# Patient Record
Sex: MALE | Race: WHITE | NOT HISPANIC OR LATINO | Employment: OTHER | URBAN - METROPOLITAN AREA
[De-identification: names, ages, dates, MRNs, and addresses within clinical notes are randomized per-mention and may not be internally consistent; named-entity substitution may affect disease eponyms.]

---

## 2019-09-24 ENCOUNTER — LAB VISIT (OUTPATIENT)
Dept: LAB | Facility: HOSPITAL | Age: 54
End: 2019-09-24
Attending: PSYCHIATRY & NEUROLOGY
Payer: OTHER GOVERNMENT

## 2019-09-24 ENCOUNTER — OFFICE VISIT (OUTPATIENT)
Dept: NEUROLOGY | Facility: CLINIC | Age: 54
End: 2019-09-24
Payer: OTHER GOVERNMENT

## 2019-09-24 VITALS
DIASTOLIC BLOOD PRESSURE: 88 MMHG | BODY MASS INDEX: 31.14 KG/M2 | HEIGHT: 72 IN | SYSTOLIC BLOOD PRESSURE: 141 MMHG | HEART RATE: 69 BPM | WEIGHT: 229.94 LBS

## 2019-09-24 DIAGNOSIS — G44.019 EPISODIC CLUSTER HEADACHE, NOT INTRACTABLE: ICD-10-CM

## 2019-09-24 DIAGNOSIS — G44.019 EPISODIC CLUSTER HEADACHE, NOT INTRACTABLE: Primary | ICD-10-CM

## 2019-09-24 LAB
ALBUMIN SERPL BCP-MCNC: 3.8 G/DL (ref 3.5–5.2)
ALP SERPL-CCNC: 91 U/L (ref 55–135)
ALT SERPL W/O P-5'-P-CCNC: 63 U/L (ref 10–44)
ANION GAP SERPL CALC-SCNC: 5 MMOL/L (ref 8–16)
AST SERPL-CCNC: 43 U/L (ref 10–40)
BASOPHILS # BLD AUTO: 0.03 K/UL (ref 0–0.2)
BASOPHILS NFR BLD: 0.5 % (ref 0–1.9)
BILIRUB SERPL-MCNC: 0.4 MG/DL (ref 0.1–1)
BUN SERPL-MCNC: 10 MG/DL (ref 6–20)
CALCIUM SERPL-MCNC: 8.8 MG/DL (ref 8.7–10.5)
CHLORIDE SERPL-SCNC: 105 MMOL/L (ref 95–110)
CO2 SERPL-SCNC: 29 MMOL/L (ref 23–29)
CREAT SERPL-MCNC: 1 MG/DL (ref 0.5–1.4)
CRP SERPL-MCNC: 5.8 MG/L (ref 0–8.2)
DIFFERENTIAL METHOD: ABNORMAL
EOSINOPHIL # BLD AUTO: 0.1 K/UL (ref 0–0.5)
EOSINOPHIL NFR BLD: 1.8 % (ref 0–8)
ERYTHROCYTE [DISTWIDTH] IN BLOOD BY AUTOMATED COUNT: 12.5 % (ref 11.5–14.5)
ERYTHROCYTE [SEDIMENTATION RATE] IN BLOOD BY WESTERGREN METHOD: 4 MM/HR (ref 0–10)
EST. GFR  (AFRICAN AMERICAN): >60 ML/MIN/1.73 M^2
EST. GFR  (NON AFRICAN AMERICAN): >60 ML/MIN/1.73 M^2
GLUCOSE SERPL-MCNC: 103 MG/DL (ref 70–110)
HCT VFR BLD AUTO: 45.4 % (ref 40–54)
HGB BLD-MCNC: 15.5 G/DL (ref 14–18)
LYMPHOCYTES # BLD AUTO: 1.1 K/UL (ref 1–4.8)
LYMPHOCYTES NFR BLD: 18.6 % (ref 18–48)
MCH RBC QN AUTO: 34.1 PG (ref 27–31)
MCHC RBC AUTO-ENTMCNC: 34.1 G/DL (ref 32–36)
MCV RBC AUTO: 100 FL (ref 82–98)
MONOCYTES # BLD AUTO: 0.8 K/UL (ref 0.3–1)
MONOCYTES NFR BLD: 12.7 % (ref 4–15)
NEUTROPHILS # BLD AUTO: 4 K/UL (ref 1.8–7.7)
NEUTROPHILS NFR BLD: 66.6 % (ref 38–73)
PLATELET # BLD AUTO: 257 K/UL (ref 150–350)
PMV BLD AUTO: 9.3 FL (ref 9.2–12.9)
POTASSIUM SERPL-SCNC: 3.9 MMOL/L (ref 3.5–5.1)
PROT SERPL-MCNC: 7 G/DL (ref 6–8.4)
RBC # BLD AUTO: 4.54 M/UL (ref 4.6–6.2)
SODIUM SERPL-SCNC: 139 MMOL/L (ref 136–145)
WBC # BLD AUTO: 6.07 K/UL (ref 3.9–12.7)

## 2019-09-24 PROCEDURE — 86140 C-REACTIVE PROTEIN: CPT

## 2019-09-24 PROCEDURE — 99999 PR PBB SHADOW E&M-EST. PATIENT-LVL III: CPT | Mod: PBBFAC,,, | Performed by: PSYCHIATRY & NEUROLOGY

## 2019-09-24 PROCEDURE — 85025 COMPLETE CBC W/AUTO DIFF WBC: CPT

## 2019-09-24 PROCEDURE — 99204 PR OFFICE/OUTPT VISIT, NEW, LEVL IV, 45-59 MIN: ICD-10-PCS | Mod: S$PBB,,, | Performed by: PSYCHIATRY & NEUROLOGY

## 2019-09-24 PROCEDURE — 85652 RBC SED RATE AUTOMATED: CPT

## 2019-09-24 PROCEDURE — 36415 COLL VENOUS BLD VENIPUNCTURE: CPT

## 2019-09-24 PROCEDURE — 99204 OFFICE O/P NEW MOD 45 MIN: CPT | Mod: S$PBB,,, | Performed by: PSYCHIATRY & NEUROLOGY

## 2019-09-24 PROCEDURE — 80053 COMPREHEN METABOLIC PANEL: CPT

## 2019-09-24 PROCEDURE — 99213 OFFICE O/P EST LOW 20 MIN: CPT | Mod: PBBFAC | Performed by: PSYCHIATRY & NEUROLOGY

## 2019-09-24 PROCEDURE — 99999 PR PBB SHADOW E&M-EST. PATIENT-LVL III: ICD-10-PCS | Mod: PBBFAC,,, | Performed by: PSYCHIATRY & NEUROLOGY

## 2019-09-24 RX ORDER — BUDESONIDE 0.25 MG/2ML
0.25 INHALANT ORAL
COMMUNITY
End: 2020-09-17

## 2019-09-24 RX ORDER — LISINOPRIL AND HYDROCHLOROTHIAZIDE 12.5; 2 MG/1; MG/1
TABLET ORAL
COMMUNITY
Start: 2019-08-30 | End: 2020-01-08 | Stop reason: SDUPTHER

## 2019-09-24 NOTE — PROGRESS NOTES
Neurology Consult Note    Chief Complaint: right sided headache    HPI:   Enoch Barr is a 54 y.o. male with medical conditions as outlined below who presents for further evaluation of right sided headache. He states he has been having headaches for the past 2 years. He denies a history of migraines or headaches when he was younger. He states he will get a sharp pain above his right eye and it will progress to involve the whole right side of his head. He states this gets up to a 9/10 pain in intensity and can last 3 hours. He states sometimes taking ibuprofen 600mg helps with this headache. He states he has been taking ibuprofen daily for the past 2 years. He reports sometimes it helps and sometimes it doesn't. He states when he gets the headache, his right eye turns red and he will have tearing from this eye. He denies ptosis or droopy eyelid. He states sometimes he will get blurry vision, but denies diplopia or loss of vision. He states he will get a runny nose at times. He states he has a constant 1-2/10 pain, however, around 5 pm four to 5 times a week, his headache will worsen to a 9/10 pain. He states he had an MRI brain done at [facility] within the past year for this and he was told everything was okay. He has no further complaints.     Past Medical History:  Past Medical History:   Diagnosis Date    Hypertension     on medication       Past Surgical History:  Past Surgical History:   Procedure Laterality Date    HERNIA REPAIR      SINUS SURGERY  2012       Social History:  Social History     Socioeconomic History    Marital status:      Spouse name: Not on file    Number of children: Not on file    Years of education: Not on file    Highest education level: Not on file   Occupational History    Not on file   Social Needs    Financial resource strain: Not on file    Food insecurity:     Worry: Not on file     Inability: Not on file    Transportation needs:     Medical: Not on file      Non-medical: Not on file   Tobacco Use    Smoking status: Former Smoker     Last attempt to quit: 1999     Years since quittin.3    Smokeless tobacco: Never Used   Substance and Sexual Activity    Alcohol use: Yes     Alcohol/week: 7.0 standard drinks     Types: 7 Glasses of wine per week     Comment: beer 2-3 daily    Drug use: No    Sexual activity: Yes     Partners: Female   Lifestyle    Physical activity:     Days per week: Not on file     Minutes per session: Not on file    Stress: Not on file   Relationships    Social connections:     Talks on phone: Not on file     Gets together: Not on file     Attends Pentecostal service: Not on file     Active member of club or organization: Not on file     Attends meetings of clubs or organizations: Not on file     Relationship status: Not on file   Other Topics Concern    Not on file   Social History Narrative    Not on file       Family History:  History reviewed. No pertinent family history.    Medications:  Current Outpatient Medications   Medication Sig Dispense Refill    budesonide (PULMICORT) 0.25 mg/2 mL nebulizer solution Inhale 0.25 mg into the lungs.      fexofenadine (ALLEGRA) 30 MG tablet Take 30 mg by mouth once daily.      fluticasone (FLONASE) 50 mcg/actuation nasal spray 2 sprays by Each Nare route once daily.      lisinopril-hydrochlorothiazide (PRINZIDE,ZESTORETIC) 20-12.5 mg per tablet Take 1 tablet by mouth once daily.  6    omeprazole (PRILOSEC) 20 MG capsule Take 20 mg by mouth once daily.  6    lisinopril-hydrochlorothiazide (PRINZIDE,ZESTORETIC) 20-12.5 mg per tablet TAKE 1 TABLET BY MOUTH ONCE DAILY       No current facility-administered medications for this visit.        Allergies:  Review of patient's allergies indicates:  No Known Allergies    ROS:  A 12 point review of system was negative aside from pertinent positives and negatives as outlined above.    Physical Exam  Vitals:    19 1456   BP: (!) 141/88   Pulse:  69       General: well nourished, well developed  Eyes: no scleral icterus   Nose: nasal turbinates intact  Neck: supple, ROM intact  Skin: no rash or ecchymosis  Joints: no swelling or erythema  Cardiac: regular rate and rhythm  Lungs: clear to auscultation bilaterally    Neuro:  Mental status: AAO x 3, no dysarthria, no aphasia, communicating appropriately  CN: PERRL, EOMI, VFF, V1-V3 sensation intact, no facial asymmetry, hearing grossly intact, tongue midline  Motor:   RUE 5/5  RLE 5/5  LUE 5/5  LLE 5/5    Normal bulk and tone    Reflexes: 2+ throughout, toes equivocal bilaterally  Sensory: intact to light touch throughout  Coordination: no dysmetria on FTN  Gait: steady    Prior Imaging/Labs:  Will try to obtain outside MRI brain records      Assessment and Plan:    54 y.o. male with right sided headaches likely 2/2 cluster headache.    1. Episodic cluster headache, not intractable  - galcanezumab-gnlm (GALCANEZUMAB-GNLM) 100 mg/mL Syrg; Inject 300 mg into the skin every 28 days.  Dispense: 3 mL; Refill: 0  - Sedimentation rate; Future  - C-reactive protein; Future  - CBC auto differential; Future  - Comprehensive metabolic panel; Future  Patient was educated on medication overuse headache and was advised to limit over the counter medication for headache including ibuprofen to 2-3 times a week.      Patient was made aware of side effects of medication(s) prescribed and was advised to notify me if he experiences any.        Patient was advised to notify me for worsening symptoms. I will see patient back in 1 month or sooner if necessary.     Thank you for this consultation.    Jacqueline Pacheco DO  [facility]  [address] [facility] [address]  [place] [id number] [phone]

## 2019-09-25 ENCOUNTER — TELEPHONE (OUTPATIENT)
Dept: PHARMACY | Facility: CLINIC | Age: 54
End: 2019-09-25

## 2019-09-26 ENCOUNTER — OFFICE VISIT (OUTPATIENT)
Dept: OPTOMETRY | Facility: CLINIC | Age: 54
End: 2019-09-26
Payer: OTHER GOVERNMENT

## 2019-09-26 DIAGNOSIS — H53.8 BLURRY VISION: Primary | ICD-10-CM

## 2019-09-26 DIAGNOSIS — H52.7 REFRACTIVE ERROR: ICD-10-CM

## 2019-09-26 PROCEDURE — 92004 PR EYE EXAM, NEW PATIENT,COMPREHESV: ICD-10-PCS | Mod: S$PBB,,, | Performed by: OPTOMETRIST

## 2019-09-26 PROCEDURE — 92015 DETERMINE REFRACTIVE STATE: CPT | Mod: ,,, | Performed by: OPTOMETRIST

## 2019-09-26 PROCEDURE — 99211 OFF/OP EST MAY X REQ PHY/QHP: CPT | Mod: PBBFAC,PO | Performed by: OPTOMETRIST

## 2019-09-26 PROCEDURE — 99999 PR PBB SHADOW E&M-EST. PATIENT-LVL I: CPT | Mod: PBBFAC,,, | Performed by: OPTOMETRIST

## 2019-09-26 PROCEDURE — 92004 COMPRE OPH EXAM NEW PT 1/>: CPT | Mod: S$PBB,,, | Performed by: OPTOMETRIST

## 2019-09-26 PROCEDURE — 92015 PR REFRACTION: ICD-10-PCS | Mod: ,,, | Performed by: OPTOMETRIST

## 2019-09-26 PROCEDURE — 99999 PR PBB SHADOW E&M-EST. PATIENT-LVL I: ICD-10-PCS | Mod: PBBFAC,,, | Performed by: OPTOMETRIST

## 2019-09-26 NOTE — PROGRESS NOTES
Subjective:       Patient ID: Enoch Barr is a 54 y.o. male      Chief Complaint   Patient presents with    Concerns About Ocular Health    Hypertensive Eye Exam     History of Present Illness  Dls: 1 yr     53 y/o male presents today with c/o blurry vision at distance and near ou.     Pt wears pal's.      No tearing  + itching  No burning   No pain   + ha's  + floaters  No flashes    Eye meds  None    Assessment/Plan:     1. Blurry vision  Good ocular health OU    2. Refractive error  Educated patient on refractive error and discussed lens options. Dispensed updated spectacle Rx. Educated about adaptation period to new specs.    Eyeglass Final Rx     Eyeglass Final Rx       Sphere Cylinder Axis Add    Right -0.75 +1.00 155 +2.00    Left -0.25 +0.25 165 +2.00    Expiration Date:  9/26/2020                  Follow up in about 1 year (around 9/26/2020).

## 2019-10-02 NOTE — TELEPHONE ENCOUNTER
Submitted prior authorization for EMGALITY 100 MG/ML (300 MG)  to insurance [employer] for review on 10/2/2019 [facility]

## 2019-10-07 NOTE — TELEPHONE ENCOUNTER
DOCUMENTATION ONLY:   Prior authorization for EMGALITY 100 MG/ML approved from 10/3/2019 to 03/31/2020.     Co-pay: $53    Patient Assistance IS NOT required.     Forward to clinical pharmacist for consult & shipment. [facility]LC

## 2019-10-21 ENCOUNTER — TELEPHONE (OUTPATIENT)
Dept: PHARMACY | Facility: CLINIC | Age: 54
End: 2019-10-21

## 2019-10-22 NOTE — TELEPHONE ENCOUNTER
Call to complete initial consult, no answer. Unable to leave voicemail.     Rui Mahajan, PharmD  Clinical Pharmacist  [facility]  P: [phone]

## 2019-10-23 ENCOUNTER — OFFICE VISIT (OUTPATIENT)
Dept: NEUROLOGY | Facility: CLINIC | Age: 54
End: 2019-10-23
Payer: OTHER GOVERNMENT

## 2019-10-23 VITALS
SYSTOLIC BLOOD PRESSURE: 133 MMHG | WEIGHT: 229 LBS | BODY MASS INDEX: 31.02 KG/M2 | HEIGHT: 72 IN | HEART RATE: 80 BPM | DIASTOLIC BLOOD PRESSURE: 89 MMHG

## 2019-10-23 DIAGNOSIS — G44.019 EPISODIC CLUSTER HEADACHE, NOT INTRACTABLE: Primary | ICD-10-CM

## 2019-10-23 PROCEDURE — 99999 PR PBB SHADOW E&M-EST. PATIENT-LVL III: ICD-10-PCS | Mod: PBBFAC,,, | Performed by: PSYCHIATRY & NEUROLOGY

## 2019-10-23 PROCEDURE — 99213 OFFICE O/P EST LOW 20 MIN: CPT | Mod: PBBFAC | Performed by: PSYCHIATRY & NEUROLOGY

## 2019-10-23 PROCEDURE — 99999 PR PBB SHADOW E&M-EST. PATIENT-LVL III: CPT | Mod: PBBFAC,,, | Performed by: PSYCHIATRY & NEUROLOGY

## 2019-10-23 PROCEDURE — 99214 PR OFFICE/OUTPT VISIT, EST, LEVL IV, 30-39 MIN: ICD-10-PCS | Mod: S$PBB,,, | Performed by: PSYCHIATRY & NEUROLOGY

## 2019-10-23 PROCEDURE — 99214 OFFICE O/P EST MOD 30 MIN: CPT | Mod: S$PBB,,, | Performed by: PSYCHIATRY & NEUROLOGY

## 2019-10-23 NOTE — PROGRESS NOTES
Neurology Follow Up Note    Chief Complaint: follow up for headaches    Interval History:  Since last visit, patient states his headaches have remained stable. He continues to get right sided constant headaches associated with tearing from his eye and nasal congestion. He states the pain becomes severe around 4-5 in the afternoon, and then subsides after a few hours.  He states he was unable to obtain emgality as of yet. I called [facility] and they state patient can come in tomorrow for initial consultation and to obtain medication.       Initial Visit 9/24/19:  Enoch Barr is a 54 y.o. male with medical conditions as outlined below who presents for further evaluation of right sided headache. He states he has been having headaches for the past 2 years. He denies a history of migraines or headaches when he was younger. He states he will get a sharp pain above his right eye and it will progress to involve the whole right side of his head. He states this gets up to a 9/10 pain in intensity and can last 3 hours. He states sometimes taking ibuprofen 600mg helps with this headache. He states he has been taking ibuprofen daily for the past 2 years. He reports sometimes it helps and sometimes it doesn't. He states when he gets the headache, his right eye turns red and he will have tearing from this eye. He denies ptosis or droopy eyelid. He states sometimes he will get blurry vision, but denies diplopia or loss of vision. He states he will get a runny nose at times. He states he has a constant 1-2/10 pain, however, around 5 pm four to 5 times a week, his headache will worsen to a 9/10 pain. He states he had an MRI brain done at [facility] within the past year for this and he was told everything was okay. He has no further complaints.     Past Medical History:  Past Medical History:   Diagnosis Date    Hypertension     on medication       Past Surgical History:  Past Surgical History:   Procedure  Laterality Date    HERNIA REPAIR      SINUS SURGERY         Social History:  Social History     Socioeconomic History    Marital status:      Spouse name: Not on file    Number of children: Not on file    Years of education: Not on file    Highest education level: Not on file   Occupational History    Not on file   Social Needs    Financial resource strain: Not on file    Food insecurity:     Worry: Not on file     Inability: Not on file    Transportation needs:     Medical: Not on file     Non-medical: Not on file   Tobacco Use    Smoking status: Former Smoker     Last attempt to quit: 1999     Years since quittin.4    Smokeless tobacco: Never Used   Substance and Sexual Activity    Alcohol use: Yes     Alcohol/week: 7.0 standard drinks     Types: 7 Glasses of wine per week     Comment: beer 2-3 daily    Drug use: No    Sexual activity: Yes     Partners: Female   Lifestyle    Physical activity:     Days per week: Not on file     Minutes per session: Not on file    Stress: Not on file   Relationships    Social connections:     Talks on phone: Not on file     Gets together: Not on file     Attends Yazidism service: Not on file     Active member of club or organization: Not on file     Attends meetings of clubs or organizations: Not on file     Relationship status: Not on file   Other Topics Concern    Not on file   Social History Narrative    Not on file       Family History:  Family History   Problem Relation Age of Onset    No Known Problems Mother     No Known Problems Father     No Known Problems Sister     No Known Problems Brother     No Known Problems Maternal Aunt     No Known Problems Maternal Uncle     No Known Problems Paternal Aunt     No Known Problems Paternal Uncle     No Known Problems Maternal Grandmother     No Known Problems Maternal Grandfather     No Known Problems Paternal Grandmother     No Known Problems Paternal Grandfather     Amblyopia  Neg Hx     Blindness Neg Hx     Cancer Neg Hx     Cataracts Neg Hx     Diabetes Neg Hx     Glaucoma Neg Hx     Hypertension Neg Hx     Macular degeneration Neg Hx     Retinal detachment Neg Hx     Strabismus Neg Hx     Stroke Neg Hx     Thyroid disease Neg Hx        Medications:  Current Outpatient Medications   Medication Sig Dispense Refill    budesonide (PULMICORT) 0.25 mg/2 mL nebulizer solution Inhale 0.25 mg into the lungs.      fexofenadine (ALLEGRA) 30 MG tablet Take 30 mg by mouth once daily.      fluticasone (FLONASE) 50 mcg/actuation nasal spray 2 sprays by Each Nare route once daily.      galcanezumab-gnlm (GALCANEZUMAB-GNLM) 300 mg/3 mL (100 mg/mL x 3) Syrg Inject 300 mg into the skin every 28 days. 3 mL 0    lisinopril-hydrochlorothiazide (PRINZIDE,ZESTORETIC) 20-12.5 mg per tablet Take 1 tablet by mouth once daily.  6    lisinopril-hydrochlorothiazide (PRINZIDE,ZESTORETIC) 20-12.5 mg per tablet TAKE 1 TABLET BY MOUTH ONCE DAILY      omeprazole (PRILOSEC) 20 MG capsule Take 20 mg by mouth once daily.  6     No current facility-administered medications for this visit.        Allergies:  Review of patient's allergies indicates:  No Known Allergies    ROS:  A 12 point review of system was negative aside from pertinent positives and negatives as outlined above.    Physical Exam  Vitals:    10/23/19 1505   BP: 133/89   Pulse: 80       General: well nourished, well developed  Eyes: no scleral icterus   Nose: nasal turbinates intact  Neck: supple, ROM intact  Skin: no rash or ecchymosis  Joints: no swelling or erythema  Cardiac: regular rate and rhythm  Lungs: clear to auscultation bilaterally    Neuro:  Mental status: AAO x 3, no dysarthria, no aphasia, communicating appropriately  CN: PERRL, EOMI, VFF, V1-V3 sensation intact, no facial asymmetry, hearing grossly intact, tongue midline  Motor:   RUE 5/5  RLE 5/5  LUE 5/5  LLE 5/5    Normal bulk and tone    Reflexes: 2+ throughout, toes  equivocal bilaterally  Sensory: intact to light touch throughout  Coordination: no dysmetria on FTN  Gait: steady    Prior Imaging/Labs:  Request for MRI sent to [place] at last visit, will have staff resend to obtain these records      Assessment and Plan:    54 y.o. male with cluster headaches.    1. Episodic cluster headache, not intractable  - galcanezumab-gnlm (GALCANEZUMAB-GNLM) 100 mg/mL Syrg; Inject 300 mg into the skin every 28 days.  Dispense: 3 mL; Refill: 0 - spoke to [facility] and patient can obtain this medication at 8 am tomorrow  Patient was educated on medication overuse headache and was advised to limit over the counter medication for headache including ibuprofen to 2-3 times a week.    Patient was made aware of side effects of medication(s) prescribed and was advised to notify me if he experiences any.         Patient was advised to notify me for worsening symptoms. If he doesn't notice a difference in symptoms in a week, he was advised to call and notify me and we will consider an alternative.  I will see patient back in 1 month or sooner if necessary.     Jacqueline Pacheco DO  [facility]  [address] [facility] [address]  [place] [id number]  [phone]

## 2019-10-24 ENCOUNTER — TELEPHONE (OUTPATIENT)
Dept: PHARMACY | Facility: CLINIC | Age: 54
End: 2019-10-24

## 2019-10-24 NOTE — TELEPHONE ENCOUNTER
Initial Emgality consult completed on 10/24 . Emgality 100mg x3 will be picked up from 001 on 10/24. $53.00 copay. Patient intends to start Emgality on 10/24. Address confirmed. Confirmed 2 patient identifiers - name and . Therapy Appropriate.    Mr. Barr suffers cluster headaches almost daily, starting usually after lunchtime and lasting about 2 hours. These headaches started about a year and a half ago. His pain level ranges from 3-7/10. His headaches frequently make him miss out on going to the gym. He rates his QoL a 7/10.    Indication: Migraine prophlaxis  Goals of treatment: reduction in cluster attach frequency    --Injection experience: None, not needle scared and confident in ability to administer  Informed patient on online injection video on  website.      Store in refrigerator prior to use (do not freeze, do not shake, keep in original box until use).    Counseled patient on administration directions:  - Inject (300 mg) comes in three (100 mg) prefilled syringes, which are taken one after the other at the start of a cluster period and then every month until the end of the cluster period.  - Take out of the refrigerator 30 minutes prior to injection to reach room temperature.  - Wash hands before and after injection.  - Monthly RX will come with gauze, band aids, and alcohol swabs.  - Patient may self-inject in either the front/top of the thighs, abdomen- but at least 2 inches away from belly button   - If someone else is giving the injection, they may also use the outer part of your upper arm or your buttocks.   - Use 3 different injection sites.   - Patient was instructed to rotate injections sites monthly.  - Inspect medication - should be clear and colorless to slightly yellow to slightly brown.   - Patient is to wipe down the injection site with the alcohol pad, wait to dry.    - Remove white cap from needle (pull straight off).  - Gently pinch the skin where you will inject and  insert needle at a 45-degree angle.  - Slowly depress plunger with thumb until all medicine injected   - If you have bleeding at the injection site, press a cotton ball or gauze over the injection site. Do not rub the injection site.  - Do not put the needle cap back on the prefilled syringe.  - Patient will use sharps container; once full, per state law, she/he may securely lock the sharps container and place in trash. Pharmacy will replace the sharps at no additional charge.    Patient was counseled on possible side effects:  - Injection site reaction: redness, soreness, itching, bruising, which should resolve within 3-5 days.  - Allergic reactions: iswelling of face, mouth, tongue, throat, trouble breathing.   - Informed that there is no data in pregnancy/breastfeeding.     Consultation included the importance of compliance and of keeping all follow up appointments.  Patient understands to report any medication changes to [facility] and provider. All questions answered and addressed to patients satisfaction. RPh will touchbase with pt in 7 days and [facility] will contact patient in 3 weeks for refills.    Rui Mahajan, PharmD  Clinical Pharmacist  [facility]  P: [phone]    InBasket sent 10/24 @ 10:44AM

## 2019-10-31 ENCOUNTER — OFFICE VISIT (OUTPATIENT)
Dept: OPTOMETRY | Facility: CLINIC | Age: 54
End: 2019-10-31
Payer: OTHER GOVERNMENT

## 2019-10-31 ENCOUNTER — PATIENT MESSAGE (OUTPATIENT)
Dept: NEUROLOGY | Facility: CLINIC | Age: 54
End: 2019-10-31

## 2019-10-31 DIAGNOSIS — H11.31 SUBCONJUNCTIVAL HEMORRHAGE OF RIGHT EYE: Primary | ICD-10-CM

## 2019-10-31 PROCEDURE — 99999 PR PBB SHADOW E&M-EST. PATIENT-LVL II: ICD-10-PCS | Mod: PBBFAC,,, | Performed by: OPTOMETRIST

## 2019-10-31 PROCEDURE — 99999 PR PBB SHADOW E&M-EST. PATIENT-LVL II: CPT | Mod: PBBFAC,,, | Performed by: OPTOMETRIST

## 2019-10-31 PROCEDURE — 99212 OFFICE O/P EST SF 10 MIN: CPT | Mod: PBBFAC,PO | Performed by: OPTOMETRIST

## 2019-10-31 PROCEDURE — 92014 COMPRE OPH EXAM EST PT 1/>: CPT | Mod: S$PBB,,, | Performed by: OPTOMETRIST

## 2019-10-31 PROCEDURE — 92014 PR EYE EXAM, EST PATIENT,COMPREHESV: ICD-10-PCS | Mod: S$PBB,,, | Performed by: OPTOMETRIST

## 2019-10-31 NOTE — PATIENT INSTRUCTIONS
Subconjunctival Hemorrhage    A subconjunctival hemorrhage is when a blood vessel breaks open in the white of the eye. It causes a bright red patch in the white of the eye. It is similar to a bruise on the skin. This type of hemorrhage is common. It can look quite alarming, but it is usually harmless.    Understanding the conjunctiva  The conjunctiva is the thin layer that covers the inside of the eyelids and the surface of the eye. It has many tiny blood vessels that bring oxygen and nutrients to the eye. The sclera is the white part of the eye that lies beneath the conjunctiva. Sometimes a blood vessel in the conjunctiva breaks open and bleeds. The blood then collects under the conjunctiva and turns part of the eye red. Over several weeks, your body then absorbs the blood.    What causes subconjunctival hemorrhage?  In many cases the cause isnt known. But some health conditions may make it more likely. These include:  Eye injury  Eye surgery  High blood pressure  Inflammation of the conjunctiva  Contact lens use  Diabetes  Arteriosclerosis  Tumor of the conjunctiva  Diseases that affect blood clotting  Violent sneezing, coughing, or vomiting  Certain medicines that can increase bleeding, such as aspirin  Pushing hard during childbirth  Straining during constipation    Symptoms of subconjunctival hemorrhage  The main symptom is a red patch on the eye. You may notice it after waking up in the morning. In most cases just 1 eye will have a hemorrhage. It usually happens once and then goes away. But some health conditions may cause repeat hemorrhages. You may feel like you have something in your eye, but this is not common.    Diagnosing subconjunctival hemorrhage  Your health care provider will ask about your health history. You may have a physical exam. This includes a basic eye exam. Your health care provider will make sure you dont have other causes of red eye that may need other treatment.  You will need to see  an eye doctor if you have had an eye injury. This doctor might use a special lighted microscope to look closely at your eye. This helps show the doctor if the injury hurt the eye itself and not just its outer layer.  If this is not your first subconjunctival hemorrhage, your doctor may need to find the cause. For example, you may need blood tests to check for a blood clotting disorder.  Treatment for subconjunctival hemorrhage  In most cases you will not need treatment. The red patch will usually go away on its own in a few weeks. It will turn from red to brown and then yellow. There are no treatments to speed up this process. Your doctor may suggest you use a warm compress and artificial tears eye drops to help relieve some of the redness.  If your subconjunctival hemorrhage was caused by a health condition, that condition will be treated. For example, you may need a blood pressure medicine to treat high blood pressure.    When to call your health care provider  Call your health care provider right away if you have any of these:  Hemorrhage that doesnt go away in 2 to 3 weeks  Eye pain  Loss of eyesight  Another subconjunctival hemorrhage       © 0530-4592 [employer]. [address] [place] [address], [place] [address]. All rights reserved. This information is not intended as a substitute for professional medical care. Always follow your healthcare professional's instructions.

## 2019-10-31 NOTE — PROGRESS NOTES
Subjective:       Patient ID: Enoch Barr is a 54 y.o. male      Chief Complaint   Patient presents with    Concerns About Ocular Health     History of Present Illness  Dls: 9/26/19 Dr. Chapman     55 y/o male presents today with c/o x 1 day blood red od no tearing no mucous no pain slight irritation od no changes in vision no  Photophobia.   Pt was seen in urgent care x 1 day. Pt did not use any gtts.         Assessment/Plan:     1. Subconjunctival hemorrhage of right eye  Discussed diagnosis with patient. Educated patient that it can take 2-3 weeks for symptoms to resolve. Artificial tears QID for comfort. Can alternate between warm and cold compresses. RTC if no improvement in symptoms.       Follow up if symptoms worsen or fail to improve.

## 2019-11-06 ENCOUNTER — PATIENT MESSAGE (OUTPATIENT)
Dept: NEUROLOGY | Facility: CLINIC | Age: 54
End: 2019-11-06

## 2019-11-06 DIAGNOSIS — G44.019 EPISODIC CLUSTER HEADACHE, NOT INTRACTABLE: Primary | ICD-10-CM

## 2019-11-06 RX ORDER — METHYLPREDNISOLONE 4 MG/1
TABLET ORAL
Qty: 1 PACKAGE | Refills: 0 | Status: SHIPPED | OUTPATIENT
Start: 2019-11-06 | End: 2019-11-27

## 2019-11-18 ENCOUNTER — TELEPHONE (OUTPATIENT)
Dept: PHARMACY | Facility: CLINIC | Age: 54
End: 2019-11-18

## 2019-11-18 DIAGNOSIS — G44.019 EPISODIC CLUSTER HEADACHE, NOT INTRACTABLE: ICD-10-CM

## 2019-11-18 NOTE — TELEPHONE ENCOUNTER
Call attempt 1 for Emgality refill - LVM and MyChart message sent. Script is out of refills; request sent to provider.

## 2019-11-20 ENCOUNTER — PATIENT MESSAGE (OUTPATIENT)
Dept: NEUROLOGY | Facility: CLINIC | Age: 54
End: 2019-11-20

## 2019-11-20 ENCOUNTER — OFFICE VISIT (OUTPATIENT)
Dept: NEUROLOGY | Facility: CLINIC | Age: 54
End: 2019-11-20
Payer: OTHER GOVERNMENT

## 2019-11-20 VITALS
BODY MASS INDEX: 31.2 KG/M2 | WEIGHT: 230.38 LBS | HEIGHT: 72 IN | DIASTOLIC BLOOD PRESSURE: 94 MMHG | SYSTOLIC BLOOD PRESSURE: 145 MMHG | HEART RATE: 66 BPM

## 2019-11-20 DIAGNOSIS — G44.019 EPISODIC CLUSTER HEADACHE, NOT INTRACTABLE: ICD-10-CM

## 2019-11-20 PROCEDURE — 99213 OFFICE O/P EST LOW 20 MIN: CPT | Mod: PBBFAC | Performed by: PSYCHIATRY & NEUROLOGY

## 2019-11-20 PROCEDURE — 99999 PR PBB SHADOW E&M-EST. PATIENT-LVL III: CPT | Mod: PBBFAC,,, | Performed by: PSYCHIATRY & NEUROLOGY

## 2019-11-20 PROCEDURE — 99214 OFFICE O/P EST MOD 30 MIN: CPT | Mod: S$PBB,,, | Performed by: PSYCHIATRY & NEUROLOGY

## 2019-11-20 PROCEDURE — 99999 PR PBB SHADOW E&M-EST. PATIENT-LVL III: ICD-10-PCS | Mod: PBBFAC,,, | Performed by: PSYCHIATRY & NEUROLOGY

## 2019-11-20 PROCEDURE — 99214 PR OFFICE/OUTPT VISIT, EST, LEVL IV, 30-39 MIN: ICD-10-PCS | Mod: S$PBB,,, | Performed by: PSYCHIATRY & NEUROLOGY

## 2019-11-20 NOTE — PROGRESS NOTES
Neurology Follow Up Note    Chief Complaint: follow up for headaches    Interval History:  Since last visit, patient feels his headaches have greatly improved on emgality. He states he tolerated medrol dose madeline well, but the past week, his headaches have started to come back. He states he has had 2-3 typical headaches in the past week. He only took emgality 100mg last month, however, he was supposed to take 300mg and was unaware of this. He is due for another injection soon and was advised to inject 300mg. He has no further complaints.    Last Visit 10/23/19:  Since last visit, patient states his headaches have remained stable. He continues to get right sided constant headaches associated with tearing from his eye and nasal congestion. He states the pain becomes severe around 4-5 in the afternoon, and then subsides after a few hours.  He states he was unable to obtain emgality as of yet. I called [facility] and they state patient can come in tomorrow for initial consultation and to obtain medication.         Initial Visit 9/24/19:  Enoch Barr is a 54 y.o. male with medical conditions as outlined below who presents for further evaluation of right sided headache. He states he has been having headaches for the past 2 years. He denies a history of migraines or headaches when he was younger. He states he will get a sharp pain above his right eye and it will progress to involve the whole right side of his head. He states this gets up to a 9/10 pain in intensity and can last 3 hours. He states sometimes taking ibuprofen 600mg helps with this headache. He states he has been taking ibuprofen daily for the past 2 years. He reports sometimes it helps and sometimes it doesn't. He states when he gets the headache, his right eye turns red and he will have tearing from this eye. He denies ptosis or droopy eyelid. He states sometimes he will get blurry vision, but denies diplopia or loss of vision. He states he  will get a runny nose at times. He states he has a constant 1-2/10 pain, however, around 5 pm four to 5 times a week, his headache will worsen to a 9/10 pain. He states he had an MRI brain done at [facility] within the past year for this and he was told everything was okay. He has no further complaints.     Past Medical History:  Past Medical History:   Diagnosis Date    Hypertension     on medication       Past Surgical History:  Past Surgical History:   Procedure Laterality Date    HERNIA REPAIR      SINUS SURGERY         Social History:  Social History     Socioeconomic History    Marital status:      Spouse name: Not on file    Number of children: Not on file    Years of education: Not on file    Highest education level: Not on file   Occupational History    Not on file   Social Needs    Financial resource strain: Not on file    Food insecurity:     Worry: Not on file     Inability: Not on file    Transportation needs:     Medical: Not on file     Non-medical: Not on file   Tobacco Use    Smoking status: Former Smoker     Last attempt to quit: 1999     Years since quittin.5    Smokeless tobacco: Never Used   Substance and Sexual Activity    Alcohol use: Yes     Alcohol/week: 7.0 standard drinks     Types: 7 Glasses of wine per week     Comment: beer 2-3 daily    Drug use: No    Sexual activity: Yes     Partners: Female   Lifestyle    Physical activity:     Days per week: Not on file     Minutes per session: Not on file    Stress: Not on file   Relationships    Social connections:     Talks on phone: Not on file     Gets together: Not on file     Attends Mandaeism service: Not on file     Active member of club or organization: Not on file     Attends meetings of clubs or organizations: Not on file     Relationship status: Not on file   Other Topics Concern    Not on file   Social History Narrative    Not on file       Family History:  Family History   Problem Relation Age  of Onset    No Known Problems Mother     No Known Problems Father     No Known Problems Sister     No Known Problems Brother     No Known Problems Maternal Aunt     No Known Problems Maternal Uncle     No Known Problems Paternal Aunt     No Known Problems Paternal Uncle     No Known Problems Maternal Grandmother     No Known Problems Maternal Grandfather     No Known Problems Paternal Grandmother     No Known Problems Paternal Grandfather     Amblyopia Neg Hx     Blindness Neg Hx     Cancer Neg Hx     Cataracts Neg Hx     Diabetes Neg Hx     Glaucoma Neg Hx     Hypertension Neg Hx     Macular degeneration Neg Hx     Retinal detachment Neg Hx     Strabismus Neg Hx     Stroke Neg Hx     Thyroid disease Neg Hx        Medications:  Current Outpatient Medications   Medication Sig Dispense Refill    budesonide (PULMICORT) 0.25 mg/2 mL nebulizer solution Inhale 0.25 mg into the lungs.      fexofenadine (ALLEGRA) 30 MG tablet Take 30 mg by mouth once daily.      fluticasone (FLONASE) 50 mcg/actuation nasal spray 2 sprays by Each Nare route once daily.      galcanezumab-gnlm (GALCANEZUMAB-GNLM) 300 mg/3 mL (100 mg/mL x 3) Syrg Inject 300 mg into the skin every 28 days. 3 mL 0    lisinopril-hydrochlorothiazide (PRINZIDE,ZESTORETIC) 20-12.5 mg per tablet Take 1 tablet by mouth once daily.  6    lisinopril-hydrochlorothiazide (PRINZIDE,ZESTORETIC) 20-12.5 mg per tablet TAKE 1 TABLET BY MOUTH ONCE DAILY      methylPREDNISolone (MEDROL DOSEPACK) 4 mg tablet use as directed 1 Package 0    omeprazole (PRILOSEC) 20 MG capsule Take 20 mg by mouth once daily.  6     No current facility-administered medications for this visit.        Allergies:  Review of patient's allergies indicates:  No Known Allergies    ROS:  A 12 point review of system was negative aside from pertinent positives and negatives as outlined above.    Physical Exam  Vitals:    11/20/19 1459   BP: (!) 145/94   Pulse: 66       General:  well nourished, well developed  Eyes: no scleral icterus   Nose: nasal turbinates intact  Neck: supple, ROM intact  Skin: no rash or ecchymosis  Joints: no swelling or erythema  Cardiac: regular rate and rhythm  Lungs: clear to auscultation bilaterally    Neuro:  Mental status: AAO x 3, no dysarthria, no aphasia, communicating appropriately  CN: PERRL, EOMI, VFF, V1-V3 sensation intact, no facial asymmetry, hearing grossly intact, tongue midline  Motor:   RUE 5/5  RLE 5/5  LUE 5/5  LLE 5/5    Normal bulk and tone    Reflexes: 2+ throughout, toes equivocal bilaterally  Sensory: intact to light touch throughout  Coordination: no dysmetria on FTN  Gait: steady    Prior Imaging/Labs:  No recent neuroimaging available for review      Assessment and Plan:    54 y.o. male with cluster headache improving on emgality    1. Episodic cluster headache  - galcanezumab-gnlm (GALCANEZUMAB-GNLM) 300 mg/3 mL (100 mg/mL x 3) Syrg; Inject 300 mg into the skin every 28 days.  Dispense: 3 mL; Refill: 0  - MRI Brain Without Contrast; Future  - MRA Brain; Future      Patient was made aware of side effects of medication(s) prescribed and was advised to notify me if he experiences any.        Patient was advised to notify me for worsening symptoms. I will see patient back in 1 month  or sooner if necessary.       Jacqueline Pacheco DO  [facility]  [address] [facility] [address]  [place] [address]  [phone]

## 2019-11-21 ENCOUNTER — TELEPHONE (OUTPATIENT)
Dept: PHARMACY | Facility: CLINIC | Age: 54
End: 2019-11-21

## 2019-11-25 ENCOUNTER — TELEPHONE (OUTPATIENT)
Dept: NEUROLOGY | Facility: CLINIC | Age: 54
End: 2019-11-25

## 2019-12-02 ENCOUNTER — PATIENT MESSAGE (OUTPATIENT)
Dept: NEUROLOGY | Facility: CLINIC | Age: 54
End: 2019-12-02

## 2019-12-06 ENCOUNTER — HOSPITAL ENCOUNTER (OUTPATIENT)
Dept: RADIOLOGY | Facility: HOSPITAL | Age: 54
Discharge: HOME | End: 2019-12-06
Attending: PSYCHIATRY & NEUROLOGY
Payer: OTHER GOVERNMENT

## 2019-12-06 DIAGNOSIS — G44.019 EPISODIC CLUSTER HEADACHE, NOT INTRACTABLE: ICD-10-CM

## 2019-12-06 PROCEDURE — 70551 MRI BRAIN STEM W/O DYE: CPT | Mod: 26,,, | Performed by: RADIOLOGY

## 2019-12-06 PROCEDURE — 70544 MRA BRAIN WITHOUT CONTRAST: ICD-10-PCS | Mod: 26,59,, | Performed by: RADIOLOGY

## 2019-12-06 PROCEDURE — 70551 MRI BRAIN WITHOUT CONTRAST: ICD-10-PCS | Mod: 26,,, | Performed by: RADIOLOGY

## 2019-12-06 PROCEDURE — 70544 MR ANGIOGRAPHY HEAD W/O DYE: CPT | Mod: 26,59,, | Performed by: RADIOLOGY

## 2019-12-06 PROCEDURE — 70544 MR ANGIOGRAPHY HEAD W/O DYE: CPT | Mod: 59,TC

## 2019-12-06 PROCEDURE — 70551 MRI BRAIN STEM W/O DYE: CPT | Mod: TC

## 2019-12-18 ENCOUNTER — OFFICE VISIT (OUTPATIENT)
Dept: NEUROLOGY | Facility: CLINIC | Age: 54
End: 2019-12-18
Payer: OTHER GOVERNMENT

## 2019-12-18 VITALS
BODY MASS INDEX: 31.24 KG/M2 | HEART RATE: 61 BPM | HEIGHT: 72 IN | DIASTOLIC BLOOD PRESSURE: 89 MMHG | WEIGHT: 230.63 LBS | SYSTOLIC BLOOD PRESSURE: 156 MMHG

## 2019-12-18 DIAGNOSIS — G44.019 EPISODIC CLUSTER HEADACHE, NOT INTRACTABLE: Primary | ICD-10-CM

## 2019-12-18 PROCEDURE — 99999 PR PBB SHADOW E&M-EST. PATIENT-LVL III: CPT | Mod: PBBFAC,,, | Performed by: PSYCHIATRY & NEUROLOGY

## 2019-12-18 PROCEDURE — 99999 PR PBB SHADOW E&M-EST. PATIENT-LVL III: ICD-10-PCS | Mod: PBBFAC,,, | Performed by: PSYCHIATRY & NEUROLOGY

## 2019-12-18 PROCEDURE — 99213 OFFICE O/P EST LOW 20 MIN: CPT | Mod: PBBFAC | Performed by: PSYCHIATRY & NEUROLOGY

## 2019-12-18 PROCEDURE — 99213 PR OFFICE/OUTPT VISIT, EST, LEVL III, 20-29 MIN: ICD-10-PCS | Mod: S$PBB,,, | Performed by: PSYCHIATRY & NEUROLOGY

## 2019-12-18 PROCEDURE — 99213 OFFICE O/P EST LOW 20 MIN: CPT | Mod: S$PBB,,, | Performed by: PSYCHIATRY & NEUROLOGY

## 2019-12-18 NOTE — PROGRESS NOTES
Neurology Follow Up Note    Chief Complaint: follow up MRI results    Interval History:  Since last visit, patient states he has had no further headaches. He took emgality 300mg injection on 11/28/19 and tolerated this well. He had an MRI brain and MRA since last visit which were unremarkable.    Last Visit 11/20/19:  Since last visit, patient feels his headaches have greatly improved on emgality. He states he tolerated medrol dose madeline well, but the past week, his headaches have started to come back. He states he has had 2-3 typical headaches in the past week. He only took emgality 100mg last month, however, he was supposed to take 300mg and was unaware of this. He is due for another injection soon and was advised to inject 300mg. He has no further complaints.     Visit 10/23/19:  Since last visit, patient states his headaches have remained stable. He continues to get right sided constant headaches associated with tearing from his eye and nasal congestion. He states the pain becomes severe around 4-5 in the afternoon, and then subsides after a few hours.  He states he was unable to obtain emgality as of yet. I called [facility] and they state patient can come in tomorrow for initial consultation and to obtain medication.         Initial Visit 9/24/19:  Enoch Barr is a 54 y.o. male with medical conditions as outlined below who presents for further evaluation of right sided headache. He states he has been having headaches for the past 2 years. He denies a history of migraines or headaches when he was younger. He states he will get a sharp pain above his right eye and it will progress to involve the whole right side of his head. He states this gets up to a 9/10 pain in intensity and can last 3 hours. He states sometimes taking ibuprofen 600mg helps with this headache. He states he has been taking ibuprofen daily for the past 2 years. He reports sometimes it helps and sometimes it doesn't. He states  when he gets the headache, his right eye turns red and he will have tearing from this eye. He denies ptosis or droopy eyelid. He states sometimes he will get blurry vision, but denies diplopia or loss of vision. He states he will get a runny nose at times. He states he has a constant 1-2/10 pain, however, around 5 pm four to 5 times a week, his headache will worsen to a 9/10 pain. He states he had an MRI brain done at [facility] within the past year for this and he was told everything was okay. He has no further complaints.        Past Medical History:  Past Medical History:   Diagnosis Date    Hypertension     on medication       Past Surgical History:  Past Surgical History:   Procedure Laterality Date    HERNIA REPAIR      SINUS SURGERY         Social History:  Social History     Socioeconomic History    Marital status:      Spouse name: Not on file    Number of children: Not on file    Years of education: Not on file    Highest education level: Not on file   Occupational History    Not on file   Social Needs    Financial resource strain: Not on file    Food insecurity:     Worry: Not on file     Inability: Not on file    Transportation needs:     Medical: Not on file     Non-medical: Not on file   Tobacco Use    Smoking status: Former Smoker     Last attempt to quit: 1999     Years since quittin.5    Smokeless tobacco: Never Used   Substance and Sexual Activity    Alcohol use: Yes     Alcohol/week: 7.0 standard drinks     Types: 7 Glasses of wine per week     Comment: beer 2-3 daily    Drug use: No    Sexual activity: Yes     Partners: Female   Lifestyle    Physical activity:     Days per week: Not on file     Minutes per session: Not on file    Stress: Not on file   Relationships    Social connections:     Talks on phone: Not on file     Gets together: Not on file     Attends Episcopal service: Not on file     Active member of club or organization: Not on file     Attends  meetings of clubs or organizations: Not on file     Relationship status: Not on file   Other Topics Concern    Not on file   Social History Narrative    Not on file       Family History:  Family History   Problem Relation Age of Onset    No Known Problems Mother     No Known Problems Father     No Known Problems Sister     No Known Problems Brother     No Known Problems Maternal Aunt     No Known Problems Maternal Uncle     No Known Problems Paternal Aunt     No Known Problems Paternal Uncle     No Known Problems Maternal Grandmother     No Known Problems Maternal Grandfather     No Known Problems Paternal Grandmother     No Known Problems Paternal Grandfather     Amblyopia Neg Hx     Blindness Neg Hx     Cancer Neg Hx     Cataracts Neg Hx     Diabetes Neg Hx     Glaucoma Neg Hx     Hypertension Neg Hx     Macular degeneration Neg Hx     Retinal detachment Neg Hx     Strabismus Neg Hx     Stroke Neg Hx     Thyroid disease Neg Hx        Medications:  Current Outpatient Medications   Medication Sig Dispense Refill    budesonide (PULMICORT) 0.25 mg/2 mL nebulizer solution Inhale 0.25 mg into the lungs.      fexofenadine (ALLEGRA) 30 MG tablet Take 30 mg by mouth once daily.      fluticasone (FLONASE) 50 mcg/actuation nasal spray 2 sprays by Each Nare route once daily.      galcanezumab-gnlm (GALCANEZUMAB-GNLM) 300 mg/3 mL (100 mg/mL x 3) Syrg Inject 300 mg into the skin every 28 days. 3 mL 0    lisinopril-hydrochlorothiazide (PRINZIDE,ZESTORETIC) 20-12.5 mg per tablet Take 1 tablet by mouth once daily.  6    lisinopril-hydrochlorothiazide (PRINZIDE,ZESTORETIC) 20-12.5 mg per tablet TAKE 1 TABLET BY MOUTH ONCE DAILY      omeprazole (PRILOSEC) 20 MG capsule Take 20 mg by mouth once daily.  6     No current facility-administered medications for this visit.        Allergies:  Review of patient's allergies indicates:  No Known Allergies    ROS:  A 12 point review of system was negative aside  from pertinent positives and negatives as outlined above.    Physical Exam  Vitals:    12/18/19 1527   BP: (!) 156/89   Pulse: 61       General: well nourished, well developed  Eyes: no scleral icterus   Nose: nasal turbinates intact  Neck: supple, ROM intact  Skin: no rash or ecchymosis  Joints: no swelling or erythema  Cardiac: regular rate and rhythm  Lungs: clear to auscultation bilaterally    Neuro:  Mental status: AAO x 3, no dysarthria, no aphasia, communicating appropriately  CN: PERRL, EOMI, VFF, V1-V3 sensation intact, no facial asymmetry, hearing grossly intact, tongue midline  Motor:   RUE 5/5  RLE 5/5  LUE 5/5  LLE 5/5    Normal bulk and tone    Reflexes: 2+ throughout, toes equivocal bilaterally  Sensory: intact to light touch throughout  Coordination: no dysmetria on FTN  Gait: steady    Prior Imaging/Labs:  Reviewed      Assessment and Plan:    54 y.o. male with cluster headaches improved on emgality.    1. Episodic cluster headache  If patient remains headache free until his next injection was due, he was advised to take one emgality 100mg injection. If his headaches worsen prior to his next injection, he was advised to take 2 emgality 100mg injections for a total of 200mg.  I will see patient back in 6 weeks and depending on headache control, will determine if patient needs to continue on emgality.            Patient was advised to notify me for worsening symptoms. I will see patient back in 6 weeks or sooner if necessary.       Jacqueline Pacheco DO  [facility]  [address] [facility] [address]  [place] [address]  [phone]

## 2019-12-19 ENCOUNTER — TELEPHONE (OUTPATIENT)
Dept: PHARMACY | Facility: CLINIC | Age: 54
End: 2019-12-19

## 2019-12-19 NOTE — TELEPHONE ENCOUNTER
Emgality script is out of refills -- electronically requesting additional. Called patient to obtain next injection date -- patient was not reached, left a voicemail.

## 2019-12-24 ENCOUNTER — TELEPHONE (OUTPATIENT)
Dept: NEUROLOGY | Facility: CLINIC | Age: 54
End: 2019-12-24

## 2019-12-24 NOTE — TELEPHONE ENCOUNTER
Sent to Dr. Miller.        ----- Message from Michael Galindo sent at 12/24/2019  8:49 AM CST -----  Contact: [facility]  Type:  Pharmacy Calling to Clarify an RX    Name of Caller: Neela    Pharm[facility] Name: [facility]    Prescription Name: galcanezumab-gnlm (GALCANEZUMAB-GNLM) 300 mg/3 mL (100 mg/mL x 3) Syrg    What do they need to clarify?Pt needs a new script/refill    Can you be contacted via [facility]?No    Best Call Back Number: [phone]    Additional Information: n/a

## 2019-12-26 ENCOUNTER — PATIENT MESSAGE (OUTPATIENT)
Dept: NEUROLOGY | Facility: CLINIC | Age: 54
End: 2019-12-26

## 2019-12-30 DIAGNOSIS — G44.019 EPISODIC CLUSTER HEADACHE, NOT INTRACTABLE: ICD-10-CM

## 2019-12-31 ENCOUNTER — HOSPITAL ENCOUNTER (EMERGENCY)
Facility: HOSPITAL | Age: 54
Discharge: HOME | End: 2019-12-31
Attending: EMERGENCY MEDICINE
Payer: OTHER GOVERNMENT

## 2019-12-31 VITALS
DIASTOLIC BLOOD PRESSURE: 92 MMHG | RESPIRATION RATE: 18 BRPM | TEMPERATURE: 99 F | HEIGHT: 72 IN | OXYGEN SATURATION: 96 % | HEART RATE: 83 BPM | SYSTOLIC BLOOD PRESSURE: 159 MMHG | BODY MASS INDEX: 31.15 KG/M2 | WEIGHT: 230 LBS

## 2019-12-31 DIAGNOSIS — J30.9 ALLERGIC RHINITIS, UNSPECIFIED SEASONALITY, UNSPECIFIED TRIGGER: ICD-10-CM

## 2019-12-31 DIAGNOSIS — R09.81 SINUS CONGESTION: Primary | ICD-10-CM

## 2019-12-31 LAB
CTP QC/QA: YES
POC MOLECULAR INFLUENZA A AGN: NEGATIVE
POC MOLECULAR INFLUENZA B AGN: NEGATIVE

## 2019-12-31 PROCEDURE — 63600175 PHARM REV CODE 636 W HCPCS: Mod: ER | Performed by: NURSE PRACTITIONER

## 2019-12-31 PROCEDURE — 87502 INFLUENZA DNA AMP PROBE: CPT | Mod: ER

## 2019-12-31 PROCEDURE — 99284 EMERGENCY DEPT VISIT MOD MDM: CPT | Mod: 25,ER

## 2019-12-31 PROCEDURE — 96372 THER/PROPH/DIAG INJ SC/IM: CPT | Mod: ER

## 2019-12-31 RX ORDER — AZELASTINE 1 MG/ML
1 SPRAY, METERED NASAL 2 TIMES DAILY
Qty: 30 ML | Refills: 0 | Status: SHIPPED | OUTPATIENT
Start: 2019-12-31 | End: 2020-05-13 | Stop reason: SDUPTHER

## 2019-12-31 RX ORDER — DEXAMETHASONE SODIUM PHOSPHATE 4 MG/ML
10 INJECTION, SOLUTION INTRA-ARTICULAR; INTRALESIONAL; INTRAMUSCULAR; INTRAVENOUS; SOFT TISSUE
Status: COMPLETED | OUTPATIENT
Start: 2019-12-31 | End: 2019-12-31

## 2019-12-31 RX ADMIN — DEXAMETHASONE SODIUM PHOSPHATE 10 MG: 4 INJECTION, SOLUTION INTRA-ARTICULAR; INTRALESIONAL; INTRAMUSCULAR; INTRAVENOUS; SOFT TISSUE at 06:12

## 2020-01-01 NOTE — ED PROVIDER NOTES
Encounter Date: 2019       History     Chief Complaint   Patient presents with    Sinus Problem     Patient reports 2-3 days of right sided sinus pressure, mild relief with OTC ibuprofen. +Sneezing. Denies fevers or chills.     53 y/o male presents to the ED with complaints of sinus congestion and pressure for 3 days.  Patient denies rash, fever, cough, chest pain, SOB, numbness, weakness, tingling, abdominal pain, back pain, dysuria, hematuria, nausea, vomiting, diarrhea, or any other complaints.  He states he has a history of sinus problems and cluster HA and the sinus congestion and pressure triggers the HA.  He rates his pain as 5/10 and has been trying allegra and flonase with minimal relief.  No aggravating or alleviating factors.            Review of patient's allergies indicates:  No Known Allergies  Past Medical History:   Diagnosis Date    Hypertension     on medication     Past Surgical History:   Procedure Laterality Date    HERNIA REPAIR      SINUS SURGERY       Family History   Problem Relation Age of Onset    No Known Problems Mother     No Known Problems Father     No Known Problems Sister     No Known Problems Brother     No Known Problems Maternal Aunt     No Known Problems Maternal Uncle     No Known Problems Paternal Aunt     No Known Problems Paternal Uncle     No Known Problems Maternal Grandmother     No Known Problems Maternal Grandfather     No Known Problems Paternal Grandmother     No Known Problems Paternal Grandfather     Amblyopia Neg Hx     Blindness Neg Hx     Cancer Neg Hx     Cataracts Neg Hx     Diabetes Neg Hx     Glaucoma Neg Hx     Hypertension Neg Hx     Macular degeneration Neg Hx     Retinal detachment Neg Hx     Strabismus Neg Hx     Stroke Neg Hx     Thyroid disease Neg Hx      Social History     Tobacco Use    Smoking status: Former Smoker     Last attempt to quit: 1999     Years since quittin.6    Smokeless tobacco: Never  Used   Substance Use Topics    Alcohol use: Yes     Alcohol/week: 7.0 standard drinks     Types: 7 Glasses of wine per week     Comment: beer 2-3 daily    Drug use: No     Review of Systems   Constitutional: Negative for chills and fever.   HENT: Positive for congestion. Negative for ear pain, rhinorrhea and sore throat.    Eyes: Negative for pain, discharge and redness.   Respiratory: Negative for cough and shortness of breath.    Cardiovascular: Negative for chest pain.   Gastrointestinal: Negative for abdominal pain, diarrhea, nausea and vomiting.   Genitourinary: Negative for dysuria.   Musculoskeletal: Negative for back pain, neck pain and neck stiffness.   Skin: Negative for rash.   Neurological: Negative for dizziness, weakness, light-headedness, numbness and headaches.   Psychiatric/Behavioral: Negative for confusion.       Physical Exam     Initial Vitals [12/31/19 1606]   BP Pulse Resp Temp SpO2   (!) 149/100 76 18 98.5 °F (36.9 °C) 95 %      MAP       --         Physical Exam    Nursing note and vitals reviewed.  Constitutional: Vital signs are normal. He appears well-developed. He is cooperative.  Non-toxic appearance. He does not appear ill.   HENT:   Head: Normocephalic and atraumatic.   Right Ear: Tympanic membrane normal.   Left Ear: Tympanic membrane normal.   Nose: Mucosal edema present. Right sinus exhibits maxillary sinus tenderness. Right sinus exhibits no frontal sinus tenderness. Left sinus exhibits no maxillary sinus tenderness and no frontal sinus tenderness.   Mouth/Throat: Uvula is midline, oropharynx is clear and moist and mucous membranes are normal.   Eyes: Conjunctivae are normal.   Neck: Normal range of motion.   Cardiovascular: Normal rate and regular rhythm.   Pulmonary/Chest: Effort normal and breath sounds normal.   Abdominal: Normal appearance.   Neurological: He is alert and oriented to person, place, and time. GCS eye subscore is 4. GCS verbal subscore is 5. GCS motor  subscore is 6.   Skin: Skin is warm, dry and intact. No rash noted.   Psychiatric: He has a normal mood and affect. His speech is normal and behavior is normal. Thought content normal.         ED Course   Procedures  Labs Reviewed   POCT INFLUENZA A/B MOLECULAR          Imaging Results    None                APC / Resident Notes:   This is an evaluation of a 54 y.o. male that presents to the Emergency Department for sinus congestion and pressure    Physical Exam shows a non-toxic, afebrile, and well appearing male. Right maxillary sinus tenderness with mucosal edema    Vital signs are reassuring. If available, previous records reviewed.   RESULTS: influenza negative    My overall impression is Sinus congestion, allergic rhinitis. I considered, but at this time, do not suspect URI, sinusitis, strep, influenza, cellulitis.    ED Course: Decadron. D/C Meds: Astelin. D/C Information: f/u, medications. The diagnosis, treatment plan, instructions for follow-up and reevaluation with PCP as well as ED return precautions were discussed and understanding was verbalized. All questions or concerns have been addressed.                                 Clinical Impression:       ICD-10-CM ICD-9-CM   1. Sinus congestion R09.81 478.19   2. Allergic rhinitis, unspecified seasonality, unspecified trigger J30.9 477.9         Disposition:   Disposition: Discharged  Condition: Stable                     ZEN Oneill  12/31/19 2028

## 2020-01-07 NOTE — PROGRESS NOTES
This note was created by combination of typed  and M-Modal dictation.  Transcription errors may be present.  If there are any questions, please contact me.    Assessment & Plan:   Acute sinusitis, recurrence not specified, unspecified location  -ongoing without relief.  Greater than 7 days.  Trial of Augmentin.  -     amoxicillin-clavulanate 875-125mg (AUGMENTIN) 875-125 mg per tablet; Take 1 tablet by mouth 2 (two) times daily. for 10 days  Dispense: 20 tablet; Refill: 0    Essential hypertension  -has been on lisinopril HCT longstanding.  Refilled today.  I have asked him to return at his convenience for fasting labs.  -     CBC auto differential; Future; Expected date: 01/08/2020  -     Comprehensive metabolic panel; Future; Expected date: 01/08/2020  -     Lipid panel; Future; Expected date: 01/08/2020  -     TSH; Future; Expected date: 01/08/2020  -     lisinopril-hydrochlorothiazide (PRINZIDE,ZESTORETIC) 20-12.5 mg per tablet; Take 1 tablet by mouth once daily.  Dispense: 90 tablet; Refill: 3    Right lower quadrant abdominal pain  Gastroesophageal reflux disease, esophagitis presence not specified  Screening for colon cancer  -he notes along with his sinus symptoms, some abdominal discomfort more on the right side, and some constipation.  No gross blood.  Has never had screening for colon cancer and I have recommended he do so.  We will see if his symptoms of discomfort and constipation resolved with treatment of his sinusitis.  But he should get the colonoscopy done regardless.  -     Case request GI: COLONOSCOPY; Future; Expected date: 01/08/2020    Chronic cluster headache, not intractable followed by neurology    Other chronic sinusitis uses budesonide for this NOT for lungs  -he is is booties and I would nebulizer for sinus rinse and not for pulmonary issues.  Does not recall a diagnosis of COPD.  No PFTs on outside records that I can visualize.    Medications Discontinued During This Encounter    Medication Reason    lisinopril-hydrochlorothiazide (PRINZIDE,ZESTORETIC) 20-12.5 mg per tablet Duplicate Order    lisinopril-hydrochlorothiazide (PRINZIDE,ZESTORETIC) 20-12.5 mg per tablet Reorder       meds sent this encounter:  Medications Ordered This Encounter   Medications    amoxicillin-clavulanate 875-125mg (AUGMENTIN) 875-125 mg per tablet     Sig: Take 1 tablet by mouth 2 (two) times daily. for 10 days     Dispense:  20 tablet     Refill:  0    lisinopril-hydrochlorothiazide (PRINZIDE,ZESTORETIC) 20-12.5 mg per tablet     Sig: Take 1 tablet by mouth once daily.     Dispense:  90 tablet     Refill:  3       Follow Up: No follow-ups on file. follow up labs.     Subjective:     Chief Complaint   Patient presents with    GI Problem     bloated. for a few days now    Headache    Generalized Body Aches       MARA Kendall is a 54 y.o. male, last appointment with this clinic was Visit date not found.    NP; previous Dr. Bergman/Kwan  Cluster headaches followed by neuro  HTN, lisinopril hctz. Thinks white coat BP.   GERD on PPI  COPD, former smoker; pulmicort PRN  Chronic sinusitis, hx of evaluation with [facility]  6/5/19 CT sinus:  Small amount of mucosal thickening along the floor of the left maxillary sinus. Both maxillary sinuses undergone previous antrostomies. There is a bridge of tissue  the natural ostia versus the created ostia bilaterally. Note that the ethmoid sinuses of also undergone previous surgery. There is no significant paranasal sinus disease within the ethmoid sinuses or the sphenoid sinuses. The frontal sinuses are absent.    ER 12/31 for sinusitis.  Decadron.  Elevated BP at ER    Here to establish care.  Also for sinus complaints as well as abdominal discomfort.    The respiratory infection type symptoms and the abdominal soreness seem to have come together.  It has been going on now for well over a week.  He felt initially some abdominal discomfort and then it evolved into a  generalized achiness and flu-like symptoms and now it is more localized to the abdomen again.  More on the right side.  Some sensation of constipation with some hard stools and resultant straining.  No gross blood.  Has never had colonoscopy.  Urination is normal.    Respiratory infection type symptoms with sinus congestion and pressure and feeling head heaviness.  Was seen at the ER but felt to be viral.  It has persisted and has not improved at all.  No objective measurement of temperature but he is afebrile on intake today.  Continued nasal congestion.    Outside records from Care everywhere state COPD, he has never been told he has COPD.  He has P does and I would nebulizer but this is used as a sinus rinse and not inhaled into the lungs.  Does not recall ever having had PFTs.  Former smoker.    Ran out of his blood pressure medicine yesterday.  Today blood pressure is okay.  Has not had labs recently and is agreeable to return for fasting labs at his convenience.    Patient Care Team:  Thom Bueno MD as PCP - General (Family Medicine)    Patient Active Problem List    Diagnosis Date Noted    Chronic cluster headache, not intractable followed by neurology 01/08/2020    Refractive error 09/26/2019    Other chronic sinusitis uses budesonide for this NOT for lungs 05/09/2019 6/5/19 CT sinus:  Small amount of mucosal thickening along the floor of the left maxillary sinus. Both maxillary sinuses undergone previous antrostomies. There is a bridge of tissue  the natural ostia versus the created ostia bilaterally. Note that the ethmoid sinuses of also undergone previous surgery. There is no significant paranasal sinus disease within the ethmoid sinuses or the sphenoid sinuses. The frontal sinuses are absent.      Essential hypertension 05/09/2019    Gastroesophageal reflux disease without esophagitis 05/09/2019       PAST MEDICAL HISTORY:  Past Medical History:   Diagnosis Date    Hypertension      on medication       PAST SURGICAL HISTORY:  Past Surgical History:   Procedure Laterality Date    HERNIA REPAIR      SINUS SURGERY  2012     Family History   Problem Relation Age of Onset    Coronary artery disease Mother     Hypertension Father     No Known Problems Sister     No Known Problems Brother     Valvular heart disease Sister     No Known Problems Brother     No Known Problems Brother     Amblyopia Neg Hx     Blindness Neg Hx     Cancer Neg Hx     Cataracts Neg Hx     Diabetes Neg Hx     Glaucoma Neg Hx     Macular degeneration Neg Hx     Retinal detachment Neg Hx     Strabismus Neg Hx     Stroke Neg Hx     Thyroid disease Neg Hx        SOCIAL HISTORY:  Social History     Socioeconomic History    Marital status:      Spouse name: Not on file    Number of children: Not on file    Years of education: Not on file    Highest education level: Not on file   Occupational History    Occupation: production      Employer: [employer]   Social Needs    Financial resource strain: Not on file    Food insecurity:     Worry: Not on file     Inability: Not on file    Transportation needs:     Medical: Not on file     Non-medical: Not on file   Tobacco Use    Smoking status: Former Smoker     Last attempt to quit: 1999     Years since quittin.6    Smokeless tobacco: Never Used   Substance and Sexual Activity    Alcohol use: Yes     Alcohol/week: 7.0 standard drinks     Types: 7 Glasses of wine per week     Comment: beer 2-3 daily    Drug use: No    Sexual activity: Yes     Partners: Female   Lifestyle    Physical activity:     Days per week: Not on file     Minutes per session: Not on file    Stress: Not on file   Relationships    Social connections:     Talks on phone: Not on file     Gets together: Not on file     Attends Christianity service: Not on file     Active member of club or organization: Not on file     Attends meetings of clubs or  organizations: Not on file     Relationship status: Not on file   Other Topics Concern    Not on file   Social History Narrative    Not on file       ALLERGIES AND MEDICATIONS: updated and reviewed.  Review of patient's allergies indicates:  No Known Allergies  Current Outpatient Medications   Medication Sig Dispense Refill    azelastine (ASTELIN) 137 mcg (0.1 %) nasal spray 1 spray (137 mcg total) by Nasal route 2 (two) times daily. 30 mL 0    budesonide (PULMICORT) 0.25 mg/2 mL nebulizer solution Inhale 0.25 mg into the lungs.      fexofenadine (ALLEGRA) 30 MG tablet Take 30 mg by mouth once daily.      galcanezumab-gnlm (GALCANEZUMAB-GNLM) 300 mg/3 mL (100 mg/mL x 3) Syrg Inject 300 mg into the skin every 28 days. 3 mL 0    lisinopril-hydrochlorothiazide (PRINZIDE,ZESTORETIC) 20-12.5 mg per tablet TAKE 1 TABLET BY MOUTH ONCE DAILY      omeprazole (PRILOSEC) 20 MG capsule Take 20 mg by mouth once daily.  6     No current facility-administered medications for this visit.        Review of Systems   Constitutional: Negative for chills, fever and malaise/fatigue.   HENT: Positive for congestion. Negative for ear pain, hearing loss and sore throat.    Respiratory: Negative for cough, sputum production and shortness of breath.    Cardiovascular: Negative for chest pain.   Gastrointestinal: Positive for abdominal pain.   Musculoskeletal: Negative for myalgias and neck pain.   Skin: Negative for rash.   Neurological: Positive for headaches.       Objective:   Physical Exam   Vitals:    01/08/20 1502   BP: 132/82   BP Location: Right arm   Patient Position: Sitting   BP Method: Medium (Manual)   Pulse: 78   Temp: 98.7 °F (37.1 °C)   TempSrc: Oral   SpO2: 99%   Weight: 103.9 kg (229 lb)   Height: 6' (1.829 m)    Body mass index is 31.06 kg/m².  Weight: 103.9 kg (229 lb)   Height: 6' (182.9 cm)     Physical Exam   Constitutional: He is oriented to person, place, and time. He appears well-developed and well-nourished.    HENT:   TMs grey/clear bilaterally.  OP no erythema no exudates   Eyes: EOM are normal.   Neck: Neck supple.   Cardiovascular: Normal rate, regular rhythm and normal heart sounds.   Pulmonary/Chest: Effort normal and breath sounds normal. He has no wheezes.   Abdominal: He exhibits no distension and no mass. There is no tenderness. There is no rebound and no guarding.   Lymphadenopathy:     He has no cervical adenopathy.   Neurological: He is alert and oriented to person, place, and time.   Skin: Skin is warm and dry.   Psychiatric: He has a normal mood and affect. His behavior is normal.

## 2020-01-08 ENCOUNTER — OFFICE VISIT (OUTPATIENT)
Dept: FAMILY MEDICINE | Facility: CLINIC | Age: 55
End: 2020-01-08
Payer: OTHER GOVERNMENT

## 2020-01-08 VITALS
HEIGHT: 72 IN | WEIGHT: 229 LBS | HEART RATE: 78 BPM | SYSTOLIC BLOOD PRESSURE: 132 MMHG | TEMPERATURE: 99 F | DIASTOLIC BLOOD PRESSURE: 82 MMHG | OXYGEN SATURATION: 99 % | BODY MASS INDEX: 31.02 KG/M2

## 2020-01-08 DIAGNOSIS — G44.029 CHRONIC CLUSTER HEADACHE, NOT INTRACTABLE: ICD-10-CM

## 2020-01-08 DIAGNOSIS — J01.90 ACUTE SINUSITIS, RECURRENCE NOT SPECIFIED, UNSPECIFIED LOCATION: Primary | ICD-10-CM

## 2020-01-08 DIAGNOSIS — R10.31 RIGHT LOWER QUADRANT ABDOMINAL PAIN: ICD-10-CM

## 2020-01-08 DIAGNOSIS — J32.8 OTHER CHRONIC SINUSITIS: ICD-10-CM

## 2020-01-08 DIAGNOSIS — K21.9 GASTROESOPHAGEAL REFLUX DISEASE, ESOPHAGITIS PRESENCE NOT SPECIFIED: ICD-10-CM

## 2020-01-08 DIAGNOSIS — Z12.11 SCREENING FOR COLON CANCER: ICD-10-CM

## 2020-01-08 DIAGNOSIS — I10 ESSENTIAL HYPERTENSION: ICD-10-CM

## 2020-01-08 PROBLEM — J44.9 CHRONIC OBSTRUCTIVE PULMONARY DISEASE: Status: ACTIVE | Noted: 2019-05-09

## 2020-01-08 PROCEDURE — 99203 PR OFFICE/OUTPT VISIT, NEW, LEVL III, 30-44 MIN: ICD-10-PCS | Mod: S$PBB,,, | Performed by: INTERNAL MEDICINE

## 2020-01-08 PROCEDURE — 99999 PR PBB SHADOW E&M-EST. PATIENT-LVL III: CPT | Mod: PBBFAC,,, | Performed by: INTERNAL MEDICINE

## 2020-01-08 PROCEDURE — 99203 OFFICE O/P NEW LOW 30 MIN: CPT | Mod: S$PBB,,, | Performed by: INTERNAL MEDICINE

## 2020-01-08 PROCEDURE — 99213 OFFICE O/P EST LOW 20 MIN: CPT | Mod: PBBFAC,PO | Performed by: INTERNAL MEDICINE

## 2020-01-08 PROCEDURE — 99999 PR PBB SHADOW E&M-EST. PATIENT-LVL III: ICD-10-PCS | Mod: PBBFAC,,, | Performed by: INTERNAL MEDICINE

## 2020-01-08 RX ORDER — AMOXICILLIN AND CLAVULANATE POTASSIUM 875; 125 MG/1; MG/1
1 TABLET, FILM COATED ORAL 2 TIMES DAILY
Qty: 20 TABLET | Refills: 0 | Status: SHIPPED | OUTPATIENT
Start: 2020-01-08 | End: 2020-01-18

## 2020-01-08 RX ORDER — LISINOPRIL AND HYDROCHLOROTHIAZIDE 12.5; 2 MG/1; MG/1
1 TABLET ORAL DAILY
Qty: 90 TABLET | Refills: 3 | Status: SHIPPED | OUTPATIENT
Start: 2020-01-08 | End: 2020-11-18 | Stop reason: SDUPTHER

## 2020-01-10 ENCOUNTER — PATIENT MESSAGE (OUTPATIENT)
Dept: NEUROLOGY | Facility: CLINIC | Age: 55
End: 2020-01-10

## 2020-01-17 DIAGNOSIS — Z12.11 COLON CANCER SCREENING: Primary | ICD-10-CM

## 2020-01-28 ENCOUNTER — OFFICE VISIT (OUTPATIENT)
Dept: PODIATRY | Facility: CLINIC | Age: 55
End: 2020-01-28
Payer: OTHER GOVERNMENT

## 2020-01-28 VITALS
BODY MASS INDEX: 31.03 KG/M2 | SYSTOLIC BLOOD PRESSURE: 132 MMHG | WEIGHT: 229.06 LBS | HEIGHT: 72 IN | DIASTOLIC BLOOD PRESSURE: 82 MMHG

## 2020-01-28 DIAGNOSIS — M72.2 PLANTAR FASCIITIS: ICD-10-CM

## 2020-01-28 DIAGNOSIS — M79.672 LEFT FOOT PAIN: Primary | ICD-10-CM

## 2020-01-28 PROCEDURE — 99203 PR OFFICE/OUTPT VISIT, NEW, LEVL III, 30-44 MIN: ICD-10-PCS | Mod: S$PBB,25,, | Performed by: PODIATRIST

## 2020-01-28 PROCEDURE — 99999 PR PBB SHADOW E&M-EST. PATIENT-LVL III: ICD-10-PCS | Mod: PBBFAC,,, | Performed by: PODIATRIST

## 2020-01-28 PROCEDURE — 99999 PR PBB SHADOW E&M-EST. PATIENT-LVL III: CPT | Mod: PBBFAC,,, | Performed by: PODIATRIST

## 2020-01-28 PROCEDURE — 96372 PR INJECTION,THERAP/PROPH/DIAG2ST, IM OR SUBCUT: ICD-10-PCS | Mod: S$PBB,,, | Performed by: PODIATRIST

## 2020-01-28 PROCEDURE — 99213 OFFICE O/P EST LOW 20 MIN: CPT | Mod: PBBFAC,PO | Performed by: PODIATRIST

## 2020-01-28 PROCEDURE — 99203 OFFICE O/P NEW LOW 30 MIN: CPT | Mod: S$PBB,25,, | Performed by: PODIATRIST

## 2020-01-28 PROCEDURE — 96372 THER/PROPH/DIAG INJ SC/IM: CPT | Mod: S$PBB,,, | Performed by: PODIATRIST

## 2020-01-28 RX ORDER — DEXAMETHASONE SODIUM PHOSPHATE 4 MG/ML
2 INJECTION, SOLUTION INTRA-ARTICULAR; INTRALESIONAL; INTRAMUSCULAR; INTRAVENOUS; SOFT TISSUE ONCE
Status: COMPLETED | OUTPATIENT
Start: 2020-01-28 | End: 2020-01-28

## 2020-01-28 RX ORDER — TRIAMCINOLONE ACETONIDE 40 MG/ML
20 INJECTION, SUSPENSION INTRA-ARTICULAR; INTRAMUSCULAR ONCE
Status: COMPLETED | OUTPATIENT
Start: 2020-01-28 | End: 2020-01-28

## 2020-01-28 RX ADMIN — TRIAMCINOLONE ACETONIDE 20 MG: 40 INJECTION, SUSPENSION INTRA-ARTICULAR; INTRAMUSCULAR at 03:01

## 2020-01-28 RX ADMIN — DEXAMETHASONE SODIUM PHOSPHATE 2 MG: 4 INJECTION, SOLUTION INTRAMUSCULAR; INTRAVENOUS at 03:01

## 2020-01-28 NOTE — PROGRESS NOTES
Subjective:      Patient ID: Enoch Barr is a 55 y.o. male.    Chief Complaint: Heel Pain (left foot, Dr Bueno)    Enoch is a 55 y.o. male who presents to the clinic complaining of heel pain in the left foot, especially with the first step in the morning. The pain is described as Throbbing. The onset of the pain was gradual and has worsened over the past several days. Enoch rates the pain as 4/10. He denies a history of trauma. Prior treatments include none.    Review of Systems   Constitution: Negative for chills, diaphoresis and fever.   Cardiovascular: Negative for claudication, cyanosis, leg swelling and syncope.   Respiratory: Negative for cough and shortness of breath.    Skin: Negative for color change, nail changes and suspicious lesions.   Musculoskeletal: Positive for myalgias. Negative for falls, joint pain, muscle cramps and muscle weakness.   Gastrointestinal: Negative for diarrhea, nausea and vomiting.   Neurological: Negative for disturbances in coordination, numbness, paresthesias, sensory change, tremors and weakness.   Psychiatric/Behavioral: Negative for altered mental status.           Objective:      Physical Exam   Constitutional: He appears well-developed. He is cooperative.   Oriented to time, place, and person.   Cardiovascular:   DP and PT pulses are palpable bilaterally. 3 sec capillary refill time and toes and feet are warm to touch proximally .  There is  hair growth on the feet and toes b/l. There is no edema b/l. No spider veins or varicosities present b/l.      Musculoskeletal:   Equinus noted b/l ankles with < 10 deg DF noted. MMT 5/5 in DF/PF/Inv/Ev resistance with no reproduction of pain in any direction. Passive range of motion of ankle and pedal joints is painless b/l.    Pain on palpation plantar medial left heel, no pain with ROM or MMT or medial and lateral compression of heel, - tinel's sign       Feet:   Right Foot:   Skin Integrity: Negative for callus or dry skin.   Left  Foot:   Skin Integrity: Negative for callus or dry skin.   Lymphadenopathy:   Negative lymphadenopathy bilateral popliteal fossa and tarsal tunnel.   Neurological: He is alert.   Light touch, proprioception, and sharp/dull sensation are all intact bilaterally. Protective threshold with the Brantwood-Wienstein monofilament is intact bilaterally.    Skin:   No open lesions, lacerations or wounds noted.Interdigital spaces clean, dry and intact b/l. No erythema noted to b/l foot.  Nails normal color and trophic qualities.     Psychiatric: He has a normal mood and affect.             Assessment:       Encounter Diagnoses   Name Primary?    Left foot pain Yes    Plantar fasciitis          Plan:       Enoch was seen today for heel pain.    Diagnoses and all orders for this visit:    Left foot pain    Plantar fasciitis    Other orders  -     triamcinolone acetonide injection 20 mg  -     dexamethasone injection 2 mg      I counseled the patient on his conditions, their implications and medical management.    Discussed different treatment options for heel pain. including conservative and interventional.  I gave written and verbal instructions on heel cord stretching and this was demonstrated for the patient. Patient expressed understanding. Discussed wearing appropriate shoe gear and avoiding flats, slippers, sandals, barefoot, and sockfeet. Recommended arch supports. My recommendation for OTC supports is [employer], [employer]ICS tennis shoes.       Patient instructed on adequate icing techniques. Patient should ice the affected area at least once per day x 10 minutes for 10 days . I advised the  patient that extra icing would also be beneficial to ensure adequate anti inflammatory effect     Stretching handout dispensed to patient. Instructions on adequate stretching reviewed in clinic     Patient would like injection today. Skin was prepped with alcohol and anesthetized with ethyl chloride.  The following mixture was  injected into Left medial heel  approach: 3ccs of mixture of (1cc 1% plain Lidocaine : 1cc 0.5% Marcaine plain:  0.5cc kenalog-40 : 0.5cc dexamethasone) directly into problematic areas.  Patient  tolerated the injection well. Related nearly 100% relief following injection.     RTC PRN

## 2020-01-29 ENCOUNTER — OFFICE VISIT (OUTPATIENT)
Dept: NEUROLOGY | Facility: CLINIC | Age: 55
End: 2020-01-29
Payer: OTHER GOVERNMENT

## 2020-01-29 VITALS
SYSTOLIC BLOOD PRESSURE: 148 MMHG | HEIGHT: 72 IN | WEIGHT: 227.31 LBS | HEART RATE: 82 BPM | BODY MASS INDEX: 30.79 KG/M2 | DIASTOLIC BLOOD PRESSURE: 94 MMHG

## 2020-01-29 DIAGNOSIS — G44.019 EPISODIC CLUSTER HEADACHE, NOT INTRACTABLE: ICD-10-CM

## 2020-01-29 PROCEDURE — 99999 PR PBB SHADOW E&M-EST. PATIENT-LVL III: CPT | Mod: PBBFAC,,, | Performed by: PSYCHIATRY & NEUROLOGY

## 2020-01-29 PROCEDURE — 99213 OFFICE O/P EST LOW 20 MIN: CPT | Mod: S$PBB,,, | Performed by: PSYCHIATRY & NEUROLOGY

## 2020-01-29 PROCEDURE — 99213 PR OFFICE/OUTPT VISIT, EST, LEVL III, 20-29 MIN: ICD-10-PCS | Mod: S$PBB,,, | Performed by: PSYCHIATRY & NEUROLOGY

## 2020-01-29 PROCEDURE — 99999 PR PBB SHADOW E&M-EST. PATIENT-LVL III: ICD-10-PCS | Mod: PBBFAC,,, | Performed by: PSYCHIATRY & NEUROLOGY

## 2020-01-29 PROCEDURE — 99213 OFFICE O/P EST LOW 20 MIN: CPT | Mod: PBBFAC | Performed by: PSYCHIATRY & NEUROLOGY

## 2020-01-29 NOTE — PROGRESS NOTES
Neurology Follow Up Note    Chief Complaint: follow up for headaches    Interval History:  Since last visit, patient states his headaches have greatly improved. He took emgality 200mg on January 10. He states he gets a mild headache one to times a week which resolves with ibuprofen. He states he had a sinus infection at the end of December which has now resolved. He had no further complaints.    Last Visit 12/18/19:  Since last visit, patient states he has had no further headaches. He took emgality 300mg injection on 11/28/19 and tolerated this well. He had an MRI brain and MRA since last visit which were unremarkable.     Visit 11/20/19:  Since last visit, patient feels his headaches have greatly improved on emgality. He states he tolerated medrol dose madeline well, but the past week, his headaches have started to come back. He states he has had 2-3 typical headaches in the past week. He only took emgality 100mg last month, however, he was supposed to take 300mg and was unaware of this. He is due for another injection soon and was advised to inject 300mg. He has no further complaints.     Visit 10/23/19:  Since last visit, patient states his headaches have remained stable. He continues to get right sided constant headaches associated with tearing from his eye and nasal congestion. He states the pain becomes severe around 4-5 in the afternoon, and then subsides after a few hours.  He states he was unable to obtain emgality as of yet. I called [facility] and they state patient can come in tomorrow for initial consultation and to obtain medication.         Initial Visit 9/24/19:  Enoch Barr is a 54 y.o. male with medical conditions as outlined below who presents for further evaluation of right sided headache. He states he has been having headaches for the past 2 years. He denies a history of migraines or headaches when he was younger. He states he will get a sharp pain above his right eye and it will  progress to involve the whole right side of his head. He states this gets up to a 9/10 pain in intensity and can last 3 hours. He states sometimes taking ibuprofen 600mg helps with this headache. He states he has been taking ibuprofen daily for the past 2 years. He reports sometimes it helps and sometimes it doesn't. He states when he gets the headache, his right eye turns red and he will have tearing from this eye. He denies ptosis or droopy eyelid. He states sometimes he will get blurry vision, but denies diplopia or loss of vision. He states he will get a runny nose at times. He states he has a constant 1-2/10 pain, however, around 5 pm four to 5 times a week, his headache will worsen to a 9/10 pain. He states he had an MRI brain done at [facility] within the past year for this and he was told everything was okay. He has no further complaints.        Past Medical History:  Past Medical History:   Diagnosis Date    Hypertension     on medication       Past Surgical History:  Past Surgical History:   Procedure Laterality Date    HERNIA REPAIR      SINUS SURGERY         Social History:  Social History     Socioeconomic History    Marital status:      Spouse name: Not on file    Number of children: Not on file    Years of education: Not on file    Highest education level: Not on file   Occupational History    Occupation: production      Employer: [employer]   Social Needs    Financial resource strain: Not on file    Food insecurity:     Worry: Not on file     Inability: Not on file    Transportation needs:     Medical: Not on file     Non-medical: Not on file   Tobacco Use    Smoking status: Former Smoker     Last attempt to quit: 1999     Years since quittin.7    Smokeless tobacco: Never Used   Substance and Sexual Activity    Alcohol use: Yes     Alcohol/week: 7.0 standard drinks     Types: 7 Glasses of wine per week     Comment: beer 2-3 daily     Drug use: No    Sexual activity: Yes     Partners: Female   Lifestyle    Physical activity:     Days per week: Not on file     Minutes per session: Not on file    Stress: Not on file   Relationships    Social connections:     Talks on phone: Not on file     Gets together: Not on file     Attends [employer]ist service: Not on file     Active member of club or organization: Not on file     Attends meetings of clubs or organizations: Not on file     Relationship status: Not on file   Other Topics Concern    Not on file   Social History Narrative    Not on file       Family History:  Family History   Problem Relation Age of Onset    Coronary artery disease Mother     Hypertension Father     No Known Problems Sister     No Known Problems Brother     Valvular heart disease Sister     No Known Problems Brother     No Known Problems Brother     Amblyopia Neg Hx     Blindness Neg Hx     Cancer Neg Hx     Cataracts Neg Hx     Diabetes Neg Hx     Glaucoma Neg Hx     Macular degeneration Neg Hx     Retinal detachment Neg Hx     Strabismus Neg Hx     Stroke Neg Hx     Thyroid disease Neg Hx        Medications:  Current Outpatient Medications   Medication Sig Dispense Refill    azelastine (ASTELIN) 137 mcg (0.1 %) nasal spray 1 spray (137 mcg total) by Nasal route 2 (two) times daily. 30 mL 0    budesonide (PULMICORT) 0.25 mg/2 mL nebulizer solution Inhale 0.25 mg into the lungs.      fexofenadine (ALLEGRA) 30 MG tablet Take 30 mg by mouth once daily.      lisinopril-hydrochlorothiazide (PRINZIDE,ZESTORETIC) 20-12.5 mg per tablet Take 1 tablet by mouth once daily. 90 tablet 3    omeprazole (PRILOSEC) 20 MG capsule Take 20 mg by mouth once daily.  6    [START ON 2/17/2020] polyethylene glycol (COLYTE) 240-22.72-6.72 -5.84 gram SolR Take 4,000 mLs (4 L total) by mouth once. for 1 dose 4000 mL 0    galcanezumab-gnlm (EMGALITY PEN) 120 mg/mL PnIj Inject 120 mg into the skin every 28 days. 1 mL 3     No  current facility-administered medications for this visit.        Allergies:  Review of patient's allergies indicates:  No Known Allergies    ROS:  A 12 point review of system was negative aside from pertinent positives and negatives as outlined above.    Physical Exam  Vitals:    01/29/20 1541   BP: (!) 148/94   Pulse: 82       General: well nourished, well developed  Eyes: no scleral icterus   Nose: nasal turbinates intact  Neck: supple, ROM intact  Skin: no rash or ecchymosis  Joints: no swelling or erythema  Cardiac: regular rate and rhythm  Lungs: clear to auscultation bilaterally    Neuro:  Mental status: AAO x 3, no dysarthria, no aphasia, communicating appropriately  CN: PERRL, EOMI, VFF, V1-V3 sensation intact, no facial asymmetry, hearing grossly intact, tongue midline  Motor:   RUE 5/5  RLE 5/5  LUE 5/5  LLE 5/5    Normal bulk and tone    Reflexes: 2+ throughout, toes equivocal bilaterally  Sensory: intact to light touch throughout  Coordination: no dysmetria on FTN  Gait: steady    Prior Imaging/Labs:  Reviewed      Assessment and Plan:    55 y.o. male with cluster headaches improved on emgality    1. Episodic cluster headache  - galcanezumab-gnlm (EMGALITY PEN) 120 mg/mL PnIj; Inject 120 mg into the skin every 28 days.  Dispense: 1 mL; Refill: 3    Patient was made aware of side effects of medication(s) prescribed and was advised to notify me if he experiences any.      Patient was advised to notify me for worsening symptoms. I will see patient back in 3 months or sooner if necessary.       Jacqueline Pacheco DO  [facility]  [address] [facility] [address]  [place] [id number]  [phone]

## 2020-02-07 NOTE — PROGRESS NOTES
This note was created by combination of typed  and M-Modal dictation.  Transcription errors may be present.  If there are any questions, please contact me.    Assessment & Plan:   Strain of abdominal wall, initial encounter  -could be abdominal strain or could be irritation of the scar tissue from previous herniorrhaphy.  I do not think this is recurrence of hernia.  No GI or  symptoms.  I would just monitor.  Does not find it debilitating.  If persisting, could consider abdominal soft tissue ultrasound to look for hernia.  We also talked about gabapentin for symptomatic control, he had previously taken this he thinks at the time of his surgery.  Is not interested in this at this time.    Bacterial sinusitis  -status post treatment last visit for sinusitis, does not feel like he got better 100%.  I will try him on a Zithromax Z-Elias.  -     azithromycin (ZITHROMAX Z-ELIAS) 250 MG tablet; Take 2 pills day 1, then 1 pill day 2-5.  Dispense: 6 tablet; Refill: 0    Needs flu shot  -     Influenza - Quadrivalent (6 months+) (PF)        There are no discontinued medications.    meds sent this encounter:  Medications Ordered This Encounter   Medications    azithromycin (ZITHROMAX Z-ELIAS) 250 MG tablet     Sig: Take 2 pills day 1, then 1 pill day 2-5.     Dispense:  6 tablet     Refill:  0       Follow Up: No follow-ups on file.    Subjective:     Chief Complaint   Patient presents with    Flank Pain     right side for about an week now. shooting pain sometimes    Leg Pain       MARA Kendall is a 55 y.o. male, last appointment with this clinic was 1/8/2020.    Last visit complained of right lower quadrant pain along with some constipation.  No gross blood  Colonoscopy has been ordered scheduled for February 18th.  Cluster headaches, followed by neuro. emgality  Saw Podiatry for plantar fasciitis.  Had injection.    Status post left-sided herniorrhaphy 2015  Status post left herniorrhaphy 2013    Future labs  ordered    1 week - pain in the right lower abd, shooting inferiorly to the proximal thigh.  No bulge.  Hx of herniorrhapy.  It's not all the time but can feel it. No redness. No pain to light touch. But with pushing firmly on this.  No pain with wearing a belt. No recent heavy lifting or straining or sneezing. BMs are OK. No blood in stool no new constipation.  Normal urination. No pain no burning no gross blood.  OTC meds none. No accompanying back pain.      Still some pressure sensation on the right side, purulent sputum this AM.  Feels like not quite ever got 100%.  Hx of allegra taking daily for allergies.  Using nasal spray about to run out.     Answers for HPI/ROS submitted by the patient on 2/6/2020   Abdominal pain  Chronicity: recurrent  Onset: in the past 7 days  Onset quality: undetermined  Frequency: daily  Episode duration: 3 hours  Progression since onset: waxing and waning  Pain location: RLQ, RUQ  Pain quality: sharp, tearing  Radiates to: RLQ, RUQ, perineum, scrotum  anorexia: No  arthralgias: No  belching: No  flatus: No  hematochezia: No  Aggravated by: certain positions, coughing, movement  Relieved by: being still  Diagnostic workup: surgery  Pain severity: mild  Treatments tried: acetaminophen  Improvement on treatment: mild  abdominal surgery: Yes  colon cancer: No  Crohn's disease: No  gallstones: No  GERD: No  irritable bowel syndrome: No  kidney stones: No  pancreatitis: No  PUD: No  ulcerative colitis: No  UTI: No      Patient Care Team:  Thom uBeno MD as PCP - General (Family Medicine)    Patient Active Problem List    Diagnosis Date Noted    Chronic cluster headache, not intractable followed by neurology 01/08/2020    Refractive error 09/26/2019    Other chronic sinusitis uses budesonide for this NOT for lungs 05/09/2019 6/5/19 CT sinus:  Small amount of mucosal thickening along the floor of the left maxillary sinus. Both maxillary sinuses undergone previous antrostomies.  There is a bridge of tissue  the natural ostia versus the created ostia bilaterally. Note that the ethmoid sinuses of also undergone previous surgery. There is no significant paranasal sinus disease within the ethmoid sinuses or the sphenoid sinuses. The frontal sinuses are absent.      Essential hypertension 2019    Gastroesophageal reflux disease without esophagitis 2019       PAST MEDICAL HISTORY:  Past Medical History:   Diagnosis Date    Hypertension     on medication       PAST SURGICAL HISTORY:  Past Surgical History:   Procedure Laterality Date    HERNIA REPAIR      SINUS SURGERY         SOCIAL HISTORY:  Social History     Socioeconomic History    Marital status:      Spouse name: Not on file    Number of children: Not on file    Years of education: Not on file    Highest education level: Not on file   Occupational History    Occupation: production      Employer: [employer]   Social Needs    Financial resource strain: Not very hard    Food insecurity:     Worry: Never true     Inability: Never true    Transportation needs:     Medical: No     Non-medical: No   Tobacco Use    Smoking status: Former Smoker     Last attempt to quit: 1999     Years since quittin.7    Smokeless tobacco: Never Used   Substance and Sexual Activity    Alcohol use: Yes     Alcohol/week: 7.0 standard drinks     Types: 7 Glasses of wine per week     Frequency: 2-3 times a week     Drinks per session: 3 or 4     Binge frequency: Less than monthly     Comment: beer 2-3 daily    Drug use: No    Sexual activity: Yes     Partners: Female   Lifestyle    Physical activity:     Days per week: 4 days     Minutes per session: 60 min    Stress: Very much   Relationships    Social connections:     Talks on phone: Twice a week     Gets together: Patient refused     Attends Quaker service: Not on file     Active member of club or organization: No      Attends meetings of clubs or organizations: Never     Relationship status:    Other Topics Concern    Not on file   Social History Narrative    Not on file       ALLERGIES AND MEDICATIONS: updated and reviewed.  Review of patient's allergies indicates:  No Known Allergies  Current Outpatient Medications   Medication Sig Dispense Refill    azelastine (ASTELIN) 137 mcg (0.1 %) nasal spray 1 spray (137 mcg total) by Nasal route 2 (two) times daily. 30 mL 0    budesonide (PULMICORT) 0.25 mg/2 mL nebulizer solution Inhale 0.25 mg into the lungs.      fexofenadine (ALLEGRA) 30 MG tablet Take 30 mg by mouth once daily.      galcanezumab-gnlm (EMGALITY PEN) 120 mg/mL PnIj Inject 120 mg into the skin every 28 days. 1 mL 3    lisinopril-hydrochlorothiazide (PRINZIDE,ZESTORETIC) 20-12.5 mg per tablet Take 1 tablet by mouth once daily. 90 tablet 3    omeprazole (PRILOSEC) 20 MG capsule Take 20 mg by mouth once daily.  6    [START ON 2/17/2020] polyethylene glycol (COLYTE) 240-22.72-6.72 -5.84 gram SolR Take 4,000 mLs (4 L total) by mouth once. for 1 dose 4000 mL 0     No current facility-administered medications for this visit.        Review of Systems   Constitutional: Negative for fever and weight loss.   Gastrointestinal: Positive for abdominal pain. Negative for constipation, diarrhea, melena, nausea and vomiting.   Genitourinary: Negative for dysuria, frequency and hematuria.   Musculoskeletal: Negative for myalgias.   Neurological: Negative for headaches.       Objective:   Physical Exam  Vitals:    02/10/20 1323   BP: (!) 132/96   BP Location: Left arm   Patient Position: Sitting   BP Method: Medium (Manual)   Pulse: 85   Temp: 98.6 °F (37 °C)   TempSrc: Oral   SpO2: 99%   Weight: 101.6 kg (224 lb)   Height: 6' (1.829 m)    Body mass index is 30.38 kg/m².  Weight: 101.6 kg (224 lb)   Height: 6' (182.9 cm)     Physical Exam   Constitutional: He is oriented to person, place, and time. He appears  well-developed and well-nourished.   HENT:   TMs grey/clear bilaterally.  OP no erythema no exudates   Eyes: EOM are normal.   Neck: Neck supple.   Cardiovascular: Normal rate, regular rhythm and normal heart sounds.   Pulmonary/Chest: Effort normal and breath sounds normal. He has no wheezes.   Abdominal: Soft. He exhibits no distension and no mass. There is tenderness. There is no guarding.   Site of previous herniorrhapy with TTP on deep palpation but I do not feel recurrent hernia/weakness of the abd wall. No rash  Hip no pain with inversion or eversion, no pain with straight leg raise.    Lymphadenopathy:     He has no cervical adenopathy.   Neurological: He is alert and oriented to person, place, and time.   Skin: Skin is warm and dry.   Psychiatric: He has a normal mood and affect. His behavior is normal.

## 2020-02-10 ENCOUNTER — TELEPHONE (OUTPATIENT)
Dept: PHARMACY | Facility: CLINIC | Age: 55
End: 2020-02-10

## 2020-02-10 ENCOUNTER — OFFICE VISIT (OUTPATIENT)
Dept: FAMILY MEDICINE | Facility: CLINIC | Age: 55
End: 2020-02-10
Payer: OTHER GOVERNMENT

## 2020-02-10 VITALS
TEMPERATURE: 99 F | HEIGHT: 72 IN | BODY MASS INDEX: 30.34 KG/M2 | OXYGEN SATURATION: 99 % | SYSTOLIC BLOOD PRESSURE: 134 MMHG | DIASTOLIC BLOOD PRESSURE: 90 MMHG | WEIGHT: 224 LBS | HEART RATE: 85 BPM

## 2020-02-10 DIAGNOSIS — S39.011A STRAIN OF ABDOMINAL WALL, INITIAL ENCOUNTER: Primary | ICD-10-CM

## 2020-02-10 DIAGNOSIS — J32.9 BACTERIAL SINUSITIS: ICD-10-CM

## 2020-02-10 DIAGNOSIS — Z23 NEEDS FLU SHOT: ICD-10-CM

## 2020-02-10 DIAGNOSIS — B96.89 BACTERIAL SINUSITIS: ICD-10-CM

## 2020-02-10 PROCEDURE — 99999 PR PBB SHADOW E&M-EST. PATIENT-LVL III: ICD-10-PCS | Mod: PBBFAC,,, | Performed by: INTERNAL MEDICINE

## 2020-02-10 PROCEDURE — 90471 IMMUNIZATION ADMIN: CPT | Mod: PBBFAC,PO

## 2020-02-10 PROCEDURE — 99999 PR PBB SHADOW E&M-EST. PATIENT-LVL III: CPT | Mod: PBBFAC,,, | Performed by: INTERNAL MEDICINE

## 2020-02-10 PROCEDURE — 99214 PR OFFICE/OUTPT VISIT, EST, LEVL IV, 30-39 MIN: ICD-10-PCS | Mod: S$PBB,25,, | Performed by: INTERNAL MEDICINE

## 2020-02-10 PROCEDURE — 99214 OFFICE O/P EST MOD 30 MIN: CPT | Mod: S$PBB,25,, | Performed by: INTERNAL MEDICINE

## 2020-02-10 PROCEDURE — 99213 OFFICE O/P EST LOW 20 MIN: CPT | Mod: PBBFAC,PO,25 | Performed by: INTERNAL MEDICINE

## 2020-02-10 RX ORDER — AZITHROMYCIN 250 MG/1
TABLET, FILM COATED ORAL
Qty: 6 TABLET | Refills: 0 | Status: SHIPPED | OUTPATIENT
Start: 2020-02-10 | End: 2020-04-03 | Stop reason: ALTCHOICE

## 2020-02-10 NOTE — TELEPHONE ENCOUNTER
Refill and followup call for Emgality. Patient confirmed need of the refill. Will deliver via [employer] on  to arrive on  with patient consent. Copay $33.00 at 001 with auth to charge [facility]. Address confirmed. Patient has 0 doses remaining (0 day supply)/ next injection due . Patient denies missed doses and no side effects.  No new medications/allergies/medical conditions. Labs are stable. No ER/Urgent care visits in past month. No Sharps container needed. Patient taking the medication as directed. Patient denies any further questions. Confirmed 2 patient identifiers - Name and .     Rui Mahajan, PharmD  Clinical Pharmacist  [facility]  P: [phone]

## 2020-02-11 ENCOUNTER — HOSPITAL ENCOUNTER (EMERGENCY)
Facility: HOSPITAL | Age: 55
Discharge: HOME | End: 2020-02-11
Attending: EMERGENCY MEDICINE
Payer: OTHER GOVERNMENT

## 2020-02-11 VITALS
SYSTOLIC BLOOD PRESSURE: 176 MMHG | TEMPERATURE: 97 F | BODY MASS INDEX: 30.34 KG/M2 | DIASTOLIC BLOOD PRESSURE: 99 MMHG | RESPIRATION RATE: 14 BRPM | HEIGHT: 72 IN | HEART RATE: 55 BPM | WEIGHT: 224 LBS | OXYGEN SATURATION: 96 %

## 2020-02-11 DIAGNOSIS — K59.00 CONSTIPATION, UNSPECIFIED CONSTIPATION TYPE: Primary | ICD-10-CM

## 2020-02-11 DIAGNOSIS — R10.9 ABDOMINAL PAIN: ICD-10-CM

## 2020-02-11 PROCEDURE — 99283 EMERGENCY DEPT VISIT LOW MDM: CPT | Mod: 25,ER

## 2020-02-11 RX ORDER — METOCLOPRAMIDE 10 MG/1
10 TABLET ORAL EVERY 6 HOURS PRN
Qty: 30 TABLET | Refills: 0 | Status: SHIPPED | OUTPATIENT
Start: 2020-02-11 | End: 2020-09-17

## 2020-02-11 NOTE — DISCHARGE INSTRUCTIONS
MiraLax, Colace, fiber and plenty of fluids.  Give yourself a mineral oil enema and hold for 20 min then follow it with a saline or Fleet's enema.

## 2020-02-11 NOTE — ED TRIAGE NOTES
55 y.o. Male with PMH HTN to ED with c.o. 8/10 substernal chest pain since 3:30 this morning, patient reports it woke him up and has gotten progressively worse throughout the day. Patient denies n/v/d. Patient endorses associated shortness of breath. Patient denies radiation, denies alleviating/ exacerbating factors.

## 2020-02-11 NOTE — ED PROVIDER NOTES
Encounter Date: 2020    SCRIBE #1 NOTE: I, am scribing for, and in the presence of. I have scribed the following portions of the note - Other sections scribed: HPI, ROS, PE.       History     Chief Complaint   Patient presents with    Chest Pain     Patient is a 55 y.o. male with midepigastric pain x 10 hours. Pain woke him up out of his sleep. He is experiencing SOB. Patient denies nausea, vomiting, diarrhea, or any alleviating/ exacerbating factors.  Patient also states that he has been constipated for the last 3-4 days and straining to have a bowel movement.  He denies any specific chest pain denies any shortness of breath denies any exertional type symptoms.  Patient states that the his symptoms started when he was lying in bed last night.    The history is provided by the patient. No  was used.     Review of patient's allergies indicates:  No Known Allergies  Past Medical History:   Diagnosis Date    Hypertension     on medication     Past Surgical History:   Procedure Laterality Date    HERNIA REPAIR      SINUS SURGERY       Family History   Problem Relation Age of Onset    Coronary artery disease Mother     Hypertension Father     No Known Problems Sister     No Known Problems Brother     Valvular heart disease Sister     No Known Problems Brother     No Known Problems Brother     Amblyopia Neg Hx     Blindness Neg Hx     Cancer Neg Hx     Cataracts Neg Hx     Diabetes Neg Hx     Glaucoma Neg Hx     Macular degeneration Neg Hx     Retinal detachment Neg Hx     Strabismus Neg Hx     Stroke Neg Hx     Thyroid disease Neg Hx      Social History     Tobacco Use    Smoking status: Former Smoker     Last attempt to quit: 1999     Years since quittin.7    Smokeless tobacco: Never Used   Substance Use Topics    Alcohol use: Yes     Alcohol/week: 7.0 standard drinks     Types: 7 Glasses of wine per week     Frequency: 2-3 times a week     Drinks per  session: 3 or 4     Binge frequency: Less than monthly     Comment: beer 2-3 daily    Drug use: No     Review of Systems   Constitutional: Negative.  Negative for fever.   HENT: Negative.  Negative for sore throat.    Eyes: Negative.  Negative for pain.   Respiratory: Negative for shortness of breath.    Cardiovascular: Negative for chest pain.   Gastrointestinal: Positive for abdominal pain (Crampy abdominal pain) and constipation. Negative for diarrhea, nausea and vomiting.   Genitourinary: Negative.  Negative for dysuria.   Musculoskeletal: Negative.  Negative for back pain.   Skin: Negative.  Negative for rash.   Neurological: Negative.  Negative for headaches.   Hematological: Negative.    Psychiatric/Behavioral: Negative.    All other systems reviewed and are negative.      Physical Exam     Initial Vitals [02/11/20 1315]   BP Pulse Resp Temp SpO2   (!) 188/110 (!) 52 14 97.4 °F (36.3 °C) 98 %      MAP       --         Physical Exam    Nursing note and vitals reviewed.  Constitutional: He appears well-developed and well-nourished.   HENT:   Head: Normocephalic and atraumatic.   Eyes: Conjunctivae and EOM are normal.   Neck: Normal range of motion. Neck supple.   Cardiovascular: Normal rate and intact distal pulses. Exam reveals no gallop, no S3, no S4, no distant heart sounds and no friction rub.    No murmur heard.  Pulmonary/Chest: Effort normal and breath sounds normal. No respiratory distress.   Abdominal: Soft. There is no tenderness. There is no rigidity, no rebound, no guarding, no CVA tenderness, no tenderness at McBurney's point and negative Eduardo's sign.       Musculoskeletal: Normal range of motion.   Neurological: He is alert and oriented to person, place, and time.   Skin: Skin is warm and dry.   Psychiatric: He has a normal mood and affect. His behavior is normal.         ED Course   Procedures  Labs Reviewed - No data to display  EKG Readings: (Independently Interpreted)   Rhythm: Normal Sinus  Rhythm. Ectopy: No Ectopy. Conduction: Normal. ST Segments: Normal ST Segments. T Waves: Normal. Clinical Impression: Normal Sinus Rhythm       Imaging Results          X-Ray Abdomen Flat And Erect (Final result)  Result time 02/11/20 14:10:58    Final result by Raymond Valerio MD (02/11/20 14:10:58)                 Impression:      See above      Electronically signed by: Raymond Valerio MD  Date:    02/11/2020  Time:    14:10             Narrative:    EXAMINATION:  XR ABDOMEN FLAT AND ERECT    CLINICAL HISTORY:  Unspecified abdominal pain    TECHNIQUE:  Flat and erect AP views of the abdomen were performed.    COMPARISON:  None    FINDINGS:  No free air in the abdomen.  No significant bowel dilatation identified.  Few calcified phleboliths noted in the pelvis.                                 Medical Decision Making:   History:   Old Medical Records: I decided to obtain old medical records.  ED Management:  This patient presents to the emergency department midepigastric abdominal pain associated with constipation.  Patient's x-ray evaluation although read negative by Radiology shows a significant amount of stool but no signs of any obstruction.  Patient's findings are consistent with his prior complaint of constipation.  Patient also is currently pending a colonoscopy for GI symptoms and the recent past.  Patient will be discharged home on Reglan with specific instructions to improve his bowel habitus.            Scribe Attestation:   Scribe #1: I performed the above scribed service and the documentation accurately describes the services I performed. I attest to the accuracy of the note.    This document was produced by a scribe under my direction and in my presence. I agree with the content of the note and have made any necessary edits.     Santana Anglin MD    02/11/2020 2:31 PM                      Clinical Impression:     1. Constipation, unspecified constipation type    2. Abdominal pain                                 Santana Anglin MD  02/11/20 1224

## 2020-02-12 ENCOUNTER — NURSE TRIAGE (OUTPATIENT)
Dept: ADMINISTRATIVE | Facility: CLINIC | Age: 55
End: 2020-02-12

## 2020-02-12 NOTE — TELEPHONE ENCOUNTER
Was seen in the ER yesterday for abdominal and chest pain. Patient dx with impaction. Did mineral oil and saline enema. Patient states that it was mostly liquid. Patient states that he has used the bathroom a few times today but not a lot has come out. Advised to see physician within three days. Verbalized understanding.     Reason for Disposition   Unable to have a bowel movement (BM) without laxative or enema    Additional Information   Negative: Patient sounds very sick or weak to the triager   Negative: [1] Vomiting AND [2] abdomen looks much more swollen than usual   Negative: [1] Vomiting AND [2] contains bile (green color)   Negative: [1] Constant abdominal pain AND [2] present > 2 hours   Negative: [1] Sudden onset rectal pain (straining, rectal pressure or fullness) AND [2] NOT better after SITZ bath, suppository or enema   Negative: [1] Intermittent mild abdominal pain AND [2] fever   Negative: Abdomen is more swollen than usual   Negative: Last bowel movement (BM) > 4 days ago   Negative: Leaking stool    Protocols used: ST CONSTIPATION-A-

## 2020-02-13 ENCOUNTER — PATIENT MESSAGE (OUTPATIENT)
Dept: FAMILY MEDICINE | Facility: CLINIC | Age: 55
End: 2020-02-13

## 2020-03-05 ENCOUNTER — TELEPHONE (OUTPATIENT)
Dept: PHARMACY | Facility: CLINIC | Age: 55
End: 2020-03-05

## 2020-03-05 NOTE — TELEPHONE ENCOUNTER
Refill call regarding Emgality (001). Will prepare for shipment with consent of patient on 33.00 [facility] ending in 1945. Patient has not started any new medications including OTC drugs. Patient has not had any medication/ dose or instruction changes. No new allergies or side effects reported with this shipment. Medication is being taken as prescribed by physician and properly stored. Two patient identifiers:  and Address verified. Next injection 3/11 with 0 doses on hand.

## 2020-04-02 ENCOUNTER — TELEPHONE (OUTPATIENT)
Dept: PHARMACY | Facility: CLINIC | Age: 55
End: 2020-04-02

## 2020-04-02 NOTE — TELEPHONE ENCOUNTER
confirmed the need for refill of emgality, shipped to confirmed address on 4/7 to arrive on 4/8. verified 2 pt. identifier, pt. states that next dose is on 4/8. pt reports that he has tested positive for COVID-19. I informed pt if he has any questions to please call [facility] for assistance. pt agreed, pt's copay is 33.00 via [facility]. no special notes for [employer] was added.

## 2020-04-03 ENCOUNTER — TELEPHONE (OUTPATIENT)
Dept: FAMILY MEDICINE | Facility: CLINIC | Age: 55
End: 2020-04-03

## 2020-04-03 ENCOUNTER — OFFICE VISIT (OUTPATIENT)
Dept: FAMILY MEDICINE | Facility: CLINIC | Age: 55
End: 2020-04-03
Payer: OTHER GOVERNMENT

## 2020-04-03 ENCOUNTER — PATIENT MESSAGE (OUTPATIENT)
Dept: FAMILY MEDICINE | Facility: CLINIC | Age: 55
End: 2020-04-03

## 2020-04-03 DIAGNOSIS — J01.81 OTHER ACUTE RECURRENT SINUSITIS: Primary | ICD-10-CM

## 2020-04-03 DIAGNOSIS — U07.1 COVID-19 VIRUS INFECTION: ICD-10-CM

## 2020-04-03 DIAGNOSIS — J31.0 PERENNIAL NON-ALLERGIC RHINITIS: ICD-10-CM

## 2020-04-03 PROCEDURE — 99213 OFFICE O/P EST LOW 20 MIN: CPT | Mod: 95,,, | Performed by: INTERNAL MEDICINE

## 2020-04-03 PROCEDURE — 99213 PR OFFICE/OUTPT VISIT, EST, LEVL III, 20-29 MIN: ICD-10-PCS | Mod: 95,,, | Performed by: INTERNAL MEDICINE

## 2020-04-03 RX ORDER — MONTELUKAST SODIUM 10 MG/1
TABLET ORAL
Qty: 90 TABLET | Refills: 3 | Status: SHIPPED | OUTPATIENT
Start: 2020-04-03 | End: 2020-09-17

## 2020-04-03 RX ORDER — MONTELUKAST SODIUM 10 MG/1
10 TABLET ORAL NIGHTLY
Qty: 30 TABLET | Refills: 0 | Status: SHIPPED | OUTPATIENT
Start: 2020-04-03 | End: 2020-04-03

## 2020-04-03 RX ORDER — AMOXICILLIN AND CLAVULANATE POTASSIUM 875; 125 MG/1; MG/1
1 TABLET, FILM COATED ORAL 2 TIMES DAILY
Qty: 20 TABLET | Refills: 0 | Status: SHIPPED | OUTPATIENT
Start: 2020-04-03 | End: 2020-04-13

## 2020-04-03 NOTE — PROGRESS NOTES
This note was created by combination of typed  and M-Modal dictation.  Transcription errors may be present.  If there are any questions, please contact me.    Assessment & Plan:   Other acute recurrent sinusitis  -Augmentin.  He has seen ENT previously.  If this continues to be an issue I have asked him to follow-up with ENT and revisit this.  Does not sound like he has had more aggressive workup, not sure if he has had nasal endoscopy.  -     amoxicillin-clavulanate 875-125mg (AUGMENTIN) 875-125 mg per tablet; Take 1 tablet by mouth 2 (two) times daily. for 10 days  Dispense: 20 tablet; Refill: 0    Perennial non-allergic rhinitis  -this seems to be chronic for him, has never tried Singulair and antihistamine and nasal spray all at once.  I have asked him to try  -     montelukast (SINGULAIR) 10 mg tablet; Take 1 tablet (10 mg total) by mouth every evening.  Dispense: 30 tablet; Refill: 0    COVID-19 virus infection  -continue self quarantine.  He will be contagious/infectious until after 14 days after onset of symptoms or afebrile times 72 hr without NSAIDs or Tylenol and reduction in respiratory symptoms.  Unfortunately he did not have any inciting symptoms.  So may be difficult to monitor.        Medications Discontinued During This Encounter   Medication Reason    azithromycin (ZITHROMAX Z-ELIAS) 250 MG tablet Therapy completed       meds sent this encounter:  Medications Ordered This Encounter   Medications    amoxicillin-clavulanate 875-125mg (AUGMENTIN) 875-125 mg per tablet     Sig: Take 1 tablet by mouth 2 (two) times daily. for 10 days     Dispense:  20 tablet     Refill:  0    montelukast (SINGULAIR) 10 mg tablet     Sig: Take 1 tablet (10 mg total) by mouth every evening.     Dispense:  30 tablet     Refill:  0       Follow Up: No follow-ups on file.    Subjective:     Chief Complaint   Patient presents with    Sinusitis    COVID-19 Concerns     positive outside test       MARA jaimes a  55 y.o. male, last appointment with this clinic was 2/10/2020.    The patient location is: [place], [place]  The chief complaint leading to consultation is: sinus pressure and drainage  Visit type: Virtual visit with synchronous audio and video  Total time spent with patient: 10 min  Each patient to whom he or she provides medical services by telemedicine is:  (1) informed of the relationship between the physician and patient and the respective role of any other health care provider with respect to management of the patient; and (2) notified that he or she may decline to receive medical services by telemedicine and may withdraw from such care at any time.    Notes:  I previously saw him back in February.  At the time abdominal wall strain.  I did not think that this was recurrence of hernia.  Sinusitis, Zithromax at that time.  Colonoscopy had been ordered but it appears it has been delayed because of the COVID-19 pandemic.    Perennial rhinitis.  In January gave him Augmentin.  In February gave him Zithromax.  He notes that it did help the intensity but did not resolve his symptoms completely.  He has chronic sinus congestion with postnasal drip.  However he has been experiencing more pressure sensation in the right maxillary area and more thick purulent drainage.  No objective fevers.  His wife had tested positive for Covid19.  She had been symptomatic.  He did not have any new symptoms but because he was exposed he went ahead and got tested at the [place] [address] and that was on Sunday.  They just called him yesterday and told him he was positive.  So it sounds like he has a mild case.  Again he is afebrile with no new real respiratory symptoms.    Patient Care Team:  Primary Doctor No as PCP - General    Patient Active Problem List    Diagnosis Date Noted    Chronic cluster headache, not intractable followed by neurology 01/08/2020    Refractive error 09/26/2019    Other chronic sinusitis uses budesonide  for this NOT for lungs 2019 CT sinus:  Small amount of mucosal thickening along the floor of the left maxillary sinus. Both maxillary sinuses undergone previous antrostomies. There is a bridge of tissue  the natural ostia versus the created ostia bilaterally. Note that the ethmoid sinuses of also undergone previous surgery. There is no significant paranasal sinus disease within the ethmoid sinuses or the sphenoid sinuses. The frontal sinuses are absent.      Essential hypertension 2019    Gastroesophageal reflux disease without esophagitis 2019       PAST MEDICAL HISTORY:  Past Medical History:   Diagnosis Date    Hypertension     on medication       PAST SURGICAL HISTORY:  Past Surgical History:   Procedure Laterality Date    HERNIA REPAIR      SINUS SURGERY         SOCIAL HISTORY:  Social History     Socioeconomic History    Marital status:      Spouse name: Not on file    Number of children: Not on file    Years of education: Not on file    Highest education level: Not on file   Occupational History    Occupation: production      Employer: [employer]   Social Needs    Financial resource strain: Not very hard    Food insecurity:     Worry: Never true     Inability: Never true    Transportation needs:     Medical: No     Non-medical: No   Tobacco Use    Smoking status: Former Smoker     Last attempt to quit: 1999     Years since quittin.8    Smokeless tobacco: Never Used   Substance and Sexual Activity    Alcohol use: Yes     Alcohol/week: 7.0 standard drinks     Types: 7 Glasses of wine per week     Frequency: 2-3 times a week     Drinks per session: 3 or 4     Binge frequency: Less than monthly     Comment: beer 2-3 daily    Drug use: No    Sexual activity: Yes     Partners: Female   Lifestyle    Physical activity:     Days per week: 4 days     Minutes per session: 60 min    Stress: Very much    Relationships    Social connections:     Talks on phone: Twice a week     Gets together: Patient refused     Attends Christian service: Not on file     Active member of club or organization: No     Attends meetings of clubs or organizations: Never     Relationship status:    Other Topics Concern    Not on file   Social History Narrative    Not on file       ALLERGIES AND MEDICATIONS: updated and reviewed.  Review of patient's allergies indicates:  No Known Allergies  Current Outpatient Medications   Medication Sig Dispense Refill    azelastine (ASTELIN) 137 mcg (0.1 %) nasal spray 1 spray (137 mcg total) by Nasal route 2 (two) times daily. 30 mL 0    azithromycin (ZITHROMAX Z-ELIAS) 250 MG tablet Take 2 pills day 1, then 1 pill day 2-5. 6 tablet 0    budesonide (PULMICORT) 0.25 mg/2 mL nebulizer solution Inhale 0.25 mg into the lungs.      fexofenadine (ALLEGRA) 30 MG tablet Take 30 mg by mouth once daily.      galcanezumab-gnlm (EMGALITY PEN) 120 mg/mL PnIj Inject 120 mg into the skin every 28 days. 1 mL 3    lisinopril-hydrochlorothiazide (PRINZIDE,ZESTORETIC) 20-12.5 mg per tablet Take 1 tablet by mouth once daily. 90 tablet 3    metoclopramide HCl (REGLAN) 10 MG tablet Take 1 tablet (10 mg total) by mouth every 6 (six) hours as needed. 30 tablet 0    omeprazole (PRILOSEC) 20 MG capsule Take 20 mg by mouth once daily.  6     No current facility-administered medications for this visit.        Review of Systems   Constitutional: Negative for chills and fever.   Respiratory: Negative for sputum production, shortness of breath and wheezing.        Objective:   Physical Exam  There were no vitals filed for this visit. There is no height or weight on file to calculate BMI.            Physical Exam   Constitutional: He is oriented to person, place, and time. He appears well-developed and well-nourished. No distress.   HENT:   Head: Normocephalic and atraumatic.   Pulmonary/Chest: Effort normal.    Neurological: He is alert and oriented to person, place, and time.   Psychiatric: He has a normal mood and affect. His behavior is normal. Thought content normal.

## 2020-04-21 ENCOUNTER — TELEPHONE (OUTPATIENT)
Dept: NEUROLOGY | Facility: CLINIC | Age: 55
End: 2020-04-21

## 2020-04-29 ENCOUNTER — OFFICE VISIT (OUTPATIENT)
Dept: NEUROLOGY | Facility: CLINIC | Age: 55
End: 2020-04-29
Payer: OTHER GOVERNMENT

## 2020-04-29 DIAGNOSIS — G44.019 EPISODIC CLUSTER HEADACHE, NOT INTRACTABLE: ICD-10-CM

## 2020-04-29 PROCEDURE — 99213 OFFICE O/P EST LOW 20 MIN: CPT | Mod: 95,,, | Performed by: PSYCHIATRY & NEUROLOGY

## 2020-04-29 PROCEDURE — 99213 PR OFFICE/OUTPT VISIT, EST, LEVL III, 20-29 MIN: ICD-10-PCS | Mod: 95,,, | Performed by: PSYCHIATRY & NEUROLOGY

## 2020-04-29 NOTE — PROGRESS NOTES
Neurology Follow Up Note    Chief Complaint: follow up cluster headaches    Interval History:  Since last visit, patient states he has not had a headache in over a month. He is tolerating emgality 120mg injection once a month well. He has no further complaints. He denies having a recent fever or feeling sick.    Last Visit 1/29/20:  Since last visit, patient states his headaches have greatly improved. He took emgality 200mg on January 10. He states he gets a mild headache one to times a week which resolves with ibuprofen. He states he had a sinus infection at the end of December which has now resolved. He had no further complaints.     Visit 12/18/19:  Since last visit, patient states he has had no further headaches. He took emgality 300mg injection on 11/28/19 and tolerated this well. He had an MRI brain and MRA since last visit which were unremarkable.     Visit 11/20/19:  Since last visit, patient feels his headaches have greatly improved on emgality. He states he tolerated medrol dose madeline well, but the past week, his headaches have started to come back. He states he has had 2-3 typical headaches in the past week. He only took emgality 100mg last month, however, he was supposed to take 300mg and was unaware of this. He is due for another injection soon and was advised to inject 300mg. He has no further complaints.     Visit 10/23/19:  Since last visit, patient states his headaches have remained stable. He continues to get right sided constant headaches associated with tearing from his eye and nasal congestion. He states the pain becomes severe around 4-5 in the afternoon, and then subsides after a few hours.  He states he was unable to obtain emgality as of yet. I called [facility] and they state patient can come in tomorrow for initial consultation and to obtain medication.         Initial Visit 9/24/19:  Enoch Barr is a 54 y.o. male with medical conditions as outlined below who presents for  further evaluation of right sided headache. He states he has been having headaches for the past 2 years. He denies a history of migraines or headaches when he was younger. He states he will get a sharp pain above his right eye and it will progress to involve the whole right side of his head. He states this gets up to a 9/10 pain in intensity and can last 3 hours. He states sometimes taking ibuprofen 600mg helps with this headache. He states he has been taking ibuprofen daily for the past 2 years. He reports sometimes it helps and sometimes it doesn't. He states when he gets the headache, his right eye turns red and he will have tearing from this eye. He denies ptosis or droopy eyelid. He states sometimes he will get blurry vision, but denies diplopia or loss of vision. He states he will get a runny nose at times. He states he has a constant 1-2/10 pain, however, around 5 pm four to 5 times a week, his headache will worsen to a 9/10 pain. He states he had an MRI brain done at [facility] within the past year for this and he was told everything was okay. He has no further complaints.    Past Medical History:  Past Medical History:   Diagnosis Date    Hypertension     on medication       Past Surgical History:  Past Surgical History:   Procedure Laterality Date    HERNIA REPAIR      SINUS SURGERY  2012       Social History:  Social History     Socioeconomic History    Marital status:      Spouse name: Not on file    Number of children: Not on file    Years of education: Not on file    Highest education level: Not on file   Occupational History    Occupation: production      Employer:  [employer]   Social Needs    Financial resource strain: Not very hard    Food insecurity:     Worry: Never true     Inability: Never true    Transportation needs:     Medical: No     Non-medical: No   Tobacco Use    Smoking status: Former Smoker     Last attempt to quit: 5/20/1999     Years  since quittin.9    Smokeless tobacco: Never Used   Substance and Sexual Activity    Alcohol use: Yes     Alcohol/week: 7.0 standard drinks     Types: 7 Glasses of wine per week     Frequency: 2-3 times a week     Drinks per session: 3 or 4     Binge frequency: Less than monthly     Comment: beer 2-3 daily    Drug use: No    Sexual activity: Yes     Partners: Female   Lifestyle    Physical activity:     Days per week: 4 days     Minutes per session: 60 min    Stress: Very much   Relationships    Social connections:     Talks on phone: Twice a week     Gets together: Patient refused     Attends Yazidi service: Not on file     Active member of club or organization: No     Attends meetings of clubs or organizations: Never     Relationship status:    Other Topics Concern    Not on file   Social History Narrative    Not on file       Family History:  Family History   Problem Relation Age of Onset    Coronary artery disease Mother     Hypertension Father     No Known Problems Sister     No Known Problems Brother     Valvular heart disease Sister     No Known Problems Brother     No Known Problems Brother     Amblyopia Neg Hx     Blindness Neg Hx     Cancer Neg Hx     Cataracts Neg Hx     Diabetes Neg Hx     Glaucoma Neg Hx     Macular degeneration Neg Hx     Retinal detachment Neg Hx     Strabismus Neg Hx     Stroke Neg Hx     Thyroid disease Neg Hx        Medications:  Current Outpatient Medications   Medication Sig Dispense Refill    azelastine (ASTELIN) 137 mcg (0.1 %) nasal spray 1 spray (137 mcg total) by Nasal route 2 (two) times daily. 30 mL 0    budesonide (PULMICORT) 0.25 mg/2 mL nebulizer solution Inhale 0.25 mg into the lungs.      fexofenadine (ALLEGRA) 30 MG tablet Take 30 mg by mouth once daily.      galcanezumab-gnlm (EMGALITY PEN) 120 mg/mL PnIj Inject 120 mg into the skin every 28 days. 1 mL 2    lisinopril-hydrochlorothiazide (PRINZIDE,ZESTORETIC) 20-12.5 mg  per tablet Take 1 tablet by mouth once daily. 90 tablet 3    metoclopramide HCl (REGLAN) 10 MG tablet Take 1 tablet (10 mg total) by mouth every 6 (six) hours as needed. 30 tablet 0    montelukast (SINGULAIR) 10 mg tablet TAKE 1 TABLET(10 MG) BY MOUTH EVERY EVENING 90 tablet 3    omeprazole (PRILOSEC) 20 MG capsule Take 20 mg by mouth once daily.  6     No current facility-administered medications for this visit.        Allergies:  Review of patient's allergies indicates:  No Known Allergies    ROS:  A 12 point review of system was negative aside from pertinent positives and negatives as outlined above.    Physical Exam  General: well nourished, well developed  Eyes: no scleral icterus   Neck: ROM intact  Skin: no obvious rashes     Neuro:  Limited exam as patient seen via virtual visit  Neuro:  Mental status: AAO x 3, no dysarthria, no aphasia, communicating appropriately  CN: EOMI, VFF, no gross facial asymmetry, hearing grossly intact, tongue midline  Motor:   Moves all extremities at least 4/5  Coordination: no dysmetria on FTN  Sensation: exam limited due to virtual visit  Gait: steady    Prior Imaging/Labs:  Reviewed      Assessment and Plan:    55 y.o. male with cluster headaches    1. Episodic cluster headache  - galcanezumab-gnlm (EMGALITY PEN) 120 mg/mL PnIj; Inject 120 mg into the skin every 28 days.  Dispense: 1 mL; Refill: 2  If patient remains headache free at next visit, can consider a trial off of emgality and see how patient does    Patient was made aware of side effects of medication(s) prescribed and was advised to notify me if he experiences any.      Patient was advised to notify me for worsening symptoms. He was made aware that I will be moving out of state next month and will transition his care to Dr. Malin. He is in agreement with this plan. Will have staff set him up to see Dr. Malin in June 2020.    The patient location is: home  The chief complaint leading to consultation is: follow  up cluster headaches  Visit type: Virtual visit with synchronous audio and video  Total time spent with patient: 10 minutes  Each patient to whom he or she provides medical services by telemedicine is:  (1) informed of the relationship between the physician and patient and the respective role of any other health care provider with respect to management of the patient; and (2) notified that he or she may decline to receive medical services by telemedicine and may withdraw from such care at any time.      Jacqueline Pacheco DO  [facility]  [address] [facility] [address]  [place], [place] [id number] [phone]

## 2020-05-01 ENCOUNTER — TELEPHONE (OUTPATIENT)
Dept: PHARMACY | Facility: CLINIC | Age: 55
End: 2020-05-01

## 2020-05-04 ENCOUNTER — TELEPHONE (OUTPATIENT)
Dept: PHARMACY | Facility: CLINIC | Age: 55
End: 2020-05-04

## 2020-05-12 RX ORDER — AZELASTINE 1 MG/ML
1 SPRAY, METERED NASAL 2 TIMES DAILY
Qty: 30 ML | Refills: 0 | OUTPATIENT
Start: 2020-05-12 | End: 2021-05-12

## 2020-05-13 ENCOUNTER — PATIENT MESSAGE (OUTPATIENT)
Dept: FAMILY MEDICINE | Facility: CLINIC | Age: 55
End: 2020-05-13

## 2020-05-13 DIAGNOSIS — J31.0 PERENNIAL NON-ALLERGIC RHINITIS: Primary | ICD-10-CM

## 2020-05-13 RX ORDER — AZELASTINE 1 MG/ML
1 SPRAY, METERED NASAL 2 TIMES DAILY
Qty: 30 ML | Refills: 11 | Status: SHIPPED | OUTPATIENT
Start: 2020-05-13 | End: 2020-09-23 | Stop reason: SDUPTHER

## 2020-05-13 RX ORDER — AZELASTINE 1 MG/ML
1 SPRAY, METERED NASAL 2 TIMES DAILY
Qty: 30 ML | Refills: 0 | Status: SHIPPED | OUTPATIENT
Start: 2020-05-13 | End: 2020-05-13 | Stop reason: SDUPTHER

## 2020-05-27 ENCOUNTER — TELEPHONE (OUTPATIENT)
Dept: PHARMACY | Facility: CLINIC | Age: 55
End: 2020-05-27

## 2020-05-29 ENCOUNTER — TELEPHONE (OUTPATIENT)
Dept: PHARMACY | Facility: CLINIC | Age: 55
End: 2020-05-29

## 2020-06-15 ENCOUNTER — TELEPHONE (OUTPATIENT)
Dept: NEUROLOGY | Facility: CLINIC | Age: 55
End: 2020-06-15

## 2020-06-29 DIAGNOSIS — Z12.11 SCREENING FOR COLON CANCER: Primary | ICD-10-CM

## 2020-07-02 ENCOUNTER — TELEPHONE (OUTPATIENT)
Dept: PHARMACY | Facility: CLINIC | Age: 55
End: 2020-07-02

## 2020-07-06 ENCOUNTER — OFFICE VISIT (OUTPATIENT)
Dept: FAMILY MEDICINE | Facility: CLINIC | Age: 55
End: 2020-07-06
Payer: OTHER GOVERNMENT

## 2020-07-06 VITALS
BODY MASS INDEX: 32.06 KG/M2 | TEMPERATURE: 97 F | DIASTOLIC BLOOD PRESSURE: 102 MMHG | SYSTOLIC BLOOD PRESSURE: 144 MMHG | WEIGHT: 236.69 LBS | HEART RATE: 80 BPM | OXYGEN SATURATION: 97 % | HEIGHT: 72 IN

## 2020-07-06 DIAGNOSIS — M54.50 LOW BACK PAIN POTENTIALLY ASSOCIATED WITH RADICULOPATHY: Primary | ICD-10-CM

## 2020-07-06 DIAGNOSIS — E66.9 OBESITY (BMI 30-39.9): ICD-10-CM

## 2020-07-06 DIAGNOSIS — I10 ESSENTIAL HYPERTENSION: ICD-10-CM

## 2020-07-06 PROCEDURE — 99999 PR PBB SHADOW E&M-EST. PATIENT-LVL IV: ICD-10-PCS | Mod: PBBFAC,,, | Performed by: INTERNAL MEDICINE

## 2020-07-06 PROCEDURE — 99214 OFFICE O/P EST MOD 30 MIN: CPT | Mod: PBBFAC,PO | Performed by: INTERNAL MEDICINE

## 2020-07-06 PROCEDURE — 99214 PR OFFICE/OUTPT VISIT, EST, LEVL IV, 30-39 MIN: ICD-10-PCS | Mod: S$PBB,,, | Performed by: INTERNAL MEDICINE

## 2020-07-06 PROCEDURE — 99999 PR PBB SHADOW E&M-EST. PATIENT-LVL IV: CPT | Mod: PBBFAC,,, | Performed by: INTERNAL MEDICINE

## 2020-07-06 PROCEDURE — 99214 OFFICE O/P EST MOD 30 MIN: CPT | Mod: S$PBB,,, | Performed by: INTERNAL MEDICINE

## 2020-07-06 RX ORDER — TIZANIDINE HYDROCHLORIDE 2 MG/1
2-4 CAPSULE, GELATIN COATED ORAL EVERY 12 HOURS PRN
Qty: 30 CAPSULE | Refills: 0 | Status: SHIPPED | OUTPATIENT
Start: 2020-07-06 | End: 2020-07-16

## 2020-07-06 NOTE — PROGRESS NOTES
HISTORY OF PRESENT ILLNESS:  Enoch Barr is a 55 y.o. male who presents to the clinic today for Back Pain  .   The patient presents to clinic today over concern of persistent back pain.  He states he initially started having some minor back pain few years ago.  He states an [place] showed the gender disc.  He states he normally exercises regularly, but this sort of fell off in January of this year.  At that time he thought that the exercising was actually aggravating his back pain.  He denies any history of trauma.  He states that since then his pain has persisted.  Today is a good they.  His pain is a 5 to a 6/10.  His pain can get worse on other days.  He reports some radiation of the pain around to the front of his groin on the right and occasionally on the left side to his mid buttock.  No pain radiating into his legs.  No bowel or bladder incontinence.  He states he has been taking Aleve and ibuprofen with minimal relief of his symptoms.  He does help.  He does not find that icing helps much.  He does have hypertension.  He states he has not been checking his blood pressure recently.  He reports compliance with his blood pressure medication.  He denies cardiac chest pain or shortness of breath      PAST MEDICAL HISTORY:  Past Medical History:   Diagnosis Date    Hypertension     on medication       PAST SURGICAL HISTORY:  Past Surgical History:   Procedure Laterality Date    HERNIA REPAIR      SINUS SURGERY  2012       SOCIAL HISTORY:  Social History     Socioeconomic History    Marital status:      Spouse name: Not on file    Number of children: Not on file    Years of education: Not on file    Highest education level: Not on file   Occupational History    Occupation: production      Employer: [employer]   Social Needs    Financial resource strain: Not very hard    Food insecurity     Worry: Never true     Inability: Never true    Transportation  needs     Medical: No     Non-medical: No   Tobacco Use    Smoking status: Former Smoker     Quit date: 1999     Years since quittin.1    Smokeless tobacco: Never Used   Substance and Sexual Activity    Alcohol use: Yes     Alcohol/week: 7.0 standard drinks     Types: 7 Glasses of wine per week     Frequency: 2-3 times a week     Drinks per session: 3 or 4     Binge frequency: Less than monthly     Comment: beer 2-3 daily    Drug use: No    Sexual activity: Yes     Partners: Female   Lifestyle    Physical activity     Days per week: 4 days     Minutes per session: 60 min    Stress: Very much   Relationships    Social connections     Talks on phone: Twice a week     Gets together: Patient refused     Attends Pentecostalism service: Not on file     Active member of club or organization: No     Attends meetings of clubs or organizations: Never     Relationship status:    Other Topics Concern    Not on file   Social History Narrative    Not on file       FAMILY HISTORY:  Family History   Problem Relation Age of Onset    Coronary artery disease Mother     Hypertension Father     No Known Problems Sister     No Known Problems Brother     Valvular heart disease Sister     No Known Problems Brother     No Known Problems Brother     Amblyopia Neg Hx     Blindness Neg Hx     Cancer Neg Hx     Cataracts Neg Hx     Diabetes Neg Hx     Glaucoma Neg Hx     Macular degeneration Neg Hx     Retinal detachment Neg Hx     Strabismus Neg Hx     Stroke Neg Hx     Thyroid disease Neg Hx        ALLERGIES AND MEDICATIONS: updated and reviewed.  Review of patient's allergies indicates:  No Known Allergies  Medication List with Changes/Refills   New Medications    TIZANIDINE 2 MG CAP    Take 1-2 capsules (2-4 mg total) by mouth every 12 (twelve) hours as needed.   Current Medications    AZELASTINE (ASTELIN) 137 MCG (0.1 %) NASAL SPRAY    1 spray (137 mcg total) by Nasal route 2 (two) times daily.     BUDESONIDE (PULMICORT) 0.25 MG/2 ML NEBULIZER SOLUTION    Inhale 0.25 mg into the lungs.    FEXOFENADINE (ALLEGRA) 30 MG TABLET    Take 30 mg by mouth once daily.    GALCANEZUMAB-GNLM (EMGALITY PEN) 120 MG/ML PNIJ    Inject 120 mg into the skin every 28 days.    LISINOPRIL-HYDROCHLOROTHIAZIDE (PRINZIDE,ZESTORETIC) 20-12.5 MG PER TABLET    Take 1 tablet by mouth once daily.    METOCLOPRAMIDE HCL (REGLAN) 10 MG TABLET    Take 1 tablet (10 mg total) by mouth every 6 (six) hours as needed.    MONTELUKAST (SINGULAIR) 10 MG TABLET    TAKE 1 TABLET(10 MG) BY MOUTH EVERY EVENING    OMEPRAZOLE (PRILOSEC) 20 MG CAPSULE    Take 20 mg by mouth once daily.         CARE TEAM:  Patient Care Team:  Dipesh Clements MD as PCP - General (Internal Medicine)         REVIEW OF SYSTEMS:  Review of Systems   Constitutional: Negative for chills, fatigue, fever and unexpected weight change.   Respiratory: Negative for cough, shortness of breath and wheezing.    Cardiovascular: Negative for chest pain, palpitations and leg swelling.   Gastrointestinal: Negative for abdominal pain, blood in stool, constipation, diarrhea, nausea and vomiting.   Genitourinary: Negative for difficulty urinating, dysuria and frequency.   Musculoskeletal: Positive for back pain. Negative for gait problem and joint swelling.   Skin: Negative for rash.   Neurological: Negative for seizures, weakness and headaches.   Hematological: Negative for adenopathy.         PHYSICAL EXAM:  Vitals:    07/06/20 1626   BP: (!) 144/102   Pulse: 80   Temp: 97.3 °F (36.3 °C)     Weight: 107.3 kg (236 lb 10.6 oz)   Height: 6' (182.9 cm)   Body mass index is 32.1 kg/m².      General appearance - alert, well appearing, and in no distress, obese  Psychiatric - alert, oriented to person, place, and time, normal mood, behavior, speech, dress, motor activity, and thought processes  Eyes - pupils equal and reactive, extraocular eye movements intact, sclera anicteric  Mouth - not examined;  patient wearing mask due to Covid 19 pandemic  Lymphatics - no palpable cervical lymphadenopathy  Chest - clear to auscultation, no wheezes, rales or rhonchi, symmetric air entry  Heart - normal rate and regular rhythm, no gallops noted  Back exam - mild limit in range of motion noted on exam due to pain, mild pain with motion noted during exam, tenderness to palpation noted - bilaterally (R>L) of the lumbar spine, negative straight-leg raise - left, mild positive straight-leg raise - right, normal strength bilateral lower extremities; normal gait  Neurological - alert, normal speech, no focal findings or movement disorder noted, cranial nerves II through XII intact  Musculoskeletal - no joint tenderness, deformity or swelling, no muscular tenderness noted  Extremities - peripheral pulses normal, no pedal edema, no clubbing or cyanosis  Skin - normal coloration and turgor, no rashes, no suspicious skin lesions noted          ASSESSMENT AND PLAN:  1. Low back pain potentially associated with radiculopathy  I suspect his pain is musculoskeletal in etiology.  He was given a back booklet with stretching and strengthening exercises.  I advised him to switch to Tylenol due to his elevation in blood pressure.  He may continue with heat.  I have also prescribed a muscle relaxer he already has an appointment with the back specialist in and several weeks for further evaluation.  He will call us if symptoms do not improve or worsen and we will try to get him in a little sooner.  - tiZANidine 2 mg Cap; Take 1-2 capsules (2-4 mg total) by mouth every 12 (twelve) hours as needed.  Dispense: 30 capsule; Refill: 0    2. Essential hypertension  Blood pressure is not controlled today. We discussed low salt diet and regular exercise. Patient reports that they have taken any decongestant or anti-inflammatory medication recently (patient educated that these medications can increase blood pressure).  Medication changes made today: None.  Patient will come back in 2-4 weeks for recheck of blood pressure by nursing staff. Patient also asked to check blood pressure at home and bring recordings to next office visit. Patient is not eligible to enroll at this time in the digital hypertension program at this time.     3. Obesity (BMI 30-39.9)  The patient is asked to make an attempt to improve diet and exercise patterns to aid in medical management of this problem.    We have made the patient an appointment to see his primary care physician in a few weeks for recheck of blood pressure.  Further health maintenance can be addressed at that time.  He states he is already scheduled for his colonoscopy next month.         No orders of the defined types were placed in this encounter.     Follow up in about 4 weeks (around 8/3/2020), or if symptoms worsen or fail to improve, for blood pressure check with PCP. or sooner as needed.

## 2020-07-08 ENCOUNTER — PATIENT OUTREACH (OUTPATIENT)
Dept: ADMINISTRATIVE | Facility: OTHER | Age: 55
End: 2020-07-08

## 2020-07-08 NOTE — PROGRESS NOTES
Requested updates within Care Everywhere.  Patient's chart was reviewed for overdue MITCHELL topics.  Immunizations reconciled.

## 2020-07-09 ENCOUNTER — TELEPHONE (OUTPATIENT)
Dept: NEUROLOGY | Facility: CLINIC | Age: 55
End: 2020-07-09

## 2020-07-09 ENCOUNTER — OFFICE VISIT (OUTPATIENT)
Dept: NEUROLOGY | Facility: CLINIC | Age: 55
End: 2020-07-09
Payer: OTHER GOVERNMENT

## 2020-07-09 DIAGNOSIS — G44.029 CHRONIC CLUSTER HEADACHE, NOT INTRACTABLE: Primary | ICD-10-CM

## 2020-07-09 PROCEDURE — 99213 OFFICE O/P EST LOW 20 MIN: CPT | Mod: 95,,, | Performed by: NEUROLOGICAL SURGERY

## 2020-07-09 PROCEDURE — 99213 PR OFFICE/OUTPT VISIT, EST, LEVL III, 20-29 MIN: ICD-10-PCS | Mod: 95,,, | Performed by: NEUROLOGICAL SURGERY

## 2020-07-09 RX ORDER — RIMEGEPANT SULFATE 75 MG/75MG
75 TABLET, ORALLY DISINTEGRATING ORAL ONCE AS NEEDED
Qty: 8 TABLET | Refills: 2 | Status: SHIPPED | OUTPATIENT
Start: 2020-07-09 | End: 2020-07-09

## 2020-07-13 NOTE — PROGRESS NOTES
Chief Complaint   Patient presents with    Headache        Enoch Barr is a 55 y.o. male with a history of multiple medical diagnoses as listed below that presents for evaluation headaches.  Patient has been tolerating his Emgality injections without any difficulty.  He feels that the injections have been helpful in controlling his pain..     PAST MEDICAL HISTORY:  Past Medical History:   Diagnosis Date    Hypertension     on medication       PAST SURGICAL HISTORY:  Past Surgical History:   Procedure Laterality Date    HERNIA REPAIR      SINUS SURGERY         SOCIAL HISTORY:  Social History     Socioeconomic History    Marital status:      Spouse name: Not on file    Number of children: Not on file    Years of education: Not on file    Highest education level: Not on file   Occupational History    Occupation: production      Employer: [employer]   Social Needs    Financial resource strain: Not very hard    Food insecurity     Worry: Never true     Inability: Never true    Transportation needs     Medical: No     Non-medical: No   Tobacco Use    Smoking status: Former Smoker     Quit date: 1999     Years since quittin.1    Smokeless tobacco: Never Used   Substance and Sexual Activity    Alcohol use: Yes     Alcohol/week: 7.0 standard drinks     Types: 7 Glasses of wine per week     Frequency: 2-3 times a week     Drinks per session: 3 or 4     Binge frequency: Less than monthly     Comment: beer 2-3 daily    Drug use: No    Sexual activity: Yes     Partners: Female   Lifestyle    Physical activity     Days per week: 4 days     Minutes per session: 60 min    Stress: Very much   Relationships    Social connections     Talks on phone: Twice a week     Gets together: Patient refused     Attends Jew service: Not on file     Active member of club or organization: No     Attends meetings of clubs or organizations: Never     Relationship status:     Other Topics Concern    Not on file   Social History Narrative    Not on file       FAMILY HISTORY:  Family History   Problem Relation Age of Onset    Coronary artery disease Mother     Hypertension Father     No Known Problems Sister     No Known Problems Brother     Valvular heart disease Sister     No Known Problems Brother     No Known Problems Brother     Amblyopia Neg Hx     Blindness Neg Hx     Cancer Neg Hx     Cataracts Neg Hx     Diabetes Neg Hx     Glaucoma Neg Hx     Macular degeneration Neg Hx     Retinal detachment Neg Hx     Strabismus Neg Hx     Stroke Neg Hx     Thyroid disease Neg Hx        ALLERGIES AND MEDICATIONS: updated and reviewed.  Review of patient's allergies indicates:  No Known Allergies  Current Outpatient Medications   Medication Sig Dispense Refill    azelastine (ASTELIN) 137 mcg (0.1 %) nasal spray 1 spray (137 mcg total) by Nasal route 2 (two) times daily. 30 mL 11    budesonide (PULMICORT) 0.25 mg/2 mL nebulizer solution Inhale 0.25 mg into the lungs.      fexofenadine (ALLEGRA) 30 MG tablet Take 30 mg by mouth once daily.      galcanezumab-gnlm (EMGALITY PEN) 120 mg/mL PnIj Inject 120 mg into the skin every 28 days. 1 mL 2    lisinopril-hydrochlorothiazide (PRINZIDE,ZESTORETIC) 20-12.5 mg per tablet Take 1 tablet by mouth once daily. 90 tablet 3    metoclopramide HCl (REGLAN) 10 MG tablet Take 1 tablet (10 mg total) by mouth every 6 (six) hours as needed. (Patient not taking: Reported on 7/6/2020) 30 tablet 0    montelukast (SINGULAIR) 10 mg tablet TAKE 1 TABLET(10 MG) BY MOUTH EVERY EVENING 90 tablet 3    omeprazole (PRILOSEC) 20 MG capsule Take 20 mg by mouth once daily.  6    tiZANidine 2 mg Cap Take 1-2 capsules (2-4 mg total) by mouth every 12 (twelve) hours as needed. 30 capsule 0     No current facility-administered medications for this visit.        Review of Systems    Neurologic Exam    Physical Exam    There were no vitals filed  for this visit.    Assessment & Plan:    Problem List Items Addressed This Visit     Chronic cluster headache, not intractable followed by neurology - Primary          The patient location is:  home  The chief complaint leading to consultation is:  Headache  Visit type: Virtual visit with synchronous audio and video  Total time spent with patient:  20  Each patient to whom he or she provides medical services by telemedicine is:  (1) informed of the relationship between the physician and patient and the respective role of any other health care provider with respect to management of the patient; and (2) notified that he or she may decline to receive medical services by telemedicine and may withdraw from such care at any time.    Notes:     Follow-up: Follow up in about 6 months (around 1/9/2021).    This note was done with the assistance of voice recognition software. Some errors may be present after proofreading.

## 2020-07-14 DIAGNOSIS — G43.009 MIGRAINE WITHOUT AURA AND WITHOUT STATUS MIGRAINOSUS, NOT INTRACTABLE: Primary | ICD-10-CM

## 2020-07-26 NOTE — PROGRESS NOTES
This note was created by combination of typed  and M-Modal dictation.  Transcription errors may be present.  If there are any questions, please contact me.    Assessment and Plan:   Normal physical exam  Encounter for screening for HIV  Need for hepatitis C screening test  -nonfasting today. Check labs.  -     CBC auto differential; Future; Expected date: 07/27/2020  -     Comprehensive metabolic panel; Future; Expected date: 07/27/2020  -     Lipid Panel; Future; Expected date: 07/27/2020  -     HIV 1/2 Ag/Ab (4th Gen); Future; Expected date: 07/27/2020  -     Hepatitis C Antibody; Future; Expected date: 07/27/2020    Chest pain, unspecified type  -subacute. No hx of asthma/COPD. EKG no abn. Checking CXR. Will likely need to refer to cardiology if labs and CXR normal.  -     X-Ray Chest PA And Lateral; Future; Expected date: 07/27/2020  -     EKG 12-lead    Acute right-sided low back pain without sciatica  -persisting pain.  Medrol dose madeline.   -     methylPREDNISolone (MEDROL DOSEPACK) 4 mg tablet; use as directed  Dispense: 1 Package; Refill: 0      There are no discontinued medications.    meds sent this encounter:  Medications Ordered This Encounter   Medications    methylPREDNISolone (MEDROL DOSEPACK) 4 mg tablet     Sig: use as directed     Dispense:  1 Package     Refill:  0       Follow Up: No follow-ups on file.    Subjective:     Chief Complaint   Patient presents with    Back Pain    Chest Pain       MARA Kendall is a 55 y.o. male, last appointment with this clinic was 7/6/2020.    Chronic cluster headaches.  Emgality.  Followed by Neurology.    8/10 colonoscopy scheduled    He saw my colleague earlier this month for low back pain.  Tizanidine.    Hx of covid positive 4/2020    Continues to have R sided low back pain.  Started in January. Similar location. Really tender on the lower back SI area. It can be sharp in spots. Mostly dull.  With bending over to  something, a hard dull ache.  Sometimes radiates into groin. Not into the leg. No weakness. Compared to inception it's worse.    Hx of back pain in the past.  Usually would be in the center of lumbar back. Hx of DJD he reports.     No pain with BM or urination.     The tizanidine knocked him out. Some pain relief.     Having sensation of chest tightness in the upper chest. That started in March or April. It's constant. Always sensation that it's tight.  Not related to physical activity. Most strenuous activity is around the house, remodeling. He's been remastering his master bath. Mainly just painting. Some dyspnea with that.     Reports hx of EGD with scarring from GERD.  Hx of PPI has been taking it a while. Today though he is having throat irritation. Normally better controlled.    Thinks he has component of white coat syndrome.  Historically home readings better than in the office.  But his current machine, is broken so no recent bp checks.     Cough mild. Post nasal drip he thinks.     Has consult with pain mgmt scheduled in August.    Answers for HPI/ROS submitted by the patient on 7/25/2020   activity change: No  unexpected weight change: Yes  rhinorrhea: No  trouble swallowing: No  visual disturbance: No  chest tightness: Yes  polyuria: No  difficulty urinating: No  joint swelling: No  confusion: No  dysphoric mood: Yes      Patient Care Team:  Dipesh Clements MD as PCP - General (Internal Medicine)    Patient Active Problem List    Diagnosis Date Noted    Chronic cluster headache, not intractable followed by neurology 01/08/2020    Refractive error 09/26/2019    Other chronic sinusitis uses budesonide for this NOT for lungs 05/09/2019 6/5/19 CT sinus:  Small amount of mucosal thickening along the floor of the left maxillary sinus. Both maxillary sinuses undergone previous antrostomies. There is a bridge of tissue  the natural ostia versus the created ostia bilaterally. Note that the ethmoid sinuses of also undergone  previous surgery. There is no significant paranasal sinus disease within the ethmoid sinuses or the sphenoid sinuses. The frontal sinuses are absent.      Essential hypertension 2019    Gastroesophageal reflux disease without esophagitis 2019       PAST MEDICAL HISTORY:  Past Medical History:   Diagnosis Date    Hypertension     on medication       PAST SURGICAL HISTORY:  Past Surgical History:   Procedure Laterality Date    HERNIA REPAIR      SINUS SURGERY         SOCIAL HISTORY:  Social History     Socioeconomic History    Marital status:      Spouse name: Not on file    Number of children: Not on file    Years of education: Not on file    Highest education level: Not on file   Occupational History    Occupation: production      Employer: [employer]   Social Needs    Financial resource strain: Not very hard    Food insecurity     Worry: Never true     Inability: Never true    Transportation needs     Medical: No     Non-medical: No   Tobacco Use    Smoking status: Former Smoker     Quit date: 1999     Years since quittin.2    Smokeless tobacco: Never Used   Substance and Sexual Activity    Alcohol use: Yes     Alcohol/week: 7.0 standard drinks     Types: 7 Glasses of wine per week     Frequency: 2-3 times a week     Drinks per session: 3 or 4     Binge frequency: Less than monthly     Comment: beer 2-3 daily    Drug use: No    Sexual activity: Yes     Partners: Female   Lifestyle    Physical activity     Days per week: 4 days     Minutes per session: 60 min    Stress: Very much   Relationships    Social connections     Talks on phone: Twice a week     Gets together: Patient refused     Attends Yazidi service: Not on file     Active member of club or organization: No     Attends meetings of clubs or organizations: Never     Relationship status:    Other Topics Concern    Not on file   Social History Narrative    Not on  file       ALLERGIES AND MEDICATIONS: updated and reviewed.  Review of patient's allergies indicates:  No Known Allergies  Current Outpatient Medications   Medication Sig Dispense Refill    azelastine (ASTELIN) 137 mcg (0.1 %) nasal spray 1 spray (137 mcg total) by Nasal route 2 (two) times daily. 30 mL 11    budesonide (PULMICORT) 0.25 mg/2 mL nebulizer solution Inhale 0.25 mg into the lungs.      fexofenadine (ALLEGRA) 30 MG tablet Take 30 mg by mouth once daily.      galcanezumab-gnlm (EMGALITY PEN) 120 mg/mL PnIj Inject 120 mg into the skin every 28 days. 1 mL 2    lisinopril-hydrochlorothiazide (PRINZIDE,ZESTORETIC) 20-12.5 mg per tablet Take 1 tablet by mouth once daily. 90 tablet 3    metoclopramide HCl (REGLAN) 10 MG tablet Take 1 tablet (10 mg total) by mouth every 6 (six) hours as needed. (Patient not taking: Reported on 7/6/2020) 30 tablet 0    montelukast (SINGULAIR) 10 mg tablet TAKE 1 TABLET(10 MG) BY MOUTH EVERY EVENING 90 tablet 3    omeprazole (PRILOSEC) 20 MG capsule Take 20 mg by mouth once daily.  6     No current facility-administered medications for this visit.        Review of Systems   HENT: Negative for hearing loss.    Eyes: Negative for discharge.   Respiratory: Negative for wheezing.    Cardiovascular: Negative for chest pain and palpitations.   Gastrointestinal: Positive for diarrhea. Negative for blood in stool, constipation and vomiting.   Genitourinary: Negative for hematuria and urgency.   Musculoskeletal: Negative for neck pain.   Neurological: Positive for headaches. Negative for weakness.   Endo/Heme/Allergies: Negative for polydipsia.       Objective:   Physical Exam   Vitals:    07/27/20 1559 07/27/20 1625   BP: (!) 160/110 (!) 140/90   BP Location: Left arm Left arm   Patient Position: Sitting Sitting   BP Method: Medium (Manual) Large (Manual)   Pulse: 87    Temp: 97.9 °F (36.6 °C)    TempSrc: Temporal    SpO2: 96%    Weight: 106 kg (233 lb 11 oz)    Height: 6' (1.829  m)     Body mass index is 31.69 kg/m².  Weight: 106 kg (233 lb 11 oz)   Height: 6' (182.9 cm)     Physical Exam  Constitutional:       General: He is not in acute distress.     Appearance: He is well-developed.   Cardiovascular:      Rate and Rhythm: Normal rate and regular rhythm.      Heart sounds: Normal heart sounds. No murmur.   Pulmonary:      Effort: Pulmonary effort is normal.      Breath sounds: Normal breath sounds.   Musculoskeletal: Normal range of motion.      Right lower leg: No edema.      Left lower leg: No edema.      Comments: Seated straight leg raise no pain on the right  Iliac crest in the posterior mid clavicular line is area of max tenderness  No induration no swelling no deformity  L spine nontender  SI joint nontender  Plantar strength 5/5 bilateral  Toe dorsiflexion on the right 5/5   Skin:     General: Skin is warm and dry.   Neurological:      Mental Status: He is alert and oriented to person, place, and time.      Coordination: Coordination normal.   Psychiatric:         Behavior: Behavior normal.         Thought Content: Thought content normal.       EKG NSR

## 2020-07-27 ENCOUNTER — HOSPITAL ENCOUNTER (OUTPATIENT)
Dept: RADIOLOGY | Facility: HOSPITAL | Age: 55
Discharge: HOME | End: 2020-07-27
Attending: INTERNAL MEDICINE
Payer: OTHER GOVERNMENT

## 2020-07-27 ENCOUNTER — OFFICE VISIT (OUTPATIENT)
Dept: FAMILY MEDICINE | Facility: CLINIC | Age: 55
End: 2020-07-27
Payer: OTHER GOVERNMENT

## 2020-07-27 VITALS
SYSTOLIC BLOOD PRESSURE: 140 MMHG | TEMPERATURE: 98 F | BODY MASS INDEX: 31.65 KG/M2 | OXYGEN SATURATION: 96 % | HEART RATE: 87 BPM | WEIGHT: 233.69 LBS | DIASTOLIC BLOOD PRESSURE: 90 MMHG | HEIGHT: 72 IN

## 2020-07-27 DIAGNOSIS — Z11.4 ENCOUNTER FOR SCREENING FOR HIV: ICD-10-CM

## 2020-07-27 DIAGNOSIS — R07.9 CHEST PAIN, UNSPECIFIED TYPE: ICD-10-CM

## 2020-07-27 DIAGNOSIS — Z11.59 NEED FOR HEPATITIS C SCREENING TEST: ICD-10-CM

## 2020-07-27 DIAGNOSIS — M54.50 ACUTE RIGHT-SIDED LOW BACK PAIN WITHOUT SCIATICA: ICD-10-CM

## 2020-07-27 DIAGNOSIS — Z00.00 NORMAL PHYSICAL EXAM: Primary | ICD-10-CM

## 2020-07-27 PROCEDURE — 99999 PR PBB SHADOW E&M-EST. PATIENT-LVL IV: ICD-10-PCS | Mod: PBBFAC,,, | Performed by: INTERNAL MEDICINE

## 2020-07-27 PROCEDURE — 71046 XR CHEST PA AND LATERAL: ICD-10-PCS | Mod: 26,,, | Performed by: RADIOLOGY

## 2020-07-27 PROCEDURE — 99396 PREV VISIT EST AGE 40-64: CPT | Mod: S$PBB,,, | Performed by: INTERNAL MEDICINE

## 2020-07-27 PROCEDURE — 71046 X-RAY EXAM CHEST 2 VIEWS: CPT | Mod: 26,,, | Performed by: RADIOLOGY

## 2020-07-27 PROCEDURE — 99999 PR PBB SHADOW E&M-EST. PATIENT-LVL IV: CPT | Mod: PBBFAC,,, | Performed by: INTERNAL MEDICINE

## 2020-07-27 PROCEDURE — 93010 EKG 12-LEAD: ICD-10-PCS | Mod: S$PBB,,, | Performed by: INTERNAL MEDICINE

## 2020-07-27 PROCEDURE — 99396 PR PREVENTIVE VISIT,EST,40-64: ICD-10-PCS | Mod: S$PBB,,, | Performed by: INTERNAL MEDICINE

## 2020-07-27 PROCEDURE — 99214 OFFICE O/P EST MOD 30 MIN: CPT | Mod: PBBFAC,25,PO | Performed by: INTERNAL MEDICINE

## 2020-07-27 PROCEDURE — 93010 ELECTROCARDIOGRAM REPORT: CPT | Mod: S$PBB,,, | Performed by: INTERNAL MEDICINE

## 2020-07-27 PROCEDURE — 71046 X-RAY EXAM CHEST 2 VIEWS: CPT | Mod: TC,FY,PO

## 2020-07-27 PROCEDURE — 93005 ELECTROCARDIOGRAM TRACING: CPT | Mod: PBBFAC,PO | Performed by: INTERNAL MEDICINE

## 2020-07-27 RX ORDER — METHYLPREDNISOLONE 4 MG/1
TABLET ORAL
Qty: 1 PACKAGE | Refills: 0 | Status: SHIPPED | OUTPATIENT
Start: 2020-07-27 | End: 2020-09-17

## 2020-07-27 NOTE — PROGRESS NOTES
CXR normal.  Await labs. No etiology for chest tightness found. Will refer to cards if labs unrevealing

## 2020-07-28 DIAGNOSIS — R79.89 ELEVATED LFTS: ICD-10-CM

## 2020-07-28 DIAGNOSIS — E78.2 MIXED HYPERLIPIDEMIA: ICD-10-CM

## 2020-07-28 DIAGNOSIS — R71.8 ELEVATED MCV: Primary | ICD-10-CM

## 2020-07-30 ENCOUNTER — TELEPHONE (OUTPATIENT)
Dept: FAMILY MEDICINE | Facility: CLINIC | Age: 55
End: 2020-07-30

## 2020-07-30 ENCOUNTER — PATIENT MESSAGE (OUTPATIENT)
Dept: FAMILY MEDICINE | Facility: CLINIC | Age: 55
End: 2020-07-30

## 2020-07-30 DIAGNOSIS — R07.9 CHEST PAIN, UNSPECIFIED TYPE: Primary | ICD-10-CM

## 2020-07-30 NOTE — TELEPHONE ENCOUNTER
I left VM on cell phone, I will also [facility] pt [facility]  Labs elevated MCV, elevated LFTs, does he drink alcohol? If so, how much?   Lipid very high  He had c/o chest tightness, plan was EKG and CXR and possibly referral to cards  EKG WNL  CXR WNL  Will refer to cardiology.

## 2020-07-31 ENCOUNTER — TELEPHONE (OUTPATIENT)
Dept: FAMILY MEDICINE | Facility: CLINIC | Age: 55
End: 2020-07-31

## 2020-08-04 ENCOUNTER — PATIENT OUTREACH (OUTPATIENT)
Dept: ADMINISTRATIVE | Facility: OTHER | Age: 55
End: 2020-08-04

## 2020-08-05 ENCOUNTER — OFFICE VISIT (OUTPATIENT)
Dept: CARDIOLOGY | Facility: CLINIC | Age: 55
End: 2020-08-05
Payer: OTHER GOVERNMENT

## 2020-08-05 VITALS
OXYGEN SATURATION: 96 % | DIASTOLIC BLOOD PRESSURE: 76 MMHG | SYSTOLIC BLOOD PRESSURE: 118 MMHG | WEIGHT: 233.69 LBS | BODY MASS INDEX: 31.69 KG/M2 | RESPIRATION RATE: 16 BRPM | HEART RATE: 72 BPM

## 2020-08-05 DIAGNOSIS — E66.9 NON MORBID OBESITY: ICD-10-CM

## 2020-08-05 DIAGNOSIS — I10 ESSENTIAL HYPERTENSION: ICD-10-CM

## 2020-08-05 DIAGNOSIS — R07.9 CHEST PAIN, UNSPECIFIED TYPE: ICD-10-CM

## 2020-08-05 DIAGNOSIS — R06.09 DOE (DYSPNEA ON EXERTION): Primary | ICD-10-CM

## 2020-08-05 PROCEDURE — 99999 PR PBB SHADOW E&M-EST. PATIENT-LVL IV: ICD-10-PCS | Mod: PBBFAC,,, | Performed by: INTERNAL MEDICINE

## 2020-08-05 PROCEDURE — 99999 PR PBB SHADOW E&M-EST. PATIENT-LVL IV: CPT | Mod: PBBFAC,,, | Performed by: INTERNAL MEDICINE

## 2020-08-05 PROCEDURE — 99204 OFFICE O/P NEW MOD 45 MIN: CPT | Mod: S$PBB,,, | Performed by: INTERNAL MEDICINE

## 2020-08-05 PROCEDURE — 99214 OFFICE O/P EST MOD 30 MIN: CPT | Mod: PBBFAC | Performed by: INTERNAL MEDICINE

## 2020-08-05 PROCEDURE — 99204 PR OFFICE/OUTPT VISIT, NEW, LEVL IV, 45-59 MIN: ICD-10-PCS | Mod: S$PBB,,, | Performed by: INTERNAL MEDICINE

## 2020-08-05 NOTE — PROGRESS NOTES
CARDIOVASCULAR CONSULTATION    REASON FOR CONSULT:   Enoch Barr is a 55 y.o. male who presents for evaluation chest tightness.      HISTORY OF PRESENT ILLNESS:     Patient is a pleasant 55-year-old.  Family history significant for coronary artery disease and mom had a massive heart attack at the age of 60 and  from it.  Has chest tightness at rest, gets worse on going up a flight of stairs.  Associated with shortness of breath with also gets worse on going up 1 flights of stairs.  Denies orthopnea, PND, swelling of feet.  EKG was personally reviewed and demonstrates normal sinus rhythm.    Also has daily palpitations.  Will check Holter monitor for it.      PAST MEDICAL HISTORY:     Past Medical History:   Diagnosis Date    Hypertension     on medication       PAST SURGICAL HISTORY:     Past Surgical History:   Procedure Laterality Date    HERNIA REPAIR      SINUS SURGERY         ALLERGIES AND MEDICATION:   Review of patient's allergies indicates:  No Known Allergies     Medication List          Accurate as of 2020  2:12 PM. If you have any questions, ask your nurse or doctor.            CONTINUE taking these medications    azelastine 137 mcg (0.1 %) nasal spray  Commonly known as: ASTELIN  1 spray (137 mcg total) by Nasal route 2 (two) times daily.     budesonide 0.25 mg/2 mL nebulizer solution  Commonly known as: PULMICORT     EMGALITY  mg/mL Pnij  Generic drug: galcanezumab-gnlm  Inject 120 mg into the skin every 28 days.     fexofenadine 30 MG tablet  Commonly known as: ALLEGRA     lisinopriL-hydrochlorothiazide 20-12.5 mg per tablet  Commonly known as: PRINZIDE,ZESTORETIC  Take 1 tablet by mouth once daily.     methylPREDNISolone 4 mg tablet  Commonly known as: MEDROL DOSEPACK  use as directed     metoclopramide HCl 10 MG tablet  Commonly known as: REGLAN  Take 1 tablet (10 mg total) by mouth every 6 (six) hours as needed.     montelukast 10 mg tablet  Commonly known as:  SINGULAIR  TAKE 1 TABLET(10 MG) BY MOUTH EVERY EVENING     omeprazole 20 MG capsule  Commonly known as: PRILOSEC            SOCIAL HISTORY:     Social History     Socioeconomic History    Marital status:      Spouse name: Not on file    Number of children: Not on file    Years of education: Not on file    Highest education level: Not on file   Occupational History    Occupation: production      Employer:  [employer]   Social Needs    Financial resource strain: Not very hard    Food insecurity     Worry: Never true     Inability: Never true    Transportation needs     Medical: No     Non-medical: No   Tobacco Use    Smoking status: Former Smoker     Quit date: 1999     Years since quittin.2    Smokeless tobacco: Never Used   Substance and Sexual Activity    Alcohol use: Yes     Alcohol/week: 7.0 standard drinks     Types: 7 Glasses of wine per week     Frequency: 2-3 times a week     Drinks per session: 3 or 4     Binge frequency: Less than monthly     Comment: beer 2-3 daily    Drug use: No    Sexual activity: Yes     Partners: Female   Lifestyle    Physical activity     Days per week: 4 days     Minutes per session: 60 min    Stress: Very much   Relationships    Social connections     Talks on phone: Twice a week     Gets together: Patient refused     Attends Confucianist service: Not on file     Active member of club or organization: No     Attends meetings of clubs or organizations: Never     Relationship status:    Other Topics Concern    Not on file   Social History Narrative    Not on file       FAMILY HISTORY:     Family History   Problem Relation Age of Onset    Coronary artery disease Mother     Hypertension Father     No Known Problems Sister     No Known Problems Brother     Valvular heart disease Sister     No Known Problems Brother     No Known Problems Brother     Amblyopia Neg Hx     Blindness Neg Hx     Cancer Neg Hx      Cataracts Neg Hx     Diabetes Neg Hx     Glaucoma Neg Hx     Macular degeneration Neg Hx     Retinal detachment Neg Hx     Strabismus Neg Hx     Stroke Neg Hx     Thyroid disease Neg Hx        REVIEW OF SYSTEMS:   Review of Systems   Constitution: Negative.   HENT: Negative.    Eyes: Negative.    Cardiovascular: Positive for chest pain and dyspnea on exertion.   Respiratory: Negative.    Endocrine: Negative.    Hematologic/Lymphatic: Negative.    Skin: Negative.    Musculoskeletal: Negative.    Gastrointestinal: Negative.    Genitourinary: Negative.    Neurological: Negative.    Psychiatric/Behavioral: Negative.    Allergic/Immunologic: Negative.        A 10 point review of systems was performed and all the pertinent positives have been mentioned. Rest of review of systems was negative.        PHYSICAL EXAM:     Vitals:    08/05/20 1400   BP: 118/76   Pulse: 72   Resp: 16    Body mass index is 31.69 kg/m².  Weight: 106 kg (233 lb 11 oz)         Physical Exam   Constitutional: He is oriented to person, place, and time. He appears well-developed and well-nourished.   HENT:   Head: Normocephalic.   Eyes: Pupils are equal, round, and reactive to light. Conjunctivae are normal.   Neck: Normal range of motion. Neck supple.   Cardiovascular: Normal rate, regular rhythm and normal heart sounds.   Pulmonary/Chest: Effort normal and breath sounds normal.   Abdominal: Soft. Bowel sounds are normal.   Neurological: He is alert and oriented to person, place, and time.   Skin: Skin is warm.   Vitals reviewed.        DATA:     Laboratory:  CBC:  Recent Labs   Lab 09/24/19  1540 07/27/20  1645   WBC 6.07 5.64   Hemoglobin 15.5 16.4   Hematocrit 45.4 49.7   Platelets 257 258       CHEMISTRIES:  Recent Labs   Lab 09/24/19  1540 07/27/20  1645   Glucose 103 95   Sodium 139 141   Potassium 3.9 3.9   BUN, Bld 10 15   Creatinine 1.0 1.3   eGFR if African American >60 >60.0   eGFR if non African American >60 >60.0   Calcium 8.8 9.8        CARDIAC BIOMARKERS:        COAGS:        LIPIDS/LFTS:  Recent Labs   Lab 09/24/19  1540 07/27/20  1645   Cholesterol  --  249 H   Triglycerides  --  508 H   HDL  --  39 L   LDL Cholesterol  --  Invalid, Trig>400.0   Non-HDL Cholesterol  --  210   AST 43 H 50 H   ALT 63 H 67 H       No results found for: HGBA1C    TSH  Recent Labs   Lab 07/27/20  1645   TSH 1.204       The 10-year ASCVD risk score (Tamaanibal STUBBS Jr., et al., 2013) is: 8.4%    Values used to calculate the score:      Age: 55 years      Sex: Male      Is Non- : No      Diabetic: No      Tobacco smoker: No      Systolic Blood Pressure: 118 mmHg      Is BP treated: Yes      HDL Cholesterol: 39 mg/dL      Total Cholesterol: 249 mg/dL             ASSESSMENT AND PLAN     Patient Active Problem List   Diagnosis    Refractive error    Other chronic sinusitis uses budesonide for this NOT for lungs    Essential hypertension    Gastroesophageal reflux disease without esophagitis    Chronic cluster headache, not intractable followed by neurology       Chest tightness and dyspnea on exertion in a patient with multiple risk factors for coronary artery disease.  Further evaluation with the help of stress test and echocardiogram.    Palpitations:  Check Holter      Thank you very much for involving me in the care of your patient.  Please do not hesitate to contact me if there are any questions.      Mario Moore MD, FACC, [facility]  Interventional Cardiologist, [facility].           This note was dictated with the help of speech recognition software.  There might be un-intended errors and/or substitutions.

## 2020-08-05 NOTE — LETTER
August 5, 2020      Dipesh Clements MD  [address]  [place] [address]           [facility] - Cardiology  [address]  [place] [address]  Phone: [phone]          Patient: Enoch Barr   MR Number: [id number]   YOB: 1965   Date of Visit: 8/5/2020       Dear Dr. Dipesh Clements:    Thank you for referring Enoch Barr to me for evaluation. Attached you will find relevant portions of my assessment and plan of care.    If you have questions, please do not hesitate to call me. I look forward to following Enoch Barr along with you.    Sincerely,    Mario Moore MD    Enclosure  CC:  No Recipients    If you would like to receive this communication electronically, please contact [email or web address] or [phone] to request more information on [employer] Link access.    For providers and/or their staff who would like to refer a patient to [facility], please contact us through our one-stop-shop provider referral line, [facility], at [phone].    If you feel you have received this communication in error or would no longer like to receive these types of communications, please e-mail [email or web address]

## 2020-08-06 ENCOUNTER — TELEPHONE (OUTPATIENT)
Dept: PHARMACY | Facility: CLINIC | Age: 55
End: 2020-08-06

## 2020-08-06 NOTE — TELEPHONE ENCOUNTER
(001) pt    refill call attempt for emgality was resend. Patient needs a new prescription refill to be authorized by the provider, to Dr ROWDY Matson, all of her pt are to sent to Dr Thom Malin. Request has been sent. [facility] will follow up to schedule.

## 2020-08-07 ENCOUNTER — LAB VISIT (OUTPATIENT)
Dept: FAMILY MEDICINE | Facility: CLINIC | Age: 55
End: 2020-08-07
Payer: OTHER GOVERNMENT

## 2020-08-07 DIAGNOSIS — Z12.11 COLON CANCER SCREENING: ICD-10-CM

## 2020-08-07 PROCEDURE — U0003 INFECTIOUS AGENT DETECTION BY NUCLEIC ACID (DNA OR RNA); SEVERE ACUTE RESPIRATORY SYNDROME CORONAVIRUS 2 (SARS-COV-2) (CORONAVIRUS DISEASE [COVID-19]), AMPLIFIED PROBE TECHNIQUE, MAKING USE OF HIGH THROUGHPUT TECHNOLOGIES AS DESCRIBED BY CMS-2020-01-R: HCPCS

## 2020-08-08 LAB — SARS-COV-2 RNA RESP QL NAA+PROBE: NOT DETECTED

## 2020-08-10 ENCOUNTER — ANESTHESIA (OUTPATIENT)
Dept: ENDOSCOPY | Facility: HOSPITAL | Age: 55
End: 2020-08-10
Payer: OTHER GOVERNMENT

## 2020-08-10 ENCOUNTER — HOSPITAL ENCOUNTER (OUTPATIENT)
Facility: HOSPITAL | Age: 55
Discharge: HOME | End: 2020-08-10
Attending: INTERNAL MEDICINE | Admitting: INTERNAL MEDICINE
Payer: OTHER GOVERNMENT

## 2020-08-10 ENCOUNTER — ANESTHESIA EVENT (OUTPATIENT)
Dept: ENDOSCOPY | Facility: HOSPITAL | Age: 55
End: 2020-08-10
Payer: OTHER GOVERNMENT

## 2020-08-10 VITALS
TEMPERATURE: 98 F | HEART RATE: 63 BPM | RESPIRATION RATE: 18 BRPM | SYSTOLIC BLOOD PRESSURE: 142 MMHG | OXYGEN SATURATION: 98 % | DIASTOLIC BLOOD PRESSURE: 60 MMHG

## 2020-08-10 DIAGNOSIS — Z12.11 COLON CANCER SCREENING: ICD-10-CM

## 2020-08-10 PROBLEM — K63.5 POLYP OF SIGMOID COLON: Status: ACTIVE | Noted: 2020-08-10

## 2020-08-10 PROCEDURE — 37000008 HC ANESTHESIA 1ST 15 MINUTES: Performed by: INTERNAL MEDICINE

## 2020-08-10 PROCEDURE — 63600175 PHARM REV CODE 636 W HCPCS: Performed by: REGISTERED NURSE

## 2020-08-10 PROCEDURE — 45385 PR COLONOSCOPY,REMV LESN,SNARE: ICD-10-PCS | Mod: 33,,, | Performed by: INTERNAL MEDICINE

## 2020-08-10 PROCEDURE — 27201089 HC SNARE, DISP (ANY): Performed by: INTERNAL MEDICINE

## 2020-08-10 PROCEDURE — 45385 COLONOSCOPY W/LESION REMOVAL: CPT | Performed by: INTERNAL MEDICINE

## 2020-08-10 PROCEDURE — 25000003 PHARM REV CODE 250: Performed by: ANESTHESIOLOGY

## 2020-08-10 PROCEDURE — 88305 TISSUE EXAM BY PATHOLOGIST: CPT | Performed by: PATHOLOGY

## 2020-08-10 PROCEDURE — 00811 ANES LWR INTST NDSC NOS: CPT | Performed by: INTERNAL MEDICINE

## 2020-08-10 PROCEDURE — D9220A PRA ANESTHESIA: ICD-10-PCS | Mod: 33,ANES,, | Performed by: ANESTHESIOLOGY

## 2020-08-10 PROCEDURE — D9220A PRA ANESTHESIA: Mod: 33,ANES,, | Performed by: ANESTHESIOLOGY

## 2020-08-10 PROCEDURE — 88305 TISSUE EXAM BY PATHOLOGIST: ICD-10-PCS | Mod: 26,,, | Performed by: PATHOLOGY

## 2020-08-10 PROCEDURE — D9220A PRA ANESTHESIA: Mod: 33,CRNA,, | Performed by: REGISTERED NURSE

## 2020-08-10 PROCEDURE — D9220A PRA ANESTHESIA: ICD-10-PCS | Mod: 33,CRNA,, | Performed by: REGISTERED NURSE

## 2020-08-10 PROCEDURE — 45385 COLONOSCOPY W/LESION REMOVAL: CPT | Mod: 33,,, | Performed by: INTERNAL MEDICINE

## 2020-08-10 PROCEDURE — 88305 TISSUE EXAM BY PATHOLOGIST: CPT | Mod: 26,,, | Performed by: PATHOLOGY

## 2020-08-10 PROCEDURE — 37000009 HC ANESTHESIA EA ADD 15 MINS: Performed by: INTERNAL MEDICINE

## 2020-08-10 RX ORDER — SODIUM CHLORIDE 9 MG/ML
INJECTION, SOLUTION INTRAVENOUS CONTINUOUS
Status: DISCONTINUED | OUTPATIENT
Start: 2020-08-10 | End: 2020-08-10 | Stop reason: HOSPADM

## 2020-08-10 RX ORDER — PROPOFOL 10 MG/ML
VIAL (ML) INTRAVENOUS
Status: DISCONTINUED | OUTPATIENT
Start: 2020-08-10 | End: 2020-08-10

## 2020-08-10 RX ORDER — LIDOCAINE HYDROCHLORIDE 20 MG/ML
INJECTION INTRAVENOUS
Status: DISCONTINUED | OUTPATIENT
Start: 2020-08-10 | End: 2020-08-10

## 2020-08-10 RX ORDER — PROPOFOL 10 MG/ML
INJECTION, EMULSION INTRAVENOUS
Status: DISCONTINUED
Start: 2020-08-10 | End: 2020-08-10 | Stop reason: HOSPADM

## 2020-08-10 RX ORDER — LIDOCAINE HYDROCHLORIDE 20 MG/ML
INJECTION, SOLUTION EPIDURAL; INFILTRATION; INTRACAUDAL; PERINEURAL
Status: DISCONTINUED
Start: 2020-08-10 | End: 2020-08-10 | Stop reason: HOSPADM

## 2020-08-10 RX ADMIN — PROPOFOL 100 MG: 10 INJECTION, EMULSION INTRAVENOUS at 09:08

## 2020-08-10 RX ADMIN — PROPOFOL 50 MG: 10 INJECTION, EMULSION INTRAVENOUS at 09:08

## 2020-08-10 RX ADMIN — Medication 100 MG: at 09:08

## 2020-08-10 RX ADMIN — SODIUM CHLORIDE: 0.9 INJECTION, SOLUTION INTRAVENOUS at 09:08

## 2020-08-10 RX ADMIN — PROPOFOL 20 MG: 10 INJECTION, EMULSION INTRAVENOUS at 09:08

## 2020-08-10 NOTE — DISCHARGE INSTRUCTIONS
Diverticulosis    Diverticulosis means that small pouches have formed in the wall of your large intestine (colon). Most often, this problem causes no symptoms and is common as people age. But the pouches in the colon are at risk of becoming infected. When this happens, the condition is called diverticulitis. Although most people with diverticulosis never develop diverticulitis, it is still not uncommon. Rectal bleeding can also occur and in less common situations, a type of colon inflammation called colitis.  While most people do not have symptoms, some people with diverticulosis may have:  · Abdominal cramps and pain  · Bloating  · Constipation  · Change in bowel habits  Causes  The exact cause of diverticulosis (and diverticulitis) has not been proved, but a few things are associated with the condition:  · Low-fiber diet  · Constipation  · Lack of exercise  Your healthcare provider will talk with you about how to manage your condition. Diet changes may be all that are needed to help control diverticulosis and prevent progression to diverticulitis. If you develop diverticulitis, you will likely need other treatments.  Home care  You may be told to take fiber supplements daily. Fiber adds bulk to the stool so that it passes through the colon more easily. Stool softeners may be recommended. You may also be given medications for pain relief. Be sure to take all medications as directed.  In the past, people were told to avoid corn, nuts, and seeds. This is no longer necessary.  Follow these guidelines when caring for yourself at home:  · Eat unprocessed foods that are high in fiber. Whole grains, fruits, and vegetables are good choices.  · Drink 6 to 8 glasses of water every day unless your healthcare provider has you limit how much fluid you should have.  · Watch for changes in your bowel movements. Tell your provider if you notice any changes.  · Begin an exercise program. Ask your provider how to get started.  Generally, walking is the best.  · Get plenty of rest and sleep.  Follow-up care  Follow up with your healthcare provider, or as advised. Regular visits may be needed to check on your health. Sometimes special procedures such as colonoscopy, are needed after an episode of diverticulitis or blooding. Be sure to keep all your appointments.  If a stool sample was taken, or cultures were done, you should be told if they are positive, or if your treatment needs to be changed. You can call as directed for the results.  If X-rays were done, a radiologist will look at them. You will be told if there is a change in your treatment.  If antibiotics were prescribed, be sure to finish them all.  When to seek medical advice  Call your healthcare provider right away if any of these occur:  · Fever of 100.4°F (38°C) or higher, or as directed by your healthcare provider  · Severe cramps in the lower left side of the abdomen or pain that is getting worse  · Tenderness in the lower left side of the abdomen or worsening pain throughout the abdomen  · Diarrhea or constipation that doesn't get better within 24 hours  · Nausea and vomiting  · Bleeding from the rectum  Call 911  Call emergency services if any of the following occur:  · Trouble breathing  · Confusion  · Very drowsy or trouble awakening  · Fainting or loss of consciousness  · Rapid heart rate  · Chest pain  Date Last Reviewed: 12/30/2015 © 2000-2017 [employer]. [address] [place] [address]. All rights reserved. This information is not intended as a substitute for professional medical care. Always follow your healthcare professional's instructions.        Understanding Colon and Rectal Polyps    The colon (also called the large intestine) is a muscular tube that forms the last part of the digestive tract. It absorbs water and stores food waste. The colon is about 4 to 6 feet long. The rectum is the last 6 inches of the colon. The colon and rectum  have a smooth lining composed of millions of cells. Changes in these cells can lead to growths in the colon that can become cancerous and should be removed. Multiple tests are available to screen for colon cancer, but the colonoscopy is the most recommended test. During colonoscopy, these polyps can be removed. How often you need this test depends on many things including your condition, your family history, symptoms, and what the findings were at the previous colonoscopy.   When the colon lining changes  Changes that happen in the cells that line the colon or rectum can lead to growths called polyps. Over a period of years, polyps can turn cancerous. Removing polyps early may prevent cancer from ever forming.  Polyps  Polyps are fleshy clumps of tissue that form on the lining of the colon or rectum. Small polyps are usually benign (not cancerous). However, over time, cells in a polyp can change and become cancerous. Certain types of polyps known as adenomatous polyps are premalignant. The risk for invasive cancer increases with the size of the polyp and certain cell and gene features. This means that they can become cancerous if they're not removed. Hyperplastic polyps are benign. They can grow quite large and not turn cancerous.   Cancer  Almost all colorectal cancers start when polyp cells begin growing abnormally. As a cancerous tumor grows, it may involve more and more of the colon or rectum. In time, cancer can also grow beyond the colon or rectum and spread to nearby organs or to glands called lymph nodes. The cells can also travel to other parts of the body. This is known as metastasis. The earlier a cancerous tumor is removed, the better the chance of preventing its spread.    Date Last Reviewed: 8/1/2016  © 1036-8165 [employer]. [address] [place] [address], [place] [address]. All rights reserved. This information is not intended as a substitute for professional medical care. Always follow your  healthcare professional's instructions.

## 2020-08-10 NOTE — [employer]ATION PATIENT INSTRUCTIONS
Discharge Summary/Instructions after an Endoscopic Procedure  Patient Name: Enoch Barr  Patient MRN: [id number]  Patient YOB: 1965  Monday, August 10, 2020  April Dos Santos MD  RESTRICTIONS:  During your procedure today, you received medications for sedation.  These   medications may affect your judgment, balance and coordination.  Therefore,   for 24 hours, you have the following restrictions:   - DO NOT drive a car, operate machinery, make legal/financial decisions,   sign important papers or drink alcohol.    ACTIVITY:  Today: no heavy lifting, straining or running due to procedural   sedation/anesthesia.  The following day: return to full activity including work.  DIET:  Eat and drink normally unless instructed otherwise.     TREATMENT FOR COMMON SIDE EFFECTS:  - Mild abdominal pain, nausea, belching, bloating or excessive gas:  rest,   eat lightly and use a heating pad.  - Sore Throat: treat with throat lozenges and/or gargle with warm salt   water.  - Because air was used during the procedure, expelling large amounts of air   from your rectum or belching is normal.  - If a bowel prep was taken, you may not have a bowel movement for 1-3 days.    This is normal.  SYMPTOMS TO WATCH FOR AND REPORT TO YOUR PHYSICIAN:  1. Abdominal pain or bloating, other than gas cramps.  2. Chest pain.  3. Back pain.  4. Signs of infection such as: chills or fever occurring within 24 hours   after the procedure.  5. Rectal bleeding, which would show as bright red, maroon, or black stools.   (A tablespoon of blood from the rectum is not serious, especially if   hemorrhoids are present.)  6. Vomiting.  7. Weakness or dizziness.  GO DIRECTLY TO THE NEAREST EMERGENCY ROOM IF YOU HAVE ANY OF THE FOLLOWING:      Difficulty breathing              Chills and/or fever over 101 F   Persistent vomiting and/or vomiting blood   Severe abdominal pain   Severe chest pain   Black, tarry stools   Bleeding- more than one tablespoon   Any  other symptom or condition that you feel may need urgent attention  Your doctor recommends these additional instructions:  If any biopsies were taken, your doctors clinic will contact you in 1 to 2   weeks with any results.  - Patient has a contact number available for emergencies.  The signs and   symptoms of potential delayed complications were discussed with the   patient.  Return to normal activities tomorrow.  Written discharge   instructions were provided to the patient.   - Discharge patient to home.   - Resume previous diet today.   - Await pathology results.   - Continue present medications.   - Repeat colonoscopy in 3 - 5 years for surveillance based on pathology   results.   - Return to primary care physician in 1 month.   - The findings and recommendations were discussed with the patient and their   family.  For questions, problems or results please call your physician - April Dos Santos MD at Work:  [phone].  [facility] can be reached at [phone]     IF A COMPLICATION OR EMERGENCY SITUATION ARISES AND YOU ARE UNABLE TO REACH   YOUR PHYSICIAN - GO DIRECTLY TO THE EMERGENCY ROOM.  April Dos Santos MD  8/10/2020 9:59:40 AM  This report has been verified and signed electronically.  [employer]

## 2020-08-10 NOTE — H&P
Endoscopy Pre-Procedure H&P    Reason for Procedure: screening colonoscopy    HPI:  Pt is a 55 y.o. male who presents for screening colonoscopy.  No family history of CRC and no GI symptoms.    Past Medical History:   Diagnosis Date    Hypertension     on medication       Past Surgical History:   Procedure Laterality Date    HERNIA REPAIR      SINUS SURGERY  2012       Family History   Problem Relation Age of Onset    Coronary artery disease Mother     Hypertension Father     No Known Problems Sister     No Known Problems Brother     Valvular heart disease Sister     No Known Problems Brother     No Known Problems Brother     Amblyopia Neg Hx     Blindness Neg Hx     Cancer Neg Hx     Cataracts Neg Hx     Diabetes Neg Hx     Glaucoma Neg Hx     Macular degeneration Neg Hx     Retinal detachment Neg Hx     Strabismus Neg Hx     Stroke Neg Hx     Thyroid disease Neg Hx        Social History     Tobacco Use    Smoking status: Former Smoker     Quit date: 1999     Years since quittin.2    Smokeless tobacco: Never Used   Substance Use Topics    Alcohol use: Yes     Alcohol/week: 7.0 standard drinks     Types: 7 Glasses of wine per week     Frequency: 2-3 times a week     Drinks per session: 3 or 4     Binge frequency: Less than monthly     Comment: beer 2-3 daily    Drug use: No       Review of patient's allergies indicates:  No Known Allergies    No current facility-administered medications on file prior to encounter.      Current Outpatient Medications on File Prior to Encounter   Medication Sig Dispense Refill    azelastine (ASTELIN) 137 mcg (0.1 %) nasal spray 1 spray (137 mcg total) by Nasal route 2 (two) times daily. 30 mL 11    budesonide (PULMICORT) 0.25 mg/2 mL nebulizer solution Inhale 0.25 mg into the lungs.      fexofenadine (ALLEGRA) 30 MG tablet Take 30 mg by mouth once daily.      galcanezumab-gnlm (EMGALITY PEN) 120 mg/mL PnIj Inject 120 mg into the skin every 28  days. 1 mL 2    lisinopril-hydrochlorothiazide (PRINZIDE,ZESTORETIC) 20-12.5 mg per tablet Take 1 tablet by mouth once daily. 90 tablet 3    metoclopramide HCl (REGLAN) 10 MG tablet Take 1 tablet (10 mg total) by mouth every 6 (six) hours as needed. 30 tablet 0    montelukast (SINGULAIR) 10 mg tablet TAKE 1 TABLET(10 MG) BY MOUTH EVERY EVENING 90 tablet 3    omeprazole (PRILOSEC) 20 MG capsule Take 20 mg by mouth once daily.  6       ROS: Negative x 10    Patient Vitals for the past 24 hrs:   BP Temp Temp src Pulse Resp SpO2   08/10/20 0820 (!) 145/94 98.4 °F (36.9 °C) Oral 76 20 98 %     Gen: Well developed, well nourished, no apparent distress  HEENT: Anicteric, PERRL, MMM  CV: Regular rate and rhythm, no murmurs   Lungs: CTAB, no increased work of breathing  Abd: Soft, NT, ND, normal BS, no HSM  Ext: No C/C/E  Neuro: Alert and oriented to person, place, time; no weakness  Skin: No rashes/lesions  Psych: Normal mood and affect    Assessment:  Enoch Barr is a 55 y.o. male who presents for screening colonoscopy.    Recommendations:  1. Colonoscopy  2. F/U with PCP     April Dos Santos MD

## 2020-08-10 NOTE — ANESTHESIA PREPROCEDURE EVALUATION
08/10/2020    Pre-operative evaluation for Procedure(s) (LRB):  COLONOSCOPY (N/A)    Enoch Barr is a 55 y.o. male     Patient Active Problem List   Diagnosis    Refractive error    Other chronic sinusitis uses budesonide for this NOT for lungs    Essential hypertension    Gastroesophageal reflux disease without esophagitis    Chronic cluster headache, not intractable followed by neurology       Review of patient's allergies indicates:  No Known Allergies    No current facility-administered medications on file prior to encounter.      Current Outpatient Medications on File Prior to Encounter   Medication Sig Dispense Refill    azelastine (ASTELIN) 137 mcg (0.1 %) nasal spray 1 spray (137 mcg total) by Nasal route 2 (two) times daily. 30 mL 11    budesonide (PULMICORT) 0.25 mg/2 mL nebulizer solution Inhale 0.25 mg into the lungs.      fexofenadine (ALLEGRA) 30 MG tablet Take 30 mg by mouth once daily.      galcanezumab-gnlm (EMGALITY PEN) 120 mg/mL PnIj Inject 120 mg into the skin every 28 days. 1 mL 2    lisinopril-hydrochlorothiazide (PRINZIDE,ZESTORETIC) 20-12.5 mg per tablet Take 1 tablet by mouth once daily. 90 tablet 3    metoclopramide HCl (REGLAN) 10 MG tablet Take 1 tablet (10 mg total) by mouth every 6 (six) hours as needed. 30 tablet 0    montelukast (SINGULAIR) 10 mg tablet TAKE 1 TABLET(10 MG) BY MOUTH EVERY EVENING 90 tablet 3    omeprazole (PRILOSEC) 20 MG capsule Take 20 mg by mouth once daily.  6       Past Surgical History:   Procedure Laterality Date    HERNIA REPAIR      SINUS SURGERY  2012       Social History     Socioeconomic History    Marital status:      Spouse name: Not on file    Number of children: Not on file    Years of education: Not on file    Highest education level: Not on file   Occupational History    Occupation: production       Employer: [employer]   Social Needs    Financial resource strain: Not very hard    Food insecurity     Worry: Never true     Inability: Never true    Transportation needs     Medical: No     Non-medical: No   Tobacco Use    Smoking status: Former Smoker     Quit date: 1999     Years since quittin.2    Smokeless tobacco: Never Used   Substance and Sexual Activity    Alcohol use: Yes     Alcohol/week: 7.0 standard drinks     Types: 7 Glasses of wine per week     Frequency: 2-3 times a week     Drinks per session: 3 or 4     Binge frequency: Less than monthly     Comment: beer 2-3 daily    Drug use: No    Sexual activity: Yes     Partners: Female   Lifestyle    Physical activity     Days per week: 4 days     Minutes per session: 60 min    Stress: Very much   Relationships    Social connections     Talks on phone: Twice a week     Gets together: Patient refused     Attends Moravian service: Not on file     Active member of club or organization: No     Attends meetings of clubs or organizations: Never     Relationship status:    Other Topics Concern    Not on file   Social History Narrative    Not on file         CBC: No results for input(s): WBC, RBC, HGB, HCT, PLT, MCV, MCH, MCHC in the last 72 hours.    CMP: No results for input(s): NA, K, CL, CO2, BUN, CREATININE, GLU, MG, PHOS, CALCIUM, ALBUMIN, PROT, ALKPHOS, ALT, AST, BILITOT in the last 72 hours.    INR  No results for input(s): PT, INR, PROTIME, APTT in the last 72 hours.        Diagnostic Studies:      EKD Echo:  No results found for this or any previous visit.      Anesthesia Evaluation    I have reviewed the Patient Summary Reports.    I have reviewed the Nursing Notes.    I have reviewed the Medications.     Review of Systems  Anesthesia Hx:  No problems with previous Anesthesia  History of prior surgery of interest to airway management or planning: Denies Family Hx of Anesthesia complications.   Denies  Personal Hx of Anesthesia complications.   Social:  Non-Smoker    Cardiovascular:   Exercise tolerance: good Hypertension    Pulmonary:  Pulmonary Normal    Renal/:  Renal/ Normal     Hepatic/GI:   Bowel Prep. Denies PUD. GERD Denies Liver Disease. Denies Hepatitis. Inguinal hernia   Neurological:  Neurology Normal Headaches    Endocrine:  Endocrine Normal        Physical Exam  General:  Well nourished    Airway/Jaw/Neck:  Airway Findings: Mouth Opening: Normal Tongue: Normal  Mallampati: II  TM Distance: Normal, at least 6 cm         Dental:  DENTAL FINDINGS: Normal   Chest/Lungs:  Chest/Lungs Findings: Normal Respiratory Rate         Mental Status:  Mental Status Findings:  Cooperative, Alert and Oriented         Anesthesia Plan  Type of Anesthesia, risks & benefits discussed:  Anesthesia Type:  spinal, general  Patient's Preference:   Intra-op Monitoring Plan:   Intra-op Monitoring Plan Comments:   Post Op Pain Control Plan:   Post Op Pain Control Plan Comments:   Induction:   IV  Beta Blocker:         Informed Consent: Patient understands risks and agrees with Anesthesia plan.  Questions answered. Anesthesia consent signed with patient.  ASA Score: 2     Day of Surgery Review of History & Physical: I have interviewed and examined the patient. I have reviewed the patient's H&P dated:  There are no significant changes.          Ready For Surgery From Anesthesia Perspective.

## 2020-08-10 NOTE — ANESTHESIA POSTPROCEDURE EVALUATION
Anesthesia Post Evaluation    Patient: Enoch Barr    Procedure(s) Performed: Procedure(s) (LRB):  COLONOSCOPY (N/A)    Final Anesthesia Type: general    Patient location during evaluation: GI PACU  Patient participation: Yes- Able to Participate  Level of consciousness: awake and alert and oriented  Post-procedure vital signs: reviewed and stable  Pain management: adequate  Airway patency: patent    PONV status at discharge: No PONV  Anesthetic complications: no      Cardiovascular status: blood pressure returned to baseline and hemodynamically stable  Respiratory status: unassisted, spontaneous ventilation and room air  Hydration status: euvolemic  Follow-up not needed.          Vitals Value Taken Time   /60 08/10/20 1028   Temp 36.4 °C (97.5 °F) 08/10/20 0958   Pulse 63 08/10/20 1028   Resp 18 08/10/20 1028   SpO2 98 % 08/10/20 1028         Event Time   Out of Recovery 10:30:17         Pain/Alexander Score: Alexander Score: 10 (8/10/2020 10:28 AM)

## 2020-08-10 NOTE — TRANSFER OF CARE
Anesthesia Transfer of Care Note    Patient: Enoch Barr    Procedure(s) Performed: Procedure(s) (LRB):  COLONOSCOPY (N/A)    Patient location: GI    Anesthesia Type: general    Transport from OR: Transported from OR on room air with adequate spontaneous ventilation    Post pain: adequate analgesia    Post assessment: no apparent anesthetic complications and tolerated procedure well    Post vital signs: stable    Level of consciousness: alert, oriented and awake    Nausea/Vomiting: no nausea/vomiting    Complications: none    Transfer of care protocol was followed      Last vitals:   Visit Vitals  /71 (BP Location: Right arm, Patient Position: Lying)   Pulse 84   Temp 36.4 °C (97.5 °F) (Oral)   Resp 12   SpO2 96%

## 2020-08-11 LAB
FINAL PATHOLOGIC DIAGNOSIS: NORMAL
GROSS: NORMAL

## 2020-08-13 DIAGNOSIS — G44.019 EPISODIC CLUSTER HEADACHE, NOT INTRACTABLE: ICD-10-CM

## 2020-08-13 RX ORDER — GALCANEZUMAB 120 MG/ML
INJECTION, SOLUTION SUBCUTANEOUS
Qty: 1 ML | Refills: 2 | Status: SHIPPED | OUTPATIENT
Start: 2020-08-13 | End: 2020-08-17 | Stop reason: SDUPTHER

## 2020-08-16 ENCOUNTER — PATIENT MESSAGE (OUTPATIENT)
Dept: FAMILY MEDICINE | Facility: CLINIC | Age: 55
End: 2020-08-16

## 2020-08-16 DIAGNOSIS — K21.9 GASTROESOPHAGEAL REFLUX DISEASE WITHOUT ESOPHAGITIS: Primary | ICD-10-CM

## 2020-08-16 RX ORDER — OMEPRAZOLE 40 MG/1
40 CAPSULE, DELAYED RELEASE ORAL DAILY
Qty: 90 CAPSULE | Refills: 1 | Status: SHIPPED | OUTPATIENT
Start: 2020-08-16 | End: 2020-10-10 | Stop reason: SDUPTHER

## 2020-08-17 DIAGNOSIS — G44.019 EPISODIC CLUSTER HEADACHE, NOT INTRACTABLE: ICD-10-CM

## 2020-08-19 ENCOUNTER — HOSPITAL ENCOUNTER (OUTPATIENT)
Dept: CARDIOLOGY | Facility: HOSPITAL | Age: 55
Discharge: HOME | End: 2020-08-19
Attending: INTERNAL MEDICINE
Payer: OTHER GOVERNMENT

## 2020-08-19 ENCOUNTER — HOSPITAL ENCOUNTER (OUTPATIENT)
Dept: RADIOLOGY | Facility: HOSPITAL | Age: 55
Discharge: HOME | End: 2020-08-19
Attending: INTERNAL MEDICINE
Payer: OTHER GOVERNMENT

## 2020-08-19 DIAGNOSIS — R06.09 DOE (DYSPNEA ON EXERTION): ICD-10-CM

## 2020-08-19 DIAGNOSIS — R07.9 CHEST PAIN, UNSPECIFIED TYPE: ICD-10-CM

## 2020-08-19 LAB
ASCENDING AORTA: 2.96 CM
AV INDEX (PROSTH): 0.85
AV MEAN GRADIENT: 3 MMHG
AV PEAK GRADIENT: 5 MMHG
AV VALVE AREA: 4.5 CM2
AV VELOCITY RATIO: 0.87
CV ECHO LV RWT: 0.49 CM
CV STRESS BASE HR: 69 BPM
DIASTOLIC BLOOD PRESSURE: 81 MMHG
DOP CALC AO PEAK VEL: 1.17 M/S
DOP CALC AO VTI: 20.08 CM
DOP CALC LVOT AREA: 5.3 CM2
DOP CALC LVOT DIAMETER: 2.59 CM
DOP CALC LVOT PEAK VEL: 1.02 M/S
DOP CALC LVOT STROKE VOLUME: 90.31 CM3
DOP CALCLVOT PEAK VEL VTI: 17.15 CM
E WAVE DECELERATION TIME: 289.16 MSEC
E/A RATIO: 0.9
ECHO LV POSTERIOR WALL: 1.09 CM (ref 0.6–1.1)
FRACTIONAL SHORTENING: 13 % (ref 28–44)
INTERVENTRICULAR SEPTUM: 1.39 CM (ref 0.6–1.1)
IVRT: 59.98 MSEC
LA MAJOR: 5.26 CM
LA MINOR: 4.69 CM
LA WIDTH: 3.94 CM
LEFT ATRIUM SIZE: 4.67 CM
LEFT ATRIUM VOLUME: 77.55 CM3
LEFT INTERNAL DIMENSION IN SYSTOLE: 3.86 CM (ref 2.1–4)
LEFT VENTRICLE DIASTOLIC VOLUME: 90.65 ML
LEFT VENTRICLE SYSTOLIC VOLUME: 64.29 ML
LEFT VENTRICULAR INTERNAL DIMENSION IN DIASTOLE: 4.46 CM (ref 3.5–6)
LEFT VENTRICULAR MASS: 204.89 G
MV PEAK A VEL: 0.61 M/S
MV PEAK E VEL: 0.55 M/S
MV STENOSIS PRESSURE HALF TIME: 51.3 MS
MV VALVE AREA P 1/2 METHOD: 4.29 CM2
OHS CV CPX 1 MINUTE RECOVERY HEART RATE: 150 BPM
OHS CV CPX 85 PERCENT MAX PREDICTED HEART RATE MALE: 141
OHS CV CPX ESTIMATED METS: 13.7
OHS CV CPX MAX PREDICTED HEART RATE: 166
OHS CV CPX PATIENT IS FEMALE: 0
OHS CV CPX PATIENT IS MALE: 1
OHS CV CPX PEAK DIASTOLIC BLOOD PRESSURE: 90 MMHG
OHS CV CPX PEAK HEAR RATE: 164 BPM
OHS CV CPX PEAK RATE PRESSURE PRODUCT: NORMAL
OHS CV CPX PEAK SYSTOLIC BLOOD PRESSURE: 180 MMHG
OHS CV CPX PERCENT MAX PREDICTED HEART RATE ACHIEVED: 99
OHS CV CPX RATE PRESSURE PRODUCT PRESENTING: 8487
PISA TR MAX VEL: 0.86 M/S
PV PEAK VELOCITY: 1.21 CM/S
RA MAJOR: 4.41 CM
RA WIDTH: 3.62 CM
RIGHT VENTRICULAR END-DIASTOLIC DIMENSION: 2.73 CM
RV TISSUE DOPPLER FREE WALL SYSTOLIC VELOCITY 1 (APICAL 4 CHAMBER VIEW): 11.52 CM/S
SINUS: 3.8 CM
STJ: 2.86 CM
STRESS ECHO POST EXERCISE DUR MIN: 13 MINUTES
STRESS ECHO POST EXERCISE DUR SEC: 18 SECONDS
STRESS ST DEPRESSION: 1 MM
SYSTOLIC BLOOD PRESSURE: 123 MMHG
TR MAX PG: 3 MMHG
TRICUSPID ANNULAR PLANE SYSTOLIC EXCURSION: 2.19 CM

## 2020-08-19 PROCEDURE — 93018 CV STRESS TEST I&R ONLY: CPT | Mod: ,,, | Performed by: INTERNAL MEDICINE

## 2020-08-19 PROCEDURE — 93227 HOLTER MONITOR - 48 HOUR (CUPID ONLY): ICD-10-PCS | Mod: ,,, | Performed by: INTERNAL MEDICINE

## 2020-08-19 PROCEDURE — 93016 CV STRESS TEST SUPVJ ONLY: CPT | Mod: ,,, | Performed by: INTERNAL MEDICINE

## 2020-08-19 PROCEDURE — 93306 TTE W/DOPPLER COMPLETE: CPT

## 2020-08-19 PROCEDURE — 93226 XTRNL ECG REC<48 HR SCAN A/R: CPT

## 2020-08-19 PROCEDURE — 78452 STRESS TEST WITH MYOCARDIAL PERFUSION (CUPID ONLY): ICD-10-PCS | Mod: 26,,, | Performed by: INTERNAL MEDICINE

## 2020-08-19 PROCEDURE — 93018 STRESS TEST WITH MYOCARDIAL PERFUSION (CUPID ONLY): ICD-10-PCS | Mod: ,,, | Performed by: INTERNAL MEDICINE

## 2020-08-19 PROCEDURE — 93016 STRESS TEST WITH MYOCARDIAL PERFUSION (CUPID ONLY): ICD-10-PCS | Mod: ,,, | Performed by: INTERNAL MEDICINE

## 2020-08-19 PROCEDURE — 93017 CV STRESS TEST TRACING ONLY: CPT

## 2020-08-19 PROCEDURE — 78452 HT MUSCLE IMAGE SPECT MULT: CPT | Mod: 26,,, | Performed by: INTERNAL MEDICINE

## 2020-08-19 PROCEDURE — 93227 XTRNL ECG REC<48 HR R&I: CPT | Mod: ,,, | Performed by: INTERNAL MEDICINE

## 2020-08-19 PROCEDURE — A9502 TC99M TETROFOSMIN: HCPCS

## 2020-08-19 PROCEDURE — 93306 ECHO (CUPID ONLY): ICD-10-PCS | Mod: 26,,, | Performed by: INTERNAL MEDICINE

## 2020-08-19 PROCEDURE — 93306 TTE W/DOPPLER COMPLETE: CPT | Mod: 26,,, | Performed by: INTERNAL MEDICINE

## 2020-08-19 RX ORDER — GALCANEZUMAB 120 MG/ML
INJECTION, SOLUTION SUBCUTANEOUS
Qty: 1 ML | Refills: 2 | Status: SHIPPED | OUTPATIENT
Start: 2020-08-19 | End: 2020-10-26 | Stop reason: SDUPTHER

## 2020-08-21 LAB
OHS CV EVENT MONITOR DAY: 0
OHS CV HOLTER LENGTH DECIMAL HOURS: 47.98
OHS CV HOLTER LENGTH HOURS: 47
OHS CV HOLTER LENGTH MINUTES: 59

## 2020-08-26 ENCOUNTER — OFFICE VISIT (OUTPATIENT)
Dept: CARDIOLOGY | Facility: CLINIC | Age: 55
End: 2020-08-26
Payer: OTHER GOVERNMENT

## 2020-08-26 VITALS
OXYGEN SATURATION: 96 % | SYSTOLIC BLOOD PRESSURE: 143 MMHG | BODY MASS INDEX: 29.86 KG/M2 | HEIGHT: 72 IN | DIASTOLIC BLOOD PRESSURE: 89 MMHG | HEART RATE: 84 BPM | WEIGHT: 220.44 LBS

## 2020-08-26 DIAGNOSIS — I73.9 PAD (PERIPHERAL ARTERY DISEASE): Primary | ICD-10-CM

## 2020-08-26 PROCEDURE — 99214 OFFICE O/P EST MOD 30 MIN: CPT | Mod: PBBFAC | Performed by: INTERNAL MEDICINE

## 2020-08-26 PROCEDURE — 99999 PR PBB SHADOW E&M-EST. PATIENT-LVL IV: CPT | Mod: PBBFAC,,, | Performed by: INTERNAL MEDICINE

## 2020-08-26 PROCEDURE — 99214 PR OFFICE/OUTPT VISIT, EST, LEVL IV, 30-39 MIN: ICD-10-PCS | Mod: S$PBB,,, | Performed by: INTERNAL MEDICINE

## 2020-08-26 PROCEDURE — 99999 PR PBB SHADOW E&M-EST. PATIENT-LVL IV: ICD-10-PCS | Mod: PBBFAC,,, | Performed by: INTERNAL MEDICINE

## 2020-08-26 PROCEDURE — 99214 OFFICE O/P EST MOD 30 MIN: CPT | Mod: S$PBB,,, | Performed by: INTERNAL MEDICINE

## 2020-08-26 RX ORDER — ATORVASTATIN CALCIUM 40 MG/1
40 TABLET, FILM COATED ORAL NIGHTLY
Qty: 90 TABLET | Refills: 3 | Status: SHIPPED | OUTPATIENT
Start: 2020-08-26 | End: 2022-03-04 | Stop reason: SDUPTHER

## 2020-08-26 RX ORDER — METOPROLOL SUCCINATE 25 MG/1
25 TABLET, EXTENDED RELEASE ORAL DAILY
Qty: 90 TABLET | Refills: 3 | Status: SHIPPED | OUTPATIENT
Start: 2020-08-26 | End: 2022-03-04 | Stop reason: SDUPTHER

## 2020-08-26 NOTE — PROGRESS NOTES
CARDIOVASCULAR CONSULTATION    REASON FOR CONSULT:   Enoch Barr is a 55 y.o. male who presents for evaluation chest tightness.      HISTORY OF PRESENT ILLNESS:     Patient is a pleasant 55-year-old.  Family history significant for coronary artery disease and mom had a massive heart attack at the age of 60 and  from it.  Has chest tightness at rest, gets worse on going up a flight of stairs.  Associated with shortness of breath with also gets worse on going up 1 flights of stairs.  Denies orthopnea, PND, swelling of feet.  EKG was personally reviewed and demonstrates normal sinus rhythm.    Also has daily palpitations.  Will check Holter monitor for it.    Notes from 2020:  Patient here for follow-up.  Denies any chest pains at rest on exertion, orthopnea, PND. No chest pains    · Sinus rhythm with heart rates varying between 40 and 126 bpm with an average of 69 bpm. Total time in sinus rhythm was approximately 48 hours  · There were very rare PVCs totalling 7  · There were very rare PACs totalling 33  · SYMPTOMS OF CHEST TIGHTNESS ASSOCIATED WITH NORMAL SINUS RHYTHM/SINUS TACHYCARDIA    The study shows equivocal myocardial perfusion.  Cannot exclude inferior ischemia vs attenuation.    Perfusion Defect There is a mild intensity, mostly fixed with some reversibilty defect in the basal to distal inferior wall(s).    Visually estimated ejection fraction is normal at stress.    There is normal wall motion post stress.    The EKG portion of this study is abnormal but not diagnostic. (Erwin 13:18, 13.7 METS, 106% MPHR, 1mm upsloping St depression).    The patient reported no chest pain during the stress test.    Consider Cardiac PET vs cath if high clinical suspicion for CAD.       PAST MEDICAL HISTORY:     Past Medical History:   Diagnosis Date    Hypertension     on medication       PAST SURGICAL HISTORY:     Past Surgical History:   Procedure Laterality Date    COLONOSCOPY N/A 8/10/2020     Procedure: COLONOSCOPY;  Surgeon: April Dos Santos MD;  Location: [facility];  Service: Endoscopy;  Laterality: N/A;    HERNIA REPAIR      SINUS SURGERY  2012       ALLERGIES AND MEDICATION:   Review of patient's allergies indicates:  No Known Allergies     Medication List          Accurate as of August 26, 2020  3:14 PM. If you have any questions, ask your nurse or doctor.            CONTINUE taking these medications    azelastine 137 mcg (0.1 %) nasal spray  Commonly known as: ASTELIN  1 spray (137 mcg total) by Nasal route 2 (two) times daily.     budesonide 0.25 mg/2 mL nebulizer solution  Commonly known as: PULMICORT     EMGALITY  mg/mL Pnij  Generic drug: galcanezumab-gnlm  Inject 120 mg into the skin every 28 days.     fexofenadine 30 MG tablet  Commonly known as: ALLEGRA     lisinopriL-hydrochlorothiazide 20-12.5 mg per tablet  Commonly known as: PRINZIDE,ZESTORETIC  Take 1 tablet by mouth once daily.     methylPREDNISolone 4 mg tablet  Commonly known as: MEDROL DOSEPACK  use as directed     metoclopramide HCl 10 MG tablet  Commonly known as: REGLAN  Take 1 tablet (10 mg total) by mouth every 6 (six) hours as needed.     montelukast 10 mg tablet  Commonly known as: SINGULAIR  TAKE 1 TABLET(10 MG) BY MOUTH EVERY EVENING     omeprazole 40 MG capsule  Commonly known as: PRILOSEC  Take 1 capsule (40 mg total) by mouth once daily.            SOCIAL HISTORY:     Social History     Socioeconomic History    Marital status:      Spouse name: Not on file    Number of children: Not on file    Years of education: Not on file    Highest education level: Not on file   Occupational History    Occupation: production      Employer: [employer]   Social Needs    Financial resource strain: Not very hard    Food insecurity     Worry: Never true     Inability: Never true    Transportation needs     Medical: No     Non-medical: No   Tobacco Use    Smoking status: Former Smoker      Quit date: 1999     Years since quittin.2    Smokeless tobacco: Never Used   Substance and Sexual Activity    Alcohol use: Yes     Alcohol/week: 7.0 standard drinks     Types: 7 Glasses of wine per week     Frequency: 2-3 times a week     Drinks per session: 3 or 4     Binge frequency: Less than monthly     Comment: beer 2-3 daily    Drug use: No    Sexual activity: Yes     Partners: Female   Lifestyle    Physical activity     Days per week: 4 days     Minutes per session: 60 min    Stress: Very much   Relationships    Social connections     Talks on phone: Twice a week     Gets together: Patient refused     Attends Buddhist service: Not on file     Active member of club or organization: No     Attends meetings of clubs or organizations: Never     Relationship status:    Other Topics Concern    Not on file   Social History Narrative    Not on file       FAMILY HISTORY:     Family History   Problem Relation Age of Onset    Coronary artery disease Mother     Hypertension Father     No Known Problems Sister     No Known Problems Brother     Valvular heart disease Sister     No Known Problems Brother     No Known Problems Brother     Amblyopia Neg Hx     Blindness Neg Hx     Cancer Neg Hx     Cataracts Neg Hx     Diabetes Neg Hx     Glaucoma Neg Hx     Macular degeneration Neg Hx     Retinal detachment Neg Hx     Strabismus Neg Hx     Stroke Neg Hx     Thyroid disease Neg Hx        REVIEW OF SYSTEMS:   Review of Systems   Constitution: Negative.   HENT: Negative.    Eyes: Negative.    Cardiovascular: Positive for chest pain and dyspnea on exertion.   Respiratory: Negative.    Endocrine: Negative.    Hematologic/Lymphatic: Negative.    Skin: Negative.    Musculoskeletal: Negative.    Gastrointestinal: Negative.    Genitourinary: Negative.    Neurological: Negative.    Psychiatric/Behavioral: Negative.    Allergic/Immunologic: Negative.        A 10 point review of systems was  performed and all the pertinent positives have been mentioned. Rest of review of systems was negative.        PHYSICAL EXAM:     Vitals:    08/26/20 1453   BP: (!) 143/89   Pulse: 84    Body mass index is 29.9 kg/m².  Weight: 100 kg (220 lb 7.4 oz)   Height: 6' (182.9 cm)     Physical Exam   Constitutional: He is oriented to person, place, and time. He appears well-developed and well-nourished.   HENT:   Head: Normocephalic.   Eyes: Pupils are equal, round, and reactive to light. Conjunctivae are normal.   Neck: Normal range of motion. Neck supple.   Cardiovascular: Normal rate, regular rhythm and normal heart sounds.   Pulmonary/Chest: Effort normal and breath sounds normal.   Abdominal: Soft. Bowel sounds are normal.   Neurological: He is alert and oriented to person, place, and time.   Skin: Skin is warm.   Vitals reviewed.        DATA:     Laboratory:  CBC:  Recent Labs   Lab 09/24/19  1540 07/27/20  1645   WBC 6.07 5.64   Hemoglobin 15.5 16.4   Hematocrit 45.4 49.7   Platelets 257 258       CHEMISTRIES:  Recent Labs   Lab 09/24/19  1540 07/27/20  1645   Glucose 103 95   Sodium 139 141   Potassium 3.9 3.9   BUN, Bld 10 15   Creatinine 1.0 1.3   eGFR if African American >60 >60.0   eGFR if non African American >60 >60.0   Calcium 8.8 9.8       CARDIAC BIOMARKERS:        COAGS:        LIPIDS/LFTS:  Recent Labs   Lab 09/24/19  1540 07/27/20  1645   Cholesterol  --  249 H   Triglycerides  --  508 H   HDL  --  39 L   LDL Cholesterol  --  Invalid, Trig>400.0   Non-HDL Cholesterol  --  210   AST 43 H 50 H   ALT 63 H 67 H       No results found for: HGBA1C    TSH  Recent Labs   Lab 07/27/20  1645   TSH 1.204       The 10-year ASCVD risk score (Hennaanibal STUBBS Jr., et al., 2013) is: 11.6%    Values used to calculate the score:      Age: 55 years      Sex: Male      Is Non- : No      Diabetic: No      Tobacco smoker: No      Systolic Blood Pressure: 143 mmHg      Is BP treated: Yes      HDL Cholesterol: 39  mg/dL      Total Cholesterol: 249 mg/dL             ASSESSMENT AND PLAN     Patient Active Problem List   Diagnosis    Refractive error    Other chronic sinusitis uses budesonide for this NOT for lungs    Essential hypertension    Gastroesophageal reflux disease without esophagitis    Chronic cluster headache, not intractable followed by neurology    Polyp of sigmoid colon 08/10/2020 colonoscopy diverticulosis entire colon.  3 mm ascending polyp.  Three polyp sigmoid.  One rectal polyp.       Chest tightness and dyspnea on exertion in a patient with multiple risk factors for coronary artery disease.  equivocal stress test.  Start metoprolol.    Dyslipidemia: lipitor. lft in 6 weeks    Palpitations:  No significant arrhythmia.    MAT          Thank you very much for involving me in the care of your patient.  Please do not hesitate to contact me if there are any questions.      Mario Moore MD, FACC, [facility]  Interventional Cardiologist, [facility].           This note was dictated with the help of speech recognition software.  There might be un-intended errors and/or substitutions.

## 2020-09-05 ENCOUNTER — TELEPHONE (OUTPATIENT)
Dept: PHARMACY | Facility: CLINIC | Age: 55
End: 2020-09-05

## 2020-09-12 DIAGNOSIS — G44.019 EPISODIC CLUSTER HEADACHE, NOT INTRACTABLE: ICD-10-CM

## 2020-09-12 RX ORDER — GALCANEZUMAB 120 MG/ML
INJECTION, SOLUTION SUBCUTANEOUS
Qty: 1 ML | Refills: 2 | Status: CANCELLED | OUTPATIENT
Start: 2020-09-12

## 2020-09-17 ENCOUNTER — OFFICE VISIT (OUTPATIENT)
Dept: URGENT CARE | Facility: CLINIC | Age: 55
End: 2020-09-17
Payer: OTHER GOVERNMENT

## 2020-09-17 ENCOUNTER — HOSPITAL ENCOUNTER (EMERGENCY)
Facility: HOSPITAL | Age: 55
Discharge: HOME | End: 2020-09-17
Attending: EMERGENCY MEDICINE
Payer: OTHER GOVERNMENT

## 2020-09-17 VITALS
HEIGHT: 72 IN | OXYGEN SATURATION: 96 % | TEMPERATURE: 98 F | DIASTOLIC BLOOD PRESSURE: 92 MMHG | BODY MASS INDEX: 29.8 KG/M2 | HEART RATE: 100 BPM | RESPIRATION RATE: 16 BRPM | WEIGHT: 220 LBS | SYSTOLIC BLOOD PRESSURE: 156 MMHG

## 2020-09-17 VITALS
WEIGHT: 220 LBS | OXYGEN SATURATION: 96 % | TEMPERATURE: 98 F | RESPIRATION RATE: 15 BRPM | HEART RATE: 114 BPM | HEIGHT: 72 IN | BODY MASS INDEX: 29.8 KG/M2

## 2020-09-17 DIAGNOSIS — S99.911A ANKLE INJURY, RIGHT, INITIAL ENCOUNTER: ICD-10-CM

## 2020-09-17 DIAGNOSIS — S70.02XA CONTUSION OF LEFT HIP, INITIAL ENCOUNTER: ICD-10-CM

## 2020-09-17 DIAGNOSIS — S50.01XA CONTUSION OF RIGHT ELBOW, INITIAL ENCOUNTER: ICD-10-CM

## 2020-09-17 DIAGNOSIS — Z23 NEED FOR TD VACCINE: ICD-10-CM

## 2020-09-17 DIAGNOSIS — S59.909A ELBOW INJURY, INITIAL ENCOUNTER: ICD-10-CM

## 2020-09-17 DIAGNOSIS — S41.112A LACERATION OF MULTIPLE SITES OF LEFT UPPER EXTREMITY, INITIAL ENCOUNTER: Primary | ICD-10-CM

## 2020-09-17 DIAGNOSIS — S51.012A LACERATION OF LEFT ELBOW, INITIAL ENCOUNTER: Primary | ICD-10-CM

## 2020-09-17 DIAGNOSIS — S49.92XA INJURY OF LEFT SHOULDER AND UPPER ARM, INITIAL ENCOUNTER: ICD-10-CM

## 2020-09-17 DIAGNOSIS — S20.212A CHEST WALL CONTUSION, LEFT, INITIAL ENCOUNTER: ICD-10-CM

## 2020-09-17 DIAGNOSIS — S30.1XXA CONTUSION OF ABDOMINAL WALL, INITIAL ENCOUNTER: ICD-10-CM

## 2020-09-17 DIAGNOSIS — S80.02XA CONTUSION OF LEFT KNEE, INITIAL ENCOUNTER: ICD-10-CM

## 2020-09-17 DIAGNOSIS — T07.XXXA ABRASIONS OF MULTIPLE SITES: ICD-10-CM

## 2020-09-17 DIAGNOSIS — S59.902A ELBOW INJURY, LEFT, INITIAL ENCOUNTER: ICD-10-CM
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
## 2020-09-17 LAB
ALBUMIN SERPL BCP-MCNC: 4.4 G/DL (ref 3.5–5.2)
ALP SERPL-CCNC: 84 U/L (ref 55–135)
ALT SERPL W/O P-5'-P-CCNC: 41 U/L (ref 10–44)
ANION GAP SERPL CALC-SCNC: 11 MMOL/L (ref 8–16)
AST SERPL-CCNC: 32 U/L (ref 10–40)
BASOPHILS # BLD AUTO: 0.04 K/UL (ref 0–0.2)
BASOPHILS NFR BLD: 0.3 % (ref 0–1.9)
BILIRUB SERPL-MCNC: 0.9 MG/DL (ref 0.1–1)
BUN SERPL-MCNC: 14 MG/DL (ref 6–20)
CALCIUM SERPL-MCNC: 9.5 MG/DL (ref 8.7–10.5)
CHLORIDE SERPL-SCNC: 104 MMOL/L (ref 95–110)
CO2 SERPL-SCNC: 25 MMOL/L (ref 23–29)
CREAT SERPL-MCNC: 1 MG/DL (ref 0.5–1.4)
DIFFERENTIAL METHOD: ABNORMAL
EOSINOPHIL # BLD AUTO: 0 K/UL (ref 0–0.5)
EOSINOPHIL NFR BLD: 0.3 % (ref 0–8)
ERYTHROCYTE [DISTWIDTH] IN BLOOD BY AUTOMATED COUNT: 11.9 % (ref 11.5–14.5)
EST. GFR  (AFRICAN AMERICAN): >60 ML/MIN/1.73 M^2
EST. GFR  (NON AFRICAN AMERICAN): >60 ML/MIN/1.73 M^2
GLUCOSE SERPL-MCNC: 92 MG/DL (ref 70–110)
HCT VFR BLD AUTO: 47.1 % (ref 40–54)
HGB BLD-MCNC: 16 G/DL (ref 14–18)
IMM GRANULOCYTES # BLD AUTO: 0.03 K/UL (ref 0–0.04)
IMM GRANULOCYTES NFR BLD AUTO: 0.3 % (ref 0–0.5)
LYMPHOCYTES # BLD AUTO: 0.8 K/UL (ref 1–4.8)
LYMPHOCYTES NFR BLD: 6.8 % (ref 18–48)
MCH RBC QN AUTO: 33.2 PG (ref 27–31)
MCHC RBC AUTO-ENTMCNC: 34 G/DL (ref 32–36)
MCV RBC AUTO: 98 FL (ref 82–98)
MONOCYTES # BLD AUTO: 1 K/UL (ref 0.3–1)
MONOCYTES NFR BLD: 8.2 % (ref 4–15)
NEUTROPHILS # BLD AUTO: 10.1 K/UL (ref 1.8–7.7)
NEUTROPHILS NFR BLD: 84.1 % (ref 38–73)
NRBC BLD-RTO: 0 /100 WBC
PLATELET # BLD AUTO: 284 K/UL (ref 150–350)
PMV BLD AUTO: 9.5 FL (ref 9.2–12.9)
POTASSIUM SERPL-SCNC: 3.9 MMOL/L (ref 3.5–5.1)
PROT SERPL-MCNC: 7.3 G/DL (ref 6–8.4)
RBC # BLD AUTO: 4.82 M/UL (ref 4.6–6.2)
SODIUM SERPL-SCNC: 140 MMOL/L (ref 136–145)
WBC # BLD AUTO: 12 K/UL (ref 3.9–12.7)

## 2020-09-17 PROCEDURE — 99285 EMERGENCY DEPT VISIT HI MDM: CPT | Mod: 25

## 2020-09-17 PROCEDURE — 25000003 PHARM REV CODE 250: Performed by: PHYSICIAN ASSISTANT

## 2020-09-17 PROCEDURE — 29515 PR APPLY LOWER LEG SPLINT: ICD-10-PCS | Mod: RT,,, | Performed by: PHYSICIAN ASSISTANT

## 2020-09-17 PROCEDURE — 96372 THER/PROPH/DIAG INJ SC/IM: CPT | Mod: 59

## 2020-09-17 PROCEDURE — 99285 EMERGENCY DEPT VISIT HI MDM: CPT | Mod: 25,,, | Performed by: PHYSICIAN ASSISTANT

## 2020-09-17 PROCEDURE — 99285 PR EMERGENCY DEPT VISIT,LEVEL V: ICD-10-PCS | Mod: 25,,, | Performed by: PHYSICIAN ASSISTANT

## 2020-09-17 PROCEDURE — 29515 APPLICATION SHORT LEG SPLINT: CPT

## 2020-09-17 PROCEDURE — 90715 TDAP VACCINE 7 YRS/> IM: CPT | Performed by: PHYSICIAN ASSISTANT

## 2020-09-17 PROCEDURE — 90471 IMMUNIZATION ADMIN: CPT | Performed by: PHYSICIAN ASSISTANT

## 2020-09-17 PROCEDURE — 25500020 PHARM REV CODE 255: Performed by: EMERGENCY MEDICINE

## 2020-09-17 PROCEDURE — 63600175 PHARM REV CODE 636 W HCPCS: Performed by: PHYSICIAN ASSISTANT

## 2020-09-17 PROCEDURE — 96374 THER/PROPH/DIAG INJ IV PUSH: CPT | Mod: 59

## 2020-09-17 PROCEDURE — 80053 COMPREHEN METABOLIC PANEL: CPT

## 2020-09-17 PROCEDURE — 29515 APPLICATION SHORT LEG SPLINT: CPT | Mod: RT,,, | Performed by: PHYSICIAN ASSISTANT

## 2020-09-17 PROCEDURE — 85025 COMPLETE CBC W/AUTO DIFF WBC: CPT

## 2020-09-17 RX ORDER — HYDROCODONE BITARTRATE AND ACETAMINOPHEN 10; 325 MG/1; MG/1
1 TABLET ORAL EVERY 4 HOURS PRN
Qty: 15 TABLET | Refills: 0 | Status: SHIPPED | OUTPATIENT
Start: 2020-09-17 | End: 2020-09-20

## 2020-09-17 RX ORDER — SILVER SULFADIAZINE 10 G/1000G
1 CREAM TOPICAL
Status: COMPLETED | OUTPATIENT
Start: 2020-09-17 | End: 2020-09-17

## 2020-09-17 RX ORDER — METHOCARBAMOL 750 MG/1
750 TABLET, FILM COATED ORAL 2 TIMES DAILY PRN
Qty: 14 TABLET | Refills: 0 | Status: SHIPPED | OUTPATIENT
Start: 2020-09-17 | End: 2020-09-23 | Stop reason: SDUPTHER

## 2020-09-17 RX ORDER — LIDOCAINE HYDROCHLORIDE 10 MG/ML
10 INJECTION INFILTRATION; PERINEURAL
Status: DISCONTINUED | OUTPATIENT
Start: 2020-09-17 | End: 2020-09-17 | Stop reason: HOSPADM

## 2020-09-17 RX ORDER — IBUPROFEN 600 MG/1
600 TABLET ORAL
Status: DISCONTINUED | OUTPATIENT
Start: 2020-09-17 | End: 2020-09-17

## 2020-09-17 RX ORDER — SULFAMETHOXAZOLE AND TRIMETHOPRIM 800; 160 MG/1; MG/1
1 TABLET ORAL 2 TIMES DAILY
Qty: 20 TABLET | Refills: 0 | Status: SHIPPED | OUTPATIENT
Start: 2020-09-17 | End: 2020-09-27

## 2020-09-17 RX ORDER — MORPHINE SULFATE 4 MG/ML
4 INJECTION, SOLUTION INTRAMUSCULAR; INTRAVENOUS
Status: COMPLETED | OUTPATIENT
Start: 2020-09-17 | End: 2020-09-17

## 2020-09-17 RX ORDER — RIMEGEPANT SULFATE 75 MG/75MG
75 TABLET, ORALLY DISINTEGRATING ORAL ONCE AS NEEDED
COMMUNITY
End: 2021-02-04

## 2020-09-17 RX ORDER — CEFAZOLIN SODIUM 1 G/3ML
2 INJECTION, POWDER, FOR SOLUTION INTRAMUSCULAR; INTRAVENOUS
Status: COMPLETED | OUTPATIENT
Start: 2020-09-17 | End: 2020-09-17

## 2020-09-17 RX ORDER — SILVER SULFADIAZINE 10 G/1000G
CREAM TOPICAL 2 TIMES DAILY
Qty: 50 G | Refills: 0 | Status: SHIPPED | OUTPATIENT
Start: 2020-09-17 | End: 2020-09-24 | Stop reason: SDUPTHER

## 2020-09-17 RX ADMIN — IOHEXOL 100 ML: 350 INJECTION, SOLUTION INTRAVENOUS at 02:09

## 2020-09-17 RX ADMIN — SILVER SULFADIAZINE 1 TUBE: 10 CREAM TOPICAL at 02:09

## 2020-09-17 RX ADMIN — MORPHINE SULFATE 4 MG: 4 INJECTION INTRAVENOUS at 01:09

## 2020-09-17 RX ADMIN — CLOSTRIDIUM TETANI TOXOID ANTIGEN (FORMALDEHYDE INACTIVATED), CORYNEBACTERIUM DIPHTHERIAE TOXOID ANTIGEN (FORMALDEHYDE INACTIVATED), BORDETELLA PERTUSSIS TOXOID ANTIGEN (GLUTARALDEHYDE INACTIVATED), BORDETELLA PERTUSSIS FILAMENTOUS HEMAGGLUTININ ANTIGEN (FORMALDEHYDE INACTIVATED), BORDETELLA PERTUSSIS PERTACTIN ANTIGEN, AND BORDETELLA PERTUSSIS FIMBRIAE 2/3 ANTIGEN 0.5 ML: 5; 2; 2.5; 5; 3; 5 INJECTION, SUSPENSION INTRAMUSCULAR at 12:09

## 2020-09-17 RX ADMIN — CEFAZOLIN 2 G: 1 INJECTION, POWDER, FOR SOLUTION INTRAMUSCULAR; INTRAVENOUS at 03:09
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
## 2020-09-18 ENCOUNTER — NURSE TRIAGE (OUTPATIENT)
Dept: ADMINISTRATIVE | Facility: CLINIC | Age: 55
End: 2020-09-18

## 2020-09-18 ENCOUNTER — PATIENT OUTREACH (OUTPATIENT)
Dept: ADMINISTRATIVE | Facility: OTHER | Age: 55
End: 2020-09-18

## 2020-09-18 ENCOUNTER — TELEPHONE (OUTPATIENT)
Dept: ORTHOPEDICS | Facility: CLINIC | Age: 55
End: 2020-09-18
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
## 2020-09-18 NOTE — PROGRESS NOTES
Health Maintenance Due   Topic Date Due    Shingles Vaccine (1 of 2) 01/23/2016    Influenza Vaccine (1) 08/01/2020     Updates were requested from care everywhere.  Chart was reviewed for overdue Proactive [facility] Encounters ([facility]) topics (CRS, Breast Cancer Screening, Eye exam)  Health Maintenance has been updated.  LINKS immunization registry triggered.  Immunizations were reconciled.
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
## 2020-09-19 ENCOUNTER — HOSPITAL ENCOUNTER (EMERGENCY)
Facility: HOSPITAL | Age: 55
Discharge: HOME | End: 2020-09-19
Attending: EMERGENCY MEDICINE
Payer: OTHER GOVERNMENT

## 2020-09-19 ENCOUNTER — NURSE TRIAGE (OUTPATIENT)
Dept: ADMINISTRATIVE | Facility: CLINIC | Age: 55
End: 2020-09-19

## 2020-09-19 DIAGNOSIS — L08.9 INFECTED ABRASIONS OF MULTIPLE SITES: Primary | ICD-10-CM

## 2020-09-19 DIAGNOSIS — T07.XXXA INFECTED ABRASIONS OF MULTIPLE SITES: Primary | ICD-10-CM

## 2020-09-19 PROCEDURE — 25000003 PHARM REV CODE 250: Mod: ER | Performed by: EMERGENCY MEDICINE

## 2020-09-19 PROCEDURE — 99284 EMERGENCY DEPT VISIT MOD MDM: CPT | Mod: ER

## 2020-09-19 RX ORDER — ACETAMINOPHEN 500 MG
1000 TABLET ORAL
Status: COMPLETED | OUTPATIENT
Start: 2020-09-19 | End: 2020-09-19

## 2020-09-19 RX ORDER — CLINDAMYCIN HYDROCHLORIDE 150 MG/1
150 CAPSULE ORAL 4 TIMES DAILY
Qty: 28 CAPSULE | Refills: 0 | Status: SHIPPED | OUTPATIENT
Start: 2020-09-19 | End: 2020-09-26

## 2020-09-19 RX ORDER — SILVER SULFADIAZINE 10 G/1000G
CREAM TOPICAL
Status: COMPLETED | OUTPATIENT
Start: 2020-09-19 | End: 2020-09-19

## 2020-09-19 RX ORDER — SILVER SULFADIAZINE 10 G/1000G
CREAM TOPICAL 2 TIMES DAILY
Qty: 50 G | Refills: 0 | Status: SHIPPED | OUTPATIENT
Start: 2020-09-19 | End: 2020-09-24

## 2020-09-19 RX ORDER — CLINDAMYCIN HYDROCHLORIDE 150 MG/1
150 CAPSULE ORAL
Status: COMPLETED | OUTPATIENT
Start: 2020-09-19 | End: 2020-09-19

## 2020-09-19 RX ADMIN — CLINDAMYCIN HYDROCHLORIDE 150 MG: 150 CAPSULE ORAL at 08:09

## 2020-09-19 RX ADMIN — ACETAMINOPHEN 1000 MG: 500 TABLET ORAL at 07:09

## 2020-09-19 RX ADMIN — SILVER SULFADIAZINE: 10 CREAM TOPICAL at 08:09
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
## 2020-09-19 NOTE — Clinical Note
"Enoch Barr (James) was seen and treated in our emergency department on 9/19/2020.  He may return to work on 09/28/2020.       If you have any questions or concerns, please don't hesitate to call.      Steff Del Angel RN    "
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
## 2020-09-20 VITALS
DIASTOLIC BLOOD PRESSURE: 78 MMHG | OXYGEN SATURATION: 98 % | SYSTOLIC BLOOD PRESSURE: 142 MMHG | HEART RATE: 76 BPM | WEIGHT: 220 LBS | TEMPERATURE: 99 F | BODY MASS INDEX: 29.84 KG/M2 | RESPIRATION RATE: 20 BRPM
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
## 2020-09-21 ENCOUNTER — OFFICE VISIT (OUTPATIENT)
Dept: PAIN MEDICINE | Facility: CLINIC | Age: 55
End: 2020-09-21
Payer: OTHER GOVERNMENT

## 2020-09-21 ENCOUNTER — OFFICE VISIT (OUTPATIENT)
Dept: ORTHOPEDICS | Facility: CLINIC | Age: 55
End: 2020-09-21
Payer: OTHER GOVERNMENT

## 2020-09-21 VITALS
SYSTOLIC BLOOD PRESSURE: 121 MMHG | HEART RATE: 81 BPM | BODY MASS INDEX: 31.35 KG/M2 | WEIGHT: 219 LBS | TEMPERATURE: 100 F | HEIGHT: 70 IN | OXYGEN SATURATION: 98 % | DIASTOLIC BLOOD PRESSURE: 77 MMHG

## 2020-09-21 VITALS — BODY MASS INDEX: 31.26 KG/M2 | WEIGHT: 218.38 LBS | HEIGHT: 70 IN

## 2020-09-21 DIAGNOSIS — M25.522 LEFT ELBOW PAIN: ICD-10-CM

## 2020-09-21 DIAGNOSIS — S93.491A SPRAIN OF OTHER LIGAMENT OF RIGHT ANKLE, INITIAL ENCOUNTER: ICD-10-CM

## 2020-09-21 DIAGNOSIS — M47.816 LUMBAR SPONDYLOSIS: ICD-10-CM

## 2020-09-21 DIAGNOSIS — S40.812A ABRASION OF LEFT UPPER EXTREMITY, INITIAL ENCOUNTER: ICD-10-CM

## 2020-09-21 DIAGNOSIS — S53.402A SPRAIN OF LEFT ELBOW, INITIAL ENCOUNTER: Primary | ICD-10-CM

## 2020-09-21 DIAGNOSIS — M51.36 DDD (DEGENERATIVE DISC DISEASE), LUMBAR: Primary | ICD-10-CM

## 2020-09-21 PROBLEM — R91.1 PULMONARY NODULE: Status: ACTIVE | Noted: 2020-09-21

## 2020-09-21 PROBLEM — M51.369 DDD (DEGENERATIVE DISC DISEASE), LUMBAR: Status: ACTIVE | Noted: 2020-09-21

## 2020-09-21 PROCEDURE — 99999 PR PBB SHADOW E&M-EST. PATIENT-LVL III: CPT | Mod: PBBFAC,,, | Performed by: PHYSICIAN ASSISTANT

## 2020-09-21 PROCEDURE — 99215 OFFICE O/P EST HI 40 MIN: CPT | Mod: PBBFAC,25,27,PN | Performed by: PAIN MEDICINE

## 2020-09-21 PROCEDURE — 99213 OFFICE O/P EST LOW 20 MIN: CPT | Mod: PBBFAC,25 | Performed by: PHYSICIAN ASSISTANT

## 2020-09-21 PROCEDURE — 99204 PR OFFICE/OUTPT VISIT, NEW, LEVL IV, 45-59 MIN: ICD-10-PCS | Mod: S$PBB,,, | Performed by: PHYSICIAN ASSISTANT

## 2020-09-21 PROCEDURE — 99999 PR PBB SHADOW E&M-EST. PATIENT-LVL III: ICD-10-PCS | Mod: PBBFAC,,, | Performed by: PHYSICIAN ASSISTANT

## 2020-09-21 PROCEDURE — 99999 PR PBB SHADOW E&M-EST. PATIENT-LVL V: CPT | Mod: PBBFAC,,, | Performed by: PAIN MEDICINE

## 2020-09-21 PROCEDURE — 99204 PR OFFICE/OUTPT VISIT, NEW, LEVL IV, 45-59 MIN: ICD-10-PCS | Mod: S$PBB,,, | Performed by: PAIN MEDICINE

## 2020-09-21 PROCEDURE — 99999 PR PBB SHADOW E&M-EST. PATIENT-LVL V: ICD-10-PCS | Mod: PBBFAC,,, | Performed by: PAIN MEDICINE

## 2020-09-21 PROCEDURE — 99204 OFFICE O/P NEW MOD 45 MIN: CPT | Mod: S$PBB,,, | Performed by: PAIN MEDICINE

## 2020-09-21 PROCEDURE — 99204 OFFICE O/P NEW MOD 45 MIN: CPT | Mod: S$PBB,,, | Performed by: PHYSICIAN ASSISTANT

## 2020-09-21 RX ORDER — HYDROCODONE BITARTRATE AND ACETAMINOPHEN 10; 325 MG/1; MG/1
1 TABLET ORAL EVERY 6 HOURS PRN
Qty: 20 TABLET | Refills: 0 | Status: SHIPPED | OUTPATIENT
Start: 2020-09-21 | End: 2020-09-24
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
## 2020-09-22 ENCOUNTER — HOSPITAL ENCOUNTER (OUTPATIENT)
Dept: CARDIOLOGY | Facility: HOSPITAL | Age: 55
Discharge: HOME | End: 2020-09-22
Attending: INTERNAL MEDICINE
Payer: OTHER GOVERNMENT

## 2020-09-22 DIAGNOSIS — I73.9 PAD (PERIPHERAL ARTERY DISEASE): ICD-10-CM

## 2020-09-22 LAB
IMMEDIATE ARM BP: 131 MMHG
IMMEDIATE LEFT ABI: 0.95
IMMEDIATE LEFT TIBIAL: 125 MMHG
IMMEDIATE RIGHT ABI: 0.98
IMMEDIATE RIGHT TIBIAL: 128 MMHG
LEFT ABI: 1.24
LEFT ANT TIBIAL SYS PSV: 43 CM/S
LEFT CFA PSV: 69 CM/S
LEFT DORSALIS PEDIS: 139 MMHG
LEFT EXTERNAL ILIAC PSV: 88 CM/S
LEFT PERONEAL SYS PSV: 56 CM/S
LEFT POPLITEAL PSV: 55 CM/S
LEFT POST TIBIAL SYS PSV: 67 CM/S
LEFT POSTERIOR TIBIAL: 128 MMHG
LEFT PROFUNDA SYS PSV: 43 CM/S
LEFT SUPER FEMORAL DIST SYS PSV: 78 CM/S
LEFT SUPER FEMORAL MID SYS PSV: 98 CM/S
LEFT SUPER FEMORAL OSTIAL SYS PSV: 66 CM/S
LEFT SUPER FEMORAL PROX SYS PSV: 91 CM/S
LEFT TBI: 1.07
LEFT TIB/PER TRUNK SYS PSV: 75 CM/S
LEFT TOE PRESSURE: 120 MMHG
OHS CV LEFT LOWER EXTREMITY ABI (NO CALC): 1.2
OHS CV RIGHT ABI LOWER EXTREMITY (NO CALC): 1.2
RIGHT ABI: 1.2
RIGHT ANT TIBIAL SYS PSV: 57 CM/S
RIGHT ARM BP: 112 MMHG
RIGHT CFA PSV: 69 CM/S
RIGHT DORSALIS PEDIS: 134 MMHG
RIGHT EXTERNAL ILLIAC PSV: 104 CM/S
RIGHT PERONEAL SYS PSV: 44 CM/S
RIGHT POPLITEAL PSV: 56 CM/S
RIGHT POST TIBIAL SYS PSV: 74 CM/S
RIGHT POSTERIOR TIBIAL: 129 MMHG
RIGHT PROFUNDA SYS PSV: 59 CM/S
RIGHT SUPER FEMORAL DIST SYS PSV: 88 CM/S
RIGHT SUPER FEMORAL MID SYS PSV: 127 CM/S
RIGHT SUPER FEMORAL OSTIAL SYS PSV: 84 CM/S
RIGHT SUPER FEMORAL PROX SYS PSV: 95 CM/S
RIGHT TBI: 0.83
RIGHT TIB/PER TRUNK SYS PSV: 90 CM/S
RIGHT TOE PRESSURE: 93 MMHG
TOE RAISES: 75

## 2020-09-22 PROCEDURE — 93925 LOWER EXTREMITY STUDY: CPT | Mod: 26,,, | Performed by: INTERNAL MEDICINE

## 2020-09-22 PROCEDURE — 93925 LOWER EXTREMITY STUDY: CPT

## 2020-09-22 PROCEDURE — 93924 LWR XTR VASC STDY BILAT: CPT | Mod: 26,,, | Performed by: INTERNAL MEDICINE

## 2020-09-22 PROCEDURE — 93924 ANKLE BRACHIAL INDICES (ABI): ICD-10-PCS | Mod: 26,,, | Performed by: INTERNAL MEDICINE

## 2020-09-22 PROCEDURE — 93925 CV US DOPPLER ARTERIAL LEGS BILATERAL (CUPID ONLY): ICD-10-PCS | Mod: 26,,, | Performed by: INTERNAL MEDICINE

## 2020-09-22 PROCEDURE — 93924 LWR XTR VASC STDY BILAT: CPT

## 2020-09-23 ENCOUNTER — OFFICE VISIT (OUTPATIENT)
Dept: FAMILY MEDICINE | Facility: CLINIC | Age: 55
End: 2020-09-23
Payer: OTHER GOVERNMENT

## 2020-09-23 VITALS
WEIGHT: 215.06 LBS | HEIGHT: 70 IN | BODY MASS INDEX: 30.79 KG/M2 | HEART RATE: 76 BPM | TEMPERATURE: 98 F | OXYGEN SATURATION: 95 % | DIASTOLIC BLOOD PRESSURE: 82 MMHG | SYSTOLIC BLOOD PRESSURE: 110 MMHG

## 2020-09-23 DIAGNOSIS — T07.XXXA ABRASIONS OF MULTIPLE SITES: ICD-10-CM

## 2020-09-23 DIAGNOSIS — M62.838 MUSCLE SPASM: ICD-10-CM

## 2020-09-23 DIAGNOSIS — J31.0 PERENNIAL NON-ALLERGIC RHINITIS: ICD-10-CM

## 2020-09-23 DIAGNOSIS — Z09 HOSPITAL DISCHARGE FOLLOW-UP: Primary | ICD-10-CM
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
## 2020-09-23 PROCEDURE — 99215 OFFICE O/P EST HI 40 MIN: CPT | Mod: PBBFAC,PO | Performed by: NURSE PRACTITIONER

## 2020-09-23 PROCEDURE — 99214 OFFICE O/P EST MOD 30 MIN: CPT | Mod: S$PBB,,, | Performed by: NURSE PRACTITIONER

## 2020-09-23 PROCEDURE — 99999 PR PBB SHADOW E&M-EST. PATIENT-LVL V: ICD-10-PCS | Mod: PBBFAC,,, | Performed by: NURSE PRACTITIONER

## 2020-09-23 PROCEDURE — 99214 PR OFFICE/OUTPT VISIT, EST, LEVL IV, 30-39 MIN: ICD-10-PCS | Mod: S$PBB,,, | Performed by: NURSE PRACTITIONER

## 2020-09-23 PROCEDURE — 99999 PR PBB SHADOW E&M-EST. PATIENT-LVL V: CPT | Mod: PBBFAC,,, | Performed by: NURSE PRACTITIONER

## 2020-09-23 RX ORDER — METHOCARBAMOL 750 MG/1
750 TABLET, FILM COATED ORAL 2 TIMES DAILY PRN
Qty: 14 TABLET | Refills: 0 | Status: SHIPPED | OUTPATIENT
Start: 2020-09-23 | End: 2020-09-30

## 2020-09-23 RX ORDER — AZELASTINE 1 MG/ML
1 SPRAY, METERED NASAL 2 TIMES DAILY
Qty: 30 ML | Refills: 11 | Status: SHIPPED | OUTPATIENT
Start: 2020-09-23 | End: 2021-09-23
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
## 2020-09-23 NOTE — PATIENT INSTRUCTIONS
Follow up with wound care Dr. Sawyer tomorrow as scheduled   MRI left tricept tomorrow as scheduled   Complete Clindamycin prescription  Robaxin as needed for muscle spasms

## 2020-09-24 ENCOUNTER — HOSPITAL ENCOUNTER (OUTPATIENT)
Dept: RADIOLOGY | Facility: HOSPITAL | Age: 55
Discharge: HOME | End: 2020-09-24
Attending: PHYSICIAN ASSISTANT
Payer: OTHER GOVERNMENT

## 2020-09-24 ENCOUNTER — HOSPITAL ENCOUNTER (OUTPATIENT)
Dept: WOUND CARE | Facility: HOSPITAL | Age: 55
Discharge: HOME | End: 2020-09-24
Attending: INTERNAL MEDICINE
Payer: OTHER GOVERNMENT

## 2020-09-24 VITALS — DIASTOLIC BLOOD PRESSURE: 72 MMHG | HEART RATE: 70 BPM | SYSTOLIC BLOOD PRESSURE: 130 MMHG | TEMPERATURE: 98 F

## 2020-09-24 DIAGNOSIS — S40.811D ABRASION OF RIGHT UPPER EXTREMITY, SUBSEQUENT ENCOUNTER: ICD-10-CM

## 2020-09-24 DIAGNOSIS — S30.811A ABRASION OF ABDOMINAL WALL: ICD-10-CM

## 2020-09-24 DIAGNOSIS — S53.402A SPRAIN OF LEFT ELBOW, INITIAL ENCOUNTER: ICD-10-CM

## 2020-09-24 DIAGNOSIS — M25.522 LEFT ELBOW PAIN: ICD-10-CM

## 2020-09-24 DIAGNOSIS — S30.811D ABRASION OF ABDOMINAL WALL, SUBSEQUENT ENCOUNTER: Primary | ICD-10-CM

## 2020-09-24 DIAGNOSIS — S40.812D ABRASION OF LEFT UPPER EXTREMITY, SUBSEQUENT ENCOUNTER: ICD-10-CM
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
## 2020-09-24 PROCEDURE — 11042 WOUND DEBRIDEMENT: ICD-10-PCS | Mod: 59,,, | Performed by: INTERNAL MEDICINE

## 2020-09-24 PROCEDURE — 11043 DBRDMT MUSC&/FSCA 1ST 20/<: CPT | Mod: ,,, | Performed by: INTERNAL MEDICINE

## 2020-09-24 PROCEDURE — 27201912 HC WOUND CARE DEBRIDEMENT SUPPLIES

## 2020-09-24 PROCEDURE — 11043 DBRDMT MUSC&/FSCA 1ST 20/<: CPT

## 2020-09-24 PROCEDURE — 73221 MRI ELBOW WITHOUT CONTRAST LEFT: ICD-10-PCS | Mod: 26,LT,, | Performed by: RADIOLOGY

## 2020-09-24 PROCEDURE — 11042 DBRDMT SUBQ TIS 1ST 20SQCM/<: CPT | Mod: 59,,, | Performed by: INTERNAL MEDICINE

## 2020-09-24 PROCEDURE — 11042 DBRDMT SUBQ TIS 1ST 20SQCM/<: CPT

## 2020-09-24 PROCEDURE — 99214 OFFICE O/P EST MOD 30 MIN: CPT | Mod: 25,,, | Performed by: INTERNAL MEDICINE

## 2020-09-24 PROCEDURE — 11043 WOUND DEBRIDEMENT: ICD-10-PCS | Mod: ,,, | Performed by: INTERNAL MEDICINE

## 2020-09-24 PROCEDURE — 73221 MRI JOINT UPR EXTREM W/O DYE: CPT | Mod: TC,LT

## 2020-09-24 PROCEDURE — 99214 PR OFFICE/OUTPT VISIT, EST, LEVL IV, 30-39 MIN: ICD-10-PCS | Mod: 25,,, | Performed by: INTERNAL MEDICINE

## 2020-09-24 PROCEDURE — 73221 MRI JOINT UPR EXTREM W/O DYE: CPT | Mod: 26,LT,, | Performed by: RADIOLOGY

## 2020-09-24 RX ORDER — HYDROCODONE BITARTRATE AND ACETAMINOPHEN 10; 325 MG/1; MG/1
1 TABLET ORAL EVERY 6 HOURS PRN
Qty: 28 TABLET | Refills: 0 | Status: SHIPPED | OUTPATIENT
Start: 2020-09-24 | End: 2020-11-18 | Stop reason: ALTCHOICE

## 2020-09-24 RX ORDER — SILVER SULFADIAZINE 10 G/1000G
CREAM TOPICAL 2 TIMES DAILY
Qty: 85 G | Refills: 1 | Status: SHIPPED | OUTPATIENT
Start: 2020-09-24 | End: 2021-07-27

## 2020-09-24 NOTE — PROCEDURES
"Wound Debridement    Date/Time: 9/24/2020 4:35 PM  Performed by: Stiven Sawyer MD  Authorized by: Stiven Sawyer MD     Time out: Immediately prior to procedure a "time out" was called to verify the correct patient, procedure, equipment, support staff and site/side marked as required.    Consent Done?:  Yes (Written)  Local anesthetic:  Topical anesthetic    Wound Details:    Location:  Left elbow    Type of Debridement:  Excisional       Length (cm):  3       Area (sq cm):  10.5       Width (cm):  3.5       Percent Debrided (%):  75       Depth (cm):  0.2       Total Area Debrided (sq cm):  7.88    Depth of debridement:  Muscle/fascia/tendon    Tissue debrided:  Subcutaneous    Devitalized tissue debrided:  Biofilm, Necrotic/Eschar and Slough    Instruments:  Curette    Additional wounds:  1    2nd Wound Details:     Location:  Right elbow       Length (cm):  8       Area (sq cm):  16       Width (cm):  2       Percent Debrided (%):  25       Depth (cm):  0.1       Total Area Debrided (sq cm):  4    Depth of debridement:  Subcutaneous tissue    Tissue debrided:  Subcutaneous    Devitalized tissue debrided:  Biofilm and Slough    Instruments:  Curette    Bleeding:  Minimal  Hemostasis Achieved: Yes    Method Used:  Pressure  Patient tolerance:  Patient tolerated the procedure well with no immediate complications      "
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
## 2020-09-25 ENCOUNTER — OFFICE VISIT (OUTPATIENT)
Dept: ORTHOPEDICS | Facility: CLINIC | Age: 55
End: 2020-09-25
Payer: OTHER GOVERNMENT

## 2020-09-25 ENCOUNTER — TELEPHONE (OUTPATIENT)
Dept: ORTHOPEDICS | Facility: CLINIC | Age: 55
End: 2020-09-25

## 2020-09-25 ENCOUNTER — TELEPHONE (OUTPATIENT)
Dept: FAMILY MEDICINE | Facility: CLINIC | Age: 55
End: 2020-09-25

## 2020-09-25 VITALS
WEIGHT: 215 LBS | HEIGHT: 70 IN | SYSTOLIC BLOOD PRESSURE: 140 MMHG | RESPIRATION RATE: 18 BRPM | OXYGEN SATURATION: 97 % | HEART RATE: 60 BPM | BODY MASS INDEX: 30.78 KG/M2 | DIASTOLIC BLOOD PRESSURE: 93 MMHG

## 2020-09-25 DIAGNOSIS — S46.312A TRICEPS TENDON RUPTURE, LEFT, INITIAL ENCOUNTER: Primary | ICD-10-CM

## 2020-09-25 PROCEDURE — 99999 PR PBB SHADOW E&M-EST. PATIENT-LVL V: CPT | Mod: PBBFAC,,, | Performed by: ORTHOPAEDIC SURGERY

## 2020-09-25 PROCEDURE — 99215 OFFICE O/P EST HI 40 MIN: CPT | Mod: PBBFAC,PN | Performed by: ORTHOPAEDIC SURGERY

## 2020-09-25 PROCEDURE — 99213 PR OFFICE/OUTPT VISIT, EST, LEVL III, 20-29 MIN: ICD-10-PCS | Mod: S$PBB,,, | Performed by: ORTHOPAEDIC SURGERY

## 2020-09-25 PROCEDURE — 99213 OFFICE O/P EST LOW 20 MIN: CPT | Mod: S$PBB,,, | Performed by: ORTHOPAEDIC SURGERY

## 2020-09-25 PROCEDURE — 99999 PR PBB SHADOW E&M-EST. PATIENT-LVL V: ICD-10-PCS | Mod: PBBFAC,,, | Performed by: ORTHOPAEDIC SURGERY

## 2020-09-25 RX ORDER — SODIUM CHLORIDE 9 MG/ML
INJECTION, SOLUTION INTRAVENOUS CONTINUOUS
Status: CANCELLED | OUTPATIENT
Start: 2020-09-25

## 2020-09-25 NOTE — TELEPHONE ENCOUNTER
----- Message from Taryn Gregory MA sent at 9/25/2020 12:39 PM CDT -----  Dr. Galvez is asking to please clear this patient for surgery on 09/30/20 left triceps tendon repair  with deep sedation. The patient was seen by Dr. Rene 09/23/20. If there are any question please call the clinic. Thanks Taryn [phone]
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
## 2020-09-25 NOTE — TELEPHONE ENCOUNTER
Voicemail message left for patient to call this office to schedule an appointment for surgery clearance.

## 2020-09-25 NOTE — TELEPHONE ENCOUNTER
Patient returned call per message below but pre-op appointment for 9/29 has already been scheduled.

## 2020-09-25 NOTE — LETTER
September 25, 2020      Ary Shook PA-C  [address]  [facility] [address]           [facility]  [address]  [facility] [place] [address]  Phone: [phone]          Patient: Enoch Barr   MR Number: [id number]   YOB: 1965   Date of Visit: 9/25/2020       Dear Ary Shook:    Thank you for referring Enoch Barr to me for evaluation. Attached you will find relevant portions of my assessment and plan of care.    If you have questions, please do not hesitate to call me. I look forward to following Enoch Barr along with you.    Sincerely,    Keysha Galvez MD    Enclosure  CC:  No Recipients    If you would like to receive this communication electronically, please contact [email or web address] or [phone] to request more information on [employer] access.    For providers and/or their staff who would like to refer a patient to [facility], please contact us through our one-stop-shop provider referral line, [facility], at [phone].    If you feel you have received this communication in error or would no longer like to receive these types of communications, please e-mail [email or web address]
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
## 2020-09-28 ENCOUNTER — TELEPHONE (OUTPATIENT)
Dept: FAMILY MEDICINE | Facility: CLINIC | Age: 55
End: 2020-09-28

## 2020-09-28 NOTE — TELEPHONE ENCOUNTER
----- Message from Brett Summers MD sent at 9/25/2020  4:36 PM CDT -----  Needs pre op evaluation.  ----- Message -----  From: Romina Guaman MA  Sent: 9/25/2020   1:14 PM CDT  To: John Rene, HORACIOP-C, Dipesh Clements MD      ----- Message -----  From: Taryn Gregory MA  Sent: 9/25/2020  12:39 PM CDT  To: Tyree Landon Staff, Anju Lopez Staff    Dr. Galvez is asking to please clear this patient for surgery on 09/30/20 left triceps tendon repair  with deep sedation. The patient was seen by Dr. Rene 09/23/20. If there are any question please call the clinic. Thanks Taryn [phone]

## 2020-09-29 ENCOUNTER — HOSPITAL ENCOUNTER (OUTPATIENT)
Dept: PREADMISSION TESTING | Facility: HOSPITAL | Age: 55
Discharge: HOME | End: 2020-09-29
Attending: ORTHOPAEDIC SURGERY
Payer: OTHER GOVERNMENT

## 2020-09-29 ENCOUNTER — OFFICE VISIT (OUTPATIENT)
Dept: CARDIOLOGY | Facility: CLINIC | Age: 55
End: 2020-09-29
Payer: OTHER GOVERNMENT

## 2020-09-29 VITALS
RESPIRATION RATE: 16 BRPM | HEART RATE: 55 BPM | WEIGHT: 216.06 LBS | DIASTOLIC BLOOD PRESSURE: 81 MMHG | SYSTOLIC BLOOD PRESSURE: 128 MMHG | TEMPERATURE: 98 F | BODY MASS INDEX: 29.26 KG/M2 | HEIGHT: 72 IN | OXYGEN SATURATION: 97 %

## 2020-09-29 VITALS
BODY MASS INDEX: 29.26 KG/M2 | OXYGEN SATURATION: 97 % | SYSTOLIC BLOOD PRESSURE: 126 MMHG | DIASTOLIC BLOOD PRESSURE: 90 MMHG | HEIGHT: 72 IN | WEIGHT: 216.06 LBS | HEART RATE: 60 BPM

## 2020-09-29 DIAGNOSIS — I10 ESSENTIAL HYPERTENSION: Primary | ICD-10-CM

## 2020-09-29 DIAGNOSIS — Z01.810 PREOP CARDIOVASCULAR EXAM: ICD-10-CM

## 2020-09-29 DIAGNOSIS — Z01.818 PREOP TESTING: ICD-10-CM

## 2020-09-29 DIAGNOSIS — S46.312A TRICEPS TENDON RUPTURE, LEFT, INITIAL ENCOUNTER: ICD-10-CM

## 2020-09-29 DIAGNOSIS — M51.36 DDD (DEGENERATIVE DISC DISEASE), LUMBAR: ICD-10-CM

## 2020-09-29 DIAGNOSIS — R94.39 ABNORMAL STRESS TEST: ICD-10-CM

## 2020-09-29 LAB — SARS-COV-2 RDRP RESP QL NAA+PROBE: NEGATIVE

## 2020-09-29 PROCEDURE — 99214 PR OFFICE/OUTPT VISIT, EST, LEVL IV, 30-39 MIN: ICD-10-PCS | Mod: S$PBB,,, | Performed by: INTERNAL MEDICINE

## 2020-09-29 PROCEDURE — 99999 PR PBB SHADOW E&M-EST. PATIENT-LVL IV: ICD-10-PCS | Mod: PBBFAC,,, | Performed by: INTERNAL MEDICINE

## 2020-09-29 PROCEDURE — 99999 PR PBB SHADOW E&M-EST. PATIENT-LVL IV: CPT | Mod: PBBFAC,,, | Performed by: INTERNAL MEDICINE

## 2020-09-29 PROCEDURE — 99214 OFFICE O/P EST MOD 30 MIN: CPT | Mod: PBBFAC,PO | Performed by: INTERNAL MEDICINE

## 2020-09-29 PROCEDURE — U0002 COVID-19 LAB TEST NON-CDC: HCPCS

## 2020-09-29 PROCEDURE — 99214 OFFICE O/P EST MOD 30 MIN: CPT | Mod: S$PBB,,, | Performed by: INTERNAL MEDICINE

## 2020-09-29 NOTE — DISCHARGE INSTRUCTIONS
Your procedure  is scheduled for ___Wednesday, 9/29/2020_______.    Call [phone] between 2pm and 5pm on __TODAY__________to find out your arrival time for the day of surgery. Time:__________.    Report to the Emergency Department on the day of your surgery.  You will be escorted to the admitting unit.      Important instructions:   Do not eat or drink after 12 midnight, including water.  It is okay to brush your teeth.  Do not have gum, candy or mints.     Take only these medications with a small sip of water on the morning of your surgery: _see med sheet________.    STOP taking Aspirin, Ibuprofen, Motrin, Mobic, Advil, Aleve, Fish oil, and Vitamin E for at least 7 days before your surgery. You may use Tylenol unless otherwise instructed by your doctor.      Prep instructions:   SHOWER   OTHER ____________________     Please shower the night before and the morning of your surgery.      Use Hibiclens soap as instructed by your pre op nurse (do not use on face or genital area).   Please place clean linens on your bed the night before surgery and wear fresh clean clothing after each shower.     DO NOT shave procedural area at least 5 days before surgery due to increased risk of skin irritation and/or possible infection.     You may wear deodorant only. Do not wear make- up, mascara, powder, lotion, perfume, or cologne.     Do not wear any jewelry or have anything metal on your body (hairpins, clips, etc.).     Please bring any documents given to you by your doctor.     If you are going home on the same day of surgery, you must arrange for a family member or a friend to drive you home.  Public transportation is prohibited.  You will not be able to drive home if you were given anesthesia or sedation.     Wear loose fitting clothes allowing for bandages.     You may leave money and valuables home.  You may bring a cell phone.     Important: Call your surgeon/doctor if fever, chills, or illness should occur  before your surgery.    Call us at the Pre-op Center at [phone]  if needed.
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
## 2020-09-30 DIAGNOSIS — S46.312A TRICEPS TENDON RUPTURE, LEFT, INITIAL ENCOUNTER: Primary | ICD-10-CM

## 2020-09-30 RX ORDER — MUPIROCIN 20 MG/G
OINTMENT TOPICAL
Status: CANCELLED | OUTPATIENT
Start: 2020-09-30

## 2020-09-30 RX ORDER — SODIUM CHLORIDE 9 MG/ML
INJECTION, SOLUTION INTRAVENOUS CONTINUOUS
Status: CANCELLED | OUTPATIENT
Start: 2020-09-30

## 2020-10-01 ENCOUNTER — HOSPITAL ENCOUNTER (OUTPATIENT)
Dept: WOUND CARE | Facility: HOSPITAL | Age: 55
Discharge: HOME | End: 2020-10-01
Attending: INTERNAL MEDICINE
Payer: OTHER GOVERNMENT

## 2020-10-01 ENCOUNTER — PATIENT MESSAGE (OUTPATIENT)
Dept: SURGERY | Facility: HOSPITAL | Age: 55
End: 2020-10-01

## 2020-10-01 ENCOUNTER — ANESTHESIA EVENT (OUTPATIENT)
Dept: SURGERY | Facility: HOSPITAL | Age: 55
End: 2020-10-01
Payer: OTHER GOVERNMENT

## 2020-10-01 VITALS
RESPIRATION RATE: 20 BRPM | SYSTOLIC BLOOD PRESSURE: 117 MMHG | DIASTOLIC BLOOD PRESSURE: 85 MMHG | HEART RATE: 70 BPM | TEMPERATURE: 98 F

## 2020-10-01 DIAGNOSIS — S30.811D ABRASION OF ABDOMINAL WALL, SUBSEQUENT ENCOUNTER: ICD-10-CM

## 2020-10-01 DIAGNOSIS — S46.312A TRICEPS TENDON RUPTURE, LEFT, INITIAL ENCOUNTER: Primary | ICD-10-CM

## 2020-10-01 DIAGNOSIS — S40.811A ABRASION OF RIGHT ARM: ICD-10-CM

## 2020-10-01 PROCEDURE — 11043 DBRDMT MUSC&/FSCA 1ST 20/<: CPT | Mod: ,,, | Performed by: INTERNAL MEDICINE

## 2020-10-01 PROCEDURE — 11043 DEBRIDEMENT: ICD-10-PCS | Mod: ,,, | Performed by: INTERNAL MEDICINE

## 2020-10-01 PROCEDURE — 11045 DBRDMT SUBQ TISS EACH ADDL: CPT | Performed by: INTERNAL MEDICINE

## 2020-10-01 PROCEDURE — 27201912 HC WOUND CARE DEBRIDEMENT SUPPLIES: Performed by: INTERNAL MEDICINE

## 2020-10-01 PROCEDURE — 99214 PR OFFICE/OUTPT VISIT, EST, LEVL IV, 30-39 MIN: ICD-10-PCS | Mod: 25,,, | Performed by: INTERNAL MEDICINE

## 2020-10-01 PROCEDURE — 11042 DBRDMT SUBQ TIS 1ST 20SQCM/<: CPT | Performed by: INTERNAL MEDICINE

## 2020-10-01 PROCEDURE — 11042 DBRDMT SUBQ TIS 1ST 20SQCM/<: CPT | Mod: 59,,, | Performed by: INTERNAL MEDICINE

## 2020-10-01 PROCEDURE — 11043 DBRDMT MUSC&/FSCA 1ST 20/<: CPT | Performed by: INTERNAL MEDICINE

## 2020-10-01 PROCEDURE — 11042 DEBRIDEMENT: ICD-10-PCS | Mod: 59,,, | Performed by: INTERNAL MEDICINE

## 2020-10-01 PROCEDURE — 99214 OFFICE O/P EST MOD 30 MIN: CPT | Mod: 25,,, | Performed by: INTERNAL MEDICINE

## 2020-10-01 NOTE — PROCEDURES
"Debridement    Date/Time: 10/1/2020 11:04 AM  Performed by: Stiven Sawyer MD  Authorized by: Stiven Sawyer MD     Time out: Immediately prior to procedure a "time out" was called to verify the correct patient, procedure, equipment, support staff and site/side marked as required.    Consent Done?:  Yes (Verbal)  Local anesthetic:  Topical anesthetic    Wound Details:    Location:  Left elbow    Type of Debridement:  Excisional       Length (cm):  2       Area (sq cm):  5.6       Width (cm):  2.8       Percent Debrided (%):  100       Depth (cm):  0.2       Total Area Debrided (sq cm):  5.6    Depth of debridement:  Muscle/fascia/tendon    Devitalized tissue debrided:  Biofilm and Slough    Instruments:  Curette    2nd Wound Details:     Location:  Right elbow    Type of Debridement:  Excisional       Length (cm):  3.8       Area (sq cm):  3.8       Width (cm):  1       Percent Debrided (%):  50       Depth (cm):  0.1       Total Area Debrided (sq cm):  1.9    Depth of debridement:  Subcutaneous tissue    Tissue debrided:  Subcutaneous    Devitalized tissue debrided:  Biofilm and Slough    Instruments:  Curette    Bleeding:  Minimal  Hemostasis Achieved: Yes    Method Used:  Pressure  Patient tolerance:  Patient tolerated the procedure well with no immediate complications      "

## 2020-10-01 NOTE — PROGRESS NOTES
[facility]                Progress Note    Subjective:       Patient ID: Enoch Barr is a 55 y.o. male.    Chief Complaint: Wound Check    F/u wound care visit. Patient ambulatory with no c/o pain at present, states he took pain meds this a.m. Wound dressings to bilateral elbows intact with scant/small amount of yellowish drainage. 5% lidocaine applied to wounds. Wound debridement done, tolerated well. Patient to RTC in one week.    Wife apply silver sulfadene daily no drainage the abdominal abrasions have closed no redness. No fevers/chills plan for left tricep tendon repair with ortho next week.       Review of Systems   Constitutional: Negative for activity change, fever and unexpected weight change.   HENT: Negative for congestion, rhinorrhea, sore throat and trouble swallowing.    Eyes: Negative for photophobia and redness.   Respiratory: Negative for cough, chest tightness, shortness of breath and wheezing.    Cardiovascular: Negative for chest pain, palpitations and leg swelling.   Gastrointestinal: Negative for abdominal pain, blood in stool, constipation, diarrhea, nausea and vomiting.   Endocrine: Negative for cold intolerance, heat intolerance and polyuria.   Genitourinary: Negative for decreased urine volume, difficulty urinating, dysuria and urgency.   Musculoskeletal: Negative for arthralgias and back pain.   Skin: Negative for rash.   Neurological: Negative for dizziness, syncope, weakness and headaches.   Psychiatric/Behavioral: Negative for dysphoric mood, sleep disturbance and suicidal ideas.         Objective:        Physical Exam  Constitutional:       General: He is not in acute distress.     Appearance: Normal appearance. He is not toxic-appearing.   HENT:      Head: Normocephalic and atraumatic.   Eyes:      General: No scleral icterus.  Cardiovascular:      Pulses: Normal pulses.   Pulmonary:      Effort: Pulmonary effort is normal. No  respiratory distress.   Abdominal:      General: There is no distension.      Palpations: Abdomen is soft.   Skin:     Comments: Right elbow woudn measures smaller proximal one half with central pale slough the distal half pink granulation tissue    Left elbow posterior with pale white slough most proximal margin has exposed tendon     Anterior left forearm pink granulation    Neurological:      General: No focal deficit present.      Mental Status: He is alert and oriented to person, place, and time.         Vitals:    10/01/20 1103   BP: 117/85   Pulse: 70   Resp: 20   Temp: 97.9 °F (36.6 °C)       Assessment:           ICD-10-CM ICD-9-CM   1. Triceps tendon rupture, left, initial encounter  S46.312A 840.8   2. Abrasion of right arm  S40.811A 912.0            Wound 09/24/20 1546 Traumatic Left medial Elbow (Active)   09/24/20 1546    Pre-existing: Yes   Primary Wound Type: Traumatic   Side: Left   Orientation: medial   Location: Elbow   Wound Number (optional):    Ankle-Brachial Index:    Pulses:    Removal Indication and Assessment:    Wound Outcome:    (Retired) Wound Type:    (Retired) Wound Length (cm):    (Retired) Wound Width (cm):    (Retired) Depth (cm):    Wound Description (Comments):    Removal Indications:    Wound Image   10/01/20 1100   Wound WDL ex 10/01/20 1100   Dressing Appearance Intact;Moist drainage 10/01/20 1100   Drainage Amount Small 10/01/20 1100   Drainage Characteristics/Odor Yellow 10/01/20 1100   Appearance Pink;Red;Yellow 10/01/20 1100   Tissue loss description Full thickness 10/01/20 1100   Black (%), Wound Tissue Color 0 % 10/01/20 1100   Red (%), Wound Tissue Color 10 % 10/01/20 1100   Yellow (%), Wound Tissue Color 90 % 10/01/20 1100   Periwound Area Redness;Scar tissue 10/01/20 1100   Wound Edges Open 10/01/20 1100   Wound Length (cm) 2 cm 10/01/20 1100   Wound Width (cm) 2.8 cm 10/01/20 1100   Wound Depth (cm) 0.2 cm 10/01/20 1100   Wound Volume (cm^3) 1.12 cm^3 10/01/20 1100    Wound Surface Area (cm^2) 5.6 cm^2 10/01/20 1100   Care Cleansed with:;Sterile normal saline 10/01/20 1100   Dressing Applied;Silver;Abd pad;Cast padding;Elastic bandage 10/01/20 1100   Periwound Care Skin barrier film applied 10/01/20 1100   Dressing Change Due 10/02/20 10/01/20 1100            Wound 09/24/20 1546 Traumatic Left lateral Arm (Active)   09/24/20 1546    Pre-existing: Yes   Primary Wound Type: Traumatic   Side: Left   Orientation: lateral   Location: Arm   Wound Number (optional):    Ankle-Brachial Index:    Pulses:    Removal Indication and Assessment:    Wound Outcome:    (Retired) Wound Type:    (Retired) Wound Length (cm):    (Retired) Wound Width (cm):    (Retired) Depth (cm):    Wound Description (Comments):    Removal Indications:    Wound Image   10/01/20 1100   Wound WDL ex 10/01/20 1100   Dressing Appearance Intact;Moist drainage 10/01/20 1100   Drainage Amount Small 10/01/20 1100   Drainage Characteristics/Odor Serosanguineous 10/01/20 1100   Appearance Red;Yellow;Hypergranulation 10/01/20 1100   Tissue loss description Partial thickness 10/01/20 1100   Black (%), Wound Tissue Color 0 % 10/01/20 1100   Red (%), Wound Tissue Color 90 % 10/01/20 1100   Yellow (%), Wound Tissue Color 10 % 10/01/20 1100   Periwound Area Redness;Scar tissue 10/01/20 1100   Wound Edges Open 10/01/20 1100   Wound Length (cm) 2 cm 10/01/20 1100   Wound Width (cm) 2.5 cm 10/01/20 1100   Wound Depth (cm) 0.1 cm 10/01/20 1100   Wound Volume (cm^3) 0.5 cm^3 10/01/20 1100   Wound Surface Area (cm^2) 5 cm^2 10/01/20 1100   Care Cleansed with:;Soap and water;Sterile normal saline 10/01/20 1100   Dressing Applied;Silver;Abd pad;Cast padding;Elastic bandage 10/01/20 1100   Periwound Care Skin barrier film applied 10/01/20 1100   Dressing Change Due 10/02/20 10/01/20 1100            Wound 09/24/20 1602 Traumatic Right medial Elbow (Active)   09/24/20 1602    Pre-existing: Yes   Primary Wound Type: Traumatic   Side: Right    Orientation: medial   Location: Elbow   Wound Number (optional):    Ankle-Brachial Index:    Pulses:    Removal Indication and Assessment:    Wound Outcome:    (Retired) Wound Type:    (Retired) Wound Length (cm):    (Retired) Wound Width (cm):    (Retired) Depth (cm):    Wound Description (Comments):    Removal Indications:    Wound Image   10/01/20 1100   Wound WDL ex 10/01/20 1100   Dressing Appearance Intact;Moist drainage 10/01/20 1100   Drainage Amount Scant 10/01/20 1100   Drainage Characteristics/Odor Yellow 10/01/20 1100   Appearance Red;Yellow 10/01/20 1100   Tissue loss description Partial thickness 10/01/20 1100   Black (%), Wound Tissue Color 0 % 10/01/20 1100   Red (%), Wound Tissue Color 5 % 10/01/20 1100   Yellow (%), Wound Tissue Color 95 % 10/01/20 1100   Periwound Area Redness 10/01/20 1100   Wound Edges Open 10/01/20 1100   Wound Length (cm) 3.8 cm 10/01/20 1100   Wound Width (cm) 1 cm 10/01/20 1100   Wound Depth (cm) 0.1 cm 10/01/20 1100   Wound Volume (cm^3) 0.38 cm^3 10/01/20 1100   Wound Surface Area (cm^2) 3.8 cm^2 10/01/20 1100   Care Cleansed with:;Sterile normal saline 10/01/20 1100   Dressing Applied;Silver;Abd pad;Cast padding;Elastic bandage 10/01/20 1100   Periwound Care Skin barrier film applied 10/01/20 1100   Dressing Change Due 10/02/20 10/01/20 1100       [REMOVED]      Wound 09/24/20 1547 Abrasion(s) Left anterior Arm (Removed)   09/24/20 1547    Pre-existing: Yes   Primary Wound Type: Abrasion(s)   Side: Left   Orientation: anterior   Location: Arm   Wound Number (optional):    Ankle-Brachial Index:    Pulses:    Removal Indication and Assessment:    Wound Outcome: Healed   (Retired) Wound Type:    (Retired) Wound Length (cm):    (Retired) Wound Width (cm):    (Retired) Depth (cm):    Wound Description (Comments):    Removal Indications:    Removed 10/01/20 1130   Wound Image   10/01/20 1100   Wound WDL WDL 10/01/20 1100   Dressing Appearance Open to air 10/01/20 1100    Drainage Amount None 10/01/20 1100   Appearance Red 10/01/20 1100   Tissue loss description Not applicable 10/01/20 1100   Black (%), Wound Tissue Color 0 % 10/01/20 1100   Red (%), Wound Tissue Color 100 % 10/01/20 1100   Yellow (%), Wound Tissue Color 0 % 10/01/20 1100   Periwound Area Dry 10/01/20 1100   Wound Length (cm) 0 cm 10/01/20 1100   Wound Width (cm) 0 cm 10/01/20 1100   Wound Depth (cm) 0 cm 10/01/20 1100   Wound Volume (cm^3) 0 cm^3 10/01/20 1100   Wound Surface Area (cm^2) 0 cm^2 10/01/20 1100   Care Cleansed with:;Sterile normal saline 10/01/20 1100       [REMOVED]      Wound 09/24/20 1603 Abrasion(s) medial Abdomen (Removed)   09/24/20 1603    Pre-existing: Yes   Primary Wound Type: Abrasion(s)   Side:    Orientation: medial   Location: Abdomen   Wound Number (optional):    Ankle-Brachial Index:    Pulses:    Removal Indication and Assessment:    Wound Outcome: Healed   (Retired) Wound Type:    (Retired) Wound Length (cm):    (Retired) Wound Width (cm):    (Retired) Depth (cm):    Wound Description (Comments):    Removal Indications:    Removed 10/01/20 1131   Wound Image   10/01/20 1100   Wound WDL ex 10/01/20 1100   Dressing Appearance Open to air 10/01/20 1100   Drainage Amount None 10/01/20 1100   Appearance Pink 10/01/20 1100   Tissue loss description Not applicable 10/01/20 1100   Black (%), Wound Tissue Color 0 % 10/01/20 1100   Red (%), Wound Tissue Color 100 % 10/01/20 1100   Yellow (%), Wound Tissue Color 0 % 10/01/20 1100   Periwound Area Dry 10/01/20 1100   Wound Length (cm) 0 cm 10/01/20 1100   Wound Width (cm) 0 cm 10/01/20 1100   Wound Depth (cm) 0 cm 10/01/20 1100   Wound Volume (cm^3) 0 cm^3 10/01/20 1100   Wound Surface Area (cm^2) 0 cm^2 10/01/20 1100   Care Cleansed with:;Sterile normal saline 10/01/20 1100           Plan:            Wounds all improving continue silver sulfadene to perimeter can stop using on abdomen as this area is closed return one week     Orders Placed  This Encounter   Procedures    Change dressing     Clean with saline. Apply cavilon periwound. Silvadene cream, ABD, cast padding, ace to arm wounds. Change BID per wife.        Follow up in about 1 week (around 10/8/2020) for wound care.

## 2020-10-02 ENCOUNTER — HOSPITAL ENCOUNTER (OUTPATIENT)
Facility: HOSPITAL | Age: 55
Discharge: HOME | End: 2020-10-02
Attending: ORTHOPAEDIC SURGERY | Admitting: ORTHOPAEDIC SURGERY
Payer: OTHER GOVERNMENT

## 2020-10-02 ENCOUNTER — ANESTHESIA (OUTPATIENT)
Dept: SURGERY | Facility: HOSPITAL | Age: 55
End: 2020-10-02
Payer: OTHER GOVERNMENT

## 2020-10-02 VITALS
TEMPERATURE: 98 F | BODY MASS INDEX: 29.3 KG/M2 | WEIGHT: 216.06 LBS | OXYGEN SATURATION: 93 % | RESPIRATION RATE: 17 BRPM | SYSTOLIC BLOOD PRESSURE: 133 MMHG | HEART RATE: 77 BPM | DIASTOLIC BLOOD PRESSURE: 82 MMHG

## 2020-10-02 DIAGNOSIS — S46.312A TRICEPS TENDON RUPTURE, LEFT, INITIAL ENCOUNTER: Primary | ICD-10-CM

## 2020-10-02 PROCEDURE — 01710 ANES PX NRV MUSC UPR A&E NOS: CPT | Performed by: ORTHOPAEDIC SURGERY

## 2020-10-02 PROCEDURE — 71000015 HC POSTOP RECOV 1ST HR: Performed by: ORTHOPAEDIC SURGERY

## 2020-10-02 PROCEDURE — D9220A PRA ANESTHESIA: Mod: CRNA,,, | Performed by: NURSE ANESTHETIST, CERTIFIED REGISTERED

## 2020-10-02 PROCEDURE — D9220A PRA ANESTHESIA: ICD-10-PCS | Mod: CRNA,,, | Performed by: NURSE ANESTHETIST, CERTIFIED REGISTERED

## 2020-10-02 PROCEDURE — 37000008 HC ANESTHESIA 1ST 15 MINUTES: Performed by: ORTHOPAEDIC SURGERY

## 2020-10-02 PROCEDURE — 37000009 HC ANESTHESIA EA ADD 15 MINS: Performed by: ORTHOPAEDIC SURGERY

## 2020-10-02 PROCEDURE — 25000003 PHARM REV CODE 250: Performed by: ORTHOPAEDIC SURGERY

## 2020-10-02 PROCEDURE — 63600175 PHARM REV CODE 636 W HCPCS: Performed by: ANESTHESIOLOGY

## 2020-10-02 PROCEDURE — 27201423 OPTIME MED/SURG SUP & DEVICES STERILE SUPPLY: Performed by: ORTHOPAEDIC SURGERY

## 2020-10-02 PROCEDURE — D9220A PRA ANESTHESIA: ICD-10-PCS | Mod: ANES,,, | Performed by: ANESTHESIOLOGY

## 2020-10-02 PROCEDURE — 71000033 HC RECOVERY, INTIAL HOUR: Performed by: ORTHOPAEDIC SURGERY

## 2020-10-02 PROCEDURE — 36000709 HC OR TIME LEV III EA ADD 15 MIN: Performed by: ORTHOPAEDIC SURGERY

## 2020-10-02 PROCEDURE — C1713 ANCHOR/SCREW BN/BN,TIS/BN: HCPCS | Performed by: ORTHOPAEDIC SURGERY

## 2020-10-02 PROCEDURE — 63600175 PHARM REV CODE 636 W HCPCS: Performed by: NURSE ANESTHETIST, CERTIFIED REGISTERED

## 2020-10-02 PROCEDURE — 24342 REPAIR OF RUPTURED TENDON: CPT | Mod: LT,,, | Performed by: ORTHOPAEDIC SURGERY

## 2020-10-02 PROCEDURE — 24342 PR REINSERT BI/TRICEPS TENDON,DISTAL: ICD-10-PCS | Mod: LT,,, | Performed by: ORTHOPAEDIC SURGERY

## 2020-10-02 PROCEDURE — 63600175 PHARM REV CODE 636 W HCPCS: Performed by: ORTHOPAEDIC SURGERY

## 2020-10-02 PROCEDURE — 25000003 PHARM REV CODE 250: Performed by: NURSE ANESTHETIST, CERTIFIED REGISTERED

## 2020-10-02 PROCEDURE — 25000003 PHARM REV CODE 250: Performed by: ANESTHESIOLOGY

## 2020-10-02 PROCEDURE — 36000708 HC OR TIME LEV III 1ST 15 MIN: Performed by: ORTHOPAEDIC SURGERY

## 2020-10-02 PROCEDURE — D9220A PRA ANESTHESIA: Mod: ANES,,, | Performed by: ANESTHESIOLOGY

## 2020-10-02 PROCEDURE — 71000016 HC POSTOP RECOV ADDL HR: Performed by: ORTHOPAEDIC SURGERY

## 2020-10-02 RX ORDER — HYDROCODONE BITARTRATE AND ACETAMINOPHEN 10; 325 MG/1; MG/1
1 TABLET ORAL ONCE
Status: COMPLETED | OUTPATIENT
Start: 2020-10-02 | End: 2020-10-02

## 2020-10-02 RX ORDER — SODIUM CHLORIDE 9 MG/ML
INJECTION, SOLUTION INTRAVENOUS CONTINUOUS
Status: DISCONTINUED | OUTPATIENT
Start: 2020-10-02 | End: 2020-10-02 | Stop reason: HOSPADM

## 2020-10-02 RX ORDER — LIDOCAINE HYDROCHLORIDE 10 MG/ML
1 INJECTION, SOLUTION EPIDURAL; INFILTRATION; INTRACAUDAL; PERINEURAL ONCE
Status: DISCONTINUED | OUTPATIENT
Start: 2020-10-02 | End: 2020-10-02 | Stop reason: HOSPADM

## 2020-10-02 RX ORDER — CEFAZOLIN SODIUM 2 G/50ML
2 SOLUTION INTRAVENOUS
Status: COMPLETED | OUTPATIENT
Start: 2020-10-02 | End: 2020-10-02

## 2020-10-02 RX ORDER — PROPOFOL 10 MG/ML
VIAL (ML) INTRAVENOUS
Status: DISCONTINUED | OUTPATIENT
Start: 2020-10-02 | End: 2020-10-02

## 2020-10-02 RX ORDER — ROCURONIUM BROMIDE 10 MG/ML
INJECTION, SOLUTION INTRAVENOUS
Status: DISCONTINUED | OUTPATIENT
Start: 2020-10-02 | End: 2020-10-02

## 2020-10-02 RX ORDER — MIDAZOLAM HYDROCHLORIDE 1 MG/ML
INJECTION, SOLUTION INTRAMUSCULAR; INTRAVENOUS
Status: DISCONTINUED | OUTPATIENT
Start: 2020-10-02 | End: 2020-10-02

## 2020-10-02 RX ORDER — ACETAMINOPHEN 10 MG/ML
1000 INJECTION, SOLUTION INTRAVENOUS ONCE
Status: COMPLETED | OUTPATIENT
Start: 2020-10-02 | End: 2020-10-02

## 2020-10-02 RX ORDER — FENTANYL CITRATE 50 UG/ML
INJECTION, SOLUTION INTRAMUSCULAR; INTRAVENOUS
Status: DISCONTINUED | OUTPATIENT
Start: 2020-10-02 | End: 2020-10-02

## 2020-10-02 RX ORDER — MUPIROCIN 20 MG/G
OINTMENT TOPICAL
Status: DISCONTINUED | OUTPATIENT
Start: 2020-10-02 | End: 2020-10-02 | Stop reason: HOSPADM

## 2020-10-02 RX ORDER — SODIUM CHLORIDE, SODIUM LACTATE, POTASSIUM CHLORIDE, CALCIUM CHLORIDE 600; 310; 30; 20 MG/100ML; MG/100ML; MG/100ML; MG/100ML
INJECTION, SOLUTION INTRAVENOUS CONTINUOUS PRN
Status: DISCONTINUED | OUTPATIENT
Start: 2020-10-02 | End: 2020-10-02

## 2020-10-02 RX ORDER — HYDROMORPHONE HYDROCHLORIDE 2 MG/ML
0.2 INJECTION, SOLUTION INTRAMUSCULAR; INTRAVENOUS; SUBCUTANEOUS EVERY 5 MIN PRN
Status: DISCONTINUED | OUTPATIENT
Start: 2020-10-02 | End: 2020-10-02 | Stop reason: HOSPADM

## 2020-10-02 RX ORDER — ONDANSETRON 2 MG/ML
4 INJECTION INTRAMUSCULAR; INTRAVENOUS DAILY PRN
Status: DISCONTINUED | OUTPATIENT
Start: 2020-10-02 | End: 2020-10-02 | Stop reason: HOSPADM

## 2020-10-02 RX ORDER — PHENYLEPHRINE HYDROCHLORIDE 10 MG/ML
INJECTION INTRAVENOUS
Status: DISCONTINUED | OUTPATIENT
Start: 2020-10-02 | End: 2020-10-02

## 2020-10-02 RX ORDER — SODIUM CHLORIDE 0.9 % (FLUSH) 0.9 %
10 SYRINGE (ML) INJECTION
Status: DISCONTINUED | OUTPATIENT
Start: 2020-10-02 | End: 2020-10-02 | Stop reason: HOSPADM

## 2020-10-02 RX ORDER — HYDROCODONE BITARTRATE AND ACETAMINOPHEN 10; 325 MG/1; MG/1
1 TABLET ORAL EVERY 6 HOURS PRN
Qty: 30 TABLET | Refills: 0 | Status: SHIPPED | OUTPATIENT
Start: 2020-10-02 | End: 2020-10-14

## 2020-10-02 RX ORDER — LIDOCAINE HYDROCHLORIDE 20 MG/ML
INJECTION INTRAVENOUS
Status: DISCONTINUED | OUTPATIENT
Start: 2020-10-02 | End: 2020-10-02

## 2020-10-02 RX ORDER — CEPHALEXIN 500 MG/1
500 CAPSULE ORAL EVERY 6 HOURS
Qty: 20 CAPSULE | Refills: 0 | Status: SHIPPED | OUTPATIENT
Start: 2020-10-02 | End: 2020-10-07

## 2020-10-02 RX ORDER — NEOSTIGMINE METHYLSULFATE 1 MG/ML
INJECTION, SOLUTION INTRAVENOUS
Status: DISCONTINUED | OUTPATIENT
Start: 2020-10-02 | End: 2020-10-02

## 2020-10-02 RX ORDER — METHOCARBAMOL 500 MG/1
500 TABLET, FILM COATED ORAL 4 TIMES DAILY
Qty: 40 TABLET | Refills: 0 | Status: SHIPPED | OUTPATIENT
Start: 2020-10-02 | End: 2020-10-12

## 2020-10-02 RX ORDER — SUCCINYLCHOLINE CHLORIDE 20 MG/ML
INJECTION INTRAMUSCULAR; INTRAVENOUS
Status: DISCONTINUED | OUTPATIENT
Start: 2020-10-02 | End: 2020-10-02

## 2020-10-02 RX ADMIN — HYDROMORPHONE HYDROCHLORIDE 0.2 MG: 2 INJECTION INTRAMUSCULAR; INTRAVENOUS; SUBCUTANEOUS at 04:10

## 2020-10-02 RX ADMIN — GLYCOPYRROLATE 0.6 MG: 0.2 INJECTION, SOLUTION INTRAMUSCULAR; INTRAVITREAL at 02:10

## 2020-10-02 RX ADMIN — SODIUM CHLORIDE, SODIUM LACTATE, POTASSIUM CHLORIDE, AND CALCIUM CHLORIDE: .6; .31; .03; .02 INJECTION, SOLUTION INTRAVENOUS at 02:10

## 2020-10-02 RX ADMIN — MUPIROCIN: 20 OINTMENT TOPICAL at 12:10

## 2020-10-02 RX ADMIN — MIDAZOLAM HYDROCHLORIDE 2 MG: 1 INJECTION, SOLUTION INTRAMUSCULAR; INTRAVENOUS at 12:10

## 2020-10-02 RX ADMIN — SUCCINYLCHOLINE CHLORIDE 120 MG: 20 INJECTION, SOLUTION INTRAMUSCULAR; INTRAVENOUS at 12:10

## 2020-10-02 RX ADMIN — Medication 100 MG: at 12:10

## 2020-10-02 RX ADMIN — PHENYLEPHRINE HYDROCHLORIDE 100 MCG: 10 INJECTION INTRAVENOUS at 12:10

## 2020-10-02 RX ADMIN — FENTANYL CITRATE 50 MCG: 50 INJECTION INTRAMUSCULAR; INTRAVENOUS at 02:10

## 2020-10-02 RX ADMIN — FENTANYL CITRATE 50 MCG: 50 INJECTION INTRAMUSCULAR; INTRAVENOUS at 12:10

## 2020-10-02 RX ADMIN — PHENYLEPHRINE HYDROCHLORIDE 100 MCG: 10 INJECTION INTRAVENOUS at 01:10

## 2020-10-02 RX ADMIN — PROPOFOL 20 MG: 10 INJECTION, EMULSION INTRAVENOUS at 03:10

## 2020-10-02 RX ADMIN — ROCURONIUM BROMIDE 5 MG: 10 INJECTION, SOLUTION INTRAVENOUS at 12:10

## 2020-10-02 RX ADMIN — HYDROMORPHONE HYDROCHLORIDE 0.2 MG: 2 INJECTION INTRAMUSCULAR; INTRAVENOUS; SUBCUTANEOUS at 03:10

## 2020-10-02 RX ADMIN — PROPOFOL 50 MG: 10 INJECTION, EMULSION INTRAVENOUS at 01:10

## 2020-10-02 RX ADMIN — FENTANYL CITRATE 50 MCG: 50 INJECTION INTRAMUSCULAR; INTRAVENOUS at 03:10

## 2020-10-02 RX ADMIN — Medication 120 MG: at 03:10

## 2020-10-02 RX ADMIN — HYDROCODONE BITARTRATE AND ACETAMINOPHEN 1 TABLET: 10; 325 TABLET ORAL at 04:10

## 2020-10-02 RX ADMIN — ACETAMINOPHEN 1000 MG: 10 INJECTION, SOLUTION INTRAVENOUS at 03:10

## 2020-10-02 RX ADMIN — PHENYLEPHRINE HYDROCHLORIDE 200 MCG: 10 INJECTION INTRAVENOUS at 12:10

## 2020-10-02 RX ADMIN — PROPOFOL 150 MG: 10 INJECTION, EMULSION INTRAVENOUS at 12:10

## 2020-10-02 RX ADMIN — SODIUM CHLORIDE: 0.9 INJECTION, SOLUTION INTRAVENOUS at 12:10

## 2020-10-02 RX ADMIN — ROCURONIUM BROMIDE 10 MG: 10 INJECTION, SOLUTION INTRAVENOUS at 01:10

## 2020-10-02 RX ADMIN — GLYCOPYRROLATE 0.1 MG: 0.2 INJECTION, SOLUTION INTRAMUSCULAR; INTRAVITREAL at 01:10

## 2020-10-02 RX ADMIN — NEOSTIGMINE METHYLSULFATE 4 MG: 1 INJECTION INTRAVENOUS at 02:10

## 2020-10-02 RX ADMIN — CEFAZOLIN SODIUM 2 G: 2 SOLUTION INTRAVENOUS at 12:10

## 2020-10-02 RX ADMIN — ROCURONIUM BROMIDE 20 MG: 10 INJECTION, SOLUTION INTRAVENOUS at 01:10

## 2020-10-02 RX ADMIN — GLYCOPYRROLATE 0.2 MG: 0.2 INJECTION, SOLUTION INTRAMUSCULAR; INTRAVITREAL at 12:10

## 2020-10-02 NOTE — ANESTHESIA PROCEDURE NOTES
Intubation  Performed by: Rosy Centeno CRNA  Authorized by: Westley Rangel MD     Intubation:     Induction:  Intravenous    Intubated:  Postinduction    Mask Ventilation:  Easy with oral airway (moderate seal secondary to facil hair (beard))    Attempts:  1    Attempted By:  CRNA    Method of Intubation:  Direct    Blade:  Issa 2    Laryngeal View Grade: Grade I - full view of chords      Difficult Airway Encountered?: No      Complications:  None    Airway Device:  Oral endotracheal tube    Airway Device Size:  7.5    Style/Cuff Inflation:  Cuffed    Inflation Amount (mL):  5    Tube secured:  22    Secured at:  The lips    Placement Verified By:  Capnometry    Complicating Factors:  None    Findings Post-Intubation:  BS equal bilateral and atraumatic/condition of teeth unchanged

## 2020-10-02 NOTE — TRANSFER OF CARE
Anesthesia Transfer of Care Note    Patient: Enoch Barr    Procedure(s) Performed: Procedure(s) (LRB):  REPAIR, TENDON, TRICEPS (Left)    Patient location: PACU    Anesthesia Type: general    Transport from OR: Transported from OR on room air with adequate spontaneous ventilation    Post pain: adequate analgesia    Post assessment: no apparent anesthetic complications and tolerated procedure well    Post vital signs: stable    Level of consciousness: awake, alert and oriented    Nausea/Vomiting: no nausea/vomiting    Complications: none    Transfer of care protocol was followed      Last vitals:   Visit Vitals  /82 (BP Location: Right arm, Patient Position: Lying)   Pulse 76   Temp 36.7 °C (98.1 °F) (Temporal)   Resp 17   Wt 98 kg (216 lb 0.8 oz)   SpO2 100%   BMI 29.30 kg/m²

## 2020-10-02 NOTE — INTERVAL H&P NOTE
The patient has been examined and the H&P has been reviewed:    I concur with the findings and changes have been noted since the H&P was written: wound has improved, amenable to open fixation of the triceps    Surgery risks, benefits and alternative options discussed and understood by patient/family.          Active Hospital Problems    Diagnosis  POA    Triceps tendon rupture, left, initial encounter [S48.385A]  Yes      Resolved Hospital Problems   No resolved problems to display.

## 2020-10-02 NOTE — ANESTHESIA PREPROCEDURE EVALUATION
10/02/2020  Enoch Barr is a 55 y.o., male.    Anesthesia Evaluation    I have reviewed the Patient Summary Reports.    I have reviewed the Nursing Notes.       Review of Systems  Anesthesia Hx:  No problems with previous Anesthesia  Denies Family Hx of Anesthesia complications.   Denies Personal Hx of Anesthesia complications.   Social:  Non-Smoker, Former Smoker    Cardiovascular:   Exercise tolerance: good Denies Pacemaker. Hypertension  Denies Valvular problems/Murmurs.  Denies MI.  Denies CAD.    Denies CABG/stent.  Denies Dysrhythmias.   Denies Angina.             denies PVD hyperlipidemia Echo:  · Normal left ventricular systolic function. The estimated ejection fraction is 55%.  · Mild concentric left ventricular hypertrophy.  · No wall motion abnormalities.  · The sinuses of Valsalva is mildly dilated, 3.8cm.  · Normal right ventricular systolic function.    Cardiology-cleared at low-moderate risk   Pulmonary:   Denies Pneumonia Denies COPD.  Denies Asthma.  Denies Shortness of breath.  Denies Recent URI. Sleep Apnea    Renal/:  Renal/ Normal     Hepatic/GI:   No Bowel Prep. Denies PUD. Denies Hiatal Hernia. GERD, well controlled Denies Liver Disease.  Denies Hepatitis.    Musculoskeletal:   Arthritis   Spine Disorders: Degenerative disease    Neurological:   Denies CVA. Headaches Denies Seizures.    Endocrine:  Endocrine Normal    Dermatological:   Bilateral UE with wound wrappings       Physical Exam  General:  Well nourished    Airway/Jaw/Neck:  Airway Findings: Mouth Opening: Normal Tongue: Normal  Mallampati: II  TM Distance: Normal, at least 6 cm   Big forbes    Dental:  Dental Findings: In tact   Chest/Lungs:  Chest/Lungs Findings: Normal Respiratory Rate, Clear to auscultation     Heart/Vascular:  Heart Findings: Rate: Normal  Rhythm: Regular Rhythm        Mental Status:  Mental Status  Findings:  Cooperative, Alert and Oriented       Wt Readings from Last 3 Encounters:   10/01/20 98 kg (216 lb 0.8 oz)   09/29/20 98 kg (216 lb 0.8 oz)   09/29/20 98 kg (216 lb 0.8 oz)     Temp Readings from Last 3 Encounters:   10/02/20 36.8 °C (98.3 °F) (Oral)   10/01/20 36.6 °C (97.9 °F)   09/29/20 36.7 °C (98 °F) (Oral)     BP Readings from Last 3 Encounters:   10/02/20 113/74   10/01/20 117/85   09/29/20 128/81     Pulse Readings from Last 3 Encounters:   10/02/20 60   10/01/20 70   09/29/20 (!) 55     Lab Results   Component Value Date    WBC 12.00 09/17/2020    HGB 16.0 09/17/2020    HCT 47.1 09/17/2020    MCV 98 09/17/2020     09/17/2020       CMP  Sodium   Date Value Ref Range Status   09/17/2020 140 136 - 145 mmol/L Final     Potassium   Date Value Ref Range Status   09/17/2020 3.9 3.5 - 5.1 mmol/L Final     Chloride   Date Value Ref Range Status   09/17/2020 104 95 - 110 mmol/L Final     CO2   Date Value Ref Range Status   09/17/2020 25 23 - 29 mmol/L Final     Glucose   Date Value Ref Range Status   09/17/2020 92 70 - 110 mg/dL Final     BUN, Bld   Date Value Ref Range Status   09/17/2020 14 6 - 20 mg/dL Final     Creatinine   Date Value Ref Range Status   09/17/2020 1.0 0.5 - 1.4 mg/dL Final     Calcium   Date Value Ref Range Status   09/17/2020 9.5 8.7 - 10.5 mg/dL Final     Total Protein   Date Value Ref Range Status   09/17/2020 7.3 6.0 - 8.4 g/dL Final     Albumin   Date Value Ref Range Status   09/17/2020 4.4 3.5 - 5.2 g/dL Final     Total Bilirubin   Date Value Ref Range Status   09/17/2020 0.9 0.1 - 1.0 mg/dL Final     Comment:     For infants and newborns, interpretation of results should be based  on gestational age, weight and in agreement with clinical  observations.  Premature Infant recommended reference ranges:  Up to 24 hours.............<8.0 mg/dL  Up to 48 hours............<12.0 mg/dL  3-5 days..................<15.0 mg/dL  6-29 days.................<15.0 mg/dL       Alkaline  Phosphatase   Date Value Ref Range Status   09/17/2020 84 55 - 135 U/L Final     AST   Date Value Ref Range Status   09/17/2020 32 10 - 40 U/L Final     ALT   Date Value Ref Range Status   09/17/2020 41 10 - 44 U/L Final     Anion Gap   Date Value Ref Range Status   09/17/2020 11 8 - 16 mmol/L Final     eGFR if    Date Value Ref Range Status   09/17/2020 >60.0 >60 mL/min/1.73 m^2 Final     eGFR if non    Date Value Ref Range Status   09/17/2020 >60.0 >60 mL/min/1.73 m^2 Final     Comment:     Calculation used to obtain the estimated glomerular filtration  rate (eGFR) is the CKD-EPI equation.        9/29/2020 COVID negative    Anesthesia Plan  Type of Anesthesia, risks & benefits discussed:  Anesthesia Type:  general, regional  Patient's Preference:   Intra-op Monitoring Plan: standard ASA monitors  Intra-op Monitoring Plan Comments:   Post Op Pain Control Plan: multimodal analgesia and per primary service following discharge from PACU  Post Op Pain Control Plan Comments:   Induction:   IV  Beta Blocker:  Patient is not currently on a Beta-Blocker (No further documentation required).       Informed Consent: Patient understands risks and agrees with Anesthesia plan.  Questions answered. Anesthesia consent signed with patient.  ASA Score: 2     Day of Surgery Review of History & Physical: I have interviewed and examined the patient. I have reviewed the patient's H&P dated:        Anesthesia Plan Notes: D/w pt post-op block for pain, and he is agreeable to it if his pain is not well controlled post-op.         Ready For Surgery From Anesthesia Perspective.

## 2020-10-02 NOTE — BRIEF OP NOTE
[facility]  Brief Operative Note    Surgery Date: 10/2/2020     Surgeon(s) and Role:     * Keysha Galvez MD - Primary    Assisting Surgeon: None    Pre-op Diagnosis:  Triceps tendon rupture, left, initial encounter [S46.312A]    Post-op Diagnosis:  Post-Op Diagnosis Codes:     * Triceps tendon rupture, left, initial encounter [S46.312A]    Procedure(s) (LRB):  REPAIR, TENDON, TRICEPS (Left)    Anesthesia: General    Description of the findings of the procedure(s): full thickness tear of triceps    Estimated Blood Loss: 10 cc          Specimens:   Specimen (12h ago, onward)    None            Discharge Note    OUTCOME: Patient tolerated treatment/procedure well without complication and is now ready for discharge.    DISPOSITION: Home or Self Care    FINAL DIAGNOSIS:  Left triceps tendon tear     FOLLOWUP: In clinic    DISCHARGE INSTRUCTIONS:  No discharge procedures on file.

## 2020-10-03 ENCOUNTER — TELEPHONE (OUTPATIENT)
Dept: ORTHOPEDICS | Facility: CLINIC | Age: 55
End: 2020-10-03

## 2020-10-03 NOTE — DISCHARGE INSTRUCTIONS
***  ACTIVITY LEVEL: If you have received sedation or an anesthetic, you may feel sleepy for several hours. Rest until you are more awake. Gradually resume your normal activities.              DIET: You may resume your home diet. If nausea is present, increase your diet gradually with fluids and bland foods.    Medications: Pain medication should be taken only if needed and as directed. If antibiotics are prescribed, the medication should be taken until completed. You will be given an updated list of you medications.    No driving, alcoholic beverages or signing legal documents for next 24 hours or while taking pain medication.    Last pain pill was given at 4:50 pm    Elevate the affected extremity to a level above your heart and ice packs may be applied in intervals of 15-20 minutes on 15-20 minutes off while awake    CALL THE DOCTOR:    For any obvious bleeding (some dried blood over the incision is normal).      Redness, swelling, foul smell around incision or fever over 101.   Shortness of breath, Coughing up Bloody Sputum or Pains or Swelling in your Calves.   Persistent pain or nausea not relieved by medication.   Impaired circulation such as tingling, change in color, numbness or tingling, coldness.    If any unusual problems or difficulties occur contact your doctor. If you cannot contact your doctor but feel your signs and symptoms warrant a physicians attention return to the emergency room.          NO WOUND CARE TO BE DONE TO LEFT ELBOW UNTIL AFTER SEEN BY AND  CLEARED BY DR GUZMAN          Fall Prevention  Millions of people fall every year and injure themselves. You may have had anesthesia or sedation which may increase your risk of falling. You may have health issues that put you at an increased risk of falling.     Here are ways to reduce your risk of falling.  ·   · Make your home safe by keeping walkways clear of objects you may trip over.  · Use non-slip pads under rugs. Do not use area rugs  or small throw rugs.  · Use non-slip mats in bathtubs and showers.  · Install handrails and lights on staircases.  · Do not walk in poorly lit areas.  · Do not stand on chairs or wobbly ladders.  · Use caution when reaching overhead or looking upward. This position can cause a loss of balance.  · Be sure your shoes fit properly, have non-slip bottoms and are in good condition.   · Wear shoes both inside and out. Avoid going barefoot or wearing slippers.  · Be cautious when going up and down stairs, curbs, and when walking on uneven sidewalks.  · If your balance is poor, consider using a cane or walker.  · If your fall was related to alcohol use, stop or limit alcohol intake.   · If your fall was related to use of sleeping medicines, talk to your doctor about this. You may need to reduce your dosage at bedtime if you awaken during the night to go to the bathroom.    · To reduce the need for nighttime bathroom trips:  ¨ Avoid drinking fluids for several hours before going to bed  ¨ Empty your bladder before going to bed  ¨ Men can keep a urinal at the bedside  · Stay as active as you can. Balance, flexibility, strength, and endurance all come from exercise. They all play a role in preventing falls. Ask your healthcare provider which types of activity are right for you.  · Get your vision checked on a regular basis.  · If you have pets, know where they are before you stand up or walk so you don't trip over them.  · Use night lights.
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
## 2020-10-05 ENCOUNTER — OFFICE VISIT (OUTPATIENT)
Dept: OPTOMETRY | Facility: CLINIC | Age: 55
End: 2020-10-05
Payer: OTHER GOVERNMENT

## 2020-10-05 DIAGNOSIS — H52.7 REFRACTIVE ERROR: ICD-10-CM

## 2020-10-05 DIAGNOSIS — I10 ESSENTIAL HYPERTENSION: Primary | ICD-10-CM

## 2020-10-05 PROCEDURE — 92014 PR EYE EXAM, EST PATIENT,COMPREHESV: ICD-10-PCS | Mod: S$PBB,,, | Performed by: OPTOMETRIST

## 2020-10-05 PROCEDURE — 92014 COMPRE OPH EXAM EST PT 1/>: CPT | Mod: S$PBB,,, | Performed by: OPTOMETRIST

## 2020-10-05 PROCEDURE — 99213 OFFICE O/P EST LOW 20 MIN: CPT | Mod: PBBFAC,PO | Performed by: OPTOMETRIST

## 2020-10-05 PROCEDURE — 99999 PR PBB SHADOW E&M-EST. PATIENT-LVL III: ICD-10-PCS | Mod: PBBFAC,,, | Performed by: OPTOMETRIST

## 2020-10-05 PROCEDURE — 99999 PR PBB SHADOW E&M-EST. PATIENT-LVL III: CPT | Mod: PBBFAC,,, | Performed by: OPTOMETRIST

## 2020-10-05 NOTE — PROGRESS NOTES
Subjective:       Patient ID: Enoch Barr is a 55 y.o. male      Chief Complaint   Patient presents with    Concerns About Ocular Health    Hypertensive Eye Exam     History of Present Illness  Dls: 10/31/19 Dr. Chapman     56 y/o male presents today for hypertensive eye exam.   Pt states no changes in vision. Pt wears pal's.     No tearing  No itching  + burning  No pain  + ha's  + floaters  No flashes    Eye meds  None         Assessment/Plan:     1. Essential hypertension  No hypertensive retinopathy. Continue BP control. RTC 1 year for DFE.    2. Refractive error  Declined MRx.  Continue with current spectacles.     Follow up in about 1 year (around 10/5/2021).

## 2020-10-06 NOTE — ANESTHESIA POSTPROCEDURE EVALUATION
Anesthesia Post Evaluation    Patient: Enoch Barr    Procedure(s) Performed: Procedure(s) (LRB):  REPAIR, TENDON, TRICEPS (Left)    Final Anesthesia Type: general    Patient location during evaluation: PACU  Patient participation: Yes- Able to Participate  Level of consciousness: awake and alert  Post-procedure vital signs: reviewed and stable  Pain management: adequate  Airway patency: patent    PONV status at discharge: No PONV  Anesthetic complications: no      Cardiovascular status: blood pressure returned to baseline and hemodynamically stable  Respiratory status: unassisted and spontaneous ventilation  Hydration status: euvolemic  Follow-up not needed.          Vitals Value Taken Time   /82 10/02/20 1637   Temp 36.4 °C (97.5 °F) 10/02/20 1637   Pulse 77 10/02/20 1637   Resp 17 10/02/20 1652   SpO2 93 % 10/02/20 1637         Event Time   Out of Recovery 16:30:00         Pain/Alexander Score: No data recorded

## 2020-10-07 ENCOUNTER — LAB VISIT (OUTPATIENT)
Dept: LAB | Facility: HOSPITAL | Age: 55
End: 2020-10-07
Attending: INTERNAL MEDICINE
Payer: OTHER GOVERNMENT

## 2020-10-07 DIAGNOSIS — I73.9 PAD (PERIPHERAL ARTERY DISEASE): ICD-10-CM

## 2020-10-07 LAB
ALBUMIN SERPL BCP-MCNC: 3.9 G/DL (ref 3.5–5.2)
ALP SERPL-CCNC: 99 U/L (ref 55–135)
ALT SERPL W/O P-5'-P-CCNC: 28 U/L (ref 10–44)
AST SERPL-CCNC: 23 U/L (ref 10–40)
BILIRUB DIRECT SERPL-MCNC: 0.4 MG/DL (ref 0.1–0.3)
BILIRUB SERPL-MCNC: 0.9 MG/DL (ref 0.1–1)
PROT SERPL-MCNC: 7.4 G/DL (ref 6–8.4)

## 2020-10-07 PROCEDURE — 80076 HEPATIC FUNCTION PANEL: CPT

## 2020-10-07 PROCEDURE — 36415 COLL VENOUS BLD VENIPUNCTURE: CPT

## 2020-10-08 ENCOUNTER — HOSPITAL ENCOUNTER (OUTPATIENT)
Dept: WOUND CARE | Facility: HOSPITAL | Age: 55
Discharge: HOME | End: 2020-10-08
Attending: INTERNAL MEDICINE
Payer: OTHER GOVERNMENT

## 2020-10-08 ENCOUNTER — OFFICE VISIT (OUTPATIENT)
Dept: ORTHOPEDICS | Facility: CLINIC | Age: 55
End: 2020-10-08
Payer: OTHER GOVERNMENT

## 2020-10-08 VITALS
SYSTOLIC BLOOD PRESSURE: 110 MMHG | HEIGHT: 72 IN | OXYGEN SATURATION: 98 % | WEIGHT: 215.19 LBS | BODY MASS INDEX: 29.15 KG/M2 | HEART RATE: 73 BPM | RESPIRATION RATE: 17 BRPM | DIASTOLIC BLOOD PRESSURE: 80 MMHG

## 2020-10-08 VITALS
RESPIRATION RATE: 20 BRPM | TEMPERATURE: 98 F | HEART RATE: 73 BPM | DIASTOLIC BLOOD PRESSURE: 76 MMHG | SYSTOLIC BLOOD PRESSURE: 116 MMHG

## 2020-10-08 DIAGNOSIS — S40.811A ABRASION OF RIGHT ARM: Primary | ICD-10-CM

## 2020-10-08 DIAGNOSIS — S46.312D RUPTURE OF TRICEPS TENDON, LEFT, SUBSEQUENT ENCOUNTER: Primary | ICD-10-CM

## 2020-10-08 PROCEDURE — 99214 PR OFFICE/OUTPT VISIT, EST, LEVL IV, 30-39 MIN: ICD-10-PCS | Mod: ,,, | Performed by: INTERNAL MEDICINE

## 2020-10-08 PROCEDURE — 99024 PR POST-OP FOLLOW-UP VISIT: ICD-10-PCS | Mod: ,,, | Performed by: PHYSICIAN ASSISTANT

## 2020-10-08 PROCEDURE — 99214 OFFICE O/P EST MOD 30 MIN: CPT | Mod: PBBFAC,PN | Performed by: PHYSICIAN ASSISTANT

## 2020-10-08 PROCEDURE — 99214 OFFICE O/P EST MOD 30 MIN: CPT | Mod: ,,, | Performed by: INTERNAL MEDICINE

## 2020-10-08 PROCEDURE — 99999 PR PBB SHADOW E&M-EST. PATIENT-LVL IV: ICD-10-PCS | Mod: PBBFAC,,, | Performed by: PHYSICIAN ASSISTANT

## 2020-10-08 PROCEDURE — 99024 POSTOP FOLLOW-UP VISIT: CPT | Mod: ,,, | Performed by: PHYSICIAN ASSISTANT

## 2020-10-08 PROCEDURE — 99999 PR PBB SHADOW E&M-EST. PATIENT-LVL IV: CPT | Mod: PBBFAC,,, | Performed by: PHYSICIAN ASSISTANT
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
## 2020-10-08 NOTE — PROGRESS NOTES
[facility]                Progress Note    Subjective:       Patient ID: Enoch Barr is a 55 y.o. male.    Chief Complaint: Wound Check    F/u wound care visit. Patient ambulatory to exam room with no difficulty. C/o pain 3/10 left arm. Dressings to BUE wounds intact. Removed dressing to right elbow only and noted small amount of yellowish drainage. LUE surgical dressing remains intact, patient states he has appt with orthropedic surgeon this afternoon. Patient to RTC in one week.    Changing right elbow dressing daily. No drainaeg no fevers or chills. Left arm remains wrapped in post surgical ace and compression dressing no strike through drainage. To see ortho today       Review of Systems   Constitutional: Negative for activity change, appetite change, fatigue, fever and unexpected weight change.   HENT: Negative for ear pain, rhinorrhea and sore throat.    Eyes: Negative for discharge and visual disturbance.   Respiratory: Negative for chest tightness, shortness of breath and wheezing.    Cardiovascular: Negative for chest pain, palpitations and leg swelling.   Gastrointestinal: Negative for abdominal pain, constipation and diarrhea.   Endocrine: Negative for cold intolerance and heat intolerance.   Genitourinary: Negative for dysuria and hematuria.   Musculoskeletal: Negative for joint swelling and neck stiffness.   Skin: Negative for rash.   Neurological: Negative for dizziness, syncope, weakness and headaches.   Psychiatric/Behavioral: Negative for suicidal ideas.         Objective:        Physical Exam  Constitutional:       General: He is not in acute distress.     Appearance: Normal appearance.   HENT:      Head: Normocephalic and atraumatic.   Eyes:      General: No scleral icterus.  Pulmonary:      Effort: Pulmonary effort is normal. No respiratory distress.   Abdominal:      General: There is no distension.      Palpations: Abdomen is soft.    Skin:     Comments: Right elbow loosely adherent slough removed with mechanical debridement no excision or sharp debridement done today. Wound measures smaller no surrounding induration full AROM of right elbow no deep structures exposed   Neurological:      General: No focal deficit present.      Mental Status: He is alert and oriented to person, place, and time.         Vitals:    10/08/20 1111   BP: 116/76   Pulse: 73   Resp: 20   Temp: 97.7 °F (36.5 °C)       Assessment:           ICD-10-CM ICD-9-CM   1. Abrasion of right arm  S40.811A 912.0            Wound 09/24/20 1546 Traumatic Left medial Elbow (Active)   09/24/20 1546    Pre-existing: Yes   Primary Wound Type: Traumatic   Side: Left   Orientation: medial   Location: Elbow   Wound Number (optional):    Ankle-Brachial Index:    Pulses:    Removal Indication and Assessment:    Wound Outcome:    (Retired) Wound Type:    (Retired) Wound Length (cm):    (Retired) Wound Width (cm):    (Retired) Depth (cm):    Wound Description (Comments):    Removal Indications:    Appearance Dressing in place, unable to visualize 10/08/20 1100            Wound 09/24/20 1546 Traumatic Left lateral Arm (Active)   09/24/20 1546    Pre-existing: Yes   Primary Wound Type: Traumatic   Side: Left   Orientation: lateral   Location: Arm   Wound Number (optional):    Ankle-Brachial Index:    Pulses:    Removal Indication and Assessment:    Wound Outcome:    (Retired) Wound Type:    (Retired) Wound Length (cm):    (Retired) Wound Width (cm):    (Retired) Depth (cm):    Wound Description (Comments):    Removal Indications:    Appearance Dressing in place, unable to visualize 10/08/20 1100            Wound 09/24/20 1602 Traumatic Right medial Elbow (Active)   09/24/20 1602    Pre-existing: Yes   Primary Wound Type: Traumatic   Side: Right   Orientation: medial   Location: Elbow   Wound Number (optional):    Ankle-Brachial Index:    Pulses:    Removal Indication and Assessment:    Wound  Outcome:    (Retired) Wound Type:    (Retired) Wound Length (cm):    (Retired) Wound Width (cm):    (Retired) Depth (cm):    Wound Description (Comments):    Removal Indications:    Wound Image   10/08/20 1100   Wound WDL ex 10/08/20 1100   Dressing Appearance Intact;Moist drainage 10/08/20 1100   Drainage Amount Small 10/08/20 1100   Drainage Characteristics/Odor Yellow 10/08/20 1100   Appearance Red;Yellow;Slough 10/08/20 1100   Tissue loss description Partial thickness 10/08/20 1100   Black (%), Wound Tissue Color 0 % 10/08/20 1100   Red (%), Wound Tissue Color 50 % 10/08/20 1100   Yellow (%), Wound Tissue Color 50 % 10/08/20 1100   Periwound Area Redness;Dry 10/08/20 1100   Wound Edges Open 10/08/20 1100   Wound Length (cm) 4.2 cm 10/08/20 1100   Wound Width (cm) 0.7 cm 10/08/20 1100   Wound Depth (cm) 0.1 cm 10/08/20 1100   Wound Volume (cm^3) 0.29 cm^3 10/08/20 1100   Wound Surface Area (cm^2) 2.94 cm^2 10/08/20 1100   Care Cleansed with:;Sterile normal saline 10/08/20 1100   Dressing Applied;Silver;Abd pad;Elastic bandage 10/08/20 1100   Periwound Care Skin barrier film applied 10/08/20 1100   Dressing Change Due 10/09/20 10/08/20 1100           Plan:          to see ortho for follow up of left surgical arm today, right arm continue with daily silver sulfadene ointment return one week      Orders Placed This Encounter   Procedures    Change dressing     Clean with saline. Apply cavilon periwound. Silvadene cream, ABD, cast padding, ace to arm wounds. Change BID per wife.        Follow up in about 1 week (around 10/15/2020) for wound care.

## 2020-10-15 ENCOUNTER — PATIENT MESSAGE (OUTPATIENT)
Dept: ORTHOPEDICS | Facility: CLINIC | Age: 55
End: 2020-10-15

## 2020-10-15 ENCOUNTER — HOSPITAL ENCOUNTER (OUTPATIENT)
Dept: WOUND CARE | Facility: HOSPITAL | Age: 55
Discharge: HOME | End: 2020-10-15
Attending: INTERNAL MEDICINE
Payer: OTHER GOVERNMENT

## 2020-10-15 ENCOUNTER — OFFICE VISIT (OUTPATIENT)
Dept: ORTHOPEDICS | Facility: CLINIC | Age: 55
End: 2020-10-15
Payer: OTHER GOVERNMENT

## 2020-10-15 VITALS
BODY MASS INDEX: 28.97 KG/M2 | SYSTOLIC BLOOD PRESSURE: 118 MMHG | OXYGEN SATURATION: 96 % | WEIGHT: 213.88 LBS | DIASTOLIC BLOOD PRESSURE: 60 MMHG | HEART RATE: 64 BPM | HEIGHT: 72 IN | RESPIRATION RATE: 17 BRPM

## 2020-10-15 VITALS
TEMPERATURE: 98 F | RESPIRATION RATE: 20 BRPM | DIASTOLIC BLOOD PRESSURE: 79 MMHG | SYSTOLIC BLOOD PRESSURE: 120 MMHG | HEART RATE: 79 BPM

## 2020-10-15 DIAGNOSIS — S40.811D ABRASION OF RIGHT UPPER EXTREMITY, SUBSEQUENT ENCOUNTER: ICD-10-CM

## 2020-10-15 DIAGNOSIS — S40.812D ABRASION OF LEFT UPPER EXTREMITY, SUBSEQUENT ENCOUNTER: Primary | ICD-10-CM
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
## 2020-10-15 DIAGNOSIS — S30.811D ABRASION OF ABDOMINAL WALL, SUBSEQUENT ENCOUNTER: ICD-10-CM

## 2020-10-15 DIAGNOSIS — S46.312D RUPTURE OF TRICEPS TENDON, LEFT, SUBSEQUENT ENCOUNTER: Primary | ICD-10-CM

## 2020-10-15 PROCEDURE — 99215 OFFICE O/P EST HI 40 MIN: CPT | Mod: PBBFAC,PN | Performed by: ORTHOPAEDIC SURGERY

## 2020-10-15 PROCEDURE — 99214 OFFICE O/P EST MOD 30 MIN: CPT | Mod: ,,, | Performed by: INTERNAL MEDICINE

## 2020-10-15 PROCEDURE — 99213 OFFICE O/P EST LOW 20 MIN: CPT | Mod: 27 | Performed by: INTERNAL MEDICINE

## 2020-10-15 PROCEDURE — 99024 POSTOP FOLLOW-UP VISIT: CPT | Mod: ,,, | Performed by: ORTHOPAEDIC SURGERY

## 2020-10-15 PROCEDURE — 99024 PR POST-OP FOLLOW-UP VISIT: ICD-10-PCS | Mod: ,,, | Performed by: ORTHOPAEDIC SURGERY

## 2020-10-15 PROCEDURE — 99999 PR PBB SHADOW E&M-EST. PATIENT-LVL V: ICD-10-PCS | Mod: PBBFAC,,, | Performed by: ORTHOPAEDIC SURGERY

## 2020-10-15 PROCEDURE — 99214 PR OFFICE/OUTPT VISIT, EST, LEVL IV, 30-39 MIN: ICD-10-PCS | Mod: ,,, | Performed by: INTERNAL MEDICINE

## 2020-10-15 PROCEDURE — 99999 PR PBB SHADOW E&M-EST. PATIENT-LVL V: CPT | Mod: PBBFAC,,, | Performed by: ORTHOPAEDIC SURGERY

## 2020-10-15 RX ORDER — OXYCODONE AND ACETAMINOPHEN 5; 325 MG/1; MG/1
1 TABLET ORAL EVERY 4 HOURS PRN
Qty: 20 TABLET | Refills: 0 | Status: SHIPPED | OUTPATIENT
Start: 2020-10-15 | End: 2020-11-02 | Stop reason: SDUPTHER

## 2020-10-15 NOTE — PROGRESS NOTES
Postoperative Visit    History of Present Illness:   Enoch is 2 weeks s/p left Triceps repair  (DOS-10/2/20)  He is doing well -- pain is well controlled and He is continuing to take narcotic pain medications   He is compliant with activity restrictions and is wearing his splint   Seen by addy last week because of itching and splint discomfort - wounds looked good, wounds re-dressed  Denies fevers, chills, constitutional symptoms   Has wound care today     Physical Examination:    NAD  left upper extremity:  Incision over the elbow is well healed. Traumatic wounds healing well   No erythema, drainage or other signs of infection. Appropriate post operative swelling is present  LTSI m/u/r  2+ RP  + EPL, IO, FDS, FDP       Assessment/Plan:  55 y.o. male   2 weeks s/p left Triceps repair  (DOS-10/2/20)    Plan  1. Sutures were removed today and steri strips were placed    2. Transitioned to elbow brace 0- 90  3. The patient was given refill for narcotic pain medications and was told that to wean off these medications.  4. elbow exercises as instructed  5.  Physical therapy prescription placed   6. Follow up in 4  weeks  with xrays    All questions were answered in detail. The patient is in full agreement with the treatment plan and will proceed accordingly.    PT protocol     2 -4 weeks: brace at all times 0-90   No passive flexion beyond 90   No resisted elbow extension   Supination and pronation and active flexion OK  Passive extension OK     4 -6 weeks:   0-120 in brace   No passive flexion beyond 120   No resisted elbow extension     6-12 weeks   Brace removed at 6 weeks   FROM Ok   No heavy lifting or painful activity

## 2020-10-15 NOTE — PROGRESS NOTES
[facility]                Progress Note    Subjective:       Patient ID: Enoch Barr is a 55 y.o. male.    Chief Complaint: Wound Check    F/u wound care visit. Patient ambulatory to exam room, no c/o pain at present. Dressings to BUE wounds intact with scant amount of drainage. Wound to right elbow healed and wounds to left arm improving. RTC in one week.    Right arm healed. Left arm able to move from 0-90per ortho to start PT next week only two small openings now      Review of Systems   Constitutional: Negative for activity change, appetite change, fatigue, fever and unexpected weight change.   HENT: Negative for ear pain, rhinorrhea and sore throat.    Eyes: Negative for discharge and visual disturbance.   Respiratory: Negative for chest tightness, shortness of breath and wheezing.    Cardiovascular: Negative for chest pain, palpitations and leg swelling.   Gastrointestinal: Negative for abdominal pain, constipation and diarrhea.   Endocrine: Negative for cold intolerance and heat intolerance.   Genitourinary: Negative for dysuria and hematuria.   Musculoskeletal: Negative for joint swelling and neck stiffness.   Skin: Negative for rash.   Neurological: Negative for dizziness, syncope, weakness and headaches.   Psychiatric/Behavioral: Negative for suicidal ideas.         Objective:        Physical Exam  Constitutional:       General: He is not in acute distress.     Appearance: Normal appearance.   HENT:      Head: Normocephalic and atraumatic.   Eyes:      General: No scleral icterus.  Pulmonary:      Effort: Pulmonary effort is normal. No respiratory distress.   Abdominal:      General: There is no distension.      Palpations: Abdomen is soft.   Skin:     Comments: Left lateral elbow two areas of hypergranulation no deep structures exposed, treated with single silver nitrate stick to create flat surface   Neurological:      General: No focal deficit present.       Mental Status: He is alert and oriented to person, place, and time.         Vitals:    10/15/20 1308   BP: 120/79   Pulse: 79   Resp: 20   Temp: 97.7 °F (36.5 °C)       Assessment:           ICD-10-CM ICD-9-CM   1. Abrasion of left upper extremity, subsequent encounter  S40.812D V58.89            Wound 09/24/20 1546 Traumatic Left medial Elbow (Active)   09/24/20 1546    Pre-existing: Yes   Primary Wound Type: Traumatic   Side: Left   Orientation: medial   Location: Elbow   Wound Number (optional):    Ankle-Brachial Index:    Pulses:    Removal Indication and Assessment:    Wound Outcome:    (Retired) Wound Type:    (Retired) Wound Length (cm):    (Retired) Wound Width (cm):    (Retired) Depth (cm):    Wound Description (Comments):    Removal Indications:    Wound Image   10/15/20 1300   Wound WDL ex 10/15/20 1300   Dressing Appearance Intact;Dried drainage 10/15/20 1300   Drainage Amount Small 10/15/20 1300   Drainage Characteristics/Odor Serosanguineous 10/15/20 1300   Appearance Red;Hypergranulation 10/15/20 1300   Tissue loss description Partial thickness 10/15/20 1300   Black (%), Wound Tissue Color 0 % 10/15/20 1300   Red (%), Wound Tissue Color 100 % 10/15/20 1300   Yellow (%), Wound Tissue Color 0 % 10/15/20 1300   Periwound Area Dry 10/15/20 1300   Wound Edges Open 10/15/20 1300   Wound Length (cm) 1 cm 10/15/20 1300   Wound Width (cm) 1.5 cm 10/15/20 1300   Wound Depth (cm) 0.1 cm 10/15/20 1300   Wound Volume (cm^3) 0.15 cm^3 10/15/20 1300   Wound Surface Area (cm^2) 1.5 cm^2 10/15/20 1300   Care Cleansed with:;Sterile normal saline 10/15/20 1300   Dressing Applied;Collagen;Elastic bandage 10/15/20 1300   Dressing Change Due 10/17/20 10/15/20 1300            Wound 09/24/20 1546 Traumatic Left lateral Arm (Active)   09/24/20 1546    Pre-existing: Yes   Primary Wound Type: Traumatic   Side: Left   Orientation: lateral   Location: Arm   Wound Number (optional):    Ankle-Brachial Index:    Pulses:     Removal Indication and Assessment:    Wound Outcome:    (Retired) Wound Type:    (Retired) Wound Length (cm):    (Retired) Wound Width (cm):    (Retired) Depth (cm):    Wound Description (Comments):    Removal Indications:    Wound Image   10/15/20 1300   Wound WDL ex 10/15/20 1300   Dressing Appearance Intact;Dried drainage 10/15/20 1300   Drainage Amount Small 10/15/20 1300   Drainage Characteristics/Odor Serosanguineous 10/15/20 1300   Appearance Red;Hypergranulation 10/15/20 1300   Tissue loss description Partial thickness 10/15/20 1300   Black (%), Wound Tissue Color 0 % 10/15/20 1300   Red (%), Wound Tissue Color 100 % 10/15/20 1300   Yellow (%), Wound Tissue Color 0 % 10/15/20 1300   Periwound Area Dry 10/15/20 1300   Wound Edges Open 10/15/20 1300   Wound Length (cm) 0.8 cm 10/15/20 1300   Wound Width (cm) 0.8 cm 10/15/20 1300   Wound Depth (cm) 0.1 cm 10/15/20 1300   Wound Volume (cm^3) 0.06 cm^3 10/15/20 1300   Wound Surface Area (cm^2) 0.64 cm^2 10/15/20 1300   Care Cleansed with:;Sterile normal saline 10/15/20 1300   Dressing Applied;Collagen;Elastic bandage 10/15/20 1300   Dressing Change Due 10/17/20 10/15/20 1300       [REMOVED]      Wound 09/24/20 1602 Traumatic Right medial Elbow (Removed)   09/24/20 1602    Pre-existing: Yes   Primary Wound Type: Traumatic   Side: Right   Orientation: medial   Location: Elbow   Wound Number (optional):    Ankle-Brachial Index:    Pulses:    Removal Indication and Assessment:    Wound Outcome: Healed   (Retired) Wound Type:    (Retired) Wound Length (cm):    (Retired) Wound Width (cm):    (Retired) Depth (cm):    Wound Description (Comments):    Removal Indications:    Removed 10/15/20 1325   Wound Image   10/15/20 1300   Wound WDL WDL 10/15/20 1300   Dressing Appearance Intact;Dried drainage 10/15/20 1300   Drainage Amount Scant 10/15/20 1300   Appearance Pink 10/15/20 1300   Tissue loss description Partial thickness 10/15/20 1300   Black (%), Wound Tissue Color  0 % 10/15/20 1300   Red (%), Wound Tissue Color 100 % 10/15/20 1300   Yellow (%), Wound Tissue Color 0 % 10/15/20 1300   Periwound Area Dry 10/15/20 1300   Wound Length (cm) 0 cm 10/15/20 1300   Wound Width (cm) 0 cm 10/15/20 1300   Wound Depth (cm) 0 cm 10/15/20 1300   Wound Volume (cm^3) 0 cm^3 10/15/20 1300   Wound Surface Area (cm^2) 0 cm^2 10/15/20 1300           Plan:          wounds are improving protective layer endoform cover with ace wrap return one week      Orders Placed This Encounter   Procedures    Change dressing     Clean with NS. Apply endoform, wrapped with ACE bandage. Patient may remove dressing on Saturday.        Follow up in about 1 week (around 10/22/2020) for wound care.

## 2020-10-20 ENCOUNTER — CLINICAL SUPPORT (OUTPATIENT)
Dept: REHABILITATION | Facility: HOSPITAL | Age: 55
End: 2020-10-20
Attending: ORTHOPAEDIC SURGERY
Payer: OTHER GOVERNMENT

## 2020-10-20 DIAGNOSIS — M79.602 PAIN OF LEFT ARM: ICD-10-CM

## 2020-10-20 DIAGNOSIS — Z78.9 IMPAIRED INSTRUMENTAL ACTIVITIES OF DAILY LIVING (IADL): ICD-10-CM

## 2020-10-20 DIAGNOSIS — M25.60 RANGE OF MOTION DEFICIT: ICD-10-CM

## 2020-10-20 DIAGNOSIS — M62.81 MUSCLE WEAKNESS OF LEFT ARM: ICD-10-CM

## 2020-10-20 DIAGNOSIS — R29.898 DECREASED GRIP STRENGTH OF LEFT HAND: ICD-10-CM

## 2020-10-20 DIAGNOSIS — S46.312D RUPTURE OF TRICEPS TENDON, LEFT, SUBSEQUENT ENCOUNTER: ICD-10-CM

## 2020-10-20 PROCEDURE — 97165 OT EVAL LOW COMPLEX 30 MIN: CPT | Mod: PN

## 2020-10-20 NOTE — PATIENT INSTRUCTIONS
[facility], OCCUPATIONAL THERAPY  HOME EXERCISE PROGRAM     Complete the following exercises with 10 repetitions each, 2 x/day.     AROM: Elbow Flexion / Extension          Bend elbow in 3 different positions: thumb up, palm up, palm down allow gravity to pull elbow into extension/straight position.      AROM: Supination / Pronation   With your elbow by your side, turn your palm up then turn your palm down.    Copyright © I. All rights reserved.       AROM: Supination / Pronation   With your elbow by your side, turn your palm up then turn your palm down.     AROM: Wrist Flexion / Extension               Bend your wrist forward and back as far as possible.      AROM: Wrist Radial / Ulnar Deviation  Bend your wrist from side to side as far as possible.    AROM: Wrist Flexion / Extension  Make a fist, then bend your wrist forward then back as far as possible.         AROM: Wrist Radial / Ulnar Deviation   Make a fist then bend your wrist toward your body, then away.         Copyright © I. All rights reserved.     Therapist: RAHUL Escalera
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
## 2020-10-22 ENCOUNTER — HOSPITAL ENCOUNTER (OUTPATIENT)
Dept: WOUND CARE | Facility: HOSPITAL | Age: 55
Discharge: HOME | End: 2020-10-22
Attending: INTERNAL MEDICINE
Payer: OTHER GOVERNMENT

## 2020-10-22 VITALS
RESPIRATION RATE: 20 BRPM | TEMPERATURE: 98 F | HEART RATE: 67 BPM | DIASTOLIC BLOOD PRESSURE: 94 MMHG | SYSTOLIC BLOOD PRESSURE: 134 MMHG

## 2020-10-22 DIAGNOSIS — S40.811D ABRASION OF RIGHT UPPER EXTREMITY, SUBSEQUENT ENCOUNTER: ICD-10-CM

## 2020-10-22 DIAGNOSIS — S46.312A TRICEPS TENDON RUPTURE, LEFT, INITIAL ENCOUNTER: Primary | ICD-10-CM

## 2020-10-22 PROCEDURE — 99213 OFFICE O/P EST LOW 20 MIN: CPT | Performed by: INTERNAL MEDICINE

## 2020-10-22 PROCEDURE — 99214 PR OFFICE/OUTPT VISIT, EST, LEVL IV, 30-39 MIN: ICD-10-PCS | Mod: ,,, | Performed by: INTERNAL MEDICINE

## 2020-10-22 PROCEDURE — 99214 OFFICE O/P EST MOD 30 MIN: CPT | Mod: ,,, | Performed by: INTERNAL MEDICINE

## 2020-10-22 NOTE — PROGRESS NOTES
"        [facility]                Progress Note    Subjective:       Patient ID: Enoch Barr is a 55 y.o. male.    Chief Complaint: Wound Check    F/u wound care visit. Patient ambulatory to exam room with no c/o pain or discomfort at present. Wound dressing to Left arm intact with no drainage noted. Wound to left lateral arm healed. Steristrips intact to left medial elbow. Blister noted to right elbow, aquacel foam border applied. RTC in one week.    Right elbow with new "blister" after rubbibng elbow ond esk at work no drainage. Left elbow wound now closed       Review of Systems   Constitutional: Negative for activity change, appetite change, fatigue, fever and unexpected weight change.   HENT: Negative for ear pain, rhinorrhea and sore throat.    Eyes: Negative for discharge and visual disturbance.   Respiratory: Negative for chest tightness, shortness of breath and wheezing.    Cardiovascular: Negative for chest pain, palpitations and leg swelling.   Gastrointestinal: Negative for abdominal pain, constipation and diarrhea.   Endocrine: Negative for cold intolerance and heat intolerance.   Genitourinary: Negative for dysuria and hematuria.   Musculoskeletal: Negative for joint swelling and neck stiffness.   Skin: Negative for rash.   Neurological: Negative for dizziness, syncope, weakness and headaches.   Psychiatric/Behavioral: Negative for suicidal ideas.         Objective:        Physical Exam  Constitutional:       General: He is not in acute distress.     Appearance: He is normal weight. He is not ill-appearing or toxic-appearing.   HENT:      Head: Normocephalic and atraumatic.   Eyes:      General: No scleral icterus.  Pulmonary:      Effort: Pulmonary effort is normal. No respiratory distress.   Abdominal:      General: There is no distension.      Palpations: Abdomen is soft.   Skin:     Comments: Left posterior elbow wound closed, site of surgery " covered with steristrips with intact scab    Right posteriro elbow with linear bullae without fluctuance or surroudnigne rythema   Neurological:      General: No focal deficit present.      Mental Status: He is alert and oriented to person, place, and time.   Psychiatric:         Mood and Affect: Mood normal.         Behavior: Behavior normal.         Thought Content: Thought content normal.         Vitals:    10/22/20 1300   BP: (!) 134/94   Pulse: 67   Resp: 20   Temp: 97.7 °F (36.5 °C)       Assessment:           ICD-10-CM ICD-9-CM   1. Triceps tendon rupture, left, initial encounter  S46.312A 840.8   2. Abrasion of right upper extremity, subsequent encounter  S40.811D V58.89            Wound 09/24/20 1546 Traumatic Left medial Elbow (Active)   09/24/20 1546    Pre-existing: Yes   Primary Wound Type: Traumatic   Side: Left   Orientation: medial   Location: Elbow   Wound Number (optional):    Ankle-Brachial Index:    Pulses:    Removal Indication and Assessment:    Wound Outcome:    (Retired) Wound Type:    (Retired) Wound Length (cm):    (Retired) Wound Width (cm):    (Retired) Depth (cm):    Wound Description (Comments):    Removal Indications:    Wound Image   10/22/20 1300   Wound WDL ex 10/22/20 1300   Dressing Appearance Intact;Dry 10/22/20 1300   Drainage Amount None 10/22/20 1300   Appearance Dressing in place, unable to visualize 10/22/20 1300   Dressing Elastic bandage 10/22/20 1300   Dressing Change Due 10/23/20 10/22/20 1300            Wound 09/24/20 1546 Traumatic Left lateral Arm (Active)   09/24/20 1546    Pre-existing: Yes   Primary Wound Type: Traumatic   Side: Left   Orientation: lateral   Location: Arm   Wound Number (optional):    Ankle-Brachial Index:    Pulses:    Removal Indication and Assessment:    Wound Outcome:    (Retired) Wound Type:    (Retired) Wound Length (cm):    (Retired) Wound Width (cm):    (Retired) Depth (cm):    Wound Description (Comments):    Removal Indications:    Wound  Image   10/22/20 1300   Wound WDL ex 10/22/20 1300   Dressing Appearance Intact;Clean 10/22/20 1300   Drainage Amount Scant 10/22/20 1300   Appearance Epithelialization;Red 10/22/20 1300   Tissue loss description Partial thickness 10/22/20 1300   Black (%), Wound Tissue Color 0 % 10/22/20 1300   Red (%), Wound Tissue Color 100 % 10/22/20 1300   Yellow (%), Wound Tissue Color 0 % 10/22/20 1300   Periwound Area Redness 10/22/20 1300   Wound Edges Open 10/22/20 1300   Wound Length (cm) 0.3 cm 10/22/20 1300   Wound Width (cm) 0.3 cm 10/22/20 1300   Wound Depth (cm) 0.1 cm 10/22/20 1300   Wound Volume (cm^3) 0.01 cm^3 10/22/20 1300   Wound Surface Area (cm^2) 0.09 cm^2 10/22/20 1300   Care Cleansed with:;Sterile normal saline 10/22/20 1300   Dressing Applied;Elastic bandage 10/22/20 1300   Dressing Change Due 10/23/20 10/22/20 1300           Plan:          foam border to right elbow for protection and local compression ace wrap for protection to left arm return one week      Orders Placed This Encounter   Procedures    Change dressing     Clean with NS, apply adaptic touch to lower incision site, wrap left arm with ace bandage. Aquacel foam border to right elbow.        Follow up in about 1 week (around 10/29/2020) for wound care.

## 2020-10-23 ENCOUNTER — PATIENT MESSAGE (OUTPATIENT)
Dept: WOUND CARE | Facility: HOSPITAL | Age: 55
End: 2020-10-23

## 2020-10-26 ENCOUNTER — PATIENT MESSAGE (OUTPATIENT)
Dept: NEUROLOGY | Facility: CLINIC | Age: 55
End: 2020-10-26

## 2020-10-26 DIAGNOSIS — G44.019 EPISODIC CLUSTER HEADACHE, NOT INTRACTABLE: ICD-10-CM

## 2020-10-27 ENCOUNTER — CLINICAL SUPPORT (OUTPATIENT)
Dept: REHABILITATION | Facility: HOSPITAL | Age: 55
End: 2020-10-27
Attending: ORTHOPAEDIC SURGERY
Payer: OTHER GOVERNMENT

## 2020-10-27 DIAGNOSIS — R29.898 DECREASED GRIP STRENGTH OF LEFT HAND: ICD-10-CM

## 2020-10-27 DIAGNOSIS — Z78.9 IMPAIRED INSTRUMENTAL ACTIVITIES OF DAILY LIVING (IADL): ICD-10-CM

## 2020-10-27 DIAGNOSIS — M62.81 MUSCLE WEAKNESS OF LEFT ARM: ICD-10-CM

## 2020-10-27 DIAGNOSIS — M25.60 RANGE OF MOTION DEFICIT: ICD-10-CM

## 2020-10-27 DIAGNOSIS — M79.602 PAIN OF LEFT ARM: ICD-10-CM

## 2020-10-27 PROCEDURE — 97110 THERAPEUTIC EXERCISES: CPT | Mod: PN

## 2020-10-27 NOTE — PROGRESS NOTES
Occupational Therapy Treatment Note     Date: 10/27/2020  Name: Enoch Barr  Clinic Number: [id number]    Therapy Diagnosis:   Encounter Diagnoses   Name Primary?    Range of motion deficit     Impaired instrumental activities of daily living (IADL)     Muscle weakness of left arm     Pain of left arm     Decreased  strength of left hand      Physician: Keysha Galvez MD     Physician Orders: Eval and Treat  Medical Diagnosis: Rupture of triceps tendon, left, subsequent encounter  Surgical Procedure and Date: left tricep repair, 10/2/20  Evaluation Date: 10/20/2020  Insurance Authorization Period Expiration: 12/31/20  Plan of Care Certification Period: 10/20/20 - 1/12/21  Date of Return to MD: 11/12/20     Visit # / Visits authorized: 1 / 20    Time In: 335 pm  Time Out: 4 pm  Total Billable Time: 20 minutes    Precautions:  Standard      Subjective     Pt reports: I have been doing my exercises. I still just feel swollen at my elbow  he was compliant with home exercise program given last session.   Response to previous treatment:increased ROM  Functional change: improved ROM    Pain: 2/10  Location: left ankles and elbow      Objective       Enoch received therapeutic exercises for 20 minutes including:  AROM for flexion to 82  PROM dalgle for extension   AROM wrist flexion and extension 20x  Pronation and supination 20x    Measurements:  -AROM Supination 60  -Active elbow flexion 82  -Passive elbow extension -20        Home Exercises and Education Provided     Education provided:   - on Friday at 4 weks increase flexion to 120  Continue no active forceful elbow extension  May use ice  Scar massage   - Progress towards goals     Written Home Exercises Provided: Patient instructed to cont prior HEP.  Exercises were reviewed and Enoch was able to demonstrate them prior to the end of the session.  Enoch demonstrated good  understanding of the HEP provided.   .   See EMR under Patient Instructions for  exercises provided prior visit.        Assessment     Pt would continue to benefit from skilled OT. Pt has progressed with elbow flexion, extension and supination.  He is compliant with brace wear. He understands to increase brace flexion to 120 at 4 weeks and knows how to do this. Demonstrated in clinic.     Enoch is progressing well towards his goals and there are no updates to goals at this time. Pt prognosis is Excellent.     Pt will continue to benefit from skilled outpatient occupational therapy to address the deficits listed in the problem list on initial evaluation provide pt/family education and to maximize pt's level of independence in the home and community environment.     Anticipated barriers to occupational therapy: NA    Pt's spiritual, cultural and educational needs considered and pt agreeable to plan of care and goals.     Goals:   Short term goals to be met in 6 weeks:  (12/1/20)  Patient to be IND with HEP and modalities for pain/edema managment.   Increase wrist AROM 10 degrees, increased elbow active flexion to 90 to increase functional hand use for ADLs.    Patient to be IND with Orthotic use, wear and care precautions.   Assess  and pinch strength and arm strength when appropriate     Long term goals to be met by d/c:  Pt will increase active elbow flexion to 125 and extension to -10 to allow functional use of left arm with self care tasks  Pt will increase strength of left elbow to allow for carrying household items and return to his leisure tasks  , pinch goals to be established.  Pt to achieve FOTO score to 31%     Plan   Certification Period/Plan of care expiration: 10/20/2020 to 1/12/21.     Outpatient Occupational Therapy 2 times weekly for 12 weeks to include the following interventions: Manual therapy/joint mobilizations, Modalities for pain management, US 3 mhz, Therapeutic exercises/activities., Strengthening, Edema Control and Scar Management  Updates/Grading for next  session: Brace increased to 120 flexion. Cont per protocol      RAHUL Becker

## 2020-10-28 RX ORDER — GALCANEZUMAB 120 MG/ML
INJECTION, SOLUTION SUBCUTANEOUS
Qty: 1 ML | Refills: 2 | Status: SHIPPED | OUTPATIENT
Start: 2020-10-28 | End: 2021-02-04

## 2020-10-29 ENCOUNTER — HOSPITAL ENCOUNTER (OUTPATIENT)
Dept: WOUND CARE | Facility: HOSPITAL | Age: 55
Discharge: HOME | End: 2020-10-29
Attending: INTERNAL MEDICINE
Payer: OTHER GOVERNMENT

## 2020-10-29 VITALS
HEART RATE: 80 BPM | TEMPERATURE: 98 F | SYSTOLIC BLOOD PRESSURE: 143 MMHG | DIASTOLIC BLOOD PRESSURE: 92 MMHG | RESPIRATION RATE: 18 BRPM

## 2020-10-29 DIAGNOSIS — S40.811A ABRASION OF RIGHT ARM: Primary | ICD-10-CM

## 2020-10-29 PROCEDURE — 11042 DBRDMT SUBQ TIS 1ST 20SQCM/<: CPT | Performed by: INTERNAL MEDICINE

## 2020-10-29 PROCEDURE — 99214 PR OFFICE/OUTPT VISIT, EST, LEVL IV, 30-39 MIN: ICD-10-PCS | Mod: 25,,, | Performed by: INTERNAL MEDICINE

## 2020-10-29 PROCEDURE — 99214 OFFICE O/P EST MOD 30 MIN: CPT | Mod: 25,,, | Performed by: INTERNAL MEDICINE

## 2020-10-29 PROCEDURE — 11042 DBRDMT SUBQ TIS 1ST 20SQCM/<: CPT | Mod: ,,, | Performed by: INTERNAL MEDICINE

## 2020-10-29 PROCEDURE — 11042 WOUND DEBRIDEMENT: ICD-10-PCS | Mod: ,,, | Performed by: INTERNAL MEDICINE

## 2020-10-29 PROCEDURE — 27201912 HC WOUND CARE DEBRIDEMENT SUPPLIES: Performed by: INTERNAL MEDICINE

## 2020-10-29 NOTE — PROGRESS NOTES
[facility]                Progress Note    Subjective:       Patient ID: Enoch Barr is a 55 y.o. male.    Chief Complaint: Wound Check    F/u wound care visit. Patient ambulatory to exam room with no difficulty. Denies pain at present time, states he does have some burning pain in right elbow at times. Wound to right elbow re-opened, slough noted to wound bed. Steristrips intact to left medial elbow, and left lateral elbow improving. RTC in two weeks.    Right elbow foam border had drainage four days ago prompting removal no further pain or drainage since that time       Review of Systems   Constitutional: Negative for activity change, fever and unexpected weight change.   HENT: Negative for congestion, rhinorrhea, sore throat and trouble swallowing.    Eyes: Negative for photophobia and redness.   Respiratory: Negative for cough, chest tightness, shortness of breath and wheezing.    Cardiovascular: Negative for chest pain, palpitations and leg swelling.   Gastrointestinal: Negative for abdominal pain, blood in stool, constipation, diarrhea, nausea and vomiting.   Endocrine: Negative for cold intolerance, heat intolerance and polyuria.   Genitourinary: Negative for decreased urine volume, difficulty urinating, dysuria and urgency.   Musculoskeletal: Negative for arthralgias and back pain.   Skin: Negative for rash.   Neurological: Negative for dizziness, syncope, weakness and headaches.   Psychiatric/Behavioral: Negative for dysphoric mood, sleep disturbance and suicidal ideas.         Objective:        Physical Exam  Constitutional:       General: He is not in acute distress.  HENT:      Head: Normocephalic and atraumatic.   Eyes:      General: No scleral icterus.  Pulmonary:      Effort: Pulmonary effort is normal. No respiratory distress.   Abdominal:      General: There is no distension.      Palpations: Abdomen is soft.   Skin:     Comments: Right elbow with  pale slough centrally no fluctuance no exposed tendon/bone/muscle   Neurological:      Mental Status: He is alert and oriented to person, place, and time.      Gait: Gait normal.         Vitals:    10/29/20 1310   BP: (!) 143/92   Pulse: 80   Resp: 18   Temp: 97.7 °F (36.5 °C)       Assessment:           ICD-10-CM ICD-9-CM   1. Abrasion of right arm  S40.811A 912.0            Wound 09/24/20 1546 Traumatic Left medial Elbow (Active)   09/24/20 1546    Pre-existing: Yes   Primary Wound Type: Traumatic   Side: Left   Orientation: medial   Location: Elbow   Wound Number (optional):    Ankle-Brachial Index:    Pulses:    Removal Indication and Assessment:    Wound Outcome:    (Retired) Wound Type:    (Retired) Wound Length (cm):    (Retired) Wound Width (cm):    (Retired) Depth (cm):    Wound Description (Comments):    Removal Indications:    Wound Image   10/29/20 1300   Wound WDL ex 10/29/20 1300   Dressing Appearance Dry;Intact 10/29/20 1300   Drainage Amount None 10/29/20 1300   Appearance Dressing in place, unable to visualize 10/29/20 1300   Wound Length (cm) 0 cm 10/29/20 1300   Wound Width (cm) 0 cm 10/29/20 1300   Wound Depth (cm) 0 cm 10/29/20 1300   Wound Volume (cm^3) 0 cm^3 10/29/20 1300   Wound Surface Area (cm^2) 0 cm^2 10/29/20 1300   Dressing Applied;Elastic bandage 10/29/20 1300            Wound 09/24/20 1546 Traumatic Left lateral Arm (Active)   09/24/20 1546    Pre-existing: Yes   Primary Wound Type: Traumatic   Side: Left   Orientation: lateral   Location: Arm   Wound Number (optional):    Ankle-Brachial Index:    Pulses:    Removal Indication and Assessment:    Wound Outcome:    (Retired) Wound Type:    (Retired) Wound Length (cm):    (Retired) Wound Width (cm):    (Retired) Depth (cm):    Wound Description (Comments):    Removal Indications:    Wound Image   10/29/20 1300   Wound WDL WDL 10/29/20 1300   Dressing Appearance Dry;Intact 10/29/20 1300   Drainage Amount None 10/29/20 1300   Appearance  Red 10/29/20 1300   Tissue loss description Partial thickness 10/29/20 1300   Black (%), Wound Tissue Color 0 % 10/29/20 1300   Red (%), Wound Tissue Color 100 % 10/29/20 1300   Yellow (%), Wound Tissue Color 0 % 10/29/20 1300   Periwound Area Redness 10/29/20 1300   Wound Edges Open 10/29/20 1300   Wound Length (cm) 0.5 cm 10/29/20 1300   Wound Width (cm) 0.3 cm 10/29/20 1300   Wound Depth (cm) 0.1 cm 10/29/20 1300   Wound Volume (cm^3) 0.02 cm^3 10/29/20 1300   Wound Surface Area (cm^2) 0.15 cm^2 10/29/20 1300   Care Cleansed with:;Sterile normal saline 10/29/20 1300   Dressing Applied;Collagen;Elastic bandage 10/29/20 1300   Dressing Change Due 11/05/20 10/29/20 1300            Wound 10/29/20 1314 Traumatic Right medial Elbow (Active)   10/29/20 1314    Pre-existing:    Primary Wound Type: Traumatic   Side: Right   Orientation: medial   Location: Elbow   Wound Number (optional):    Ankle-Brachial Index:    Pulses:    Removal Indication and Assessment:    Wound Outcome:    (Retired) Wound Type:    (Retired) Wound Length (cm):    (Retired) Wound Width (cm):    (Retired) Depth (cm):    Wound Description (Comments):    Removal Indications:    Wound Image   10/29/20 1300   Wound WDL ex 10/29/20 1300   Dressing Appearance Intact;Dry 10/29/20 1300   Drainage Amount None 10/29/20 1300   Appearance Pink;Red;Yellow 10/29/20 1300   Tissue loss description Partial thickness 10/29/20 1300   Black (%), Wound Tissue Color 0 % 10/29/20 1300   Red (%), Wound Tissue Color 90 % 10/29/20 1300   Yellow (%), Wound Tissue Color 10 % 10/29/20 1300   Periwound Area Redness 10/29/20 1300   Wound Edges Approximated 10/29/20 1300   Wound Length (cm) 2 cm 10/29/20 1300   Wound Width (cm) 0.5 cm 10/29/20 1300   Wound Depth (cm) 0.1 cm 10/29/20 1300   Wound Volume (cm^3) 0.1 cm^3 10/29/20 1300   Wound Surface Area (cm^2) 1 cm^2 10/29/20 1300   Care Cleansed with:;Sterile normal saline 10/29/20 1300   Dressing Applied;Collagen 10/29/20 1300    Periwound Care Skin barrier film applied 10/29/20 1300   Dressing Change Due 11/05/20 10/29/20 1300           Plan:            Debridement of right elbow done today add endoform for collagen formation continue foam border return in two weeks    Orders Placed This Encounter   Procedures    Change dressing     Clean with NS. Right elbow-apply cavilon to periwound, endoform to wound bed, tegafoam. Left lateral elbow-endoform, ace bandage.        Follow up in about 2 weeks (around 11/12/2020) for wound care.

## 2020-10-29 NOTE — PROCEDURES
"Wound Debridement    Date/Time: 10/29/2020 1:25 PM  Performed by: Stiven Sawyer MD  Authorized by: Stiven Sawyer MD     Time out: Immediately prior to procedure a "time out" was called to verify the correct patient, procedure, equipment, support staff and site/side marked as required.    Consent Done?:  Yes (Verbal)  Local anesthetic:  Topical anesthetic    Wound Details:    Location:  Right elbow    Type of Debridement:  Excisional       Length (cm):  2       Area (sq cm):  1       Width (cm):  0.5       Percent Debrided (%):  100       Depth (cm):  0.1       Total Area Debrided (sq cm):  1    Depth of debridement:  Subcutaneous tissue    Tissue debrided:  Subcutaneous    Devitalized tissue debrided:  Biofilm and Slough    Instruments:  Curette    Bleeding:  Moderate  Hemostasis Achieved: Yes    Method Used:  Pressure  Patient tolerance:  Patient tolerated the procedure well with no immediate complications      "

## 2020-11-02 ENCOUNTER — PATIENT MESSAGE (OUTPATIENT)
Dept: ORTHOPEDICS | Facility: CLINIC | Age: 55
End: 2020-11-02

## 2020-11-02 RX ORDER — OXYCODONE AND ACETAMINOPHEN 5; 325 MG/1; MG/1
1 TABLET ORAL EVERY 4 HOURS PRN
Qty: 20 TABLET | Refills: 0 | Status: SHIPPED | OUTPATIENT
Start: 2020-11-02 | End: 2020-11-18 | Stop reason: ALTCHOICE

## 2020-11-02 RX ORDER — METHOCARBAMOL 500 MG/1
500 TABLET, FILM COATED ORAL 3 TIMES DAILY PRN
Qty: 21 TABLET | Refills: 0 | Status: SHIPPED | OUTPATIENT
Start: 2020-11-02 | End: 2020-11-09

## 2020-11-05 ENCOUNTER — PATIENT MESSAGE (OUTPATIENT)
Dept: OPTOMETRY | Facility: CLINIC | Age: 55
End: 2020-11-05

## 2020-11-10 ENCOUNTER — CLINICAL SUPPORT (OUTPATIENT)
Dept: REHABILITATION | Facility: HOSPITAL | Age: 55
End: 2020-11-10
Attending: ORTHOPAEDIC SURGERY
Payer: OTHER GOVERNMENT

## 2020-11-10 ENCOUNTER — PATIENT OUTREACH (OUTPATIENT)
Dept: ADMINISTRATIVE | Facility: OTHER | Age: 55
End: 2020-11-10
Payer: OTHER GOVERNMENT

## 2020-11-10 DIAGNOSIS — Z78.9 IMPAIRED INSTRUMENTAL ACTIVITIES OF DAILY LIVING (IADL): ICD-10-CM

## 2020-11-10 DIAGNOSIS — M62.81 MUSCLE WEAKNESS OF LEFT ARM: ICD-10-CM

## 2020-11-10 DIAGNOSIS — M25.60 RANGE OF MOTION DEFICIT: Primary | ICD-10-CM

## 2020-11-10 DIAGNOSIS — M79.602 PAIN OF LEFT ARM: ICD-10-CM

## 2020-11-10 DIAGNOSIS — R29.898 DECREASED GRIP STRENGTH OF LEFT HAND: ICD-10-CM

## 2020-11-10 PROCEDURE — 97110 THERAPEUTIC EXERCISES: CPT | Mod: PN

## 2020-11-10 NOTE — PROGRESS NOTES
Occupational Therapy Treatment Note     Date: 11/10/2020  Name: Enoch Barr  [facility] Number: [id number]    Therapy Diagnosis:   Encounter Diagnoses   Name Primary?    Range of motion deficit Yes    Impaired instrumental activities of daily living (IADL)     Muscle weakness of left arm     Pain of left arm     Decreased  strength of left hand      Physician: Keysha Galvez MD     Physician Orders: Eval and Treat  Medical Diagnosis: Rupture of triceps tendon, left, subsequent encounter  Surgical Procedure and Date: left tricep repair, 10/2/20  Evaluation Date: 10/20/2020  Insurance Authorization Period Expiration: 12/31/20  Plan of Care Certification Period: 10/20/20 - 1/12/21  Date of Return to MD: 11/12/20     Visit # / Visits authorized: 2 / 20    Time In: 3:32 pm  Time Out:  4:00 pm  Total Billable Time: 28 minutes    Precautions:  Standard    5 weeks post op  protocol      2 -4 weeks: brace at all times 0-90   No passive flexion beyond 90   No resisted elbow extension   Supination and pronation and active flexion OK  Passive extension OK      4 -6 weeks:   0-120 in brace   No passive flexion beyond 120   No resisted elbow extension      6-12 weeks   Brace removed at 6 weeks   FROM Ok   No heavy lifting or painful activity     Subjective     Pt reports: My shoulder gets tired of holding my arm   he was compliant with home exercise program given last session.   Response to previous treatment: increased ROM  Functional change: none reported    Pain: 1/10  Location: left  elbow      Objective       Enoch received therapeutic exercises for 28 minutes including:  Passive stretch of elbow into flexion x 15 reps  Active wrist ext/flexion with fisted hand x 15 reps  Active supination/pronation x 15 reps    Measurements:  10/27/20                                     11/10/20  -AROM Supination 60                Active supination 70  -Active elbow flexion 82             Passive 98    Active 96    -Passive  elbow extension -20   Passive -25    Enoch declined ice application.     Home Exercises and Education Provided     Education provided:   Continue no active forceful elbow extension  May use ice  Scar massage   - Progress towards goals     Written Home Exercises Provided: Patient instructed to cont prior HEP.  Exercises were reviewed and Enoch was able to demonstrate them prior to the end of the session.  Enoch demonstrated good  understanding of the HEP provided.   .   See EMR under Patient Instructions for exercises provided 10/20/20.        Assessment     Pt would continue to benefit from skilled OT. Enoch is demonstrating improvement in passive and active elbow flexion, improvements in supination as well.    He is compliant with brace wear. He understands to increase brace flexion to 120 at 4 weeks and knows how to do this. Demonstrated in clinic.     Enoch is progressing well towards his goals and there are no updates to goals at this time. Pt prognosis is Excellent.     Pt will continue to benefit from skilled outpatient occupational therapy to address the deficits listed in the problem list on initial evaluation provide pt/family education and to maximize pt's level of independence in the home and community environment.     Anticipated barriers to occupational therapy: NA    Pt's spiritual, cultural and educational needs considered and pt agreeable to plan of care and goals.     Goals:   Short term goals to be met in 6 weeks:  (12/1/20)  Patient to be IND with HEP and modalities for pain/edema managment.   Increase wrist AROM 10 degrees, increased elbow active flexion to 90 to increase functional hand use for ADLs.    Patient to be IND with Orthotic use, wear and care precautions.   Assess  and pinch strength and arm strength when appropriate     Long term goals to be met by d/c:  Pt will increase active elbow flexion to 125 and extension to -10 to allow functional use of left arm with self care tasks  Pt  will increase strength of left elbow to allow for carrying household items and return to his leisure tasks  , pinch goals to be established.  Pt to achieve FOTO score to 31%     Plan   Certification Period/Plan of care expiration: 10/20/2020 to 1/12/21.     Outpatient Occupational Therapy 2 times weekly for 12 weeks to include the following interventions: Manual therapy/joint mobilizations, Modalities for pain management, US 3 mhz, Therapeutic exercises/activities., Strengthening, Edema Control and Scar Management  Updates/Grading for next session: Brace increased to 120 flexion. Cont per protocol      RAHUL Escalera

## 2020-11-11 ENCOUNTER — OFFICE VISIT (OUTPATIENT)
Dept: OPTOMETRY | Facility: CLINIC | Age: 55
End: 2020-11-11
Payer: OTHER GOVERNMENT

## 2020-11-11 DIAGNOSIS — H52.7 REFRACTIVE ERROR: Primary | ICD-10-CM

## 2020-11-11 PROCEDURE — 99999 PR PBB SHADOW E&M-EST. PATIENT-LVL II: CPT | Mod: PBBFAC,,, | Performed by: OPTOMETRIST

## 2020-11-11 PROCEDURE — 92015 PR REFRACTION: ICD-10-PCS | Mod: ,,, | Performed by: OPTOMETRIST

## 2020-11-11 PROCEDURE — 92015 DETERMINE REFRACTIVE STATE: CPT | Mod: ,,, | Performed by: OPTOMETRIST

## 2020-11-11 PROCEDURE — 99999 PR PBB SHADOW E&M-EST. PATIENT-LVL II: ICD-10-PCS | Mod: PBBFAC,,, | Performed by: OPTOMETRIST

## 2020-11-11 PROCEDURE — 99212 OFFICE O/P EST SF 10 MIN: CPT | Mod: PBBFAC,PO | Performed by: OPTOMETRIST

## 2020-11-11 NOTE — PROGRESS NOTES
Subjective:       Patient ID: Enoch Barr is a 55 y.o. male      Chief Complaint   Patient presents with    Concerns About Ocular Health     History of Present Illness  Dls: 10/5/20 Dr. Chapman     54 y/o male presents today for refraction.   Pt states no changes in vision. Pt wears pal's.   Pt wants a new rx for glasses.      Assessment/Plan:     1. Refractive error  Educated patient on refractive error and discussed lens options. Dispensed updated spectacle Rx. Educated about adaptation period to new specs.    Eyeglass Final Rx     Eyeglass Final Rx       Sphere Cylinder Axis Add    Right -0.75 +0.75 155 +2.25    Left -0.50 +0.50 165 +2.25    Expiration Date: 11/12/2021

## 2020-11-12 ENCOUNTER — HOSPITAL ENCOUNTER (OUTPATIENT)
Dept: WOUND CARE | Facility: HOSPITAL | Age: 55
Discharge: HOME | End: 2020-11-12
Attending: INTERNAL MEDICINE
Payer: OTHER GOVERNMENT

## 2020-11-12 ENCOUNTER — OFFICE VISIT (OUTPATIENT)
Dept: ORTHOPEDICS | Facility: CLINIC | Age: 55
End: 2020-11-12
Payer: OTHER GOVERNMENT

## 2020-11-12 VITALS
TEMPERATURE: 97 F | RESPIRATION RATE: 20 BRPM | DIASTOLIC BLOOD PRESSURE: 84 MMHG | SYSTOLIC BLOOD PRESSURE: 137 MMHG | HEART RATE: 107 BPM

## 2020-11-12 VITALS
SYSTOLIC BLOOD PRESSURE: 132 MMHG | WEIGHT: 212.5 LBS | RESPIRATION RATE: 18 BRPM | OXYGEN SATURATION: 98 % | HEART RATE: 77 BPM | DIASTOLIC BLOOD PRESSURE: 88 MMHG | BODY MASS INDEX: 28.78 KG/M2 | HEIGHT: 72 IN

## 2020-11-12 DIAGNOSIS — S40.811D ABRASION OF RIGHT UPPER EXTREMITY, SUBSEQUENT ENCOUNTER: Primary | ICD-10-CM

## 2020-11-12 DIAGNOSIS — S46.312D RUPTURE OF TRICEPS TENDON, LEFT, SUBSEQUENT ENCOUNTER: Primary | ICD-10-CM

## 2020-11-12 PROCEDURE — 99212 OFFICE O/P EST SF 10 MIN: CPT | Performed by: INTERNAL MEDICINE

## 2020-11-12 PROCEDURE — 99999 PR PBB SHADOW E&M-EST. PATIENT-LVL IV: ICD-10-PCS | Mod: PBBFAC,,, | Performed by: ORTHOPAEDIC SURGERY

## 2020-11-12 PROCEDURE — 99024 POSTOP FOLLOW-UP VISIT: CPT | Mod: ,,, | Performed by: ORTHOPAEDIC SURGERY

## 2020-11-12 PROCEDURE — 99214 OFFICE O/P EST MOD 30 MIN: CPT | Mod: PBBFAC,27,PN | Performed by: ORTHOPAEDIC SURGERY

## 2020-11-12 PROCEDURE — 99024 PR POST-OP FOLLOW-UP VISIT: ICD-10-PCS | Mod: ,,, | Performed by: ORTHOPAEDIC SURGERY

## 2020-11-12 PROCEDURE — 99213 OFFICE O/P EST LOW 20 MIN: CPT | Mod: ,,, | Performed by: INTERNAL MEDICINE

## 2020-11-12 PROCEDURE — 99213 PR OFFICE/OUTPT VISIT, EST, LEVL III, 20-29 MIN: ICD-10-PCS | Mod: ,,, | Performed by: INTERNAL MEDICINE

## 2020-11-12 PROCEDURE — 99999 PR PBB SHADOW E&M-EST. PATIENT-LVL IV: CPT | Mod: PBBFAC,,, | Performed by: ORTHOPAEDIC SURGERY

## 2020-11-12 NOTE — PROGRESS NOTES
Postoperative Visit     History of Present Illness:   Enoch is 6 weeks s/p left Triceps repair  (DOS-10/2/20)  He is doing well -- pain is well controlled and He is continuing to take narcotic pain medications   He is compliant with activity restrictions and is wearing his brace  Doing OT      Physical Examination:    NAD  left upper extremity:  Incision over the elbow is well healed. Traumatic wounds healing well   P/AROM   No erythema, drainage or other signs of infection. Appropriate post operative swelling is present  LTSI m/u/r  2+ RP  + EPL, IO, FDS, FDP         Assessment/Plan:  55 y.o. male 6 weeks s/p left Triceps repair  (DOS-10/2/20)     Plan  - Ok to dc brace  - Motion as tolerated  - Protocol below      All questions were answered in detail. The patient is in full agreement with the treatment plan and will proceed accordingly.     PT protocol       6-12 weeks   Brace removed at 6 weeks   FROM Ok   No heavy lifting or painful activity

## 2020-11-12 NOTE — PROGRESS NOTES
[facility]                Progress Note    Subjective:       Patient ID: Enoch Barr is a 55 y.o. male.    Chief Complaint: Wound Check    F/u wound care visit. Patient ambulatory to exam room, no c/o pain at present. Wound dressing to right elbow intact, no drainage noted. All wounds healed. Patient informed to notify [facility] of any problems or concerns, patient verbalized understanding.    No open wounds       Review of Systems   Constitutional: Negative for activity change, appetite change, fatigue, fever and unexpected weight change.   HENT: Negative for ear pain, rhinorrhea and sore throat.    Eyes: Negative for discharge and visual disturbance.   Respiratory: Negative for chest tightness, shortness of breath and wheezing.    Cardiovascular: Negative for chest pain, palpitations and leg swelling.   Gastrointestinal: Negative for abdominal pain, constipation and diarrhea.   Endocrine: Negative for cold intolerance and heat intolerance.   Genitourinary: Negative for dysuria and hematuria.   Musculoskeletal: Negative for joint swelling and neck stiffness.   Skin: Negative for rash.   Neurological: Negative for dizziness, syncope, weakness and headaches.   Psychiatric/Behavioral: Negative for suicidal ideas.         Objective:        Physical Exam  Constitutional:       General: He is not in acute distress.     Appearance: Normal appearance.   HENT:      Head: Normocephalic and atraumatic.   Eyes:      General: No scleral icterus.  Pulmonary:      Effort: Pulmonary effort is normal. No respiratory distress.   Skin:     Comments: Intact epithelium to left lateral elbow   Neurological:      General: No focal deficit present.      Mental Status: He is alert and oriented to person, place, and time.   Psychiatric:         Mood and Affect: Mood normal.         Behavior: Behavior normal.         Thought Content: Thought content normal.         Vitals:    11/12/20  1305   BP: 137/84   Pulse: 107   Resp: 20   Temp: 97.2 °F (36.2 °C)       Assessment:         No diagnosis found.         Wound 09/24/20 1546 Traumatic Left medial Elbow (Active)   09/24/20 1546    Pre-existing: Yes   Primary Wound Type: Traumatic   Side: Left   Orientation: medial   Location: Elbow   Wound Number (optional):    Ankle-Brachial Index:    Pulses:    Removal Indication and Assessment:    Wound Outcome:    (Retired) Wound Type:    (Retired) Wound Length (cm):    (Retired) Wound Width (cm):    (Retired) Depth (cm):    Wound Description (Comments):    Removal Indications:    Wound Image   11/12/20 1300   Wound WDL WDL 11/12/20 1300   Dressing Appearance No dressing 11/12/20 1300   Drainage Amount None 11/12/20 1300   Appearance Red 11/12/20 1300   Tissue loss description Not applicable 11/12/20 1300   Black (%), Wound Tissue Color 0 % 11/12/20 1300   Red (%), Wound Tissue Color 0 % 11/12/20 1300   Yellow (%), Wound Tissue Color 0 % 11/12/20 1300   Periwound Area Dry 11/12/20 1300   Wound Edges Other (see comments) 11/12/20 1300   Wound Length (cm) 0 cm 11/12/20 1300   Wound Width (cm) 0 cm 11/12/20 1300   Wound Depth (cm) 0 cm 11/12/20 1300   Wound Volume (cm^3) 0 cm^3 11/12/20 1300   Wound Surface Area (cm^2) 0 cm^2 11/12/20 1300   Care Cleansed with:;Sterile normal saline 11/12/20 1300            Wound 09/24/20 1546 Traumatic Left lateral Arm (Active)   09/24/20 1546    Pre-existing: Yes   Primary Wound Type: Traumatic   Side: Left   Orientation: lateral   Location: Arm   Wound Number (optional):    Ankle-Brachial Index:    Pulses:    Removal Indication and Assessment:    Wound Outcome:    (Retired) Wound Type:    (Retired) Wound Length (cm):    (Retired) Wound Width (cm):    (Retired) Depth (cm):    Wound Description (Comments):    Removal Indications:    Wound Image   11/12/20 1300   Wound WDL WDL 11/12/20 1300   Dressing Appearance Open to air 11/12/20 1300   Drainage Amount None 11/12/20 1300    Appearance Red 11/12/20 1300   Tissue loss description Not applicable 11/12/20 1300   Black (%), Wound Tissue Color 0 % 11/12/20 1300   Red (%), Wound Tissue Color 0 % 11/12/20 1300   Yellow (%), Wound Tissue Color 0 % 11/12/20 1300   Periwound Area Dry;Intact 11/12/20 1300   Wound Edges Other (see comments) 11/12/20 1300   Wound Length (cm) 0 cm 11/12/20 1300   Wound Width (cm) 0 cm 11/12/20 1300   Wound Depth (cm) 0 cm 11/12/20 1300   Wound Volume (cm^3) 0 cm^3 11/12/20 1300   Wound Surface Area (cm^2) 0 cm^2 11/12/20 1300   Care Cleansed with:;Sterile normal saline 11/12/20 1300            Wound 10/29/20 1314 Traumatic Right medial Elbow (Active)   10/29/20 1314    Pre-existing:    Primary Wound Type: Traumatic   Side: Right   Orientation: medial   Location: Elbow   Wound Number (optional):    Ankle-Brachial Index:    Pulses:    Removal Indication and Assessment:    Wound Outcome:    (Retired) Wound Type:    (Retired) Wound Length (cm):    (Retired) Wound Width (cm):    (Retired) Depth (cm):    Wound Description (Comments):    Removal Indications:    Wound Image   11/12/20 1300   Wound WDL WDL 11/12/20 1300   Dressing Appearance Intact;Clean 11/12/20 1300   Drainage Amount None 11/12/20 1300   Appearance Red 11/12/20 1300   Tissue loss description Not applicable 11/12/20 1300   Black (%), Wound Tissue Color 0 % 11/12/20 1300   Red (%), Wound Tissue Color 100 % 11/12/20 1300   Yellow (%), Wound Tissue Color 0 % 11/12/20 1300   Periwound Area Dry 11/12/20 1300   Wound Edges Other (see comments) 11/12/20 1300   Wound Length (cm) 0 cm 11/12/20 1300   Wound Width (cm) 0 cm 11/12/20 1300   Wound Depth (cm) 0 cm 11/12/20 1300   Wound Volume (cm^3) 0 cm^3 11/12/20 1300   Wound Surface Area (cm^2) 0 cm^2 11/12/20 1300   Care Cleansed with:;Sterile normal saline 11/12/20 1300           Plan:          wounds healed recommend daily moisturizing lotion to bilateral elbows discharge from wound care clinic      No orders of  the defined types were placed in this encounter.       Follow up if symptoms worsen or fail to improve.

## 2020-11-16 NOTE — PROGRESS NOTES
Occupational Therapy Treatment Note     Date: 11/17/2020  Name: Enoch Barr  Clinic Number: [id number]    Therapy Diagnosis:   Encounter Diagnoses   Name Primary?    Range of motion deficit Yes    Impaired instrumental activities of daily living (IADL)     Muscle weakness of left arm     Pain of left arm     Decreased  strength of left hand      Physician: Keysha Galvez MD     Physician Orders: Eval and Treat  Medical Diagnosis: Rupture of triceps tendon, left, subsequent encounter  Surgical Procedure and Date: left tricep repair, 10/2/20  Evaluation Date: 10/20/2020  Insurance Authorization Period Expiration: 12/31/20  Plan of Care Certification Period: 10/20/20 - 1/12/21  Date of Return to MD: 11/12/20     Visit # / Visits authorized: 4/ 20    Time In: 3:37 pm  Time Out:  4:13 pm  Total Billable Time: 33 minutes    Precautions:  Standard    5 weeks post op  protocol      2 -4 weeks: brace at all times 0-90   No passive flexion beyond 90   No resisted elbow extension   Supination and pronation and active flexion OK  Passive extension OK      4 -6 weeks:   0-120 in brace   No passive flexion beyond 120   No resisted elbow extension      6-12 weeks   Brace removed at 6 weeks     Post op visit 11/12/20  FROM Ok   No heavy lifting or painful activity     Subjective     Pt reports: I have been using my brace at night to sleep.  She said I could take it off.  he was compliant with home exercise program given 10/20/20   Response to previous treatment: increased ROM  Functional change: trying to hold pot still while he stirs, able to bathe right side better    Pain: 1/10  Location: left  elbow      Objective       Enoch received therapeutic exercises for 33  minutes including:  Active left elbow flexion with end range push  x 15 reps  Active elbow extension on table top x 10 reps  Active wrist ext/flexion with fisted hand off wedge x 15 reps             Wrist UD/RD fisted hand off wedge x 15 reps  Active  supination/pronation with red therabar x 15 reps  Active tricep kick backs x 10 reps  Active wrist circles CCW/CW 10 reps each  Active elbow flexion forearm supinated x 10 reps                                                 Pronated x 10 reps  Measurements:  10/27/20                                     11/10/20                              11/17/20  -AROM Supination 60                Active supination 70  -Active elbow flexion 82             Passive 98    Active 96   Active flex 105  -Passive elbow extension -20   Passive -25                     Active ext -5(gravity assisted)    Chris setting #2  Right 1 trial 110.6#  Left 1 trial 96.2#    Enoch received ice application to left elbow after contraindications cleared 8 minutes.     Home Exercises and Education Provided     Education provided:   Cautioned re: resistive use of arm with tasks, active motion only  Use of ice to help any pain or tightness    - Progress towards goals     Written Home Exercises Provided: Patient instructed to cont prior HEP.  Exercises were reviewed and Enoch was able to demonstrate them prior to the end of the session.  Enoch demonstrated good  understanding of the HEP provided.   .   See EMR under Patient Instructions for exercises provided 10/20/20.        Assessment     Pt would continue to benefit from skilled OT. Enoch is demonstrating improvement in active elbow flexion and extension. Improved self care function with left arm during bath.    Enoch is progressing well towards his goals and there are no updates to goals at this time. Pt prognosis is Excellent.     Pt will continue to benefit from skilled outpatient occupational therapy to address the deficits listed in the problem list on initial evaluation provide pt/family education and to maximize pt's level of independence in the home and community environment.     Anticipated barriers to occupational therapy: NA    Pt's spiritual, cultural and educational needs considered and pt  agreeable to plan of care and goals.     Goals:   Short term goals to be met in 6 weeks:  (12/1/20)  Patient to be IND with HEP and modalities for pain/edema managment.   Increase wrist AROM 10 degrees, increased elbow active flexion to 90 to increase functional hand use for ADLs.    Patient to be IND with Orthotic use, wear and care precautions.   Assess  and pinch strength and arm strength when appropriate     Long term goals to be met by d/c:  Pt will increase active elbow flexion to 125 and extension to -10 to allow functional use of left arm with self care tasks  Pt will increase strength of left elbow to allow for carrying household items and return to his leisure tasks  , pinch goals to be established.  Pt to achieve FOTO score to 31%     Plan   Certification Period/Plan of care expiration: 10/20/2020 to 1/12/21.     Outpatient Occupational Therapy 2 times weekly for 12 weeks to include the following interventions: Manual therapy/joint mobilizations, Modalities for pain management, US 3 mhz, Therapeutic exercises/activities., Strengthening, Edema Control and Scar Management  Updates/Grading for next session: Brace increased to 120 flexion. Cont per protocol      RAHUL Escalera

## 2020-11-17 ENCOUNTER — CLINICAL SUPPORT (OUTPATIENT)
Dept: REHABILITATION | Facility: HOSPITAL | Age: 55
End: 2020-11-17
Attending: ORTHOPAEDIC SURGERY
Payer: OTHER GOVERNMENT

## 2020-11-17 ENCOUNTER — OFFICE VISIT (OUTPATIENT)
Dept: ORTHOPEDICS | Facility: CLINIC | Age: 55
End: 2020-11-17
Payer: OTHER GOVERNMENT

## 2020-11-17 ENCOUNTER — PATIENT MESSAGE (OUTPATIENT)
Dept: ORTHOPEDICS | Facility: CLINIC | Age: 55
End: 2020-11-17

## 2020-11-17 ENCOUNTER — OFFICE VISIT (OUTPATIENT)
Dept: PAIN MEDICINE | Facility: CLINIC | Age: 55
End: 2020-11-17
Payer: OTHER GOVERNMENT

## 2020-11-17 VITALS
BODY MASS INDEX: 29.1 KG/M2 | HEART RATE: 74 BPM | WEIGHT: 216.06 LBS | BODY MASS INDEX: 29.26 KG/M2 | TEMPERATURE: 99 F | OXYGEN SATURATION: 98 % | SYSTOLIC BLOOD PRESSURE: 133 MMHG | WEIGHT: 214.81 LBS | DIASTOLIC BLOOD PRESSURE: 80 MMHG | HEIGHT: 72 IN | OXYGEN SATURATION: 96 % | HEART RATE: 80 BPM | DIASTOLIC BLOOD PRESSURE: 87 MMHG | SYSTOLIC BLOOD PRESSURE: 130 MMHG | RESPIRATION RATE: 18 BRPM | HEIGHT: 72 IN

## 2020-11-17 DIAGNOSIS — M51.36 DDD (DEGENERATIVE DISC DISEASE), LUMBAR: ICD-10-CM

## 2020-11-17 DIAGNOSIS — S46.312D RUPTURE OF TRICEPS TENDON, LEFT, SUBSEQUENT ENCOUNTER: Primary | ICD-10-CM

## 2020-11-17 DIAGNOSIS — M47.816 LUMBAR SPONDYLOSIS: Primary | ICD-10-CM

## 2020-11-17 DIAGNOSIS — Z78.9 IMPAIRED INSTRUMENTAL ACTIVITIES OF DAILY LIVING (IADL): ICD-10-CM

## 2020-11-17 DIAGNOSIS — R29.898 DECREASED GRIP STRENGTH OF LEFT HAND: ICD-10-CM

## 2020-11-17 DIAGNOSIS — M62.81 MUSCLE WEAKNESS OF LEFT ARM: ICD-10-CM

## 2020-11-17 DIAGNOSIS — M25.60 RANGE OF MOTION DEFICIT: Primary | ICD-10-CM

## 2020-11-17 DIAGNOSIS — M79.602 PAIN OF LEFT ARM: ICD-10-CM

## 2020-11-17 PROBLEM — D12.6 TUBULAR ADENOMA OF COLON: Status: ACTIVE | Noted: 2020-08-10

## 2020-11-17 PROCEDURE — 99999 PR PBB SHADOW E&M-EST. PATIENT-LVL IV: CPT | Mod: PBBFAC,,, | Performed by: REGISTERED NURSE

## 2020-11-17 PROCEDURE — 99999 PR PBB SHADOW E&M-EST. PATIENT-LVL IV: ICD-10-PCS | Mod: PBBFAC,,, | Performed by: REGISTERED NURSE

## 2020-11-17 PROCEDURE — 99024 POSTOP FOLLOW-UP VISIT: CPT | Mod: ,,, | Performed by: ORTHOPAEDIC SURGERY

## 2020-11-17 PROCEDURE — 99999 PR PBB SHADOW E&M-EST. PATIENT-LVL IV: CPT | Mod: PBBFAC,,, | Performed by: ORTHOPAEDIC SURGERY

## 2020-11-17 PROCEDURE — 99213 PR OFFICE/OUTPT VISIT, EST, LEVL III, 20-29 MIN: ICD-10-PCS | Mod: S$PBB,,, | Performed by: REGISTERED NURSE

## 2020-11-17 PROCEDURE — 99024 PR POST-OP FOLLOW-UP VISIT: ICD-10-PCS | Mod: ,,, | Performed by: ORTHOPAEDIC SURGERY

## 2020-11-17 PROCEDURE — 99214 OFFICE O/P EST MOD 30 MIN: CPT | Mod: PBBFAC,PN | Performed by: ORTHOPAEDIC SURGERY

## 2020-11-17 PROCEDURE — 99999 PR PBB SHADOW E&M-EST. PATIENT-LVL IV: ICD-10-PCS | Mod: PBBFAC,,, | Performed by: ORTHOPAEDIC SURGERY

## 2020-11-17 PROCEDURE — 99213 OFFICE O/P EST LOW 20 MIN: CPT | Mod: S$PBB,,, | Performed by: REGISTERED NURSE

## 2020-11-17 PROCEDURE — 97110 THERAPEUTIC EXERCISES: CPT | Mod: PN

## 2020-11-17 PROCEDURE — 99214 OFFICE O/P EST MOD 30 MIN: CPT | Mod: PBBFAC,27,PN | Performed by: REGISTERED NURSE

## 2020-11-17 RX ORDER — DICLOFENAC SODIUM 50 MG/1
50 TABLET, DELAYED RELEASE ORAL 2 TIMES DAILY
Qty: 60 TABLET | Refills: 0 | Status: SHIPPED | OUTPATIENT
Start: 2020-11-17 | End: 2020-12-18 | Stop reason: SDUPTHER

## 2020-11-17 NOTE — PROGRESS NOTES
Follow up visit    History of Present Illness:   Enoch comes to the office for follow up evaluation of bilateral upper extremities - left triceps repair ab out 6 weeks ago. Notes suture abscess that drained yesterday - no erythema or further drainage   Now having blistering over areas of road rash  C/o new L shoulder pain   Sleeping with brace on but not wearing during the day     ROS: unremarkable and no change since last visit    Physical Examination:    NAD  Right upper arm    Blistering over road rash. No sign of infection    Left upper arm   No erythema or drainage - no sign of infection  Blistering over road rash - no sign of infection   LTSI m/u/r  2+ RP  + EPL, IO, FDS, FDP     AROM 0-90, PROM 0-100    Radiographic imaging: no new     Assessment/Plan:  55 y.o. male  with Left triceps repair, suture abscess     We discussed the etiology of persistent pain and further treatment options.  1. Wound care for blistering   2. Will observe suture abscess- keep covered, let me know if it develops erythema or other signs of infection  3. Continue PT - can be more aggressive with ROM now  4. Keep previously scheduled follow up    All questions were answered in detail. The patient  verbalized the understanding of the treatment plan and is in full agreement with the treatment plan.
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
## 2020-11-18 ENCOUNTER — OFFICE VISIT (OUTPATIENT)
Dept: FAMILY MEDICINE | Facility: CLINIC | Age: 55
End: 2020-11-18
Payer: OTHER GOVERNMENT

## 2020-11-18 VITALS
DIASTOLIC BLOOD PRESSURE: 78 MMHG | WEIGHT: 216 LBS | TEMPERATURE: 98 F | SYSTOLIC BLOOD PRESSURE: 112 MMHG | OXYGEN SATURATION: 98 % | BODY MASS INDEX: 29.26 KG/M2 | HEIGHT: 72 IN | HEART RATE: 68 BPM

## 2020-11-18 DIAGNOSIS — R91.1 PULMONARY NODULE: ICD-10-CM

## 2020-11-18 DIAGNOSIS — Z23 NEEDS FLU SHOT: ICD-10-CM

## 2020-11-18 DIAGNOSIS — E78.2 MIXED HYPERLIPIDEMIA: ICD-10-CM

## 2020-11-18 DIAGNOSIS — G47.00 INSOMNIA, UNSPECIFIED TYPE: Primary | ICD-10-CM

## 2020-11-18 DIAGNOSIS — I10 ESSENTIAL HYPERTENSION: ICD-10-CM

## 2020-11-18 PROCEDURE — 99999 PR PBB SHADOW E&M-EST. PATIENT-LVL IV: CPT | Mod: PBBFAC,,, | Performed by: INTERNAL MEDICINE

## 2020-11-18 PROCEDURE — 90686 IIV4 VACC NO PRSV 0.5 ML IM: CPT | Mod: PBBFAC,PO

## 2020-11-18 PROCEDURE — 99214 OFFICE O/P EST MOD 30 MIN: CPT | Mod: S$PBB,,, | Performed by: INTERNAL MEDICINE

## 2020-11-18 PROCEDURE — 99999 PR PBB SHADOW E&M-EST. PATIENT-LVL IV: ICD-10-PCS | Mod: PBBFAC,,, | Performed by: INTERNAL MEDICINE

## 2020-11-18 PROCEDURE — 99214 PR OFFICE/OUTPT VISIT, EST, LEVL IV, 30-39 MIN: ICD-10-PCS | Mod: S$PBB,,, | Performed by: INTERNAL MEDICINE

## 2020-11-18 PROCEDURE — 99214 OFFICE O/P EST MOD 30 MIN: CPT | Mod: PBBFAC,PO,25 | Performed by: INTERNAL MEDICINE

## 2020-11-18 RX ORDER — TRAZODONE HYDROCHLORIDE 50 MG/1
TABLET ORAL
Qty: 30 TABLET | Refills: 2 | Status: SHIPPED | OUTPATIENT
Start: 2020-11-18 | End: 2021-01-18

## 2020-11-18 RX ORDER — LISINOPRIL AND HYDROCHLOROTHIAZIDE 12.5; 2 MG/1; MG/1
1 TABLET ORAL DAILY
Qty: 90 TABLET | Refills: 3 | Status: SHIPPED | OUTPATIENT
Start: 2020-11-18 | End: 2022-03-04 | Stop reason: SDUPTHER
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
## 2020-11-19 ENCOUNTER — HOSPITAL ENCOUNTER (OUTPATIENT)
Dept: WOUND CARE | Facility: HOSPITAL | Age: 55
Discharge: HOME | End: 2020-11-19
Attending: INTERNAL MEDICINE
Payer: OTHER GOVERNMENT

## 2020-11-19 VITALS — SYSTOLIC BLOOD PRESSURE: 134 MMHG | DIASTOLIC BLOOD PRESSURE: 94 MMHG | HEART RATE: 83 BPM | TEMPERATURE: 98 F

## 2020-11-19 PROCEDURE — 99213 PR OFFICE/OUTPT VISIT, EST, LEVL III, 20-29 MIN: ICD-10-PCS | Mod: ,,, | Performed by: INTERNAL MEDICINE

## 2020-11-19 PROCEDURE — 99213 OFFICE O/P EST LOW 20 MIN: CPT | Mod: ,,, | Performed by: INTERNAL MEDICINE

## 2020-11-19 PROCEDURE — 99213 OFFICE O/P EST LOW 20 MIN: CPT | Performed by: INTERNAL MEDICINE

## 2020-11-19 NOTE — PROGRESS NOTES
"[facility]                Progress Note    Subjective:       Patient ID: Enoch Barr is a 55 y.o. male.    Chief Complaint: No chief complaint on file.    Walked to clinic unaided. States had blisters on Sunday on left arm but now has thin layer of skin covering wound. Surgery site has no drainage, swelling or reness. No dressing needed will apply lotion to moisturize skin no need to return    Note expressible white drainge from left elbow at distal end of surgical scar five days ago no pain no further drainage since that time continues to progress with PT and home exercise program with improved range of motion. Saw "blister on right elbow at sight of previous abrasion. No drainage currenlty      Review of Systems   Constitutional: Negative for activity change, appetite change, fatigue, fever and unexpected weight change.   HENT: Negative for ear pain, rhinorrhea and sore throat.    Eyes: Negative for discharge and visual disturbance.   Respiratory: Negative for chest tightness, shortness of breath and wheezing.    Cardiovascular: Negative for chest pain, palpitations and leg swelling.   Gastrointestinal: Negative for abdominal pain, constipation and diarrhea.   Endocrine: Negative for cold intolerance and heat intolerance.   Genitourinary: Negative for dysuria and hematuria.   Musculoskeletal: Negative for joint swelling and neck stiffness.   Skin: Negative for rash.   Neurological: Negative for dizziness, syncope, weakness and headaches.   Psychiatric/Behavioral: Negative for suicidal ideas.         Objective:        Physical Exam  Constitutional:       Appearance: He is not ill-appearing or toxic-appearing.   HENT:      Head: Normocephalic and atraumatic.   Eyes:      General: No scleral icterus.  Pulmonary:      Effort: Pulmonary effort is normal. No respiratory distress.   Abdominal:      General: There is distension.      Palpations: Abdomen is soft.   Skin:    "  Comments: Left elbow intact skin no fluctuance mobile 1mm subcutaneous papule at sight of previously seen drainage   Neurological:      General: No focal deficit present.      Mental Status: He is alert and oriented to person, place, and time.   Psychiatric:         Mood and Affect: Mood normal.         Behavior: Behavior normal.         Thought Content: Thought content normal.         Vitals:    11/19/20 1558   BP: (!) 134/94   Pulse: 83   Temp: 97.7 °F (36.5 °C)       Assessment:         No diagnosis found.    [REMOVED]      Wound 09/24/20 1546 Traumatic Left medial Elbow (Removed)   09/24/20 1546    Pre-existing: Yes   Primary Wound Type: Traumatic   Side: Left   Orientation: medial   Location: Elbow   Wound Number (optional):    Ankle-Brachial Index:    Pulses:    Removal Indication and Assessment:    Wound Outcome: Healed   (Retired) Wound Type:    (Retired) Wound Length (cm):    (Retired) Wound Width (cm):    (Retired) Depth (cm):    Wound Description (Comments):    Removal Indications:    Removed 11/19/20 1620   Wound WDL ex 11/19/20 1600   Drainage Amount None 11/19/20 1600   Appearance Epithelialization 11/19/20 1600   Wound Length (cm) 0 cm 11/19/20 1600   Wound Width (cm) 0 cm 11/19/20 1600   Wound Depth (cm) 0 cm 11/19/20 1600   Wound Volume (cm^3) 0 cm^3 11/19/20 1600   Wound Surface Area (cm^2) 0 cm^2 11/19/20 1600       [REMOVED]      Wound 09/24/20 1546 Traumatic Left lateral Arm (Removed)   09/24/20 1546    Pre-existing: Yes   Primary Wound Type: Traumatic   Side: Left   Orientation: lateral   Location: Arm   Wound Number (optional):    Ankle-Brachial Index:    Pulses:    Removal Indication and Assessment:    Wound Outcome: Healed   (Retired) Wound Type:    (Retired) Wound Length (cm):    (Retired) Wound Width (cm):    (Retired) Depth (cm):    Wound Description (Comments):    Removal Indications:    Removed 11/19/20 1623   Wound Image   11/19/20 1600   Wound WDL WDL 11/19/20 1600   Drainage Amount  None 11/19/20 1600   Wound Length (cm) 0 cm 11/19/20 1600   Wound Width (cm) 0 cm 11/19/20 1600   Wound Depth (cm) 0 cm 11/19/20 1600   Wound Volume (cm^3) 0 cm^3 11/19/20 1600   Wound Surface Area (cm^2) 0 cm^2 11/19/20 1600       [REMOVED]      Wound 10/29/20 1314 Traumatic Right medial Elbow (Removed)   10/29/20 1314    Pre-existing:    Primary Wound Type: Traumatic   Side: Right   Orientation: medial   Location: Elbow   Wound Number (optional):    Ankle-Brachial Index:    Pulses:    Removal Indication and Assessment:    Wound Outcome: Healed   (Retired) Wound Type:    (Retired) Wound Length (cm):    (Retired) Wound Width (cm):    (Retired) Depth (cm):    Wound Description (Comments):    Removal Indications:    Removed 11/19/20 1626   Wound WDL WDL 11/19/20 1600   Wound Length (cm) 0 cm 11/19/20 1600   Wound Width (cm) 0 cm 11/19/20 1600   Wound Depth (cm) 0 cm 11/19/20 1600   Wound Volume (cm^3) 0 cm^3 11/19/20 1600   Wound Surface Area (cm^2) 0 cm^2 11/19/20 1600           Plan:          no open wound suspect suture granuloma from previous tricep repair as cause of previous drainage no evidence of cellulitis good AROM at elbow (with appropriate restrictions per ortho) recommend moisturizing cream twice daily. Follow up in wound care clinic if further drainage or new open wounds      No orders of the defined types were placed in this encounter.       Follow up if symptoms worsen or fail to improve.

## 2020-11-23 NOTE — PROGRESS NOTES
Occupational Therapy Progress Note     Date: 11/24/2020  Name: Enoch Barr  Clinic Number: [id number]    Therapy Diagnosis:   Encounter Diagnoses   Name Primary?    Range of motion deficit Yes    Impaired instrumental activities of daily living (IADL)     Muscle weakness of left arm     Pain of left arm     Decreased  strength of left hand      Physician: Keysha Galvez MD     Physician Orders: Eval and Treat  Medical Diagnosis: Rupture of triceps tendon, left, subsequent encounter  Surgical Procedure and Date: left tricep repair, 10/2/20  Evaluation Date: 10/20/2020  Insurance Authorization Period Expiration: 12/31/20  Plan of Care Certification Period: 10/20/20 - 1/12/21  Date of Return to MD: 11/12/20     Visit # / Visits authorized: 5/ 20    Time In: 3:25 pm  Time Out:  4:17 pm  Total Billable Time: 44 minutes    Precautions:  Standard    7 weeks post op  protocol      2 -4 weeks: brace at all times 0-90   No passive flexion beyond 90   No resisted elbow extension   Supination and pronation and active flexion OK  Passive extension OK      4 -6 weeks:   0-120 in brace   No passive flexion beyond 120   No resisted elbow extension      6-12 weeks   Brace removed at 6 weeks     Post op visit 11/12/20  FROM Ok   No heavy lifting or painful activity     Subjective     Pt reports: I have been working on a lathe a little bit.  My arm is feeling overall. I have some pain in my shoulder.  I am not sleeping in my brace anymore.  he was compliant with home exercise program given 10/20/20   Response to previous treatment: no more sore then usual  Functional change: trying to hold pot still while he stirs, able to bathe right side better    Pain: 1/10  Location: left  elbow      Objective       Enoch received therapeutic exercises for 44  minutes including:  Re Assessment:    Edema. Measured in centimeters.    10/20/2020 11/24/20 10/20/2020     Left Left  Right   2in. Above elbow 27.5 27.5 28.3   2in. Below  elbow 27.0 27.0 28.0    at elbow 27.5 28.5 28.3   Proximal Wrist Crease    27.8 17.5 27.8      AROM                 10/20/20    11/24/20       Left  Left  AROM Right    Elbow flexion  77  107 135   Elbow extension-passive -30  -10 +5   Supination  45  60 70   Pronation WFL   wFL   Wrist flexion 65  65 70   Wrist extension 65  70 70      Active elbow flexion and extension off wedge x 10 reps  Active left elbow flexion with end range push  x 10 reps  Active elbow extension with end range push x 10 reps  Hammer twist for supination/pronation x 10 reps  Hammer for UD/RD x 20 reps each  1# wrist ext/flexion  off wedge x 10 reps x 3 sets  Wrist maze x 5 reps with left hand        Ultra gripper setting #2 x 4 min    Enoch received ice application to left elbow after contraindications cleared 8 minutes.     Home Exercises and Education Provided     Education provided:   Cautioned re: resistive use of arm with tasks, active motion only  Use of ice to help any pain or tightness    - Progress towards goals     Written Home Exercises Provided: Patient instructed to cont prior HEP.  Exercises were reviewed and Enoch was able to demonstrate them prior to the end of the session.  Enoch demonstrated good  understanding of the HEP provided.   .   See EMR under Patient Instructions for exercises provided 10/20/20.        Assessment     Pt would continue to benefit from skilled OT. Enoch is demonstrating improvements in AROM as noted above. Mild pain in elbow but more so in his shoulder. Improving functional use of left hand during self care.    Enoch is progressing well towards his goals and there are no updates to goals at this time. Pt prognosis is Excellent.     Pt will continue to benefit from skilled outpatient occupational therapy to address the deficits listed in the problem list on initial evaluation provide pt/family education and to maximize pt's level of independence in the home and community environment.     Anticipated  barriers to occupational therapy: NA    Pt's spiritual, cultural and educational needs considered and pt agreeable to plan of care and goals.     Goals:   Short term goals to be met in 6 weeks:  (12/1/20)  Patient to be IND with HEP and modalities for pain/edema managment.-continuous   Increase wrist AROM 10 degrees, increased elbow active flexion to 90 to increase functional hand use for ADLs- met 11/24/20    Patient to be IND with Orthotic use, wear and care precautions- met 11/24/20   Assess  and pinch strength and arm strength when appropriate- met with  strength     Long term goals to be met by d/c:  Pt will increase active elbow flexion to 125 and extension to -10 to allow functional use of left arm with self care tasks  Pt will increase strength of left elbow to allow for carrying household items and return to his leisure tasks  , pinch goals to be established.  Pt to achieve FOTO score to 31%     Plan   Certification Period/Plan of care expiration: 10/20/2020 to 1/12/21.     Outpatient Occupational Therapy 2 times weekly for 12 weeks to include the following interventions: Manual therapy/joint mobilizations, Modalities for pain management, US 3 mhz, Therapeutic exercises/activities., Strengthening, Edema Control and Scar Management  Updates/Grading for next session: Brace increased to 120 flexion. Cont per protocol      RAHUL Esclaera

## 2020-11-24 ENCOUNTER — CLINICAL SUPPORT (OUTPATIENT)
Dept: REHABILITATION | Facility: HOSPITAL | Age: 55
End: 2020-11-24
Attending: ORTHOPAEDIC SURGERY
Payer: OTHER GOVERNMENT

## 2020-11-24 DIAGNOSIS — M25.60 RANGE OF MOTION DEFICIT: Primary | ICD-10-CM

## 2020-11-24 DIAGNOSIS — R29.898 DECREASED GRIP STRENGTH OF LEFT HAND: ICD-10-CM

## 2020-11-24 DIAGNOSIS — M79.602 PAIN OF LEFT ARM: ICD-10-CM

## 2020-11-24 DIAGNOSIS — Z78.9 IMPAIRED INSTRUMENTAL ACTIVITIES OF DAILY LIVING (IADL): ICD-10-CM

## 2020-11-24 DIAGNOSIS — M62.81 MUSCLE WEAKNESS OF LEFT ARM: ICD-10-CM

## 2020-11-24 PROCEDURE — 97110 THERAPEUTIC EXERCISES: CPT | Mod: PN

## 2020-11-30 NOTE — PROGRESS NOTES
Occupational Therapy Treatment Note     Date: 12/1/2020  Name: Enoch Barr  [facility] Number: [id number]    Therapy Diagnosis:   Encounter Diagnoses   Name Primary?    Range of motion deficit Yes    Impaired instrumental activities of daily living (IADL)     Muscle weakness of left arm     Pain of left arm     Decreased  strength of left hand      Physician: Keysha Galvez MD     Physician Orders: Eval and Treat  Medical Diagnosis: Rupture of triceps tendon, left, subsequent encounter  Surgical Procedure and Date: left tricep repair, 10/2/20  Evaluation Date: 10/20/2020  Insurance Authorization Period Expiration: 12/31/20  Plan of Care Certification Period: 10/20/20 - 1/12/21  Date of Return to MD: 11/12/20     Visit # / Visits authorized: 6/ 20    Time In: 3:30 pm  Time Out:  4:17 pm  Total Billable Time: 42 minutes    Precautions:  Standard    8 weeks post op  protocol      2 -4 weeks: brace at all times 0-90   No passive flexion beyond 90   No resisted elbow extension   Supination and pronation and active flexion OK  Passive extension OK      4 -6 weeks:   0-120 in brace   No passive flexion beyond 120   No resisted elbow extension      6-12 weeks   Brace removed at 6 weeks     Post op visit 11/12/20  FROM Ok   No heavy lifting or painful activity   8 weeks p/o  Subjective     Pt reports: My shoulder has been bothering me some. My elbow is feeling okay.  he was compliant with home exercise program given 10/20/20   Response to previous treatment: not too bad  Functional change: can wash my hair and feed myself with my left hand    Pain: 3-4/10  At worst  Location: left  elbow      Objective     Enoch received therapeutic exercises for 42  minutes including:   Active elbow flexion and extension off wedge x 10 reps  Active flexion 130*  Ultra gripper setting #3 x 4 min  3# wrist ext/deviation and flexion off wedge x 3 sets 10 reps  3# pron/supination x 2 min  Slide board on arm rest 1# left arm push x  15 reps  Slide board on table at shoulder height elbow extension x 20 reps  Supine tricep curl x 2 sets 10 reps  Extension end range stretch x 10 reps 5 second hold        Enoch received ice application to left elbow after contraindications cleared 5 minutes.     Home Exercises and Education Provided     Education provided:   Continue to be cautious of  resistive use of arm with tasks at home  Use of ice to help any pain or tightness  Can perform supine tricep curls at home 10 reps only  - Progress towards goals     Written Home Exercises Provided: Patient instructed to cont prior HEP.  Exercises were reviewed and Enoch was able to demonstrate them prior to the end of the session.  Enoch demonstrated good  understanding of the HEP provided.   .   See EMR under Patient Instructions for exercises provided 10/20/20.        Assessment     Pt would continue to benefit from skilled OT. Enoch is demonstrating significant  improvements in AROM of elbow flexion and less overall elbow pain. Some increases in left shoulder pain recently.  Pt tolerated gravity resisted elbow extension in supine.  Improving functional use of left hand during self care.    Enoch is progressing well towards his goals and there are no updates to goals at this time. Pt prognosis is Excellent.     Pt will continue to benefit from skilled outpatient occupational therapy to address the deficits listed in the problem list on initial evaluation provide pt/family education and to maximize pt's level of independence in the home and community environment.     Anticipated barriers to occupational therapy: NA    Pt's spiritual, cultural and educational needs considered and pt agreeable to plan of care and goals.     Goals:   Short term goals to be met in 6 weeks:  (12/1/20)  Patient to be IND with HEP and modalities for pain/edema managment.-continuous   Increase wrist AROM 10 degrees, increased elbow active flexion to 90 to increase functional hand use for  ADLs- met 11/24/20    Patient to be IND with Orthotic use, wear and care precautions- met 11/24/20   Assess  and pinch strength and arm strength when appropriate- met with  strength     Long term goals to be met by d/c:  Pt will increase active elbow flexion to 125 and extension to -10 to allow functional use of left arm with self care tasks  Pt will increase strength of left elbow to allow for carrying household items and return to his leisure tasks  , pinch goals to be established.  Pt to achieve FOTO score to 31%     Plan   Certification Period/Plan of care expiration: 10/20/2020 to 1/12/21.     Outpatient Occupational Therapy 2 times weekly for 12 weeks to include the following interventions: Manual therapy/joint mobilizations, Modalities for pain management, US 3 mhz, Therapeutic exercises/activities., Strengthening, Edema Control and Scar Management  Updates/Grading for next session: Brace increased to 120 flexion. Cont per protocol      RAHUL Escalera

## 2020-12-01 ENCOUNTER — CLINICAL SUPPORT (OUTPATIENT)
Dept: REHABILITATION | Facility: HOSPITAL | Age: 55
End: 2020-12-01
Attending: ORTHOPAEDIC SURGERY
Payer: OTHER GOVERNMENT

## 2020-12-01 DIAGNOSIS — M25.60 RANGE OF MOTION DEFICIT: Primary | ICD-10-CM

## 2020-12-01 DIAGNOSIS — Z78.9 IMPAIRED INSTRUMENTAL ACTIVITIES OF DAILY LIVING (IADL): ICD-10-CM

## 2020-12-01 DIAGNOSIS — M79.602 PAIN OF LEFT ARM: ICD-10-CM

## 2020-12-01 DIAGNOSIS — R29.898 DECREASED GRIP STRENGTH OF LEFT HAND: ICD-10-CM

## 2020-12-01 DIAGNOSIS — M62.81 MUSCLE WEAKNESS OF LEFT ARM: ICD-10-CM

## 2020-12-01 PROCEDURE — 97110 THERAPEUTIC EXERCISES: CPT | Mod: PN

## 2020-12-09 NOTE — PROGRESS NOTES
Occupational Therapy Treatment Note     Date: 12/10/2020  Name: Enoch Barr  [facility] Number: [id number]    Therapy Diagnosis:   Encounter Diagnoses   Name Primary?    Range of motion deficit Yes    Impaired instrumental activities of daily living (IADL)     Muscle weakness of left arm     Pain of left arm     Decreased  strength of left hand      Physician: Keysha Galvez MD     Physician Orders: Eval and Treat  Medical Diagnosis: Rupture of triceps tendon, left, subsequent encounter  Surgical Procedure and Date: left tricep repair, 10/2/20  Evaluation Date: 10/20/2020  Insurance Authorization Period Expiration: 12/31/20  Plan of Care Certification Period: 10/20/20 - 1/12/21  Date of Return to MD: 11/12/20     Visit # / Visits authorized: 7/ 20    Time In: 3:35 pm  Time Out:  4:15 pm  Total Billable Time: 40 minutes    Precautions:  Standard    8 weeks post op  protocol      2 -4 weeks: brace at all times 0-90   No passive flexion beyond 90   No resisted elbow extension   Supination and pronation and active flexion OK  Passive extension OK      4 -6 weeks:   0-120 in brace   No passive flexion beyond 120   No resisted elbow extension      6-12 weeks   Brace removed at 6 weeks     Post op visit 11/12/20  FROM Ok   No heavy lifting or painful activity   8 weeks p/o  Subjective     Pt reports: My elbow wounds are doing well, finally closing  he was compliant with home exercise program given 10/20/20   Response to previous treatment: a little sore  Functional change: nothing reported    Pain: 3-4/10  At worst  Location: left  elbow      Objective     Enoch received therapeutic exercises for 40  minutes including:   Sci fit with KANDY SHERIFF 75% of work x 6 min alternating every minute.  Active isolated elbow flexion at side x 20 reps  Stretching of elbow off wedge into extension and flexion x 10 reps holding end range 5 seconds  2# on hammer for pron/supination x 3 min                                Ulnar  and radial deviation x 2 min each  Ultra gripper setting #3 x 4 min  Slide board on arm rest 2# left arm push x 15 reps  2# slide abd/adduction with elbow extension x 15 reps  Supine tricep curl 1/2# cuff wgt x 20  reps    Left elbow flexion 131*    Home Exercises and Education Provided     Education provided:   Continue to be cautious of  resistive use of arm with tasks at home  Use of ice to help any pain or tightness  Can perform supine tricep curls at home 10 reps only  - Progress towards goals     Written Home Exercises Provided: Patient instructed to cont prior HEP.  Exercises were reviewed and Enoch was able to demonstrate them prior to the end of the session.  Enoch demonstrated good  understanding of the HEP provided.   .   See EMR under Patient Instructions for exercises provided 10/20/20.        Assessment     Pt would continue to benefit from skilled OT. Enoch with good tolerance to light resistive exercises in clinic.  No increases in pain reported post therapy session. Continued progression of elbow flexion.     Enoch is progressing well towards his goals and there are no updates to goals at this time. Pt prognosis is Excellent.     Pt will continue to benefit from skilled outpatient occupational therapy to address the deficits listed in the problem list on initial evaluation provide pt/family education and to maximize pt's level of independence in the home and community environment.     Anticipated barriers to occupational therapy: NA    Pt's spiritual, cultural and educational needs considered and pt agreeable to plan of care and goals.     Goals:   Short term goals to be met in 6 weeks:  (12/1/20)  Patient to be IND with HEP and modalities for pain/edema managment.-continuous   Increase wrist AROM 10 degrees, increased elbow active flexion to 90 to increase functional hand use for ADLs- met 11/24/20    Patient to be IND with Orthotic use, wear and care precautions- met 11/24/20   Assess  and  pinch strength and arm strength when appropriate- met with  strength     Long term goals to be met by d/c:  Pt will increase active elbow flexion to 125 and extension to -10 to allow functional use of left arm with self care tasks  Pt will increase strength of left elbow to allow for carrying household items and return to his leisure tasks  , pinch goals to be established.  Pt to achieve FOTO score to 31%     Plan   Certification Period/Plan of care expiration: 10/20/2020 to 1/12/21.     Outpatient Occupational Therapy 2 times weekly for 12 weeks to include the following interventions: Manual therapy/joint mobilizations, Modalities for pain management, US 3 mhz, Therapeutic exercises/activities., Strengthening, Edema Control and Scar Management  Updates/Grading for next session: Brace increased to 120 flexion. Cont per protocol      RAHUL Escalera

## 2020-12-10 ENCOUNTER — CLINICAL SUPPORT (OUTPATIENT)
Dept: REHABILITATION | Facility: HOSPITAL | Age: 55
End: 2020-12-10
Attending: ORTHOPAEDIC SURGERY
Payer: OTHER GOVERNMENT

## 2020-12-10 DIAGNOSIS — M62.81 MUSCLE WEAKNESS OF LEFT ARM: ICD-10-CM

## 2020-12-10 DIAGNOSIS — Z78.9 IMPAIRED INSTRUMENTAL ACTIVITIES OF DAILY LIVING (IADL): ICD-10-CM

## 2020-12-10 DIAGNOSIS — M79.602 PAIN OF LEFT ARM: ICD-10-CM

## 2020-12-10 DIAGNOSIS — M25.60 RANGE OF MOTION DEFICIT: Primary | ICD-10-CM

## 2020-12-10 DIAGNOSIS — R29.898 DECREASED GRIP STRENGTH OF LEFT HAND: ICD-10-CM

## 2020-12-10 PROCEDURE — 97110 THERAPEUTIC EXERCISES: CPT | Mod: PN

## 2020-12-14 NOTE — PROGRESS NOTES
Occupational Therapy Treatment Note     Date: 12/15/2020  Name: Enoch Barr  [facility] Number: [id number]    Therapy Diagnosis:   Encounter Diagnoses   Name Primary?    Range of motion deficit Yes    Impaired instrumental activities of daily living (IADL)     Muscle weakness of left arm     Pain of left arm     Decreased  strength of left hand      Physician: Keysha Galvez MD     Physician Orders: Eval and Treat  Medical Diagnosis: Rupture of triceps tendon, left, subsequent encounter  Surgical Procedure and Date: left tricep repair, 10/2/20  Evaluation Date: 10/20/2020  Insurance Authorization Period Expiration: 12/31/20  Plan of Care Certification Period: 10/20/20 - 1/12/21  Date of Return to MD: 11/12/20     Visit # / Visits authorized: 8/ 20    Time In: 3:22 pm  Time Out:  4:10 pm  Total Billable Time: 43 minutes    Precautions:  Standard    8 weeks post op  protocol      2 -4 weeks: brace at all times 0-90   No passive flexion beyond 90   No resisted elbow extension   Supination and pronation and active flexion OK  Passive extension OK      4 -6 weeks:   0-120 in brace   No passive flexion beyond 120   No resisted elbow extension      6-12 weeks   Brace removed at 6 weeks     Post op visit 11/12/20  FROM Ok   No heavy lifting or painful activity   8 weeks p/o  Subjective     Pt reports: I haven't had to take a tylenol today, it is really not hurting.   he was compliant with home exercise program given 10/20/20   Response to previous treatment: sore  Functional change: able to make pens    Pain: 2/10  presently  Location: left  elbow      Objective     Enoch received therapeutic exercises for 43  minutes including:   Sci fit with KANDY SHERIFF 75% of work x 8 min alternating every minute.  Active isolated elbow flexion at side x 20 reps  Stretching of elbow off wedge into extension and flexion x 10 reps holding end range 5 seconds  3# bicep curls with forearm in neutral and forearm supinated x 10  reps  Ultra gripper setting #3 x 4 min  3# wrist flexion/deviation/flexion x 10 reps each off wedge  Supine tricep curl 1# cuff wgt x 20  Reps                                Active extension x 10 reps  2# on hammer for pron/supination x 2 min                                Ulnar and radial deviation x 1.5 min each    Left elbow flexion 133*    Home Exercises and Education Provided     Education provided:   Continue to be cautious of  resistive use of arm with tasks at home  Use of ice to help any pain or tightness  Can perform supine tricep curls at home 10 reps only  - Progress towards goals     Written Home Exercises Provided: Patient instructed to cont prior HEP.  Exercises were reviewed and Enoch was able to demonstrate them prior to the end of the session.  Enoch demonstrated good  understanding of the HEP provided.   .   See EMR under Patient Instructions for exercises provided 10/20/20.        Assessment     Pt would continue to benefit from skilled OT. Enoch performed all exercises with good control and mild increases in pain which were addressed with ice application.   Enoch is progressing well towards his goals and there are no updates to goals at this time. Pt prognosis is Excellent.     Pt will continue to benefit from skilled outpatient occupational therapy to address the deficits listed in the problem list on initial evaluation provide pt/family education and to maximize pt's level of independence in the home and community environment.     Anticipated barriers to occupational therapy: NA    Pt's spiritual, cultural and educational needs considered and pt agreeable to plan of care and goals.     Goals:   Short term goals to be met in 6 weeks:  (12/1/20)  Patient to be IND with HEP and modalities for pain/edema managment.-continuous   Increase wrist AROM 10 degrees, increased elbow active flexion to 90 to increase functional hand use for ADLs- met 11/24/20    Patient to be IND with Orthotic use, wear and  care precautions- met 11/24/20   Assess  and pinch strength and arm strength when appropriate- met with  strength     Long term goals to be met by d/c:  Pt will increase active elbow flexion to 125 and extension to -10 to allow functional use of left arm with self care tasks  Pt will increase strength of left elbow to allow for carrying household items and return to his leisure tasks  , pinch goals to be established.  Pt to achieve FOTO score to 31%     Plan   Certification Period/Plan of care expiration: 10/20/2020 to 1/12/21.     Outpatient Occupational Therapy 2 times weekly for 12 weeks to include the following interventions: Manual therapy/joint mobilizations, Modalities for pain management, US 3 mhz, Therapeutic exercises/activities., Strengthening, Edema Control and Scar Management  Updates/Grading for next session: Brace increased to 120 flexion. Cont per protocol      RAHUL Escalera

## 2020-12-15 ENCOUNTER — CLINICAL SUPPORT (OUTPATIENT)
Dept: REHABILITATION | Facility: HOSPITAL | Age: 55
End: 2020-12-15
Attending: ORTHOPAEDIC SURGERY
Payer: OTHER GOVERNMENT

## 2020-12-15 DIAGNOSIS — M79.602 PAIN OF LEFT ARM: ICD-10-CM

## 2020-12-15 DIAGNOSIS — R29.898 DECREASED GRIP STRENGTH OF LEFT HAND: ICD-10-CM

## 2020-12-15 DIAGNOSIS — M25.60 RANGE OF MOTION DEFICIT: Primary | ICD-10-CM

## 2020-12-15 DIAGNOSIS — M62.81 MUSCLE WEAKNESS OF LEFT ARM: ICD-10-CM

## 2020-12-15 DIAGNOSIS — Z78.9 IMPAIRED INSTRUMENTAL ACTIVITIES OF DAILY LIVING (IADL): ICD-10-CM

## 2020-12-15 PROCEDURE — 97110 THERAPEUTIC EXERCISES: CPT | Mod: PN

## 2020-12-21 RX ORDER — DICLOFENAC SODIUM 50 MG/1
50 TABLET, DELAYED RELEASE ORAL 2 TIMES DAILY
Qty: 60 TABLET | Refills: 0 | Status: SHIPPED | OUTPATIENT
Start: 2020-12-21 | End: 2021-01-22

## 2020-12-21 NOTE — PROGRESS NOTES
Occupational Therapy Treatment Note     Date: 12/22/2020  Name: Enoch Barr  [facility] Number: [id number]    Therapy Diagnosis:   Encounter Diagnoses   Name Primary?    Range of motion deficit Yes    Impaired instrumental activities of daily living (IADL)     Muscle weakness of left arm     Pain of left arm     Decreased  strength of left hand      Physician: Keysha Galvez MD     Physician Orders: Eval and Treat  Medical Diagnosis: Rupture of triceps tendon, left, subsequent encounter  Surgical Procedure and Date: left tricep repair, 10/2/20  Evaluation Date: 10/20/2020  Insurance Authorization Period Expiration: 12/31/20  Plan of Care Certification Period: 10/20/20 - 1/12/21  Date of Return to MD: 11/12/20     Visit # / Visits authorized: 9/ 20    Time In: 3:22 pm  Time Out:  4:13 pm  Total Billable Time: 46  minutes    Precautions:  Standard   11 weeks post op  protocol      2 -4 weeks: brace at all times 0-90   No passive flexion beyond 90   No resisted elbow extension   Supination and pronation and active flexion OK  Passive extension OK      4 -6 weeks:   0-120 in brace   No passive flexion beyond 120   No resisted elbow extension      6-12 weeks   Brace removed at 6 weeks     Post op visit 11/12/20  FROM Ok   No heavy lifting or painful activity     Subjective     Pt reports: I slept wrong on my shoulder so it is bothering me some. The elbow feels okay.   he was compliant with home exercise program given 10/20/20   Response to previous treatment: no adverse affects  Functional change: able to carry 2 gallons of water with his left hand    Pain: 3/10  Presently in shoulder  Location: left  elbow      Objective     Enoch received therapeutic exercises for 46  minutes including:   Sci fit with KANDY SHERIFF 50/50 of work x 8 min alternating every minute.    Edema. Measured in centimeters.    10/20/2020 11/24/20 12/22/20 10/20/2020     Left Left   Right   2in. Above elbow 27.5 27.5 28.6 28.3   2in. Below  elbow 27.0 27.0 27.6 28.0    at elbow 27.5 28.5 28.5 28.3   Proximal Wrist Crease    27.8 17.5 17.8 27.8      AROM                 10/20/20    11/24/20      12/22/20    Left  Left  AROM Left AROM Right    Elbow flexion  77  107   138 135   Elbow extension-passive -30  -10   -5 +5   Supination  45  60   65 70   Pronation WFL   WFL wFL   Wrist flexion 65  65  65 70   Wrist extension 65  70   60 70     Chris setting #2                      11/17/20 12/22/20  Right 1 trial 110.6#  Left 1 trial 96.2#                 97.6#                                                      12/20/20  Strength: left elbow flexion 4+/5                                  Extension 4+/5 slight discomfort at lateral epicondyle                                   Supination 5/5                                  Pronation 5/5                 Left wrist extension 4+/5                                 Flexion 4+/5  tricep kick backs with 4 and 3# x 10 reps each(forearm neutral)  Bicep curls off table 4# x 2 sets 10 reps neutral forearm and supinated forearm                                   3# x 2 sets pronated forearm  Ultra gripper setting #3 x 5 min  4# wrist flexion/deviation/flexion x 10 reps each off wedge    Ice applied to left elbow x 5 min after contraindications cleared.     Home Exercises and Education Provided     Education provided:   Continue to be cautious of  resistive use of arm with tasks at home  Use of ice to help any pain or tightness  Can perform supine tricep curls at home 10 reps only  - Progress towards goals     Written Home Exercises Provided: Patient instructed to cont prior HEP.  Exercises were reviewed and Enoch was able to demonstrate them prior to the end of the session.  Enoch demonstrated good  understanding of the HEP provided.   .   See EMR under Patient Instructions for exercises provided 10/20/20.        Assessment     Pt would continue to benefit from skilled OT. Enoch is demonstrating improvements in AROM of  his left elbow and good tolerance to resistive exercises.  He is unable to fully extend with resistive motion against gravity.    Enoch is progressing well towards his goals and there are no updates to goals at this time. Pt prognosis is Excellent.     Pt will continue to benefit from skilled outpatient occupational therapy to address the deficits listed in the problem list on initial evaluation provide pt/family education and to maximize pt's level of independence in the home and community environment.     Anticipated barriers to occupational therapy: NA    Pt's spiritual, cultural and educational needs considered and pt agreeable to plan of care and goals.     Goals:   Short term goals to be met in 6 weeks:  (12/1/20)  Patient to be IND with HEP and modalities for pain/edema managment.-continuous   Increase wrist AROM 10 degrees, increased elbow active flexion to 90 to increase functional hand use for ADLs- met 11/24/20    Patient to be IND with Orthotic use, wear and care precautions- met 11/24/20   Assess  and pinch strength and arm strength when appropriate- met with  strength     Long term goals to be met by d/c:  Pt will increase active elbow flexion to 125 and extension to -10 to allow functional use of left arm with self care tasks - met 12/22/20  Pt will increase strength of left elbow to allow for carrying household items and return to his leisure tasks- progressing with tasks  , pinch goals to be established-  strength of left is 13# less then dominant hand- no limitation  Pt to achieve FOTO score to 31% - met 12/22/20    Plan   Certification Period/Plan of care expiration: 10/20/2020 to 1/12/21.     Outpatient Occupational Therapy 2 times weekly for 12 weeks to include the following interventions: Manual therapy/joint mobilizations, Modalities for pain management, US 3 mhz, Therapeutic exercises/activities., Strengthening, Edema Control and Scar Management  Updates/Grading for next  session: Brace increased to 120 flexion. Cont per protocol      RAHUL Escalera

## 2020-12-22 ENCOUNTER — CLINICAL SUPPORT (OUTPATIENT)
Dept: REHABILITATION | Facility: HOSPITAL | Age: 55
End: 2020-12-22
Attending: ORTHOPAEDIC SURGERY
Payer: OTHER GOVERNMENT

## 2020-12-22 DIAGNOSIS — Z78.9 IMPAIRED INSTRUMENTAL ACTIVITIES OF DAILY LIVING (IADL): ICD-10-CM

## 2020-12-22 DIAGNOSIS — M25.60 RANGE OF MOTION DEFICIT: Primary | ICD-10-CM

## 2020-12-22 DIAGNOSIS — M79.602 PAIN OF LEFT ARM: ICD-10-CM

## 2020-12-22 DIAGNOSIS — M62.81 MUSCLE WEAKNESS OF LEFT ARM: ICD-10-CM

## 2020-12-22 DIAGNOSIS — R29.898 DECREASED GRIP STRENGTH OF LEFT HAND: ICD-10-CM

## 2020-12-22 PROCEDURE — 97110 THERAPEUTIC EXERCISES: CPT | Mod: PN

## 2020-12-24 ENCOUNTER — OFFICE VISIT (OUTPATIENT)
Dept: ORTHOPEDICS | Facility: CLINIC | Age: 55
End: 2020-12-24
Payer: OTHER GOVERNMENT

## 2020-12-24 VITALS
OXYGEN SATURATION: 97 % | SYSTOLIC BLOOD PRESSURE: 130 MMHG | WEIGHT: 224.88 LBS | HEART RATE: 94 BPM | RESPIRATION RATE: 18 BRPM | DIASTOLIC BLOOD PRESSURE: 80 MMHG | HEIGHT: 72 IN | BODY MASS INDEX: 30.46 KG/M2

## 2020-12-24 DIAGNOSIS — S46.312D RUPTURE OF TRICEPS TENDON, LEFT, SUBSEQUENT ENCOUNTER: Primary | ICD-10-CM

## 2020-12-24 DIAGNOSIS — M75.22 BICEPS TENDINITIS OF LEFT SHOULDER: ICD-10-CM

## 2020-12-24 PROCEDURE — 99999 PR PBB SHADOW E&M-EST. PATIENT-LVL IV: ICD-10-PCS | Mod: PBBFAC,,, | Performed by: ORTHOPAEDIC SURGERY

## 2020-12-24 PROCEDURE — 99024 POSTOP FOLLOW-UP VISIT: CPT | Mod: ,,, | Performed by: ORTHOPAEDIC SURGERY

## 2020-12-24 PROCEDURE — 99999 PR PBB SHADOW E&M-EST. PATIENT-LVL IV: CPT | Mod: PBBFAC,,, | Performed by: ORTHOPAEDIC SURGERY

## 2020-12-24 PROCEDURE — 99024 PR POST-OP FOLLOW-UP VISIT: ICD-10-PCS | Mod: ,,, | Performed by: ORTHOPAEDIC SURGERY

## 2020-12-24 PROCEDURE — 99214 OFFICE O/P EST MOD 30 MIN: CPT | Mod: PBBFAC,PN | Performed by: ORTHOPAEDIC SURGERY

## 2020-12-24 NOTE — PROGRESS NOTES
Follow up visit    History of Present Illness:   Enoch comes to the office for follow up evaluation of bilateral upper extremities - left triceps repair about 12 weeks ago.   Still has some blistering in areas of healed road rash - comes and goes  C/o new L shoulder pain - continues. Localized to anterior shoulder  Sleeping with brace on but not wearing during the day   Motion significantly improved   Started strengthening in therapy and had some soreness  Has some aching in the forearm    ROS: unremarkable and no change since last visit    Physical Examination:    NAD  Right upper arm    Blistering over road rash. No sign of infection    Left upper arm   + speeds and yergasons  Neg jobes  + neers  Elbow:   No erythema or drainage - no sign of infection  Blistering over road rash - no sign of infection   LTSI m/u/r  2+ RP  + EPL, IO, FDS, FDP     AROM 0-135, PROM 0-135    Radiographic imaging: no new     Assessment/Plan:  55 y.o. male  with Left triceps repair, suture abscess     We discussed the etiology of persistent pain and further treatment options.  1. OT for shoulder and elbow  2. Continue diclofenac   3. RTC 6 weeks - can consider shoulder injection at that time if no improvement     All questions were answered in detail. The patient  verbalized the understanding of the treatment plan and is in full agreement with the treatment plan.

## 2020-12-28 NOTE — PROGRESS NOTES
Occupational Therapy  Note     Date: 12/29/2020  Name: Enoch Barr  [facility] Number: [id number]    Therapy Diagnosis:   Encounter Diagnoses   Name Primary?    Range of motion deficit Yes    Impaired instrumental activities of daily living (IADL)     Muscle weakness of left arm     Pain of left arm     Decreased  strength of left hand      Physician: Keysha Galvez MD     Physician Orders: Eval and Treat  Medical Diagnosis: Rupture of triceps tendon, left, subsequent encounter  Surgical Procedure and Date: left tricep repair, 10/2/20  Evaluation Date: 10/20/2020  Insurance Authorization Period Expiration: 12/31/20  Plan of Care Certification Period: 10/20/20 - 1/12/21  Date of Return to MD: 11/12/20     Visit # / Visits authorized: 10/ 20    Time In: 3:20 pm  Time Out:  4:20 pm  Total Billable Time: 60  minutes    Precautions:  Standard   11 weeks post op  VRBO rec'd from Dr. Galvez to assess his left shoulder -eval and treat    Subjective     Pt reports: My elbow feels okay. My shoulder is sore right here in front.  he was compliant with home exercise program given 10/20/20   Response to previous treatment: no adverse affects  Functional change: able to carry 2 gallons of water with his left hand    Pain: 3/10  Presently in shoulder  Location: left  elbow      Objective     Assesssment x 30 minutes as follows:    Posture is upright and aligned, no forward head or shoulders     (L) UE (R) UE     AROM AROM Norm   Shoulder Flexion 130 135 180   Shoulder Abduction 160 160 0-180   Shoulder Extension 50 45 0-50   Shoulder Internal Rotation 65 to waist 60 to waist 0-90   Shoulder External Rotation 35 45 0-90   Horz Shoulder Adduction 30 40 0-40     ROM Comments:   Discomfort with end ranges with LUE    Painful Arc:   Patient demonstrates no painful arc in shoulder flexion or abduction      Strength  Shoulder Flexion RUE: 5/5   Shoulder Extension RUE: 5/5   Shoulder Abduction RUE:  5/5   Shoulder Adduction  RUE:  5/5   Internal Rotation RUE: 5/5   External Rotation RUE: 5/5       Shoulder Flexion LUE: 4+/5   Shoulder Extension LUE: 4+/5   Shoulder Abduction LUE: 4/5   Shoulder Adduction LUE: 4+/5   Internal Rotation LUE:  4/5   External Rotation LUE:  4/5         Pull Tests:  Abduction: n/t  Ext. Rotation: n/t    Special Tests:  Positive: none  Negative: Neer Impingement, Empty can test and Speed's Test    Palpation: (for pain)     Positive: bicep distal tendon     Negative: Lateral Subacromial Space, Anterior Subacromial Space, Posterior Subacromial Space, Infraspinatus Region, Bicipital Groove and Supraspinatus Region    Enoch received therapeutic exercises for 22  minutes including:   None resistive pulley stretch for muscle warm up x 3 min  Belt IR stretch x 10 reps hold x 5 seconds  Doorway stretch x 5 reps holding x 5 seconds  Supine tricep curls 4# x 10 reps, 5 reps 5 reps  Prone tricep curls no resistance x 2 sets 10 reps  Seated bicep curls 4# x 10 reps neutral forearm and supinated forearm    Sci fit with BUE RUE 50/50 of work x 4 min alternating every minute.      Ice applied to left elbow and shoulder x 8 min after contraindications cleared.     Home Exercises and Education Provided     Education provided:   Shoulder structure and pathology of shoulders  Use of ice to help any pain or tightness  Can perform supine tricep curls at home 10 reps only  - Progress towards goals     Written Home Exercises Provided: yes  Exercises were reviewed and Enoch was able to demonstrate them prior to the end of the session.  Enoch demonstrated good  understanding of the HEP provided.   .   See EMR under Patient Instructions for exercises provided 10/20/20 and 12/29/20.        Assessment     Pt would continue to benefit from skilled OT. Enoch is demonstrating weakness in left shoulder and limitations in IR and ER of left shoulder.  He is able to perform tricep curls in supine with resistance.  Unable to achieve his full  extension range in prone against gravity.    Enoch is progressing well towards his goals and there are no updates to goals at this time. Pt prognosis is Excellent.     Pt will continue to benefit from skilled outpatient occupational therapy to address the deficits listed in the problem list on initial evaluation provide pt/family education and to maximize pt's level of independence in the home and community environment.     Anticipated barriers to occupational therapy: NA    Pt's spiritual, cultural and educational needs considered and pt agreeable to plan of care and goals.     Goals:   Short term goals to be met in 6 weeks:  (12/1/20)  Patient to be IND with HEP and modalities for pain/edema managment.-continuous   Increase wrist AROM 10 degrees, increased elbow active flexion to 90 to increase functional hand use for ADLs- met 11/24/20    Patient to be IND with Orthotic use, wear and care precautions- met 11/24/20   Assess  and pinch strength and arm strength when appropriate- met with  strength     Long term goals to be met by d/c:  Pt will increase active elbow flexion to 125 and extension to -10 to allow functional use of left arm with self care tasks - met 12/22/20  Pt will increase strength of left elbow to allow for carrying household items and return to his leisure tasks- progressing with tasks  , pinch goals to be established-  strength of left is 13# less then dominant hand- no limitation  Pt to achieve FOTO score to 31% - met 12/22/20    Additional goals for shoulder:   Improve active left  IR and ER by 10 degrees to assist with dressing   Pt will improve strength of rotations to 4+/5 for lifting and reaching at home    Plan   Certification Period/Plan of care expiration: 10/20/2020 to 1/12/21.     Outpatient Occupational Therapy 2 times weekly for 12 weeks to include the following interventions: Manual therapy/joint mobilizations, Modalities for pain management, US 3 mhz, Therapeutic  exercises/activities., Strengthening, Edema Control and Scar Management  Updates/Grading for next session: Brace increased to 120 flexion. Cont per protocol      RAHUL Escalera

## 2020-12-29 ENCOUNTER — CLINICAL SUPPORT (OUTPATIENT)
Dept: REHABILITATION | Facility: HOSPITAL | Age: 55
End: 2020-12-29
Attending: ORTHOPAEDIC SURGERY
Payer: OTHER GOVERNMENT

## 2020-12-29 DIAGNOSIS — Z78.9 IMPAIRED INSTRUMENTAL ACTIVITIES OF DAILY LIVING (IADL): ICD-10-CM

## 2020-12-29 DIAGNOSIS — M62.81 MUSCLE WEAKNESS OF LEFT ARM: ICD-10-CM

## 2020-12-29 DIAGNOSIS — M79.602 PAIN OF LEFT ARM: ICD-10-CM

## 2020-12-29 DIAGNOSIS — M25.60 RANGE OF MOTION DEFICIT: Primary | ICD-10-CM

## 2020-12-29 DIAGNOSIS — R29.898 DECREASED GRIP STRENGTH OF LEFT HAND: ICD-10-CM

## 2020-12-29 PROCEDURE — 97168 OT RE-EVAL EST PLAN CARE: CPT | Mod: PN

## 2020-12-29 PROCEDURE — 97110 THERAPEUTIC EXERCISES: CPT | Mod: PN

## 2020-12-31 ENCOUNTER — PATIENT MESSAGE (OUTPATIENT)
Dept: REHABILITATION | Facility: HOSPITAL | Age: 55
End: 2020-12-31

## 2021-01-04 ENCOUNTER — PATIENT MESSAGE (OUTPATIENT)
Dept: ORTHOPEDICS | Facility: CLINIC | Age: 56
End: 2021-01-04

## 2021-01-06 ENCOUNTER — OFFICE VISIT (OUTPATIENT)
Dept: FAMILY MEDICINE | Facility: CLINIC | Age: 56
End: 2021-01-06
Payer: OTHER GOVERNMENT

## 2021-01-06 VITALS
DIASTOLIC BLOOD PRESSURE: 80 MMHG | HEART RATE: 79 BPM | SYSTOLIC BLOOD PRESSURE: 106 MMHG | HEIGHT: 72 IN | BODY MASS INDEX: 30.2 KG/M2 | OXYGEN SATURATION: 98 % | WEIGHT: 223 LBS | TEMPERATURE: 98 F

## 2021-01-06 DIAGNOSIS — I10 ESSENTIAL HYPERTENSION: ICD-10-CM

## 2021-01-06 DIAGNOSIS — H02.9 LESION OF RIGHT UPPER EYELID: ICD-10-CM

## 2021-01-06 DIAGNOSIS — Z86.19 HISTORY OF VARICELLA: ICD-10-CM

## 2021-01-06 DIAGNOSIS — G47.33 OSA (OBSTRUCTIVE SLEEP APNEA): Primary | ICD-10-CM

## 2021-01-06 DIAGNOSIS — D12.6 TUBULAR ADENOMA OF COLON: ICD-10-CM

## 2021-01-06 PROCEDURE — 99214 PR OFFICE/OUTPT VISIT, EST, LEVL IV, 30-39 MIN: ICD-10-PCS | Mod: S$PBB,,, | Performed by: INTERNAL MEDICINE

## 2021-01-06 PROCEDURE — 99999 PR PBB SHADOW E&M-EST. PATIENT-LVL V: CPT | Mod: PBBFAC,,, | Performed by: INTERNAL MEDICINE

## 2021-01-06 PROCEDURE — 99999 PR PBB SHADOW E&M-EST. PATIENT-LVL V: ICD-10-PCS | Mod: PBBFAC,,, | Performed by: INTERNAL MEDICINE

## 2021-01-06 PROCEDURE — 99215 OFFICE O/P EST HI 40 MIN: CPT | Mod: PBBFAC,PO | Performed by: INTERNAL MEDICINE

## 2021-01-06 PROCEDURE — 99214 OFFICE O/P EST MOD 30 MIN: CPT | Mod: S$PBB,,, | Performed by: INTERNAL MEDICINE

## 2021-01-07 ENCOUNTER — CLINICAL SUPPORT (OUTPATIENT)
Dept: REHABILITATION | Facility: HOSPITAL | Age: 56
End: 2021-01-07
Attending: ORTHOPAEDIC SURGERY
Payer: OTHER GOVERNMENT

## 2021-01-07 DIAGNOSIS — M62.81 MUSCLE WEAKNESS OF LEFT ARM: ICD-10-CM

## 2021-01-07 DIAGNOSIS — M25.60 RANGE OF MOTION DEFICIT: Primary | ICD-10-CM

## 2021-01-07 DIAGNOSIS — R29.898 DECREASED GRIP STRENGTH OF LEFT HAND: ICD-10-CM

## 2021-01-07 DIAGNOSIS — M79.602 PAIN OF LEFT ARM: ICD-10-CM

## 2021-01-07 DIAGNOSIS — Z78.9 IMPAIRED INSTRUMENTAL ACTIVITIES OF DAILY LIVING (IADL): ICD-10-CM

## 2021-01-07 PROCEDURE — 97110 THERAPEUTIC EXERCISES: CPT | Mod: PN

## 2021-01-07 PROCEDURE — 97035 APP MDLTY 1+ULTRASOUND EA 15: CPT | Mod: PN

## 2021-01-08 ENCOUNTER — PATIENT MESSAGE (OUTPATIENT)
Dept: REHABILITATION | Facility: HOSPITAL | Age: 56
End: 2021-01-08

## 2021-01-11 ENCOUNTER — LAB VISIT (OUTPATIENT)
Dept: LAB | Facility: HOSPITAL | Age: 56
End: 2021-01-11
Attending: INTERNAL MEDICINE
Payer: OTHER GOVERNMENT

## 2021-01-11 DIAGNOSIS — R71.8 ELEVATED MCV: ICD-10-CM

## 2021-01-11 DIAGNOSIS — R79.89 ELEVATED LFTS: ICD-10-CM

## 2021-01-11 DIAGNOSIS — Z86.19 HISTORY OF VARICELLA: ICD-10-CM

## 2021-01-11 DIAGNOSIS — E78.2 MIXED HYPERLIPIDEMIA: ICD-10-CM

## 2021-01-11 LAB
ALBUMIN SERPL BCP-MCNC: 3.9 G/DL (ref 3.5–5.2)
ALP SERPL-CCNC: 97 U/L (ref 55–135)
ALT SERPL W/O P-5'-P-CCNC: 34 U/L (ref 10–44)
ANION GAP SERPL CALC-SCNC: 7 MMOL/L (ref 8–16)
AST SERPL-CCNC: 28 U/L (ref 10–40)
BASOPHILS # BLD AUTO: 0.05 K/UL (ref 0–0.2)
BASOPHILS NFR BLD: 0.9 % (ref 0–1.9)
BILIRUB SERPL-MCNC: 0.4 MG/DL (ref 0.1–1)
BUN SERPL-MCNC: 18 MG/DL (ref 6–20)
CALCIUM SERPL-MCNC: 9.1 MG/DL (ref 8.7–10.5)
CHLORIDE SERPL-SCNC: 104 MMOL/L (ref 95–110)
CHOLEST SERPL-MCNC: 151 MG/DL (ref 120–199)
CHOLEST/HDLC SERPL: 3.6 {RATIO} (ref 2–5)
CO2 SERPL-SCNC: 30 MMOL/L (ref 23–29)
CREAT SERPL-MCNC: 1 MG/DL (ref 0.5–1.4)
DIFFERENTIAL METHOD: ABNORMAL
EOSINOPHIL # BLD AUTO: 0.2 K/UL (ref 0–0.5)
EOSINOPHIL NFR BLD: 2.8 % (ref 0–8)
ERYTHROCYTE [DISTWIDTH] IN BLOOD BY AUTOMATED COUNT: 12.4 % (ref 11.5–14.5)
EST. GFR  (AFRICAN AMERICAN): >60 ML/MIN/1.73 M^2
EST. GFR  (NON AFRICAN AMERICAN): >60 ML/MIN/1.73 M^2
GLUCOSE SERPL-MCNC: 104 MG/DL (ref 70–110)
HCT VFR BLD AUTO: 46.1 % (ref 40–54)
HDLC SERPL-MCNC: 42 MG/DL (ref 40–75)
HDLC SERPL: 27.8 % (ref 20–50)
HGB BLD-MCNC: 15.1 G/DL (ref 14–18)
IMM GRANULOCYTES # BLD AUTO: 0.01 K/UL (ref 0–0.04)
IMM GRANULOCYTES NFR BLD AUTO: 0.2 % (ref 0–0.5)
LDLC SERPL CALC-MCNC: 78.2 MG/DL (ref 63–159)
LYMPHOCYTES # BLD AUTO: 1.3 K/UL (ref 1–4.8)
LYMPHOCYTES NFR BLD: 24.2 % (ref 18–48)
MCH RBC QN AUTO: 32.5 PG (ref 27–31)
MCHC RBC AUTO-ENTMCNC: 32.8 G/DL (ref 32–36)
MCV RBC AUTO: 99 FL (ref 82–98)
MONOCYTES # BLD AUTO: 0.8 K/UL (ref 0.3–1)
MONOCYTES NFR BLD: 15.2 % (ref 4–15)
NEUTROPHILS # BLD AUTO: 3.1 K/UL (ref 1.8–7.7)
NEUTROPHILS NFR BLD: 56.7 % (ref 38–73)
NONHDLC SERPL-MCNC: 109 MG/DL
NRBC BLD-RTO: 0 /100 WBC
PLATELET # BLD AUTO: 289 K/UL (ref 150–350)
PMV BLD AUTO: 9.9 FL (ref 9.2–12.9)
POTASSIUM SERPL-SCNC: 4.1 MMOL/L (ref 3.5–5.1)
PROT SERPL-MCNC: 7 G/DL (ref 6–8.4)
RBC # BLD AUTO: 4.65 M/UL (ref 4.6–6.2)
SODIUM SERPL-SCNC: 141 MMOL/L (ref 136–145)
TRIGL SERPL-MCNC: 154 MG/DL (ref 30–150)
WBC # BLD AUTO: 5.38 K/UL (ref 3.9–12.7)

## 2021-01-11 PROCEDURE — 36415 COLL VENOUS BLD VENIPUNCTURE: CPT | Mod: PO

## 2021-01-11 PROCEDURE — 86787 VARICELLA-ZOSTER ANTIBODY: CPT

## 2021-01-11 PROCEDURE — 85025 COMPLETE CBC W/AUTO DIFF WBC: CPT

## 2021-01-11 PROCEDURE — 80053 COMPREHEN METABOLIC PANEL: CPT

## 2021-01-11 PROCEDURE — 80061 LIPID PANEL: CPT

## 2021-01-11 PROCEDURE — 83921 ORGANIC ACID SINGLE QUANT: CPT

## 2021-01-12 ENCOUNTER — CLINICAL SUPPORT (OUTPATIENT)
Dept: REHABILITATION | Facility: HOSPITAL | Age: 56
End: 2021-01-12
Attending: ORTHOPAEDIC SURGERY
Payer: OTHER GOVERNMENT

## 2021-01-12 DIAGNOSIS — M62.81 MUSCLE WEAKNESS OF LEFT ARM: ICD-10-CM

## 2021-01-12 DIAGNOSIS — R29.898 DECREASED GRIP STRENGTH OF LEFT HAND: ICD-10-CM

## 2021-01-12 DIAGNOSIS — M79.602 PAIN OF LEFT ARM: ICD-10-CM

## 2021-01-12 DIAGNOSIS — M25.60 RANGE OF MOTION DEFICIT: Primary | ICD-10-CM

## 2021-01-12 DIAGNOSIS — Z78.9 IMPAIRED INSTRUMENTAL ACTIVITIES OF DAILY LIVING (IADL): ICD-10-CM

## 2021-01-12 PROCEDURE — 97035 APP MDLTY 1+ULTRASOUND EA 15: CPT | Mod: PN

## 2021-01-12 PROCEDURE — 97110 THERAPEUTIC EXERCISES: CPT | Mod: PN

## 2021-01-13 LAB
VARICELLA INTERPRETATION: POSITIVE
VARICELLA ZOSTER IGG: 3.97 ISR (ref 0–0.9)

## 2021-01-14 LAB — METHYLMALONATE SERPL-SCNC: 0.32 UMOL/L

## 2021-01-15 ENCOUNTER — TELEPHONE (OUTPATIENT)
Dept: SLEEP MEDICINE | Facility: HOSPITAL | Age: 56
End: 2021-01-15

## 2021-01-15 DIAGNOSIS — E78.2 MIXED HYPERLIPIDEMIA: ICD-10-CM

## 2021-01-15 DIAGNOSIS — I10 ESSENTIAL HYPERTENSION: Primary | ICD-10-CM

## 2021-01-19 ENCOUNTER — TELEPHONE (OUTPATIENT)
Dept: SLEEP MEDICINE | Facility: HOSPITAL | Age: 56
End: 2021-01-19

## 2021-01-19 ENCOUNTER — CLINICAL SUPPORT (OUTPATIENT)
Dept: REHABILITATION | Facility: HOSPITAL | Age: 56
End: 2021-01-19
Attending: ORTHOPAEDIC SURGERY
Payer: OTHER GOVERNMENT

## 2021-01-19 DIAGNOSIS — Z78.9 IMPAIRED INSTRUMENTAL ACTIVITIES OF DAILY LIVING (IADL): ICD-10-CM

## 2021-01-19 DIAGNOSIS — M25.60 RANGE OF MOTION DEFICIT: Primary | ICD-10-CM

## 2021-01-19 DIAGNOSIS — R29.898 DECREASED GRIP STRENGTH OF LEFT HAND: ICD-10-CM

## 2021-01-19 DIAGNOSIS — M79.602 PAIN OF LEFT ARM: ICD-10-CM

## 2021-01-19 DIAGNOSIS — M62.81 MUSCLE WEAKNESS OF LEFT ARM: ICD-10-CM

## 2021-01-19 PROCEDURE — 97124 MASSAGE THERAPY: CPT | Mod: PN

## 2021-01-19 PROCEDURE — 97110 THERAPEUTIC EXERCISES: CPT | Mod: PN

## 2021-01-19 PROCEDURE — 97140 MANUAL THERAPY 1/> REGIONS: CPT | Mod: PN

## 2021-01-20 ENCOUNTER — HOSPITAL ENCOUNTER (OUTPATIENT)
Dept: SLEEP MEDICINE | Facility: HOSPITAL | Age: 56
Discharge: HOME | End: 2021-01-20
Attending: INTERNAL MEDICINE
Payer: OTHER GOVERNMENT

## 2021-01-20 DIAGNOSIS — G47.33 OSA (OBSTRUCTIVE SLEEP APNEA): ICD-10-CM

## 2021-01-20 PROCEDURE — 95800 PR SLEEP STUDY, UNATTENDED, RECORD HEART RATE/O2 SAT/RESP ANAL/SLEEP TIME: ICD-10-PCS | Mod: 26,,, | Performed by: INTERNAL MEDICINE

## 2021-01-20 PROCEDURE — 95800 SLP STDY UNATTENDED: CPT

## 2021-01-20 PROCEDURE — 95800 SLP STDY UNATTENDED: CPT | Mod: 26,,, | Performed by: INTERNAL MEDICINE

## 2021-01-21 ENCOUNTER — HOSPITAL ENCOUNTER (OUTPATIENT)
Dept: WOUND CARE | Facility: HOSPITAL | Age: 56
Discharge: HOME | End: 2021-01-21
Attending: INTERNAL MEDICINE
Payer: OTHER GOVERNMENT

## 2021-01-21 VITALS — SYSTOLIC BLOOD PRESSURE: 122 MMHG | TEMPERATURE: 98 F | DIASTOLIC BLOOD PRESSURE: 82 MMHG | HEART RATE: 90 BPM

## 2021-01-21 DIAGNOSIS — L85.3 DRY SKIN: Primary | ICD-10-CM

## 2021-01-21 PROCEDURE — 99211 OFF/OP EST MAY X REQ PHY/QHP: CPT | Performed by: INTERNAL MEDICINE

## 2021-01-21 PROCEDURE — 99213 PR OFFICE/OUTPT VISIT, EST, LEVL III, 20-29 MIN: ICD-10-PCS | Mod: ,,, | Performed by: INTERNAL MEDICINE

## 2021-01-21 PROCEDURE — 99213 OFFICE O/P EST LOW 20 MIN: CPT | Mod: ,,, | Performed by: INTERNAL MEDICINE

## 2021-01-26 ENCOUNTER — CLINICAL SUPPORT (OUTPATIENT)
Dept: REHABILITATION | Facility: HOSPITAL | Age: 56
End: 2021-01-26
Attending: ORTHOPAEDIC SURGERY
Payer: OTHER GOVERNMENT

## 2021-01-26 DIAGNOSIS — R29.898 DECREASED GRIP STRENGTH OF LEFT HAND: ICD-10-CM

## 2021-01-26 DIAGNOSIS — Z78.9 IMPAIRED INSTRUMENTAL ACTIVITIES OF DAILY LIVING (IADL): ICD-10-CM

## 2021-01-26 DIAGNOSIS — M62.81 MUSCLE WEAKNESS OF LEFT ARM: ICD-10-CM

## 2021-01-26 DIAGNOSIS — M79.602 PAIN OF LEFT ARM: ICD-10-CM

## 2021-01-26 DIAGNOSIS — M25.60 RANGE OF MOTION DEFICIT: Primary | ICD-10-CM

## 2021-01-26 PROCEDURE — 97140 MANUAL THERAPY 1/> REGIONS: CPT | Mod: PN

## 2021-01-26 PROCEDURE — 97110 THERAPEUTIC EXERCISES: CPT | Mod: PN

## 2021-01-27 DIAGNOSIS — G47.33 OSA (OBSTRUCTIVE SLEEP APNEA): Primary | ICD-10-CM

## 2021-01-28 ENCOUNTER — TELEPHONE (OUTPATIENT)
Dept: FAMILY MEDICINE | Facility: CLINIC | Age: 56
End: 2021-01-28

## 2021-02-02 ENCOUNTER — CLINICAL SUPPORT (OUTPATIENT)
Dept: REHABILITATION | Facility: HOSPITAL | Age: 56
End: 2021-02-02
Attending: ORTHOPAEDIC SURGERY
Payer: OTHER GOVERNMENT

## 2021-02-02 DIAGNOSIS — Z78.9 IMPAIRED INSTRUMENTAL ACTIVITIES OF DAILY LIVING (IADL): ICD-10-CM

## 2021-02-02 DIAGNOSIS — M79.602 PAIN OF LEFT ARM: ICD-10-CM

## 2021-02-02 DIAGNOSIS — R29.898 DECREASED GRIP STRENGTH OF LEFT HAND: ICD-10-CM

## 2021-02-02 DIAGNOSIS — M25.60 RANGE OF MOTION DEFICIT: Primary | ICD-10-CM

## 2021-02-02 DIAGNOSIS — M62.81 MUSCLE WEAKNESS OF LEFT ARM: ICD-10-CM

## 2021-02-02 PROCEDURE — 97110 THERAPEUTIC EXERCISES: CPT | Mod: PN

## 2021-02-04 ENCOUNTER — OFFICE VISIT (OUTPATIENT)
Dept: ORTHOPEDICS | Facility: CLINIC | Age: 56
End: 2021-02-04
Payer: OTHER GOVERNMENT

## 2021-02-04 ENCOUNTER — OFFICE VISIT (OUTPATIENT)
Dept: PODIATRY | Facility: CLINIC | Age: 56
End: 2021-02-04
Payer: OTHER GOVERNMENT

## 2021-02-04 VITALS
HEIGHT: 72 IN | WEIGHT: 229.5 LBS | OXYGEN SATURATION: 94 % | HEART RATE: 76 BPM | BODY MASS INDEX: 31.08 KG/M2 | DIASTOLIC BLOOD PRESSURE: 80 MMHG | SYSTOLIC BLOOD PRESSURE: 110 MMHG | TEMPERATURE: 99 F | RESPIRATION RATE: 18 BRPM

## 2021-02-04 VITALS — WEIGHT: 229 LBS | HEIGHT: 72 IN | BODY MASS INDEX: 31.02 KG/M2

## 2021-02-04 DIAGNOSIS — S46.312D RUPTURE OF TRICEPS TENDON, LEFT, SUBSEQUENT ENCOUNTER: Primary | ICD-10-CM

## 2021-02-04 DIAGNOSIS — M20.42 HAMMER TOES OF BOTH FEET: ICD-10-CM

## 2021-02-04 DIAGNOSIS — M79.672 LEFT FOOT PAIN: Primary | ICD-10-CM

## 2021-02-04 DIAGNOSIS — M20.5X2 HALLUX LIMITUS, ACQUIRED, LEFT: ICD-10-CM

## 2021-02-04 DIAGNOSIS — M20.41 HAMMER TOES OF BOTH FEET: ICD-10-CM

## 2021-02-04 DIAGNOSIS — M20.5X1 HALLUX LIMITUS, ACQUIRED, RIGHT: ICD-10-CM

## 2021-02-04 DIAGNOSIS — M72.2 PLANTAR FASCIITIS: ICD-10-CM

## 2021-02-04 PROCEDURE — 99214 PR OFFICE/OUTPT VISIT, EST, LEVL IV, 30-39 MIN: ICD-10-PCS | Mod: S$PBB,,, | Performed by: PODIATRIST

## 2021-02-04 PROCEDURE — 99214 OFFICE O/P EST MOD 30 MIN: CPT | Mod: PBBFAC,27,PO | Performed by: PODIATRIST

## 2021-02-04 PROCEDURE — 99999 PR PBB SHADOW E&M-EST. PATIENT-LVL IV: ICD-10-PCS | Mod: PBBFAC,,, | Performed by: ORTHOPAEDIC SURGERY

## 2021-02-04 PROCEDURE — 99213 PR OFFICE/OUTPT VISIT, EST, LEVL III, 20-29 MIN: ICD-10-PCS | Mod: S$PBB,,, | Performed by: ORTHOPAEDIC SURGERY

## 2021-02-04 PROCEDURE — 99999 PR PBB SHADOW E&M-EST. PATIENT-LVL IV: CPT | Mod: PBBFAC,,, | Performed by: PODIATRIST

## 2021-02-04 PROCEDURE — 99999 PR PBB SHADOW E&M-EST. PATIENT-LVL IV: CPT | Mod: PBBFAC,,, | Performed by: ORTHOPAEDIC SURGERY

## 2021-02-04 PROCEDURE — 99999 PR PBB SHADOW E&M-EST. PATIENT-LVL IV: ICD-10-PCS | Mod: PBBFAC,,, | Performed by: PODIATRIST

## 2021-02-04 PROCEDURE — 99213 OFFICE O/P EST LOW 20 MIN: CPT | Mod: S$PBB,,, | Performed by: ORTHOPAEDIC SURGERY

## 2021-02-04 PROCEDURE — 99214 OFFICE O/P EST MOD 30 MIN: CPT | Mod: S$PBB,,, | Performed by: PODIATRIST

## 2021-02-04 PROCEDURE — 99214 OFFICE O/P EST MOD 30 MIN: CPT | Mod: PBBFAC,PN | Performed by: ORTHOPAEDIC SURGERY

## 2021-02-04 RX ORDER — METHYLPREDNISOLONE 4 MG/1
TABLET ORAL
Qty: 21 TABLET | Refills: 0 | Status: SHIPPED | OUTPATIENT
Start: 2021-02-04 | End: 2021-05-27 | Stop reason: ALTCHOICE

## 2021-02-08 ENCOUNTER — PATIENT MESSAGE (OUTPATIENT)
Dept: FAMILY MEDICINE | Facility: CLINIC | Age: 56
End: 2021-02-08

## 2021-02-08 ENCOUNTER — PATIENT MESSAGE (OUTPATIENT)
Dept: REHABILITATION | Facility: HOSPITAL | Age: 56
End: 2021-02-08

## 2021-02-09 ENCOUNTER — OFFICE VISIT (OUTPATIENT)
Dept: OPTOMETRY | Facility: CLINIC | Age: 56
End: 2021-02-09
Payer: OTHER GOVERNMENT

## 2021-02-09 DIAGNOSIS — H02.9 LESION OF RIGHT UPPER EYELID: Primary | ICD-10-CM

## 2021-02-09 PROCEDURE — 92012 INTRM OPH EXAM EST PATIENT: CPT | Mod: S$PBB,,, | Performed by: OPTOMETRIST

## 2021-02-09 PROCEDURE — 99999 PR PBB SHADOW E&M-EST. PATIENT-LVL II: ICD-10-PCS | Mod: PBBFAC,,, | Performed by: OPTOMETRIST

## 2021-02-09 PROCEDURE — 92012 PR EYE EXAM, EST PATIENT,INTERMED: ICD-10-PCS | Mod: S$PBB,,, | Performed by: OPTOMETRIST

## 2021-02-09 PROCEDURE — 92285 EXTERNAL OCULAR PHOTOGRAPHY: CPT | Mod: PBBFAC | Performed by: OPTOMETRIST

## 2021-02-09 PROCEDURE — 92285 EXTERNAL PHOTOGRAPHY - OU - BOTH EYES: ICD-10-PCS | Mod: 26,S$PBB,, | Performed by: OPTOMETRIST

## 2021-02-09 PROCEDURE — 99212 OFFICE O/P EST SF 10 MIN: CPT | Mod: PBBFAC | Performed by: OPTOMETRIST

## 2021-02-09 PROCEDURE — 99999 PR PBB SHADOW E&M-EST. PATIENT-LVL II: CPT | Mod: PBBFAC,,, | Performed by: OPTOMETRIST

## 2021-02-09 RX ORDER — TRAZODONE HYDROCHLORIDE 50 MG/1
TABLET ORAL
COMMUNITY
Start: 2020-11-18 | End: 2021-07-27 | Stop reason: SDUPTHER

## 2021-02-09 RX ORDER — CEPHALEXIN 500 MG/1
TABLET ORAL
COMMUNITY
Start: 2020-10-02 | End: 2021-07-27

## 2021-02-09 RX ORDER — DICLOFENAC SODIUM 50 MG/1
TABLET, DELAYED RELEASE ORAL
COMMUNITY
Start: 2020-12-21 | End: 2021-05-27 | Stop reason: ALTCHOICE

## 2021-02-10 ENCOUNTER — PATIENT MESSAGE (OUTPATIENT)
Dept: REHABILITATION | Facility: HOSPITAL | Age: 56
End: 2021-02-10

## 2021-02-15 ENCOUNTER — TELEPHONE (OUTPATIENT)
Dept: OPHTHALMOLOGY | Facility: CLINIC | Age: 56
End: 2021-02-15

## 2021-02-15 ENCOUNTER — PATIENT MESSAGE (OUTPATIENT)
Dept: OPHTHALMOLOGY | Facility: CLINIC | Age: 56
End: 2021-02-15

## 2021-02-15 ENCOUNTER — DOCUMENTATION ONLY (OUTPATIENT)
Dept: REHABILITATION | Facility: HOSPITAL | Age: 56
End: 2021-02-15

## 2021-02-15 DIAGNOSIS — M79.602 PAIN OF LEFT ARM: ICD-10-CM

## 2021-02-15 DIAGNOSIS — R29.898 DECREASED GRIP STRENGTH OF LEFT HAND: ICD-10-CM

## 2021-02-15 DIAGNOSIS — M25.60 RANGE OF MOTION DEFICIT: Primary | ICD-10-CM

## 2021-02-15 DIAGNOSIS — M62.81 MUSCLE WEAKNESS OF LEFT ARM: ICD-10-CM

## 2021-02-15 DIAGNOSIS — Z78.9 IMPAIRED INSTRUMENTAL ACTIVITIES OF DAILY LIVING (IADL): ICD-10-CM

## 2021-02-17 ENCOUNTER — PATIENT MESSAGE (OUTPATIENT)
Dept: FAMILY MEDICINE | Facility: CLINIC | Age: 56
End: 2021-02-17

## 2021-02-17 DIAGNOSIS — G47.33 OSA (OBSTRUCTIVE SLEEP APNEA): Primary | ICD-10-CM

## 2021-02-19 ENCOUNTER — TELEPHONE (OUTPATIENT)
Dept: SLEEP MEDICINE | Facility: CLINIC | Age: 56
End: 2021-02-19

## 2021-02-24 ENCOUNTER — OFFICE VISIT (OUTPATIENT)
Dept: SLEEP MEDICINE | Facility: CLINIC | Age: 56
End: 2021-02-24
Payer: OTHER GOVERNMENT

## 2021-02-24 VITALS
DIASTOLIC BLOOD PRESSURE: 87 MMHG | HEIGHT: 73 IN | HEART RATE: 77 BPM | SYSTOLIC BLOOD PRESSURE: 132 MMHG | BODY MASS INDEX: 30.38 KG/M2 | WEIGHT: 229.25 LBS

## 2021-02-24 DIAGNOSIS — R06.89 HYPERCARBIA: ICD-10-CM

## 2021-02-24 DIAGNOSIS — G47.33 OSA (OBSTRUCTIVE SLEEP APNEA): Primary | ICD-10-CM

## 2021-02-24 DIAGNOSIS — F51.09 OTHER INSOMNIA NOT DUE TO A SUBSTANCE OR KNOWN PHYSIOLOGICAL CONDITION: ICD-10-CM

## 2021-02-24 PROCEDURE — 99204 OFFICE O/P NEW MOD 45 MIN: CPT | Mod: S$PBB,,, | Performed by: INTERNAL MEDICINE

## 2021-02-24 PROCEDURE — 99999 PR PBB SHADOW E&M-EST. PATIENT-LVL III: CPT | Mod: PBBFAC,,, | Performed by: INTERNAL MEDICINE

## 2021-02-24 PROCEDURE — 99204 PR OFFICE/OUTPT VISIT, NEW, LEVL IV, 45-59 MIN: ICD-10-PCS | Mod: S$PBB,,, | Performed by: INTERNAL MEDICINE

## 2021-02-24 PROCEDURE — 99999 PR PBB SHADOW E&M-EST. PATIENT-LVL III: ICD-10-PCS | Mod: PBBFAC,,, | Performed by: INTERNAL MEDICINE

## 2021-02-24 PROCEDURE — 99213 OFFICE O/P EST LOW 20 MIN: CPT | Mod: PBBFAC | Performed by: INTERNAL MEDICINE

## 2021-02-26 ENCOUNTER — TELEPHONE (OUTPATIENT)
Dept: SLEEP MEDICINE | Facility: HOSPITAL | Age: 56
End: 2021-02-26

## 2021-02-26 DIAGNOSIS — R05.9 COUGH: ICD-10-CM

## 2021-03-08 ENCOUNTER — PATIENT MESSAGE (OUTPATIENT)
Dept: PODIATRY | Facility: CLINIC | Age: 56
End: 2021-03-08

## 2021-03-08 DIAGNOSIS — M20.42 HAMMER TOES OF BOTH FEET: ICD-10-CM

## 2021-03-08 DIAGNOSIS — M20.41 HAMMER TOES OF BOTH FEET: ICD-10-CM

## 2021-03-08 DIAGNOSIS — M20.5X1 HALLUX LIMITUS, ACQUIRED, RIGHT: ICD-10-CM

## 2021-03-08 DIAGNOSIS — M79.672 LEFT FOOT PAIN: Primary | ICD-10-CM

## 2021-03-08 DIAGNOSIS — M72.2 PLANTAR FASCIITIS: ICD-10-CM

## 2021-03-08 DIAGNOSIS — M20.5X2 HALLUX LIMITUS, ACQUIRED, LEFT: ICD-10-CM

## 2021-03-25 ENCOUNTER — TELEPHONE (OUTPATIENT)
Dept: SLEEP MEDICINE | Facility: HOSPITAL | Age: 56
End: 2021-03-25

## 2021-03-25 ENCOUNTER — LAB VISIT (OUTPATIENT)
Dept: FAMILY MEDICINE | Facility: CLINIC | Age: 56
End: 2021-03-25
Payer: OTHER GOVERNMENT

## 2021-03-25 DIAGNOSIS — R05.9 COUGH: ICD-10-CM

## 2021-03-25 PROCEDURE — U0005 INFEC AGEN DETEC AMPLI PROBE: HCPCS | Performed by: INTERNAL MEDICINE

## 2021-03-25 PROCEDURE — U0003 INFECTIOUS AGENT DETECTION BY NUCLEIC ACID (DNA OR RNA); SEVERE ACUTE RESPIRATORY SYNDROME CORONAVIRUS 2 (SARS-COV-2) (CORONAVIRUS DISEASE [COVID-19]), AMPLIFIED PROBE TECHNIQUE, MAKING USE OF HIGH THROUGHPUT TECHNOLOGIES AS DESCRIBED BY CMS-2020-01-R: HCPCS | Performed by: INTERNAL MEDICINE

## 2021-03-26 LAB — SARS-COV-2 RNA RESP QL NAA+PROBE: NOT DETECTED

## 2021-03-27 ENCOUNTER — HOSPITAL ENCOUNTER (OUTPATIENT)
Dept: SLEEP MEDICINE | Facility: HOSPITAL | Age: 56
Discharge: HOME | End: 2021-03-27
Attending: INTERNAL MEDICINE
Payer: OTHER GOVERNMENT

## 2021-03-27 DIAGNOSIS — R06.89 HYPERCARBIA: ICD-10-CM

## 2021-03-27 DIAGNOSIS — G47.33 OSA (OBSTRUCTIVE SLEEP APNEA): ICD-10-CM

## 2021-03-27 DIAGNOSIS — F51.09 OTHER INSOMNIA NOT DUE TO A SUBSTANCE OR KNOWN PHYSIOLOGICAL CONDITION: ICD-10-CM

## 2021-03-27 PROCEDURE — 95811 POLYSOM 6/>YRS CPAP 4/> PARM: CPT

## 2021-04-05 PROCEDURE — 95811 POLYSOM 6/>YRS CPAP 4/> PARM: CPT | Mod: 26,,, | Performed by: INTERNAL MEDICINE

## 2021-04-05 PROCEDURE — 95811 PR POLYSOMNOGRAPHY W/CPAP: ICD-10-PCS | Mod: 26,,, | Performed by: INTERNAL MEDICINE

## 2021-04-06 ENCOUNTER — CLINICAL SUPPORT (OUTPATIENT)
Dept: REHABILITATION | Facility: HOSPITAL | Age: 56
End: 2021-04-06
Attending: PODIATRIST
Payer: OTHER GOVERNMENT

## 2021-04-06 ENCOUNTER — PATIENT MESSAGE (OUTPATIENT)
Dept: SLEEP MEDICINE | Facility: CLINIC | Age: 56
End: 2021-04-06

## 2021-04-06 DIAGNOSIS — M25.672 DECREASED RANGE OF MOTION OF LEFT ANKLE: ICD-10-CM

## 2021-04-06 DIAGNOSIS — M72.2 PLANTAR FASCIITIS: ICD-10-CM

## 2021-04-06 DIAGNOSIS — G47.33 OSA (OBSTRUCTIVE SLEEP APNEA): Primary | ICD-10-CM

## 2021-04-06 DIAGNOSIS — R29.898 DECREASED STRENGTH OF LOWER EXTREMITY: ICD-10-CM

## 2021-04-06 DIAGNOSIS — M79.672 LEFT FOOT PAIN: ICD-10-CM

## 2021-04-06 PROCEDURE — 97162 PT EVAL MOD COMPLEX 30 MIN: CPT | Mod: PN

## 2021-04-06 PROCEDURE — 97110 THERAPEUTIC EXERCISES: CPT | Mod: PN

## 2021-04-15 ENCOUNTER — OFFICE VISIT (OUTPATIENT)
Dept: PODIATRY | Facility: CLINIC | Age: 56
End: 2021-04-15
Payer: OTHER GOVERNMENT

## 2021-04-15 VITALS
WEIGHT: 229 LBS | SYSTOLIC BLOOD PRESSURE: 132 MMHG | BODY MASS INDEX: 30.35 KG/M2 | HEIGHT: 73 IN | DIASTOLIC BLOOD PRESSURE: 80 MMHG

## 2021-04-15 DIAGNOSIS — M79.672 LEFT FOOT PAIN: Primary | ICD-10-CM

## 2021-04-15 DIAGNOSIS — M20.42 HAMMER TOES OF BOTH FEET: ICD-10-CM

## 2021-04-15 DIAGNOSIS — M20.5X1 HALLUX LIMITUS, ACQUIRED, RIGHT: ICD-10-CM

## 2021-04-15 DIAGNOSIS — M20.5X2 HALLUX LIMITUS, ACQUIRED, LEFT: ICD-10-CM

## 2021-04-15 DIAGNOSIS — M20.41 HAMMER TOES OF BOTH FEET: ICD-10-CM

## 2021-04-15 DIAGNOSIS — M72.2 PLANTAR FASCIITIS: ICD-10-CM

## 2021-04-15 PROCEDURE — 99999 PR PBB SHADOW E&M-EST. PATIENT-LVL III: CPT | Mod: PBBFAC,,, | Performed by: PODIATRIST

## 2021-04-15 PROCEDURE — 99213 OFFICE O/P EST LOW 20 MIN: CPT | Mod: PBBFAC,PO | Performed by: PODIATRIST

## 2021-04-15 PROCEDURE — 99999 PR PBB SHADOW E&M-EST. PATIENT-LVL III: ICD-10-PCS | Mod: PBBFAC,,, | Performed by: PODIATRIST

## 2021-04-15 PROCEDURE — 99213 OFFICE O/P EST LOW 20 MIN: CPT | Mod: S$PBB,,, | Performed by: PODIATRIST

## 2021-04-15 PROCEDURE — 99213 PR OFFICE/OUTPT VISIT, EST, LEVL III, 20-29 MIN: ICD-10-PCS | Mod: S$PBB,,, | Performed by: PODIATRIST

## 2021-04-29 ENCOUNTER — CLINICAL SUPPORT (OUTPATIENT)
Dept: REHABILITATION | Facility: HOSPITAL | Age: 56
End: 2021-04-29
Attending: PODIATRIST
Payer: OTHER GOVERNMENT

## 2021-04-29 DIAGNOSIS — R29.898 DECREASED STRENGTH OF LOWER EXTREMITY: ICD-10-CM

## 2021-04-29 DIAGNOSIS — M79.672 LEFT FOOT PAIN: Primary | ICD-10-CM

## 2021-04-29 DIAGNOSIS — M25.672 DECREASED RANGE OF MOTION OF LEFT ANKLE: ICD-10-CM

## 2021-04-29 PROCEDURE — 97110 THERAPEUTIC EXERCISES: CPT | Mod: PN,CQ

## 2021-05-06 ENCOUNTER — CLINICAL SUPPORT (OUTPATIENT)
Dept: REHABILITATION | Facility: HOSPITAL | Age: 56
End: 2021-05-06
Attending: ORTHOPAEDIC SURGERY
Payer: OTHER GOVERNMENT

## 2021-05-06 DIAGNOSIS — M25.672 DECREASED RANGE OF MOTION OF LEFT ANKLE: ICD-10-CM

## 2021-05-06 DIAGNOSIS — M79.672 LEFT FOOT PAIN: ICD-10-CM

## 2021-05-06 DIAGNOSIS — R29.898 DECREASED STRENGTH OF LOWER EXTREMITY: ICD-10-CM

## 2021-05-06 PROCEDURE — 97110 THERAPEUTIC EXERCISES: CPT | Mod: PN

## 2021-05-13 ENCOUNTER — CLINICAL SUPPORT (OUTPATIENT)
Dept: REHABILITATION | Facility: HOSPITAL | Age: 56
End: 2021-05-13
Attending: ORTHOPAEDIC SURGERY
Payer: OTHER GOVERNMENT

## 2021-05-13 DIAGNOSIS — M25.672 DECREASED RANGE OF MOTION OF LEFT ANKLE: ICD-10-CM

## 2021-05-13 DIAGNOSIS — R29.898 DECREASED STRENGTH OF LOWER EXTREMITY: ICD-10-CM

## 2021-05-13 DIAGNOSIS — M79.672 LEFT FOOT PAIN: ICD-10-CM

## 2021-05-13 PROCEDURE — 97110 THERAPEUTIC EXERCISES: CPT | Mod: PN

## 2021-05-20 ENCOUNTER — CLINICAL SUPPORT (OUTPATIENT)
Dept: REHABILITATION | Facility: HOSPITAL | Age: 56
End: 2021-05-20
Attending: ORTHOPAEDIC SURGERY
Payer: OTHER GOVERNMENT

## 2021-05-20 DIAGNOSIS — M25.672 DECREASED RANGE OF MOTION OF LEFT ANKLE: ICD-10-CM

## 2021-05-20 DIAGNOSIS — R29.898 DECREASED STRENGTH OF LOWER EXTREMITY: ICD-10-CM

## 2021-05-20 DIAGNOSIS — M79.672 LEFT FOOT PAIN: ICD-10-CM

## 2021-05-20 PROCEDURE — 97110 THERAPEUTIC EXERCISES: CPT | Mod: PN

## 2021-05-27 ENCOUNTER — OFFICE VISIT (OUTPATIENT)
Dept: PODIATRY | Facility: CLINIC | Age: 56
End: 2021-05-27
Payer: OTHER GOVERNMENT

## 2021-05-27 VITALS — WEIGHT: 229.06 LBS | BODY MASS INDEX: 30.36 KG/M2 | HEIGHT: 73 IN

## 2021-05-27 DIAGNOSIS — M79.672 LEFT FOOT PAIN: Primary | ICD-10-CM

## 2021-05-27 DIAGNOSIS — M20.5X1 HALLUX LIMITUS, ACQUIRED, RIGHT: ICD-10-CM

## 2021-05-27 DIAGNOSIS — M20.5X2 HALLUX LIMITUS, ACQUIRED, LEFT: ICD-10-CM

## 2021-05-27 DIAGNOSIS — M20.42 HAMMER TOES OF BOTH FEET: ICD-10-CM

## 2021-05-27 DIAGNOSIS — M20.41 HAMMER TOES OF BOTH FEET: ICD-10-CM

## 2021-05-27 DIAGNOSIS — M72.2 PLANTAR FASCIITIS: ICD-10-CM

## 2021-05-27 PROCEDURE — 99999 PR PBB SHADOW E&M-EST. PATIENT-LVL III: CPT | Mod: PBBFAC,,, | Performed by: PODIATRIST

## 2021-05-27 PROCEDURE — 99213 OFFICE O/P EST LOW 20 MIN: CPT | Mod: PBBFAC,PO | Performed by: PODIATRIST

## 2021-05-27 PROCEDURE — 99214 OFFICE O/P EST MOD 30 MIN: CPT | Mod: S$PBB,,, | Performed by: PODIATRIST

## 2021-05-27 PROCEDURE — 99999 PR PBB SHADOW E&M-EST. PATIENT-LVL III: ICD-10-PCS | Mod: PBBFAC,,, | Performed by: PODIATRIST

## 2021-05-27 PROCEDURE — 99214 PR OFFICE/OUTPT VISIT, EST, LEVL IV, 30-39 MIN: ICD-10-PCS | Mod: S$PBB,,, | Performed by: PODIATRIST

## 2021-05-27 RX ORDER — ACETAMINOPHEN AND CODEINE PHOSPHATE 300; 30 MG/1; MG/1
1 TABLET ORAL EVERY 4 HOURS PRN
Qty: 15 TABLET | Refills: 0 | Status: SHIPPED | OUTPATIENT
Start: 2021-05-27 | End: 2021-07-27

## 2021-05-27 RX ORDER — MELOXICAM 15 MG/1
15 TABLET ORAL DAILY
Qty: 30 TABLET | Refills: 1 | Status: SHIPPED | OUTPATIENT
Start: 2021-05-27 | End: 2021-05-27

## 2021-05-31 ENCOUNTER — CLINICAL SUPPORT (OUTPATIENT)
Dept: REHABILITATION | Facility: HOSPITAL | Age: 56
End: 2021-05-31
Attending: ORTHOPAEDIC SURGERY
Payer: OTHER GOVERNMENT

## 2021-05-31 DIAGNOSIS — M79.672 LEFT FOOT PAIN: ICD-10-CM

## 2021-05-31 DIAGNOSIS — M25.672 DECREASED RANGE OF MOTION OF LEFT ANKLE: ICD-10-CM

## 2021-05-31 DIAGNOSIS — R29.898 DECREASED STRENGTH OF LOWER EXTREMITY: ICD-10-CM

## 2021-05-31 PROCEDURE — 97110 THERAPEUTIC EXERCISES: CPT | Mod: PN

## 2021-06-30 ENCOUNTER — PATIENT OUTREACH (OUTPATIENT)
Dept: ADMINISTRATIVE | Facility: OTHER | Age: 56
End: 2021-06-30

## 2021-07-01 ENCOUNTER — OFFICE VISIT (OUTPATIENT)
Dept: OPHTHALMOLOGY | Facility: CLINIC | Age: 56
End: 2021-07-01
Payer: OTHER GOVERNMENT

## 2021-07-01 DIAGNOSIS — H02.9 EYELID LESION: Primary | ICD-10-CM

## 2021-07-01 PROCEDURE — 99999 PR PBB SHADOW E&M-EST. PATIENT-LVL III: ICD-10-PCS | Mod: PBBFAC,,, | Performed by: OPHTHALMOLOGY

## 2021-07-01 PROCEDURE — 99999 PR PBB SHADOW E&M-EST. PATIENT-LVL III: CPT | Mod: PBBFAC,,, | Performed by: OPHTHALMOLOGY

## 2021-07-01 PROCEDURE — 92285 EXTERNAL PHOTOGRAPHY - OU - BOTH EYES: ICD-10-PCS | Mod: 26,S$PBB,, | Performed by: OPHTHALMOLOGY

## 2021-07-01 PROCEDURE — 99204 PR OFFICE/OUTPT VISIT, NEW, LEVL IV, 45-59 MIN: ICD-10-PCS | Mod: S$PBB,,, | Performed by: OPHTHALMOLOGY

## 2021-07-01 PROCEDURE — 92285 EXTERNAL OCULAR PHOTOGRAPHY: CPT | Mod: PBBFAC | Performed by: OPHTHALMOLOGY

## 2021-07-01 PROCEDURE — 99204 OFFICE O/P NEW MOD 45 MIN: CPT | Mod: S$PBB,,, | Performed by: OPHTHALMOLOGY

## 2021-07-01 PROCEDURE — 99213 OFFICE O/P EST LOW 20 MIN: CPT | Mod: PBBFAC | Performed by: OPHTHALMOLOGY

## 2021-07-01 RX ORDER — DICLOFENAC SODIUM 50 MG/1
TABLET, DELAYED RELEASE ORAL
COMMUNITY
Start: 2021-01-22 | End: 2021-07-27

## 2021-07-26 ENCOUNTER — PROCEDURE VISIT (OUTPATIENT)
Dept: OPHTHALMOLOGY | Facility: CLINIC | Age: 56
End: 2021-07-26
Payer: OTHER GOVERNMENT

## 2021-07-26 VITALS — SYSTOLIC BLOOD PRESSURE: 125 MMHG | DIASTOLIC BLOOD PRESSURE: 83 MMHG | HEART RATE: 77 BPM

## 2021-07-26 DIAGNOSIS — H02.821 CYST OF RIGHT UPPER EYELID: Primary | ICD-10-CM

## 2021-07-26 PROCEDURE — 99499 UNLISTED E&M SERVICE: CPT | Mod: S$PBB,,, | Performed by: OPHTHALMOLOGY

## 2021-07-26 PROCEDURE — 67840 PR REMOVE, EYELID, LESN (NOT CHALAZION): ICD-10-PCS | Mod: S$PBB,RT,, | Performed by: OPHTHALMOLOGY

## 2021-07-26 PROCEDURE — 88304 TISSUE EXAM BY PATHOLOGIST: CPT | Mod: 26,,, | Performed by: PATHOLOGY

## 2021-07-26 PROCEDURE — 88304 TISSUE EXAM BY PATHOLOGIST: CPT | Performed by: PATHOLOGY

## 2021-07-26 PROCEDURE — 99499 NO LOS: ICD-10-PCS | Mod: S$PBB,,, | Performed by: OPHTHALMOLOGY

## 2021-07-26 PROCEDURE — 88304 PR  SURG PATH,LEVEL III: ICD-10-PCS | Mod: 26,,, | Performed by: PATHOLOGY

## 2021-07-26 PROCEDURE — 67840 REMOVE EYELID LESION: CPT | Mod: PBBFAC | Performed by: OPHTHALMOLOGY

## 2021-07-26 PROCEDURE — 67840 REMOVE EYELID LESION: CPT | Mod: S$PBB,RT,, | Performed by: OPHTHALMOLOGY

## 2021-07-27 ENCOUNTER — APPOINTMENT (OUTPATIENT)
Dept: RADIOLOGY | Facility: HOSPITAL | Age: 56
End: 2021-07-27
Attending: ORTHOPAEDIC SURGERY
Payer: OTHER GOVERNMENT

## 2021-07-27 ENCOUNTER — OFFICE VISIT (OUTPATIENT)
Dept: ORTHOPEDICS | Facility: CLINIC | Age: 56
End: 2021-07-27
Payer: OTHER GOVERNMENT

## 2021-07-27 VITALS
OXYGEN SATURATION: 92 % | HEIGHT: 72 IN | RESPIRATION RATE: 18 BRPM | SYSTOLIC BLOOD PRESSURE: 122 MMHG | DIASTOLIC BLOOD PRESSURE: 96 MMHG | BODY MASS INDEX: 31.08 KG/M2 | WEIGHT: 229.5 LBS | HEART RATE: 98 BPM

## 2021-07-27 DIAGNOSIS — M25.521 RIGHT ELBOW PAIN: Primary | ICD-10-CM

## 2021-07-27 DIAGNOSIS — M25.521 RIGHT ELBOW PAIN: ICD-10-CM

## 2021-07-27 PROCEDURE — 99214 OFFICE O/P EST MOD 30 MIN: CPT | Mod: PBBFAC,PN | Performed by: ORTHOPAEDIC SURGERY

## 2021-07-27 PROCEDURE — 99999 PR PBB SHADOW E&M-EST. PATIENT-LVL IV: ICD-10-PCS | Mod: PBBFAC,,, | Performed by: ORTHOPAEDIC SURGERY

## 2021-07-27 PROCEDURE — 99999 PR PBB SHADOW E&M-EST. PATIENT-LVL IV: CPT | Mod: PBBFAC,,, | Performed by: ORTHOPAEDIC SURGERY

## 2021-07-27 PROCEDURE — 73080 XR ELBOW COMPLETE 3 VIEW RIGHT: ICD-10-PCS | Mod: 26,RT,, | Performed by: RADIOLOGY

## 2021-07-27 PROCEDURE — 99213 PR OFFICE/OUTPT VISIT, EST, LEVL III, 20-29 MIN: ICD-10-PCS | Mod: S$PBB,,, | Performed by: ORTHOPAEDIC SURGERY

## 2021-07-27 PROCEDURE — 73080 X-RAY EXAM OF ELBOW: CPT | Mod: 26,RT,, | Performed by: RADIOLOGY

## 2021-07-27 PROCEDURE — 99213 OFFICE O/P EST LOW 20 MIN: CPT | Mod: S$PBB,,, | Performed by: ORTHOPAEDIC SURGERY

## 2021-07-27 PROCEDURE — 73080 X-RAY EXAM OF ELBOW: CPT | Mod: TC,FY,PN,RT

## 2021-07-27 RX ORDER — MELOXICAM 15 MG/1
15 TABLET ORAL DAILY
Qty: 30 TABLET | Refills: 0 | Status: SHIPPED | OUTPATIENT
Start: 2021-07-27 | End: 2021-09-13 | Stop reason: SDUPTHER

## 2021-07-30 ENCOUNTER — TELEPHONE (OUTPATIENT)
Dept: OPHTHALMOLOGY | Facility: CLINIC | Age: 56
End: 2021-07-30

## 2021-07-30 LAB
FINAL PATHOLOGIC DIAGNOSIS: NORMAL
GROSS: NORMAL
Lab: NORMAL

## 2021-08-25 ENCOUNTER — PATIENT OUTREACH (OUTPATIENT)
Dept: ADMINISTRATIVE | Facility: OTHER | Age: 56
End: 2021-08-25

## 2021-08-27 ENCOUNTER — TELEPHONE (OUTPATIENT)
Dept: OTOLARYNGOLOGY | Facility: CLINIC | Age: 56
End: 2021-08-27

## 2021-09-10 DIAGNOSIS — M25.562 PAIN IN BOTH KNEES, UNSPECIFIED CHRONICITY: Primary | ICD-10-CM

## 2021-09-10 DIAGNOSIS — M25.561 PAIN IN BOTH KNEES, UNSPECIFIED CHRONICITY: Primary | ICD-10-CM

## 2021-09-13 ENCOUNTER — OFFICE VISIT (OUTPATIENT)
Dept: ORTHOPEDICS | Facility: CLINIC | Age: 56
End: 2021-09-13
Payer: OTHER GOVERNMENT

## 2021-09-13 ENCOUNTER — TELEPHONE (OUTPATIENT)
Dept: OTOLARYNGOLOGY | Facility: CLINIC | Age: 56
End: 2021-09-13

## 2021-09-13 VITALS
WEIGHT: 223.31 LBS | HEIGHT: 72 IN | BODY MASS INDEX: 30.25 KG/M2 | OXYGEN SATURATION: 94 % | DIASTOLIC BLOOD PRESSURE: 82 MMHG | SYSTOLIC BLOOD PRESSURE: 118 MMHG | RESPIRATION RATE: 18 BRPM | HEART RATE: 87 BPM

## 2021-09-13 DIAGNOSIS — M25.562 PAIN IN BOTH KNEES, UNSPECIFIED CHRONICITY: Primary | ICD-10-CM

## 2021-09-13 DIAGNOSIS — M25.561 PAIN IN BOTH KNEES, UNSPECIFIED CHRONICITY: Primary | ICD-10-CM

## 2021-09-13 PROCEDURE — 99999 PR PBB SHADOW E&M-EST. PATIENT-LVL IV: ICD-10-PCS | Mod: PBBFAC,,, | Performed by: ORTHOPAEDIC SURGERY

## 2021-09-13 PROCEDURE — 99213 PR OFFICE/OUTPT VISIT, EST, LEVL III, 20-29 MIN: ICD-10-PCS | Mod: S$PBB,,, | Performed by: ORTHOPAEDIC SURGERY

## 2021-09-13 PROCEDURE — 99214 OFFICE O/P EST MOD 30 MIN: CPT | Mod: PBBFAC,PN | Performed by: ORTHOPAEDIC SURGERY

## 2021-09-13 PROCEDURE — 99999 PR PBB SHADOW E&M-EST. PATIENT-LVL IV: CPT | Mod: PBBFAC,,, | Performed by: ORTHOPAEDIC SURGERY

## 2021-09-13 PROCEDURE — 99213 OFFICE O/P EST LOW 20 MIN: CPT | Mod: S$PBB,,, | Performed by: ORTHOPAEDIC SURGERY

## 2021-09-13 RX ORDER — MELOXICAM 15 MG/1
15 TABLET ORAL DAILY
Qty: 30 TABLET | Refills: 0 | Status: SHIPPED | OUTPATIENT
Start: 2021-09-13 | End: 2021-09-20

## 2021-09-21 ENCOUNTER — OFFICE VISIT (OUTPATIENT)
Dept: ORTHOPEDICS | Facility: CLINIC | Age: 56
End: 2021-09-21
Payer: OTHER GOVERNMENT

## 2021-09-21 VITALS
HEART RATE: 65 BPM | RESPIRATION RATE: 18 BRPM | SYSTOLIC BLOOD PRESSURE: 138 MMHG | OXYGEN SATURATION: 93 % | BODY MASS INDEX: 31.36 KG/M2 | HEIGHT: 72 IN | DIASTOLIC BLOOD PRESSURE: 80 MMHG | WEIGHT: 231.5 LBS

## 2021-09-21 DIAGNOSIS — M77.11 RIGHT LATERAL EPICONDYLITIS: Primary | ICD-10-CM

## 2021-09-21 DIAGNOSIS — M25.521 RIGHT ELBOW PAIN: ICD-10-CM

## 2021-09-21 PROCEDURE — 99999 PR PBB SHADOW E&M-EST. PATIENT-LVL IV: ICD-10-PCS | Mod: PBBFAC,,, | Performed by: ORTHOPAEDIC SURGERY

## 2021-09-21 PROCEDURE — 99214 OFFICE O/P EST MOD 30 MIN: CPT | Mod: PBBFAC,PN | Performed by: ORTHOPAEDIC SURGERY

## 2021-09-21 PROCEDURE — 99213 OFFICE O/P EST LOW 20 MIN: CPT | Mod: S$PBB,,, | Performed by: ORTHOPAEDIC SURGERY

## 2021-09-21 PROCEDURE — 99213 PR OFFICE/OUTPT VISIT, EST, LEVL III, 20-29 MIN: ICD-10-PCS | Mod: S$PBB,,, | Performed by: ORTHOPAEDIC SURGERY

## 2021-09-21 PROCEDURE — 99999 PR PBB SHADOW E&M-EST. PATIENT-LVL IV: CPT | Mod: PBBFAC,,, | Performed by: ORTHOPAEDIC SURGERY

## 2021-09-23 ENCOUNTER — OFFICE VISIT (OUTPATIENT)
Dept: OTOLARYNGOLOGY | Facility: CLINIC | Age: 56
End: 2021-09-23
Payer: OTHER GOVERNMENT

## 2021-09-23 VITALS — DIASTOLIC BLOOD PRESSURE: 85 MMHG | HEART RATE: 68 BPM | SYSTOLIC BLOOD PRESSURE: 119 MMHG

## 2021-09-23 DIAGNOSIS — R09.81 NASAL CONGESTION: Primary | ICD-10-CM

## 2021-09-23 DIAGNOSIS — T48.5X5A RHINITIS MEDICAMENTOSA: ICD-10-CM

## 2021-09-23 DIAGNOSIS — J32.9 CHRONIC SINUSITIS, UNSPECIFIED LOCATION: ICD-10-CM

## 2021-09-23 DIAGNOSIS — R05.9 COUGH: ICD-10-CM

## 2021-09-23 DIAGNOSIS — J31.0 RHINITIS MEDICAMENTOSA: ICD-10-CM

## 2021-09-23 DIAGNOSIS — R09.82 PND (POST-NASAL DRIP): ICD-10-CM

## 2021-09-23 PROCEDURE — 99204 OFFICE O/P NEW MOD 45 MIN: CPT | Mod: S$PBB,,, | Performed by: NURSE PRACTITIONER

## 2021-09-23 PROCEDURE — 99204 PR OFFICE/OUTPT VISIT, NEW, LEVL IV, 45-59 MIN: ICD-10-PCS | Mod: S$PBB,,, | Performed by: NURSE PRACTITIONER

## 2021-09-23 PROCEDURE — 99999 PR PBB SHADOW E&M-EST. PATIENT-LVL III: CPT | Mod: PBBFAC,,, | Performed by: NURSE PRACTITIONER

## 2021-09-23 PROCEDURE — 99999 PR PBB SHADOW E&M-EST. PATIENT-LVL III: ICD-10-PCS | Mod: PBBFAC,,, | Performed by: NURSE PRACTITIONER

## 2021-09-23 PROCEDURE — 99213 OFFICE O/P EST LOW 20 MIN: CPT | Mod: PBBFAC | Performed by: NURSE PRACTITIONER

## 2021-09-23 RX ORDER — AMOXICILLIN AND CLAVULANATE POTASSIUM 875; 125 MG/1; MG/1
1 TABLET, FILM COATED ORAL 2 TIMES DAILY
Qty: 20 TABLET | Refills: 0 | Status: SHIPPED | OUTPATIENT
Start: 2021-09-23 | End: 2021-10-03

## 2021-09-23 RX ORDER — FLUTICASONE PROPIONATE 50 MCG
1 SPRAY, SUSPENSION (ML) NASAL DAILY
Qty: 16 G | Refills: 2 | Status: SHIPPED | OUTPATIENT
Start: 2021-09-23 | End: 2021-12-20

## 2021-09-24 ENCOUNTER — PATIENT MESSAGE (OUTPATIENT)
Dept: FAMILY MEDICINE | Facility: CLINIC | Age: 56
End: 2021-09-24

## 2021-09-24 DIAGNOSIS — G47.00 INSOMNIA, UNSPECIFIED TYPE: ICD-10-CM

## 2021-09-24 RX ORDER — TRAZODONE HYDROCHLORIDE 50 MG/1
TABLET ORAL
Qty: 30 TABLET | Refills: 1 | Status: SHIPPED | OUTPATIENT
Start: 2021-09-24 | End: 2022-02-06

## 2021-10-13 ENCOUNTER — CLINICAL SUPPORT (OUTPATIENT)
Dept: REHABILITATION | Facility: HOSPITAL | Age: 56
End: 2021-10-13
Attending: ORTHOPAEDIC SURGERY
Payer: OTHER GOVERNMENT

## 2021-10-13 DIAGNOSIS — M25.562 PAIN IN BOTH KNEES, UNSPECIFIED CHRONICITY: ICD-10-CM

## 2021-10-13 DIAGNOSIS — M25.561 PAIN IN BOTH KNEES, UNSPECIFIED CHRONICITY: ICD-10-CM

## 2021-10-13 DIAGNOSIS — M77.11 LATERAL EPICONDYLITIS OF RIGHT ELBOW: ICD-10-CM

## 2021-10-13 DIAGNOSIS — M25.561 CHRONIC PAIN OF BOTH KNEES: ICD-10-CM

## 2021-10-13 DIAGNOSIS — M25.562 CHRONIC PAIN OF BOTH KNEES: ICD-10-CM

## 2021-10-13 DIAGNOSIS — G89.29 CHRONIC PAIN OF BOTH KNEES: ICD-10-CM

## 2021-10-13 PROBLEM — R29.898 DECREASED STRENGTH OF LOWER EXTREMITY: Status: RESOLVED | Noted: 2021-04-06 | Resolved: 2021-10-13

## 2021-10-13 PROBLEM — M79.672 LEFT FOOT PAIN: Status: RESOLVED | Noted: 2021-04-06 | Resolved: 2021-10-13

## 2021-10-13 PROBLEM — M25.672 DECREASED RANGE OF MOTION OF LEFT ANKLE: Status: RESOLVED | Noted: 2021-04-06 | Resolved: 2021-10-13

## 2021-10-13 PROCEDURE — 97161 PT EVAL LOW COMPLEX 20 MIN: CPT | Mod: PN

## 2021-10-13 PROCEDURE — 97110 THERAPEUTIC EXERCISES: CPT | Mod: PN

## 2021-11-09 ENCOUNTER — CLINICAL SUPPORT (OUTPATIENT)
Dept: REHABILITATION | Facility: HOSPITAL | Age: 56
End: 2021-11-09
Attending: ORTHOPAEDIC SURGERY
Payer: OTHER GOVERNMENT

## 2021-11-09 DIAGNOSIS — M25.561 CHRONIC PAIN OF BOTH KNEES: ICD-10-CM

## 2021-11-09 DIAGNOSIS — G89.29 CHRONIC PAIN OF BOTH KNEES: ICD-10-CM

## 2021-11-09 DIAGNOSIS — M25.562 CHRONIC PAIN OF BOTH KNEES: ICD-10-CM

## 2021-11-09 DIAGNOSIS — M77.11 LATERAL EPICONDYLITIS OF RIGHT ELBOW: ICD-10-CM

## 2021-11-09 PROCEDURE — 97140 MANUAL THERAPY 1/> REGIONS: CPT | Mod: PN

## 2021-11-16 ENCOUNTER — CLINICAL SUPPORT (OUTPATIENT)
Dept: REHABILITATION | Facility: HOSPITAL | Age: 56
End: 2021-11-16
Attending: ORTHOPAEDIC SURGERY
Payer: OTHER GOVERNMENT

## 2021-11-16 DIAGNOSIS — G89.29 CHRONIC PAIN OF BOTH KNEES: ICD-10-CM

## 2021-11-16 DIAGNOSIS — M25.562 CHRONIC PAIN OF BOTH KNEES: ICD-10-CM

## 2021-11-16 DIAGNOSIS — M25.561 CHRONIC PAIN OF BOTH KNEES: ICD-10-CM

## 2021-11-16 DIAGNOSIS — M77.11 LATERAL EPICONDYLITIS OF RIGHT ELBOW: Primary | ICD-10-CM

## 2021-11-16 PROCEDURE — 97110 THERAPEUTIC EXERCISES: CPT | Mod: PN,CQ

## 2021-11-16 PROCEDURE — 97140 MANUAL THERAPY 1/> REGIONS: CPT | Mod: PN,CQ

## 2021-11-17 ENCOUNTER — OFFICE VISIT (OUTPATIENT)
Dept: ORTHOPEDICS | Facility: CLINIC | Age: 56
End: 2021-11-17
Payer: OTHER GOVERNMENT

## 2021-11-17 VITALS
HEART RATE: 83 BPM | DIASTOLIC BLOOD PRESSURE: 90 MMHG | WEIGHT: 235.44 LBS | OXYGEN SATURATION: 93 % | SYSTOLIC BLOOD PRESSURE: 136 MMHG | RESPIRATION RATE: 18 BRPM | BODY MASS INDEX: 31.89 KG/M2 | HEIGHT: 72 IN

## 2021-11-17 DIAGNOSIS — M77.11 RIGHT LATERAL EPICONDYLITIS: Primary | ICD-10-CM

## 2021-11-17 PROCEDURE — 99999 PR PBB SHADOW E&M-EST. PATIENT-LVL III: ICD-10-PCS | Mod: PBBFAC,,, | Performed by: ORTHOPAEDIC SURGERY

## 2021-11-17 PROCEDURE — 99999 PR PBB SHADOW E&M-EST. PATIENT-LVL III: CPT | Mod: PBBFAC,,, | Performed by: ORTHOPAEDIC SURGERY

## 2021-11-17 PROCEDURE — 99213 OFFICE O/P EST LOW 20 MIN: CPT | Mod: S$PBB,,, | Performed by: ORTHOPAEDIC SURGERY

## 2021-11-17 PROCEDURE — 99213 PR OFFICE/OUTPT VISIT, EST, LEVL III, 20-29 MIN: ICD-10-PCS | Mod: S$PBB,,, | Performed by: ORTHOPAEDIC SURGERY

## 2021-11-17 PROCEDURE — 99213 OFFICE O/P EST LOW 20 MIN: CPT | Mod: PBBFAC,PN | Performed by: ORTHOPAEDIC SURGERY

## 2021-11-17 RX ORDER — CELECOXIB 200 MG/1
200 CAPSULE ORAL 2 TIMES DAILY
Qty: 60 CAPSULE | Refills: 0 | Status: SHIPPED | OUTPATIENT
Start: 2021-11-17 | End: 2021-12-28

## 2021-11-23 ENCOUNTER — OFFICE VISIT (OUTPATIENT)
Dept: OTOLARYNGOLOGY | Facility: CLINIC | Age: 56
End: 2021-11-23
Payer: OTHER GOVERNMENT

## 2021-11-23 ENCOUNTER — CLINICAL SUPPORT (OUTPATIENT)
Dept: REHABILITATION | Facility: HOSPITAL | Age: 56
End: 2021-11-23
Attending: ORTHOPAEDIC SURGERY
Payer: OTHER GOVERNMENT

## 2021-11-23 VITALS — HEART RATE: 62 BPM | SYSTOLIC BLOOD PRESSURE: 116 MMHG | DIASTOLIC BLOOD PRESSURE: 80 MMHG

## 2021-11-23 DIAGNOSIS — R09.89 CHRONIC THROAT CLEARING: ICD-10-CM

## 2021-11-23 DIAGNOSIS — K21.9 LARYNGOPHARYNGEAL REFLUX (LPR): Primary | ICD-10-CM

## 2021-11-23 DIAGNOSIS — G89.29 CHRONIC PAIN OF BOTH KNEES: ICD-10-CM

## 2021-11-23 DIAGNOSIS — M25.562 CHRONIC PAIN OF BOTH KNEES: ICD-10-CM

## 2021-11-23 DIAGNOSIS — M25.561 CHRONIC PAIN OF BOTH KNEES: ICD-10-CM

## 2021-11-23 DIAGNOSIS — R09.A2 GLOBUS SENSATION: ICD-10-CM

## 2021-11-23 DIAGNOSIS — M77.11 LATERAL EPICONDYLITIS OF RIGHT ELBOW: ICD-10-CM

## 2021-11-23 PROCEDURE — 99214 OFFICE O/P EST MOD 30 MIN: CPT | Mod: 25,S$PBB,, | Performed by: NURSE PRACTITIONER

## 2021-11-23 PROCEDURE — 99214 OFFICE O/P EST MOD 30 MIN: CPT | Mod: PBBFAC,25 | Performed by: NURSE PRACTITIONER

## 2021-11-23 PROCEDURE — 97140 MANUAL THERAPY 1/> REGIONS: CPT | Mod: PN

## 2021-11-23 PROCEDURE — 99999 PR PBB SHADOW E&M-EST. PATIENT-LVL IV: ICD-10-PCS | Mod: PBBFAC,,, | Performed by: NURSE PRACTITIONER

## 2021-11-23 PROCEDURE — 97110 THERAPEUTIC EXERCISES: CPT | Mod: PN

## 2021-11-23 PROCEDURE — 31575 DIAGNOSTIC LARYNGOSCOPY: CPT | Mod: PBBFAC | Performed by: NURSE PRACTITIONER

## 2021-11-23 PROCEDURE — 99999 PR PBB SHADOW E&M-EST. PATIENT-LVL IV: CPT | Mod: PBBFAC,,, | Performed by: NURSE PRACTITIONER

## 2021-11-23 PROCEDURE — 31575 LARYNGOSCOPY: ICD-10-PCS | Mod: S$PBB,,, | Performed by: NURSE PRACTITIONER

## 2021-11-23 PROCEDURE — 99214 PR OFFICE/OUTPT VISIT, EST, LEVL IV, 30-39 MIN: ICD-10-PCS | Mod: 25,S$PBB,, | Performed by: NURSE PRACTITIONER

## 2021-11-23 PROCEDURE — 31575 DIAGNOSTIC LARYNGOSCOPY: CPT | Mod: S$PBB,,, | Performed by: NURSE PRACTITIONER

## 2021-12-07 ENCOUNTER — CLINICAL SUPPORT (OUTPATIENT)
Dept: REHABILITATION | Facility: HOSPITAL | Age: 56
End: 2021-12-07
Attending: ORTHOPAEDIC SURGERY
Payer: OTHER GOVERNMENT

## 2021-12-07 DIAGNOSIS — G89.29 CHRONIC PAIN OF BOTH KNEES: ICD-10-CM

## 2021-12-07 DIAGNOSIS — M25.561 CHRONIC PAIN OF BOTH KNEES: ICD-10-CM

## 2021-12-07 DIAGNOSIS — M77.11 LATERAL EPICONDYLITIS OF RIGHT ELBOW: ICD-10-CM

## 2021-12-07 DIAGNOSIS — M25.562 CHRONIC PAIN OF BOTH KNEES: ICD-10-CM

## 2021-12-07 PROCEDURE — 97140 MANUAL THERAPY 1/> REGIONS: CPT | Mod: PN,CQ

## 2021-12-07 PROCEDURE — 97110 THERAPEUTIC EXERCISES: CPT | Mod: PN,CQ

## 2021-12-14 ENCOUNTER — CLINICAL SUPPORT (OUTPATIENT)
Dept: REHABILITATION | Facility: HOSPITAL | Age: 56
End: 2021-12-14
Attending: ORTHOPAEDIC SURGERY
Payer: OTHER GOVERNMENT

## 2021-12-14 DIAGNOSIS — M77.11 LATERAL EPICONDYLITIS OF RIGHT ELBOW: ICD-10-CM

## 2021-12-14 DIAGNOSIS — G89.29 CHRONIC PAIN OF BOTH KNEES: ICD-10-CM

## 2021-12-14 DIAGNOSIS — M25.562 CHRONIC PAIN OF BOTH KNEES: ICD-10-CM

## 2021-12-14 DIAGNOSIS — M25.561 CHRONIC PAIN OF BOTH KNEES: ICD-10-CM

## 2021-12-14 PROCEDURE — 97140 MANUAL THERAPY 1/> REGIONS: CPT | Mod: PN

## 2021-12-14 PROCEDURE — 97110 THERAPEUTIC EXERCISES: CPT | Mod: PN

## 2021-12-27 ENCOUNTER — PATIENT OUTREACH (OUTPATIENT)
Dept: ADMINISTRATIVE | Facility: OTHER | Age: 56
End: 2021-12-27
Payer: OTHER GOVERNMENT

## 2021-12-28 ENCOUNTER — CLINICAL SUPPORT (OUTPATIENT)
Dept: REHABILITATION | Facility: HOSPITAL | Age: 56
End: 2021-12-28
Attending: ORTHOPAEDIC SURGERY
Payer: OTHER GOVERNMENT

## 2021-12-28 DIAGNOSIS — G89.29 CHRONIC PAIN OF BOTH KNEES: ICD-10-CM

## 2021-12-28 DIAGNOSIS — M25.561 CHRONIC PAIN OF BOTH KNEES: ICD-10-CM

## 2021-12-28 DIAGNOSIS — M25.562 CHRONIC PAIN OF BOTH KNEES: ICD-10-CM

## 2021-12-28 DIAGNOSIS — M77.11 LATERAL EPICONDYLITIS OF RIGHT ELBOW: ICD-10-CM

## 2021-12-28 PROCEDURE — 97110 THERAPEUTIC EXERCISES: CPT | Mod: PN

## 2021-12-29 ENCOUNTER — OFFICE VISIT (OUTPATIENT)
Dept: ORTHOPEDICS | Facility: CLINIC | Age: 56
End: 2021-12-29
Payer: OTHER GOVERNMENT

## 2021-12-29 VITALS
WEIGHT: 237 LBS | SYSTOLIC BLOOD PRESSURE: 130 MMHG | HEART RATE: 91 BPM | HEIGHT: 72 IN | RESPIRATION RATE: 18 BRPM | BODY MASS INDEX: 32.1 KG/M2 | DIASTOLIC BLOOD PRESSURE: 78 MMHG | OXYGEN SATURATION: 99 %

## 2021-12-29 DIAGNOSIS — M77.11 RIGHT LATERAL EPICONDYLITIS: Primary | ICD-10-CM

## 2021-12-29 PROCEDURE — 99212 OFFICE O/P EST SF 10 MIN: CPT | Mod: S$PBB,,, | Performed by: ORTHOPAEDIC SURGERY

## 2021-12-29 PROCEDURE — 99213 OFFICE O/P EST LOW 20 MIN: CPT | Mod: PBBFAC,PN | Performed by: ORTHOPAEDIC SURGERY

## 2021-12-29 PROCEDURE — 99999 PR PBB SHADOW E&M-EST. PATIENT-LVL III: ICD-10-PCS | Mod: PBBFAC,,, | Performed by: ORTHOPAEDIC SURGERY

## 2021-12-29 PROCEDURE — 99999 PR PBB SHADOW E&M-EST. PATIENT-LVL III: CPT | Mod: PBBFAC,,, | Performed by: ORTHOPAEDIC SURGERY

## 2021-12-29 PROCEDURE — 99212 PR OFFICE/OUTPT VISIT, EST, LEVL II, 10-19 MIN: ICD-10-PCS | Mod: S$PBB,,, | Performed by: ORTHOPAEDIC SURGERY

## 2022-01-22 ENCOUNTER — HOSPITAL ENCOUNTER (OUTPATIENT)
Facility: HOSPITAL | Age: 57
Discharge: HOME | End: 2022-01-26
Attending: EMERGENCY MEDICINE | Admitting: INTERNAL MEDICINE
Payer: OTHER GOVERNMENT

## 2022-01-22 DIAGNOSIS — R10.13 EPIGASTRIC PAIN: ICD-10-CM

## 2022-01-22 DIAGNOSIS — R07.9 CHEST PAIN: ICD-10-CM

## 2022-01-22 DIAGNOSIS — K80.20 CHOLELITHIASIS WITHOUT CHOLECYSTITIS: ICD-10-CM

## 2022-01-22 DIAGNOSIS — R74.8 ELEVATED LIVER ENZYMES: ICD-10-CM

## 2022-01-22 DIAGNOSIS — K80.10 CALCULUS OF GALLBLADDER WITH CHRONIC CHOLECYSTITIS WITHOUT OBSTRUCTION: Primary | ICD-10-CM

## 2022-01-22 DIAGNOSIS — R10.11 RIGHT UPPER QUADRANT ABDOMINAL PAIN: ICD-10-CM

## 2022-01-22 DIAGNOSIS — K80.20 CALCULUS OF GALLBLADDER WITHOUT CHOLECYSTITIS WITHOUT OBSTRUCTION: ICD-10-CM

## 2022-01-22 DIAGNOSIS — K80.20 CALCULUS OF CYSTIC DUCT: ICD-10-CM

## 2022-01-22 LAB
ALBUMIN SERPL-MCNC: 4.1 G/DL (ref 3.3–5.5)
ALBUMIN SERPL-MCNC: 4.2 G/DL (ref 3.3–5.5)
ALLENS TEST: ABNORMAL
ALP SERPL-CCNC: 109 U/L (ref 42–141)
ALP SERPL-CCNC: 126 U/L (ref 42–141)
BILIRUB SERPL-MCNC: 1.7 MG/DL (ref 0.2–1.6)
BILIRUB SERPL-MCNC: 1.7 MG/DL (ref 0.2–1.6)
BUN SERPL-MCNC: 12 MG/DL (ref 7–22)
CALCIUM SERPL-MCNC: 9.9 MG/DL (ref 8–10.3)
CHLORIDE SERPL-SCNC: 100 MMOL/L (ref 98–108)
CREAT SERPL-MCNC: 1 MG/DL (ref 0.6–1.2)
GLUCOSE SERPL-MCNC: 123 MG/DL (ref 73–118)
HCO3 UR-SCNC: 31.5 MMOL/L (ref 24–28)
LDH SERPL L TO P-CCNC: 1.39 MMOL/L (ref 0.5–2.2)
PCO2 BLDA: 55.1 MMHG (ref 35–45)
PH SMN: 7.37 [PH] (ref 7.35–7.45)
PO2 BLDA: 22 MMHG (ref 40–60)
POC ALT (SGPT): 337 U/L (ref 10–47)
POC ALT (SGPT): 343 U/L (ref 10–47)
POC AMYLASE: 47 U/L (ref 14–97)
POC AST (SGOT): 376 U/L (ref 11–38)
POC AST (SGOT): 389 U/L (ref 11–38)
POC B-TYPE NATRIURETIC PEPTIDE: 6.9 PG/ML (ref 0–100)
POC BE: 4 MMOL/L
POC CARDIAC TROPONIN I: 0 NG/ML
POC GGT: 626 U/L (ref 5–65)
POC SATURATED O2: 34 % (ref 95–100)
POC TCO2: 31 MMOL/L (ref 18–33)
POC TCO2: 33 MMOL/L (ref 24–29)
POTASSIUM BLD-SCNC: 4.3 MMOL/L (ref 3.6–5.1)
PROTEIN, POC: 7.2 G/DL (ref 6.4–8.1)
PROTEIN, POC: 7.3 G/DL (ref 6.4–8.1)
SAMPLE: ABNORMAL
SAMPLE: NORMAL
SITE: ABNORMAL
SODIUM BLD-SCNC: 140 MMOL/L (ref 128–145)

## 2022-01-22 PROCEDURE — 96374 THER/PROPH/DIAG INJ IV PUSH: CPT | Mod: ER

## 2022-01-22 PROCEDURE — 82803 BLOOD GASES ANY COMBINATION: CPT | Mod: ER

## 2022-01-22 PROCEDURE — 93005 ELECTROCARDIOGRAM TRACING: CPT | Mod: ER

## 2022-01-22 PROCEDURE — 84484 ASSAY OF TROPONIN QUANT: CPT | Mod: ER

## 2022-01-22 PROCEDURE — 93010 ELECTROCARDIOGRAM REPORT: CPT | Mod: ,,, | Performed by: INTERNAL MEDICINE

## 2022-01-22 PROCEDURE — 83880 ASSAY OF NATRIURETIC PEPTIDE: CPT | Mod: ER

## 2022-01-22 PROCEDURE — 99291 CRITICAL CARE FIRST HOUR: CPT | Mod: 25,ER

## 2022-01-22 PROCEDURE — 25000003 PHARM REV CODE 250: Mod: ER | Performed by: EMERGENCY MEDICINE

## 2022-01-22 PROCEDURE — 82150 ASSAY OF AMYLASE: CPT | Mod: ER

## 2022-01-22 PROCEDURE — 93010 EKG 12-LEAD: ICD-10-PCS | Mod: ,,, | Performed by: INTERNAL MEDICINE

## 2022-01-22 PROCEDURE — 85025 COMPLETE CBC W/AUTO DIFF WBC: CPT | Mod: ER

## 2022-01-22 PROCEDURE — 96361 HYDRATE IV INFUSION ADD-ON: CPT | Mod: ER

## 2022-01-22 PROCEDURE — 80053 COMPREHEN METABOLIC PANEL: CPT | Mod: ER

## 2022-01-22 PROCEDURE — 85025 COMPLETE CBC W/AUTO DIFF WBC: CPT | Performed by: EMERGENCY MEDICINE

## 2022-01-22 PROCEDURE — 96375 TX/PRO/DX INJ NEW DRUG ADDON: CPT | Mod: ER

## 2022-01-22 PROCEDURE — 63600175 PHARM REV CODE 636 W HCPCS: Mod: ER | Performed by: EMERGENCY MEDICINE

## 2022-01-22 RX ORDER — ONDANSETRON 2 MG/ML
8 INJECTION INTRAMUSCULAR; INTRAVENOUS
Status: COMPLETED | OUTPATIENT
Start: 2022-01-22 | End: 2022-01-22

## 2022-01-22 RX ORDER — MORPHINE SULFATE 4 MG/ML
8 INJECTION, SOLUTION INTRAMUSCULAR; INTRAVENOUS
Status: COMPLETED | OUTPATIENT
Start: 2022-01-22 | End: 2022-01-22

## 2022-01-22 RX ADMIN — SODIUM CHLORIDE 1000 ML: 0.9 INJECTION, SOLUTION INTRAVENOUS at 11:01

## 2022-01-22 RX ADMIN — MORPHINE SULFATE 8 MG: 4 INJECTION, SOLUTION INTRAMUSCULAR; INTRAVENOUS at 11:01

## 2022-01-22 RX ADMIN — ONDANSETRON 8 MG: 2 INJECTION INTRAMUSCULAR; INTRAVENOUS at 11:01

## 2022-01-23 PROBLEM — G44.209 TENSION TYPE HEADACHE: Status: ACTIVE | Noted: 2022-01-23

## 2022-01-23 PROBLEM — K80.20 CHOLELITHIASIS: Status: ACTIVE | Noted: 2022-01-23

## 2022-01-23 PROBLEM — R74.8 ELEVATED LIVER ENZYMES: Status: ACTIVE | Noted: 2022-01-23

## 2022-01-23 PROBLEM — E78.5 HLD (HYPERLIPIDEMIA): Status: ACTIVE | Noted: 2022-01-23

## 2022-01-23 PROBLEM — E78.00 HYPERCHOLESTEROLEMIA: Status: ACTIVE | Noted: 2022-01-23

## 2022-01-23 LAB
ALBUMIN SERPL BCP-MCNC: 3.8 G/DL (ref 3.5–5.2)
ALP SERPL-CCNC: 117 U/L (ref 55–135)
ALT SERPL W/O P-5'-P-CCNC: 333 U/L (ref 10–44)
ANION GAP SERPL CALC-SCNC: 8 MMOL/L (ref 8–16)
APTT BLDCRRT: 25.6 SEC (ref 21–32)
AST SERPL-CCNC: 188 U/L (ref 10–40)
BASOPHILS # BLD AUTO: 0.03 K/UL (ref 0–0.2)
BASOPHILS # BLD AUTO: 0.03 K/UL (ref 0–0.2)
BASOPHILS NFR BLD: 0.3 % (ref 0–1.9)
BASOPHILS NFR BLD: 0.3 % (ref 0–1.9)
BILIRUB SERPL-MCNC: 1.3 MG/DL (ref 0.1–1)
BUN SERPL-MCNC: 11 MG/DL (ref 6–20)
CALCIUM SERPL-MCNC: 8.9 MG/DL (ref 8.7–10.5)
CHLORIDE SERPL-SCNC: 104 MMOL/L (ref 95–110)
CO2 SERPL-SCNC: 27 MMOL/L (ref 23–29)
CREAT SERPL-MCNC: 1.2 MG/DL (ref 0.5–1.4)
CTP QC/QA: YES
DIFFERENTIAL METHOD: ABNORMAL
DIFFERENTIAL METHOD: ABNORMAL
EOSINOPHIL # BLD AUTO: 0 K/UL (ref 0–0.5)
EOSINOPHIL # BLD AUTO: 0 K/UL (ref 0–0.5)
EOSINOPHIL NFR BLD: 0.3 % (ref 0–8)
EOSINOPHIL NFR BLD: 0.3 % (ref 0–8)
ERYTHROCYTE [DISTWIDTH] IN BLOOD BY AUTOMATED COUNT: 12.2 % (ref 11.5–14.5)
ERYTHROCYTE [DISTWIDTH] IN BLOOD BY AUTOMATED COUNT: 12.7 % (ref 11.5–14.5)
EST. GFR  (AFRICAN AMERICAN): >60 ML/MIN/1.73 M^2
EST. GFR  (NON AFRICAN AMERICAN): >60 ML/MIN/1.73 M^2
GLUCOSE SERPL-MCNC: 93 MG/DL (ref 70–110)
HCT VFR BLD AUTO: 43.9 % (ref 40–54)
HCT VFR BLD AUTO: 45.9 % (ref 40–54)
HGB BLD-MCNC: 14.9 G/DL (ref 14–18)
HGB BLD-MCNC: 15 G/DL (ref 14–18)
IMM GRANULOCYTES # BLD AUTO: 0.03 K/UL (ref 0–0.04)
IMM GRANULOCYTES # BLD AUTO: 0.03 K/UL (ref 0–0.04)
IMM GRANULOCYTES NFR BLD AUTO: 0.3 % (ref 0–0.5)
IMM GRANULOCYTES NFR BLD AUTO: 0.3 % (ref 0–0.5)
INR PPP: 1.1 (ref 0.8–1.2)
LYMPHOCYTES # BLD AUTO: 0.3 K/UL (ref 1–4.8)
LYMPHOCYTES # BLD AUTO: 0.5 K/UL (ref 1–4.8)
LYMPHOCYTES NFR BLD: 3 % (ref 18–48)
LYMPHOCYTES NFR BLD: 3.9 % (ref 18–48)
MAGNESIUM SERPL-MCNC: 1.9 MG/DL (ref 1.6–2.6)
MCH RBC QN AUTO: 32 PG (ref 27–31)
MCH RBC QN AUTO: 33.3 PG (ref 27–31)
MCHC RBC AUTO-ENTMCNC: 32.7 G/DL (ref 32–36)
MCHC RBC AUTO-ENTMCNC: 33.9 G/DL (ref 32–36)
MCV RBC AUTO: 98 FL (ref 82–98)
MCV RBC AUTO: 98 FL (ref 82–98)
MONOCYTES # BLD AUTO: 0.4 K/UL (ref 0.3–1)
MONOCYTES # BLD AUTO: 1.5 K/UL (ref 0.3–1)
MONOCYTES NFR BLD: 13 % (ref 4–15)
MONOCYTES NFR BLD: 4.2 % (ref 4–15)
NEUTROPHILS # BLD AUTO: 9.1 K/UL (ref 1.8–7.7)
NEUTROPHILS # BLD AUTO: 9.6 K/UL (ref 1.8–7.7)
NEUTROPHILS NFR BLD: 82.2 % (ref 38–73)
NEUTROPHILS NFR BLD: 91.9 % (ref 38–73)
NRBC BLD-RTO: 0 /100 WBC
NRBC BLD-RTO: 0 /100 WBC
PHOSPHATE SERPL-MCNC: 3.1 MG/DL (ref 2.7–4.5)
PLATELET # BLD AUTO: 235 K/UL (ref 150–450)
PLATELET # BLD AUTO: 244 K/UL (ref 150–450)
PMV BLD AUTO: 10.1 FL (ref 9.2–12.9)
PMV BLD AUTO: 9.3 FL (ref 9.2–12.9)
POTASSIUM SERPL-SCNC: 4.2 MMOL/L (ref 3.5–5.1)
PROT SERPL-MCNC: 6.8 G/DL (ref 6–8.4)
PROTHROMBIN TIME: 11.5 SEC (ref 9–12.5)
RBC # BLD AUTO: 4.48 M/UL (ref 4.6–6.2)
RBC # BLD AUTO: 4.69 M/UL (ref 4.6–6.2)
SARS-COV-2 RDRP RESP QL NAA+PROBE: NEGATIVE
SODIUM SERPL-SCNC: 139 MMOL/L (ref 136–145)
WBC # BLD AUTO: 11.62 K/UL (ref 3.9–12.7)
WBC # BLD AUTO: 9.93 K/UL (ref 3.9–12.7)

## 2022-01-23 PROCEDURE — 99204 PR OFFICE/OUTPT VISIT, NEW, LEVL IV, 45-59 MIN: ICD-10-PCS | Mod: ,,, | Performed by: SURGERY

## 2022-01-23 PROCEDURE — 25000003 PHARM REV CODE 250: Performed by: NURSE PRACTITIONER

## 2022-01-23 PROCEDURE — 96372 THER/PROPH/DIAG INJ SC/IM: CPT | Mod: 59

## 2022-01-23 PROCEDURE — 99214 OFFICE O/P EST MOD 30 MIN: CPT | Mod: ,,, | Performed by: INTERNAL MEDICINE

## 2022-01-23 PROCEDURE — G0378 HOSPITAL OBSERVATION PER HR: HCPCS

## 2022-01-23 PROCEDURE — 36415 COLL VENOUS BLD VENIPUNCTURE: CPT | Performed by: NURSE PRACTITIONER

## 2022-01-23 PROCEDURE — 85730 THROMBOPLASTIN TIME PARTIAL: CPT | Performed by: NURSE PRACTITIONER

## 2022-01-23 PROCEDURE — 85610 PROTHROMBIN TIME: CPT | Performed by: NURSE PRACTITIONER

## 2022-01-23 PROCEDURE — 63600175 PHARM REV CODE 636 W HCPCS: Performed by: NURSE PRACTITIONER

## 2022-01-23 PROCEDURE — 25500020 PHARM REV CODE 255: Mod: ER | Performed by: EMERGENCY MEDICINE

## 2022-01-23 PROCEDURE — 80053 COMPREHEN METABOLIC PANEL: CPT | Performed by: NURSE PRACTITIONER

## 2022-01-23 PROCEDURE — 84100 ASSAY OF PHOSPHORUS: CPT | Performed by: NURSE PRACTITIONER

## 2022-01-23 PROCEDURE — U0002 COVID-19 LAB TEST NON-CDC: HCPCS | Mod: ER | Performed by: EMERGENCY MEDICINE

## 2022-01-23 PROCEDURE — 96361 HYDRATE IV INFUSION ADD-ON: CPT | Mod: ER

## 2022-01-23 PROCEDURE — C9113 INJ PANTOPRAZOLE SODIUM, VIA: HCPCS | Performed by: NURSE PRACTITIONER

## 2022-01-23 PROCEDURE — 85025 COMPLETE CBC W/AUTO DIFF WBC: CPT | Performed by: NURSE PRACTITIONER

## 2022-01-23 PROCEDURE — 96375 TX/PRO/DX INJ NEW DRUG ADDON: CPT

## 2022-01-23 PROCEDURE — 96361 HYDRATE IV INFUSION ADD-ON: CPT

## 2022-01-23 PROCEDURE — 99214 PR OFFICE/OUTPT VISIT, EST, LEVL IV, 30-39 MIN: ICD-10-PCS | Mod: ,,, | Performed by: INTERNAL MEDICINE

## 2022-01-23 PROCEDURE — 83735 ASSAY OF MAGNESIUM: CPT | Performed by: NURSE PRACTITIONER

## 2022-01-23 PROCEDURE — 99204 OFFICE O/P NEW MOD 45 MIN: CPT | Mod: ,,, | Performed by: SURGERY

## 2022-01-23 RX ORDER — TRAZODONE HYDROCHLORIDE 50 MG/1
50 TABLET ORAL NIGHTLY PRN
Status: DISCONTINUED | OUTPATIENT
Start: 2022-01-23 | End: 2022-01-26 | Stop reason: HOSPADM

## 2022-01-23 RX ORDER — SODIUM CHLORIDE 0.9 % (FLUSH) 0.9 %
10 SYRINGE (ML) INJECTION
Status: DISCONTINUED | OUTPATIENT
Start: 2022-01-23 | End: 2022-01-26 | Stop reason: HOSPADM

## 2022-01-23 RX ORDER — HYDROCHLOROTHIAZIDE 12.5 MG/1
12.5 TABLET ORAL DAILY
Status: DISCONTINUED | OUTPATIENT
Start: 2022-01-23 | End: 2022-01-26 | Stop reason: HOSPADM

## 2022-01-23 RX ORDER — ONDANSETRON 2 MG/ML
4 INJECTION INTRAMUSCULAR; INTRAVENOUS EVERY 6 HOURS PRN
Status: DISCONTINUED | OUTPATIENT
Start: 2022-01-23 | End: 2022-01-26 | Stop reason: HOSPADM

## 2022-01-23 RX ORDER — SODIUM CHLORIDE 9 MG/ML
INJECTION, SOLUTION INTRAVENOUS CONTINUOUS
Status: DISCONTINUED | OUTPATIENT
Start: 2022-01-23 | End: 2022-01-24

## 2022-01-23 RX ORDER — LISINOPRIL 20 MG/1
20 TABLET ORAL DAILY
Status: DISCONTINUED | OUTPATIENT
Start: 2022-01-23 | End: 2022-01-26 | Stop reason: HOSPADM

## 2022-01-23 RX ORDER — METOPROLOL SUCCINATE 25 MG/1
25 TABLET, EXTENDED RELEASE ORAL DAILY
Status: DISCONTINUED | OUTPATIENT
Start: 2022-01-23 | End: 2022-01-26 | Stop reason: HOSPADM

## 2022-01-23 RX ORDER — ACETAMINOPHEN 325 MG/1
650 TABLET ORAL EVERY 6 HOURS PRN
Status: DISCONTINUED | OUTPATIENT
Start: 2022-01-23 | End: 2022-01-26 | Stop reason: HOSPADM

## 2022-01-23 RX ORDER — PANTOPRAZOLE SODIUM 40 MG/10ML
40 INJECTION, POWDER, LYOPHILIZED, FOR SOLUTION INTRAVENOUS DAILY
Status: DISCONTINUED | OUTPATIENT
Start: 2022-01-23 | End: 2022-01-26 | Stop reason: HOSPADM

## 2022-01-23 RX ORDER — ENOXAPARIN SODIUM 100 MG/ML
40 INJECTION SUBCUTANEOUS EVERY 24 HOURS
Status: DISCONTINUED | OUTPATIENT
Start: 2022-01-23 | End: 2022-01-26 | Stop reason: HOSPADM

## 2022-01-23 RX ORDER — MORPHINE SULFATE 4 MG/ML
2 INJECTION, SOLUTION INTRAMUSCULAR; INTRAVENOUS EVERY 4 HOURS PRN
Status: DISCONTINUED | OUTPATIENT
Start: 2022-01-23 | End: 2022-01-26 | Stop reason: HOSPADM

## 2022-01-23 RX ADMIN — PANTOPRAZOLE SODIUM 40 MG: 40 INJECTION, POWDER, FOR SOLUTION INTRAVENOUS at 08:01

## 2022-01-23 RX ADMIN — IOHEXOL 100 ML: 350 INJECTION, SOLUTION INTRAVENOUS at 12:01

## 2022-01-23 RX ADMIN — ACETAMINOPHEN 650 MG: 325 TABLET ORAL at 05:01

## 2022-01-23 RX ADMIN — METOPROLOL SUCCINATE 25 MG: 25 TABLET, EXTENDED RELEASE ORAL at 08:01

## 2022-01-23 RX ADMIN — ENOXAPARIN SODIUM 40 MG: 40 INJECTION SUBCUTANEOUS at 04:01

## 2022-01-23 RX ADMIN — LISINOPRIL 20 MG: 20 TABLET ORAL at 08:01

## 2022-01-23 RX ADMIN — SODIUM CHLORIDE: 0.9 INJECTION, SOLUTION INTRAVENOUS at 07:01

## 2022-01-23 RX ADMIN — HYDROCHLOROTHIAZIDE 12.5 MG: 12.5 TABLET ORAL at 08:01

## 2022-01-23 NOTE — CARE UPDATE
"I have read and reviewed the findings on documentation from staff  provider, Virgie Geiger NP, and agree with the ROS, PE, and Plan from 1/23/2022, Please see assessment and plan in full details. Additions and/or changes below:     57 year old male presented with epigastric pain, NV and found on US abdomen to have " Cholelithiasis without evidence of cholecystitis" also suggested NMScan. Denies recent alcohol use, ABX use, or any new meds. GI and Surgery weighed in. GI again suggested HIDA Scan to rule out cystic duct obstruction, suspects passed a GB stone. Surgery consulted noted the HIDA scan order as well, as long as tbili and LFTs trend down plan for cholecystectomy tomorrow 01/23/22. He cannot get HiDA scan today. Clear liquids today and NPO after midnight. Continues to complain of epigastric pain.    PLAN     -Awaiting HiDA scan  -clear liquids today and NPO after midnight  -supportive care pain  -GI and surgery following  -Follow labs        LABS: [facility] 01/23/22    ALT (SGPT), POC 10 - 47 U/L 337 High     AST (SGOT), POC 11 - 38 U/L 376 High      Bilirubin, UA 0.2 - 1.6 mg/dL 1.7 High       Present on Admission:   Essential hypertension   Tension type headache       cholelithias     Elevated liver enzymes     Hyprcholesterolemia              Nathalie Cabrera, DNP, APRN, FNP-C  [facility] - [facility] [place]  [address] [place] [address]  Office [phone]; Pager [phone]            "

## 2022-01-23 NOTE — PLAN OF CARE
Problem: Adult Inpatient Plan of Care  Goal: Plan of Care Review  Outcome: Ongoing, Progressing  Goal: Patient-Specific Goal (Individualized)  Outcome: Ongoing, Progressing  Goal: Optimal Comfort and Wellbeing  Outcome: Ongoing, Progressing  Intervention: Monitor Pain and Promote Comfort  Flowsheets (Taken 1/23/2022 0501)  Pain Management Interventions: quiet environment facilitated

## 2022-01-23 NOTE — HOSPITAL COURSE
"57 year old male presented with epigastric pain, NV and found on US abdomen to have " Cholelithiasis without evidence of cholecystitis" along with elevated LFT's. Denies recent alcohol use, ABX use, or any new meds. GI and Surgery weighed in. GI suggested HIDA Scan to rule out cystic duct obstruction, suspects passed a GB stone. Surgery consulted noted the HIDA scan order as well, as long as tbili and LFTs trend down plan for cholecystectomy.  Cystic and CBD patent on HIDA.  LFT's continue to trend downward.  Patient underwent lap sinan on 1/25 without complications.  He is doing well and tolerating diet.  He will be discharged home to follow up with Surgery and PCP.      "

## 2022-01-23 NOTE — SUBJECTIVE & OBJECTIVE
Past Medical History:   Diagnosis Date    GERD (gastroesophageal reflux disease)     Hypercholesteremia     Hypertension     on medication    Lumbar spondylosis 9/21/2020    Migraines     Sleep apnea        Past Surgical History:   Procedure Laterality Date    COLONOSCOPY N/A 8/10/2020    Procedure: COLONOSCOPY;  Surgeon: April Dos Santos MD;  Location: [facility] ENDO;  Service: Endoscopy;  Laterality: N/A;    HERNIA REPAIR      REPAIR OF TRICEPS TENDON Left 10/2/2020    Procedure: REPAIR, TENDON, TRICEPS;  Surgeon: Keysha Galvez MD;  Location: [facility] OR;  Service: Orthopedics;  Laterality: Left;  RN PRE OP DONE 9/29/2020----COVID NEGATIVE ON 9/29  [facility] Rep: Delma [phone]    SINUS SURGERY  2012       Review of patient's allergies indicates:  No Known Allergies    No current facility-administered medications on file prior to encounter.     Current Outpatient Medications on File Prior to Encounter   Medication Sig    atorvastatin (LIPITOR) 40 MG tablet Take 1 tablet (40 mg total) by mouth every evening.    celecoxib (CELEBREX) 200 MG capsule TAKE 1 CAPSULE(200 MG) BY MOUTH TWICE DAILY    fexofenadine (ALLEGRA) 30 MG tablet Take 30 mg by mouth once daily.    fluticasone propionate (FLONASE) 50 mcg/actuation nasal spray SHAKE LIQUID AND USE 1 SPRAY(50 MCG) IN EACH NOSTRIL EVERY DAY    lisinopriL-hydrochlorothiazide (PRINZIDE,ZESTORETIC) 20-12.5 mg per tablet Take 1 tablet by mouth once daily.    metoprolol succinate (TOPROL-XL) 25 MG 24 hr tablet Take 1 tablet (25 mg total) by mouth once daily.    omeprazole (PRILOSEC) 40 MG capsule TAKE 1 CAPSULE(40 MG) BY MOUTH EVERY DAY    tobramycin-dexamethasone 0.3-0.1% (TOBRADEX) 0.3-0.1 % Oint Place a small amount of ointment onto right upper eyelid biopsy site at night before bedtime    traZODone (DESYREL) 50 MG tablet TAKE 1/2 TO 1 TABLET BY MOUTH EVERY NIGHT 1 HOUR BEFORE BEDTIME AS NEEDED FOR INSOMNIA     Family History     Problem Relation (Age of  Onset)    Coronary artery disease Mother    Hypertension Father    No Known Problems Sister, Brother, Brother, Brother, Maternal Aunt, Maternal Uncle, Paternal Aunt, Paternal Uncle, Maternal Grandmother, Maternal Grandfather, Paternal Grandmother, Paternal Grandfather    Valvular heart disease Sister        Tobacco Use    Smoking status: Former Smoker     Quit date: 1999     Years since quittin.6    Smokeless tobacco: Never Used   Substance and Sexual Activity    Alcohol use: Not Currently     Alcohol/week: 7.0 standard drinks     Types: 7 Glasses of wine per week     Comment: beer 2-3 daily    Drug use: No    Sexual activity: Yes     Partners: Female     Review of Systems   Constitutional: Negative for chills and fever.   Eyes: Negative for photophobia and visual disturbance.   Respiratory: Positive for cough. Negative for shortness of breath.         Chronic cough    Cardiovascular: Negative for chest pain, palpitations and leg swelling.   Gastrointestinal: Positive for abdominal pain, nausea and vomiting. Negative for diarrhea.   Genitourinary: Negative for frequency, hematuria and urgency.   Skin: Negative for pallor, rash and wound.   Neurological: Negative for light-headedness and headaches.   Psychiatric/Behavioral: Negative for confusion and decreased concentration.     Objective:     Vital Signs (Most Recent):  Temp: 98.5 °F (36.9 °C) (22)  Pulse: (!) 113 (22)  Resp: 19 (22)  BP: 94/64 (22)  SpO2: (!) 93 % (22) Vital Signs (24h Range):  Temp:  [98 °F (36.7 °C)-99.8 °F (37.7 °C)] 98.5 °F (36.9 °C)  Pulse:  [] 113  Resp:  [17-32] 19  SpO2:  [93 %-97 %] 93 %  BP: ()/(57-89) 94/64     Weight: 104.5 kg (230 lb 6.1 oz)  Body mass index is 31.25 kg/m².    Physical Exam  Constitutional:       Appearance: Normal appearance.   HENT:      Head: Normocephalic and atraumatic.      Mouth/Throat:      Mouth: Mucous membranes are moist.    Eyes:      Extraocular Movements: Extraocular movements intact.      Pupils: Pupils are equal, round, and reactive to light.   Cardiovascular:      Rate and Rhythm: Tachycardia present.      Pulses: Normal pulses.      Heart sounds: Normal heart sounds.   Abdominal:      General: Bowel sounds are normal.      Tenderness: There is abdominal tenderness.   Musculoskeletal:         General: Normal range of motion.      Cervical back: Normal range of motion.   Skin:     General: Skin is warm and dry.   Neurological:      Mental Status: He is alert and oriented to person, place, and time. Mental status is at baseline.   Psychiatric:         Mood and Affect: Mood normal.         Behavior: Behavior normal.           CRANIAL NERVES     CN III, IV, VI   Pupils are equal, round, and reactive to light.       Significant Labs: All pertinent labs within the past 24 hours have been reviewed.    Significant Imaging: I have reviewed all pertinent imaging results/findings within the past 24 hours.

## 2022-01-23 NOTE — ED PROVIDER NOTES
Encounter Date: 1/22/2022       History     Chief Complaint   Patient presents with    Chest Pain     Epigastric chest pain since approx 8 am today. Reports nausea and x1 emesis episode earlier today. Denies fever/chill/cough.     56 y.o. male with GERD, hypertension, hypercholesterolemia and others presents to the emergency department complaining of acute, epigastric abdominal pain that began this morning around 8:00 a.m. while doing woodwork in his garage.  He states pain feels like trapped gas and is constant, progressively worsening and radiates to the bilateral upper quadrant.  States pain is worse with taking a deep breath.  Reports one episode emesis after drinking coffee and eating toast this morning.  He reports tolerating intake of a slice of toast there after.  Reports last bowel movement this morning was soft, nonbloody and non melenic.    He denies chest pain, shortness of breath., diaphoresis, urinary changes or fever.  He reports chronic cough related to chronic sinus problems.  He denies tobacco or alcohol use.          Review of patient's allergies indicates:  No Known Allergies  Past Medical History:   Diagnosis Date    GERD (gastroesophageal reflux disease)     Hypercholesteremia     Hypertension     on medication    Lumbar spondylosis 9/21/2020    Migraines     Sleep apnea      Past Surgical History:   Procedure Laterality Date    COLONOSCOPY N/A 8/10/2020    Procedure: COLONOSCOPY;  Surgeon: April Dos Santos MD;  Location: [facility] ENDO;  Service: Endoscopy;  Laterality: N/A;    HERNIA REPAIR      REPAIR OF TRICEPS TENDON Left 10/2/2020    Procedure: REPAIR, TENDON, TRICEPS;  Surgeon: Keysha Galvez MD;  Location: [facility] OR;  Service: Orthopedics;  Laterality: Left;  RN PRE OP DONE 9/29/2020----COVID NEGATIVE ON 9/29  Ar[employer] Rep: Delma [phone]    SINUS SURGERY  2012     Family History   Problem Relation Age of Onset    Coronary artery disease Mother     Hypertension Father      No Known Problems Sister     No Known Problems Brother     Valvular heart disease Sister     No Known Problems Brother     No Known Problems Brother     No Known Problems Maternal Aunt     No Known Problems Maternal Uncle     No Known Problems Paternal Aunt     No Known Problems Paternal Uncle     No Known Problems Maternal Grandmother     No Known Problems Maternal Grandfather     No Known Problems Paternal Grandmother     No Known Problems Paternal Grandfather     Amblyopia Neg Hx     Blindness Neg Hx     Cancer Neg Hx     Cataracts Neg Hx     Diabetes Neg Hx     Glaucoma Neg Hx     Macular degeneration Neg Hx     Retinal detachment Neg Hx     Strabismus Neg Hx     Stroke Neg Hx     Thyroid disease Neg Hx      Social History     Tobacco Use    Smoking status: Former Smoker     Quit date: 1999     Years since quittin.6    Smokeless tobacco: Never Used   Substance Use Topics    Alcohol use: Not Currently     Alcohol/week: 7.0 standard drinks     Types: 7 Glasses of wine per week     Comment: beer 2-3 daily    Drug use: No     Review of Systems   Constitutional: Negative for appetite change and fever.   Respiratory: Positive for cough (chronic). Negative for shortness of breath.    Cardiovascular: Negative for chest pain.   Gastrointestinal: Positive for abdominal pain, nausea and vomiting. Negative for blood in stool, constipation and diarrhea.   Genitourinary: Negative for decreased urine volume, dysuria, frequency and hematuria.   All other systems reviewed and are negative.      Physical Exam     Initial Vitals [22 2130]   BP Pulse Resp Temp SpO2   (!) 144/89 75 17 98 °F (36.7 °C) 97 %      MAP       --         Physical Exam    Nursing note and vitals reviewed.  Constitutional: He appears well-developed and well-nourished. He is not diaphoretic. No distress.   HENT:   Head: Normocephalic and atraumatic.   Mouth/Throat: Oropharynx is clear and moist.   Eyes: Conjunctivae  are normal.   Neck: Phonation normal. No stridor present.   Normal range of motion.  Cardiovascular: Regular rhythm and intact distal pulses.   Pulmonary/Chest: Effort normal. No accessory muscle usage or stridor. No tachypnea. No respiratory distress.   Abdominal: He exhibits distension. There is abdominal tenderness in the right upper quadrant and epigastric area.   No right CVA tenderness.  No left CVA tenderness. There is no rebound and no guarding.   Musculoskeletal:         General: No tenderness. Normal range of motion.      Cervical back: Normal range of motion.     Neurological: He is alert and oriented to person, place, and time. He has normal strength. Gait normal. GCS eye subscore is 4. GCS verbal subscore is 5. GCS motor subscore is 6.   Skin: Skin is warm and intact.   Psychiatric: He has a normal mood and affect.         ED Course   Critical Care    Date/Time: 1/23/2022 3:34 AM  Performed by: Ariana Gamino MD  Authorized by: Ariana Gamino MD   Direct patient critical care time: 10 minutes  Additional history critical care time: 10 minutes  Ordering / reviewing critical care time: 10 minutes  Documentation critical care time: 10 minutes  Consulting other physicians critical care time: 10 minutes  Total critical care time (exclusive of procedural time) : 50 minutes  Critical care was necessary to treat or prevent imminent or life-threatening deterioration of the following conditions: hepatic failure.  Critical care was time spent personally by me on the following activities: discussions with consultants, evaluation of patient's response to treatment, ordering and review of laboratory studies, obtaining history from patient or surrogate, development of treatment plan with patient or surrogate, examination of patient, ordering and review of radiographic studies, re-evaluation of patient's condition, ordering and performing treatments and interventions and review of old charts.        Labs Reviewed    ISTAT PROCEDURE - Abnormal; Notable for the following components:       Result Value    POC PCO2 55.1 (*)     POC PO2 22 (*)     POC HCO3 31.5 (*)     POC SATURATED O2 34 (*)     POC TCO2 33 (*)     All other components within normal limits   POCT CMP - Abnormal; Notable for the following components:    ALT (SGPT),  (*)     AST (SGOT),  (*)     POC Glucose 123 (*)     Bilirubin, UA 1.7 (*)     All other components within normal limits   POCT LIVER PANEL - Abnormal; Notable for the following components:    ALT (SGPT),  (*)     AST (SGOT),  (*)     POC  (*)     Bilirubin, UA 1.7 (*)     All other components within normal limits   TROPONIN ISTAT   CBC W/ AUTO DIFFERENTIAL   MAGNESIUM   PHOSPHORUS   COMPREHENSIVE METABOLIC PANEL   CBC W/ AUTO DIFFERENTIAL   PROTIME-INR   APTT   POCT CBC   SARS-COV-2 RDRP GENE    Narrative:     This test utilizes isothermal nucleic acid amplification   technology to detect the SARS-CoV-2 RdRp nucleic acid segment.   The analytical sensitivity (limit of detection) is 125 genome   equivalents/mL.   A POSITIVE result implies infection with the SARS-CoV-2 virus;   the patient is presumed to be contagious.     A NEGATIVE result means that SARS-CoV-2 nucleic acids are not   present above the limit of detection. A NEGATIVE result should be   treated as presumptive. It does not rule out the possibility of   COVID-19 and should not be the sole basis for treatment decisions.   If COVID-19 is strongly suspected based on clinical and exposure   history, re-testing using an alternate molecular assay should be   considered.   This test is only for use under the Food and Drug   Administration s Emergency Use Authorization (EUA).   Commercial kits are provided by [employer].   Performance characteristics of the EUA have been independently   verified by [facility] Department of   Pathology and Laboratory Medicine.    _________________________________________________________________   The authorized Fact Sheet for Healthcare Providers and the authorized Fact   Sheet for Patients of the ID NOW COVID-19 are available on the FDA   website:     https://www.fda.gov/media/727960/download  https://www.fda.gov/media/123177/download         POCT CMP   POCT TROPONIN   POCT B-TYPE NATRIURETIC PEPTIDE (BNP)   POCT LIVER PANEL   POCT B-TYPE NATRIURETIC PEPTIDE (BNP)     EKG Readings: (Independently Interpreted)   Initial Reading: No STEMI. Rhythm: Normal Sinus Rhythm. Heart Rate: 67. Ectopy: No Ectopy. Conduction: Normal. ST Segments: Normal ST Segments. T Waves: Normal. Axis: Normal. Clinical Impression: Normal Sinus Rhythm       Imaging Results           US Abdomen Limited (Final result)  Result time 01/23/22 02:27:05    Final result by Santana Velasquez MD (01/23/22 02:27:05)                 Impression:      Cholelithiasis without evidence of cholecystitis.    No indication of biliary ductal dilatation of the liver parenchyma.    Suggest consideration of obtaining a nuclear medicine hepatobiliary scan.    This report was flagged in Epic as abnormal.      Electronically signed by: Santana Velasquez  Date:    01/23/2022  Time:    02:27             Narrative:    EXAMINATION:  US ABDOMEN LIMITED    CLINICAL HISTORY:  gallstones with suspected obstruction;    TECHNIQUE:  Limited ultrasound of the right upper quadrant of the abdomen (including pancreas, liver, gallbladder, common bile duct, and spleen) was performed.    COMPARISON:  None.    FINDINGS:  Liver: Normal in size, measuring 14.2 cm. Homogeneous echotexture. No focal hepatic lesions.    Gallbladder: Multiple stones within the lumen.  No evidence of gallbladder wall thickening.  No evidence of a Eduardo sign.  The largest stone within the lumen is 7 mm.  No indication of hypervascularity of the wall.  The cystic duct is not visualized.    Biliary system: The common duct is not dilated,  measuring 2.6 mm.  No intrahepatic ductal dilatation.    Spleen: Normal in size and echotexture, measuring 10.3 x 3.8 cm.    Miscellaneous: No upper abdominal ascites.                               CT Abdomen Pelvis With Contrast (Final result)  Result time 01/23/22 00:24:56    Final result by Martin Oneill MD (01/23/22 00:24:56)                 Impression:      New gallbladder hydrops with question stone in the cystic duct.  Correlation needed for early cholecystitis.      Electronically signed by: Martin Oneill  Date:    01/23/2022  Time:    00:24             Narrative:    EXAMINATION:  CT ABDOMEN PELVIS WITH CONTRAST    CLINICAL HISTORY:  Epigastric pain;    TECHNIQUE:  Low dose axial images, sagittal and coronal reformations were obtained from the lung bases to the pubic symphysis following the IV administration of 100 mL of Omnipaque 350 .  Oral contrast was not given.    COMPARISON:  CT abdomen and pelvis, 09/17/2020    FINDINGS:  Abdomen:    - Lung bases: No infiltrates and no nodules.    - Liver: No focal mass.    - Gallbladder: Moderate hydrops with question layering stone in the cystic duct best seen on image 28 of series 2.    - Bile Ducts: No evidence of intra or extra hepatic biliary ductal dilation.    - Spleen: Negative.    - Kidneys: No mass or hydronephrosis.    - Adrenals: Unremarkable.    - Pancreas: No mass or peripancreatic fat stranding.    - Retroperitoneum:  No significant adenopathy.    - Vascular: No abdominal aortic aneurysm.    - Abdominal wall:  Unremarkable.    Pelvis:    No pelvic mass, adenopathy, or free fluid.    Bowel/Mesentery:    No evidence of bowel obstruction or inflammation.    Bones:  No acute osseous abnormality and no suspicious lytic or blastic lesion.                                 Medications   metoprolol succinate (TOPROL-XL) 24 hr tablet 25 mg (has no administration in time range)   traZODone tablet 50 mg (has no administration in time range)   lisinopriL  tablet 20 mg (has no administration in time range)   hydroCHLOROthiazide tablet 12.5 mg (has no administration in time range)   sodium chloride 0.9% flush 10 mL (has no administration in time range)   acetaminophen tablet 650 mg (has no administration in time range)   ondansetron injection 4 mg (has no administration in time range)   enoxaparin injection 40 mg (has no administration in time range)   morphine injection 2 mg (has no administration in time range)   sodium chloride 0.9% bolus 1,000 mL (0 mLs Intravenous Stopped 1/23/22 0115)   morphine injection 8 mg (8 mg Intravenous Given 1/22/22 2314)   ondansetron injection 8 mg (8 mg Intravenous Given 1/22/22 2314)   iohexoL (OMNIPAQUE 350) injection 100 mL (100 mLs Intravenous Given 1/23/22 0005)                        Labs Reviewed        Admission on 01/22/2022   Component Date Value Ref Range Status    POC PH 01/22/2022 7.366  7.35 - 7.45 Final    POC PCO2 01/22/2022 55.1* 35 - 45 mmHg Final    POC PO2 01/22/2022 22* 40 - 60 mmHg Final    POC HCO3 01/22/2022 31.5* 24 - 28 mmol/L Final    POC BE 01/22/2022 4  -2 to 2 mmol/L Final    POC SATURATED O2 01/22/2022 34* 95 - 100 % Final    POC Lactate 01/22/2022 1.39  0.5 - 2.2 mmol/L Final    POC TCO2 01/22/2022 33* 24 - 29 mmol/L Final    Sample 01/22/2022 VENOUS   Final    Site 01/22/2022 Other   Final    Allens Test 01/22/2022 N/A   Final    Albumin, POC 01/22/2022 4.1  3.3 - 5.5 g/dL Final    Alkaline Phosphatase, POC 01/22/2022 109  42 - 141 U/L Final    ALT (SGPT), POC 01/22/2022 337* 10 - 47 U/L Final    AST (SGOT), POC 01/22/2022 376* 11 - 38 U/L Final    POC BUN 01/22/2022 12  7 - 22 mg/dL Final    Calcium, POC 01/22/2022 9.9  8.0 - 10.3 mg/dL Final    POC Chloride 01/22/2022 100  98 - 108 mmol/L Final    POC Creatinine 01/22/2022 1.0  0.6 - 1.2 mg/dL Final    POC Glucose 01/22/2022 123* 73 - 118 mg/dL Final    POC Potassium 01/22/2022 4.3  3.6 - 5.1 mmol/L Final    POC Sodium  01/22/2022 140  128 - 145 mmol/L Final    Bilirubin, UA 01/22/2022 1.7* 0.2 - 1.6 mg/dL Final    POC TCO2 01/22/2022 31  18 - 33 mmol/L Final    Protein, UA 01/22/2022 7.2  6.4 - 8.1 g/dL Final    Albumin, POC 01/22/2022 4.2  3.3 - 5.5 g/dL Final    Alkaline Phosphatase, POC 01/22/2022 126  42 - 141 U/L Final    ALT (SGPT), POC 01/22/2022 343* 10 - 47 U/L Final    Amylase, POC 01/22/2022 47  14 - 97 U/L Final    AST (SGOT), POC 01/22/2022 389* 11 - 38 U/L Final    POC GGT 01/22/2022 626* 5 - 65 U/L Final    Bilirubin, UA 01/22/2022 1.7* 0.2 - 1.6 mg/dL Final    Protein, UA 01/22/2022 7.3  6.4 - 8.1 g/dL Final    POC Cardiac Troponin I 01/22/2022 0.00  <0.09 ng/mL Final    Sample 01/22/2022 unknown   Final    Comment: A single negative troponin is insufficient to rule out myocardial infarction.  The use of a serial sampling protocol is recommended practice. Correlate results with reference intervals established for methodology used. Point of care and core laboratory   troponin results are not interchangeable.      POC B-Type Natriuretic Peptide 01/22/2022 6.9  0.0 - 100.0 pg/mL Final    POC Rapid COVID 01/23/2022 Negative  Negative Final     Acceptable 01/23/2022 Yes   Final        Imaging Reviewed    Imaging Results           US Abdomen Limited (Final result)  Result time 01/23/22 02:27:05    Final result by Santana Velasquez MD (01/23/22 02:27:05)                 Impression:      Cholelithiasis without evidence of cholecystitis.    No indication of biliary ductal dilatation of the liver parenchyma.    Suggest consideration of obtaining a nuclear medicine hepatobiliary scan.    This report was flagged in Epic as abnormal.      Electronically signed by: Santana Velasquez  Date:    01/23/2022  Time:    02:27             Narrative:    EXAMINATION:  US ABDOMEN LIMITED    CLINICAL HISTORY:  gallstones with suspected obstruction;    TECHNIQUE:  Limited ultrasound of the right upper quadrant of  the abdomen (including pancreas, liver, gallbladder, common bile duct, and spleen) was performed.    COMPARISON:  None.    FINDINGS:  Liver: Normal in size, measuring 14.2 cm. Homogeneous echotexture. No focal hepatic lesions.    Gallbladder: Multiple stones within the lumen.  No evidence of gallbladder wall thickening.  No evidence of a Eduardo sign.  The largest stone within the lumen is 7 mm.  No indication of hypervascularity of the wall.  The cystic duct is not visualized.    Biliary system: The common duct is not dilated, measuring 2.6 mm.  No intrahepatic ductal dilatation.    Spleen: Normal in size and echotexture, measuring 10.3 x 3.8 cm.    Miscellaneous: No upper abdominal ascites.                               CT Abdomen Pelvis With Contrast (Final result)  Result time 01/23/22 00:24:56    Final result by Martin Oneill MD (01/23/22 00:24:56)                 Impression:      New gallbladder hydrops with question stone in the cystic duct.  Correlation needed for early cholecystitis.      Electronically signed by: Martin Oneill  Date:    01/23/2022  Time:    00:24             Narrative:    EXAMINATION:  CT ABDOMEN PELVIS WITH CONTRAST    CLINICAL HISTORY:  Epigastric pain;    TECHNIQUE:  Low dose axial images, sagittal and coronal reformations were obtained from the lung bases to the pubic symphysis following the IV administration of 100 mL of Omnipaque 350 .  Oral contrast was not given.    COMPARISON:  CT abdomen and pelvis, 09/17/2020    FINDINGS:  Abdomen:    - Lung bases: No infiltrates and no nodules.    - Liver: No focal mass.    - Gallbladder: Moderate hydrops with question layering stone in the cystic duct best seen on image 28 of series 2.    - Bile Ducts: No evidence of intra or extra hepatic biliary ductal dilation.    - Spleen: Negative.    - Kidneys: No mass or hydronephrosis.    - Adrenals: Unremarkable.    - Pancreas: No mass or peripancreatic fat stranding.    - Retroperitoneum:  No  significant adenopathy.    - Vascular: No abdominal aortic aneurysm.    - Abdominal wall:  Unremarkable.    Pelvis:    No pelvic mass, adenopathy, or free fluid.    Bowel/Mesentery:    No evidence of bowel obstruction or inflammation.    Bones:  No acute osseous abnormality and no suspicious lytic or blastic lesion.                                Medications given in ED    Medications   metoprolol succinate (TOPROL-XL) 24 hr tablet 25 mg (has no administration in time range)   traZODone tablet 50 mg (has no administration in time range)   lisinopriL tablet 20 mg (has no administration in time range)   hydroCHLOROthiazide tablet 12.5 mg (has no administration in time range)   sodium chloride 0.9% flush 10 mL (has no administration in time range)   acetaminophen tablet 650 mg (has no administration in time range)   ondansetron injection 4 mg (has no administration in time range)   enoxaparin injection 40 mg (has no administration in time range)   morphine injection 2 mg (has no administration in time range)   sodium chloride 0.9% bolus 1,000 mL (0 mLs Intravenous Stopped 1/23/22 0115)   morphine injection 8 mg (8 mg Intravenous Given 1/22/22 2314)   ondansetron injection 8 mg (8 mg Intravenous Given 1/22/22 2314)   iohexoL (OMNIPAQUE 350) injection 100 mL (100 mLs Intravenous Given 1/23/22 0005)       Note was created using voice recognition software. Note may have occasional typographical errors that may not have been identified and edited despite good mary initial review prior to signing.    Clinical Impression:   Final diagnoses:  [R10.13] Epigastric pain  [R10.11] Right upper quadrant abdominal pain  [K80.20] Calculus of cystic duct (Primary)  [K80.20] Cholelithiasis without cholecystitis          ED Disposition Condition    Observation               Ariana Gamino MD  01/24/22 3336

## 2022-01-23 NOTE — ASSESSMENT & PLAN NOTE
Consult GI   CT abdomen/pelvis: Impression: New gallbladder hydrops with question stone in the cystic duct.  Correlation needed for early cholecystitis.  US of abdomen: Impression: Cholelithiasis without evidence of cholecystitis. No indication of biliary ductal dilatation of the liver parenchyma. Suggest consideration of obtaining a nuclear medicine hepatobiliary scan.  Prn pain meds and antiemetics   IVF

## 2022-01-23 NOTE — ED NOTES
Epigastric ain since 1000 am, states it is worse with eating, vomited once and pain worsened, reports pain gets better with lying on side  States pain is a dull ache

## 2022-01-23 NOTE — H&P
[facility] Medicine  History & Physical    Patient Name: Enoch Barr  MRN: [id number]  Patient Class: OP- Observation  Admission Date: 1/22/2022  Attending Physician: Anthony Hyde MD   Primary Care Provider: Dipesh Clements MD         Patient information was obtained from patient and ER records.     Subjective:     Principal Problem:Cholelithiasis    Chief Complaint:   Chief Complaint   Patient presents with    Chest Pain     Epigastric chest pain since approx 8 am today. Reports nausea and x1 emesis episode earlier today. Denies fever/chill/cough.        HPI: 56 year old male present to the ED with c/o of acute, epigastric abdominal pain that began this morning around 8:00 a.m. while doing woodwork in his garage.  He states pain feels like trapped gas and is constant, progressively worsening and radiates to the bilateral upper quadrant.  States pain is worse with taking a deep breath.  Reports one episode emesis after drinking coffee and eating toast this morning.  He reports tolerating intake of a slice of toast there after.  Reports last bowel movement this morning was soft, nonbloody and non melenic.  PMHx of GERD, hypertension, hypercholesterolemia    ED Physician stated she spoke with general surgery they recommended consulting GI.                   Past Medical History:   Diagnosis Date    GERD (gastroesophageal reflux disease)     Hypercholesteremia     Hypertension     on medication    Lumbar spondylosis 9/21/2020    Migraines     Sleep apnea        Past Surgical History:   Procedure Laterality Date    COLONOSCOPY N/A 8/10/2020    Procedure: COLONOSCOPY;  Surgeon: April Dos Santos MD;  Location: [facility] ENDO;  Service: Endoscopy;  Laterality: N/A;    HERNIA REPAIR      REPAIR OF TRICEPS TENDON Left 10/2/2020    Procedure: REPAIR, TENDON, TRICEPS;  Surgeon: Keysha Galvez MD;  Location: [facility] OR;  Service: Orthopedics;  Laterality: Left;  RN PRE OP DONE  2020----COVID NEGATIVE ON   [employer] Rep: Delma [phone]    SINUS SURGERY         Review of patient's allergies indicates:  No Known Allergies    No current facility-administered medications on file prior to encounter.     Current Outpatient Medications on File Prior to Encounter   Medication Sig    atorvastatin (LIPITOR) 40 MG tablet Take 1 tablet (40 mg total) by mouth every evening.    celecoxib (CELEBREX) 200 MG capsule TAKE 1 CAPSULE(200 MG) BY MOUTH TWICE DAILY    fexofenadine (ALLEGRA) 30 MG tablet Take 30 mg by mouth once daily.    fluticasone propionate (FLONASE) 50 mcg/actuation nasal spray SHAKE LIQUID AND USE 1 SPRAY(50 MCG) IN EACH NOSTRIL EVERY DAY    lisinopriL-hydrochlorothiazide (PRINZIDE,ZESTORETIC) 20-12.5 mg per tablet Take 1 tablet by mouth once daily.    metoprolol succinate (TOPROL-XL) 25 MG 24 hr tablet Take 1 tablet (25 mg total) by mouth once daily.    omeprazole (PRILOSEC) 40 MG capsule TAKE 1 CAPSULE(40 MG) BY MOUTH EVERY DAY    tobramycin-dexamethasone 0.3-0.1% (TOBRADEX) 0.3-0.1 % Oint Place a small amount of ointment onto right upper eyelid biopsy site at night before bedtime    traZODone (DESYREL) 50 MG tablet TAKE 1/2 TO 1 TABLET BY MOUTH EVERY NIGHT 1 HOUR BEFORE BEDTIME AS NEEDED FOR INSOMNIA     Family History     Problem Relation (Age of Onset)    Coronary artery disease Mother    Hypertension Father    No Known Problems Sister, Brother, Brother, Brother, Maternal Aunt, Maternal Uncle, Paternal Aunt, Paternal Uncle, Maternal Grandmother, Maternal Grandfather, Paternal Grandmother, Paternal Grandfather    Valvular heart disease Sister        Tobacco Use    Smoking status: Former Smoker     Quit date: 1999     Years since quittin.6    Smokeless tobacco: Never Used   Substance and Sexual Activity    Alcohol use: Not Currently     Alcohol/week: 7.0 standard drinks     Types: 7 Glasses of wine per week     Comment: beer 2-3 daily    Drug use: No     Sexual activity: Yes     Partners: Female     Review of Systems   Constitutional: Negative for chills and fever.   Eyes: Negative for photophobia and visual disturbance.   Respiratory: Positive for cough. Negative for shortness of breath.         Chronic cough    Cardiovascular: Negative for chest pain, palpitations and leg swelling.   Gastrointestinal: Positive for abdominal pain, nausea and vomiting. Negative for diarrhea.   Genitourinary: Negative for frequency, hematuria and urgency.   Skin: Negative for pallor, rash and wound.   Neurological: Negative for light-headedness and headaches.   Psychiatric/Behavioral: Negative for confusion and decreased concentration.     Objective:     Vital Signs (Most Recent):  Temp: 98.5 °F (36.9 °C) (01/23/22 0423)  Pulse: (!) 113 (01/23/22 0423)  Resp: 19 (01/23/22 0423)  BP: 94/64 (01/23/22 0423)  SpO2: (!) 93 % (01/23/22 0423) Vital Signs (24h Range):  Temp:  [98 °F (36.7 °C)-99.8 °F (37.7 °C)] 98.5 °F (36.9 °C)  Pulse:  [] 113  Resp:  [17-32] 19  SpO2:  [93 %-97 %] 93 %  BP: ()/(57-89) 94/64     Weight: 104.5 kg (230 lb 6.1 oz)  Body mass index is 31.25 kg/m².    Physical Exam  Constitutional:       Appearance: Normal appearance.   HENT:      Head: Normocephalic and atraumatic.      Mouth/Throat:      Mouth: Mucous membranes are moist.   Eyes:      Extraocular Movements: Extraocular movements intact.      Pupils: Pupils are equal, round, and reactive to light.   Cardiovascular:      Rate and Rhythm: Tachycardia present.      Pulses: Normal pulses.      Heart sounds: Normal heart sounds.   Abdominal:   He exhibits distension. There is abdominal tenderness in the right upper quadrant and epigastric area.   No right CVA tenderness.  No left CVA tenderness. There is no rebound and no guarding.    Musculoskeletal:         General: Normal range of motion.      Cervical back: Normal range of motion.   Skin:     General: Skin is warm and dry.   Neurological:       Mental Status: He is alert and oriented to person, place, and time. Mental status is at baseline.   Psychiatric:         Mood and Affect: Mood normal.         Behavior: Behavior normal.           CRANIAL NERVES     CN III, IV, VI   Pupils are equal, round, and reactive to light.       Significant Labs: All pertinent labs within the past 24 hours have been reviewed.    Significant Imaging: I have reviewed all pertinent imaging results/findings within the past 24 hours.    Assessment/Plan:     * Cholelithiasis  Consult GI   CT abdomen/pelvis: Impression: New gallbladder hydrops with question stone in the cystic duct.  Correlation needed for early cholecystitis.  US of abdomen: Impression: Cholelithiasis without evidence of cholecystitis. No indication of biliary ductal dilatation of the liver parenchyma. Suggest consideration of obtaining a nuclear medicine hepatobiliary scan.  Prn pain meds and antiemetics   IVF     Elevated liver enzymes  Trend level  Held statin       Hypercholesterolemia  Held statin due to elevated liver enzymes     Essential hypertension  Resume home meds         VTE Risk Mitigation (From admission, onward)         Ordered     enoxaparin injection 40 mg  Daily         01/23/22 0251     IP VTE HIGH RISK PATIENT  Once         01/23/22 0250     Place sequential compression device  Until discontinued         01/23/22 0250                 As clarification, on 1/23/22 @0530  patient should be placed in hospital observation services under my care in collaboration with MD Virgie Gunderson NP  Department of Hospital Medicine   [facility]

## 2022-01-23 NOTE — HPI
56 year old male present to the ED with c/o of acute, epigastric abdominal pain that began this morning around 8:00 a.m. while doing woodwork in his garage.  He states pain feels like trapped gas and is constant, progressively worsening and radiates to the bilateral upper quadrant.  States pain is worse with taking a deep breath.  Reports one episode emesis after drinking coffee and eating toast this morning.  He reports tolerating intake of a slice of toast there after.  Reports last bowel movement this morning was soft, nonbloody and non melenic.  PMHx of GERD, hypertension, hypercholesterolemia

## 2022-01-23 NOTE — CONSULTS
Enoch Barr  [id number]    Consultant: Dr. Hyde     CC/Reason for Consultation: Cholelithiasis    HPI:  57 y.o. male with pmh of HTN, GERD, transferred from outside ED for epigastric pain that started yesterday morning.  Patient it felt like gas pain, was associated with nausea and vomiting.  Denies fever/chills,chest pain. In the ED patient had elevated of lfts and tbili of 1.7. CT showed gallstones and hydrops in the gallbladder, US showed cholellithiasis without evidence of cholecystitis.  Currently patients says pain is improved.    Past Medical History:   Diagnosis Date    GERD (gastroesophageal reflux disease)     Hypercholesteremia     Hypertension     on medication    Lumbar spondylosis 2020    Migraines     Sleep apnea        Past Surgical History:   Procedure Laterality Date    COLONOSCOPY N/A 8/10/2020    Procedure: COLONOSCOPY;  Surgeon: April Dos Santos MD;  Location: [facility] ENDO;  Service: Endoscopy;  Laterality: N/A;    HERNIA REPAIR      REPAIR OF TRICEPS TENDON Left 10/2/2020    Procedure: REPAIR, TENDON, TRICEPS;  Surgeon: Keysha Galvez MD;  Location: [facility] OR;  Service: Orthopedics;  Laterality: Left;  RN PRE OP DONE 2020----COVID NEGATIVE ON   [employer] Rep: Delma [phone]    SINUS SURGERY  2012       Social History     Socioeconomic History    Marital status:    Occupational History    Occupation: production      Employer: [employer]   Tobacco Use    Smoking status: Former Smoker     Quit date: 1999     Years since quittin.6    Smokeless tobacco: Never Used   Substance and Sexual Activity    Alcohol use: Not Currently     Alcohol/week: 7.0 standard drinks     Types: 7 Glasses of wine per week     Comment: beer 2-3 daily    Drug use: No    Sexual activity: Yes     Partners: Female       Family History   Problem Relation Age of Onset    Coronary artery disease Mother     Hypertension Father     No Known Problems  Sister     No Known Problems Brother     Valvular heart disease Sister     No Known Problems Brother     No Known Problems Brother     No Known Problems Maternal Aunt     No Known Problems Maternal Uncle     No Known Problems Paternal Aunt     No Known Problems Paternal Uncle     No Known Problems Maternal Grandmother     No Known Problems Maternal Grandfather     No Known Problems Paternal Grandmother     No Known Problems Paternal Grandfather     Amblyopia Neg Hx     Blindness Neg Hx     Cancer Neg Hx     Cataracts Neg Hx     Diabetes Neg Hx     Glaucoma Neg Hx     Macular degeneration Neg Hx     Retinal detachment Neg Hx     Strabismus Neg Hx     Stroke Neg Hx     Thyroid disease Neg Hx        Review of patient's allergies indicates:  No Known Allergies    ROS - Negative except what is noted in HPI    Objective    Vitals:    01/23/22 0846   BP: (!) 157/69   Pulse:    Resp:    Temp:        CBC No results for input(s): WBC, HGB, HCT, PLT in the last 168 hours.    CHEM No results for input(s): NA, K, CL, CO2, BUN, CREATININE, GLU in the last 168 hours.    GEN: Awake, alert, resting comfortable in bed   HEENT: NCAT  RESP: Normal WOB, no distress  CV: RR  ABD: Soft, mild ttp in the ruq, no guarding/rebound.  Np murphys sign present  EXT: WWP, no edema  SKIN: warm, dry  NEURO: alert, normal speech  PSYCH: normal mood and affect    Imaging Results           US Abdomen Limited (Final result)  Result time 01/23/22 02:27:05    Final result by Santana Velasquez MD (01/23/22 02:27:05)                 Impression:      Cholelithiasis without evidence of cholecystitis.    No indication of biliary ductal dilatation of the liver parenchyma.    Suggest consideration of obtaining a nuclear medicine hepatobiliary scan.    This report was flagged in Epic as abnormal.      Electronically signed by: Santana Velasquez  Date:    01/23/2022  Time:    02:27             Narrative:    EXAMINATION:  US ABDOMEN  LIMITED    CLINICAL HISTORY:  gallstones with suspected obstruction;    TECHNIQUE:  Limited ultrasound of the right upper quadrant of the abdomen (including pancreas, liver, gallbladder, common bile duct, and spleen) was performed.    COMPARISON:  None.    FINDINGS:  Liver: Normal in size, measuring 14.2 cm. Homogeneous echotexture. No focal hepatic lesions.    Gallbladder: Multiple stones within the lumen.  No evidence of gallbladder wall thickening.  No evidence of a Eduardo sign.  The largest stone within the lumen is 7 mm.  No indication of hypervascularity of the wall.  The cystic duct is not visualized.    Biliary system: The common duct is not dilated, measuring 2.6 mm.  No intrahepatic ductal dilatation.    Spleen: Normal in size and echotexture, measuring 10.3 x 3.8 cm.    Miscellaneous: No upper abdominal ascites.                               CT Abdomen Pelvis With Contrast (Final result)  Result time 01/23/22 00:24:56    Final result by Martin Oneill MD (01/23/22 00:24:56)                 Impression:      New gallbladder hydrops with question stone in the cystic duct.  Correlation needed for early cholecystitis.      Electronically signed by: Martin Oneill  Date:    01/23/2022  Time:    00:24             Narrative:    EXAMINATION:  CT ABDOMEN PELVIS WITH CONTRAST    CLINICAL HISTORY:  Epigastric pain;    TECHNIQUE:  Low dose axial images, sagittal and coronal reformations were obtained from the lung bases to the pubic symphysis following the IV administration of 100 mL of Omnipaque 350 .  Oral contrast was not given.    COMPARISON:  CT abdomen and pelvis, 09/17/2020    FINDINGS:  Abdomen:    - Lung bases: No infiltrates and no nodules.    - Liver: No focal mass.    - Gallbladder: Moderate hydrops with question layering stone in the cystic duct best seen on image 28 of series 2.    - Bile Ducts: No evidence of intra or extra hepatic biliary ductal dilation.    - Spleen: Negative.    - Kidneys: No  mass or hydronephrosis.    - Adrenals: Unremarkable.    - Pancreas: No mass or peripancreatic fat stranding.    - Retroperitoneum:  No significant adenopathy.    - Vascular: No abdominal aortic aneurysm.    - Abdominal wall:  Unremarkable.    Pelvis:    No pelvic mass, adenopathy, or free fluid.    Bowel/Mesentery:    No evidence of bowel obstruction or inflammation.    Bones:  No acute osseous abnormality and no suspicious lytic or blastic lesion.                                  A/P: 57 y.o. male with cholelithiasis admitted with epigastric pain and elevated LFTs.  GI consulted, planning on HIDA scan.  Most likely patient passed a gallstone recently.    -trend LFTs, tbili  -will follow up HIDA scan  -as long as tbili and LFTs trend down will plan for cholecystectomy tomorrow  -NPO at midnight      Rodolfo Camacho MD  [facility] Surgery PGY-V

## 2022-01-23 NOTE — CONSULTS
[facility] Consultation Note    Reason for Consult:      PCP:   Dipesh Clements   No address on file    LOS: 0        Initial History of Present Illness (HPI):  This is a 57 y.o. male consulted for gallstones.  Started yesterday after drinking some coffee and felt like trapped gas in his chest and abdomen. Labs showed elevated lft's and ct u/s showed possibly GB hydrops but no biliary dilation ,question of cystic stone.    His pain is down to 1/10 now with morphine, feels tender over abdomen        ROS:  Constitutional: No fevers, chills, No weight loss  ENT: No allergies  CV: No chest pain  Pulm: No cough, No shortness of breath  Ophtho: No vision changes  GI: see HPI  Derm: No rash  Heme: No lymphadenopathy, No bruising  MSK: No arthritis  : No dysuria, No hematuria  Endo: No hot or cold intolerance  Neuro: No syncope, No seizure  Psych: No anxiety, No depression    Medical History:  has a past medical history of GERD (gastroesophageal reflux disease), Hypercholesteremia, Hypertension, Lumbar spondylosis (9/21/2020), Migraines, and Sleep apnea.    Surgical History:  has a past surgical history that includes Sinus surgery (2012); Hernia repair; Colonoscopy (N/A, 8/10/2020); and Repair of triceps tendon (Left, 10/2/2020).    Family History: family history includes Coronary artery disease in his mother; Hypertension in his father; No Known Problems in his brother, brother, brother, maternal aunt, maternal grandfather, maternal grandmother, maternal uncle, paternal aunt, paternal grandfather, paternal grandmother, paternal uncle, and sister; Valvular heart disease in his sister..     Social History:  reports that he quit smoking about 22 years ago. He has never used smokeless tobacco. He reports previous alcohol use of about 7.0 standard drinks of alcohol per week. He reports that he does not use drugs.    Review of patient's allergies indicates:  No Known Allergies    No current  facility-administered medications on file prior to encounter.     Current Outpatient Medications on File Prior to Encounter   Medication Sig Dispense Refill    atorvastatin (LIPITOR) 40 MG tablet Take 1 tablet (40 mg total) by mouth every evening. 90 tablet 3    celecoxib (CELEBREX) 200 MG capsule TAKE 1 CAPSULE(200 MG) BY MOUTH TWICE DAILY 180 capsule 0    fexofenadine (ALLEGRA) 30 MG tablet Take 30 mg by mouth once daily.      fluticasone propionate (FLONASE) 50 mcg/actuation nasal spray SHAKE LIQUID AND USE 1 SPRAY(50 MCG) IN EACH NOSTRIL EVERY DAY 16 g 6    lisinopriL-hydrochlorothiazide (PRINZIDE,ZESTORETIC) 20-12.5 mg per tablet Take 1 tablet by mouth once daily. 90 tablet 3    metoprolol succinate (TOPROL-XL) 25 MG 24 hr tablet Take 1 tablet (25 mg total) by mouth once daily. 90 tablet 3    omeprazole (PRILOSEC) 40 MG capsule TAKE 1 CAPSULE(40 MG) BY MOUTH EVERY DAY 90 capsule 1    tobramycin-dexamethasone 0.3-0.1% (TOBRADEX) 0.3-0.1 % Oint Place a small amount of ointment onto right upper eyelid biopsy site at night before bedtime 3.5 g 0    traZODone (DESYREL) 50 MG tablet TAKE 1/2 TO 1 TABLET BY MOUTH EVERY NIGHT 1 HOUR BEFORE BEDTIME AS NEEDED FOR INSOMNIA 30 tablet 1        Objective Findings:    Vital Signs:  /60 (BP Location: Left arm, Patient Position: Lying)   Pulse 65   Temp 97.3 °F (36.3 °C) (Oral)   Resp 18   Ht 6' (1.829 m)   Wt 104.5 kg (230 lb 6.1 oz)   SpO2 96%   BMI 31.25 kg/m²   Body mass index is 31.25 kg/m².    Physical Exam:  General Appearance: Well appearing in no acute distress  Head:   Normocephalic, without obvious abnormality  Eyes:    No scleral icterus, EOMI  ENT: Neck supple, Lips, mucosa, and tongue normal; teeth and gums normal  Lungs: CTA bilaterally in anterior and posterior fields, no wheezes, no crackles.  Heart:  Regular rate and rhythm, S1, S2 normal, no murmurs heard  Abdomen: Soft, mildly tender, non distended with positive bowel sounds in all four  quadrants. No hepatosplenomegaly, ascites, or mass. Negative michael sign  Extremities: 2+ pulses, no clubbing, cyanosis or edema  Skin: No rash  Neurologic: CN II-XII intact      Labs:  Lab Results   Component Value Date    WBC 5.38 01/11/2021    HGB 15.1 01/11/2021    HCT 46.1 01/11/2021     01/11/2021    CHOL 151 01/11/2021    TRIG 154 (H) 01/11/2021    HDL 42 01/11/2021    ALT 34 01/11/2021    AST 28 01/11/2021     01/11/2021    K 4.1 01/11/2021     01/11/2021    CREATININE 1.0 01/11/2021    BUN 18 01/11/2021    CO2 30 (H) 01/11/2021    TSH 1.204 07/27/2020       No results found for: HPYLORINTERP  No results found for: HPYLORIANTIG        Imaging:      Endoscopy:    Colon 8/20      Assessment:  1. Calculus of cystic duct    2. Chest pain    3. Epigastric pain    4. Right upper quadrant abdominal pain    5. Cholelithiasis without cholecystitis           Recommendations:  1. Check HIDA scan to rule out cystic duct obstruction  2. Low suspicion for CBD stone as Bili < 4 and no dilation on imaging. May have passed a GB stone leading to elevated lft's continue to follow  3. Need gen surgery on board, will consult  4. Liquid diet ok if no HIDA scan today      Thank you so much for allowing me to participate in the care of Enoch Obrien MD

## 2022-01-24 LAB
ALBUMIN SERPL BCP-MCNC: 3.3 G/DL (ref 3.5–5.2)
ALP SERPL-CCNC: 94 U/L (ref 55–135)
ALT SERPL W/O P-5'-P-CCNC: 196 U/L (ref 10–44)
ANION GAP SERPL CALC-SCNC: 9 MMOL/L (ref 8–16)
AST SERPL-CCNC: 80 U/L (ref 10–40)
BILIRUB SERPL-MCNC: 1.3 MG/DL (ref 0.1–1)
BUN SERPL-MCNC: 11 MG/DL (ref 6–20)
CALCIUM SERPL-MCNC: 8.6 MG/DL (ref 8.7–10.5)
CHLORIDE SERPL-SCNC: 106 MMOL/L (ref 95–110)
CO2 SERPL-SCNC: 23 MMOL/L (ref 23–29)
CREAT SERPL-MCNC: 1 MG/DL (ref 0.5–1.4)
EST. GFR  (AFRICAN AMERICAN): >60 ML/MIN/1.73 M^2
EST. GFR  (NON AFRICAN AMERICAN): >60 ML/MIN/1.73 M^2
GLUCOSE SERPL-MCNC: 93 MG/DL (ref 70–110)
POTASSIUM SERPL-SCNC: 4.3 MMOL/L (ref 3.5–5.1)
PROT SERPL-MCNC: 6.3 G/DL (ref 6–8.4)
SODIUM SERPL-SCNC: 138 MMOL/L (ref 136–145)

## 2022-01-24 PROCEDURE — 84145 PROCALCITONIN (PCT): CPT | Performed by: STUDENT IN AN ORGANIZED HEALTH CARE EDUCATION/TRAINING PROGRAM

## 2022-01-24 PROCEDURE — 36415 COLL VENOUS BLD VENIPUNCTURE: CPT | Performed by: STUDENT IN AN ORGANIZED HEALTH CARE EDUCATION/TRAINING PROGRAM

## 2022-01-24 PROCEDURE — 96361 HYDRATE IV INFUSION ADD-ON: CPT

## 2022-01-24 PROCEDURE — G0378 HOSPITAL OBSERVATION PER HR: HCPCS

## 2022-01-24 PROCEDURE — 80053 COMPREHEN METABOLIC PANEL: CPT | Performed by: STUDENT IN AN ORGANIZED HEALTH CARE EDUCATION/TRAINING PROGRAM

## 2022-01-24 PROCEDURE — C9113 INJ PANTOPRAZOLE SODIUM, VIA: HCPCS | Performed by: NURSE PRACTITIONER

## 2022-01-24 PROCEDURE — 96376 TX/PRO/DX INJ SAME DRUG ADON: CPT

## 2022-01-24 PROCEDURE — 87040 BLOOD CULTURE FOR BACTERIA: CPT | Mod: 59 | Performed by: STUDENT IN AN ORGANIZED HEALTH CARE EDUCATION/TRAINING PROGRAM

## 2022-01-24 PROCEDURE — 63600175 PHARM REV CODE 636 W HCPCS: Performed by: NURSE PRACTITIONER

## 2022-01-24 PROCEDURE — 85025 COMPLETE CBC W/AUTO DIFF WBC: CPT | Performed by: STUDENT IN AN ORGANIZED HEALTH CARE EDUCATION/TRAINING PROGRAM

## 2022-01-24 PROCEDURE — 25000003 PHARM REV CODE 250: Performed by: NURSE PRACTITIONER

## 2022-01-24 PROCEDURE — 96365 THER/PROPH/DIAG IV INF INIT: CPT | Mod: 59

## 2022-01-24 PROCEDURE — 83605 ASSAY OF LACTIC ACID: CPT | Performed by: STUDENT IN AN ORGANIZED HEALTH CARE EDUCATION/TRAINING PROGRAM

## 2022-01-24 PROCEDURE — 96372 THER/PROPH/DIAG INJ SC/IM: CPT | Mod: 59

## 2022-01-24 PROCEDURE — 99214 PR OFFICE/OUTPT VISIT, EST, LEVL IV, 30-39 MIN: ICD-10-PCS | Mod: 57,,, | Performed by: SURGERY

## 2022-01-24 PROCEDURE — 99214 OFFICE O/P EST MOD 30 MIN: CPT | Mod: 57,,, | Performed by: SURGERY

## 2022-01-24 RX ADMIN — ACETAMINOPHEN 650 MG: 325 TABLET ORAL at 09:01

## 2022-01-24 RX ADMIN — HYDROCHLOROTHIAZIDE 12.5 MG: 12.5 TABLET ORAL at 09:01

## 2022-01-24 RX ADMIN — PANTOPRAZOLE SODIUM 40 MG: 40 INJECTION, POWDER, FOR SOLUTION INTRAVENOUS at 09:01

## 2022-01-24 RX ADMIN — PIPERACILLIN AND TAZOBACTAM 4.5 G: 4; .5 INJECTION, POWDER, LYOPHILIZED, FOR SOLUTION INTRAVENOUS; PARENTERAL at 11:01

## 2022-01-24 RX ADMIN — TRAZODONE HYDROCHLORIDE 50 MG: 50 TABLET ORAL at 09:01

## 2022-01-24 RX ADMIN — LISINOPRIL 20 MG: 20 TABLET ORAL at 09:01

## 2022-01-24 RX ADMIN — METOPROLOL SUCCINATE 25 MG: 25 TABLET, EXTENDED RELEASE ORAL at 09:01

## 2022-01-24 RX ADMIN — ENOXAPARIN SODIUM 40 MG: 40 INJECTION SUBCUTANEOUS at 04:01
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
## 2022-01-24 NOTE — PLAN OF CARE
Problem: Adult Inpatient Plan of Care  Goal: Plan of Care Review  Outcome: Ongoing, Progressing  Goal: Patient-Specific Goal (Individualized)  Outcome: Ongoing, Progressing  Goal: Absence of Hospital-Acquired Illness or Injury  Outcome: Ongoing, Progressing  Goal: Optimal Comfort and Wellbeing  Outcome: Ongoing, Progressing  Goal: Readiness for Transition of Care  Outcome: Ongoing, Progressing       Patient remained free from falls/injury throughout shift.  No acute changes in status.  Patient remained NPO since midnight.  Bed locked in lowest position.  Side rails up x2.  Call bell within reach.  Purposeful rounding maintained throughout shift.

## 2022-01-24 NOTE — PLAN OF CARE
01/24/22 1059   Discharge Planning   Assessment Type Discharge Planning Brief Assessment   Resource/Environmental Concerns none   Support Systems Spouse/significant other   Equipment Currently Used at Home none   Current Living Arrangements home/apartment/condo   Patient/Family Anticipates Transition to home   Patient/Family Anticipated Services at Transition none   DME Needed Upon Discharge  none   Discharge Plan A Home with family  (with follow up)     [id number] - [place] - [address] AT [facility] [place]  [address]  [place] [address]  Phone: [phone] Fax: [phone]    [id number] - [place] - [address] AT [facility]  [address]  [place] [address]  Phone: [phone] Fax: [phone]

## 2022-01-25 ENCOUNTER — ANESTHESIA EVENT (OUTPATIENT)
Dept: SURGERY | Facility: HOSPITAL | Age: 57
End: 2022-01-25
Payer: OTHER GOVERNMENT

## 2022-01-25 ENCOUNTER — ANESTHESIA (OUTPATIENT)
Dept: SURGERY | Facility: HOSPITAL | Age: 57
End: 2022-01-25
Payer: OTHER GOVERNMENT

## 2022-01-25 LAB
ALBUMIN SERPL BCP-MCNC: 3.2 G/DL (ref 3.5–5.2)
ALP SERPL-CCNC: 93 U/L (ref 55–135)
ALT SERPL W/O P-5'-P-CCNC: 134 U/L (ref 10–44)
ANION GAP SERPL CALC-SCNC: 8 MMOL/L (ref 8–16)
AST SERPL-CCNC: 41 U/L (ref 10–40)
BASOPHILS # BLD AUTO: 0.03 K/UL (ref 0–0.2)
BASOPHILS # BLD AUTO: 0.04 K/UL (ref 0–0.2)
BASOPHILS NFR BLD: 0.5 % (ref 0–1.9)
BASOPHILS NFR BLD: 0.7 % (ref 0–1.9)
BILIRUB SERPL-MCNC: 0.6 MG/DL (ref 0.1–1)
BUN SERPL-MCNC: 15 MG/DL (ref 6–20)
CALCIUM SERPL-MCNC: 8.8 MG/DL (ref 8.7–10.5)
CHLORIDE SERPL-SCNC: 103 MMOL/L (ref 95–110)
CO2 SERPL-SCNC: 28 MMOL/L (ref 23–29)
CREAT SERPL-MCNC: 1.1 MG/DL (ref 0.5–1.4)
DIFFERENTIAL METHOD: ABNORMAL
DIFFERENTIAL METHOD: ABNORMAL
EOSINOPHIL # BLD AUTO: 0 K/UL (ref 0–0.5)
EOSINOPHIL # BLD AUTO: 0.1 K/UL (ref 0–0.5)
EOSINOPHIL NFR BLD: 0.3 % (ref 0–8)
EOSINOPHIL NFR BLD: 1.2 % (ref 0–8)
ERYTHROCYTE [DISTWIDTH] IN BLOOD BY AUTOMATED COUNT: 12.1 % (ref 11.5–14.5)
ERYTHROCYTE [DISTWIDTH] IN BLOOD BY AUTOMATED COUNT: 12.3 % (ref 11.5–14.5)
EST. GFR  (AFRICAN AMERICAN): >60 ML/MIN/1.73 M^2
EST. GFR  (NON AFRICAN AMERICAN): >60 ML/MIN/1.73 M^2
GLUCOSE SERPL-MCNC: 106 MG/DL (ref 70–110)
HCT VFR BLD AUTO: 42.1 % (ref 40–54)
HCT VFR BLD AUTO: 43.2 % (ref 40–54)
HGB BLD-MCNC: 14.1 G/DL (ref 14–18)
HGB BLD-MCNC: 14.3 G/DL (ref 14–18)
IMM GRANULOCYTES # BLD AUTO: 0.01 K/UL (ref 0–0.04)
IMM GRANULOCYTES # BLD AUTO: 0.02 K/UL (ref 0–0.04)
IMM GRANULOCYTES NFR BLD AUTO: 0.2 % (ref 0–0.5)
IMM GRANULOCYTES NFR BLD AUTO: 0.3 % (ref 0–0.5)
LACTATE SERPL-SCNC: 1.4 MMOL/L (ref 0.5–2.2)
LYMPHOCYTES # BLD AUTO: 0.6 K/UL (ref 1–4.8)
LYMPHOCYTES # BLD AUTO: 0.8 K/UL (ref 1–4.8)
LYMPHOCYTES NFR BLD: 10.5 % (ref 18–48)
LYMPHOCYTES NFR BLD: 14.2 % (ref 18–48)
MAGNESIUM SERPL-MCNC: 2.3 MG/DL (ref 1.6–2.6)
MCH RBC QN AUTO: 32.5 PG (ref 27–31)
MCH RBC QN AUTO: 32.7 PG (ref 27–31)
MCHC RBC AUTO-ENTMCNC: 33.1 G/DL (ref 32–36)
MCHC RBC AUTO-ENTMCNC: 33.5 G/DL (ref 32–36)
MCV RBC AUTO: 98 FL (ref 82–98)
MCV RBC AUTO: 98 FL (ref 82–98)
MONOCYTES # BLD AUTO: 0.8 K/UL (ref 0.3–1)
MONOCYTES # BLD AUTO: 1.3 K/UL (ref 0.3–1)
MONOCYTES NFR BLD: 13.9 % (ref 4–15)
MONOCYTES NFR BLD: 21.1 % (ref 4–15)
NEUTROPHILS # BLD AUTO: 3.7 K/UL (ref 1.8–7.7)
NEUTROPHILS # BLD AUTO: 4.3 K/UL (ref 1.8–7.7)
NEUTROPHILS NFR BLD: 62.8 % (ref 38–73)
NEUTROPHILS NFR BLD: 74.3 % (ref 38–73)
NRBC BLD-RTO: 0 /100 WBC
NRBC BLD-RTO: 0 /100 WBC
PHOSPHATE SERPL-MCNC: 3.4 MG/DL (ref 2.7–4.5)
PLATELET # BLD AUTO: 194 K/UL (ref 150–450)
PLATELET # BLD AUTO: 209 K/UL (ref 150–450)
PMV BLD AUTO: 9.3 FL (ref 9.2–12.9)
PMV BLD AUTO: 9.4 FL (ref 9.2–12.9)
POTASSIUM SERPL-SCNC: 4.2 MMOL/L (ref 3.5–5.1)
PROCALCITONIN SERPL IA-MCNC: 0.43 NG/ML
PROT SERPL-MCNC: 6.5 G/DL (ref 6–8.4)
RBC # BLD AUTO: 4.31 M/UL (ref 4.6–6.2)
RBC # BLD AUTO: 4.4 M/UL (ref 4.6–6.2)
SODIUM SERPL-SCNC: 139 MMOL/L (ref 136–145)
WBC # BLD AUTO: 5.83 K/UL (ref 3.9–12.7)
WBC # BLD AUTO: 5.92 K/UL (ref 3.9–12.7)

## 2022-01-25 PROCEDURE — 36415 COLL VENOUS BLD VENIPUNCTURE: CPT | Performed by: STUDENT IN AN ORGANIZED HEALTH CARE EDUCATION/TRAINING PROGRAM

## 2022-01-25 PROCEDURE — 96372 THER/PROPH/DIAG INJ SC/IM: CPT | Mod: 59

## 2022-01-25 PROCEDURE — 25000003 PHARM REV CODE 250: Performed by: SURGERY

## 2022-01-25 PROCEDURE — 63600175 PHARM REV CODE 636 W HCPCS: Performed by: SURGERY

## 2022-01-25 PROCEDURE — 88304 PR  SURG PATH,LEVEL III: ICD-10-PCS | Mod: 26,,, | Performed by: PATHOLOGY

## 2022-01-25 PROCEDURE — 88304 TISSUE EXAM BY PATHOLOGIST: CPT | Mod: 26,,, | Performed by: PATHOLOGY

## 2022-01-25 PROCEDURE — 47562 LAPAROSCOPIC CHOLECYSTECTOMY: CPT | Mod: ,,, | Performed by: SURGERY

## 2022-01-25 PROCEDURE — 84100 ASSAY OF PHOSPHORUS: CPT | Performed by: STUDENT IN AN ORGANIZED HEALTH CARE EDUCATION/TRAINING PROGRAM

## 2022-01-25 PROCEDURE — G0378 HOSPITAL OBSERVATION PER HR: HCPCS

## 2022-01-25 PROCEDURE — 71000033 HC RECOVERY, INTIAL HOUR: Performed by: SURGERY

## 2022-01-25 PROCEDURE — 85025 COMPLETE CBC W/AUTO DIFF WBC: CPT | Performed by: STUDENT IN AN ORGANIZED HEALTH CARE EDUCATION/TRAINING PROGRAM

## 2022-01-25 PROCEDURE — 00790 ANES IPER UPR ABD NOS: CPT | Performed by: SURGERY

## 2022-01-25 PROCEDURE — 63600175 PHARM REV CODE 636 W HCPCS: Performed by: REGISTERED NURSE

## 2022-01-25 PROCEDURE — D9220A PRA ANESTHESIA: Mod: CRNA,,, | Performed by: REGISTERED NURSE

## 2022-01-25 PROCEDURE — D9220A PRA ANESTHESIA: Mod: ANES,,, | Performed by: ANESTHESIOLOGY

## 2022-01-25 PROCEDURE — 63600175 PHARM REV CODE 636 W HCPCS: Performed by: ANESTHESIOLOGY

## 2022-01-25 PROCEDURE — 80053 COMPREHEN METABOLIC PANEL: CPT | Performed by: STUDENT IN AN ORGANIZED HEALTH CARE EDUCATION/TRAINING PROGRAM

## 2022-01-25 PROCEDURE — 96376 TX/PRO/DX INJ SAME DRUG ADON: CPT | Mod: 59

## 2022-01-25 PROCEDURE — 25000003 PHARM REV CODE 250: Performed by: NURSE PRACTITIONER

## 2022-01-25 PROCEDURE — 25000003 PHARM REV CODE 250: Performed by: HOSPITALIST

## 2022-01-25 PROCEDURE — D9220A PRA ANESTHESIA: ICD-10-PCS | Mod: CRNA,,, | Performed by: REGISTERED NURSE

## 2022-01-25 PROCEDURE — C9113 INJ PANTOPRAZOLE SODIUM, VIA: HCPCS | Performed by: NURSE PRACTITIONER

## 2022-01-25 PROCEDURE — 88304 TISSUE EXAM BY PATHOLOGIST: CPT | Performed by: PATHOLOGY

## 2022-01-25 PROCEDURE — 36000708 HC OR TIME LEV III 1ST 15 MIN: Performed by: SURGERY

## 2022-01-25 PROCEDURE — 27201423 OPTIME MED/SURG SUP & DEVICES STERILE SUPPLY: Performed by: SURGERY

## 2022-01-25 PROCEDURE — 63600175 PHARM REV CODE 636 W HCPCS: Performed by: NURSE PRACTITIONER

## 2022-01-25 PROCEDURE — 25000003 PHARM REV CODE 250: Performed by: REGISTERED NURSE

## 2022-01-25 PROCEDURE — 37000008 HC ANESTHESIA 1ST 15 MINUTES: Performed by: SURGERY

## 2022-01-25 PROCEDURE — 47562 PR LAP,CHOLECYSTECTOMY: ICD-10-PCS | Mod: ,,, | Performed by: SURGERY

## 2022-01-25 PROCEDURE — 96372 THER/PROPH/DIAG INJ SC/IM: CPT | Mod: 59 | Performed by: NURSE PRACTITIONER

## 2022-01-25 PROCEDURE — 36000709 HC OR TIME LEV III EA ADD 15 MIN: Performed by: SURGERY

## 2022-01-25 PROCEDURE — D9220A PRA ANESTHESIA: ICD-10-PCS | Mod: ANES,,, | Performed by: ANESTHESIOLOGY

## 2022-01-25 PROCEDURE — 96366 THER/PROPH/DIAG IV INF ADDON: CPT | Mod: 59

## 2022-01-25 PROCEDURE — 37000009 HC ANESTHESIA EA ADD 15 MINS: Performed by: SURGERY

## 2022-01-25 PROCEDURE — 83735 ASSAY OF MAGNESIUM: CPT | Performed by: STUDENT IN AN ORGANIZED HEALTH CARE EDUCATION/TRAINING PROGRAM

## 2022-01-25 PROCEDURE — A4216 STERILE WATER/SALINE, 10 ML: HCPCS | Performed by: SURGERY

## 2022-01-25 RX ORDER — MUPIROCIN 20 MG/G
1 OINTMENT TOPICAL 2 TIMES DAILY
Status: DISCONTINUED | OUTPATIENT
Start: 2022-01-25 | End: 2022-01-26 | Stop reason: HOSPADM

## 2022-01-25 RX ORDER — MIDAZOLAM HYDROCHLORIDE 1 MG/ML
INJECTION INTRAMUSCULAR; INTRAVENOUS
Status: DISCONTINUED | OUTPATIENT
Start: 2022-01-25 | End: 2022-01-25

## 2022-01-25 RX ORDER — FENTANYL CITRATE 50 UG/ML
25 INJECTION, SOLUTION INTRAMUSCULAR; INTRAVENOUS EVERY 5 MIN PRN
Status: DISCONTINUED | OUTPATIENT
Start: 2022-01-25 | End: 2022-01-25 | Stop reason: HOSPADM

## 2022-01-25 RX ORDER — SODIUM CHLORIDE 0.9 % (FLUSH) 0.9 %
10 SYRINGE (ML) INJECTION
Status: DISCONTINUED | OUTPATIENT
Start: 2022-01-25 | End: 2022-01-26 | Stop reason: HOSPADM

## 2022-01-25 RX ORDER — DEXAMETHASONE SODIUM PHOSPHATE 4 MG/ML
INJECTION, SOLUTION INTRA-ARTICULAR; INTRALESIONAL; INTRAMUSCULAR; INTRAVENOUS; SOFT TISSUE
Status: DISCONTINUED | OUTPATIENT
Start: 2022-01-25 | End: 2022-01-25

## 2022-01-25 RX ORDER — FENTANYL CITRATE 50 UG/ML
INJECTION, SOLUTION INTRAMUSCULAR; INTRAVENOUS
Status: DISCONTINUED | OUTPATIENT
Start: 2022-01-25 | End: 2022-01-25

## 2022-01-25 RX ORDER — ONDANSETRON 2 MG/ML
INJECTION INTRAMUSCULAR; INTRAVENOUS
Status: DISCONTINUED | OUTPATIENT
Start: 2022-01-25 | End: 2022-01-25

## 2022-01-25 RX ORDER — HALOPERIDOL 5 MG/ML
0.5 INJECTION INTRAMUSCULAR EVERY 10 MIN PRN
Status: DISCONTINUED | OUTPATIENT
Start: 2022-01-25 | End: 2022-01-25 | Stop reason: HOSPADM

## 2022-01-25 RX ORDER — SUCCINYLCHOLINE CHLORIDE 20 MG/ML
INJECTION INTRAMUSCULAR; INTRAVENOUS
Status: DISCONTINUED | OUTPATIENT
Start: 2022-01-25 | End: 2022-01-25

## 2022-01-25 RX ORDER — SODIUM CHLORIDE 9 MG/ML
INJECTION, SOLUTION INTRAVENOUS CONTINUOUS
Status: DISCONTINUED | OUTPATIENT
Start: 2022-01-25 | End: 2022-01-26 | Stop reason: HOSPADM

## 2022-01-25 RX ORDER — ACETAMINOPHEN 10 MG/ML
1000 INJECTION, SOLUTION INTRAVENOUS ONCE
Status: COMPLETED | OUTPATIENT
Start: 2022-01-25 | End: 2022-01-25

## 2022-01-25 RX ORDER — MORPHINE SULFATE 4 MG/ML
5 INJECTION, SOLUTION INTRAMUSCULAR; INTRAVENOUS EVERY 4 HOURS PRN
Status: DISCONTINUED | OUTPATIENT
Start: 2022-01-25 | End: 2022-01-26 | Stop reason: HOSPADM

## 2022-01-25 RX ORDER — KETOROLAC TROMETHAMINE 30 MG/ML
30 INJECTION, SOLUTION INTRAMUSCULAR; INTRAVENOUS ONCE
Status: COMPLETED | OUTPATIENT
Start: 2022-01-25 | End: 2022-01-25

## 2022-01-25 RX ORDER — BUPIVACAINE HYDROCHLORIDE 2.5 MG/ML
INJECTION, SOLUTION INFILTRATION; PERINEURAL
Status: DISCONTINUED | OUTPATIENT
Start: 2022-01-25 | End: 2022-01-25 | Stop reason: HOSPADM

## 2022-01-25 RX ORDER — LIDOCAINE HYDROCHLORIDE 20 MG/ML
INJECTION INTRAVENOUS
Status: DISCONTINUED | OUTPATIENT
Start: 2022-01-25 | End: 2022-01-25

## 2022-01-25 RX ORDER — ROCURONIUM BROMIDE 10 MG/ML
INJECTION, SOLUTION INTRAVENOUS
Status: DISCONTINUED | OUTPATIENT
Start: 2022-01-25 | End: 2022-01-25

## 2022-01-25 RX ORDER — PROPOFOL 10 MG/ML
VIAL (ML) INTRAVENOUS
Status: DISCONTINUED | OUTPATIENT
Start: 2022-01-25 | End: 2022-01-25

## 2022-01-25 RX ORDER — SODIUM CHLORIDE 0.9 % (FLUSH) 0.9 %
10 SYRINGE (ML) INJECTION
Status: DISCONTINUED | OUTPATIENT
Start: 2022-01-25 | End: 2022-01-25 | Stop reason: HOSPADM

## 2022-01-25 RX ORDER — CETIRIZINE HYDROCHLORIDE 10 MG/1
10 TABLET ORAL DAILY
Status: DISCONTINUED | OUTPATIENT
Start: 2022-01-25 | End: 2022-01-26 | Stop reason: HOSPADM

## 2022-01-25 RX ADMIN — TRAZODONE HYDROCHLORIDE 50 MG: 50 TABLET ORAL at 09:01

## 2022-01-25 RX ADMIN — SUCCINYLCHOLINE CHLORIDE 120 MG: 20 INJECTION, SOLUTION INTRAMUSCULAR; INTRAVENOUS at 12:01

## 2022-01-25 RX ADMIN — KETOROLAC TROMETHAMINE 30 MG: 30 INJECTION, SOLUTION INTRAMUSCULAR at 02:01

## 2022-01-25 RX ADMIN — MIDAZOLAM HYDROCHLORIDE 2 MG: 1 INJECTION INTRAMUSCULAR; INTRAVENOUS at 12:01

## 2022-01-25 RX ADMIN — ACETAMINOPHEN 1000 MG: 10 INJECTION INTRAVENOUS at 01:01

## 2022-01-25 RX ADMIN — PROPOFOL 150 MG: 10 INJECTION, EMULSION INTRAVENOUS at 12:01

## 2022-01-25 RX ADMIN — PIPERACILLIN AND TAZOBACTAM 4.5 G: 4; .5 INJECTION, POWDER, LYOPHILIZED, FOR SOLUTION INTRAVENOUS; PARENTERAL at 08:01

## 2022-01-25 RX ADMIN — PIPERACILLIN AND TAZOBACTAM 4.5 G: 4; .5 INJECTION, POWDER, LYOPHILIZED, FOR SOLUTION INTRAVENOUS; PARENTERAL at 04:01

## 2022-01-25 RX ADMIN — CETIRIZINE HYDROCHLORIDE 10 MG: 10 TABLET, FILM COATED ORAL at 09:01

## 2022-01-25 RX ADMIN — MUPIROCIN 1 G: 20 OINTMENT TOPICAL at 11:01

## 2022-01-25 RX ADMIN — PANTOPRAZOLE SODIUM 40 MG: 40 INJECTION, POWDER, FOR SOLUTION INTRAVENOUS at 08:01

## 2022-01-25 RX ADMIN — LIDOCAINE HYDROCHLORIDE 100 MG: 20 INJECTION, SOLUTION INTRAVENOUS at 12:01

## 2022-01-25 RX ADMIN — SODIUM CHLORIDE, SODIUM LACTATE, POTASSIUM CHLORIDE, AND CALCIUM CHLORIDE: .6; .31; .03; .02 INJECTION, SOLUTION INTRAVENOUS at 12:01

## 2022-01-25 RX ADMIN — Medication 10 ML: at 11:01

## 2022-01-25 RX ADMIN — DEXAMETHASONE SODIUM PHOSPHATE 4 MG: 4 INJECTION, SOLUTION INTRAMUSCULAR; INTRAVENOUS at 12:01

## 2022-01-25 RX ADMIN — PIPERACILLIN AND TAZOBACTAM 4.5 G: 4; .5 INJECTION, POWDER, LYOPHILIZED, FOR SOLUTION INTRAVENOUS; PARENTERAL at 11:01

## 2022-01-25 RX ADMIN — ONDANSETRON 4 MG: 2 INJECTION, SOLUTION INTRAMUSCULAR; INTRAVENOUS at 01:01

## 2022-01-25 RX ADMIN — ROCURONIUM BROMIDE 40 MG: 10 INJECTION, SOLUTION INTRAVENOUS at 12:01

## 2022-01-25 RX ADMIN — ENOXAPARIN SODIUM 40 MG: 40 INJECTION SUBCUTANEOUS at 04:01

## 2022-01-25 RX ADMIN — HYDROCHLOROTHIAZIDE 12.5 MG: 12.5 TABLET ORAL at 08:01

## 2022-01-25 RX ADMIN — METOPROLOL SUCCINATE 25 MG: 25 TABLET, EXTENDED RELEASE ORAL at 08:01

## 2022-01-25 RX ADMIN — FENTANYL CITRATE 100 MCG: 50 INJECTION, SOLUTION INTRAMUSCULAR; INTRAVENOUS at 12:01

## 2022-01-25 RX ADMIN — SUGAMMADEX 200 MG: 100 INJECTION, SOLUTION INTRAVENOUS at 01:01

## 2022-01-25 RX ADMIN — LISINOPRIL 20 MG: 20 TABLET ORAL at 08:01

## 2022-01-25 NOTE — PLAN OF CARE
Problem: Adult Inpatient Plan of Care  Goal: Plan of Care Review  Outcome: Ongoing, Progressing  Goal: Patient-Specific Goal (Individualized)  Outcome: Ongoing, Progressing  Goal: Absence of Hospital-Acquired Illness or Injury  Outcome: Ongoing, Progressing  Goal: Optimal Comfort and Wellbeing  Outcome: Ongoing, Progressing  Goal: Readiness for Transition of Care  Outcome: Ongoing, Progressing     Problem: Fall Injury Risk  Goal: Absence of Fall and Fall-Related Injury  Outcome: Ongoing, Progressing       Patient remained free from falls/injury throughout shift.  Patient ran a fever, on call hospital med doc notified, orders given.  Fever resolved.   Patient remained NPO since midnight.  Bed locked in lowest position.  Side rails up x2.  Call bell within reach.  Purposeful rounding maintained throughout shift.

## 2022-01-25 NOTE — SUBJECTIVE & OBJECTIVE
Interval History:  Patient underwent HIDA scan.  Demonstrated patent cystic and common bile ducts.  Surgery planning to take him to OR tomorrow.  Abdominal pain has improved.  Still has some mild right upper quadrant tenderness.   Review of Systems   Constitutional: Negative for chills and fever.   Eyes: Negative for photophobia and visual disturbance.   Respiratory: Negative for cough and shortness of breath.         Chronic cough    Cardiovascular: Negative for chest pain, palpitations and leg swelling.   Gastrointestinal: Positive for abdominal pain and nausea. Negative for diarrhea and vomiting.   Genitourinary: Negative for frequency, hematuria and urgency.   Skin: Negative for pallor, rash and wound.   Neurological: Negative for light-headedness and headaches.   Psychiatric/Behavioral: Negative for confusion and decreased concentration.     Objective:     Vital Signs (Most Recent):  Temp: 100.2 °F (37.9 °C) (01/24/22 1926)  Pulse: 80 (01/24/22 1926)  Resp: 18 (01/24/22 1926)  BP: 109/61 (01/24/22 1926)  SpO2: 96 % (01/24/22 1926) Vital Signs (24h Range):  Temp:  [98.1 °F (36.7 °C)-100.2 °F (37.9 °C)] 100.2 °F (37.9 °C)  Pulse:  [66-80] 80  Resp:  [18] 18  SpO2:  [94 %-99 %] 96 %  BP: (107-133)/(61-67) 109/61     Weight: 104.5 kg (230 lb 6.1 oz)  Body mass index is 31.25 kg/m².    Intake/Output Summary (Last 24 hours) at 1/24/2022 2038  Last data filed at 1/24/2022 1719  Gross per 24 hour   Intake 1620 ml   Output --   Net 1620 ml      Physical Exam  Constitutional:       Appearance: Normal appearance.   HENT:      Head: Normocephalic and atraumatic.      Mouth/Throat:      Mouth: Mucous membranes are moist.   Eyes:      Extraocular Movements: Extraocular movements intact.      Pupils: Pupils are equal, round, and reactive to light.   Cardiovascular:      Rate and Rhythm: Tachycardia present.      Pulses: Normal pulses.      Heart sounds: Normal heart sounds.   Abdominal:      General: Bowel sounds are normal.       Tenderness: There is abdominal tenderness.   Musculoskeletal:         General: Normal range of motion.      Cervical back: Normal range of motion.   Skin:     General: Skin is warm and dry.   Neurological:      Mental Status: He is alert and oriented to person, place, and time. Mental status is at baseline.   Psychiatric:         Mood and Affect: Mood normal.         Behavior: Behavior normal.         Significant Labs: All pertinent labs within the past 24 hours have been reviewed.    Significant Imaging: I have reviewed all pertinent imaging results/findings within the past 24 hours.

## 2022-01-25 NOTE — PLAN OF CARE
Alexander 10/10. AAOx4. VSS per flow sheet. Denies pain.  No n/v present.   3 abdominal lap sites present with island dressings cdi.  See chart for full assessment.

## 2022-01-25 NOTE — ASSESSMENT & PLAN NOTE
CT abdomen/pelvis: Impression: New gallbladder hydrops with question stone in the cystic duct.  Correlation needed for early cholecystitis.  US of abdomen: Impression: Cholelithiasis without evidence of cholecystitis. No indication of biliary ductal dilatation of the liver parenchyma. Suggest consideration of obtaining a nuclear medicine hepatobiliary scan.  HIDA scan indicates that stones seem to have cleared  Prn pain meds and antiemetics   Plan for cholecystectomy today.

## 2022-01-25 NOTE — OP NOTE
Laparoscopic cholecystectomy      DATE OF PROCEDURE: 01/25/2022       PREOPERATIVE DIAGNOSIS: Symptomatic cholelithiasis.     POSTOPERATIVE DIAGNOSIS: Same     PROCEDURE PERFORMED: Laparoscopic cholecystectomy.     SURGEON: Jarrod Martinez M.D.     ANESTHESIA: General.     ESTIMATED BLOOD LOSS: minimal     DESCRIPTION OF OPERATION: The patient was brought to the Operating Room, placed  on operating room table in supine position. Under adequate anesthesia, prepped  and draped around her abdomen in the usual sterile fashion. Incision was made   in umbilicus through which a 5-mm port was inserted under direct vision. The   patient had her abdomen insufflated with 3.5 liters of CO2. Two other ports   were placed. An epigastric 11 mm and off right subcostal 5 mm port were placed.  The gallbladder was identified and retracted in cephalad fashion. Dissection   was done around the triangle of Calot. The cystic duct and cystic artery were   both identified and divided. The cystic duct was clipped. The gallbladder was   removed from the gallbladder fossa in antegrade fashion and brought out through   the epigastric port site. The abdomen was desufflated. The ports were removed   and port sites closed in layers with absorbable suture. Steri-Strips were   applied as well as bandage. The patient was awakened and transported to the   Recovery Room in satisfactory condition.

## 2022-01-25 NOTE — TRANSFER OF CARE
Anesthesia Transfer of Care Note    Patient: Enoch Barr    Procedure(s) Performed: Procedure(s) (LRB):  CHOLECYSTECTOMY, LAPAROSCOPIC (N/A)    Patient location: PACU    Anesthesia Type: general    Transport from OR: Transported from OR on 6-10 L/min O2 by face mask with adequate spontaneous ventilation    Post pain: adequate analgesia    Post assessment: no apparent anesthetic complications and tolerated procedure well    Post vital signs: stable    Level of consciousness: awake, alert and oriented    Nausea/Vomiting: no nausea/vomiting    Complications: none    Transfer of care protocol was followed      Last vitals:   Visit Vitals  BP (!) 155/96 (BP Location: Left arm, Patient Position: Lying)   Pulse 68   Temp 36.7 °C (98 °F) (Oral)   Resp 16   Ht 6' (1.829 m)   Wt 104.5 kg (230 lb 6.1 oz)   SpO2 100%   BMI 31.25 kg/m²

## 2022-01-25 NOTE — PROGRESS NOTES
"[facility] Medicine  Progress Note    Patient Name: Enoch Barr  MRN: [id number]  Patient Class: OP- Observation   Admission Date: 1/22/2022  Length of Stay: 0 days  Attending Physician: Leroy Lara MD  Primary Care Provider: Dipesh Clements MD        Subjective:     Principal Problem:Cholelithiasis        HPI:  56 year old male present to the ED with c/o of acute, epigastric abdominal pain that began this morning around 8:00 a.m. while doing woodwork in his garage.  He states pain feels like trapped gas and is constant, progressively worsening and radiates to the bilateral upper quadrant.  States pain is worse with taking a deep breath.  Reports one episode emesis after drinking coffee and eating toast this morning.  He reports tolerating intake of a slice of toast there after.  Reports last bowel movement this morning was soft, nonbloody and non melenic.  PMHx of GERD, hypertension, hypercholesterolemia                  Overview/Hospital Course:  57 year old male presented with epigastric pain, NV and found on US abdomen to have " Cholelithiasis without evidence of cholecystitis" also suggested NMScan. Denies recent alcohol use, ABX use, or any new meds. GI and Surgery weighed in. GI again suggested HIDA Scan to rule out cystic duct obstruction, suspects passed a GB stone. Surgery consulted noted the HIDA scan order as well, as long as tbili and LFTs trend down plan for cholecystectomy.  Awaiting HIDA scan prior to planned surgery.          Interval History:  Patient underwent HIDA scan.  Demonstrated patent cystic and common bile ducts.  Surgery planning to take him to OR tomorrow.  Abdominal pain has improved.  Still has some mild right upper quadrant tenderness.   Review of Systems   Constitutional: Negative for chills and fever.   Eyes: Negative for photophobia and visual disturbance.   Respiratory: Negative for cough and shortness of breath.         Chronic cough  "   Cardiovascular: Negative for chest pain, palpitations and leg swelling.   Gastrointestinal: Positive for abdominal pain and nausea. Negative for diarrhea and vomiting.   Genitourinary: Negative for frequency, hematuria and urgency.   Skin: Negative for pallor, rash and wound.   Neurological: Negative for light-headedness and headaches.   Psychiatric/Behavioral: Negative for confusion and decreased concentration.     Objective:     Vital Signs (Most Recent):  Temp: 100.2 °F (37.9 °C) (01/24/22 1926)  Pulse: 80 (01/24/22 1926)  Resp: 18 (01/24/22 1926)  BP: 109/61 (01/24/22 1926)  SpO2: 96 % (01/24/22 1926) Vital Signs (24h Range):  Temp:  [98.1 °F (36.7 °C)-100.2 °F (37.9 °C)] 100.2 °F (37.9 °C)  Pulse:  [66-80] 80  Resp:  [18] 18  SpO2:  [94 %-99 %] 96 %  BP: (107-133)/(61-67) 109/61     Weight: 104.5 kg (230 lb 6.1 oz)  Body mass index is 31.25 kg/m².    Intake/Output Summary (Last 24 hours) at 1/24/2022 2038  Last data filed at 1/24/2022 1719  Gross per 24 hour   Intake 1620 ml   Output --   Net 1620 ml      Physical Exam  Constitutional:       Appearance: Normal appearance.   HENT:      Head: Normocephalic and atraumatic.      Mouth/Throat:      Mouth: Mucous membranes are moist.   Eyes:      Extraocular Movements: Extraocular movements intact.      Pupils: Pupils are equal, round, and reactive to light.   Cardiovascular:      Rate and Rhythm: Tachycardia present.      Pulses: Normal pulses.      Heart sounds: Normal heart sounds.   Abdominal:      General: Bowel sounds are normal.      Tenderness: There is abdominal tenderness.   Musculoskeletal:         General: Normal range of motion.      Cervical back: Normal range of motion.   Skin:     General: Skin is warm and dry.   Neurological:      Mental Status: He is alert and oriented to person, place, and time. Mental status is at baseline.   Psychiatric:         Mood and Affect: Mood normal.         Behavior: Behavior normal.         Significant Labs: All  pertinent labs within the past 24 hours have been reviewed.    Significant Imaging: I have reviewed all pertinent imaging results/findings within the past 24 hours.      Assessment/Plan:      * Cholelithiasis  CT abdomen/pelvis: Impression: New gallbladder hydrops with question stone in the cystic duct.  Correlation needed for early cholecystitis.  US of abdomen: Impression: Cholelithiasis without evidence of cholecystitis. No indication of biliary ductal dilatation of the liver parenchyma. Suggest consideration of obtaining a nuclear medicine hepatobiliary scan.  HIDA scan indicates that stones seem to have cleared  Prn pain meds and antiemetics   Plan for cholecystectomy on 01/25.  NPO after midnight             Tension type headache        Hypercholesterolemia  Held statin due to elevated liver enzymes       Elevated liver enzymes  Trend level  Held statin      Essential hypertension  Resumed home meds         VTE Risk Mitigation (From admission, onward)         Ordered     enoxaparin injection 40 mg  Daily         01/23/22 0251     IP VTE HIGH RISK PATIENT  Once         01/23/22 0250     Place sequential compression device  Until discontinued         01/23/22 0250                Discharge Planning   MIKE: 1/26/2022     Code Status: Full Code   Is the patient medically ready for discharge?:     Reason for patient still in hospital (select all that apply): Patient trending condition, Treatment and Consult recommendations  Discharge Plan A: Home with family (with follow up)                  Leroy Lara MD  Department of Hospital Medicine   [facility] [place]

## 2022-01-25 NOTE — SUBJECTIVE & OBJECTIVE
Interval History: Feeling better.    Review of Systems   HENT: Negative for ear discharge and ear pain.    Eyes: Negative for discharge and itching.   Endocrine: Negative for cold intolerance and heat intolerance.   Neurological: Negative for seizures and syncope.     Objective:     Vital Signs (Most Recent):  Temp: 97.3 °F (36.3 °C) (01/25/22 1112)  Pulse: 62 (01/25/22 1112)  Resp: 17 (01/25/22 1112)  BP: 111/74 (01/25/22 1112)  SpO2: 97 % (01/25/22 1112) Vital Signs (24h Range):  Temp:  [97.3 °F (36.3 °C)-101.2 °F (38.4 °C)] 97.3 °F (36.3 °C)  Pulse:  [60-92] 62  Resp:  [17-18] 17  SpO2:  [94 %-97 %] 97 %  BP: ()/(57-74) 111/74     Weight: 104.5 kg (230 lb 6.1 oz)  Body mass index is 31.25 kg/m².    Intake/Output Summary (Last 24 hours) at 1/25/2022 1213  Last data filed at 1/25/2022 0812  Gross per 24 hour   Intake 819.74 ml   Output --   Net 819.74 ml      Physical Exam  Constitutional:       Appearance: Normal appearance.   HENT:      Head: Normocephalic and atraumatic.      Mouth/Throat:      Mouth: Mucous membranes are moist.   Eyes:      Extraocular Movements: Extraocular movements intact.      Pupils: Pupils are equal, round, and reactive to light.   Cardiovascular:      Rate and Rhythm: Normal rate.      Pulses: Normal pulses.      Heart sounds: Normal heart sounds.   Abdominal:      General: Bowel sounds are normal.      Tenderness: There is no guarding or rebound.   Musculoskeletal:         General: Normal range of motion.      Cervical back: Normal range of motion.   Skin:     General: Skin is warm and dry.   Neurological:      Mental Status: He is alert and oriented to person, place, and time. Mental status is at baseline.   Psychiatric:         Mood and Affect: Mood normal.         Behavior: Behavior normal.         Significant Labs:   All pertinent labs within the past 24 hours have been reviewed.  BMP:   Recent Labs   Lab 01/25/22  0548         K 4.2      CO2 28   BUN 15    CREATININE 1.1   CALCIUM 8.8   MG 2.3     CBC:   Recent Labs   Lab 01/23/22  1230 01/24/22  2359 01/25/22  0548   WBC 11.62 5.83 5.92   HGB 15.0 14.3 14.1   HCT 45.9 43.2 42.1    194 209       Significant Imaging: I have reviewed all pertinent imaging results/findings within the past 24 hours.

## 2022-01-25 NOTE — ASSESSMENT & PLAN NOTE
CT abdomen/pelvis: Impression: New gallbladder hydrops with question stone in the cystic duct.  Correlation needed for early cholecystitis.  US of abdomen: Impression: Cholelithiasis without evidence of cholecystitis. No indication of biliary ductal dilatation of the liver parenchyma. Suggest consideration of obtaining a nuclear medicine hepatobiliary scan.  HIDA scan indicates that stones seem to have cleared  Prn pain meds and antiemetics   Plan for cholecystectomy on 01/25.  NPO after midnight

## 2022-01-25 NOTE — NURSING
Dr. Dawkins notified of patient's fever.  Orders given for labs and antibiotics.  Will continue to monitor.

## 2022-01-25 NOTE — PROGRESS NOTES
"[facility]  Progress Note    Patient Name: Enoch Barr  MRN: [id number]  Patient Class: OP- Observation   Admission Date: 1/22/2022  Length of Stay: 0 days  Attending Physician: José Miguel Bone MD  Primary Care Provider: Dipesh Clements MD        Subjective:     Principal Problem:Cholelithiasis        HPI:  56 year old male present to the ED with c/o of acute, epigastric abdominal pain that began this morning around 8:00 a.m. while doing woodwork in his garage.  He states pain feels like trapped gas and is constant, progressively worsening and radiates to the bilateral upper quadrant.  States pain is worse with taking a deep breath.  Reports one episode emesis after drinking coffee and eating toast this morning.  He reports tolerating intake of a slice of toast there after.  Reports last bowel movement this morning was soft, nonbloody and non melenic.  PMHx of GERD, hypertension, hypercholesterolemia                  Overview/Hospital Course:  57 year old male presented with epigastric pain, NV and found on US abdomen to have " Cholelithiasis without evidence of cholecystitis" along with elevated LFT's. Denies recent alcohol use, ABX use, or any new meds. GI and Surgery weighed in. GI suggested HIDA Scan to rule out cystic duct obstruction, suspects passed a GB stone. Surgery consulted noted the HIDA scan order as well, as long as tbili and LFTs trend down plan for cholecystectomy.  Cystic and CBD patent on HIDA.  LFT's continue to trend downward.          Interval History: Feeling better.    Review of Systems   HENT: Negative for ear discharge and ear pain.    Eyes: Negative for discharge and itching.   Endocrine: Negative for cold intolerance and heat intolerance.   Neurological: Negative for seizures and syncope.     Objective:     Vital Signs (Most Recent):  Temp: 97.3 °F (36.3 °C) (01/25/22 1112)  Pulse: 62 (01/25/22 1112)  Resp: 17 (01/25/22 1112)  BP: 111/74 (01/25/22 " 1112)  SpO2: 97 % (01/25/22 1112) Vital Signs (24h Range):  Temp:  [97.3 °F (36.3 °C)-101.2 °F (38.4 °C)] 97.3 °F (36.3 °C)  Pulse:  [60-92] 62  Resp:  [17-18] 17  SpO2:  [94 %-97 %] 97 %  BP: ()/(57-74) 111/74     Weight: 104.5 kg (230 lb 6.1 oz)  Body mass index is 31.25 kg/m².    Intake/Output Summary (Last 24 hours) at 1/25/2022 1213  Last data filed at 1/25/2022 0812  Gross per 24 hour   Intake 819.74 ml   Output --   Net 819.74 ml      Physical Exam  Constitutional:       Appearance: Normal appearance.   HENT:      Head: Normocephalic and atraumatic.      Mouth/Throat:      Mouth: Mucous membranes are moist.   Eyes:      Extraocular Movements: Extraocular movements intact.      Pupils: Pupils are equal, round, and reactive to light.   Cardiovascular:      Rate and Rhythm: Normal rate.      Pulses: Normal pulses.      Heart sounds: Normal heart sounds.   Abdominal:      General: Bowel sounds are normal.      Tenderness: There is no guarding or rebound.   Musculoskeletal:         General: Normal range of motion.      Cervical back: Normal range of motion.   Skin:     General: Skin is warm and dry.   Neurological:      Mental Status: He is alert and oriented to person, place, and time. Mental status is at baseline.   Psychiatric:         Mood and Affect: Mood normal.         Behavior: Behavior normal.         Significant Labs:   All pertinent labs within the past 24 hours have been reviewed.  BMP:   Recent Labs   Lab 01/25/22  0548         K 4.2      CO2 28   BUN 15   CREATININE 1.1   CALCIUM 8.8   MG 2.3     CBC:   Recent Labs   Lab 01/23/22  1230 01/24/22  2359 01/25/22  0548   WBC 11.62 5.83 5.92   HGB 15.0 14.3 14.1   HCT 45.9 43.2 42.1    194 209       Significant Imaging: I have reviewed all pertinent imaging results/findings within the past 24 hours.      Assessment/Plan:      * Cholelithiasis  CT abdomen/pelvis: Impression: New gallbladder hydrops with question stone in the  cystic duct.  Correlation needed for early cholecystitis.  US of abdomen: Impression: Cholelithiasis without evidence of cholecystitis. No indication of biliary ductal dilatation of the liver parenchyma. Suggest consideration of obtaining a nuclear medicine hepatobiliary scan.  HIDA scan indicates that stones seem to have cleared  Prn pain meds and antiemetics   Plan for cholecystectomy today.             Tension type headache        Hypercholesterolemia  Held statin due to elevated liver enzymes       Elevated liver enzymes  Secondary to above.  Improving       Essential hypertension  Resumed home meds         VTE Risk Mitigation (From admission, onward)         Ordered     enoxaparin injection 40 mg  Daily         01/23/22 0251     IP VTE HIGH RISK PATIENT  Once         01/23/22 0250     Place sequential compression device  Until discontinued         01/23/22 0250                Discharge Planning   MIKE: 1/26/2022     Code Status: Full Code   Is the patient medically ready for discharge?:     Reason for patient still in hospital (select all that apply): Treatment  Discharge Plan A: Home with family (with follow up)                  José Miguel Bone MD  Department of Hospital Medicine   [facility] - Surgery

## 2022-01-25 NOTE — ANESTHESIA PREPROCEDURE EVALUATION
01/25/2022  Enoch Barr is a 57 y.o., male.    Anesthesia Evaluation     I have reviewed the Nursing Notes.       Review of Systems  Social:  Former Smoker, Non-Smoker   Cardiovascular:   Denies Pacemaker. Hypertension  Denies Valvular problems/Murmurs.  Denies MI.   Denies CABG/stent.  hyperlipidemia ECG has been reviewed. Echo WNL    Stress test 2020 shows mild defect mostly fixed with some reversible component. Dr. Moore reviewed these results and wrote note in 9/2020 that since his exercise tolerance is good since starting metoprolol he thinks the pt should continue metoprolol and no need for further workup.   Pulmonary:   Denies Pneumonia Denies COPD.  Denies Asthma.  Denies Shortness of breath.  Denies Recent URI. Sleep Apnea    Renal/:  Renal/ Normal     Hepatic/GI:   No Bowel Prep. Denies PUD. GERD Denies Hepatitis. Calculus of cystic duct   Musculoskeletal:   Arthritis     Neurological:   Denies CVA. Headaches Denies Seizures.    Endocrine:  Endocrine Normal        Physical Exam  General:  Obesity    Airway/Jaw/Neck:  Airway Findings: Mouth Opening: Normal General Airway Assessment: Adult  Mallampati: II  TM Distance: Normal, at least 6 cm         Dental:  DENTAL FINDINGS: Normal   Chest/Lungs:  Chest/Lungs Clear    Heart/Vascular:  Heart Findings: Normal Heart murmur: negative       Mental Status:  Mental Status Findings:  Cooperative, Alert and Oriented         Anesthesia Plan  Type of Anesthesia, risks & benefits discussed:  Anesthesia Type:  general    Patient's Preference:   Plan Factors:          Intra-op Monitoring Plan: standard ASA monitors  Intra-op Monitoring Plan Comments:   Post Op Pain Control Plan:   Post Op Pain Control Plan Comments:     Induction:   IV  Beta Blocker:         Informed Consent: Patient understands risks and agrees with Anesthesia plan.  Questions answered.   ASA  Score: 3     Day of Surgery Review of History & Physical:            Ready For Surgery From Anesthesia Perspective.

## 2022-01-26 VITALS
OXYGEN SATURATION: 96 % | RESPIRATION RATE: 17 BRPM | BODY MASS INDEX: 31.2 KG/M2 | SYSTOLIC BLOOD PRESSURE: 120 MMHG | TEMPERATURE: 98 F | WEIGHT: 230.38 LBS | DIASTOLIC BLOOD PRESSURE: 84 MMHG | HEIGHT: 72 IN | HEART RATE: 69 BPM

## 2022-01-26 PROBLEM — R74.8 ELEVATED LIVER ENZYMES: Status: RESOLVED | Noted: 2022-01-23 | Resolved: 2022-01-26

## 2022-01-26 LAB
ALBUMIN SERPL BCP-MCNC: 3.1 G/DL (ref 3.5–5.2)
ALP SERPL-CCNC: 92 U/L (ref 55–135)
ALT SERPL W/O P-5'-P-CCNC: 104 U/L (ref 10–44)
ANION GAP SERPL CALC-SCNC: 9 MMOL/L (ref 8–16)
ANION GAP SERPL CALC-SCNC: 9 MMOL/L (ref 8–16)
AST SERPL-CCNC: 35 U/L (ref 10–40)
BASOPHILS # BLD AUTO: 0.01 K/UL (ref 0–0.2)
BASOPHILS NFR BLD: 0.1 % (ref 0–1.9)
BILIRUB SERPL-MCNC: 0.7 MG/DL (ref 0.1–1)
BUN SERPL-MCNC: 13 MG/DL (ref 6–20)
BUN SERPL-MCNC: 13 MG/DL (ref 6–20)
CALCIUM SERPL-MCNC: 9.1 MG/DL (ref 8.7–10.5)
CALCIUM SERPL-MCNC: 9.1 MG/DL (ref 8.7–10.5)
CHLORIDE SERPL-SCNC: 103 MMOL/L (ref 95–110)
CHLORIDE SERPL-SCNC: 103 MMOL/L (ref 95–110)
CO2 SERPL-SCNC: 25 MMOL/L (ref 23–29)
CO2 SERPL-SCNC: 25 MMOL/L (ref 23–29)
CREAT SERPL-MCNC: 1 MG/DL (ref 0.5–1.4)
CREAT SERPL-MCNC: 1 MG/DL (ref 0.5–1.4)
DIFFERENTIAL METHOD: ABNORMAL
EOSINOPHIL # BLD AUTO: 0 K/UL (ref 0–0.5)
EOSINOPHIL NFR BLD: 0 % (ref 0–8)
ERYTHROCYTE [DISTWIDTH] IN BLOOD BY AUTOMATED COUNT: 11.9 % (ref 11.5–14.5)
EST. GFR  (AFRICAN AMERICAN): >60 ML/MIN/1.73 M^2
EST. GFR  (AFRICAN AMERICAN): >60 ML/MIN/1.73 M^2
EST. GFR  (NON AFRICAN AMERICAN): >60 ML/MIN/1.73 M^2
EST. GFR  (NON AFRICAN AMERICAN): >60 ML/MIN/1.73 M^2
GLUCOSE SERPL-MCNC: 107 MG/DL (ref 70–110)
GLUCOSE SERPL-MCNC: 107 MG/DL (ref 70–110)
HCT VFR BLD AUTO: 43.4 % (ref 40–54)
HGB BLD-MCNC: 14.7 G/DL (ref 14–18)
IMM GRANULOCYTES # BLD AUTO: 0.07 K/UL (ref 0–0.04)
IMM GRANULOCYTES NFR BLD AUTO: 0.8 % (ref 0–0.5)
LYMPHOCYTES # BLD AUTO: 1 K/UL (ref 1–4.8)
LYMPHOCYTES NFR BLD: 11.5 % (ref 18–48)
MAGNESIUM SERPL-MCNC: 2.3 MG/DL (ref 1.6–2.6)
MCH RBC QN AUTO: 32.9 PG (ref 27–31)
MCHC RBC AUTO-ENTMCNC: 33.9 G/DL (ref 32–36)
MCV RBC AUTO: 97 FL (ref 82–98)
MONOCYTES # BLD AUTO: 1.5 K/UL (ref 0.3–1)
MONOCYTES NFR BLD: 16.5 % (ref 4–15)
NEUTROPHILS # BLD AUTO: 6.3 K/UL (ref 1.8–7.7)
NEUTROPHILS NFR BLD: 71.1 % (ref 38–73)
NRBC BLD-RTO: 0 /100 WBC
PLATELET # BLD AUTO: 234 K/UL (ref 150–450)
PMV BLD AUTO: 9.7 FL (ref 9.2–12.9)
POTASSIUM SERPL-SCNC: 4.1 MMOL/L (ref 3.5–5.1)
POTASSIUM SERPL-SCNC: 4.1 MMOL/L (ref 3.5–5.1)
PROT SERPL-MCNC: 6.7 G/DL (ref 6–8.4)
RBC # BLD AUTO: 4.47 M/UL (ref 4.6–6.2)
SODIUM SERPL-SCNC: 137 MMOL/L (ref 136–145)
SODIUM SERPL-SCNC: 137 MMOL/L (ref 136–145)
WBC # BLD AUTO: 8.8 K/UL (ref 3.9–12.7)

## 2022-01-26 PROCEDURE — 96376 TX/PRO/DX INJ SAME DRUG ADON: CPT

## 2022-01-26 PROCEDURE — 80053 COMPREHEN METABOLIC PANEL: CPT | Performed by: SURGERY

## 2022-01-26 PROCEDURE — 63600175 PHARM REV CODE 636 W HCPCS: Performed by: SURGERY

## 2022-01-26 PROCEDURE — 25000003 PHARM REV CODE 250: Performed by: SURGERY

## 2022-01-26 PROCEDURE — 85025 COMPLETE CBC W/AUTO DIFF WBC: CPT | Performed by: SURGERY

## 2022-01-26 PROCEDURE — 96366 THER/PROPH/DIAG IV INF ADDON: CPT

## 2022-01-26 PROCEDURE — 25000003 PHARM REV CODE 250: Performed by: NURSE PRACTITIONER

## 2022-01-26 PROCEDURE — 36415 COLL VENOUS BLD VENIPUNCTURE: CPT | Performed by: SURGERY

## 2022-01-26 PROCEDURE — G0378 HOSPITAL OBSERVATION PER HR: HCPCS

## 2022-01-26 PROCEDURE — 83735 ASSAY OF MAGNESIUM: CPT | Performed by: SURGERY

## 2022-01-26 PROCEDURE — C9113 INJ PANTOPRAZOLE SODIUM, VIA: HCPCS | Performed by: SURGERY

## 2022-01-26 RX ORDER — OXYCODONE AND ACETAMINOPHEN 5; 325 MG/1; MG/1
1 TABLET ORAL EVERY 8 HOURS PRN
Qty: 15 EACH | Refills: 0 | Status: SHIPPED | OUTPATIENT
Start: 2022-01-26 | End: 2022-03-11

## 2022-01-26 RX ADMIN — LISINOPRIL 20 MG: 20 TABLET ORAL at 08:01

## 2022-01-26 RX ADMIN — CETIRIZINE HYDROCHLORIDE 10 MG: 10 TABLET, FILM COATED ORAL at 08:01

## 2022-01-26 RX ADMIN — MUPIROCIN 1 G: 20 OINTMENT TOPICAL at 08:01

## 2022-01-26 RX ADMIN — PIPERACILLIN AND TAZOBACTAM 4.5 G: 4; .5 INJECTION, POWDER, LYOPHILIZED, FOR SOLUTION INTRAVENOUS; PARENTERAL at 08:01

## 2022-01-26 RX ADMIN — METOPROLOL SUCCINATE 25 MG: 25 TABLET, EXTENDED RELEASE ORAL at 08:01

## 2022-01-26 RX ADMIN — PANTOPRAZOLE SODIUM 40 MG: 40 INJECTION, POWDER, FOR SOLUTION INTRAVENOUS at 08:01

## 2022-01-26 RX ADMIN — HYDROCHLOROTHIAZIDE 12.5 MG: 12.5 TABLET ORAL at 08:01

## 2022-01-26 NOTE — DISCHARGE INSTRUCTIONS
Patient Education       Cholecystectomy Discharge Instructions   About this topic   Cholecystectomy is surgery to remove the gallbladder. There are two types of surgeries done for this. You may have had your gallbladder taken out with a laparoscope. This means you have a few small cuts in your belly. Other times, it is taken out through one large cut in your belly. This is called an open procedure.     What care is needed at home?   · Ask your doctor what you need to do when you go home. Make sure you ask questions if you do not understand what the doctor says. This way you will know what you need to do.  · Talk to your doctor about how to care for your cut site. Ask your doctor about:  ? When you should change your bandages  ? When you may take a bath or shower  ? If you need to be careful with lifting things over 10 pounds (4.5 kg)  ? When you may go back to your normal activities like work, driving, or sex  · Be sure to wash your hands before and after touching your wound or dressing.  · If you had a laparoscopic procedure, you will have a few small cut sites. They may have special tape on them called steri-strips. These will fall off on their own in about 10 days. You can take them off after 10 to 14 days if they have not fallen off. They may also use a skin glue to keep the cut sites together. This will slowly peel off on its own in about 7 to 14 days. Do not pick or peel at them.  · If you had an open procedure, you will have one large cut site. It may have sutures or staples. You may also have a drain to get rid of extra fluid. When the drain is taken out, there will be a small cut site.  · Keep coughing and doing deep breathing exercises for 7 to 10 days after you go home. This will help prevent lung infections.     What follow-up care is needed?   · Your doctor may ask you to make visits to the office to check on your progress. Be sure to keep these visits.  · If you have stitches or staples, you will need  to have them taken out. Your doctor will often want to do this in 1 to 2 weeks. If the doctor used skin glue, the glue will fall off on its own.  · If you have a drain, you will need to have that removed in the office. Your doctor will want to do this in 1 to 2 weeks.  What drugs may be needed?   The doctor may order drugs to:  · Help with pain  · Fight an infection  · Soften stools if you are taking drugs for pain control  · Help with upset stomach  · Prevent blood clots  Will physical activity be limited?   · Try to walk 3 to 4 times each day. Do your best to walk a little farther and longer each day.  · You may need to rest for a while. Talk to your doctor before you run, work out, or play sports.  · Do light household work only, such as washing dishes or helping with meals.  What changes to diet are needed?   · Drink 8 to 10 glasses of fluids each day.  · Avoid eating greasy or spicy foods.  · Your doctor may suggest a high-fiber diet. Ask your doctor if you need to change your diet.  What problems could happen?   · Bleeding  · Infection  · Swelling of the pancreas  · Injury to small bowel  When do I need to call the doctor?   · Signs of infection. These include a fever of 100.4°F (38°C) or higher, chills, very bad sore throat, pain with passing urine, or wound that will not heal.  · Signs of wound infection. These include swelling, redness, warmth around the wound; too much pain when touched; yellowish, greenish, or bloody discharge; foul smell coming from the cut site; cut site opens up.  · Signs of liver problems like upset stomach or throwing up, belly pain, feeling tired, dark urine, yellow skin or eyes, not hungry.  · Bowel movements that are white or gray in color or no bowel movement for 2 to 3 days after surgery  · Sudden onset of chest pain, fast heartbeat, breathing problems, and pain or tenderness in your calf could be a sign that a blood clot has traveled to your lungs. Call for emergency help right  away.  Helpful tips   · When you cough, sneeze, or do deep breathing exercises, press a pillow across your cut to support the muscles to protect the wound and ease pain.  · Be active to help healing and prevent blood clots.  Teach Back: Helping You Understand   The Teach Back Method helps you understand the information we are giving you. After you talk with the staff, tell them in your own words what you learned. This helps to make sure the staff has described each thing clearly. It also helps to explain things that may have been confusing. Before going home, make sure you can do these:  · I can tell you about my procedure.  · I can tell you how to care for my cut site.  · I can tell you what I will do if I have swelling, redness, or warmth around my wound.  Where can I learn more?   FamilyDoctor.org  http://familydoctor.org/familydoctor/en/drugs-procedures-devices/procedures-devices/gallbladder-removal-laparoscopic-method.html   [employer]  https://www.[employer].ca/health-topics/pj556963   [facility]  https://www.nhs.uk/conditions/gallbladder-removal/what-happens/   Last Reviewed Date   2021-05-17  Consumer Information Use and Disclaimer   This information is not specific medical advice and does not replace information you receive from your health care provider. This is only a brief summary of general information. It does NOT include all information about conditions, illnesses, injuries, tests, procedures, treatments, therapies, discharge instructions or life-style choices that may apply to you. You must talk with your health care provider for complete information about your health and treatment options. This information should not be used to decide whether or not to accept your health care providers advice, instructions or recommendations. Only your health care provider has the knowledge and training to provide advice that is right for you.  Copyright   Copyright © 2021 [employer], Inc. and its affiliates and/or  licensors. All rights reserved.
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
## 2022-01-26 NOTE — ANESTHESIA POSTPROCEDURE EVALUATION
Anesthesia Post Evaluation    Patient: Enoch Barr    Procedure(s) Performed: Procedure(s) (LRB):  CHOLECYSTECTOMY, LAPAROSCOPIC (N/A)    Final Anesthesia Type: general      Patient location during evaluation: PACU  Patient participation: Yes- Able to Participate  Level of consciousness: awake and alert, oriented and awake  Post-procedure vital signs: reviewed and stable  Airway patency: patent    PONV status at discharge: No PONV  Anesthetic complications: no      Cardiovascular status: blood pressure returned to baseline  Respiratory status: unassisted, spontaneous ventilation and room air  Hydration status: euvolemic  Follow-up not needed.          Vitals Value Taken Time   /84 01/26/22 0716   Temp 36.4 °C (97.6 °F) 01/26/22 0716   Pulse 69 01/26/22 0716   Resp 17 01/26/22 0716   SpO2 96 % 01/26/22 0716         Event Time   Out of Recovery 01/25/2022 14:58:07         Pain/Alexander Score: Pain Rating Prior to Med Admin: 0 (1/25/2022  2:11 PM)  Alexander Score: 10 (1/25/2022  2:15 PM)

## 2022-01-26 NOTE — PROGRESS NOTES
WRITTEN HEALTHCARE DISCHARGE INFORMATION      Things that YOU are RESPONSIBLE for to Manage Your Care At Home:     1. Getting your prescriptions filled.  2. Taking you medications as directed. DO NOT MISS ANY DOSES!  3. Going to your follow-up doctor appointments. This is important because it allows the doctor to monitor your progress and to determine if any changes need to be made to your treatment plan.     If you are unable to make your follow up appointments, please call the number listed and reschedule this appointment.      ____________HELP AT HOME____________________     Experiencing any SIGNS or SYMPTOMS: YOU CAN     Schedule a same day appopintment with your Primary Care Doctor or  you can call [facility] On Call Nurse Care Line for 24/7 assistance at [phone]     If you are experience any signs or symptoms that have become severe, Call 911 and come to your nearest Emergency Room.     Thank you for choosing [facility] and allowing us to care for you.   From your care management team:      You should receive a call from [facility] within 48-72 hours to help manage your care after discharge. Please try to make sure that you answer your phone for this important phone call.     Follow-up Information     Jarrod Martinez MD On 2/4/2022.    Specialties: General Surgery, Oncology  Why: at 9:15am  Contact information:  [address]  [address]  [place] [address]  [phone]             Dipesh Clements MD.    Specialty: Internal Medicine  Why: call as needed post hospital discharge to schedule PCP follow up   Contact information:  [address]  [place] [address] [phone]

## 2022-01-26 NOTE — NURSING
Discharge instructions, education, surgical site instructions and home medications given to pt and spouse and v/u. IV to left ac removed with jelco intact and tolerated well and pressure dressing applied. Notified transport of discharge.

## 2022-01-26 NOTE — DISCHARGE SUMMARY
"[facility] [place] [facility] Medicine  Discharge Summary      Patient Name: Enoch Barr  MRN: [id number]  Patient Class: OP- Observation  Admission Date: 1/22/2022  Hospital Length of Stay: 0 days  Discharge Date and Time:  01/26/2022 10:28 AM  Attending Physician: José Miguel Bone MD   Discharging Provider: José Miguel Bone MD  Primary Care Provider: Dipesh Clements MD      HPI:   56 year old male present to the ED with c/o of acute, epigastric abdominal pain that began this morning around 8:00 a.m. while doing woodwork in his garage.  He states pain feels like trapped gas and is constant, progressively worsening and radiates to the bilateral upper quadrant.  States pain is worse with taking a deep breath.  Reports one episode emesis after drinking coffee and eating toast this morning.  He reports tolerating intake of a slice of toast there after.  Reports last bowel movement this morning was soft, nonbloody and non melenic.  PMHx of GERD, hypertension, hypercholesterolemia                  Procedure(s) (LRB):  CHOLECYSTECTOMY, LAPAROSCOPIC (N/A)      Hospital Course:   57 year old male presented with epigastric pain, NV and found on US abdomen to have " Cholelithiasis without evidence of cholecystitis" along with elevated LFT's. Denies recent alcohol use, ABX use, or any new meds. GI and Surgery weighed in. GI suggested HIDA Scan to rule out cystic duct obstruction, suspects passed a GB stone. Surgery consulted noted the HIDA scan order as well, as long as tbili and LFTs trend down plan for cholecystectomy.  Cystic and CBD patent on HIDA.  LFT's continue to trend downward.  Patient underwent lap sinan on 1/25 without complications.  He is doing well and tolerating diet.  He will be discharged home to follow up with Surgery and PCP.           Goals of Care Treatment Preferences:  Code Status: Full Code      Consults:   Consults (From admission, onward)        Status Ordering Provider     Inpatient consult " to General Surgery  Once        Provider:  (Not yet assigned)    BERTIN Huff     Inpatient consult to Gastroenterology  Once        Provider:  Bertin Obrien MD    Completed IKER HOWARD          No new Assessment & Plan notes have been filed under this hospital service since the last note was generated.  Service: Hospital Medicine    Final Active Diagnoses:    Diagnosis Date Noted POA    PRINCIPAL PROBLEM:  Cholelithiasis [K80.20] 01/23/2022 Yes    Hypercholesterolemia [E78.00] 01/23/2022 Yes    Tension type headache [G44.209] 01/23/2022 Yes    Essential hypertension [I10] 05/09/2019 Yes      Problems Resolved During this Admission:    Diagnosis Date Noted Date Resolved POA    Elevated liver enzymes [R74.8] 01/23/2022 01/26/2022 Yes       Discharged Condition: stable    Disposition: Home or Self Care    Follow Up:   Follow-up Information     Jarrod Martinez MD On 2/4/2022.    Specialties: General Surgery, Oncology  Why: at 9:15am  Contact information:  [address]  [address]  [facility] [address] [phone]             Dipesh Clements MD.    Specialty: Internal Medicine  Why: call as needed post hospital discharge to schedule PCP follow up   Contact information:  [address] [facility] [address] [phone]                       Patient Instructions:      Diet Cardiac     Notify your health care provider if you experience any of the following:  temperature >100.4     Notify your health care provider if you experience any of the following:  persistent nausea and vomiting or diarrhea     Notify your health care provider if you experience any of the following:  severe uncontrolled pain     Notify your health care provider if you experience any of the following:  difficulty breathing or increased cough     Notify your health care provider if you experience any of the following:  persistent dizziness, light-headedness, or visual disturbances     Notify your health care provider if you experience  any of the following:  increased confusion or weakness     Activity as tolerated           Pending Diagnostic Studies:     Procedure Component Value Units Date/Time    EKG 12-lead [[id number]] Collected: 01/22/22 2127    Order Status: Sent Lab Status: No result     Specimen to Pathology, Surgery General Surgery [[id number]] Collected: 01/25/22 1320    Order Status: Sent Lab Status: In process Updated: 01/25/22 1322         Medications:  Reconciled Home Medications:      Medication List      START taking these medications    oxyCODONE-acetaminophen 5-325 mg per tablet  Commonly known as: PERCOCET  Take 1 tablet by mouth every 8 (eight) hours as needed for Pain.        CONTINUE taking these medications    atorvastatin 40 MG tablet  Commonly known as: LIPITOR  Take 1 tablet (40 mg total) by mouth every evening.     celecoxib 200 MG capsule  Commonly known as: CeleBREX  TAKE 1 CAPSULE(200 MG) BY MOUTH TWICE DAILY     fexofenadine 30 MG tablet  Commonly known as: ALLEGRA  Take 30 mg by mouth once daily.     fluticasone propionate 50 mcg/actuation nasal spray  Commonly known as: FLONASE  SHAKE LIQUID AND USE 1 SPRAY(50 MCG) IN EACH NOSTRIL EVERY DAY     lisinopriL-hydrochlorothiazide 20-12.5 mg per tablet  Commonly known as: PRINZIDE,ZESTORETIC  Take 1 tablet by mouth once daily.     metoprolol succinate 25 MG 24 hr tablet  Commonly known as: TOPROL-XL  Take 1 tablet (25 mg total) by mouth once daily.     omeprazole 40 MG capsule  Commonly known as: PRILOSEC  TAKE 1 CAPSULE(40 MG) BY MOUTH EVERY DAY     tobramycin-dexamethasone 0.3-0.1% 0.3-0.1 % Oint  Commonly known as: TOBRADEX  Place a small amount of ointment onto right upper eyelid biopsy site at night before bedtime     traZODone 50 MG tablet  Commonly known as: DESYREL  TAKE 1/2 TO 1 TABLET BY MOUTH EVERY NIGHT 1 HOUR BEFORE BEDTIME AS NEEDED FOR INSOMNIA            Indwelling Lines/Drains at time of discharge:   Lines/Drains/Airways     None                 Time  spent on the discharge of patient: >30 minutes         José Miguel Bone MD  Department of Hospital Medicine  [facility] [place] [facility] [place]

## 2022-01-27 LAB
FINAL PATHOLOGIC DIAGNOSIS: NORMAL
GROSS: NORMAL
Lab: NORMAL

## 2022-01-29 LAB
BACTERIA BLD CULT: NORMAL
BACTERIA BLD CULT: NORMAL

## 2022-02-08 ENCOUNTER — OFFICE VISIT (OUTPATIENT)
Dept: SURGERY | Facility: CLINIC | Age: 57
End: 2022-02-08
Payer: OTHER GOVERNMENT

## 2022-02-08 VITALS
DIASTOLIC BLOOD PRESSURE: 82 MMHG | HEART RATE: 67 BPM | HEIGHT: 72 IN | WEIGHT: 221 LBS | BODY MASS INDEX: 29.93 KG/M2 | SYSTOLIC BLOOD PRESSURE: 114 MMHG

## 2022-02-08 DIAGNOSIS — K80.10 CALCULUS OF GALLBLADDER WITH CHRONIC CHOLECYSTITIS WITHOUT OBSTRUCTION: Primary | ICD-10-CM

## 2022-02-08 PROCEDURE — 99024 POSTOP FOLLOW-UP VISIT: CPT | Mod: S$GLB,,, | Performed by: SURGERY

## 2022-02-08 PROCEDURE — 99024 PR POST-OP FOLLOW-UP VISIT: ICD-10-PCS | Mod: S$GLB,,, | Performed by: SURGERY

## 2022-02-08 NOTE — PROGRESS NOTES
Subjective:       Patient ID: Enoch Barr is a 57 y.o. male.    Chief Complaint: Follow-up    HPI 56 yo male s/p lap sinan without complaints today  Review of Systems   Constitutional: Negative.    HENT: Negative.    Eyes: Negative.    Respiratory: Negative.    Cardiovascular: Negative.    Gastrointestinal: Negative.    Endocrine: Negative.    Musculoskeletal: Negative.    Integumentary:  Negative.   Allergic/Immunologic: Negative.    Neurological: Negative.    Hematological: Negative.    Psychiatric/Behavioral: Negative.    All other systems reviewed and are negative.        Objective:      Physical Exam  Vitals reviewed.   Constitutional:       Appearance: He is well-developed and well-nourished.   HENT:      Head: Normocephalic and atraumatic.      Right Ear: External ear normal.      Left Ear: External ear normal.      Nose: Nose normal.      Mouth/Throat:      Mouth: Oropharynx is clear and moist.   Eyes:      Extraocular Movements: EOM normal.      Conjunctiva/sclera: Conjunctivae normal.      Pupils: Pupils are equal, round, and reactive to light.   Cardiovascular:      Rate and Rhythm: Normal rate and regular rhythm.      Pulses: Intact distal pulses.      Heart sounds: Normal heart sounds.   Pulmonary:      Effort: Pulmonary effort is normal.      Breath sounds: Normal breath sounds.   Abdominal:      General: Bowel sounds are normal.      Palpations: Abdomen is soft.   Musculoskeletal:         General: Normal range of motion.      Cervical back: Normal range of motion and neck supple.   Skin:     General: Skin is warm and dry.   Neurological:      Mental Status: He is alert and oriented to person, place, and time.      Deep Tendon Reflexes: Reflexes are normal and symmetric.   Psychiatric:         Mood and Affect: Mood and affect normal.         Behavior: Behavior normal.         Thought Content: Thought content normal.         Assessment:       Problem List Items Addressed This Visit     Cholelithiasis  - Primary        doing well  Plan:       I discussed his status and his aftercare and I will see him back prn

## 2022-02-18 ENCOUNTER — TELEPHONE (OUTPATIENT)
Dept: CARDIOLOGY | Facility: CLINIC | Age: 57
End: 2022-02-18
Payer: OTHER GOVERNMENT

## 2022-02-21 ENCOUNTER — PATIENT OUTREACH (OUTPATIENT)
Dept: ADMINISTRATIVE | Facility: OTHER | Age: 57
End: 2022-02-21
Payer: OTHER GOVERNMENT

## 2022-02-22 ENCOUNTER — OFFICE VISIT (OUTPATIENT)
Dept: PODIATRY | Facility: CLINIC | Age: 57
End: 2022-02-22
Payer: OTHER GOVERNMENT

## 2022-02-22 ENCOUNTER — HOSPITAL ENCOUNTER (OUTPATIENT)
Dept: RADIOLOGY | Facility: HOSPITAL | Age: 57
Discharge: HOME | End: 2022-02-22
Attending: PODIATRIST
Payer: OTHER GOVERNMENT

## 2022-02-22 VITALS — BODY MASS INDEX: 29.93 KG/M2 | WEIGHT: 221 LBS | HEIGHT: 72 IN

## 2022-02-22 DIAGNOSIS — M79.672 LEFT FOOT PAIN: Primary | ICD-10-CM

## 2022-02-22 DIAGNOSIS — M76.60 ACHILLES TENDINITIS, UNSPECIFIED LATERALITY: ICD-10-CM

## 2022-02-22 DIAGNOSIS — M79.672 LEFT FOOT PAIN: ICD-10-CM

## 2022-02-22 PROCEDURE — 73630 XR FOOT COMPLETE 3 VIEW LEFT: ICD-10-PCS | Mod: 26,LT,, | Performed by: RADIOLOGY

## 2022-02-22 PROCEDURE — 99214 OFFICE O/P EST MOD 30 MIN: CPT | Mod: PBBFAC,PO | Performed by: PODIATRIST

## 2022-02-22 PROCEDURE — 73630 X-RAY EXAM OF FOOT: CPT | Mod: TC,FY,PO,LT

## 2022-02-22 PROCEDURE — 99214 PR OFFICE/OUTPT VISIT, EST, LEVL IV, 30-39 MIN: ICD-10-PCS | Mod: S$PBB,,, | Performed by: PODIATRIST

## 2022-02-22 PROCEDURE — 99999 PR PBB SHADOW E&M-EST. PATIENT-LVL IV: ICD-10-PCS | Mod: PBBFAC,,, | Performed by: PODIATRIST

## 2022-02-22 PROCEDURE — 99214 OFFICE O/P EST MOD 30 MIN: CPT | Mod: S$PBB,,, | Performed by: PODIATRIST

## 2022-02-22 PROCEDURE — 99999 PR PBB SHADOW E&M-EST. PATIENT-LVL IV: CPT | Mod: PBBFAC,,, | Performed by: PODIATRIST

## 2022-02-22 PROCEDURE — 73630 X-RAY EXAM OF FOOT: CPT | Mod: 26,LT,, | Performed by: RADIOLOGY

## 2022-02-22 RX ORDER — METHYLPREDNISOLONE 4 MG/1
TABLET ORAL
Qty: 1 EACH | Refills: 0 | Status: SHIPPED | OUTPATIENT
Start: 2022-02-22 | End: 2022-03-11
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
## 2022-03-04 ENCOUNTER — OFFICE VISIT (OUTPATIENT)
Dept: CARDIOLOGY | Facility: CLINIC | Age: 57
End: 2022-03-04
Payer: OTHER GOVERNMENT

## 2022-03-04 VITALS
DIASTOLIC BLOOD PRESSURE: 91 MMHG | OXYGEN SATURATION: 96 % | BODY MASS INDEX: 31.65 KG/M2 | HEIGHT: 72 IN | RESPIRATION RATE: 15 BRPM | SYSTOLIC BLOOD PRESSURE: 126 MMHG | WEIGHT: 233.69 LBS | HEART RATE: 78 BPM

## 2022-03-04 DIAGNOSIS — R91.1 PULMONARY NODULE: ICD-10-CM

## 2022-03-04 DIAGNOSIS — G47.33 OSA (OBSTRUCTIVE SLEEP APNEA): ICD-10-CM

## 2022-03-04 DIAGNOSIS — G44.029 CHRONIC CLUSTER HEADACHE, NOT INTRACTABLE: ICD-10-CM

## 2022-03-04 DIAGNOSIS — M77.11 LATERAL EPICONDYLITIS OF RIGHT ELBOW: ICD-10-CM

## 2022-03-04 DIAGNOSIS — K21.9 GASTROESOPHAGEAL REFLUX DISEASE WITHOUT ESOPHAGITIS: ICD-10-CM

## 2022-03-04 DIAGNOSIS — E78.5 HYPERLIPIDEMIA, UNSPECIFIED HYPERLIPIDEMIA TYPE: Primary | ICD-10-CM

## 2022-03-04 DIAGNOSIS — J32.8 OTHER CHRONIC SINUSITIS: ICD-10-CM

## 2022-03-04 DIAGNOSIS — I10 ESSENTIAL HYPERTENSION: ICD-10-CM

## 2022-03-04 DIAGNOSIS — M51.36 DDD (DEGENERATIVE DISC DISEASE), LUMBAR: ICD-10-CM

## 2022-03-04 DIAGNOSIS — E78.00 HYPERCHOLESTEROLEMIA: ICD-10-CM

## 2022-03-04 PROCEDURE — 99999 PR PBB SHADOW E&M-EST. PATIENT-LVL IV: ICD-10-PCS | Mod: PBBFAC,,, | Performed by: INTERNAL MEDICINE

## 2022-03-04 PROCEDURE — 99214 PR OFFICE/OUTPT VISIT, EST, LEVL IV, 30-39 MIN: ICD-10-PCS | Mod: S$PBB,,, | Performed by: INTERNAL MEDICINE

## 2022-03-04 PROCEDURE — 99214 OFFICE O/P EST MOD 30 MIN: CPT | Mod: S$PBB,,, | Performed by: INTERNAL MEDICINE

## 2022-03-04 PROCEDURE — 99999 PR PBB SHADOW E&M-EST. PATIENT-LVL IV: CPT | Mod: PBBFAC,,, | Performed by: INTERNAL MEDICINE

## 2022-03-04 PROCEDURE — 99214 OFFICE O/P EST MOD 30 MIN: CPT | Mod: PBBFAC | Performed by: INTERNAL MEDICINE

## 2022-03-04 RX ORDER — ATORVASTATIN CALCIUM 40 MG/1
40 TABLET, FILM COATED ORAL NIGHTLY
Qty: 90 TABLET | Refills: 3 | Status: SHIPPED | OUTPATIENT
Start: 2022-03-04 | End: 2023-02-08

## 2022-03-04 RX ORDER — METOPROLOL SUCCINATE 25 MG/1
25 TABLET, EXTENDED RELEASE ORAL DAILY
Qty: 90 TABLET | Refills: 3 | Status: SHIPPED | OUTPATIENT
Start: 2022-03-04 | End: 2023-03-15 | Stop reason: SDUPTHER

## 2022-03-04 RX ORDER — LISINOPRIL AND HYDROCHLOROTHIAZIDE 12.5; 2 MG/1; MG/1
1 TABLET ORAL DAILY
Qty: 90 TABLET | Refills: 3 | Status: SHIPPED | OUTPATIENT
Start: 2022-03-04 | End: 2023-03-02
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
## 2022-03-10 PROBLEM — E78.5 HLD (HYPERLIPIDEMIA): Status: RESOLVED | Noted: 2022-01-23 | Resolved: 2022-03-10
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
## 2022-03-11 ENCOUNTER — OFFICE VISIT (OUTPATIENT)
Dept: FAMILY MEDICINE | Facility: CLINIC | Age: 57
End: 2022-03-11
Payer: OTHER GOVERNMENT

## 2022-03-11 VITALS
WEIGHT: 220 LBS | HEART RATE: 67 BPM | BODY MASS INDEX: 29.8 KG/M2 | DIASTOLIC BLOOD PRESSURE: 72 MMHG | SYSTOLIC BLOOD PRESSURE: 108 MMHG | HEIGHT: 72 IN | OXYGEN SATURATION: 96 %

## 2022-03-11 DIAGNOSIS — I10 ESSENTIAL HYPERTENSION: ICD-10-CM

## 2022-03-11 DIAGNOSIS — D12.6 TUBULAR ADENOMA OF COLON: ICD-10-CM

## 2022-03-11 DIAGNOSIS — G47.33 OSA (OBSTRUCTIVE SLEEP APNEA): ICD-10-CM

## 2022-03-11 DIAGNOSIS — K21.9 GASTROESOPHAGEAL REFLUX DISEASE WITHOUT ESOPHAGITIS: ICD-10-CM

## 2022-03-11 DIAGNOSIS — E78.00 HYPERCHOLESTEROLEMIA: ICD-10-CM

## 2022-03-11 DIAGNOSIS — Z90.49 STATUS POST LAPAROSCOPIC CHOLECYSTECTOMY: ICD-10-CM

## 2022-03-11 DIAGNOSIS — Z00.00 NORMAL PHYSICAL EXAM: Primary | ICD-10-CM

## 2022-03-11 PROCEDURE — 99396 PR PREVENTIVE VISIT,EST,40-64: ICD-10-PCS | Mod: S$PBB,,, | Performed by: INTERNAL MEDICINE

## 2022-03-11 PROCEDURE — 99214 OFFICE O/P EST MOD 30 MIN: CPT | Mod: PBBFAC,PO | Performed by: INTERNAL MEDICINE

## 2022-03-11 PROCEDURE — 99999 PR PBB SHADOW E&M-EST. PATIENT-LVL IV: ICD-10-PCS | Mod: PBBFAC,,, | Performed by: INTERNAL MEDICINE

## 2022-03-11 PROCEDURE — 99999 PR PBB SHADOW E&M-EST. PATIENT-LVL IV: CPT | Mod: PBBFAC,,, | Performed by: INTERNAL MEDICINE

## 2022-03-11 PROCEDURE — 99396 PREV VISIT EST AGE 40-64: CPT | Mod: S$PBB,,, | Performed by: INTERNAL MEDICINE

## 2022-03-11 RX ORDER — OMEPRAZOLE 20 MG/1
20 CAPSULE, DELAYED RELEASE ORAL DAILY
Qty: 90 CAPSULE | Refills: 3 | Status: SHIPPED | OUTPATIENT
Start: 2022-03-11 | End: 2023-02-28

## 2022-03-11 RX ORDER — MELOXICAM 15 MG/1
TABLET ORAL
COMMUNITY
Start: 2021-05-27 | End: 2022-03-11 | Stop reason: ALTCHOICE

## 2022-03-22 ENCOUNTER — TELEPHONE (OUTPATIENT)
Dept: FAMILY MEDICINE | Facility: CLINIC | Age: 57
End: 2022-03-22
Payer: OTHER GOVERNMENT

## 2022-03-22 NOTE — TELEPHONE ENCOUNTER
----- Message from Celsa Galindo sent at 3/22/2022 10:26 AM CDT -----  Type: Lab    Caller is requesting to schedule their Lab appointment .   Order is not listed in EPIC.  Please enter order and contact patient to schedule. Patient missed his lab appt. And need new orders to r/s     Name of Caller: Tere- Wife     Preferred Date and Time of Labs: anytime       Where would they like the lab performed?Merged with [facility]     Would the patient rather a call back or a response via [facility]? Call     Best Call Back Number:.[phone] (home)

## 2022-03-23 ENCOUNTER — LAB VISIT (OUTPATIENT)
Dept: LAB | Facility: HOSPITAL | Age: 57
End: 2022-03-23
Attending: INTERNAL MEDICINE
Payer: OTHER GOVERNMENT

## 2022-03-23 DIAGNOSIS — E78.00 HYPERCHOLESTEROLEMIA: ICD-10-CM

## 2022-03-23 LAB
CHOLEST SERPL-MCNC: 136 MG/DL (ref 120–199)
CHOLEST/HDLC SERPL: 4 {RATIO} (ref 2–5)
HDLC SERPL-MCNC: 34 MG/DL (ref 40–75)
HDLC SERPL: 25 % (ref 20–50)
LDLC SERPL CALC-MCNC: 71.4 MG/DL (ref 63–159)
NONHDLC SERPL-MCNC: 102 MG/DL
TRIGL SERPL-MCNC: 153 MG/DL (ref 30–150)

## 2022-03-23 PROCEDURE — 36415 COLL VENOUS BLD VENIPUNCTURE: CPT | Mod: PO | Performed by: INTERNAL MEDICINE

## 2022-03-23 PROCEDURE — 80061 LIPID PANEL: CPT | Performed by: INTERNAL MEDICINE

## 2022-03-29 ENCOUNTER — CLINICAL SUPPORT (OUTPATIENT)
Dept: REHABILITATION | Facility: HOSPITAL | Age: 57
End: 2022-03-29
Attending: PODIATRIST
Payer: OTHER GOVERNMENT

## 2022-03-29 DIAGNOSIS — M72.2 PLANTAR FASCIITIS OF LEFT FOOT: ICD-10-CM

## 2022-03-29 DIAGNOSIS — M76.60 ACHILLES TENDINITIS, UNSPECIFIED LATERALITY: ICD-10-CM

## 2022-03-29 DIAGNOSIS — M79.672 LEFT FOOT PAIN: ICD-10-CM

## 2022-03-29 DIAGNOSIS — R26.89 GAIT, ANTALGIC: ICD-10-CM

## 2022-03-29 PROBLEM — G89.29 CHRONIC PAIN OF BOTH KNEES: Status: RESOLVED | Noted: 2021-10-13 | Resolved: 2022-03-29

## 2022-03-29 PROBLEM — M25.562 CHRONIC PAIN OF BOTH KNEES: Status: RESOLVED | Noted: 2021-10-13 | Resolved: 2022-03-29

## 2022-03-29 PROBLEM — M77.11 LATERAL EPICONDYLITIS OF RIGHT ELBOW: Status: RESOLVED | Noted: 2021-10-13 | Resolved: 2022-03-29

## 2022-03-29 PROBLEM — M25.561 CHRONIC PAIN OF BOTH KNEES: Status: RESOLVED | Noted: 2021-10-13 | Resolved: 2022-03-29

## 2022-03-29 PROCEDURE — 97161 PT EVAL LOW COMPLEX 20 MIN: CPT | Mod: PN

## 2022-03-29 PROCEDURE — 97110 THERAPEUTIC EXERCISES: CPT | Mod: PN

## 2022-03-29 NOTE — PLAN OF CARE
[facility]  Physical Therapy Initial Evaluation  Date: 3/29/2022   Name: Enoch DELUCA Dardanelle  [facility] Number: [id number]    Therapy Diagnosis:   Encounter Diagnoses   Name Primary?    Left foot pain     Achilles tendinitis, unspecified laterality     Plantar fasciitis of left foot     Gait, antalgic      Physician: Mckenna Nixon DPM    Physician Orders: PT Eval and Treat   Medical Diagnosis from Referral:   M79.672 (ICD-10-CM) - Left foot pain   M76.60 (ICD-10-CM) - Achilles tendinitis, unspecified laterality       Evaluation Date: 3/29/2022  Authorization Period Expiration: 12/31/2022  Plan of Care Expiration: 6-29-22  Visit # / Visits authorized: 1/1 Progress Note Due: 4-29-22  FOTO: 1/ 1    Precautions: Standard    Time In: 8:30 am  Time Out: 9:15 am  Total Appointment Time (timed & untimed codes): 45 minutes    Subjective   Date of onset: 1 year ago  History of current condition - Enoch reports: L plantar fascia pain. Pt states he had L plantar fascia pain and pain subsided, but now pain has increases. Pt states he has pain in the first step in the morning.     ADRIAN: Gradually start hurting   Any falls:  No  Any pain Plantar fascia pain: yes  Any pain Deltoid ligament: no  Any pain ATFL, calcaneou fibular or posterior talofibular L: No  Any ankle bruise: No  Pain radiates: No  Pain constant or intermittent: Constant   Any injection:No       Current 4/10, worst 5/10, best 1/10   Location: left Plantar fascia    Description: Aching and Dull  Aggravating Factors: Standing, Walking, Extension, Flexing and Lifting  Easing Factors: rest    Prior Therapy: no  Social History:  lives with their family  Occupation: desk job and walking  Prior Level of Function: independent  Current Level of Function: independent     Pts goals: decrease heel pain       Imaging, bone scan films:   Impression:     1. No acute displaced fracture, dislocation or suspicious osseous lesion.  2. Plantar calcaneal  spurring is present.    Medical History:   Past Medical History:   Diagnosis Date    GERD (gastroesophageal reflux disease)     Hypercholesteremia     Hypertension     on medication    Lumbar spondylosis 9/21/2020    Migraines     Sleep apnea        Surgical History:   Enoch Barr  has a past surgical history that includes Sinus surgery (2012); Hernia repair; Colonoscopy (N/A, 8/10/2020); Repair of triceps tendon (Left, 10/2/2020); and Laparoscopic cholecystectomy (N/A, 1/25/2022).    Medications:   Enoch has a current medication list which includes the following prescription(s): atorvastatin, celecoxib, fexofenadine, fluticasone propionate, lisinopril-hydrochlorothiazide, metoprolol succinate, and omeprazole.    Allergies:   Review of patient's allergies indicates:  No Known Allergies          Objective     Observation:     Posture Alignment: increase pronation     Sensation: Light touch:intact to light touch    DTR: intact to light touch    GAIT DEVIATIONS: Enoch displays antalgic gait    Ankle Range of Motion:   Ankle Left   Dorsiflexion WFL with pain   Plantarflexion WFL with pain   Inversion WFL with pain   Eversion WFL with pain       Strength:   Left Ankle    Dorsiflexion: 4+/5 with pain   Plantarflexion: 4+/5 with pain    Inversion: 4+/5 with pain   Eversion: 4+/5 with pain        Special Tests:   Left   Posterior Drawer Test Negative    Anterior Drawer Test Negative    Squeeze test Negative    Ely test Negative    Subtalar Neutral Negative    Heel walking Negative    Toe walking Negative    Navicular Drop Negative    Windlass test Negative        Joint Mobility: decrease subtalar mobility     Palpation: severe L plantar fascia pain and severe distal achilles pain. Pain and tightness of medial gastroc and soleus    Flexibility: decrease gastroc flexibility       CMS Impairment/Limitation/Restriction for FOTO Foot Survey  Status Limitation G-Code CMS Severity Modifier  Intake 65% 35% Current  Status CJ - At least 20 percent but less than 40 percent  Predicted 75% 25% Goal Status+ CJ - At least 20 percent but less than 40 percent  D/C Status CJ **only report if this is a one time visit  +Based on FOTO predicted change score    TREATMENT   Treatment Time In: 8:50 am  Treatment Time Out: 9:00 am   Total Treatment time separate from Evaluation: 10  minutes    Enoch received therapeutic exercises to develop strength, endurance, ROM and flexibility for 10 minutes including:  Long sitting calf stretch   Toe yoga   Big toes stretch       Home Exercises and Patient Education Provided    Education provided:   - Perform HEP 2 times per day     Written Home Exercises Provided: Patient instructed to cont prior HEP.  Exercises were reviewed and Enoch was able to demonstrate them prior to the end of the session.  Enoch demonstrated good  understanding of the education provided.     See EMR under Patient Instructions for exercises provided 3/29/2022.    Assessment   Enoch is a 57 y.o. male referred to outpatient Physical Therapy with a medical diagnosis of Left foot pain and Achilles tendinitis, unspecified laterality. Pt presents with sign and symptoms of L plantar fasciitis. Pt has good L ankle AROM, but he experience pain at distal plantar fascia and distal achilles pain. Pt ambulated with antalgic gait pattern due to L achilles pain. Pt has good L ankle muscles strength, but  Increases pain during resistance. During palpation, increase L plantar fascia pain, increase L distal achilles pain and increase medial gastroc and soleus pain. Pt will benefit from skilled PT services to decrease pain and return to functional mobility pain free.     Pt prognosis is Good.   Pt will benefit from skilled outpatient Physical Therapy to address the deficits stated above and in the chart below, provide pt/family education, and to maximize pt's level of independence.     Plan of care discussed with patient: Yes  Pt's spiritual,  cultural and educational needs considered and patient is agreeable to the plan of care and goals as stated below:     Anticipated Barriers for therapy: none     Medical Necessity is demonstrated by the following  History  Co-morbidities and personal factors that may impact the plan of care Co-morbidities:   not identified    Personal Factors:   no deficits     Complexity: low   Examination  Body Structures and Functions, activity limitations and participation restrictions that may impact the plan of care Body Regions:   Foot    Body Systems:    strength  gait  transfers  transitions  motor control  motor learning    Participation Restrictions:   Community ambulation and work    Activity limitations:   Learning and applying knowledge  no deficits    General Tasks and Commands  no deficits    Communication  no deficits    Mobility  walking  moving around using equipment (WC)  using transportation (bus, train, plane, car)  driving (bike, car, motorcycle)    Self care  no deficits    Domestic Life  shopping  cooking  doing house work (cleaning house, washing dishes, laundry)    Interactions/Relationships  no deficits    Life Areas  no deficits    Community and Social Life  community life  recreation and leisure         Complexity: low  See FOTO outcome assessment   Clinical Presentation stable and uncomplicated low   Decision Making/ Complexity Score: low     Goals:  GOALS: Short Term Goals:  4 weeks  1.Report decreased  in  pain at worse less than  <   / =  4  /10  to increase tolerance for improved functional actvities. On going  2. Increased B ankle MMT 1/2 grade  to increase tolerance for ADL and work activities.On going  3. Pt to report decrease the frequency of pain during first step in the morning by 50% since initial eval.   4. Pt to tolerate HEP to improve ROM and independence with ADL's. On going    Long Term Goals: 8 weeks  1.Report decreased  in  pain at worse  less than  <   / =  2  /10  to increase  tolerance for improved functional actvities. On going  2.Pt to report decrease the frequency of pain during first step in the morning by 50% since initial eval.   3.Increased B ankle  MMT 1 grade  to increase tolerance for ADL and work activities.On going  4. Pt will score at least 25% on  FOTO outcome assessment  to increase functional activities and mobility. On going  5. Pt to be Independent with HEP to improve ROM and independence with ADL's. On going      Plan   Plan of care Certification: 3/29/2022 to 6-29-22    Outpatient Physical Therapy 2 times weekly for 12 weeks to include the following interventions: Gait Training, Manual Therapy, Moist Heat/ Ice, Neuromuscular Re-ed, Patient Education, Therapeutic Activities and Therapeutic Exercise. Dry needling.     Mik Mas, PT      I CERTIFY THE NEED FOR THESE SERVICES FURNISHED UNDER THIS PLAN OF TREATMENT AND WHILE UNDER MY CARE   Physician's comments:     Physician's Signature: ___________________________________________________

## 2022-04-01 DIAGNOSIS — K21.9 GASTROESOPHAGEAL REFLUX DISEASE WITHOUT ESOPHAGITIS: ICD-10-CM

## 2022-04-01 RX ORDER — OMEPRAZOLE 40 MG/1
CAPSULE, DELAYED RELEASE ORAL
Qty: 90 CAPSULE | Refills: 1 | OUTPATIENT
Start: 2022-04-01

## 2022-04-01 NOTE — TELEPHONE ENCOUNTER
No new care gaps identified.  Powered by [email or web address]. Reference number: [id number].   4/01/2022 10:53:31 AM CDT

## 2022-04-22 ENCOUNTER — NUTRITION (OUTPATIENT)
Dept: NUTRITION | Facility: CLINIC | Age: 57
End: 2022-04-22
Payer: OTHER GOVERNMENT

## 2022-04-22 VITALS — WEIGHT: 226.88 LBS | BODY MASS INDEX: 30.73 KG/M2 | HEIGHT: 72 IN

## 2022-04-22 DIAGNOSIS — Z90.49 STATUS POST LAPAROSCOPIC CHOLECYSTECTOMY: Primary | ICD-10-CM

## 2022-04-22 DIAGNOSIS — Z71.3 DIETARY COUNSELING: ICD-10-CM

## 2022-04-22 PROCEDURE — 99999 PR PBB SHADOW E&M-EST. PATIENT-LVL III: ICD-10-PCS | Mod: PBBFAC,,,

## 2022-04-22 PROCEDURE — 99999 PR PBB SHADOW E&M-EST. PATIENT-LVL III: CPT | Mod: PBBFAC,,,

## 2022-04-22 PROCEDURE — 99213 OFFICE O/P EST LOW 20 MIN: CPT | Mod: PBBFAC

## 2022-04-22 PROCEDURE — 97802 MEDICAL NUTRITION INDIV IN: CPT | Mod: PBBFAC | Performed by: DIETITIAN, REGISTERED

## 2022-04-22 NOTE — PROGRESS NOTES
Referring Physician:Dipesh Clements MD     Reason for visit:  Chief Complaint   Patient presents with    Hyperlipidemia    Obesity    Patient Education    Nutrition Counseling      Initial Visit    :1965     Allergies Reviewed  Meds Reviewed    Anthropometrics  Weight:102.9 kg (226 lb 13.7 oz)  Height:6' (1.829 m)  BMI:Body mass index is 30.77 kg/m².   IBW:   80.9 kg  +/-10%    Meds:  Outpatient Medications Prior to Visit   Medication Sig Dispense Refill    atorvastatin (LIPITOR) 40 MG tablet Take 1 tablet (40 mg total) by mouth every evening. 90 tablet 3    celecoxib (CELEBREX) 200 MG capsule TAKE 1 CAPSULE(200 MG) BY MOUTH TWICE DAILY 180 capsule 0    fexofenadine (ALLEGRA) 30 MG tablet Take 30 mg by mouth once daily.      fluticasone propionate (FLONASE) 50 mcg/actuation nasal spray SHAKE LIQUID AND USE 1 SPRAY(50 MCG) IN EACH NOSTRIL EVERY DAY 16 g 6    lisinopriL-hydrochlorothiazide (PRINZIDE,ZESTORETIC) 20-12.5 mg per tablet Take 1 tablet by mouth once daily. 90 tablet 3    metoprolol succinate (TOPROL-XL) 25 MG 24 hr tablet Take 1 tablet (25 mg total) by mouth once daily. 90 tablet 3    omeprazole (PRILOSEC) 20 MG capsule Take 1 capsule (20 mg total) by mouth once daily. Decreased dose 90 capsule 3     No facility-administered medications prior to visit.       Food/Drug Interactions Noted:  n/a    Vitamins/Supplements/Herbs:  Vit B12 per friend recommendation    Labs: TG  153   HDL  34     Nutrition Prescription:   2427 Kcals/day( 30 kcal/kg IBW),   65 g protein( 0.8 g/kg IBW)     Support System:   Pt and his wife grocery shop and prepare meals.    Diet Hx:   Pt is s/p lila menon on 2022, and is requesting information on appropriate diet regimen for this and hypertension.  He and his wife have been focused on healthy eating, including avoiding high fat foods and reading food labels.  Snacks:  Avoiding chips; gummy bears; beef sticks.  They eat out occasionally.    Breakfast:   Egg  whites or oatmeal.  Green tea with lemon.  Lunch:   Packet of tuna with Belvita crackers or if he gets lunch out:  Wraps or salad with ranch light or Oil & vinegar dressing.  Unsweetened tea with lemon  Dinner:  Last night at home:  quesadillas and taquitos with sour cream and guacamole, Monster energy drink and couple of glasses of water.    Current activity level and/or physical limitations:  Tries to go to the gym 3 times/week; has not been able to go past few weeks.  Does at least 30 minutes cardio, some stretches and a little work with weights when he does go to the gym.    Motivation to make changes/anticipated barriers and/or expected adherence:   No barriers to adherence identified.    Nutrition-Focus Physical Findings:   Pt wearing face covering per COVID19 protocol; pt appears well nourished    Assessment:  Pt very attentive and asked numerous relevant questions about foods/beverages recommended and to avoid; sample meal plan and menus; reading food labels; portion control; grocery shopping and cooking tips; healthy snacking; what does a healthy plate look like.  All questions answered, and he verbalized understanding of information.    Nutrition Diagnosis:   Food- and nutrition-related knowledge deficit RT lack of prior exposure to information AEB dx obesity and s/p lap sinan      Recommendations:  Low fat, low sodium, heart healthy diet. Exercise goal:  30 minutes per day, 3-5 days per week.  Handouts provided & reviewed:  Fat-Restricted Nutrition Therapy; Gallbladder Nutrition Therapy; Eat Fit Shopping List; Eat Fit alannah info; Servings of Carbohydrates, Protein and Fats for Meal Planning; My Plate Planner; Low-Sodium Nutrition Therapy; Acceptable Salt-Free Seasoning Blends; Sodium-free Flavoring Tips; Reading A Food Label      Strategies Implemented:   Portion control; reading food labels    Consultation Time:45 minutes.  Communicated with referring healthcare provider:  Consult note available in pt's  Epic chart per MD discretion  Follow Up:  Pt provided with dietitian contact number and advised to call with questions or make future appointment if further intervention needed.

## 2022-04-26 ENCOUNTER — CLINICAL SUPPORT (OUTPATIENT)
Dept: REHABILITATION | Facility: HOSPITAL | Age: 57
End: 2022-04-26
Attending: PODIATRIST
Payer: OTHER GOVERNMENT

## 2022-04-26 DIAGNOSIS — R26.89 GAIT, ANTALGIC: ICD-10-CM

## 2022-04-26 DIAGNOSIS — M79.672 LEFT FOOT PAIN: ICD-10-CM

## 2022-04-26 DIAGNOSIS — M72.2 PLANTAR FASCIITIS OF LEFT FOOT: Primary | ICD-10-CM

## 2022-04-26 PROCEDURE — 97140 MANUAL THERAPY 1/> REGIONS: CPT | Mod: PN

## 2022-04-26 PROCEDURE — 97110 THERAPEUTIC EXERCISES: CPT | Mod: PN

## 2022-04-26 NOTE — PROGRESS NOTES
[facility]   Physical Therapy Treatment Note     Name: Enoch DELUCA Momo  [facility] Number: [id number]    Therapy Diagnosis:   Encounter Diagnoses   Name Primary?    Plantar fasciitis of left foot Yes    Gait, antalgic     Left foot pain      Physician: Mckenna Nixon DPM    Visit Date: 4/26/2022    Physician Orders: PT Eval and Treat   Medical Diagnosis from Referral:   M79.672 (ICD-10-CM) - Left foot pain   M76.60 (ICD-10-CM) - Achilles tendinitis, unspecified laterality         Evaluation Date: 3/29/2022  Authorization Period Expiration: 12/31/2022  Plan of Care Expiration: 6-29-22  Visit # / Visits authorized: 1/1 Progress Note Due: 4-29-22  FOTO: 1/ 1     Precautions: Standard    Time In: 4:00 pm  Time Out: 4:45 pm  Total Billable Time: 45  minutes      SUBJECTIVE     Pt reports: that pain comes and goes. Pt states pain over the weekend was 1 or 2/10. Pt states sometimes he has more pain then the other. Pain is localized in the L heel.   He was compliant with home exercise program.  Response to previous treatment: none  Functional change: No change    Pain: 1/10  Location: left Plantar fascia      OBJECTIVE     Objective Measures updated at progress report unless specified.       TREATMENT     Enoch received the treatments listed below:      received therapeutic exercises to develop strength and ROM for 30  minutes including:    Upright bike 6 min   Long sitting gastroc stretch 5x30 sec  Long sitting soleus stretch 5x30 sec  Toe yoga 3x10   Big toes stretch  Marbles pick ups 1 trails   [place] 2 min    received the following manual therapy techniques: Soft tissue Mobilization were applied to the: L plantar fascia  for 15  minutes, including:    Pt cleared of contraindications and verbal and written consent acquired. Pt given option of copy of consent form. Pt educated on benefits and potential side effects of DN. DN for 10 minutes with pt in prone position with involved side L plantar  fascia. Plantar fasciitis protocol with winding. Pt received 10 min E-stim at parameters of 2 hz and 250 us.     Patient provided written and verbal consent to receive functional dry needling at today's visit (see consent form scanned into chart). FDN performed to L medial gastroc, L achilles tendon and L plantar fascia  mm. FDN performed to reduce pain and muscle tension, promote blood flow, and improve ROM and function. Pt tolerated tx well without adverse effects. She was educated on what to expect following the procedure and she verbalized understanding.      PATIENT EDUCATION AND HOME EXERCISES     Home Exercises and Patient Education Provided     Education provided:   - Perform HEP 2 times per day      Written Home Exercises Provided: Patient instructed to cont prior HEP.  Exercises were reviewed and Enoch was able to demonstrate them prior to the end of the session.  Enoch demonstrated good  understanding of the education provided.      See EMR under Patient Instructions for exercises provided 3/29/2022.    ASSESSMENT     Pt tolerated initial tx well. Dry needling was performed to L medial calf, L achilles and distal plantar fascia. 4 needles were inserted on calf. 4 needles in the achilles tendon and 1 plantar fascia. Pt did not have any side effects. Pt responded well to therex exercises. Pt required mod v/c's to perform exercises with proper from. Pt will cont benefiting from skilled PT services to decrease plantar fascia pain.     Enoch Is progressing well towards his goals.   Pt prognosis is Good.     Pt will continue to benefit from skilled outpatient physical therapy to address the deficits listed in the problem list box on initial evaluation, provide pt/family education and to maximize pt's level of independence in the home and community environment.     Pt's spiritual, cultural and educational needs considered and pt agreeable to plan of care and goals.     Anticipated barriers to physical therapy:  none    Goals:  GOALS: Short Term Goals:  4 weeks  1.Report decreased  in  pain at worse less than  <   / =  4  /10  to increase tolerance for improved functional actvities. On going  2. Increased B ankle MMT 1/2 grade  to increase tolerance for ADL and work activities.On going  3. Pt to report decrease the frequency of pain during first step in the morning by 50% since initial eval.   4. Pt to tolerate HEP to improve ROM and independence with ADL's. On going     Long Term Goals: 8 weeks  1.Report decreased  in  pain at worse  less than  <   / =  2  /10  to increase tolerance for improved functional actvities. On going  2.Pt to report decrease the frequency of pain during first step in the morning by 50% since initial eval.   3.Increased B ankle  MMT 1 grade  to increase tolerance for ADL and work activities.On going  4. Pt will score at least 25% on  FOTO outcome assessment  to increase functional activities and mobility. On going  5. Pt to be Independent with HEP to improve ROM and independence with ADL's. On going    PLAN     Plan of care Certification: 3/29/2022 to 6-29-22    Mik Mas, PT

## 2022-05-03 ENCOUNTER — CLINICAL SUPPORT (OUTPATIENT)
Dept: REHABILITATION | Facility: HOSPITAL | Age: 57
End: 2022-05-03
Attending: PODIATRIST
Payer: OTHER GOVERNMENT

## 2022-05-03 DIAGNOSIS — M72.2 PLANTAR FASCIITIS OF LEFT FOOT: Primary | ICD-10-CM

## 2022-05-03 DIAGNOSIS — R26.89 GAIT, ANTALGIC: ICD-10-CM

## 2022-05-03 DIAGNOSIS — M79.672 LEFT FOOT PAIN: ICD-10-CM

## 2022-05-03 PROCEDURE — 97140 MANUAL THERAPY 1/> REGIONS: CPT | Mod: PN

## 2022-05-03 PROCEDURE — 97110 THERAPEUTIC EXERCISES: CPT | Mod: PN

## 2022-05-03 NOTE — PROGRESS NOTES
[facility]   Physical Therapy Treatment Note     Name: Enoch DELUCA Momo  [facility] Number: [id number]    Therapy Diagnosis:   Encounter Diagnoses   Name Primary?    Plantar fasciitis of left foot Yes    Gait, antalgic     Left foot pain      Physician: Mckenna Nixon DPM    Visit Date: 5/3/2022    Physician Orders: PT Eval and Treat   Medical Diagnosis from Referral:   M79.672 (ICD-10-CM) - Left foot pain   M76.60 (ICD-10-CM) - Achilles tendinitis, unspecified laterality         Evaluation Date: 3/29/2022  Authorization Period Expiration:12/31/2022  Plan of Care Expiration: 6-29-22  Visit # / Visits authorized: 2/15 Progress Note Due: 4-29-22  FOTO: 1/ 1     Precautions: Standard    Time In: 3:40 pm  Time Out: 4:45 m  Total Billable Time: 60  minutes      SUBJECTIVE     Pt reports: that he has L heel pain about 2 or 3/10. Pt states he experience increase in L plantar fascia soreness after last dry needling session. Pt states he would rather not have dry needling today.   He was compliant with home exercise program.  Response to previous treatment: none  Functional change: No change    Pain: 1/10  Location: left Plantar fascia      OBJECTIVE     Objective Measures updated at progress report unless specified.       TREATMENT     Enoch received the treatments listed below:      received therapeutic exercises to develop strength and ROM for 40 minutes including:    Upright bike 6 min   Standing gastroc stretch 5x30 sec  Standing soleus stretch 5x30 sec   Heel raises with towel under big towel 3x10   Toe yoga GTB 3x10   Marbles pick ups 1 trails   [place] 2 min  Reverse lunges +GTB around ball of foot 10x  SLS on  Blue foam focusing in decrease pronation [id number]   Tandem stance on blue foam focusing in decrease pronation [id number]    received the following manual therapy techniques: Soft tissue Mobilization were applied to the: L plantar fascia  for 15  minutes, including:    STM and PROM of L  plantar fascia.    Vacuum/cupping STM with manual therapy techniques was performed to L calf and plantar fascia to decrease muscle tightness, increase circulation and promote healing process. The pt's skin was monitored for redness adjusting pressure as needed. The pt was instructed in possible side effects of bruising and/or soreness.     Pt received heat pack for 10 min at calf and plantar fascia to decrease pain    PATIENT EDUCATION AND HOME EXERCISES     Home Exercises and Patient Education Provided     Education provided:   - Perform HEP 2 times per day      Written Home Exercises Provided: Patient instructed to cont prior HEP.  Exercises were reviewed and Enoch was able to demonstrate them prior to the end of the session.  Enoch demonstrated good  understanding of the education provided.      See EMR under Patient Instructions for exercises provided 3/29/2022.    ASSESSMENT     Pt tolerated tx well. Progression of exercises CKC. Pt demonstrated increase L ankle pronation in WB. Pt required heavy v/c's to avoid L ankle pronation during SLS and tandem stance exercises. Cupping technique was performed L calf and  Plantar fascia to decrease pain and decrease muscles tightness. Pt will cont benefiting from skilled PT services to decrease plantar fascia pain.     Enoch Is progressing well towards his goals.   Pt prognosis is Good.     Pt will continue to benefit from skilled outpatient physical therapy to address the deficits listed in the problem list box on initial evaluation, provide pt/family education and to maximize pt's level of independence in the home and community environment.     Pt's spiritual, cultural and educational needs considered and pt agreeable to plan of care and goals.     Anticipated barriers to physical therapy: none    Goals:  GOALS: Short Term Goals:  4 weeks  1.Report decreased  in  pain at worse less than  <   / =  4  /10  to increase tolerance for improved functional actvities. On  going  2. Increased B ankle MMT 1/2 grade  to increase tolerance for ADL and work activities.On going  3. Pt to report decrease the frequency of pain during first step in the morning by 50% since initial eval.   4. Pt to tolerate HEP to improve ROM and independence with ADL's. On going     Long Term Goals: 8 weeks  1.Report decreased  in  pain at worse  less than  <   / =  2  /10  to increase tolerance for improved functional actvities. On going  2.Pt to report decrease the frequency of pain during first step in the morning by 50% since initial eval.   3.Increased B ankle  MMT 1 grade  to increase tolerance for ADL and work activities.On going  4. Pt will score at least 25% on  FOTO outcome assessment  to increase functional activities and mobility. On going  5. Pt to be Independent with HEP to improve ROM and independence with ADL's. On going    PLAN     Plan of care Certification: 3/29/2022 to 6-29-22    Mik Mas, PT

## 2022-05-11 ENCOUNTER — CLINICAL SUPPORT (OUTPATIENT)
Dept: REHABILITATION | Facility: HOSPITAL | Age: 57
End: 2022-05-11
Attending: PODIATRIST
Payer: OTHER GOVERNMENT

## 2022-05-11 DIAGNOSIS — R26.89 GAIT, ANTALGIC: ICD-10-CM

## 2022-05-11 DIAGNOSIS — M79.672 LEFT FOOT PAIN: ICD-10-CM

## 2022-05-11 DIAGNOSIS — M72.2 PLANTAR FASCIITIS OF LEFT FOOT: Primary | ICD-10-CM

## 2022-05-11 PROCEDURE — 97140 MANUAL THERAPY 1/> REGIONS: CPT | Mod: PN

## 2022-05-11 PROCEDURE — 97110 THERAPEUTIC EXERCISES: CPT | Mod: PN

## 2022-05-11 NOTE — PROGRESS NOTES
[facility]   Physical Therapy Treatment Note     Name: Enoch DELUCA Momo  [facility] Number: [id number]    Therapy Diagnosis:   Encounter Diagnoses   Name Primary?    Plantar fasciitis of left foot Yes    Gait, antalgic     Left foot pain      Physician: Mckenna Nixon DPM    Visit Date: 5/11/2022    Physician Orders: PT Eval and Treat   Medical Diagnosis from Referral:   M79.672 (ICD-10-CM) - Left foot pain   M76.60 (ICD-10-CM) - Achilles tendinitis, unspecified laterality         Evaluation Date: 3/29/2022  Authorization Period Expiration:12/31/2022  Plan of Care Expiration: 6-29-22  Visit # / Visits authorized: 3/15 Progress Note Due: 4-29-22  FOTO: 1/ 1     Precautions: Standard    Time In: 4:00 pm  Time Out: 4:45 pm  Total Billable Time: 45   minutes      SUBJECTIVE     Pt reports: that cupping helped, but helped decrease pain only for a couple days.   He was compliant with home exercise program.  Response to previous treatment: none  Functional change: No change    Pain: 1/10  Location: left Plantar fascia      OBJECTIVE     Objective Measures updated at progress report unless specified.       TREATMENT     Enoch received the treatments listed below:      received therapeutic exercises to develop strength and ROM for 35 minutes including:    Upright bike 6 min   Standing gastroc stretch 5x30 sec  Standing soleus stretch 5x30 sec   Heel raises with towel under big towel 3x10   Toe yoga GTB 3x10   Marbles pick ups 1 trails   [place] 2 min  Reverse lunges +GTB around ball of foot 10x  SLS on  Blue foam focusing in decrease pronation [id number]   Tandem stance on blue foam focusing in decrease pronation [id number]    received the following manual therapy techniques: Soft tissue Mobilization were applied to the: L plantar fascia  for 10  minutes, including:    STM and PROM of L plantar fascia and L medial gastroc     Vacuum/cupping STM with manual therapy techniques was performed to L calf and  plantar fascia to decrease muscle tightness, increase circulation and promote healing process. The pt's skin was monitored for redness adjusting pressure as needed. The pt was instructed in possible side effects of bruising and/or soreness.     Pt received heat pack for 10 min at calf and plantar fascia to decrease pain    PATIENT EDUCATION AND HOME EXERCISES     Home Exercises and Patient Education Provided     Education provided:   - Perform HEP 2 times per day      Written Home Exercises Provided: Patient instructed to cont prior HEP.  Exercises were reviewed and Enoch was able to demonstrate them prior to the end of the session.  Enoch demonstrated good  understanding of the education provided.      See EMR under Patient Instructions for exercises provided 3/29/2022.    ASSESSMENT     Pt tolerated tx well. Cont with CKC exercises and calf stretches. Pt demonstrated increase L ankle pronation in WB. Pt required heavy v/c's to avoid L ankle pronation during SLS and tandem stance exercises. STM performed of L medial gastroc and plantar fascia. Pt demonstrated increase soft tissue restriction and tenderness. Pt will cont benefiting from skilled PT services to decrease plantar fascia pain.     Enoch Is progressing well towards his goals.   Pt prognosis is Good.     Pt will continue to benefit from skilled outpatient physical therapy to address the deficits listed in the problem list box on initial evaluation, provide pt/family education and to maximize pt's level of independence in the home and community environment.     Pt's spiritual, cultural and educational needs considered and pt agreeable to plan of care and goals.     Anticipated barriers to physical therapy: none    Goals:  GOALS: Short Term Goals:  4 weeks  1.Report decreased  in  pain at worse less than  <   / =  4  /10  to increase tolerance for improved functional actvities. On going  2. Increased B ankle MMT 1/2 grade  to increase tolerance for ADL and  work activities.On going  3. Pt to report decrease the frequency of pain during first step in the morning by 50% since initial eval.   4. Pt to tolerate HEP to improve ROM and independence with ADL's. On going     Long Term Goals: 8 weeks  1.Report decreased  in  pain at worse  less than  <   / =  2  /10  to increase tolerance for improved functional actvities. On going  2.Pt to report decrease the frequency of pain during first step in the morning by 50% since initial eval.   3.Increased B ankle  MMT 1 grade  to increase tolerance for ADL and work activities.On going  4. Pt will score at least 25% on  FOTO outcome assessment  to increase functional activities and mobility. On going  5. Pt to be Independent with HEP to improve ROM and independence with ADL's. On going    PLAN     Plan of care Certification: 3/29/2022 to 6-29-22    Mik Mas PT

## 2022-06-21 ENCOUNTER — HOSPITAL ENCOUNTER (EMERGENCY)
Facility: HOSPITAL | Age: 57
Discharge: HOME | End: 2022-06-21
Attending: INTERNAL MEDICINE
Payer: OTHER GOVERNMENT

## 2022-06-21 VITALS
HEART RATE: 68 BPM | SYSTOLIC BLOOD PRESSURE: 138 MMHG | RESPIRATION RATE: 20 BRPM | WEIGHT: 220 LBS | BODY MASS INDEX: 29.8 KG/M2 | OXYGEN SATURATION: 97 % | TEMPERATURE: 98 F | DIASTOLIC BLOOD PRESSURE: 91 MMHG | HEIGHT: 72 IN

## 2022-06-21 DIAGNOSIS — M79.671 FOOT PAIN, RIGHT: Primary | ICD-10-CM

## 2022-06-21 DIAGNOSIS — R52 PAIN: ICD-10-CM

## 2022-06-21 PROCEDURE — 99283 EMERGENCY DEPT VISIT LOW MDM: CPT | Mod: 25,ER

## 2022-06-21 PROCEDURE — 25000003 PHARM REV CODE 250: Mod: ER | Performed by: EMERGENCY MEDICINE

## 2022-06-21 RX ORDER — ACETAMINOPHEN 500 MG
1000 TABLET ORAL
Status: COMPLETED | OUTPATIENT
Start: 2022-06-21 | End: 2022-06-21

## 2022-06-21 RX ADMIN — ACETAMINOPHEN 1000 MG: 500 TABLET, FILM COATED ORAL at 07:06

## 2022-08-06 DIAGNOSIS — G47.00 INSOMNIA, UNSPECIFIED TYPE: ICD-10-CM

## 2022-08-06 NOTE — TELEPHONE ENCOUNTER
No new care gaps identified.  [facility] Embedded Care Gaps. Reference number: [id number]. 8/06/2022   10:29:20 AM CDT

## 2022-08-08 ENCOUNTER — PATIENT MESSAGE (OUTPATIENT)
Dept: FAMILY MEDICINE | Facility: CLINIC | Age: 57
End: 2022-08-08
Payer: OTHER GOVERNMENT
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
## 2022-08-08 NOTE — TELEPHONE ENCOUNTER
Messaged pt to see if he is still taking/if he requested the refill vs automated refill request from pharmacy

## 2022-08-09 ENCOUNTER — OFFICE VISIT (OUTPATIENT)
Dept: FAMILY MEDICINE | Facility: CLINIC | Age: 57
End: 2022-08-09
Payer: OTHER GOVERNMENT

## 2022-08-09 VITALS
SYSTOLIC BLOOD PRESSURE: 116 MMHG | HEIGHT: 72 IN | BODY MASS INDEX: 29.84 KG/M2 | TEMPERATURE: 98 F | HEART RATE: 66 BPM | DIASTOLIC BLOOD PRESSURE: 84 MMHG | OXYGEN SATURATION: 97 %

## 2022-08-09 DIAGNOSIS — B96.89 ACUTE BACTERIAL SINUSITIS: Primary | ICD-10-CM

## 2022-08-09 DIAGNOSIS — G47.00 INSOMNIA, UNSPECIFIED TYPE: ICD-10-CM

## 2022-08-09 DIAGNOSIS — J01.90 ACUTE BACTERIAL SINUSITIS: Primary | ICD-10-CM

## 2022-08-09 PROCEDURE — 99214 OFFICE O/P EST MOD 30 MIN: CPT | Mod: S$PBB,,, | Performed by: INTERNAL MEDICINE

## 2022-08-09 PROCEDURE — 99999 PR PBB SHADOW E&M-EST. PATIENT-LVL IV: CPT | Mod: PBBFAC,,, | Performed by: INTERNAL MEDICINE

## 2022-08-09 PROCEDURE — 99999 PR PBB SHADOW E&M-EST. PATIENT-LVL IV: ICD-10-PCS | Mod: PBBFAC,,, | Performed by: INTERNAL MEDICINE

## 2022-08-09 PROCEDURE — 99214 OFFICE O/P EST MOD 30 MIN: CPT | Mod: PBBFAC,PO | Performed by: INTERNAL MEDICINE

## 2022-08-09 PROCEDURE — 99214 PR OFFICE/OUTPT VISIT, EST, LEVL IV, 30-39 MIN: ICD-10-PCS | Mod: S$PBB,,, | Performed by: INTERNAL MEDICINE

## 2022-08-09 RX ORDER — TRAZODONE HYDROCHLORIDE 50 MG/1
25-50 TABLET ORAL NIGHTLY PRN
COMMUNITY
Start: 2022-07-03 | End: 2022-08-09 | Stop reason: SDUPTHER

## 2022-08-09 RX ORDER — AMOXICILLIN AND CLAVULANATE POTASSIUM 875; 125 MG/1; MG/1
1 TABLET, FILM COATED ORAL 2 TIMES DAILY
Qty: 20 TABLET | Refills: 0 | Status: SHIPPED | OUTPATIENT
Start: 2022-08-09 | End: 2022-08-19

## 2022-08-09 RX ORDER — TRAZODONE HYDROCHLORIDE 50 MG/1
25-50 TABLET ORAL NIGHTLY PRN
Qty: 90 TABLET | Refills: 3 | Status: SHIPPED | OUTPATIENT
Start: 2022-08-09 | End: 2023-03-15 | Stop reason: SDUPTHER

## 2022-08-09 RX ORDER — TRAZODONE HYDROCHLORIDE 50 MG/1
TABLET ORAL
Qty: 30 TABLET | Refills: 0 | OUTPATIENT
Start: 2022-08-09

## 2022-09-30 ENCOUNTER — HOSPITAL ENCOUNTER (OUTPATIENT)
Dept: RADIOLOGY | Facility: HOSPITAL | Age: 57
Discharge: HOME | End: 2022-09-30
Attending: INTERNAL MEDICINE
Payer: OTHER GOVERNMENT

## 2022-09-30 ENCOUNTER — OFFICE VISIT (OUTPATIENT)
Dept: INTERNAL MEDICINE | Facility: CLINIC | Age: 57
End: 2022-09-30
Payer: OTHER GOVERNMENT

## 2022-09-30 VITALS
HEIGHT: 72 IN | DIASTOLIC BLOOD PRESSURE: 82 MMHG | BODY MASS INDEX: 31.62 KG/M2 | HEART RATE: 89 BPM | OXYGEN SATURATION: 97 % | SYSTOLIC BLOOD PRESSURE: 116 MMHG | WEIGHT: 233.44 LBS

## 2022-09-30 DIAGNOSIS — M54.41 CHRONIC BILATERAL LOW BACK PAIN WITH RIGHT-SIDED SCIATICA: Primary | ICD-10-CM

## 2022-09-30 DIAGNOSIS — G89.29 CHRONIC BILATERAL LOW BACK PAIN WITH RIGHT-SIDED SCIATICA: Primary | ICD-10-CM

## 2022-09-30 DIAGNOSIS — M54.41 CHRONIC BILATERAL LOW BACK PAIN WITH RIGHT-SIDED SCIATICA: ICD-10-CM

## 2022-09-30 DIAGNOSIS — M54.2 CERVICAL SPINE PAIN: ICD-10-CM

## 2022-09-30 DIAGNOSIS — G89.29 CHRONIC BILATERAL LOW BACK PAIN WITH RIGHT-SIDED SCIATICA: ICD-10-CM

## 2022-09-30 PROCEDURE — 72040 XR CERVICAL SPINE AP LATERAL: ICD-10-PCS | Mod: 26,,, | Performed by: RADIOLOGY

## 2022-09-30 PROCEDURE — 99214 OFFICE O/P EST MOD 30 MIN: CPT | Mod: PBBFAC | Performed by: INTERNAL MEDICINE

## 2022-09-30 PROCEDURE — 99999 PR PBB SHADOW E&M-EST. PATIENT-LVL IV: ICD-10-PCS | Mod: PBBFAC,,, | Performed by: INTERNAL MEDICINE

## 2022-09-30 PROCEDURE — 72040 X-RAY EXAM NECK SPINE 2-3 VW: CPT | Mod: 26,,, | Performed by: RADIOLOGY

## 2022-09-30 PROCEDURE — 72110 XR LUMBAR SPINE AP AND LAT WITH FLEX/EXT: ICD-10-PCS | Mod: 26,,, | Performed by: RADIOLOGY

## 2022-09-30 PROCEDURE — 72110 X-RAY EXAM L-2 SPINE 4/>VWS: CPT | Mod: 26,,, | Performed by: RADIOLOGY

## 2022-09-30 PROCEDURE — 72040 X-RAY EXAM NECK SPINE 2-3 VW: CPT | Mod: TC

## 2022-09-30 PROCEDURE — 99214 PR OFFICE/OUTPT VISIT, EST, LEVL IV, 30-39 MIN: ICD-10-PCS | Mod: S$PBB,,, | Performed by: INTERNAL MEDICINE

## 2022-09-30 PROCEDURE — 99214 OFFICE O/P EST MOD 30 MIN: CPT | Mod: S$PBB,,, | Performed by: INTERNAL MEDICINE

## 2022-09-30 PROCEDURE — 72110 X-RAY EXAM L-2 SPINE 4/>VWS: CPT | Mod: TC

## 2022-09-30 PROCEDURE — 99999 PR PBB SHADOW E&M-EST. PATIENT-LVL IV: CPT | Mod: PBBFAC,,, | Performed by: INTERNAL MEDICINE

## 2022-09-30 RX ORDER — METHOCARBAMOL 500 MG/1
500 TABLET, FILM COATED ORAL 3 TIMES DAILY
Qty: 30 TABLET | Refills: 0 | Status: SHIPPED | OUTPATIENT
Start: 2022-09-30 | End: 2022-10-10

## 2022-09-30 NOTE — PROGRESS NOTES
57-year-old male  Appointment was made because of lumbar pain and cervical pain.    He has been having low back pain for while at least a few months but episodically before then.  Will feel the pain more when waking up in the morning and then later in the day particularly when he is doing work activity bending over.  On occasion pain can extend down to the right lower buttocks and upper leg.  He has had no injury that he can remember.  In the past he was told of having lumbar strain    For 1 month he has been having pain at the base of his neck upper thoracic region that will extend up to the upper left neck and sometimes left shoulder region.  No pain when he moves his arms is no radicular symptoms involving extremities    He is on the medicine Celebrex 200 mg once a day only as needed.  Is not every day.  He takes a because of a prior elbow injury    Medical history   Hypertension  Hyperlipidemia  GERD       Medication list per med card      Examination   Weight 233   Pulse 80   Blood pressure 116/80  2+ biceps triceps reflexes  no pain when abduct rotate arms at the shoulder joint   no pain when I palpate over the cervical area    1+ bilateral knee, trace ankle jerk reflex  Negative straight leg raise    Impression   Chronic lumbar pain prior due to lumbar degenerative and myofascial strain   Cervical myofascial strain    Plan  X-rays of the cervical vertebrae lumbar   Referred to physical therapy  For couple weeks Celebrex once a day in prescribed methocarbamol 500 mg 3 times a day as needed

## 2022-10-20 ENCOUNTER — CLINICAL SUPPORT (OUTPATIENT)
Dept: REHABILITATION | Facility: HOSPITAL | Age: 57
End: 2022-10-20
Attending: INTERNAL MEDICINE
Payer: OTHER GOVERNMENT

## 2022-10-20 DIAGNOSIS — M54.41 CHRONIC BILATERAL LOW BACK PAIN WITH RIGHT-SIDED SCIATICA: Primary | ICD-10-CM

## 2022-10-20 DIAGNOSIS — M54.2 CERVICAL SPINE PAIN: ICD-10-CM

## 2022-10-20 DIAGNOSIS — G89.29 CHRONIC BILATERAL LOW BACK PAIN WITH RIGHT-SIDED SCIATICA: Primary | ICD-10-CM

## 2022-10-20 PROCEDURE — 97140 MANUAL THERAPY 1/> REGIONS: CPT

## 2022-10-20 PROCEDURE — 97161 PT EVAL LOW COMPLEX 20 MIN: CPT

## 2022-10-20 NOTE — PLAN OF CARE
[facility]   Physical Therapy Initial Evaluation     Date: 10/20/2022   Name: Enoch DELUCA Momo  [facility] Number: [id number]    Therapy Diagnosis: No diagnosis found.  Physician: Juan C Henriquez MD    Physician Orders: PT Eval and Treat   Medical Diagnosis from Referral: M54.41,G89.29 (ICD-10-CM) - Chronic bilateral low back pain with right-sided sciatica M54.2 (ICD-10-CM) - Cervical spine pain   Evaluation Date: 10/20/2022  Authorization Period Expiration: 12/31/2022  Plan of Care Expiration: 12/14/2022  Progress Note Due: 10th visit  Visit # / Visits authorized: 1/ pending auth   FOTO: 1/10    Precautions: Standard     Time In: 4:50pm  Time Out: 5:40pm  Total Appointment Time (timed & untimed codes): 50 minutes      SUBJECTIVE     Date of onset: About 6 months ago     History of current condition - Neoch reports: per his MD note:     He has been having low back pain for while at least a few months but episodically before then.  Will feel the pain more when waking up in the morning and then later in the day particularly when he is doing work activity bending over.  On occasion pain can extend down to the right lower buttocks and upper leg.  He has had no injury that he can remember.  In the past he was told of having lumbar strain     Today he reports that the muscle relaxers his MD prescribed have helped a little bit.  His pain is worse in the morning and takes him a little while after a hot shower to get going.  He is stretching, and exercising at the gym and this seems to help him but he is not getting significantly better with this.      Falls: None     Imaging, MRI studies:   CLINICAL HISTORY:  Lumbago with sciatica, right side     TECHNIQUE:  AP and lateral views as well as lateral flexion and extension images are performed through the lumbar spine.     COMPARISON:  September 21, 2020     FINDINGS:  Redemonstrated minimal lumbar levocurvature.  Reconfirmed grade 1 retrolisthesis L2  with respect L3, L3 with respect L4, and L4 with respect L5.  No acute fractures, preserved vertebral body heights and pedicles.  No spondylolysis.  Flexion and extension views demonstrate no instability.  Disc narrowing L3-L4 and L5-S1 levels.  Other disc space levels preserved.  Mild facet arthropathic changes lower 2 levels.  Intact right and left SI joints.  The lumbar spine findings detailed above do not appear significantly changed to earlier exam.     Impression:     Prior Therapy: not yet  Social History:  lives with their spouse  Occupation: Works at a desk job   Prior Level of Function: I with ADLs and MRADLs   Current Level of Function: Difficulty with standing and bending for long periods of time    Pain:  Current 5/10, worst 7/10, best 3/10   Location: bilateral back  generalized   Description: Aching and Tight  Aggravating Factors: Standing, Bending, and Flexing  Easing Factors: pain medication    Patients goals: Reduce pain and improve flexibility and function      Medical History:   Past Medical History:   Diagnosis Date    GERD (gastroesophageal reflux disease)     Hypercholesteremia     Hypertension     on medication    Lumbar spondylosis 9/21/2020    Migraines     Sleep apnea        Surgical History:   Enoch DELUCA [place]  has a past surgical history that includes Sinus surgery (2012); Hernia repair; Colonoscopy (N/A, 8/10/2020); Repair of triceps tendon (Left, 10/2/2020); and Laparoscopic cholecystectomy (N/A, 1/25/2022).    Medications:   Enoch has a current medication list which includes the following prescription(s): atorvastatin, celecoxib, fexofenadine, lisinopril-hydrochlorothiazide, metoprolol succinate, omeprazole, and trazodone.    Allergies:   Review of patient's allergies indicates:  No Known Allergies       OBJECTIVE   Posture: Anterior rotated pelvis   R RB in standing  Increased Lumbar Lordosis     Spinal AROM:    MMT    Flexion: Limited 30%   4-/5  Extension: Limited 10%   3+/5  R SB:  Limited 20%    4-/5  L SB: Limited 20%    4-/5    Hip AROM:    MMT    IR: Limited 30%  4/5  ER: Limited 20%  4/5  Extension: WFL  4-/5  Abduction: WFL  4-/5    Flexibility:  Moderate to severe tightness in Quads and Hamstrings    Palpation:  TTP in Lumbosacral PSM, QL Bilaterally, and Gluteal mm        Limitation/Restriction for FOTO Lumbar Survey    Therapist reviewed FOTO scores for Enoch Barr on 10/20/2022.   FOTO documents entered into [employer] - see Media section.    Limitation Score: 37%         TREATMENT     Total Treatment time (time-based codes) separate from Evaluation: 15 minutes      Enoch received the treatments listed below:          manual therapy techniques: Myofacial release were applied to the: Lumbar PSM L2-5 for 15 minutes, including:  Therapeutic Dry Needling           PATIENT EDUCATION AND HOME EXERCISES     Education provided:   - Anatomy, physiology, diagnosis, prognosis, PT POC    Written Home Exercises Provided: Patient instructed to cont prior HEP. Exercises were reviewed and Enoch was able to demonstrate them prior to the end of the session.  Enoch demonstrated good  understanding of the education provided. See EMR under Patient Instructions for exercises provided during therapy sessions.    ASSESSMENT     Enoch is a 57 y.o. male referred to outpatient Physical Therapy with a medical diagnosis of chronic back pain. Patient presents with loss of spinal RANGE OF MOTION, tightness in hip and pelvic mm, and weakness in core and lumbopelvic mm.  PT to address these deficits with focused spine strengthening and stability program to facilitate return to PLOF with pain free completion of daily activities without limitation.     Patient prognosis is Good.   Patientt will benefit from skilled outpatient Physical Therapy to address the deficits stated above and in the chart below, provide patient /family education, and to maximize patientt's level of independence.     Plan of care discussed with  patient: Yes  Patient's spiritual, cultural and educational needs considered and patient is agreeable to the plan of care and goals as stated below:     Anticipated Barriers for therapy: None     Medical Necessity is demonstrated by the following  History  Co-morbidities and personal factors that may impact the plan of care Co-morbidities:   HTN    Personal Factors:   no deficits     low   Examination  Body Structures and Functions, activity limitations and participation restrictions that may impact the plan of care Body Regions:   neck  back    Body Systems:    ROM  strength    Participation Restrictions:   None     Activity limitations:   Learning and applying knowledge  no deficits    General Tasks and Commands  no deficits    Communication  no deficits    Mobility  walking    Self care  dressing    Domestic Life  doing house work (cleaning house, washing dishes, laundry)    Interactions/Relationships  no deficits    Life Areas  no deficits    Community and Social Life  community life  recreation and leisure   low   Clinical Presentation stable and uncomplicated low   Decision Making/ Complexity Score: low     GOALS: Short Term Goals:  8 weeks  1.Report decreased low back  pain  < / =  3-4/10  to increase tolerance for ADLs, MRADLs, and fitness activities.  2. Increase ROM by 10% where limited in order to perform ADLs without difficulty.  3. Increase strength by 1/3 MMT grade in hip and lumbopelvic mm  to increase tolerance for ADL and work activities.  4. Pt to tolerate HEP to improve ROM and independence with ADL's    Long Term Goals: 8 weeks  1.Report decreased low back pain < / = 1-2/10  to increase tolerance for ADLs, MRADLs, and fitness activities.   2.Patient goal: Reduce pain and improve flexibility and function   3.Increase strength to 4+/5 in  hip and lumbopelvic mm  to increase tolerance for ADL and work activities.  4. Pt will report at 20% or less limitations on FOTO  to demonstrate increase in LE  function with every day tasks.    PLAN   Plan of care Certification: 10/20/2022 to 12/14/2022.    Outpatient Physical Therapy 2 times weekly for 6 weeks to include the following interventions: Manual Therapy, Moist Heat/ Ice, Neuromuscular Re-ed, Patient Education, Therapeutic Activities, and Therapeutic Exercise.     Kianna Romeo, PT      I CERTIFY THE NEED FOR THESE SERVICES FURNISHED UNDER THIS PLAN OF TREATMENT AND WHILE UNDER MY CARE   Physician's comments:     Physician's Signature: ___________________________________________________

## 2022-10-24 ENCOUNTER — CLINICAL SUPPORT (OUTPATIENT)
Dept: REHABILITATION | Facility: HOSPITAL | Age: 57
End: 2022-10-24
Payer: OTHER GOVERNMENT

## 2022-10-24 DIAGNOSIS — G89.29 CHRONIC BILATERAL LOW BACK PAIN WITH RIGHT-SIDED SCIATICA: Primary | ICD-10-CM

## 2022-10-24 DIAGNOSIS — M54.41 CHRONIC BILATERAL LOW BACK PAIN WITH RIGHT-SIDED SCIATICA: Primary | ICD-10-CM

## 2022-10-24 PROCEDURE — 97110 THERAPEUTIC EXERCISES: CPT | Mod: CQ

## 2022-10-24 NOTE — PROGRESS NOTES
[facility]   Physical Therapy Treatment Note     Name: Enoch DELUCA Momo  [facility] Number: [id number]    Therapy Diagnosis:   Encounter Diagnosis   Name Primary?    Chronic bilateral low back pain with right-sided sciatica Yes     Physician: Mckenna Nixon DPM    Visit Date: 10/24/2022    Physician Orders: PT Eval and Treat   Medical Diagnosis from Referral: M54.41,G89.29 (ICD-10-CM) - Chronic bilateral low back pain with right-sided sciatica M54.2 (ICD-10-CM) - Cervical spine pain   Evaluation Date: 10/20/2022  Authorization Period Expiration: 12/31/2022  Plan of Care Expiration: 12/14/2022  Progress Note Due: 10th visit  Visit # / Visits authorized: 1/ 15  FOTO: 1/10    PTA Visit #: 1/5     Time In: 3:00 pm  Time Out: 3:58 pm  Total Billable Time: 58 minutes    Precautions: Standard     SUBJECTIVE     Pt reports: just a little bit of pain in his low back which worsens if he sits for a long time . Pt stated he goes to the gym and performs stretches and walks on the treadmill which he feels helps.   He  did not receive  a home exercise program at [facility].   Response to previous treatment: a little sore after  Functional change: none    Pain: 3/10  Location: B low back     OBJECTIVE     Objective Measures updated at progress report unless specified.     Treatment     Enoch received the treatments listed below:      therapeutic exercises to develop strength, endurance, ROM, posture, and core stabilization for 58 minutes including:    Aerobic activity and endurance training for reciprocal motion of lower limbs on Nustep for 7 minutes at Level 3.0 at > or equal to 50 spm w/ rest to increase mobility, blood flow and improve tissue tolerance     Piriformis stretch : 3 x 30'' B   HSS c/ strap : 3 x 30''  Quad stretch c/ strap : 3 x 30''  Posterior pelvic tilts : 2 x 10 reps , 5'' hold   Braced march : 2 x 10 reps   Glut set into mini bridge : 4 x 5 reps   SL clamshells RTB: 2 x 10 reps BLE      Patient  Education and Home Exercises     Home Exercises Provided and Patient Education Provided     Education provided:   - HEP provided and reviewed (10/24/2022)    Written Home Exercises Provided: yes. Exercises were reviewed and Enoch was able to demonstrate them prior to the end of the session.  Enoch demonstrated good  understanding of the education provided. See EMR under Patient Instructions for exercises provided during therapy sessions    ASSESSMENT     Pt with a good tolerance to session today . Noted significant HS tightness and weakness . Pt with difficulty performing bridging due to HS cramping , improved tolerance with cues for glut recruitment. Pt demonstrated good ability to brace core although was challenged maintaining core activation with dual activity. Will continue to progress next .     Enoch Is progressing well towards his goals.   Pt prognosis is Good.     Pt will continue to benefit from skilled outpatient physical therapy to address the deficits listed in the problem list box on initial evaluation, provide pt/family education and to maximize pt's level of independence in the home and community environment.   Pt's spiritual, cultural and educational needs considered and pt agreeable to plan of care and goals.     Anticipated barriers to physical therapy: none    GOALS: Short Term Goals:  8 weeks  1.Report decreased low back  pain  < / =  3-4/10  to increase tolerance for ADLs, MRADLs, and fitness activities.  2. Increase ROM by 10% where limited in order to perform ADLs without difficulty.  3. Increase strength by 1/3 MMT grade in hip and lumbopelvic mm  to increase tolerance for ADL and work activities.  4. Pt to tolerate HEP to improve ROM and independence with ADL's     Long Term Goals: 8 weeks  1.Report decreased low back pain < / = 1-2/10  to increase tolerance for ADLs, MRADLs, and fitness activities.   2.Patient goal: Reduce pain and improve flexibility and function   3.Increase strength to  4+/5 in  hip and lumbopelvic mm  to increase tolerance for ADL and work activities.  4. Pt will report at 20% or less limitations on FOTO  to demonstrate increase in LE function with every day tasks.      PLAN     Continue to progress core and hip strengthening     Minoo Matthews, PTA

## 2022-10-26 ENCOUNTER — CLINICAL SUPPORT (OUTPATIENT)
Dept: REHABILITATION | Facility: HOSPITAL | Age: 57
End: 2022-10-26
Payer: OTHER GOVERNMENT

## 2022-10-26 DIAGNOSIS — G89.29 CHRONIC BILATERAL LOW BACK PAIN WITH RIGHT-SIDED SCIATICA: Primary | ICD-10-CM

## 2022-10-26 DIAGNOSIS — M54.41 CHRONIC BILATERAL LOW BACK PAIN WITH RIGHT-SIDED SCIATICA: Primary | ICD-10-CM

## 2022-10-26 PROCEDURE — 97110 THERAPEUTIC EXERCISES: CPT | Mod: CQ

## 2022-10-26 NOTE — PROGRESS NOTES
[facility]   Physical Therapy Treatment Note     Name: Enoch Barr  [facility] Number: [id number]    Therapy Diagnosis:   Encounter Diagnosis   Name Primary?    Chronic bilateral low back pain with right-sided sciatica Yes     Physician: Mckenna Nixon DPM    Visit Date: 10/26/2022    Physician Orders: PT Eval and Treat   Medical Diagnosis from Referral: M54.41,G89.29 (ICD-10-CM) - Chronic bilateral low back pain with right-sided sciatica M54.2 (ICD-10-CM) - Cervical spine pain   Evaluation Date: 10/20/2022  Authorization Period Expiration: 12/31/2022  Plan of Care Expiration: 12/14/2022  Progress Note Due: 10th visit  Visit # / Visits authorized: 5 / 15    PTA Visit #: 2 / 5     Time In: 1500  Time Out: 1600  Total Billable Time: 30 minutes (2 TE)    Precautions: Standard     SUBJECTIVE     Patient reports: that he does not have pain if he stands upright. Reports discomfort if he is bent over doing something or has to pick something up.   He was a home exercise program at [facility].   Response to previous treatment: good; appropriate muscle response  Functional change: none to note today    Pain: 3/10, currently  Location: B low back     OBJECTIVE     Objective Measures updated at progress report unless specified.     Treatment     Enoch received the treatments listed below:      therapeutic exercises to develop strength, endurance, ROM, posture, and core stabilization for 60 minutes including:    Aerobic activity and endurance training for reciprocal motion of lower limbs on Nustep for 7 minutes at Level 3.0 at > or equal to 50 spm w/ rest to increase mobility, blood flow and improve tissue tolerance     Piriformis stretch (Figure 4): 3 x 30 second hold B   HSS c/ strap; 3 x 30 second hold B  +Supine hip flexor with quad stretch; 30 second hold, 3 reps B - can be d/c'd next visit  +Prone on elbows; 10 second hold, 10 reps    Posterior pelvic tilts; 5 second hold, 3 minutes   Braced march; 3  minutes  Glut set into mini bridge: 2 x 10 reps   SL clamshells RTB: 3 x 10 reps BLE    +Lifting/squatting techniques; add next visit    Patient Education and Home Exercises     Home Exercises Provided and Patient Education Provided     Education provided:   - HEP provided and reviewed (10/24/2022)    Written Home Exercises Provided: yes. Exercises were reviewed and Enoch was able to demonstrate them prior to the end of the session.  Enoch demonstrated good  understanding of the education provided. See EMR under Patient Instructions for exercises provided during therapy sessions    ASSESSMENT     Defer prone quad and hip flexor stretch next visit; patient demonstrating improvement in tissue extensibility. He had good tolerance to exercises performed. Mod cueing required for proper core activation with dual activity. No exacerbation of pain during session. Continue progressing per POC towards treatment goals. Consider lifting and squatting technique next visit.  Enoch Is progressing well towards his goals.   Pt prognosis is Good.     Pt will continue to benefit from skilled outpatient physical therapy to address the deficits listed in the problem list box on initial evaluation, provide pt/family education and to maximize pt's level of independence in the home and community environment.   Pt's spiritual, cultural and educational needs considered and pt agreeable to plan of care and goals.     Anticipated barriers to physical therapy: none    GOALS: Short Term Goals:  8 weeks  1.Report decreased low back  pain  < / =  3-4/10  to increase tolerance for ADLs, MRADLs, and fitness activities.  2. Increase ROM by 10% where limited in order to perform ADLs without difficulty.  3. Increase strength by 1/3 MMT grade in hip and lumbopelvic mm  to increase tolerance for ADL and work activities.  4. Pt to tolerate HEP to improve ROM and independence with ADL's     Long Term Goals: 8 weeks  1.Report decreased low back pain < / =  1-2/10  to increase tolerance for ADLs, MRADLs, and fitness activities.   2.Patient goal: Reduce pain and improve flexibility and function   3.Increase strength to 4+/5 in  hip and lumbopelvic mm  to increase tolerance for ADL and work activities.  4. Pt will report at 20% or less limitations on FOTO  to demonstrate increase in LE function with every day tasks.      PLAN     Continue to progress core and hip strengthening.    Anjali Ritchie, PTA

## 2022-10-29 ENCOUNTER — OFFICE VISIT (OUTPATIENT)
Dept: URGENT CARE | Facility: CLINIC | Age: 57
End: 2022-10-29
Payer: OTHER GOVERNMENT

## 2022-10-29 VITALS
DIASTOLIC BLOOD PRESSURE: 95 MMHG | OXYGEN SATURATION: 97 % | HEART RATE: 84 BPM | SYSTOLIC BLOOD PRESSURE: 127 MMHG | BODY MASS INDEX: 31.56 KG/M2 | RESPIRATION RATE: 17 BRPM | WEIGHT: 233 LBS | HEIGHT: 72 IN | TEMPERATURE: 98 F

## 2022-10-29 DIAGNOSIS — J01.91 ACUTE RECURRENT SINUSITIS, UNSPECIFIED LOCATION: Primary | ICD-10-CM

## 2022-10-29 DIAGNOSIS — R09.81 NASAL CONGESTION: ICD-10-CM

## 2022-10-29 PROCEDURE — 99213 OFFICE O/P EST LOW 20 MIN: CPT | Mod: S$GLB,,, | Performed by: PHYSICIAN ASSISTANT

## 2022-10-29 PROCEDURE — 99213 PR OFFICE/OUTPT VISIT, EST, LEVL III, 20-29 MIN: ICD-10-PCS | Mod: S$GLB,,, | Performed by: PHYSICIAN ASSISTANT

## 2022-10-29 RX ORDER — FLUTICASONE PROPIONATE 50 MCG
1 SPRAY, SUSPENSION (ML) NASAL DAILY
Qty: 16 G | Refills: 3 | Status: SHIPPED | OUTPATIENT
Start: 2022-10-29 | End: 2024-03-15 | Stop reason: SDUPTHER

## 2022-10-29 RX ORDER — AMOXICILLIN AND CLAVULANATE POTASSIUM 875; 125 MG/1; MG/1
1 TABLET, FILM COATED ORAL EVERY 12 HOURS
Qty: 20 TABLET | Refills: 0 | Status: SHIPPED | OUTPATIENT
Start: 2022-10-29 | End: 2022-12-27

## 2022-10-29 RX ORDER — CROMOLYN SODIUM 5.2 MG/ML
1 SPRAY, METERED NASAL 4 TIMES DAILY
Qty: 26 ML | Refills: 1 | Status: SHIPPED | OUTPATIENT
Start: 2022-10-29

## 2022-10-29 NOTE — PROGRESS NOTES
Subjective:       Patient ID: Enoch Barr is a 57 y.o. male.    Vitals:  height is 6' (1.829 m) and weight is 105.7 kg (233 lb). His oral temperature is 98.3 °F (36.8 °C). His blood pressure is 127/95 (abnormal) and his pulse is 84. His respiration is 17 and oxygen saturation is 97%.     Chief Complaint: Sinus Problem    Patient with history of recurrent sinus infections a comes in complaining of 4-5 days thick green nasal discharge frontal headache and subjective fevers    Sinus Problem  This is a new problem. Episode onset: 4 days ago. The problem is unchanged. There has been no fever. The pain is moderate. Associated symptoms include headaches and sinus pressure. (Post nasal drip, thick yellow mucus production) Treatments tried: dayquil. The treatment provided mild relief.     HENT:  Positive for sinus pain and sinus pressure.    Skin:  Negative for erythema.   Neurological:  Positive for headaches.     Objective:      Physical Exam   Constitutional: He is oriented to person, place, and time. He appears well-developed. He is cooperative.  Non-toxic appearance. He does not appear ill. No distress.   HENT:   Head: Normocephalic and atraumatic.      Comments: There is some obvious very mild bilateral lower eyelid swelling with overlying maxillary sinus that is mild with no erythema  Ears:   Right Ear: Hearing, tympanic membrane, external ear and ear canal normal.   Left Ear: Hearing, tympanic membrane, external ear and ear canal normal.   Nose: Rhinorrhea and congestion present. No mucosal edema or nasal deformity. No epistaxis. Right sinus exhibits no maxillary sinus tenderness and no frontal sinus tenderness. Left sinus exhibits no maxillary sinus tenderness and no frontal sinus tenderness.   Mouth/Throat: Uvula is midline and mucous membranes are normal. No trismus in the jaw. Normal dentition. No uvula swelling. Posterior oropharyngeal erythema present. No oropharyngeal exudate or posterior oropharyngeal  edema.   Eyes: Conjunctivae, EOM and lids are normal. Pupils are equal, round, and reactive to light. Right eye exhibits no discharge. Left eye exhibits no discharge. No scleral icterus.   Neck: Trachea normal and phonation normal. Neck supple. No JVD present. No tracheal deviation present. No thyromegaly present. No edema present. No erythema present. No neck rigidity present.   Cardiovascular: Normal rate, regular rhythm, normal heart sounds and normal pulses.   No murmur heard.Exam reveals no gallop and no friction rub.   Pulmonary/Chest: Effort normal and breath sounds normal. No stridor. No respiratory distress. He has no decreased breath sounds. He has no wheezes. He has no rhonchi. He has no rales. He exhibits no tenderness.   Abdominal: Normal appearance. He exhibits no distension. Soft. There is no abdominal tenderness. There is no rebound and no guarding.   Musculoskeletal: Normal range of motion.         General: No deformity. Normal range of motion.   Neurological: He is alert and oriented to person, place, and time. He exhibits normal muscle tone. Coordination normal.   Skin: Skin is warm, dry, intact, not diaphoretic, not pale and no rash. Capillary refill takes less than 2 seconds. No erythema   Psychiatric: His speech is normal and behavior is normal. Judgment and thought content normal.   Nursing note and vitals reviewed.      Assessment:       1. Acute recurrent sinusitis, unspecified location    2. Nasal congestion          Plan:         Acute recurrent sinusitis, unspecified location  -     amoxicillin-clavulanate 875-125mg (AUGMENTIN) 875-125 mg per tablet; Take 1 tablet by mouth every 12 (twelve) hours.  Dispense: 20 tablet; Refill: 0  -     fluticasone propionate (FLONASE) 50 mcg/actuation nasal spray; 1 spray (50 mcg total) by Each Nostril route once daily.  Dispense: 16 g; Refill: 3  -     cromolyn (NASALCHROM) 5.2 mg/spray (4 %) nasal spray; 1 spray by Nasal route 4 (four) times daily.   Dispense: 26 mL; Refill: 1    Nasal congestion  -     fluticasone propionate (FLONASE) 50 mcg/actuation nasal spray; 1 spray (50 mcg total) by Each Nostril route once daily.  Dispense: 16 g; Refill: 3  -     cromolyn (NASALCHROM) 5.2 mg/spray (4 %) nasal spray; 1 spray by Nasal route 4 (four) times daily.  Dispense: 26 mL; Refill: 1     Follow up if symptoms worsen or fail to improve, for F/U with PCP or ED. There are no Patient Instructions on file for this visit.

## 2022-10-31 ENCOUNTER — CLINICAL SUPPORT (OUTPATIENT)
Dept: REHABILITATION | Facility: HOSPITAL | Age: 57
End: 2022-10-31
Payer: OTHER GOVERNMENT

## 2022-10-31 DIAGNOSIS — M54.41 CHRONIC BILATERAL LOW BACK PAIN WITH RIGHT-SIDED SCIATICA: Primary | ICD-10-CM

## 2022-10-31 DIAGNOSIS — G89.29 CHRONIC BILATERAL LOW BACK PAIN WITH RIGHT-SIDED SCIATICA: Primary | ICD-10-CM

## 2022-10-31 PROCEDURE — 97110 THERAPEUTIC EXERCISES: CPT | Mod: CQ

## 2022-10-31 NOTE — PROGRESS NOTES
[facility]   Physical Therapy Treatment Note     Name: Enoch DELUCA Momo  [facility] Number: [id number]    Therapy Diagnosis:   Encounter Diagnosis   Name Primary?    Chronic bilateral low back pain with right-sided sciatica Yes       Physician: Mckenna Nixon DPM    Visit Date: 10/31/2022    Physician Orders: PT Eval and Treat   Medical Diagnosis from Referral: M54.41,G89.29 (ICD-10-CM) - Chronic bilateral low back pain with right-sided sciatica M54.2 (ICD-10-CM) - Cervical spine pain   Evaluation Date: 10/20/2022  Authorization Period Expiration: 12/31/2022  Plan of Care Expiration: 12/14/2022  Progress Note Due: 10th visit  Visit # / Visits authorized: 6 / 15    PTA Visit #: 3 / 5    Time In: 4:35 pm  Time Out: 5:40 pm  Total Billable Time: 30 minutes (2 TE)    Precautions: Standard     SUBJECTIVE     Patient reports: he is having pain in his L low back and pain going down his leg to his knee .   He was a home exercise program at [facility].   Response to previous treatment: good; appropriate muscle response  Functional change: none to note today    Pain: 4-5/10, currently  Location: B low back     OBJECTIVE     Objective Measures updated at progress report unless specified.     Treatment     Enoch received the treatments listed below:      therapeutic exercises to develop strength, endurance, ROM, posture, and core stabilization for 60 minutes including:    Aerobic activity and endurance training for reciprocal motion of lower limbs on Nustep for 7 minutes at Level 3.0 at > or equal to 50 spm w/ rest to increase mobility, blood flow and improve tissue tolerance     Piriformis stretch (Figure 4): 3 x 30 second hold B   HSS c/ strap; 3 x 30 second hold B  Sciatic nerve glides : 3 x 10 reps   Prone on elbows; 10 second hold, 10 reps    Posterior pelvic tilts; 5 second hold, 3 minutes   Braced march; 3 minutes  Glut set into mini bridge: 2 x 10 reps   SL clamshells RTB: 3 x 10 reps BLE  +Seated hip  hinging c/ cane for cue: 2 x 10 reps     +Lifting/squatting techniques; add next visit    Patient Education and Home Exercises     Home Exercises Provided and Patient Education Provided     Education provided:   - HEP provided and reviewed (10/24/2022)    Written Home Exercises Provided: yes. Exercises were reviewed and Enoch was able to demonstrate them prior to the end of the session.  Enoch demonstrated good  understanding of the education provided. See EMR under Patient Instructions for exercises provided during therapy sessions    ASSESSMENT     Pt continues to require mod cues for maintaining core activation with dual activity and for proper performance. Attempt to progress with sit to stands and standing hip hinges which pt was very challenged with performing correctly due to poor motor control. He responded well to seated hip hinging with use of cane for cue for neutral spine. Will plan to progress next with postural training for squatting/lifting .     Enoch Is progressing well towards his goals.   Pt prognosis is Good.     Pt will continue to benefit from skilled outpatient physical therapy to address the deficits listed in the problem list box on initial evaluation, provide pt/family education and to maximize pt's level of independence in the home and community environment.   Pt's spiritual, cultural and educational needs considered and pt agreeable to plan of care and goals.     Anticipated barriers to physical therapy: none    GOALS: Short Term Goals:  8 weeks  1.Report decreased low back  pain  < / =  3-4/10  to increase tolerance for ADLs, MRADLs, and fitness activities.  2. Increase ROM by 10% where limited in order to perform ADLs without difficulty.  3. Increase strength by 1/3 MMT grade in hip and lumbopelvic mm  to increase tolerance for ADL and work activities.  4. Pt to tolerate HEP to improve ROM and independence with ADL's     Long Term Goals: 8 weeks  1.Report decreased low back pain < / =  1-2/10  to increase tolerance for ADLs, MRADLs, and fitness activities.   2.Patient goal: Reduce pain and improve flexibility and function   3.Increase strength to 4+/5 in  hip and lumbopelvic mm  to increase tolerance for ADL and work activities.  4. Pt will report at 20% or less limitations on FOTO  to demonstrate increase in LE function with every day tasks.      PLAN     Continue to progress core and hip strengthening.    Minoo Matthews, PTA

## 2022-11-14 ENCOUNTER — CLINICAL SUPPORT (OUTPATIENT)
Dept: REHABILITATION | Facility: HOSPITAL | Age: 57
End: 2022-11-14
Payer: OTHER GOVERNMENT

## 2022-11-14 DIAGNOSIS — M54.41 CHRONIC BILATERAL LOW BACK PAIN WITH RIGHT-SIDED SCIATICA: Primary | ICD-10-CM

## 2022-11-14 DIAGNOSIS — G89.29 CHRONIC BILATERAL LOW BACK PAIN WITH RIGHT-SIDED SCIATICA: Primary | ICD-10-CM

## 2022-11-14 PROCEDURE — 97140 MANUAL THERAPY 1/> REGIONS: CPT | Mod: CQ

## 2022-11-14 PROCEDURE — 97110 THERAPEUTIC EXERCISES: CPT | Mod: CQ

## 2022-11-14 NOTE — PROGRESS NOTES
[facility]   Physical Therapy Treatment Note     Name: Enoch DELUCA Momo  [facility] Number: [id number]    Therapy Diagnosis:   Encounter Diagnosis   Name Primary?    Chronic bilateral low back pain with right-sided sciatica Yes       Physician: Mckenna Nixon DPM    Visit Date: 11/14/2022    Physician Orders: PT Eval and Treat   Medical Diagnosis from Referral: M54.41,G89.29 (ICD-10-CM) - Chronic bilateral low back pain with right-sided sciatica M54.2 (ICD-10-CM) - Cervical spine pain   Evaluation Date: 10/20/2022  Authorization Period Expiration: 12/31/2022  Plan of Care Expiration: 12/14/2022  Progress Note Due: 10th visit  Visit # / Visits authorized: 7 / 15    PTA Visit #: 4 / 5    Time In: 5:00 pm  Time Out: 6:00 pm  Total Billable Time: 60 minutes (3 TE, MT -1)    Precautions: Standard     SUBJECTIVE     Patient reports: he just got back from vacation. He is having some pain across his low back . He has not had pain going down the leg since last week but feels a lot of tightness in the side of his thigh .   He was somewhat complaint with home exercise program.   Response to previous treatment: good; appropriate muscle response  Functional change: none to note today    Pain: 3/10, currently  Location: B low back     OBJECTIVE     Objective Measures updated at progress report unless specified.     Treatment     Enoch received the treatments listed below:      therapeutic exercises to develop strength, endurance, ROM, posture, and core stabilization for 50 minutes including:    Aerobic activity and endurance training for reciprocal motion of lower limbs on Nustep for 5 minutes at Level 3.0 at > or equal to 50 spm w/ rest to increase mobility, blood flow and improve tissue tolerance     Piriformis stretch (Figure 4): 3 x 30 second hold B   HSS c/ strap; 3 x 30 second hold B  Sciatic nerve glides : 3 x 10 reps   Prone on elbows; 10 second hold, 10 reps    Posterior pelvic tilts; 5 second  hold, 3 minutes   Braced march : 3 x 10 reps   Glut set into mini bridge: 2 x 10 reps   SL clamshells GTB: 3 x 10 reps BLE  Seated hip hinging c/ cane for cue: 2 x 10 reps   Sit to stands : 2 x 10 reps       manual therapy techniques: Soft tissue Mobilization were applied to the: L LE for 10 minutes, including:  Thera roller to L lateral proximal thigh       Patient Education and Home Exercises     Home Exercises Provided and Patient Education Provided     Education provided:   - HEP provided and reviewed (10/24/2022)    Written Home Exercises Provided: yes. Exercises were reviewed and Enoch was able to demonstrate them prior to the end of the session.  Enoch demonstrated good  understanding of the education provided. See EMR under Patient Instructions for exercises provided during therapy sessions    ASSESSMENT     Pt demonstrated improved core activation with less cues required although still exhibits weakness while performing. He continues with significant hamstring and lateral hip muscular restrictions as well as weakness. Increased tightness and cramping today noted with exercise performance so resistance and reps modified . He would benefit from continued focus on core and hip strengthening to promote lumbo pelvic stabilization.     Enoch Is progressing well towards his goals.   Pt prognosis is Good.     Pt will continue to benefit from skilled outpatient physical therapy to address the deficits listed in the problem list box on initial evaluation, provide pt/family education and to maximize pt's level of independence in the home and community environment.   Pt's spiritual, cultural and educational needs considered and pt agreeable to plan of care and goals.     Anticipated barriers to physical therapy: none    GOALS: Short Term Goals:  8 weeks  1.Report decreased low back  pain  < / =  3-4/10  to increase tolerance for ADLs, MRADLs, and fitness activities.  2. Increase ROM by 10% where limited in order to  perform ADLs without difficulty.  3. Increase strength by 1/3 MMT grade in hip and lumbopelvic mm  to increase tolerance for ADL and work activities.  4. Pt to tolerate HEP to improve ROM and independence with ADL's     Long Term Goals: 8 weeks  1.Report decreased low back pain < / = 1-2/10  to increase tolerance for ADLs, MRADLs, and fitness activities.   2.Patient goal: Reduce pain and improve flexibility and function   3.Increase strength to 4+/5 in  hip and lumbopelvic mm  to increase tolerance for ADL and work activities.  4. Pt will report at 20% or less limitations on FOTO  to demonstrate increase in LE function with every day tasks.      PLAN     Continue to progress core and hip strengthening.    Minoo Matthews, PTA

## 2022-11-17 ENCOUNTER — CLINICAL SUPPORT (OUTPATIENT)
Dept: REHABILITATION | Facility: HOSPITAL | Age: 57
End: 2022-11-17
Payer: OTHER GOVERNMENT

## 2022-11-17 DIAGNOSIS — M54.41 CHRONIC BILATERAL LOW BACK PAIN WITH RIGHT-SIDED SCIATICA: Primary | ICD-10-CM

## 2022-11-17 DIAGNOSIS — G89.29 CHRONIC BILATERAL LOW BACK PAIN WITH RIGHT-SIDED SCIATICA: Primary | ICD-10-CM

## 2022-11-17 PROCEDURE — 97110 THERAPEUTIC EXERCISES: CPT

## 2022-11-17 NOTE — PROGRESS NOTES
"[facility]   Physical Therapy Treatment Note     Name: Enoch Barr  Clinic Number: [id number]    Therapy Diagnosis:   No diagnosis found.      Physician: Mckenna Nixon DPM    Visit Date: 11/17/2022    Physician Orders: PT Eval and Treat   Medical Diagnosis from Referral: M54.41,G89.29 (ICD-10-CM) - Chronic bilateral low back pain with right-sided sciatica M54.2 (ICD-10-CM) - Cervical spine pain   Evaluation Date: 10/20/2022  Authorization Period Expiration: 12/31/2022  Plan of Care Expiration: 12/14/2022  Progress Note Due: 10th visit  Visit # / Visits authorized: 8 / 15    PTA Visit #: 0 / 5    Time In: 4:00 pm  Time Out:  5:07pmpm  Total Billable Time: 67minutes (4 TE )    Precautions: Standard     SUBJECTIVE     Patient reports: he is feeling "good" today.  No new c/o and no significant changes noted.  He has pain in his lower and mid back today.  No pain in his hip and LE.         He was somewhat complaint with home exercise program.   Response to previous treatment: good; appropriate muscle response  Functional change: none to note today    Pain: 3/10, currently  Location: B low back     OBJECTIVE     Objective Measures updated at progress report unless specified.     Treatment     Enoch received the treatments listed below:      therapeutic exercises to develop strength, endurance, ROM, posture, and core stabilization for 50 minutes including:    Aerobic activity and endurance training for reciprocal motion of lower limbs on Nustep for 10 minutes at Level 4.0 at > or equal to 50 spm w/ rest to increase mobility, blood flow and improve tissue tolerance     Piriformis stretch (Figure 4): 3 x 30 second hold B   HSS c/ strap; 3 x 30 second hold B  -NT Sciatic nerve glides : 3 x 10 reps   Prone on elbows; 10 second hold, 10 reps  Standing QL Stretch 3 x 30 sec     Posterior pelvic tilts; 5 second hold, 3 minutes   Braced march : 3 x 10 reps   Glut set into mini bridge: 3 x 10 reps "   SL clamshells GTB: 3 x 10 reps BLE  NT-Seated hip hinging c/ cane for cue: 2 x 10 reps   Sit to stands : 2 x 10 reps       manual therapy techniques: Soft tissue Mobilization were applied to the: L LE for 10 minutes, including:  Thera roller to L lateral proximal thigh       Patient Education and Home Exercises     Home Exercises Provided and Patient Education Provided     Education provided:   - HEP provided and reviewed (10/24/2022)    Written Home Exercises Provided: yes. Exercises were reviewed and Enoch was able to demonstrate them prior to the end of the session.  Enoch demonstrated good  understanding of the education provided. See EMR under Patient Instructions for exercises provided during therapy sessions    ASSESSMENT   Patient tolerated there ex well without incident.  Pt was educated on importance of core stabilization for lower back pain reduction.  He is tolerating more functional activities with less pain in his lower back since starting PT.      Enoch Is progressing well towards his goals.   Pt prognosis is Good.     Pt will continue to benefit from skilled outpatient physical therapy to address the deficits listed in the problem list box on initial evaluation, provide pt/family education and to maximize pt's level of independence in the home and community environment.   Pt's spiritual, cultural and educational needs considered and pt agreeable to plan of care and goals.     Anticipated barriers to physical therapy: none    GOALS: Short Term Goals:  8 weeks  1.Report decreased low back  pain  < / =  3-4/10  to increase tolerance for ADLs, MRADLs, and fitness activities.  2. Increase ROM by 10% where limited in order to perform ADLs without difficulty.  3. Increase strength by 1/3 MMT grade in hip and lumbopelvic mm  to increase tolerance for ADL and work activities.  4. Pt to tolerate HEP to improve ROM and independence with ADL's     Long Term Goals: 8 weeks  1.Report decreased low back pain < /  = 1-2/10  to increase tolerance for ADLs, MRADLs, and fitness activities.   2.Patient goal: Reduce pain and improve flexibility and function   3.Increase strength to 4+/5 in  hip and lumbopelvic mm  to increase tolerance for ADL and work activities.  4. Pt will report at 20% or less limitations on FOTO  to demonstrate increase in LE function with every day tasks.      PLAN     Continue to progress core and hip strengthening.    Kianna Romeo, PT

## 2022-11-21 ENCOUNTER — CLINICAL SUPPORT (OUTPATIENT)
Dept: REHABILITATION | Facility: HOSPITAL | Age: 57
End: 2022-11-21
Payer: OTHER GOVERNMENT

## 2022-11-21 DIAGNOSIS — G89.29 CHRONIC BILATERAL LOW BACK PAIN WITH RIGHT-SIDED SCIATICA: Primary | ICD-10-CM

## 2022-11-21 DIAGNOSIS — M54.41 CHRONIC BILATERAL LOW BACK PAIN WITH RIGHT-SIDED SCIATICA: Primary | ICD-10-CM

## 2022-11-21 PROCEDURE — 97140 MANUAL THERAPY 1/> REGIONS: CPT | Mod: CQ

## 2022-11-21 PROCEDURE — 97110 THERAPEUTIC EXERCISES: CPT | Mod: CQ

## 2022-11-21 NOTE — PROGRESS NOTES
[facility]   Physical Therapy Treatment Note     Name: Enoch DELUCA Momo  [facility] Number: [id number]    Therapy Diagnosis:   Encounter Diagnosis   Name Primary?    Chronic bilateral low back pain with right-sided sciatica Yes       Physician: Mckenna Nixon DPM    Visit Date: 11/21/2022    Physician Orders: PT Eval and Treat   Medical Diagnosis from Referral: M54.41,G89.29 (ICD-10-CM) - Chronic bilateral low back pain with right-sided sciatica M54.2 (ICD-10-CM) - Cervical spine pain   Evaluation Date: 10/20/2022  Authorization Period Expiration: 12/31/2022  Plan of Care Expiration: 12/14/2022  Progress Note Due: 10th visit  Visit # / Visits authorized: 8 / 15    PTA Visit #: 1 / 5    Time In: 4:50 pm  Time Out:  5:50 pm  Total Billable Time: 60 minutes     Precautions: Standard     SUBJECTIVE     Patient reports: sore in his low back and tight in the side of his leg down to his calf.     He was somewhat complaint with home exercise program.   Response to previous treatment: good; appropriate muscle response  Functional change: none to note today    Pain: 3/10, currently  Location: B low back     OBJECTIVE     Objective Measures updated at progress report unless specified.     Treatment     Enoch received the treatments listed below:      therapeutic exercises to develop strength, endurance, ROM, posture, and core stabilization for 45 minutes including:    Aerobic activity and endurance training for reciprocal motion of lower limbs on Nustep for 10 minutes at Level 4.0 at > or equal to 50 spm w/ rest to increase mobility, blood flow and improve tissue tolerance     Piriformis stretch (Figure 4): 3 x 30 second hold B - NP  HSS c/ strap; 3 x 30 second hold B- NP   Sciatic nerve glides : 3 x 10 reps   Prone on elbows; 10 second hold, 10 reps  Standing QL Stretch 3 x 30 sec     Posterior pelvic tilts; 10 second hold x 10 reps   Braced march : 3 x 10 reps   Glut set into mini bridge: 3 x 10 reps    SL jesús GTB: 3 x 10 reps BLE  Seated hip hinging c/ cane for cue: 2 x 10 reps - NP 2.2 time  Sit to stands : 2 x 10 reps - NP 2/2 time      manual therapy techniques: Soft tissue Mobilization were applied to the: L LE for 15 minutes, including:  Long axis hip distraction   Lumbar distraction       Patient Education and Home Exercises     Home Exercises Provided and Patient Education Provided     Education provided:   - HEP provided and reviewed (10/24/2022)    Written Home Exercises Provided: yes. Exercises were reviewed and Enoch was able to demonstrate them prior to the end of the session.  Enoch demonstrated good  understanding of the education provided. See EMR under Patient Instructions for exercises provided during therapy sessions    ASSESSMENT     Long axis hip distraction and lumbar distraction performed at start of session with pt reporting reduction in symptoms down his LE and increased low back pain indicating centralization. He continues to exhibit neural and muscular tension although noted improvements following therapeutic interventions. Education on importance of HEP compliance as he admits has not performed past few days . He continues to exhibit hip and LE weakness with cramping in his LEs reported with there-ex . Noted improved core strength/stabilization . Pt would benefit from continued mobility and strength progressions.     Enoch Is progressing well towards his goals.   Pt prognosis is Good.     Pt will continue to benefit from skilled outpatient physical therapy to address the deficits listed in the problem list box on initial evaluation, provide pt/family education and to maximize pt's level of independence in the home and community environment.   Pt's spiritual, cultural and educational needs considered and pt agreeable to plan of care and goals.     Anticipated barriers to physical therapy: none    GOALS: Short Term Goals:  8 weeks  1.Report decreased low back  pain  < / =  3-4/10   to increase tolerance for ADLs, MRADLs, and fitness activities.  2. Increase ROM by 10% where limited in order to perform ADLs without difficulty.  3. Increase strength by 1/3 MMT grade in hip and lumbopelvic mm  to increase tolerance for ADL and work activities.  4. Pt to tolerate HEP to improve ROM and independence with ADL's     Long Term Goals: 8 weeks  1.Report decreased low back pain < / = 1-2/10  to increase tolerance for ADLs, MRADLs, and fitness activities.   2.Patient goal: Reduce pain and improve flexibility and function   3.Increase strength to 4+/5 in  hip and lumbopelvic mm  to increase tolerance for ADL and work activities.  4. Pt will report at 20% or less limitations on FOTO  to demonstrate increase in LE function with every day tasks.      PLAN     Continue to progress core and hip strengthening.    Minoo Matthews, PTA

## 2022-11-25 ENCOUNTER — CLINICAL SUPPORT (OUTPATIENT)
Dept: REHABILITATION | Facility: HOSPITAL | Age: 57
End: 2022-11-25
Payer: OTHER GOVERNMENT

## 2022-11-25 DIAGNOSIS — M54.41 CHRONIC BILATERAL LOW BACK PAIN WITH RIGHT-SIDED SCIATICA: Primary | ICD-10-CM

## 2022-11-25 DIAGNOSIS — G89.29 CHRONIC BILATERAL LOW BACK PAIN WITH RIGHT-SIDED SCIATICA: Primary | ICD-10-CM

## 2022-11-25 PROCEDURE — 97110 THERAPEUTIC EXERCISES: CPT

## 2022-11-25 PROCEDURE — 97140 MANUAL THERAPY 1/> REGIONS: CPT

## 2022-11-25 NOTE — PROGRESS NOTES
[facility]   Physical Therapy Treatment Note     Name: Enoch Barr  [facility] Number: [id number]    Therapy Diagnosis:   No diagnosis found.      Physician: Mckenna Nixon DPM    Visit Date: 11/25/2022    Physician Orders: PT Eval and Treat   Medical Diagnosis from Referral: M54.41,G89.29 (ICD-10-CM) - Chronic bilateral low back pain with right-sided sciatica M54.2 (ICD-10-CM) - Cervical spine pain   Evaluation Date: 10/20/2022  Authorization Period Expiration: 12/31/2022  Plan of Care Expiration: 12/14/2022  Progress Note Due: 10th visit  Visit # / Visits authorized: 9 / 15    PTA Visit #: 0 / 5    Time In: 12:00pm  Time Out:  12:55pm  Total Billable Time: 60 minutes     Precautions: Standard     SUBJECTIVE     Patient reports: sore in his low back and tight in the side of his leg down to his calf.     He was somewhat complaint with home exercise program.   Response to previous treatment: good; appropriate muscle response  Functional change: none to note today    Pain: 3/10, currently  Location: B low back     OBJECTIVE     Objective Measures updated at progress report unless specified.     Treatment     Enoch received the treatments listed below:      therapeutic exercises to develop strength, endurance, ROM, posture, and core stabilization for 45 minutes including:    Aerobic activity and endurance training for reciprocal motion of lower limbs on Nustep for 10 minutes at Level 4.0 at > or equal to 50 spm w/ rest to increase mobility, blood flow and improve tissue tolerance     NT-Piriformis stretch (Figure 4): 3 x 30 second hold B   NT-HSS c/ strap; 3 x 30 second hold B  NT-Sciatic nerve glides : 3 x 10 reps   NT-Seated hip hinging c/ cane for cue: 2 x 10 reps   NT-Sit to stands : 2 x 10 reps       Prone on elbows; x 3 min with 6 sec hold   Posterior pelvic tilts; 10 second hold x 10 reps   PPT with Hip AD ball squeeze 10 sec hold x 10 reps   Braced march with GTB around knees x 2  mins with 3 sec hold   Glut set into mini bridge with GTB around knees x 2 mins with sec hold   SL clamshells GTB: x 2min with 3 sec hold reps BLE  Standing QL Stretch 10 x 10sec hold       manual therapy techniques: Soft tissue Mobilization were applied to the: L LE for 10 minutes, including:  Long axis hip distraction   Hip Flexor and TFL stretch       Patient Education and Home Exercises     Home Exercises Provided and Patient Education Provided     Education provided:   - HEP provided and reviewed (10/24/2022)    Written Home Exercises Provided: yes. Exercises were reviewed and Enoch was able to demonstrate them prior to the end of the session.  Enoch demonstrated good  understanding of the education provided. See EMR under Patient Instructions for exercises provided during therapy sessions    ASSESSMENT     Patient with consistent reports of his pain starting to centralize to his lower lumbar area.  He reports the pain in his R hip and lateral thigh is much better and does not occur as frequent as it did before he started his sessions of PT.   Pt would benefit from continued mobility and strength progressions.     Enoch Is progressing well towards his goals.   Pt prognosis is Good.     Pt will continue to benefit from skilled outpatient physical therapy to address the deficits listed in the problem list box on initial evaluation, provide pt/family education and to maximize pt's level of independence in the home and community environment.   Pt's spiritual, cultural and educational needs considered and pt agreeable to plan of care and goals.     Anticipated barriers to physical therapy: none    GOALS: Short Term Goals:  8 weeks  1.Report decreased low back  pain  < / =  3-4/10  to increase tolerance for ADLs, MRADLs, and fitness activities.  2. Increase ROM by 10% where limited in order to perform ADLs without difficulty.  3. Increase strength by 1/3 MMT grade in hip and lumbopelvic mm  to increase tolerance for  ADL and work activities.  4. Pt to tolerate HEP to improve ROM and independence with ADL's     Long Term Goals: 8 weeks  1.Report decreased low back pain < / = 1-2/10  to increase tolerance for ADLs, MRADLs, and fitness activities.   2.Patient goal: Reduce pain and improve flexibility and function   3.Increase strength to 4+/5 in  hip and lumbopelvic mm  to increase tolerance for ADL and work activities.  4. Pt will report at 20% or less limitations on FOTO  to demonstrate increase in LE function with every day tasks.      PLAN     Continue to progress core and hip strengthening.    Kianna Romeo, PT

## 2022-11-28 ENCOUNTER — CLINICAL SUPPORT (OUTPATIENT)
Dept: REHABILITATION | Facility: HOSPITAL | Age: 57
End: 2022-11-28
Payer: OTHER GOVERNMENT

## 2022-11-28 DIAGNOSIS — G89.29 CHRONIC BILATERAL LOW BACK PAIN WITH RIGHT-SIDED SCIATICA: Primary | ICD-10-CM

## 2022-11-28 DIAGNOSIS — M54.41 CHRONIC BILATERAL LOW BACK PAIN WITH RIGHT-SIDED SCIATICA: Primary | ICD-10-CM

## 2022-11-28 PROCEDURE — 97110 THERAPEUTIC EXERCISES: CPT | Mod: CQ

## 2022-11-28 PROCEDURE — 97140 MANUAL THERAPY 1/> REGIONS: CPT | Mod: CQ

## 2022-11-28 NOTE — PROGRESS NOTES
[facility]   Physical Therapy Treatment Note     Name: Enoch DELUCA Momo  [facility] Number: [id number]    Therapy Diagnosis:   Encounter Diagnosis   Name Primary?    Chronic bilateral low back pain with right-sided sciatica Yes         Physician: Mckenna Nixon DPM    Visit Date: 11/28/2022    Physician Orders: PT Eval and Treat   Medical Diagnosis from Referral: M54.41,G89.29 (ICD-10-CM) - Chronic bilateral low back pain with right-sided sciatica M54.2 (ICD-10-CM) - Cervical spine pain   Evaluation Date: 10/20/2022  Authorization Period Expiration: 12/31/2022  Plan of Care Expiration: 12/14/2022  Progress Note Due: 10th visit  Visit # / Visits authorized: 9 / 15    PTA Visit #: 1 / 5    Time In: 5:00 pm  Time Out: 6:00 pm   Total Billable Time: 60 minutes     Precautions: Standard     SUBJECTIVE     Patient reports: tenderness and pain on the side of his calf and still feels tight going down the side of his leg .     He was somewhat complaint with home exercise program.   Response to previous treatment: good; appropriate muscle response  Functional change: none to note today    Pain: 4/10  Location: B low back , LLE    OBJECTIVE     Objective Measures updated at progress report unless specified.     Treatment     Enoch received the treatments listed below:      therapeutic exercises to develop strength, endurance, ROM, posture, and core stabilization for 50 minutes including:    Aerobic activity and endurance training for reciprocal motion of lower limbs on Nustep for 10 minutes at Level 4.0 at > or equal to 50 spm w/ rest to increase mobility, blood flow and improve tissue tolerance     NT-Piriformis stretch (Figure 4): 3 x 30 second hold B   NT-HSS c/ strap; 3 x 30 second hold B  NT-Sciatic nerve glides : 3 x 10 reps   NT-Seated hip hinging c/ cane for cue: 2 x 10 reps     Prone on elbows; x 3 min with 6 sec hold   Posterior pelvic tilts; 10 second hold x 10 reps   PPT with Hip AD ball  squeeze 10 sec hold x 10 reps   Braced march with GTB around knees x 2 mins with 3 sec hold   Glut set into mini bridge with GTB around knees x 2 mins with sec hold   SL clamshells GTB: x 2min with 3 sec hold reps BLE  Sit to stands : 2 x 10 reps   Standing QL Stretch 10 x 10sec hold     manual therapy techniques: Soft tissue Mobilization were applied to the: L LE for 10 minutes, including:  Long axis hip distraction   Hip Flexor and TFL stretch     Patient Education and Home Exercises     Home Exercises Provided and Patient Education Provided     Education provided:   - HEP provided and reviewed (10/24/2022)    Written Home Exercises Provided: yes. Exercises were reviewed and Enoch was able to demonstrate them prior to the end of the session.  Enoch demonstrated good  understanding of the education provided. See EMR under Patient Instructions for exercises provided during therapy sessions    ASSESSMENT     Pt demonstrates good response to manual interventions performed and with signs/symptoms of centralization following completion. Able to perform sit to stands with no knee or lateral thigh pain reported while performing. Also noted min to no muscle cramps during session today .  Pt would benefit from continued mobility and strength progressions.     Enoch Is progressing well towards his goals.   Pt prognosis is Good.     Pt will continue to benefit from skilled outpatient physical therapy to address the deficits listed in the problem list box on initial evaluation, provide pt/family education and to maximize pt's level of independence in the home and community environment.   Pt's spiritual, cultural and educational needs considered and pt agreeable to plan of care and goals.     Anticipated barriers to physical therapy: none    GOALS: Short Term Goals:  8 weeks  1.Report decreased low back  pain  < / =  3-4/10  to increase tolerance for ADLs, MRADLs, and fitness activities.  2. Increase ROM by 10% where limited in  order to perform ADLs without difficulty.  3. Increase strength by 1/3 MMT grade in hip and lumbopelvic mm  to increase tolerance for ADL and work activities.  4. Pt to tolerate HEP to improve ROM and independence with ADL's     Long Term Goals: 8 weeks  1.Report decreased low back pain < / = 1-2/10  to increase tolerance for ADLs, MRADLs, and fitness activities.   2.Patient goal: Reduce pain and improve flexibility and function   3.Increase strength to 4+/5 in  hip and lumbopelvic mm  to increase tolerance for ADL and work activities.  4. Pt will report at 20% or less limitations on FOTO  to demonstrate increase in LE function with every day tasks.      PLAN     Continue to progress core and hip strengthening.    Minoo Matthews, PTA

## 2022-12-01 ENCOUNTER — CLINICAL SUPPORT (OUTPATIENT)
Dept: REHABILITATION | Facility: HOSPITAL | Age: 57
End: 2022-12-01
Payer: OTHER GOVERNMENT

## 2022-12-01 DIAGNOSIS — M54.41 CHRONIC BILATERAL LOW BACK PAIN WITH RIGHT-SIDED SCIATICA: Primary | ICD-10-CM

## 2022-12-01 DIAGNOSIS — G89.29 CHRONIC BILATERAL LOW BACK PAIN WITH RIGHT-SIDED SCIATICA: Primary | ICD-10-CM

## 2022-12-01 PROCEDURE — 97110 THERAPEUTIC EXERCISES: CPT

## 2022-12-01 NOTE — PROGRESS NOTES
[facility]   Physical Therapy Treatment Note     Name: Enoch Barr  [facility] Number: [id number]    Therapy Diagnosis:   No diagnosis found.        Physician: Mckenna Nixon DPM    Visit Date: 12/1/2022    Physician Orders: PT Eval and Treat   Medical Diagnosis from Referral: M54.41,G89.29 (ICD-10-CM) - Chronic bilateral low back pain with right-sided sciatica M54.2 (ICD-10-CM) - Cervical spine pain   Evaluation Date: 10/20/2022  Authorization Period Expiration: 12/31/2022  Plan of Care Expiration: 12/14/2022  Progress Note Due: 10th visit  Visit # / Visits authorized: 13/ 15    PTA Visit #: 0 / 5    Time In: 4:00pm  Time Out: 4:43pm  Total Billable Time: 43 minutes     Precautions: Standard     SUBJECTIVE     Patient reports:he is sore today in his legs.  He reports he went to the gym yesterday and did a hard leg work out.      He was somewhat complaint with home exercise program.   Response to previous treatment: good; appropriate muscle response  Functional change: none to note today    Pain: 4/10  Location: B low back , LLE    OBJECTIVE     Objective Measures updated at progress report unless specified.     Treatment     Enoch received the treatments listed below:      therapeutic exercises to develop strength, endurance, ROM, posture, and core stabilization for 50 minutes including:    Aerobic activity and endurance training for reciprocal motion of lower limbs on Nustep for 10 minutes at Level 4.0 at > or equal to 50 spm w/ rest to increase mobility, blood flow and improve tissue tolerance     NT-Piriformis stretch (Figure 4): 3 x 30 second hold B   NT-HSS c/ strap; 3 x 30 second hold B  NT-Sciatic nerve glides : 3 x 10 reps   NT-Seated hip hinging c/ cane for cue: 2 x 10 reps     Prone on elbows; x 3 min with 6 sec hold   Posterior pelvic tilts; 10 second hold x 10 reps   PPT with Hip AD ball squeeze 10 sec hold x 10 reps   Braced march with GTB around knees x 2 mins with 3 sec  hold   Glut set into mini bridge with GTB around knees x 2 mins with sec hold   SL clamshells GTB: x 2min with 3 sec hold reps BLE  NT-Sit to stands : 2 x 10 reps  Standing QL Stretch 10 x 10sec hold   Farmer's Walk with 12# KB x 5 mins   Paloff Press with BTB x 15 reps B     manual therapy techniques: Soft tissue Mobilization were applied to the: L LE for 10 minutes, including:  Long axis hip distraction   Hip Flexor and TFL stretch     Patient Education and Home Exercises     Home Exercises Provided and Patient Education Provided     Education provided:   - HEP provided and reviewed (10/24/2022)    Written Home Exercises Provided: yes. Exercises were reviewed and Enoch was able to demonstrate them prior to the end of the session.  Enoch demonstrated good  understanding of the education provided. See EMR under Patient Instructions for exercises provided during therapy sessions    ASSESSMENT     Pt demonstrates good response to manual interventions performed and with signs/symptoms of centralization following completion. Able to perform sit to stands with no knee or lateral thigh pain reported while performing. Also noted min to no muscle cramps during session today .  Pt would benefit from continued mobility and strength progressions.     Enoch Is progressing well towards his goals.   Pt prognosis is Good.     Pt will continue to benefit from skilled outpatient physical therapy to address the deficits listed in the problem list box on initial evaluation, provide pt/family education and to maximize pt's level of independence in the home and community environment.   Pt's spiritual, cultural and educational needs considered and pt agreeable to plan of care and goals.     Anticipated barriers to physical therapy: none    GOALS: Short Term Goals:  8 weeks  1.Report decreased low back  pain  < / =  3-4/10  to increase tolerance for ADLs, MRADLs, and fitness activities.  2. Increase ROM by 10% where limited in order to  perform ADLs without difficulty.  3. Increase strength by 1/3 MMT grade in hip and lumbopelvic mm  to increase tolerance for ADL and work activities.  4. Pt to tolerate HEP to improve ROM and independence with ADL's     Long Term Goals: 8 weeks  1.Report decreased low back pain < / = 1-2/10  to increase tolerance for ADLs, MRADLs, and fitness activities.   2.Patient goal: Reduce pain and improve flexibility and function   3.Increase strength to 4+/5 in  hip and lumbopelvic mm  to increase tolerance for ADL and work activities.  4. Pt will report at 20% or less limitations on FOTO  to demonstrate increase in LE function with every day tasks.      PLAN     Continue to progress core and hip strengthening.    Kianna Romeo, PT

## 2022-12-05 ENCOUNTER — CLINICAL SUPPORT (OUTPATIENT)
Dept: REHABILITATION | Facility: HOSPITAL | Age: 57
End: 2022-12-05
Payer: OTHER GOVERNMENT

## 2022-12-05 DIAGNOSIS — M54.41 CHRONIC BILATERAL LOW BACK PAIN WITH RIGHT-SIDED SCIATICA: Primary | ICD-10-CM

## 2022-12-05 DIAGNOSIS — G89.29 CHRONIC BILATERAL LOW BACK PAIN WITH RIGHT-SIDED SCIATICA: Primary | ICD-10-CM

## 2022-12-05 PROCEDURE — 97110 THERAPEUTIC EXERCISES: CPT | Mod: CQ

## 2022-12-05 NOTE — PROGRESS NOTES
[facility]   Physical Therapy Treatment Note     Name: Enoch DELUCA Momo  [facility] Number: [id number]    Therapy Diagnosis:   Encounter Diagnosis   Name Primary?    Chronic bilateral low back pain with right-sided sciatica Yes       Physician: Mckenna Nixon DPM    Visit Date: 12/5/2022    Physician Orders: PT Eval and Treat   Medical Diagnosis from Referral: M54.41,G89.29 (ICD-10-CM) - Chronic bilateral low back pain with right-sided sciatica M54.2 (ICD-10-CM) - Cervical spine pain   Evaluation Date: 10/20/2022  Authorization Period Expiration: 12/31/2022  Plan of Care Expiration: 12/14/2022  Progress Note Due: 10th visit  Visit # / Visits authorized: 14/ 15    PTA Visit #: 1 / 5    Time In: 4:50 pm  Time Out: 5:50 pm  Total Billable Time: 60 minutes     Precautions: Standard     SUBJECTIVE     Patient reports: he is doing well , some soreness in is hips .      He was somewhat complaint with home exercise program.   Response to previous treatment: good; appropriate muscle response  Functional change: none to note today    Pain: 4/10  Location: B low back , LLE    OBJECTIVE     Objective Measures updated at progress report unless specified.     Treatment     Enoch received the treatments listed below:      therapeutic exercises to develop strength, endurance, ROM, posture, and core stabilization for 60 minutes including:    Aerobic activity and endurance training for reciprocal motion of lower limbs on Nustep for 10 minutes at Level 4.0 at > or equal to 50 spm w/ rest to increase mobility, blood flow and improve tissue tolerance     NT-Piriformis stretch (Figure 4): 3 x 30 second hold B   NT-HSS c/ strap; 3 x 30 second hold B  NT-Sciatic nerve glides : 3 x 10 reps   NT-Seated hip hinging c/ cane for cue: 2 x 10 reps     Prone on elbows; x 3 min with 6 sec hold   Posterior pelvic tilts; 10 second hold x 10 reps   PPT with Hip AD ball squeeze 10 sec hold x 10 reps   Braced march with GTB around  knees x 2 mins with 3 sec hold   Glut set into mini bridge with GTB around knees x 2 mins with sec hold   SL clamshells GTB: x 2min with 3 sec hold reps BLE  NT-Sit to stands : 2 x 10 reps  Standing QL Stretch 10 x 10sec hold   Farmer's Walk with 12# KB x 5 mins   Paloff Press with BTB x 15 reps B     manual therapy techniques: Soft tissue Mobilization were applied to the: L LE for 00 minutes, including:  Long axis hip distraction   Hip Flexor and TFL stretch     Patient Education and Home Exercises     Home Exercises Provided and Patient Education Provided     Education provided:   - HEP provided and reviewed (10/24/2022)    Written Home Exercises Provided: yes. Exercises were reviewed and Enoch was able to demonstrate them prior to the end of the session.  Enoch demonstrated good  understanding of the education provided. See EMR under Patient Instructions for exercises provided during therapy sessions    ASSESSMENT     Pt demonstrated good tolerance to there-ex as above and was able to complete session with no adverse effects . Noted appropriate muscle response upon session completion.     Enoch Is progressing well towards his goals.   Pt prognosis is Good.     Pt will continue to benefit from skilled outpatient physical therapy to address the deficits listed in the problem list box on initial evaluation, provide pt/family education and to maximize pt's level of independence in the home and community environment.   Pt's spiritual, cultural and educational needs considered and pt agreeable to plan of care and goals.     Anticipated barriers to physical therapy: none    GOALS: Short Term Goals:  8 weeks  1.Report decreased low back  pain  < / =  3-4/10  to increase tolerance for ADLs, MRADLs, and fitness activities.  2. Increase ROM by 10% where limited in order to perform ADLs without difficulty.  3. Increase strength by 1/3 MMT grade in hip and lumbopelvic mm  to increase tolerance for ADL and work activities.  4.  Pt to tolerate HEP to improve ROM and independence with ADL's     Long Term Goals: 8 weeks  1.Report decreased low back pain < / = 1-2/10  to increase tolerance for ADLs, MRADLs, and fitness activities.   2.Patient goal: Reduce pain and improve flexibility and function   3.Increase strength to 4+/5 in  hip and lumbopelvic mm  to increase tolerance for ADL and work activities.  4. Pt will report at 20% or less limitations on FOTO  to demonstrate increase in LE function with every day tasks.      PLAN     Continue to progress core and hip strengthening.    Minoo Matthews, PTA

## 2022-12-08 ENCOUNTER — CLINICAL SUPPORT (OUTPATIENT)
Dept: REHABILITATION | Facility: HOSPITAL | Age: 57
End: 2022-12-08
Payer: OTHER GOVERNMENT

## 2022-12-08 DIAGNOSIS — G89.29 CHRONIC BILATERAL LOW BACK PAIN WITH RIGHT-SIDED SCIATICA: Primary | ICD-10-CM

## 2022-12-08 DIAGNOSIS — M54.41 CHRONIC BILATERAL LOW BACK PAIN WITH RIGHT-SIDED SCIATICA: Primary | ICD-10-CM

## 2022-12-08 PROCEDURE — 97110 THERAPEUTIC EXERCISES: CPT

## 2022-12-08 NOTE — PROGRESS NOTES
[facility]   Physical Therapy Treatment Note / Discharge Summary     Name: Enoch Barr  Clinic Number: [id number]    Therapy Diagnosis:   No diagnosis found.      Physician: Mckenna Nixon DPM    Visit Date: 12/8/2022    Physician Orders: PT Eval and Treat   Medical Diagnosis from Referral: M54.41,G89.29 (ICD-10-CM) - Chronic bilateral low back pain with right-sided sciatica M54.2 (ICD-10-CM) - Cervical spine pain   Evaluation Date: 10/20/2022  Authorization Period Expiration: 12/31/2022  Plan of Care Expiration: 12/14/2022  Progress Note Due: 10th visit  Visit # / Visits authorized: 15/ 15    PTA Visit #: 0 / 5    Time In: 5:00 pm  Time Out: 5:53pm  Total Billable Time: 53 minutes     Precautions: Standard     SUBJECTIVE     Patient reports: he is doing well , some soreness in is hips .      He was somewhat complaint with home exercise program.   Response to previous treatment: good; appropriate muscle response  Functional change: none to note today    Pain: 4/10  Location: B low back , LLE    OBJECTIVE     Objective Measures updated at progress report unless specified.     Treatment     Enoch received the treatments listed below:      therapeutic exercises to develop strength, endurance, ROM, posture, and core stabilization for 53 minutes including:    NT-Aerobic activity and endurance training for reciprocal motion of lower limbs on Nustep for 10 minutes at Level 4.0 at > or equal to 50 spm w/ rest to increase mobility, blood flow and improve tissue tolerance     NT-Piriformis stretch (Figure 4): 3 x 30 second hold B   NT-HSS c/ strap; 3 x 30 second hold B  NT-Sciatic nerve glides : 3 x 10 reps   NT-Seated hip hinging c/ cane for cue: 2 x 10 reps   NT-Sit to stands : 2 x 10 reps      Prone on elbows; x 3 min with 6 sec hold   Posterior pelvic tilts; 10 second hold x 10 reps   PPT with Hip AD ball squeeze 10 sec hold x 10 reps   Braced march with GTB around knees x 2 mins with 3 sec  hold   Glut set into mini bridge with GTB around knees x 2 mins with sec hold   SL clamshells GTB: x 2min with 3 sec hold reps BLE  Standing QL Stretch 10 x 10sec hold   Farmer's Walk with 12# KB x 5 mins   Paloff Press with BTB x 15 reps B     manual therapy techniques: Soft tissue Mobilization were applied to the: L LE for 00 minutes, including:  Long axis hip distraction   Hip Flexor and TFL stretch     Patient Education and Home Exercises     Home Exercises Provided and Patient Education Provided     Education provided:   - HEP provided and reviewed (10/24/2022)    Written Home Exercises Provided: yes. Exercises were reviewed and Enoch was able to demonstrate them prior to the end of the session.  Enoch demonstrated good  understanding of the education provided. See EMR under Patient Instructions for exercises provided during therapy sessions    ASSESSMENT     Pt demonstrated good tolerance to there-ex as above and was able to complete session with no adverse effects . Noted appropriate muscle response upon session completion. Pt has met goals and max potential with PT at this time.  Discharge to [facility].      Enoch Is progressing well towards his goals.   Pt prognosis is Good.     Pt will continue to benefit from skilled outpatient physical therapy to address the deficits listed in the problem list box on initial evaluation, provide pt/family education and to maximize pt's level of independence in the home and community environment.   Pt's spiritual, cultural and educational needs considered and pt agreeable to plan of care and goals.     Anticipated barriers to physical therapy: none    GOALS: Short Term Goals:  4 weeks-MET  1.Report decreased low back  pain  < / =  3-4/10  to increase tolerance for ADLs, MRADLs, and fitness activities.  2. Increase ROM by 10% where limited in order to perform ADLs without difficulty.  3. Increase strength by 1/3 MMT grade in hip and lumbopelvic mm  to increase tolerance for ADL  and work activities.  4. Pt to tolerate HEP to improve ROM and independence with ADL's     Long Term Goals: 8 weeks-MET  1.Report decreased low back pain < / = 1-2/10  to increase tolerance for ADLs, MRADLs, and fitness activities.   2.Patient goal: Reduce pain and improve flexibility and function   3.Increase strength to 4+/5 in  hip and lumbopelvic mm  to increase tolerance for ADL and work activities.  4. Pt will report at 20% or less limitations on FOTO  to demonstrate increase in LE function with every day tasks.      PLAN     Discharge to HEP     Kianna Romeo, PT

## 2022-12-12 ENCOUNTER — PATIENT MESSAGE (OUTPATIENT)
Dept: FAMILY MEDICINE | Facility: CLINIC | Age: 57
End: 2022-12-12
Payer: OTHER GOVERNMENT

## 2022-12-27 ENCOUNTER — OFFICE VISIT (OUTPATIENT)
Dept: ORTHOPEDICS | Facility: CLINIC | Age: 57
End: 2022-12-27
Payer: OTHER GOVERNMENT

## 2022-12-27 VITALS — WEIGHT: 231.81 LBS | BODY MASS INDEX: 31.44 KG/M2

## 2022-12-27 DIAGNOSIS — M54.16 LUMBAR RADICULOPATHY, CHRONIC: ICD-10-CM

## 2022-12-27 DIAGNOSIS — M54.16 LUMBAR RADICULOPATHY: Primary | ICD-10-CM

## 2022-12-27 PROCEDURE — 99213 OFFICE O/P EST LOW 20 MIN: CPT | Mod: PBBFAC | Performed by: PHYSICIAN ASSISTANT

## 2022-12-27 PROCEDURE — 99214 PR OFFICE/OUTPT VISIT, EST, LEVL IV, 30-39 MIN: ICD-10-PCS | Mod: S$PBB,,, | Performed by: PHYSICIAN ASSISTANT

## 2022-12-27 PROCEDURE — 99999 PR PBB SHADOW E&M-EST. PATIENT-LVL III: ICD-10-PCS | Mod: PBBFAC,,, | Performed by: PHYSICIAN ASSISTANT

## 2022-12-27 PROCEDURE — 99999 PR PBB SHADOW E&M-EST. PATIENT-LVL III: CPT | Mod: PBBFAC,,, | Performed by: PHYSICIAN ASSISTANT

## 2022-12-27 PROCEDURE — 99214 OFFICE O/P EST MOD 30 MIN: CPT | Mod: S$PBB,,, | Performed by: PHYSICIAN ASSISTANT

## 2022-12-27 RX ORDER — METHOCARBAMOL 750 MG/1
750 TABLET, FILM COATED ORAL 3 TIMES DAILY
Qty: 60 TABLET | Refills: 0 | Status: SHIPPED | OUTPATIENT
Start: 2022-12-27 | End: 2023-04-21 | Stop reason: SDUPTHER

## 2022-12-27 RX ORDER — GABAPENTIN 100 MG/1
100 CAPSULE ORAL 3 TIMES DAILY
Qty: 90 CAPSULE | Refills: 0 | Status: SHIPPED | OUTPATIENT
Start: 2022-12-27 | End: 2023-01-24

## 2022-12-27 NOTE — PROGRESS NOTES
DATE: 12/27/2022  PATIENT: Enoch Barr    Supervising Physician: Boubacar Dobson M.D.    CHIEF COMPLAINT: back and bilateral leg pain    HISTORY:  Enoch Barr is a 57 y.o. male here for initial evaluation of low back and bilateral lateral leg pain to the knee (Back - 6, Leg - 6).  The pain in the back is what bothers him most.  The pain has been present for about 2 months. The patient describes the pain as aching.  The pain is worse with walking and improved by sitting. There is no associated numbness and tingling. There is subjective weakness. Prior treatments have included celebrex, advil and physical therapy, but no ESIs or surgery.  He denies any relief with PT or the medications.     The patient denies myelopathic symptoms such as handwriting changes or difficulty with buttons/coins/keys. Denies perineal paresthesias, bowel/bladder dysfunction.    PAST MEDICAL/SURGICAL HISTORY:  Past Medical History:   Diagnosis Date    GERD (gastroesophageal reflux disease)     Hypercholesteremia     Hypertension     on medication    Lumbar spondylosis 9/21/2020    Migraines     Sleep apnea      Past Surgical History:   Procedure Laterality Date    COLONOSCOPY N/A 8/10/2020    Procedure: COLONOSCOPY;  Surgeon: April Dos Santos MD;  Location: [facility] ENDO;  Service: Endoscopy;  Laterality: N/A;    HERNIA REPAIR      LAPAROSCOPIC CHOLECYSTECTOMY N/A 1/25/2022    Procedure: CHOLECYSTECTOMY, LAPAROSCOPIC;  Surgeon: Jarrod Martinez MD;  Location: [facility] OR;  Service: General;  Laterality: N/A;    REPAIR OF TRICEPS TENDON Left 10/2/2020    Procedure: REPAIR, TENDON, TRICEPS;  Surgeon: Keysha Galvez MD;  Location: [facility] OR;  Service: Orthopedics;  Laterality: Left;  RN PRE OP DONE 9/29/2020----COVID NEGATIVE ON 9/29  [facility] Rep: [place] [phone]    SINUS SURGERY  2012       Medications:   Current Outpatient Medications on File Prior to Visit   Medication Sig Dispense Refill    atorvastatin (LIPITOR) 40 MG tablet Take 1  tablet (40 mg total) by mouth every evening. 90 tablet 3    celecoxib (CELEBREX) 200 MG capsule TAKE 1 CAPSULE(200 MG) BY MOUTH TWICE DAILY (Patient taking differently: Take 200 mg by mouth once daily.) 180 capsule 0    cromolyn (NASALCHROM) 5.2 mg/spray (4 %) nasal spray 1 spray by Nasal route 4 (four) times daily. 26 mL 1    fluticasone propionate (FLONASE) 50 mcg/actuation nasal spray 1 spray (50 mcg total) by Each Nostril route once daily. 16 g 3    lisinopriL-hydrochlorothiazide (PRINZIDE,ZESTORETIC) 20-12.5 mg per tablet Take 1 tablet by mouth once daily. 90 tablet 3    metoprolol succinate (TOPROL-XL) 25 MG 24 hr tablet Take 1 tablet (25 mg total) by mouth once daily. 90 tablet 3    omeprazole (PRILOSEC) 20 MG capsule Take 1 capsule (20 mg total) by mouth once daily. Decreased dose 90 capsule 3    traZODone (DESYREL) 50 MG tablet Take 0.5-1 tablets (25-50 mg total) by mouth nightly as needed for Insomnia. 90 tablet 3    [DISCONTINUED] amoxicillin-clavulanate 875-125mg (AUGMENTIN) 875-125 mg per tablet Take 1 tablet by mouth every 12 (twelve) hours. 20 tablet 0    [DISCONTINUED] fexofenadine (ALLEGRA) 30 MG tablet Take 30 mg by mouth once daily.       No current facility-administered medications on file prior to visit.       Social History:   Social History     Socioeconomic History    Marital status:    Occupational History    Occupation: production      Employer: [employer]   Tobacco Use    Smoking status: Former     Types: Cigarettes     Quit date: 1999     Years since quittin.6    Smokeless tobacco: Never   Substance and Sexual Activity    Alcohol use: Not Currently     Alcohol/week: 7.0 standard drinks     Types: 7 Glasses of wine per week     Comment: beer 2-3 daily    Drug use: No    Sexual activity: Yes     Partners: Female     Social Determinants of Health     Financial Resource Strain: Unknown    Difficulty of Paying Living Expenses: Patient refused    Food Insecurity: Unknown    Worried About Running Out of Food in the Last Year: Patient refused    Ran Out of Food in the Last Year: Patient refused   Transportation Needs: No Transportation Needs    Lack of Transportation (Medical): No    Lack of Transportation (Non-Medical): No   Physical Activity: Sufficiently Active    Days of Exercise per Week: 4 days    Minutes of Exercise per Session: 70 min   Stress: No Stress Concern Present    Feeling of Stress : Only a little   Social Connections: Unknown    Frequency of Communication with Friends and Family: Patient refused    Frequency of Social Gatherings with Friends and Family: Patient refused    Active Member of Clubs or Organizations: No    Attends Club or Organization Meetings: Never    Marital Status:    Housing Stability: Unknown    Unable to Pay for Housing in the Last Year: Patient refused    Unstable Housing in the Last Year: Patient refused       REVIEW OF SYSTEMS:  Constitution: Negative. Negative for chills, fever and night sweats.   Cardiovascular: Negative for chest pain and syncope.   Respiratory: Negative for cough and shortness of breath.   Gastrointestinal: See HPI. Negative for nausea/vomiting. Negative for abdominal pain.  Genitourinary: See HPI. Negative for discoloration or dysuria.  Skin: Negative for dry skin, itching and rash.   Hematologic/Lymphatic: Negative for bleeding problem. Does not bruise/bleed easily.   Musculoskeletal: Negative for falls and muscle weakness.   Neurological: See HPI. No seizures.   Endocrine: Negative for polydipsia, polyphagia and polyuria.   Allergic/Immunologic: Negative for hives and persistent infections.     EXAM:  Wt 105.2 kg (231 lb 13 oz)   BMI 31.44 kg/m²     General: The patient is a very pleasant 57 y.o. male in no apparent distress, the patient is oriented to person, place and time.  Psych: Normal mood and affect  HEENT: Vision grossly intact, hearing intact to the spoken word.  Lungs:  Respirations unlabored.  Gait: Normal station and gait, no difficulty with toe or heel walk.   Skin: Dorsal lumbar skin negative for rashes, lesions, hairy patches and surgical scars. There is minimal lumbar tenderness to palpation.  Range of motion: Lumbar range of motion is acceptable.  Spinal Balance: Global saggital and coronal spinal balance acceptable, not significant for scoliosis and kyphosis.  Musculoskeletal: No pain with the range of motion of the bilateral hips. No trochanteric tenderness to palpation.  Vascular: Bilateral lower extremities warm and well perfused, dorsalis pedis pulses 2+ bilaterally.  Neurological: Normal strength and tone in all major motor groups in the bilateral lower extremities. Normal sensation to light touch in the L2-S1 dermatomes bilaterally.  Deep tendon reflexes symmetric 2+ in the bilateral lower extremities.  Negative Babinski bilaterally. Straight leg raise negative bilaterally.    IMAGING:      Today I personally reviewed AP, Lat and Flex/Ex  upright L-spine films that demonstrate mild degenerative changes.  There is retrolisthesis at L2/3, L3/4 and L4/5.       Body mass index is 31.44 kg/m².    No results found for: HGBA1C        ASSESSMENT/PLAN:    Enoch was seen today for low-back pain and leg pain.    Diagnoses and all orders for this visit:    Lumbar radiculopathy    Lumbar radiculopathy, chronic  -     MRI Lumbar Spine Without Contrast; Future    Other orders  -     methocarbamoL (ROBAXIN) 750 MG Tab; Take 1 tablet (750 mg total) by mouth 3 (three) times daily. As needed for muscle spasms for 60 doses  -     gabapentin (NEURONTIN) 100 MG capsule; Take 1 capsule (100 mg total) by mouth 3 (three) times daily.      Today we discussed at length all of the different treatment options including anti-inflammatories, acetaminophen, rest, ice, heat, physical therapy including strengthening and stretching exercises, home exercises, ROM, aerobic conditioning, aqua therapy,  other modalities including ultrasound, massage, and dry needling, epidural steroid injections and finally surgical intervention.      The patient has failed conservative treatment including medications and physical therapy. I would like to get an MRI.  I will see him back after to discuss results and further treatment including ESIs.

## 2023-01-02 ENCOUNTER — PATIENT MESSAGE (OUTPATIENT)
Dept: OPTOMETRY | Facility: CLINIC | Age: 58
End: 2023-01-02
Payer: OTHER GOVERNMENT

## 2023-01-08 ENCOUNTER — HOSPITAL ENCOUNTER (EMERGENCY)
Facility: HOSPITAL | Age: 58
Discharge: HOME | End: 2023-01-08
Attending: EMERGENCY MEDICINE
Payer: OTHER GOVERNMENT

## 2023-01-08 VITALS
BODY MASS INDEX: 30.48 KG/M2 | WEIGHT: 225 LBS | RESPIRATION RATE: 18 BRPM | TEMPERATURE: 98 F | DIASTOLIC BLOOD PRESSURE: 86 MMHG | HEART RATE: 78 BPM | HEIGHT: 72 IN | SYSTOLIC BLOOD PRESSURE: 155 MMHG | OXYGEN SATURATION: 99 %
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
## 2023-01-08 DIAGNOSIS — S90.416A TOE ABRASION: ICD-10-CM

## 2023-01-08 DIAGNOSIS — I10 HYPERTENSION, UNSPECIFIED TYPE: Primary | ICD-10-CM

## 2023-01-08 DIAGNOSIS — T14.8XXA SPRAIN AND STRAIN: ICD-10-CM

## 2023-01-08 PROCEDURE — 25000003 PHARM REV CODE 250: Mod: ER | Performed by: EMERGENCY MEDICINE

## 2023-01-08 PROCEDURE — 99284 EMERGENCY DEPT VISIT MOD MDM: CPT | Mod: 25,ER

## 2023-01-08 RX ORDER — IBUPROFEN 600 MG/1
600 TABLET ORAL
Status: COMPLETED | OUTPATIENT
Start: 2023-01-08 | End: 2023-01-08

## 2023-01-08 RX ORDER — CELECOXIB 200 MG/1
200 CAPSULE ORAL 2 TIMES DAILY
Qty: 180 CAPSULE | Refills: 0 | Status: ON HOLD | OUTPATIENT
Start: 2023-01-08 | End: 2023-07-05 | Stop reason: HOSPADM

## 2023-01-08 RX ORDER — ACETAMINOPHEN 500 MG
1000 TABLET ORAL
Status: COMPLETED | OUTPATIENT
Start: 2023-01-08 | End: 2023-01-08

## 2023-01-08 RX ORDER — DICLOFENAC SODIUM 10 MG/G
2 GEL TOPICAL 4 TIMES DAILY
Qty: 100 G | Refills: 0 | Status: SHIPPED | OUTPATIENT
Start: 2023-01-08

## 2023-01-08 RX ADMIN — ACETAMINOPHEN 1000 MG: 500 TABLET, FILM COATED ORAL at 02:01

## 2023-01-08 RX ADMIN — IBUPROFEN 600 MG: 600 TABLET ORAL at 02:01

## 2023-01-08 NOTE — ED PROVIDER NOTES
SCRIBE #1 NOTE: I, Ulises Aaron, am scribing for, and in the presence of,  Abraham Patterson MD. I have scribed the following portions of the note - Other sections scribed: HPI, ROS, PE.         EM PHYSICIAN NOTE       This patient presents with a complaint of   Chief Complaint   Patient presents with    Knee Pain     Pt c/o R knee pain with RLE swelling since Saturday, reports misstep on stairs on Friday       HPI: 56 y/o male with HTN presents with R foot pain and swelling since yesterday; patient notes misstep 2 days PTA. He also endorses R knee pain from the same accident. He has attempted treatment with Ibuprofen with some success. He takes Lisinopril for his blood pressure.      Review of patient's allergies indicates:  No Known Allergies  No current facility-administered medications for this encounter.    Current Outpatient Medications:     atorvastatin (LIPITOR) 40 MG tablet, Take 1 tablet (40 mg total) by mouth every evening., Disp: 90 tablet, Rfl: 3    celecoxib (CELEBREX) 200 MG capsule, Take 1 capsule (200 mg total) by mouth 2 (two) times daily. May take 650 mg of Tylenol at the same time, Disp: 180 capsule, Rfl: 0    cromolyn (NASALCHROM) 5.2 mg/spray (4 %) nasal spray, 1 spray by Nasal route 4 (four) times daily., Disp: 26 mL, Rfl: 1    diclofenac sodium (VOLTAREN) 1 % Gel, Apply 2 g topically 4 (four) times daily., Disp: 100 g, Rfl: 0    fluticasone propionate (FLONASE) 50 mcg/actuation nasal spray, 1 spray (50 mcg total) by Each Nostril route once daily., Disp: 16 g, Rfl: 3    gabapentin (NEURONTIN) 100 MG capsule, Take 1 capsule (100 mg total) by mouth 3 (three) times daily., Disp: 90 capsule, Rfl: 0    lisinopriL-hydrochlorothiazide (PRINZIDE,ZESTORETIC) 20-12.5 mg per tablet, Take 1 tablet by mouth once daily., Disp: 90 tablet, Rfl: 3    methocarbamoL (ROBAXIN) 750 MG Tab, Take 1 tablet (750 mg total) by mouth 3 (three) times daily. As needed for muscle spasms for 60 doses, Disp: 60 tablet, Rfl: 0     metoprolol succinate (TOPROL-XL) 25 MG 24 hr tablet, Take 1 tablet (25 mg total) by mouth once daily., Disp: 90 tablet, Rfl: 3    omeprazole (PRILOSEC) 20 MG capsule, Take 1 capsule (20 mg total) by mouth once daily. Decreased dose, Disp: 90 capsule, Rfl: 3    traZODone (DESYREL) 50 MG tablet, Take 0.5-1 tablets (25-50 mg total) by mouth nightly as needed for Insomnia., Disp: 90 tablet, Rfl: 3     [facility]erred [facility]        Pertinent REVIEW of SYSTEMS  Source: Patient  The nurse's notes and triage vital signs were reviewed.  GENERAL/CONSTITUTIONAL: There is not a report of fever   CARDIOVASCULAR: There is not a report of chest pain   RESPIRATORY: There is not a report of cough or SOB  GASTROINTESTINAL: There is not a report of  vomiting, diarrhea  HEMATOLOGIC/LYMPHATIC: There is not a report of anticoagulant/antithrombotic use.  (+) R foot and knee pain reported.        PHYSICAL EXAMINATION    ED Triage Vitals [01/08/23 1327]   Enc Vitals Group      BP (!) 173/96      Pulse 86      Resp 18      Temp 98.4 °F (36.9 °C)      Temp src Oral      SpO2 98 %      Weight 225 lb      Height 6'      Head Circumference       Peak Flow       Pain Score       Pain Loc       Pain Edu?       Excl. in GC?      Vital signs and Pulse Ox reviewed in clinical context. Abnormalities noted:  Hypertension noted, not at goal  Pt's level of consciousness is Awake and Alert, and the patient is in mild distress.  Skin: warm, pink and dry.  Capillary refill is less than 2 seconds.  Mucosa: normal, moist  Head and Neck: no JVD, neck supple  Cardiac exam: RRR; +2 distal pulses.   Pulmonary exam: unlabored and clear  Abd Exam: soft nontender   Musculoskeletal: Tenderness to MCT joint region of lesser toes of R foot; Tenderness to R proximal tibia without effusion; no ecchymosis, deformity, or swelling.   Neurologic: GCS 15; moving all extremities equally, no facial droop        Initial Impression:  Acute knee and foot  injury;  Hypertension, not at goal  Plan:  Analgesics, imaging, reassess  Abraham Patterson MD, 2:13 PM 1/8/2023      Medical decision making:   Nurses notes and Vital Signs reviewed.     Problems: Today's visit reveals foot and knee injury which Acute problem that is concerning for deterioration due to differential diagnosis that includes fracture.     Other problems today include hypertension not at goal   I reviewed the prescription monitoring program.  Patient does take gabapentin as he noted.  Patient has had prescriptions for Percocet and Valium over a year ago.    I will order Voltaren gel and a refill on the patient's Celebrex to treat his pain.    See ER course below for lab test ordered, results reviewed, and any independent interpretation of images or EKG:  ED Course as of 01/08/23 1507   Sun Jan 08, 2023   1446 My independent wet read of the plain films of the knee and foot do not reveal fracture. [MH]   1459 Final reading of the x-rays of the knee and foot per Radiology are no acute fracture [MH]      ED Course User Index  [MH] Abraham Patterson MD             Orders Placed This Encounter   Procedures    X-Ray Foot Complete Right    X-Ray Knee 3 View Right         Diagnoses that have been ruled out:   None   Diagnoses that are still under consideration:   None   Final diagnoses:   Fall   Toe abrasion   Toe abrasion   Hypertension, unspecified type   Sprain and strain               Follow-up Information       Follow up With Specialties Details Why Contact Info    Dipesh Clements MD Internal Medicine Schedule an appointment as soon as possible for a visit  Primary Care Provider: Blood Pressure check ASAP [address] [place] [address]  [phone]            ED Prescriptions       Medication Sig Dispense Start Date End Date Auth. Provider    diclofenac sodium (VOLTAREN) 1 % Gel Apply 2 g topically 4 (four) times daily. 100 g 1/8/2023 -- Abraham Patterson MD    celecoxib (CELEBREX) 200 MG capsule  Take 1 capsule (200 mg total) by mouth 2 (two) times daily. May take 650 mg of Tylenol at the same time 180 capsule 1/8/2023 -- Abraham Patterson MD            This note was created using Dictation Software.  This program may occasionally misinterpret certain words and phrases.      SCRIBE ATTESTATION NOTE:   I attest that I personally performed the services documented by the scribe and acknowledged and confirm the content of the note.   Nurses notes were reviewed.  Abraham Patterson MD  01/08/23 [id number]

## 2023-01-09 NOTE — PROGRESS NOTES
DATE: 1/11/2023  PATIENT: Enoch Barr    Attending Physician: David Don MD    HISTORY:  Enoch Barr is a 57 y.o. male who returns to me today for MRI results.  He was last seen by Angelina Viera PA-C on 12/27/22.  Today he is doing well but notes he continues to have low back pain.    The Patient denies myelopathic symptoms such as handwriting changes or difficulty with buttons/coins/keys. Denies perineal paresthesias, bowel/bladder dysfunction.    PMH/PSH/FamHx/SocHx:  Unchanged from prior visit    ROS:  REVIEW OF SYSTEMS:  Constitution: Negative. Negative for chills, fever and night sweats.   HENT: Negative for congestion and headaches.    Eyes: Negative for blurred vision, left vision loss and right vision loss.   Cardiovascular: Negative for chest pain and syncope.   Respiratory: Negative for cough and shortness of breath.    Endocrine: Negative for polydipsia, polyphagia and polyuria.   Hematologic/Lymphatic: Negative for bleeding problem. Does not bruise/bleed easily.   Skin: Negative for dry skin, itching and rash.   Musculoskeletal: Negative for falls and muscle weakness.   Gastrointestinal: Negative for abdominal pain and bowel incontinence.   Allergic/Immunologic: Negative for hives and persistent infections.  Genitourinary: Negative for urinary retention/incontinence and nocturia.   Neurological: negative for disturbances in coordination, no myelopathic symptoms such as handwriting changes or difficulty with buttons, coins, keys or small objects. No loss of balance and seizures.   Psychiatric/Behavioral: Negative for depression. The patient does not have insomnia.   Denies perineal paresthesias, bowel or bladder incontinence    EXAM:  There were no vitals taken for this visit.    My physical examination was notable for the following findings:     Musculoskeletal and neuro exam stable.      IMAGING:    Today I personally re- reviewed AP, Lat and Flex/Ex  upright L-spine that demonstrate mild  degenerative changes.  There is retrolisthesis at L2/3, L3/4 and L4/5.      MRI lumbar demonstrates lumbar degenerative changes contributing to multilevel neural foraminal narrowing, most severe at L5-S1 noting a left foraminal zone disc extrusion that contributes to moderate to severe narrowing with questionable impingement of the exiting L5 nerve root.  No spinal canal stenosis.    There is no height or weight on file to calculate BMI.    No results found for: HGBA1C      ASSESSMENT/PLAN:    There are no diagnoses linked to this encounter.    Today we discussed at length all of the different treatment options including anti-inflammatories, acetaminophen, rest, ice, heat, physical therapy including strengthening and stretching exercises, home exercises, ROM, aerobic conditioning, aqua therapy, other modalities including ultrasound, massage, and dry needling, epidural steroid injections and finally surgical intervention.      Pt presents with chronic low back pain. Failure of conservative rx. Will schedule in pain management to discuss ESIs vs RFAs.

## 2023-01-10 ENCOUNTER — HOSPITAL ENCOUNTER (OUTPATIENT)
Dept: RADIOLOGY | Facility: HOSPITAL | Age: 58
Discharge: HOME | End: 2023-01-10
Attending: PHYSICIAN ASSISTANT
Payer: OTHER GOVERNMENT

## 2023-01-10 DIAGNOSIS — M54.16 LUMBAR RADICULOPATHY, CHRONIC: ICD-10-CM

## 2023-01-10 PROCEDURE — 72148 MRI LUMBAR SPINE W/O DYE: CPT | Mod: TC

## 2023-01-10 PROCEDURE — 72148 MRI LUMBAR SPINE WITHOUT CONTRAST: ICD-10-PCS | Mod: 26,,, | Performed by: RADIOLOGY

## 2023-01-10 PROCEDURE — 72148 MRI LUMBAR SPINE W/O DYE: CPT | Mod: 26,,, | Performed by: RADIOLOGY

## 2023-01-11 ENCOUNTER — OFFICE VISIT (OUTPATIENT)
Dept: ORTHOPEDICS | Facility: CLINIC | Age: 58
End: 2023-01-11
Payer: OTHER GOVERNMENT

## 2023-01-11 VITALS — WEIGHT: 223.31 LBS | HEIGHT: 72 IN | BODY MASS INDEX: 30.25 KG/M2

## 2023-01-11 DIAGNOSIS — M51.36 DDD (DEGENERATIVE DISC DISEASE), LUMBAR: Primary | ICD-10-CM

## 2023-01-11 PROCEDURE — 99213 PR OFFICE/OUTPT VISIT, EST, LEVL III, 20-29 MIN: ICD-10-PCS | Mod: S$PBB,,, | Performed by: ORTHOPAEDIC SURGERY

## 2023-01-11 PROCEDURE — 99999 PR PBB SHADOW E&M-EST. PATIENT-LVL III: CPT | Mod: PBBFAC,,, | Performed by: ORTHOPAEDIC SURGERY

## 2023-01-11 PROCEDURE — 99213 OFFICE O/P EST LOW 20 MIN: CPT | Mod: PBBFAC | Performed by: ORTHOPAEDIC SURGERY

## 2023-01-11 PROCEDURE — 99999 PR PBB SHADOW E&M-EST. PATIENT-LVL III: ICD-10-PCS | Mod: PBBFAC,,, | Performed by: ORTHOPAEDIC SURGERY

## 2023-01-11 PROCEDURE — 99213 OFFICE O/P EST LOW 20 MIN: CPT | Mod: S$PBB,,, | Performed by: ORTHOPAEDIC SURGERY

## 2023-01-24 ENCOUNTER — OFFICE VISIT (OUTPATIENT)
Dept: PAIN MEDICINE | Facility: CLINIC | Age: 58
End: 2023-01-24
Payer: OTHER GOVERNMENT

## 2023-01-24 VITALS
DIASTOLIC BLOOD PRESSURE: 99 MMHG | WEIGHT: 230.31 LBS | BODY MASS INDEX: 31.23 KG/M2 | SYSTOLIC BLOOD PRESSURE: 148 MMHG | OXYGEN SATURATION: 96 %

## 2023-01-24 DIAGNOSIS — M43.16 SPONDYLOLISTHESIS OF LUMBAR REGION: ICD-10-CM

## 2023-01-24 DIAGNOSIS — M47.816 LUMBAR SPONDYLOSIS: ICD-10-CM

## 2023-01-24 DIAGNOSIS — M51.36 DDD (DEGENERATIVE DISC DISEASE), LUMBAR: Primary | ICD-10-CM

## 2023-01-24 DIAGNOSIS — M54.16 LUMBAR RADICULOPATHY: ICD-10-CM

## 2023-01-24 PROCEDURE — 99214 PR OFFICE/OUTPT VISIT, EST, LEVL IV, 30-39 MIN: ICD-10-PCS | Mod: S$PBB,,,

## 2023-01-24 PROCEDURE — 99999 PR PBB SHADOW E&M-EST. PATIENT-LVL IV: CPT | Mod: PBBFAC,,,

## 2023-01-24 PROCEDURE — 99214 OFFICE O/P EST MOD 30 MIN: CPT | Mod: S$PBB,,,

## 2023-01-24 PROCEDURE — 99999 PR PBB SHADOW E&M-EST. PATIENT-LVL IV: ICD-10-PCS | Mod: PBBFAC,,,

## 2023-01-24 PROCEDURE — 99214 OFFICE O/P EST MOD 30 MIN: CPT | Mod: PBBFAC,PN

## 2023-01-24 RX ORDER — GABAPENTIN 300 MG/1
600 CAPSULE ORAL 3 TIMES DAILY
Qty: 180 CAPSULE | Refills: 5 | Status: SHIPPED | OUTPATIENT
Start: 2023-01-24 | End: 2023-03-15 | Stop reason: SINTOL

## 2023-01-24 NOTE — PROGRESS NOTES
Subjective:     Patient ID: Enoch Barr is a 58 y.o. male    Chief Complaint: Low-back Pain (Radiating achy pain down left leg )      Referred by: No ref. provider found      HPI:    Initial Encounter (***):  Enoch Barr is a 58 y.o. male who presents today with ***. ***.   This pain is described in detail below.    Physical Therapy: ***    Non-pharmacologic Treatment: ***         TENS? ***    Pain Medications:         Currently taking: ***    Has tried in the past:  ***    Has not tried: *** Opioids, NSAIDs, Tylenol, Muscle relaxants, TCAs, SNRIs, anticonvulsants, topical creams    Blood thinners: ***    Interventional Therapies: ***    Relevant Surgeries: ***    Affecting sleep? ***    Affecting daily activities? {YES/NO/WILD CARDS:72561}    Depressive symptoms? no          SI/HI? No    Work status: Employed    Pain Scores:    Best:       3/10  Worst:     7/10  Usually:   5/10  Today:    5/10         Review of Systems    Past Medical History:   Diagnosis Date    GERD (gastroesophageal reflux disease)     Hypercholesteremia     Hypertension     on medication    Lumbar spondylosis 9/21/2020    Migraines     Sleep apnea        Past Surgical History:   Procedure Laterality Date    COLONOSCOPY N/A 8/10/2020    Procedure: COLONOSCOPY;  Surgeon: April Dos Santos MD;  Location: [facility] ENDO;  Service: Endoscopy;  Laterality: N/A;    HERNIA REPAIR      LAPAROSCOPIC CHOLECYSTECTOMY N/A 1/25/2022    Procedure: CHOLECYSTECTOMY, LAPAROSCOPIC;  Surgeon: Jarrod Martinez MD;  Location: [facility] OR;  Service: General;  Laterality: N/A;    REPAIR OF TRICEPS TENDON Left 10/2/2020    Procedure: REPAIR, TENDON, TRICEPS;  Surgeon: Keysha Galvez MD;  Location: [facility] OR;  Service: Orthopedics;  Laterality: Left;  RN PRE OP DONE 9/29/2020----COVID NEGATIVE ON 9/29  [employer] Rep: Delma [phone]    SINUS SURGERY  2012       Social History     Socioeconomic History    Marital status:    Occupational History    Occupation:  production      Employer:  [employer]   Tobacco Use    Smoking status: Former     Types: Cigarettes     Quit date: 1999     Years since quittin.6    Smokeless tobacco: Never   Substance and Sexual Activity    Alcohol use: Not Currently     Alcohol/week: 7.0 standard drinks     Types: 7 Glasses of wine per week     Comment: beer 2-3 daily    Drug use: No    Sexual activity: Yes     Partners: Female     Social Determinants of Health     Financial Resource Strain: Unknown    Difficulty of Paying Living Expenses: Patient refused   Food Insecurity: Unknown    Worried About Running Out of Food in the Last Year: Patient refused    Ran Out of Food in the Last Year: Patient refused   Transportation Needs: No Transportation Needs    Lack of Transportation (Medical): No    Lack of Transportation (Non-Medical): No   Physical Activity: Sufficiently Active    Days of Exercise per Week: 4 days    Minutes of Exercise per Session: 70 min   Stress: No Stress Concern Present    Feeling of Stress : Only a little   Social Connections: Unknown    Frequency of Communication with Friends and Family: Patient refused    Frequency of Social Gatherings with Friends and Family: Patient refused    Active Member of Clubs or Organizations: No    Attends Club or Organization Meetings: Never    Marital Status:    Housing Stability: Unknown    Unable to Pay for Housing in the Last Year: Patient refused    Unstable Housing in the Last Year: Patient refused       Review of patient's allergies indicates:  No Known Allergies    Current Outpatient Medications on File Prior to Visit   Medication Sig Dispense Refill    atorvastatin (LIPITOR) 40 MG tablet Take 1 tablet (40 mg total) by mouth every evening. 90 tablet 3    celecoxib (CELEBREX) 200 MG capsule Take 1 capsule (200 mg total) by mouth 2 (two) times daily. May take 650 mg of Tylenol at the same time 180 capsule 0    cromolyn (NASALCHROM) 5.2 mg/spray (4  %) nasal spray 1 spray by Nasal route 4 (four) times daily. 26 mL 1    diclofenac sodium (VOLTAREN) 1 % Gel Apply 2 g topically 4 (four) times daily. 100 g 0    fluticasone propionate (FLONASE) 50 mcg/actuation nasal spray 1 spray (50 mcg total) by Each Nostril route once daily. 16 g 3    gabapentin (NEURONTIN) 100 MG capsule Take 1 capsule (100 mg total) by mouth 3 (three) times daily. 90 capsule 0    lisinopriL-hydrochlorothiazide (PRINZIDE,ZESTORETIC) 20-12.5 mg per tablet Take 1 tablet by mouth once daily. 90 tablet 3    metoprolol succinate (TOPROL-XL) 25 MG 24 hr tablet Take 1 tablet (25 mg total) by mouth once daily. 90 tablet 3    omeprazole (PRILOSEC) 20 MG capsule Take 1 capsule (20 mg total) by mouth once daily. Decreased dose 90 capsule 3    traZODone (DESYREL) 50 MG tablet Take 0.5-1 tablets (25-50 mg total) by mouth nightly as needed for Insomnia. 90 tablet 3     No current facility-administered medications on file prior to visit.       Objective:      BP (!) 148/99 (BP Location: Right arm, Patient Position: Sitting, BP Method: Medium (Automatic))   Wt 104.5 kg (230 lb 4.8 oz)   SpO2 96%   BMI 31.23 kg/m²     Exam:  ***    Imaging:  ***    Assessment:       No diagnosis found.      Plan:       There are no diagnoses linked to this encounter.    Enoch Barr is a 58 y.o. male with ***.    ***          This note was created by combination of typed  and M-Modal dictation. Transcription and phonetic errors may be present.  If there are any questions, please contact me.
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
## 2023-01-24 NOTE — PROGRESS NOTES
Mr. Barr will be scheduled for left L5 TF ANKITA on 2/17/23.  Reviewed the pre-procedure instructions listed below with the patient- copy also provided.  Instructed patient to notify clinic if he begins feeling ill, runs a fever, is prescribed antibiotics, and/or has any outpatient procedures (colonoscopy, endoscopy, OBGYN, dental work, etc.), and/or any vaccinations or booster shots within the two weeks leading up to this procedure.  Instructed patient to report to [facility], 2nd Floor Endoscopy Registration Desk.  All questions answered- patient verbalized understanding.     Day of Procedure  Ensure you have obtained an arrival time from the Pain Management Staff  You will be contacted the week before your scheduled date to review these instructions and receive your arrival time.  Please check any voicemails received.  If you arrive past your scheduled procedure time, you may be asked to reschedule your procedure.    Ensure you have a  with you.  If you arrive without a responsible adult to stay with you and drive you home, you may be asked to reschedule your procedure.     Take all of your prescribed medications that you routinely take for blood pressure, heart medications, thyroid, cholesterol, etc.  You will be informed if blood thinners should be held.    This is NOT a fasting procedure, you may eat the day of your procedure.    Wear comfortable clothing (loose fitting pants).  You may wear glasses, dentures, contact lenses, and/or hearing aids.     Notify the nurse during the intake process if you are allergic to any medications, if you are diabetic, or if you are not feeling well      Diabetic Patients  Please check your blood sugar prior to coming in for your procedure.  If the value is greater than 200, contact the clinic [phone] as the procedure may need to be rescheduled.  The procedure may not be performed if your blood sugar is above 200 even after checking into the  \[facility] [place] [facility]\"".      IF you are taking blood thinners, please make sure our staff is aware so that we can obtain clearance and have you hold the medication accordingly.
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
## 2023-01-24 NOTE — PATIENT INSTRUCTIONS
Gabapentin 300mg pills  start taking one pill in the morning and one pill at night (2 pills total per day.)  After three days start taking one pill in the morning, one pill in the afternoon and one pill at night. (3 pills total per day.)  After three days start taking one pill in the morning, one pill in the afternoon and two pills at night. (4 pills total per day.)  After three days start taking two pills in the morning, one pill in the afternoon and two pills at night. (5 pills total per day.)  After three days start taking two pills in the morning, two pills in the afternoon and two pills at night. (6 pills total per day.)  Stay at this dose until next visit.   If experiencing any side effects take highest dose tolerated.

## 2023-01-24 NOTE — PROGRESS NOTES
Subjective:     Patient ID: Enoch Barr is a 58 y.o. male    Chief Complaint: Low-back Pain (Radiating achy pain down left leg )      Referred by: No ref. provider found      HPI:    Initial Encounter (***):  Enoch Barr is a 58 y.o. male who presents today with ***. ***.   This pain is described in detail below.    Physical Therapy: ***    Non-pharmacologic Treatment: ***         TENS? ***    Pain Medications:         Currently taking: ***    Has tried in the past:  ***    Has not tried: *** Opioids, NSAIDs, Tylenol, Muscle relaxants, TCAs, SNRIs, anticonvulsants, topical creams    Blood thinners: ***    Interventional Therapies: ***    Relevant Surgeries: ***    Affecting sleep? ***    Affecting daily activities? {YES/NO/WILD CARDS:08880}    Depressive symptoms? no          SI/HI? No    Work status: Employed    Pain Scores:    Best:       3/10  Worst:     7/10  Usually:   5/10  Today:    5/10         Review of Systems    Past Medical History:   Diagnosis Date    GERD (gastroesophageal reflux disease)     Hypercholesteremia     Hypertension     on medication    Lumbar spondylosis 9/21/2020    Migraines     Sleep apnea        Past Surgical History:   Procedure Laterality Date    COLONOSCOPY N/A 8/10/2020    Procedure: COLONOSCOPY;  Surgeon: April Dos Santos MD;  Location: [facility] ENDO;  Service: Endoscopy;  Laterality: N/A;    HERNIA REPAIR      LAPAROSCOPIC CHOLECYSTECTOMY N/A 1/25/2022    Procedure: CHOLECYSTECTOMY, LAPAROSCOPIC;  Surgeon: Jarrod Martinez MD;  Location: [facility] OR;  Service: General;  Laterality: N/A;    REPAIR OF TRICEPS TENDON Left 10/2/2020    Procedure: REPAIR, TENDON, TRICEPS;  Surgeon: Keysha Galvez MD;  Location: [facility] OR;  Service: Orthopedics;  Laterality: Left;  RN PRE OP DONE 9/29/2020----COVID NEGATIVE ON 9/29  [facility] Rep: Delma [phone]    SINUS SURGERY  2012       Social History     Socioeconomic History    Marital status:    Occupational History    Occupation:  production      Employer:  [employer]   Tobacco Use    Smoking status: Former     Types: Cigarettes     Quit date: 1999     Years since quittin.6    Smokeless tobacco: Never   Substance and Sexual Activity    Alcohol use: Not Currently     Alcohol/week: 7.0 standard drinks     Types: 7 Glasses of wine per week     Comment: beer 2-3 daily    Drug use: No    Sexual activity: Yes     Partners: Female     Social Determinants of Health     Financial Resource Strain: Unknown    Difficulty of Paying Living Expenses: Patient refused   Food Insecurity: Unknown    Worried About Running Out of Food in the Last Year: Patient refused    Ran Out of Food in the Last Year: Patient refused   Transportation Needs: No Transportation Needs    Lack of Transportation (Medical): No    Lack of Transportation (Non-Medical): No   Physical Activity: Sufficiently Active    Days of Exercise per Week: 4 days    Minutes of Exercise per Session: 70 min   Stress: No Stress Concern Present    Feeling of Stress : Only a little   Social Connections: Unknown    Frequency of Communication with Friends and Family: Patient refused    Frequency of Social Gatherings with Friends and Family: Patient refused    Active Member of Clubs or Organizations: No    Attends Club or Organization Meetings: Never    Marital Status:    Housing Stability: Unknown    Unable to Pay for Housing in the Last Year: Patient refused    Unstable Housing in the Last Year: Patient refused       Review of patient's allergies indicates:  No Known Allergies    Current Outpatient Medications on File Prior to Visit   Medication Sig Dispense Refill    atorvastatin (LIPITOR) 40 MG tablet Take 1 tablet (40 mg total) by mouth every evening. 90 tablet 3    celecoxib (CELEBREX) 200 MG capsule Take 1 capsule (200 mg total) by mouth 2 (two) times daily. May take 650 mg of Tylenol at the same time 180 capsule 0    cromolyn (NASALCHROM) 5.2 mg/spray (4  %) nasal spray 1 spray by Nasal route 4 (four) times daily. 26 mL 1    diclofenac sodium (VOLTAREN) 1 % Gel Apply 2 g topically 4 (four) times daily. 100 g 0    fluticasone propionate (FLONASE) 50 mcg/actuation nasal spray 1 spray (50 mcg total) by Each Nostril route once daily. 16 g 3    lisinopriL-hydrochlorothiazide (PRINZIDE,ZESTORETIC) 20-12.5 mg per tablet Take 1 tablet by mouth once daily. 90 tablet 3    metoprolol succinate (TOPROL-XL) 25 MG 24 hr tablet Take 1 tablet (25 mg total) by mouth once daily. 90 tablet 3    omeprazole (PRILOSEC) 20 MG capsule Take 1 capsule (20 mg total) by mouth once daily. Decreased dose 90 capsule 3    traZODone (DESYREL) 50 MG tablet Take 0.5-1 tablets (25-50 mg total) by mouth nightly as needed for Insomnia. 90 tablet 3    [DISCONTINUED] gabapentin (NEURONTIN) 100 MG capsule Take 1 capsule (100 mg total) by mouth 3 (three) times daily. 90 capsule 0     No current facility-administered medications on file prior to visit.       Objective:      BP (!) 148/99 (BP Location: Right arm, Patient Position: Sitting, BP Method: Medium (Automatic))   Wt 104.5 kg (230 lb 4.8 oz)   SpO2 96%   BMI 31.23 kg/m²     Exam:  ***    Imaging:  ***    Assessment:       Encounter Diagnoses   Name Primary?    DDD (degenerative disc disease), lumbar Yes    Lumbar radiculopathy     Spondylolisthesis of lumbar region     Lumbar spondylosis          Plan:       Enoch was seen today for low-back pain.    Diagnoses and all orders for this visit:    DDD (degenerative disc disease), lumbar  -     gabapentin (NEURONTIN) 300 MG capsule; Take 2 capsules (600 mg total) by mouth 3 (three) times daily.  -     Case Request Endoscopy: Left L5 Transforaminal Epidural Steroid Injection    Lumbar radiculopathy  -     gabapentin (NEURONTIN) 300 MG capsule; Take 2 capsules (600 mg total) by mouth 3 (three) times daily.  -     Case Request Endoscopy: Left L5 Transforaminal Epidural Steroid Injection    Spondylolisthesis  of lumbar region    Lumbar spondylosis    Enoch Barr is a 58 y.o. male with ***.    ***          This note was created by combination of typed  and M-Modal dictation. Transcription and phonetic errors may be present.  If there are any questions, please contact me.

## 2023-01-28 ENCOUNTER — OFFICE VISIT (OUTPATIENT)
Dept: URGENT CARE | Facility: CLINIC | Age: 58
End: 2023-01-28
Payer: OTHER GOVERNMENT

## 2023-01-28 VITALS
WEIGHT: 230 LBS | SYSTOLIC BLOOD PRESSURE: 112 MMHG | OXYGEN SATURATION: 95 % | RESPIRATION RATE: 18 BRPM | TEMPERATURE: 99 F | HEIGHT: 72 IN | DIASTOLIC BLOOD PRESSURE: 80 MMHG | HEART RATE: 98 BPM | BODY MASS INDEX: 31.15 KG/M2

## 2023-01-28 DIAGNOSIS — M79.671 RIGHT FOOT PAIN: Primary | ICD-10-CM

## 2023-01-28 PROCEDURE — 73630 XR FOOT COMPLETE 3 VIEW RIGHT: ICD-10-PCS | Mod: RT,S$GLB,, | Performed by: RADIOLOGY

## 2023-01-28 PROCEDURE — 96372 THER/PROPH/DIAG INJ SC/IM: CPT | Mod: S$GLB,,, | Performed by: FAMILY MEDICINE

## 2023-01-28 PROCEDURE — 73630 X-RAY EXAM OF FOOT: CPT | Mod: RT,S$GLB,, | Performed by: RADIOLOGY

## 2023-01-28 PROCEDURE — 99213 OFFICE O/P EST LOW 20 MIN: CPT | Mod: 25,S$GLB,,

## 2023-01-28 PROCEDURE — 96372 PR INJECTION,THERAP/PROPH/DIAG2ST, IM OR SUBCUT: ICD-10-PCS | Mod: S$GLB,,, | Performed by: FAMILY MEDICINE

## 2023-01-28 PROCEDURE — 99213 PR OFFICE/OUTPT VISIT, EST, LEVL III, 20-29 MIN: ICD-10-PCS | Mod: 25,S$GLB,,

## 2023-01-28 RX ORDER — KETOROLAC TROMETHAMINE 30 MG/ML
30 INJECTION, SOLUTION INTRAMUSCULAR; INTRAVENOUS
Status: COMPLETED | OUTPATIENT
Start: 2023-01-28 | End: 2023-01-28

## 2023-01-28 RX ORDER — IBUPROFEN 800 MG/1
800 TABLET ORAL EVERY 8 HOURS PRN
Qty: 30 TABLET | Refills: 0 | Status: ON HOLD | OUTPATIENT
Start: 2023-01-28 | End: 2023-07-05 | Stop reason: HOSPADM

## 2023-01-28 RX ADMIN — KETOROLAC TROMETHAMINE 30 MG: 30 INJECTION, SOLUTION INTRAMUSCULAR; INTRAVENOUS at 11:01

## 2023-01-28 NOTE — PROGRESS NOTES
Subjective:       Patient ID: Enoch Barr is a 58 y.o. male.    Vitals:  height is 6' (1.829 m) and weight is 104.3 kg (230 lb). His tympanic temperature is 98.5 °F (36.9 °C). His blood pressure is 112/80 and his pulse is 98. His respiration is 18 and oxygen saturation is 95%.     Chief Complaint: Injury (Right foot swollen, extreme pain by ball of foot, hard to walk, - Entered by patient) and Foot Injury    Pt is a 57 y/o M who presents with R foot pain and swelling x3 days. Notes that he had an injury earlier this month about 3 weeks ago where he injured his R knee and R foot after misstepping on the stairs. Foot was doing fine until 2 days ago. Denies any other injury. Having some numbness and tingling to his 2nd-4th toes. Taking tylenol and ibuprofen with some relief. Denies focal weakness, loss of sensation, erythema, open wounds, rash.    Foot Injury   The incident occurred 2 days ago. The incident occurred at home. The injury mechanism is unknown. The pain is present in the right foot and right toes. The quality of the pain is described as aching. The pain is at a severity of 6/10. The pain is moderate. Associated symptoms include an inability to bear weight, numbness and tingling. He reports no foreign bodies present. The symptoms are aggravated by movement and weight bearing. He has tried acetaminophen (ibuprofen) for the symptoms. The treatment provided mild relief.     Constitution: Negative for fever.   Cardiovascular:  Negative for leg swelling.   Musculoskeletal:  Positive for joint pain and joint swelling. Negative for abnormal ROM of joint and back pain.   Skin:  Negative for rash, wound, abrasion and erythema.   Neurological:  Positive for numbness and tingling.     Objective:      Physical Exam   Constitutional: He is oriented to person, place, and time. He appears well-developed.   HENT:   Head: Normocephalic and atraumatic. Head is without abrasion, without contusion and without laceration.    Ears:   Right Ear: External ear normal.   Left Ear: External ear normal.   Nose: Nose normal.   Mouth/Throat: Oropharynx is clear and moist and mucous membranes are normal.   Eyes: Conjunctivae, EOM and lids are normal. Pupils are equal, round, and reactive to light.   Neck: Trachea normal and phonation normal. Neck supple.   Cardiovascular: Normal rate, regular rhythm and normal heart sounds.   Pulmonary/Chest: Effort normal and breath sounds normal. No stridor. No respiratory distress.   Musculoskeletal: Normal range of motion.         General: Swelling and tenderness present. Normal range of motion.      Comments: TTP to 1st-4th MTP joints. Mild swelling to foot. Sensation to light touch intact.   Neurological: no focal deficit. He is alert and oriented to person, place, and time. No cranial nerve deficit.   Skin: Skin is warm, dry, intact and no rash. Capillary refill takes less than 2 seconds. No abrasion, No burn, No bruising, No erythema and No ecchymosis   Psychiatric: His speech is normal and behavior is normal. Judgment and thought content normal.   Nursing note and vitals reviewed.        X-Ray Foot Complete Right    Result Date: 1/28/2023  EXAMINATION: XR FOOT COMPLETE 3 VIEW RIGHT CLINICAL HISTORY: . Pain in right foot TECHNIQUE: AP, lateral, and oblique views of the right foot were performed. COMPARISON: 01/08/2023. FINDINGS: The bones appear intact.  There is no evidence for acute fracture or bone destruction.  There is no evidence for dislocation. There are degenerative changes particularly at the 1st and 2nd metatarsophalangeal joints. No bony erosions are identified. There is hallux valgus deformity present. There is a small calcaneal spur at the insertion of the plantar fascia.     No evidence for acute fracture, bone destruction, or dislocation. Degenerative changes. Hallux valgus deformity. Calcaneal spur at the insertion of the plantar fascia. Electronically signed by: Jeffrey Phillip MD  Date:    01/28/2023 Time:    12:13        Assessment:       1. Right foot pain          Plan:         Right foot pain  -     X-Ray Foot Complete Right; Future; Expected date: 01/28/2023  -     ketorolac injection 30 mg  -     ibuprofen (ADVIL,MOTRIN) 800 MG tablet; Take 1 tablet (800 mg total) by mouth every 8 (eight) hours as needed for Pain.  Dispense: 30 tablet; Refill: 0                     Patient Instructions     Take Tylenol or ibuprofen for pain     Rest, ice, compress at home     Avoid prolonged use of the affected area until better.      Please return or see your primary care doctor or podiatrist if you develop new or worsening symptoms.      You must understand that you've received an Urgent Care treatment only and that you may be released before all of your medical problems are known or treated. You, the patient, will arrange for follow up as instructed. If your symptoms worsen or fail to improve you should go to the Emergency Room.     If you are give a referral to specialist, please conform your appointment by calling [phone].

## 2023-01-28 NOTE — PATIENT INSTRUCTIONS
Take Tylenol or ibuprofen for pain     Rest, ice, compress at home     Avoid prolonged use of the affected area until better.      Please return or see your primary care doctor or podiatrist if you develop new or worsening symptoms.      You must understand that you've received an Urgent Care treatment only and that you may be released before all of your medical problems are known or treated. You, the patient, will arrange for follow up as instructed. If your symptoms worsen or fail to improve you should go to the Emergency Room.     If you are give a referral to specialist, please conform your appointment by calling [phone].

## 2023-02-02 ENCOUNTER — OFFICE VISIT (OUTPATIENT)
Dept: PODIATRY | Facility: CLINIC | Age: 58
End: 2023-02-02
Payer: OTHER GOVERNMENT

## 2023-02-02 VITALS — BODY MASS INDEX: 31.14 KG/M2 | HEIGHT: 72 IN | WEIGHT: 229.94 LBS

## 2023-02-02 DIAGNOSIS — M21.619 BUNION: ICD-10-CM

## 2023-02-02 DIAGNOSIS — M79.671 RIGHT FOOT PAIN: Primary | ICD-10-CM

## 2023-02-02 PROCEDURE — 99213 OFFICE O/P EST LOW 20 MIN: CPT | Mod: PBBFAC,PO | Performed by: PODIATRIST

## 2023-02-02 PROCEDURE — 99214 OFFICE O/P EST MOD 30 MIN: CPT | Mod: S$PBB,,, | Performed by: PODIATRIST

## 2023-02-02 PROCEDURE — 99214 PR OFFICE/OUTPT VISIT, EST, LEVL IV, 30-39 MIN: ICD-10-PCS | Mod: S$PBB,,, | Performed by: PODIATRIST

## 2023-02-02 PROCEDURE — 99999 PR PBB SHADOW E&M-EST. PATIENT-LVL III: CPT | Mod: PBBFAC,,, | Performed by: PODIATRIST

## 2023-02-02 PROCEDURE — 99999 PR PBB SHADOW E&M-EST. PATIENT-LVL III: ICD-10-PCS | Mod: PBBFAC,,, | Performed by: PODIATRIST

## 2023-02-02 RX ORDER — METHYLPREDNISOLONE 4 MG/1
TABLET ORAL
Qty: 1 EACH | Refills: 0 | Status: SHIPPED | OUTPATIENT
Start: 2023-02-02 | End: 2023-03-15

## 2023-02-02 NOTE — PROGRESS NOTES
Subjective:      Patient ID: Enoch Barr is a 58 y.o. male.    Chief Complaint: Foot Pain (Right foot)    Enoch is a 58 y.o. male who presents to the podiatry clinic  with complaint of  right foot pain. Onset of the symptoms was several days ago. Precipitating event: injured right foot . Current symptoms include: ability to bear weight, but with some pain. Aggravating factors: any weight bearing. Symptoms have progressed to a point and plateaued. Patient has had prior foot problems. Evaluation to date: none. Treatment to date: none. Patients rates pain 5/10 on pain scale.    Review of Systems   Constitutional: Negative for chills.   Cardiovascular:  Negative for chest pain and claudication.   Respiratory:  Negative for cough.    Skin:  Positive for color change, dry skin and nail changes.   Musculoskeletal:  Positive for joint pain.   Gastrointestinal:  Negative for nausea.   Neurological:  Positive for paresthesias. Negative for numbness.   Psychiatric/Behavioral:  The patient is not nervous/anxious.          Objective:      Physical Exam  Constitutional:       Appearance: He is well-developed.      Comments: Oriented to time, place, and person.   Cardiovascular:      Comments: DP and PT pulses are palpable bilaterally. 3 sec capillary refill time and toes and feet are warm to touch proximally .  There is  hair growth on the feet and toes b/l. There is no edema b/l. No spider veins or varicosities present b/l.     Musculoskeletal:      Comments: Equinus noted b/l ankles with < 10 deg DF noted. MMT 5/5 in DF/PF/Inv/Ev resistance with no reproduction of pain in any direction. Passive range of motion of ankle and pedal joints is painless b/l.     Feet:      Right foot:      Skin integrity: No callus or dry skin.      Left foot:      Skin integrity: No callus or dry skin.   Lymphadenopathy:      Comments: Negative lymphadenopathy bilateral popliteal fossa and tarsal tunnel.   Skin:     Comments: No open lesions,  lacerations or wounds noted.Interdigital spaces clean, dry and intact b/l. No erythema noted to b/l foot.  Nails normal color and trophic qualities.     Neurological:      Mental Status: He is alert.      Comments: Light touch, proprioception, and sharp/dull sensation are all intact bilaterally. Protective threshold with the [place]-Wienstein monofilament is intact bilaterally.    Psychiatric:         Behavior: Behavior is cooperative.             Assessment:       Encounter Diagnoses   Name Primary?    Right foot pain Yes    Bunion          Plan:       Enoch was seen today for foot pain.    Diagnoses and all orders for this visit:    Right foot pain    Bunion    Other orders  -     methylPREDNISolone (MEDROL, ELIAS,) 4 mg tablet; use as directed      I counseled the patient on his conditions, their implications and medical management.        Patient instructed on adequate icing techniques. Patient should ice the affected area at least once per day x 10 minutes for 10 days . I advised the patient that extra icing would also be beneficial to ensure adequate anti inflammatory effect      Medrol dose elias prescribed, advised patient to take meds as directed. Patient should complete medication. If problems occur notify the clinic     Recommend ambulation in CAM boot- patient states he already has one at home.     Conservative treatments for bunion deformity discussed include padding, wide width shoes.  Surgical treatments discussed, including bunionectomy  and generic post-op course. Patient opting to pursue surgical option. Explained to patient bunion pain may be exacerbated by recent injury. However patient requests surgical eval for bunion correction. Appt. Scheduled with Dr. Juve dawson eval.     RTC PRN

## 2023-02-09 ENCOUNTER — TELEPHONE (OUTPATIENT)
Dept: PAIN MEDICINE | Facility: CLINIC | Age: 58
End: 2023-02-09
Payer: OTHER GOVERNMENT

## 2023-02-09 NOTE — TELEPHONE ENCOUNTER
Reviewed pre-procedure instructions with Mr. Kendall, as well as provided arrival time of 12:45p for 2/17/23.  All questions answered- verbalized understanding.

## 2023-02-13 ENCOUNTER — OFFICE VISIT (OUTPATIENT)
Dept: PODIATRY | Facility: CLINIC | Age: 58
End: 2023-02-13
Payer: OTHER GOVERNMENT

## 2023-02-13 DIAGNOSIS — M21.961 DEFORMITY OF METATARSAL BONE OF RIGHT FOOT: ICD-10-CM

## 2023-02-13 DIAGNOSIS — M20.11 HALLUX VALGUS OF RIGHT FOOT: Primary | ICD-10-CM

## 2023-02-13 DIAGNOSIS — M79.671 RIGHT FOOT PAIN: ICD-10-CM

## 2023-02-13 PROCEDURE — 99999 PR PBB SHADOW E&M-EST. PATIENT-LVL III: CPT | Mod: PBBFAC,,, | Performed by: PODIATRIST

## 2023-02-13 PROCEDURE — 99999 PR PBB SHADOW E&M-EST. PATIENT-LVL III: ICD-10-PCS | Mod: PBBFAC,,, | Performed by: PODIATRIST

## 2023-02-13 PROCEDURE — 99213 OFFICE O/P EST LOW 20 MIN: CPT | Mod: PBBFAC,PN | Performed by: PODIATRIST

## 2023-02-13 PROCEDURE — 99213 OFFICE O/P EST LOW 20 MIN: CPT | Mod: S$PBB,,, | Performed by: PODIATRIST

## 2023-02-13 PROCEDURE — 99213 PR OFFICE/OUTPT VISIT, EST, LEVL III, 20-29 MIN: ICD-10-PCS | Mod: S$PBB,,, | Performed by: PODIATRIST

## 2023-02-13 NOTE — PROGRESS NOTES
Subjective:      Patient ID: Enoch Barr is a 58 y.o. male.    Chief Complaint: R foot pain      58 y.o. male presenting with R foot pain. Points 1st MPJ and 2nd MPJ area for pain. Aching and throbbing pain. Has been dealing with pain for 3 months. Known to Dr. Nixon. Previous treatments include NSAIDs, oral steroid which helped with some symptoms. R foot pain continues to get worse. Pain gets worse with pressure.  Following up with pain management for back pain. History of taking gabapentin which caused swelling on b/l LE thus stopped. 1st MPJ pain is worse than 2nd MPJ pain.     Level of ambulation/Occupation:   Smoking status: no  Janeen-D Def: ?  DM:no  Other:     Review of Systems   Constitutional: Negative for decreased appetite and malaise/fatigue.   Cardiovascular:  Negative for claudication and leg swelling.   Musculoskeletal:  Positive for arthritis, back pain, joint pain and stiffness. Negative for joint swelling and muscle weakness.        R foot pain    Neurological:  Negative for numbness and weakness.   Psychiatric/Behavioral:  Negative for altered mental status.            Past Medical History:   Diagnosis Date    GERD (gastroesophageal reflux disease)     Hypercholesteremia     Hypertension     on medication    Lumbar spondylosis 9/21/2020    Migraines     Sleep apnea        Past Surgical History:   Procedure Laterality Date    COLONOSCOPY N/A 8/10/2020    Procedure: COLONOSCOPY;  Surgeon: April Dos Santos MD;  Location: [facility] ENDO;  Service: Endoscopy;  Laterality: N/A;    HERNIA REPAIR      LAPAROSCOPIC CHOLECYSTECTOMY N/A 1/25/2022    Procedure: CHOLECYSTECTOMY, LAPAROSCOPIC;  Surgeon: Jarrod Martinez MD;  Location: [facility] OR;  Service: General;  Laterality: N/A;    REPAIR OF TRICEPS TENDON Left 10/2/2020    Procedure: REPAIR, TENDON, TRICEPS;  Surgeon: Keysha Galvez MD;  Location: [facility] OR;  Service: Orthopedics;  Laterality: Left;  RN PRE OP DONE 9/29/2020----COVID NEGATIVE ON 9/29  [facility]  Rep: Delma [phone]    SINUS SURGERY  2012       Family History   Problem Relation Age of Onset    Coronary artery disease Mother     Hypertension Father     No Known Problems Sister     No Known Problems Brother     Valvular heart disease Sister     No Known Problems Brother     No Known Problems Brother     No Known Problems Maternal Aunt     No Known Problems Maternal Uncle     No Known Problems Paternal Aunt     No Known Problems Paternal Uncle     No Known Problems Maternal Grandmother     No Known Problems Maternal Grandfather     No Known Problems Paternal Grandmother     No Known Problems Paternal Grandfather     Amblyopia Neg Hx     Blindness Neg Hx     Cancer Neg Hx     Cataracts Neg Hx     Diabetes Neg Hx     Glaucoma Neg Hx     Macular degeneration Neg Hx     Retinal detachment Neg Hx     Strabismus Neg Hx     Stroke Neg Hx     Thyroid disease Neg Hx        Social History     Socioeconomic History    Marital status:    Occupational History    Occupation: production      Employer: [employer]   Tobacco Use    Smoking status: Former     Types: Cigarettes     Quit date: 1999     Years since quittin.7    Smokeless tobacco: Never   Substance and Sexual Activity    Alcohol use: Not Currently     Alcohol/week: 7.0 standard drinks     Types: 7 Glasses of wine per week     Comment: beer 2-3 daily    Drug use: No    Sexual activity: Yes     Partners: Female     Social Determinants of Health     Financial Resource Strain: Unknown    Difficulty of Paying Living Expenses: Patient refused   Food Insecurity: Unknown    Worried About Running Out of Food in the Last Year: Patient refused    Ran Out of Food in the Last Year: Patient refused   Transportation Needs: No Transportation Needs    Lack of Transportation (Medical): No    Lack of Transportation (Non-Medical): No   Physical Activity: Sufficiently Active    Days of Exercise per Week: 4 days    Minutes of Exercise per  Session: 70 min   Stress: No Stress Concern Present    Feeling of Stress : Only a little   Social Connections: Unknown    Frequency of Communication with Friends and Family: Patient refused    Frequency of Social Gatherings with Friends and Family: Patient refused    Active Member of Clubs or Organizations: No    Attends Club or Organization Meetings: Never    Marital Status:    Housing Stability: Unknown    Unable to Pay for Housing in the Last Year: Patient refused    Unstable Housing in the Last Year: Patient refused       Current Outpatient Medications   Medication Sig Dispense Refill    atorvastatin (LIPITOR) 40 MG tablet TAKE 1 TABLET(40 MG) BY MOUTH EVERY EVENING 90 tablet 0    celecoxib (CELEBREX) 200 MG capsule Take 1 capsule (200 mg total) by mouth 2 (two) times daily. May take 650 mg of Tylenol at the same time 180 capsule 0    cromolyn (NASALCHROM) 5.2 mg/spray (4 %) nasal spray 1 spray by Nasal route 4 (four) times daily. 26 mL 1    diclofenac sodium (VOLTAREN) 1 % Gel Apply 2 g topically 4 (four) times daily. 100 g 0    fluticasone propionate (FLONASE) 50 mcg/actuation nasal spray 1 spray (50 mcg total) by Each Nostril route once daily. 16 g 3    gabapentin (NEURONTIN) 300 MG capsule Take 2 capsules (600 mg total) by mouth 3 (three) times daily. 180 capsule 5    ibuprofen (ADVIL,MOTRIN) 800 MG tablet Take 1 tablet (800 mg total) by mouth every 8 (eight) hours as needed for Pain. 30 tablet 0    lisinopriL-hydrochlorothiazide (PRINZIDE,ZESTORETIC) 20-12.5 mg per tablet Take 1 tablet by mouth once daily. 90 tablet 3    methylPREDNISolone (MEDROL, ELIAS,) 4 mg tablet use as directed 1 each 0    metoprolol succinate (TOPROL-XL) 25 MG 24 hr tablet Take 1 tablet (25 mg total) by mouth once daily. 90 tablet 3    omeprazole (PRILOSEC) 20 MG capsule Take 1 capsule (20 mg total) by mouth once daily. Decreased dose 90 capsule 3    traZODone (DESYREL) 50 MG tablet Take 0.5-1 tablets (25-50 mg total) by mouth  nightly as needed for Insomnia. 90 tablet 3     No current facility-administered medications for this visit.       Review of patient's allergies indicates:  No Known Allergies    There were no vitals filed for this visit.    Objective:      Physical Exam  Constitutional:       General: He is not in acute distress.     Appearance: He is well-developed.   HENT:      Nose: Nose normal.   Eyes:      Conjunctiva/sclera: Conjunctivae normal.   Pulmonary:      Effort: Pulmonary effort is normal.   Chest:      Chest wall: No tenderness.   Abdominal:      Tenderness: There is no abdominal tenderness.   Musculoskeletal:      Cervical back: Normal range of motion.   Neurological:      Mental Status: He is alert and oriented to person, place, and time.   Psychiatric:         Behavior: Behavior normal.       Vascular: Distal DP/PT pulses palpable 2/4. CRT < 3 sec to tips of toes. No vericosities noted to LEs. Hair growth present LE, warm to touch LE, No edema noted to LE.    Dermatologic: No open lesions, lacerations or wounds. Interdigital spaces clean, dry and intact. No erythema, rubor, calor noted LE    Musculoskeletal: No calf tenderness LE, Compartments soft/compressible.  RLE: HAV, trackbound at 1st MPJ. Pain over medial 1st met head. No pain at 1st MPJ during ROM. Pain over 2nd MPJ dorsally. No crepitus at 2nd MPJ.     Neurological: Light touch, proprioception, and sharp/dull sensation are all intact. Protective threshold with the [place]-Wienstein monofilament is intact. Vibratory sensation intact.         Assessment:       Encounter Diagnoses   Name Primary?    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    58 y.o. male with R foot pain, 1st MPJ and 2nd MPJ.    -rx. R foot MRI  -R foot xrays reviewed. (Both WB, NWB). HAV with some flattening noted at 2nd MPJ. No OA changes to 1st MPJ.  -1st MPJ pain most likely 2/2 underlying HAV. 2nd MPJ pain most likely 2/2 OCD/intra articular pathology of 2nd MPJ. I will get MRI.     -I reviewed imaging, clinical history, and diagnosis as above with the patient. I attempted to use layman's terms to educate the patient as well as utilize foot models and/or pictures. I personally went through imaging with the patient.    -The nature of the condition, options for management, as well as potential risks and complications were discussed in detail with patient. Patient was amenable to my recommendations and left my office fully informed and will follow up as instructed or sooner if necessary.    -f/u after MRI     Note dictated with voice recognition software, please excuse any grammatical errors.

## 2023-02-16 ENCOUNTER — OFFICE VISIT (OUTPATIENT)
Dept: CARDIOLOGY | Facility: CLINIC | Age: 58
End: 2023-02-16
Payer: OTHER GOVERNMENT

## 2023-02-16 VITALS
DIASTOLIC BLOOD PRESSURE: 74 MMHG | BODY MASS INDEX: 31.05 KG/M2 | HEART RATE: 86 BPM | HEIGHT: 72 IN | WEIGHT: 229.25 LBS | SYSTOLIC BLOOD PRESSURE: 116 MMHG | RESPIRATION RATE: 18 BRPM | OXYGEN SATURATION: 98 %

## 2023-02-16 DIAGNOSIS — I10 ESSENTIAL HYPERTENSION: Primary | ICD-10-CM

## 2023-02-16 DIAGNOSIS — R91.1 PULMONARY NODULE: ICD-10-CM

## 2023-02-16 DIAGNOSIS — R26.89 GAIT, ANTALGIC: ICD-10-CM

## 2023-02-16 DIAGNOSIS — E78.00 HYPERCHOLESTEROLEMIA: ICD-10-CM

## 2023-02-16 DIAGNOSIS — M51.36 DDD (DEGENERATIVE DISC DISEASE), LUMBAR: ICD-10-CM

## 2023-02-16 DIAGNOSIS — M54.41 CHRONIC BILATERAL LOW BACK PAIN WITH RIGHT-SIDED SCIATICA: ICD-10-CM

## 2023-02-16 DIAGNOSIS — K21.9 GASTROESOPHAGEAL REFLUX DISEASE WITHOUT ESOPHAGITIS: ICD-10-CM

## 2023-02-16 DIAGNOSIS — G89.29 CHRONIC BILATERAL LOW BACK PAIN WITH RIGHT-SIDED SCIATICA: ICD-10-CM

## 2023-02-16 DIAGNOSIS — H52.7 REFRACTIVE ERROR: ICD-10-CM

## 2023-02-16 DIAGNOSIS — G47.33 OSA (OBSTRUCTIVE SLEEP APNEA): ICD-10-CM

## 2023-02-16 PROCEDURE — 99214 OFFICE O/P EST MOD 30 MIN: CPT | Mod: PBBFAC,PO | Performed by: INTERNAL MEDICINE

## 2023-02-16 PROCEDURE — 93010 EKG 12-LEAD: ICD-10-PCS | Mod: S$PBB,,, | Performed by: INTERNAL MEDICINE

## 2023-02-16 PROCEDURE — 99214 OFFICE O/P EST MOD 30 MIN: CPT | Mod: S$PBB,,, | Performed by: INTERNAL MEDICINE

## 2023-02-16 PROCEDURE — 93005 ELECTROCARDIOGRAM TRACING: CPT | Mod: PBBFAC,PO | Performed by: INTERNAL MEDICINE

## 2023-02-16 PROCEDURE — 99214 PR OFFICE/OUTPT VISIT, EST, LEVL IV, 30-39 MIN: ICD-10-PCS | Mod: S$PBB,,, | Performed by: INTERNAL MEDICINE

## 2023-02-16 PROCEDURE — 99999 PR PBB SHADOW E&M-EST. PATIENT-LVL IV: ICD-10-PCS | Mod: PBBFAC,,, | Performed by: INTERNAL MEDICINE

## 2023-02-16 PROCEDURE — 93010 ELECTROCARDIOGRAM REPORT: CPT | Mod: S$PBB,,, | Performed by: INTERNAL MEDICINE

## 2023-02-16 PROCEDURE — 99999 PR PBB SHADOW E&M-EST. PATIENT-LVL IV: CPT | Mod: PBBFAC,,, | Performed by: INTERNAL MEDICINE

## 2023-02-16 NOTE — PROGRESS NOTES
CARDIOVASCULAR CONSULTATION    REASON FOR CONSULT:   Enoch Barr is a 58 y.o. male who presents for evaluation chest tightness.      HISTORY OF PRESENT ILLNESS:     Patient is a pleasant 55-year-old.  Family history significant for coronary artery disease and mom had a massive heart attack at the age of 60 and  from it.  Has chest tightness at rest, gets worse on going up a flight of stairs.  Associated with shortness of breath with also gets worse on going up 1 flights of stairs.  Denies orthopnea, PND, swelling of feet.  EKG was personally reviewed and demonstrates normal sinus rhythm.    Also has daily palpitations.  Will check Holter monitor for it.    Notes from 2020:  Patient here for follow-up.  Denies any chest pains at rest on exertion, orthopnea, PND. No chest pains    Sinus rhythm with heart rates varying between 40 and 126 bpm with an average of 69 bpm. Total time in sinus rhythm was approximately 48 hours  There were very rare PVCs totalling 7  There were very rare PACs totalling 33  SYMPTOMS OF CHEST TIGHTNESS ASSOCIATED WITH NORMAL SINUS RHYTHM/SINUS TACHYCARDIA    The study shows equivocal myocardial perfusion.  Cannot exclude inferior ischemia vs attenuation.    Perfusion Defect There is a mild intensity, mostly fixed with some reversibilty defect in the basal to distal inferior wall(s).    Visually estimated ejection fraction is normal at stress.    There is normal wall motion post stress.    The EKG portion of this study is abnormal but not diagnostic. (Erwin 13:18, 13.7 METS, 106% MPHR, 1mm upsloping St depression).    The patient reported no chest pain during the stress test.    Consider Cardiac PET vs cath if high clinical suspicion for CAD.     Notes from 2020:  Patient here for follow-up.  Denies any chest pains at rest on exertion, orthopnea, PND.  States that the chest pain is gone away after starting metoprolol.    No evidence of hemodynamically significant  infrainguinal PAD bilaterally.  Tri- and biphasic waveforms throughout.  Normal MAT bilaterally      Normal resting MAT bilaterally.  Normal PVR waveforms bilaterally.  Borderline abnormal exercise MAT bilaterally (R 1.2->0.98; L 1.07->0.95)      Notes from March 2022:  Patient here for follow-up.  Denies any chest pains at rest on exertion, orthopnea, PND.  Has been doing fine.    Notes from February 23: Patient here for follow-up.  Has been doing fine.  Denies any chest pains at rest on exertion, orthopnea, PND.  EKG done today shows normal sinus rhythm, normal EKG.      PAST MEDICAL HISTORY:     Past Medical History:   Diagnosis Date    GERD (gastroesophageal reflux disease)     Hypercholesteremia     Hypertension     on medication    Lumbar spondylosis 9/21/2020    Migraines     Sleep apnea        PAST SURGICAL HISTORY:     Past Surgical History:   Procedure Laterality Date    COLONOSCOPY N/A 8/10/2020    Procedure: COLONOSCOPY;  Surgeon: April Dos Santos MD;  Location: [facility] ENDO;  Service: Endoscopy;  Laterality: N/A;    HERNIA REPAIR      LAPAROSCOPIC CHOLECYSTECTOMY N/A 1/25/2022    Procedure: CHOLECYSTECTOMY, LAPAROSCOPIC;  Surgeon: Jarrod Martinez MD;  Location: [facility] OR;  Service: General;  Laterality: N/A;    REPAIR OF TRICEPS TENDON Left 10/2/2020    Procedure: REPAIR, TENDON, TRICEPS;  Surgeon: Keysha Galvez MD;  Location: [facility] OR;  Service: Orthopedics;  Laterality: Left;  RN PRE OP DONE 9/29/2020----COVID NEGATIVE ON 9/29  Arthrex Rep: Delma [phone]    SINUS SURGERY  2012       ALLERGIES AND MEDICATION:   Review of patient's allergies indicates:  No Known Allergies     Medication List            Accurate as of February 16, 2023  2:03 PM. If you have any questions, ask your nurse or doctor.                CONTINUE taking these medications      atorvastatin 40 MG tablet  Commonly known as: LIPITOR  TAKE 1 TABLET(40 MG) BY MOUTH EVERY EVENING     celecoxib 200 MG capsule  Commonly known as:  CeleBREX  Take 1 capsule (200 mg total) by mouth 2 (two) times daily. May take 650 mg of Tylenol at the same time     cromolyn 5.2 mg/spray (4 %) nasal spray  Commonly known as: NASALCHROM  1 spray by Nasal route 4 (four) times daily.     diclofenac sodium 1 % Gel  Commonly known as: VOLTAREN  Apply 2 g topically 4 (four) times daily.     fluticasone propionate 50 mcg/actuation nasal spray  Commonly known as: FLONASE  1 spray (50 mcg total) by Each Nostril route once daily.     gabapentin 300 MG capsule  Commonly known as: NEURONTIN  Take 2 capsules (600 mg total) by mouth 3 (three) times daily.     ibuprofen 800 MG tablet  Commonly known as: ADVIL,MOTRIN  Take 1 tablet (800 mg total) by mouth every 8 (eight) hours as needed for Pain.     lisinopriL-hydrochlorothiazide 20-12.5 mg per tablet  Commonly known as: PRINZIDE,ZESTORETIC  Take 1 tablet by mouth once daily.     methylPREDNISolone 4 mg tablet  Commonly known as: MEDROL (ELIAS)  use as directed     metoprolol succinate 25 MG 24 hr tablet  Commonly known as: TOPROL-XL  Take 1 tablet (25 mg total) by mouth once daily.     omeprazole 20 MG capsule  Commonly known as: PRILOSEC  Take 1 capsule (20 mg total) by mouth once daily. Decreased dose     traZODone 50 MG tablet  Commonly known as: DESYREL  Take 0.5-1 tablets (25-50 mg total) by mouth nightly as needed for Insomnia.              SOCIAL HISTORY:     Social History     Socioeconomic History    Marital status:    Occupational History    Occupation: production      Employer: [employer]   Tobacco Use    Smoking status: Former     Types: Cigarettes     Quit date: 1999     Years since quittin.7    Smokeless tobacco: Never   Substance and Sexual Activity    Alcohol use: Not Currently     Alcohol/week: 7.0 standard drinks     Types: 7 Glasses of wine per week     Comment: beer 2-3 daily    Drug use: No    Sexual activity: Yes     Partners: Female     Social Determinants of  Health     Financial Resource Strain: Unknown    Difficulty of Paying Living Expenses: Patient refused   Food Insecurity: Unknown    Worried About Running Out of Food in the Last Year: Patient refused    Ran Out of Food in the Last Year: Patient refused   Transportation Needs: No Transportation Needs    Lack of Transportation (Medical): No    Lack of Transportation (Non-Medical): No   Physical Activity: Sufficiently Active    Days of Exercise per Week: 4 days    Minutes of Exercise per Session: 70 min   Stress: No Stress Concern Present    Feeling of Stress : Only a little   Social Connections: Unknown    Frequency of Communication with Friends and Family: Patient refused    Frequency of Social Gatherings with Friends and Family: Patient refused    Active Member of Clubs or Organizations: No    Attends Club or Organization Meetings: Never    Marital Status:    Housing Stability: Unknown    Unable to Pay for Housing in the Last Year: Patient refused    Unstable Housing in the Last Year: Patient refused       FAMILY HISTORY:     Family History   Problem Relation Age of Onset    Coronary artery disease Mother     Hypertension Father     No Known Problems Sister     No Known Problems Brother     Valvular heart disease Sister     No Known Problems Brother     No Known Problems Brother     No Known Problems Maternal Aunt     No Known Problems Maternal Uncle     No Known Problems Paternal Aunt     No Known Problems Paternal Uncle     No Known Problems Maternal Grandmother     No Known Problems Maternal Grandfather     No Known Problems Paternal Grandmother     No Known Problems Paternal Grandfather     Amblyopia Neg Hx     Blindness Neg Hx     Cancer Neg Hx     Cataracts Neg Hx     Diabetes Neg Hx     Glaucoma Neg Hx     Macular degeneration Neg Hx     Retinal detachment Neg Hx     Strabismus Neg Hx     Stroke Neg Hx     Thyroid disease Neg Hx        REVIEW OF SYSTEMS:   Review of Systems   Constitutional: Negative.    HENT: Negative.     Eyes: Negative.    Cardiovascular: Negative.    Respiratory: Negative.     Endocrine: Negative.    Hematologic/Lymphatic: Negative.    Skin: Negative.    Musculoskeletal: Negative.    Gastrointestinal: Negative.    Genitourinary: Negative.    Neurological: Negative.    Psychiatric/Behavioral: Negative.     Allergic/Immunologic: Negative.      A 10 point review of systems was performed and all the pertinent positives have been mentioned. Rest of review of systems was negative.        PHYSICAL EXAM:     Vitals:    02/16/23 1344   BP: 116/74   Pulse: 86   Resp: 18    Body mass index is 31.1 kg/m².  Weight: 104 kg (229 lb 4.5 oz)   Height: 6' (182.9 cm)     Physical Exam  Vitals reviewed.   Constitutional:       Appearance: He is well-developed.   HENT:      Head: Normocephalic.   Eyes:      Conjunctiva/sclera: Conjunctivae normal.      Pupils: Pupils are equal, round, and reactive to light.   Cardiovascular:      Rate and Rhythm: Normal rate and regular rhythm.      Heart sounds: Normal heart sounds.   Pulmonary:      Effort: Pulmonary effort is normal.      Breath sounds: Normal breath sounds.   Abdominal:      General: Bowel sounds are normal.      Palpations: Abdomen is soft.   Musculoskeletal:      Cervical back: Normal range of motion and neck supple.   Skin:     General: Skin is warm.   Neurological:      Mental Status: He is alert and oriented to person, place, and time.         DATA:     Laboratory:  CBC:  Recent Labs   Lab 01/24/22  2359 01/25/22  0548 01/26/22  0520   WBC 5.83 5.92 8.80   Hemoglobin 14.3 14.1 14.7   Hematocrit 43.2 42.1 43.4   Platelets 194 209 234         CHEMISTRIES:  Recent Labs   Lab 01/23/22  1230 01/24/22  0632 01/25/22  0548 01/26/22  0520   Glucose 93 93 106 107  107   Sodium 139 138 139 137  137   Potassium 4.2 4.3 4.2 4.1  4.1   BUN 11 11 15 13  13   Creatinine 1.2 1.0 1.1 1.0  1.0   eGFR if African American >60 >60 >60 >60  >60   eGFR if non African  American >60 >60 >60 >60  >60   Calcium 8.9 8.6 L 8.8 9.1  9.1   Magnesium 1.9  --  2.3 2.3         CARDIAC BIOMARKERS:        COAGS:  Recent Labs   Lab 01/23/22  1230   INR 1.1         LIPIDS/LFTS:  Recent Labs   Lab 07/27/20  1645 09/17/20  1311 01/11/21  0828 01/23/22  1230 01/24/22  0632 01/25/22  0548 01/26/22  0520 03/23/22  0703   Cholesterol 249 H  --  151  --   --   --   --  136   Triglycerides 508 H  --  154 H  --   --   --   --  153 H   HDL 39 L  --  42  --   --   --   --  34 L   LDL Cholesterol Invalid, Trig>400.0  --  78.2  --   --   --   --  71.4   Non-HDL Cholesterol 210  --  109  --   --   --   --  102   AST 50 H   < > 28   < > 80 H 41 H 35  --    ALT 67 H   < > 34   < > 196 H 134 H 104 H  --     < > = values in this interval not displayed.         No results found for: HGBA1C    TSH  Recent Labs   Lab 07/27/20  1645   TSH 1.204         The 10-year ASCVD risk score (Sandra LEMA, et al., 2019) is: 6%    Values used to calculate the score:      Age: 58 years      Sex: Male      Is Non- : No      Diabetic: No      Tobacco smoker: No      Systolic Blood Pressure: 116 mmHg      Is BP treated: Yes      HDL Cholesterol: 34 mg/dL      Total Cholesterol: 136 mg/dL             ASSESSMENT AND PLAN     Patient Active Problem List   Diagnosis    Refractive error    Other chronic sinusitis uses budesonide for this NOT for lungs    Essential hypertension    Gastroesophageal reflux disease without esophagitis    Chronic cluster headache, not intractable followed by neurology    Tubular adenoma of colon 08/10/2020 colonoscopy ascending colon tubular adenoma sigmoid hyperplastic polyps.  Rectal hyperplastic polyp.    Pulmonary nodule 2mm on CT; no further imaging    Lumbar spondylosis    DDD (degenerative disc disease), lumbar    Triceps tendon rupture, left, initial encounter    GM (obstructive sleep apnea)    Cholelithiasis    Hypercholesterolemia    Tension type headache    Plantar  fasciitis of left foot    Gait, antalgic    Left foot pain    Cervical spine pain    Chronic bilateral low back pain with right-sided sciatica    Lumbar radiculopathy       Chest tightness and dyspnea on exertion in a patient with multiple risk factors for coronary artery disease.  equivocal stress test.  Symptoms resolved after starting metoprolol.  Continue current therapy.        Dyslipidemia: lipitor.     Palpitations:  No significant arrhythmia.        Follow-up in 6 months              Thank you very much for involving me in the care of your patient.  Please do not hesitate to contact me if there are any questions.      Mario Moore MD, FACC, [facility]  Interventional Cardiologist, [facility].           This note was dictated with the help of speech recognition software.  There might be un-intended errors and/or substitutions.

## 2023-02-17 ENCOUNTER — HOSPITAL ENCOUNTER (OUTPATIENT)
Facility: HOSPITAL | Age: 58
Discharge: HOME | End: 2023-02-17
Attending: PAIN MEDICINE | Admitting: PAIN MEDICINE
Payer: OTHER GOVERNMENT

## 2023-02-17 VITALS
RESPIRATION RATE: 17 BRPM | DIASTOLIC BLOOD PRESSURE: 80 MMHG | OXYGEN SATURATION: 92 % | SYSTOLIC BLOOD PRESSURE: 121 MMHG | TEMPERATURE: 99 F | HEART RATE: 74 BPM

## 2023-02-17 DIAGNOSIS — M54.16 LUMBAR RADICULOPATHY: Primary | ICD-10-CM

## 2023-02-17 PROCEDURE — 64483 NJX AA&/STRD TFRM EPI L/S 1: CPT | Mod: LT | Performed by: PAIN MEDICINE

## 2023-02-17 PROCEDURE — 64483 PR EPIDURAL INJ, ANES/STEROID, TRANSFORAMINAL, LUMB/SACR, SNGL LEVL: ICD-10-PCS | Mod: LT,,, | Performed by: PAIN MEDICINE

## 2023-02-17 PROCEDURE — 64483 NJX AA&/STRD TFRM EPI L/S 1: CPT | Mod: LT,,, | Performed by: PAIN MEDICINE

## 2023-02-17 PROCEDURE — 25000003 PHARM REV CODE 250: Performed by: PAIN MEDICINE

## 2023-02-17 PROCEDURE — 63600175 PHARM REV CODE 636 W HCPCS: Performed by: PAIN MEDICINE

## 2023-02-17 PROCEDURE — 25500020 PHARM REV CODE 255: Performed by: PAIN MEDICINE

## 2023-02-17 RX ORDER — ALPRAZOLAM 0.5 MG/1
1 TABLET, ORALLY DISINTEGRATING ORAL ONCE AS NEEDED
Status: COMPLETED | OUTPATIENT
Start: 2023-02-17 | End: 2023-02-17

## 2023-02-17 RX ORDER — LIDOCAINE HYDROCHLORIDE 10 MG/ML
1 INJECTION, SOLUTION EPIDURAL; INFILTRATION; INTRACAUDAL; PERINEURAL ONCE
Status: COMPLETED | OUTPATIENT
Start: 2023-02-17 | End: 2023-02-17

## 2023-02-17 RX ORDER — DEXAMETHASONE SODIUM PHOSPHATE 10 MG/ML
10 INJECTION INTRAMUSCULAR; INTRAVENOUS ONCE
Status: COMPLETED | OUTPATIENT
Start: 2023-02-17 | End: 2023-02-17

## 2023-02-17 RX ORDER — DEXAMETHASONE SODIUM PHOSPHATE 10 MG/ML
INJECTION INTRAMUSCULAR; INTRAVENOUS
Status: DISCONTINUED
Start: 2023-02-17 | End: 2023-02-17 | Stop reason: HOSPADM

## 2023-02-17 RX ORDER — LIDOCAINE HYDROCHLORIDE 10 MG/ML
10 INJECTION INFILTRATION; PERINEURAL ONCE
Status: COMPLETED | OUTPATIENT
Start: 2023-02-17 | End: 2023-02-17

## 2023-02-17 RX ORDER — LIDOCAINE HYDROCHLORIDE 10 MG/ML
INJECTION INFILTRATION; PERINEURAL
Status: DISCONTINUED
Start: 2023-02-17 | End: 2023-02-17 | Stop reason: HOSPADM

## 2023-02-17 RX ORDER — LIDOCAINE HYDROCHLORIDE 20 MG/ML
10 INJECTION, SOLUTION INFILTRATION; PERINEURAL ONCE
Status: DISCONTINUED | OUTPATIENT
Start: 2023-02-17 | End: 2023-02-17 | Stop reason: RX

## 2023-02-17 RX ORDER — LIDOCAINE HYDROCHLORIDE 10 MG/ML
INJECTION, SOLUTION EPIDURAL; INFILTRATION; INTRACAUDAL; PERINEURAL
Status: DISCONTINUED
Start: 2023-02-17 | End: 2023-02-17 | Stop reason: HOSPADM

## 2023-02-17 RX ADMIN — ALPRAZOLAM 1 MG: 0.5 TABLET, ORALLY DISINTEGRATING ORAL at 01:02

## 2023-02-17 NOTE — OP NOTE
Lumbar transforaminal ANKITA    Time-out taken to identify patient and procedure side prior to starting the procedure.   I attest that I have reviewed the patient's home medications prior to the procedure and no contraindication have been identified. I  re-evaluated the patient after the patient was positioned for the procedure in the procedure room immediately before the procedural time-out. The vital signs are current and represent the current state of the patient which has not significantly changed since the preprocedure assessment.                              Date of Service: 02/17/2023    PCP: Dipesh Clements MD    Referring Physician:                                     PROCEDURE: Left L5 transforaminal epidural steroid injection under fluoroscopy    REASON FOR PROCEDURE: Left DDD (degenerative disc disease), lumbar [M51.36]  Lumbar radiculopathy [M54.16]    PHYSICIAN: Santana Rojo MD    ASSISTANTS:None    MEDICATIONS INJECTED:  Preservative-free dexamethasone 10mg, Xylocaine 1% MPF 3-5ml. 3ml per level. Preservative free, sterile normal saline is used to get larger volume as needed.    LOCAL ANESTHETIC INJECTED:  Xylocaine 1% 3ml per site.    SEDATION MEDICATIONS: None    ESTIMATED BLOOD LOSS:  None.    COMPLICATIONS:  None.    TECHNIQUE:   Laying in a prone position, the patient was prepped and draped in the usual sterile fashion using ChloraPrep and fenestrated drape.  The area to be injected was determined under fluoroscopic guidance.  Local anesthetic was given by raising a wheel and going down to the hub of a 27-gauge 1.25 inch needle.  The 4.75 inch 25-gauge spinal needle was introduced towards the transverse process of each above named nerve root level.  The needle was walked medially then hinged into the neural foramen.  Omnipaque was injected to confirm appropriate placement and that there was no vascular runoff.  The medication was then injected after applying negative pressure. The patient  tolerated the procedure well.    PAIN BEFORE THE PROCEDURE: 5/10.    PAIN AFTER THE PROCEDURE: 3/10.    The patient was monitored after the procedure.  Patient was given post procedure and discharge instructions to follow at home.  We will see the patient back in two weeks or the patient may call to inform of status. The patient was discharged in a stable condition.

## 2023-02-17 NOTE — DISCHARGE INSTRUCTIONS

## 2023-02-17 NOTE — PLAN OF CARE
Patient tolerated procedure well. Patient reports 2/10 pain after procedure. Assisted patient up for first time. Gait steady. All discharge instructions given.

## 2023-02-24 ENCOUNTER — LAB VISIT (OUTPATIENT)
Dept: LAB | Facility: HOSPITAL | Age: 58
End: 2023-02-24
Attending: INTERNAL MEDICINE
Payer: OTHER GOVERNMENT

## 2023-02-24 DIAGNOSIS — E78.00 HYPERCHOLESTEROLEMIA: ICD-10-CM

## 2023-02-24 DIAGNOSIS — Z00.00 NORMAL PHYSICAL EXAM: ICD-10-CM

## 2023-02-24 LAB
ALBUMIN SERPL BCP-MCNC: 3.8 G/DL (ref 3.5–5.2)
ALP SERPL-CCNC: 83 U/L (ref 55–135)
ALT SERPL W/O P-5'-P-CCNC: 58 U/L (ref 10–44)
ANION GAP SERPL CALC-SCNC: 5 MMOL/L (ref 8–16)
AST SERPL-CCNC: 36 U/L (ref 10–40)
BASOPHILS # BLD AUTO: 0.04 K/UL (ref 0–0.2)
BASOPHILS NFR BLD: 0.6 % (ref 0–1.9)
BILIRUB SERPL-MCNC: 0.6 MG/DL (ref 0.1–1)
BUN SERPL-MCNC: 15 MG/DL (ref 6–20)
CALCIUM SERPL-MCNC: 8.8 MG/DL (ref 8.7–10.5)
CHLORIDE SERPL-SCNC: 108 MMOL/L (ref 95–110)
CHOLEST SERPL-MCNC: 153 MG/DL (ref 120–199)
CHOLEST/HDLC SERPL: 3.4 {RATIO} (ref 2–5)
CO2 SERPL-SCNC: 27 MMOL/L (ref 23–29)
CREAT SERPL-MCNC: 0.8 MG/DL (ref 0.5–1.4)
DIFFERENTIAL METHOD: ABNORMAL
EOSINOPHIL # BLD AUTO: 0.3 K/UL (ref 0–0.5)
EOSINOPHIL NFR BLD: 4 % (ref 0–8)
ERYTHROCYTE [DISTWIDTH] IN BLOOD BY AUTOMATED COUNT: 12.4 % (ref 11.5–14.5)
EST. GFR  (NO RACE VARIABLE): >60 ML/MIN/1.73 M^2
GLUCOSE SERPL-MCNC: 82 MG/DL (ref 70–110)
HCT VFR BLD AUTO: 44.5 % (ref 40–54)
HDLC SERPL-MCNC: 45 MG/DL (ref 40–75)
HDLC SERPL: 29.4 % (ref 20–50)
HGB BLD-MCNC: 15 G/DL (ref 14–18)
IMM GRANULOCYTES # BLD AUTO: 0.01 K/UL (ref 0–0.04)
IMM GRANULOCYTES NFR BLD AUTO: 0.2 % (ref 0–0.5)
LDLC SERPL CALC-MCNC: 87 MG/DL (ref 63–159)
LYMPHOCYTES # BLD AUTO: 1.5 K/UL (ref 1–4.8)
LYMPHOCYTES NFR BLD: 23.9 % (ref 18–48)
MCH RBC QN AUTO: 32.1 PG (ref 27–31)
MCHC RBC AUTO-ENTMCNC: 33.7 G/DL (ref 32–36)
MCV RBC AUTO: 95 FL (ref 82–98)
MONOCYTES # BLD AUTO: 0.8 K/UL (ref 0.3–1)
MONOCYTES NFR BLD: 12.4 % (ref 4–15)
NEUTROPHILS # BLD AUTO: 3.7 K/UL (ref 1.8–7.7)
NEUTROPHILS NFR BLD: 58.9 % (ref 38–73)
NONHDLC SERPL-MCNC: 108 MG/DL
NRBC BLD-RTO: 0 /100 WBC
PLATELET # BLD AUTO: 269 K/UL (ref 150–450)
PMV BLD AUTO: 8.9 FL (ref 9.2–12.9)
POTASSIUM SERPL-SCNC: 4.1 MMOL/L (ref 3.5–5.1)
PROT SERPL-MCNC: 7.1 G/DL (ref 6–8.4)
RBC # BLD AUTO: 4.67 M/UL (ref 4.6–6.2)
SODIUM SERPL-SCNC: 140 MMOL/L (ref 136–145)
TRIGL SERPL-MCNC: 105 MG/DL (ref 30–150)
WBC # BLD AUTO: 6.2 K/UL (ref 3.9–12.7)

## 2023-02-24 PROCEDURE — 80061 LIPID PANEL: CPT | Performed by: INTERNAL MEDICINE

## 2023-02-24 PROCEDURE — 36415 COLL VENOUS BLD VENIPUNCTURE: CPT | Performed by: INTERNAL MEDICINE

## 2023-02-24 PROCEDURE — 85025 COMPLETE CBC W/AUTO DIFF WBC: CPT | Performed by: INTERNAL MEDICINE

## 2023-02-24 PROCEDURE — 80053 COMPREHEN METABOLIC PANEL: CPT | Performed by: INTERNAL MEDICINE

## 2023-03-03 ENCOUNTER — HOSPITAL ENCOUNTER (OUTPATIENT)
Dept: RADIOLOGY | Facility: HOSPITAL | Age: 58
Discharge: HOME | End: 2023-03-03
Attending: PODIATRIST
Payer: OTHER GOVERNMENT

## 2023-03-03 ENCOUNTER — OFFICE VISIT (OUTPATIENT)
Dept: PAIN MEDICINE | Facility: CLINIC | Age: 58
End: 2023-03-03
Payer: OTHER GOVERNMENT

## 2023-03-03 VITALS
OXYGEN SATURATION: 93 % | HEIGHT: 72 IN | HEART RATE: 60 BPM | SYSTOLIC BLOOD PRESSURE: 136 MMHG | DIASTOLIC BLOOD PRESSURE: 82 MMHG | BODY MASS INDEX: 31.45 KG/M2 | WEIGHT: 232.19 LBS

## 2023-03-03 DIAGNOSIS — M54.16 LUMBAR RADICULOPATHY: ICD-10-CM

## 2023-03-03 DIAGNOSIS — M21.961 DEFORMITY OF METATARSAL BONE OF RIGHT FOOT: ICD-10-CM

## 2023-03-03 DIAGNOSIS — M20.11 HALLUX VALGUS OF RIGHT FOOT: ICD-10-CM

## 2023-03-03 DIAGNOSIS — M51.36 DDD (DEGENERATIVE DISC DISEASE), LUMBAR: Primary | ICD-10-CM

## 2023-03-03 DIAGNOSIS — M47.816 LUMBAR SPONDYLOSIS: ICD-10-CM

## 2023-03-03 PROCEDURE — 99999 PR PBB SHADOW E&M-EST. PATIENT-LVL IV: CPT | Mod: PBBFAC,,,

## 2023-03-03 PROCEDURE — 73718 MRI FOOT (FOREFOOT) RIGHT WITHOUT CONTRAST: ICD-10-PCS | Mod: 26,RT,, | Performed by: RADIOLOGY

## 2023-03-03 PROCEDURE — 73718 MRI LOWER EXTREMITY W/O DYE: CPT | Mod: TC,RT

## 2023-03-03 PROCEDURE — 99214 OFFICE O/P EST MOD 30 MIN: CPT | Mod: PBBFAC,PN,25

## 2023-03-03 PROCEDURE — 99999 PR PBB SHADOW E&M-EST. PATIENT-LVL IV: ICD-10-PCS | Mod: PBBFAC,,,

## 2023-03-03 PROCEDURE — 99214 OFFICE O/P EST MOD 30 MIN: CPT | Mod: S$PBB,,,

## 2023-03-03 PROCEDURE — 99214 PR OFFICE/OUTPT VISIT, EST, LEVL IV, 30-39 MIN: ICD-10-PCS | Mod: S$PBB,,,

## 2023-03-03 PROCEDURE — 73718 MRI LOWER EXTREMITY W/O DYE: CPT | Mod: 26,RT,, | Performed by: RADIOLOGY
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
## 2023-03-06 ENCOUNTER — OFFICE VISIT (OUTPATIENT)
Dept: PODIATRY | Facility: CLINIC | Age: 58
End: 2023-03-06
Payer: OTHER GOVERNMENT

## 2023-03-06 VITALS — HEIGHT: 72 IN | WEIGHT: 233.38 LBS | BODY MASS INDEX: 31.61 KG/M2

## 2023-03-06 DIAGNOSIS — M79.671 RIGHT FOOT PAIN: ICD-10-CM

## 2023-03-06 DIAGNOSIS — M19.071 ARTHRITIS OF RIGHT FOOT: ICD-10-CM

## 2023-03-06 DIAGNOSIS — M20.11 HALLUX VALGUS OF RIGHT FOOT: Primary | ICD-10-CM

## 2023-03-06 DIAGNOSIS — M21.961 DEFORMITY OF METATARSAL BONE OF RIGHT FOOT: ICD-10-CM

## 2023-03-06 PROCEDURE — 99999 PR PBB SHADOW E&M-EST. PATIENT-LVL IV: ICD-10-PCS | Mod: PBBFAC,,, | Performed by: PODIATRIST

## 2023-03-06 PROCEDURE — 99214 OFFICE O/P EST MOD 30 MIN: CPT | Mod: 57,S$PBB,, | Performed by: PODIATRIST

## 2023-03-06 PROCEDURE — 99214 PR OFFICE/OUTPT VISIT, EST, LEVL IV, 30-39 MIN: ICD-10-PCS | Mod: 57,S$PBB,, | Performed by: PODIATRIST

## 2023-03-06 PROCEDURE — 99214 OFFICE O/P EST MOD 30 MIN: CPT | Mod: PBBFAC,PN | Performed by: PODIATRIST

## 2023-03-06 PROCEDURE — 99999 PR PBB SHADOW E&M-EST. PATIENT-LVL IV: CPT | Mod: PBBFAC,,, | Performed by: PODIATRIST

## 2023-03-06 NOTE — PROGRESS NOTES
Subjective:      Patient ID: Enoch Barr is a 58 y.o. male.    Chief Complaint: R foot pain    58 y.o. male presenting with R foot pain. Points 1st MPJ and 2nd MPJ area for pain. Aching and throbbing pain. Has been dealing with pain for 3 months. Known to Dr. Nixon. Previous treatments include NSAIDs, oral steroid which helped with some symptoms. R foot pain continues to get worse. Pain gets worse with pressure.  Following up with pain management for back pain. History of taking gabapentin which caused swelling on b/l LE thus stopped. 1st MPJ pain is worse than 2nd MPJ pain.     3/6/23 f/u R foot pain. Most of his pain is located on medial aspect of 1st met head. Aching and throbbing pain. Here to discuss MRI and surgical options. MRI noted OA changes to 2nd MPJ. Mild OA changes to 1 MPJ. Tells me he has minimal pain over 2nd MPJ.     Level of ambulation/Occupation:   Smoking status: no  Janeen-D Def: ?  DM:no  Other:     Review of Systems   Constitutional: Negative for decreased appetite and malaise/fatigue.   Cardiovascular:  Negative for claudication and leg swelling.   Musculoskeletal:  Positive for arthritis, back pain, joint pain and stiffness. Negative for joint swelling and muscle weakness.        R foot pain    Neurological:  Negative for numbness and weakness.   Psychiatric/Behavioral:  Negative for altered mental status.            Past Medical History:   Diagnosis Date    GERD (gastroesophageal reflux disease)     Hypercholesteremia     Hypertension     on medication    Lumbar spondylosis 9/21/2020    Migraines     Sleep apnea        Past Surgical History:   Procedure Laterality Date    COLONOSCOPY N/A 8/10/2020    Procedure: COLONOSCOPY;  Surgeon: April Dos Santos MD;  Location: [facility] ENDO;  Service: Endoscopy;  Laterality: N/A;    EPIDURAL STEROID INJECTION Left 2/17/2023    Procedure: Left L5 Transforaminal Epidural Steroid Injection;  Surgeon: Santana Rojo Jr., MD;  Location: [facility] ENDO;  Service:  Pain Management;  Laterality: Left;  @1245  No ATC or DM    HERNIA REPAIR      LAPAROSCOPIC CHOLECYSTECTOMY N/A 2022    Procedure: CHOLECYSTECTOMY, LAPAROSCOPIC;  Surgeon: Jarrod Martinez MD;  Location: [facility] OR;  Service: General;  Laterality: N/A;    REPAIR OF TRICEPS TENDON Left 10/2/2020    Procedure: REPAIR, TENDON, TRICEPS;  Surgeon: Keysha Galvez MD;  Location: [facility] OR;  Service: Orthopedics;  Laterality: Left;  RN PRE OP DONE 2020----COVID NEGATIVE ON   Arthrex Rep: Delma [phone]    SINUS SURGERY         Family History   Problem Relation Age of Onset    Coronary artery disease Mother     Hypertension Father     No Known Problems Sister     No Known Problems Brother     Valvular heart disease Sister     No Known Problems Brother     No Known Problems Brother     No Known Problems Maternal Aunt     No Known Problems Maternal Uncle     No Known Problems Paternal Aunt     No Known Problems Paternal Uncle     No Known Problems Maternal Grandmother     No Known Problems Maternal Grandfather     No Known Problems Paternal Grandmother     No Known Problems Paternal Grandfather     Amblyopia Neg Hx     Blindness Neg Hx     Cancer Neg Hx     Cataracts Neg Hx     Diabetes Neg Hx     Glaucoma Neg Hx     Macular degeneration Neg Hx     Retinal detachment Neg Hx     Strabismus Neg Hx     Stroke Neg Hx     Thyroid disease Neg Hx        Social History     Socioeconomic History    Marital status:    Occupational History    Occupation: production      Employer: [employer]   Tobacco Use    Smoking status: Former     Types: Cigarettes     Quit date: 1999     Years since quittin.8    Smokeless tobacco: Never   Substance and Sexual Activity    Alcohol use: Not Currently     Alcohol/week: 7.0 standard drinks     Types: 7 Glasses of wine per week     Comment: beer 2-3 daily    Drug use: No    Sexual activity: Yes     Partners: Female       Current  Outpatient Medications   Medication Sig Dispense Refill    atorvastatin (LIPITOR) 40 MG tablet TAKE 1 TABLET(40 MG) BY MOUTH EVERY EVENING 90 tablet 0    celecoxib (CELEBREX) 200 MG capsule Take 1 capsule (200 mg total) by mouth 2 (two) times daily. May take 650 mg of Tylenol at the same time 180 capsule 0    cromolyn (NASALCHROM) 5.2 mg/spray (4 %) nasal spray 1 spray by Nasal route 4 (four) times daily. 26 mL 1    diclofenac sodium (VOLTAREN) 1 % Gel Apply 2 g topically 4 (four) times daily. 100 g 0    fluticasone propionate (FLONASE) 50 mcg/actuation nasal spray 1 spray (50 mcg total) by Each Nostril route once daily. 16 g 3    gabapentin (NEURONTIN) 300 MG capsule Take 2 capsules (600 mg total) by mouth 3 (three) times daily. 180 capsule 5    ibuprofen (ADVIL,MOTRIN) 800 MG tablet Take 1 tablet (800 mg total) by mouth every 8 (eight) hours as needed for Pain. 30 tablet 0    lisinopriL-hydrochlorothiazide (PRINZIDE,ZESTORETIC) 20-12.5 mg per tablet TAKE 1 TABLET BY MOUTH EVERY DAY 90 tablet 3    metoprolol succinate (TOPROL-XL) 25 MG 24 hr tablet Take 1 tablet (25 mg total) by mouth once daily. 90 tablet 3    omeprazole (PRILOSEC) 20 MG capsule TAKE 1 CAPSULE(20 MG) BY MOUTH EVERY DAY. DECREASED DOSE 90 capsule 0    traZODone (DESYREL) 50 MG tablet Take 0.5-1 tablets (25-50 mg total) by mouth nightly as needed for Insomnia. 90 tablet 3     No current facility-administered medications for this visit.       Review of patient's allergies indicates:  No Known Allergies    Vitals:    03/06/23 1047   Weight: 105.9 kg (233 lb 6.4 oz)   Height: 6' (1.829 m)   PainSc:   4   PainLoc: Foot       Objective:      Physical Exam  Constitutional:       General: He is not in acute distress.     Appearance: He is well-developed.   HENT:      Nose: Nose normal.   Eyes:      Conjunctiva/sclera: Conjunctivae normal.   Pulmonary:      Effort: Pulmonary effort is normal.   Chest:      Chest wall: No tenderness.   Abdominal:       Tenderness: There is no abdominal tenderness.   Musculoskeletal:      Cervical back: Normal range of motion.   Neurological:      Mental Status: He is alert and oriented to person, place, and time.   Psychiatric:         Behavior: Behavior normal.       Vascular: Distal DP/PT pulses palpable 2/4. CRT < 3 sec to tips of toes. No vericosities noted to LEs. Hair growth present LE, warm to touch LE, No edema noted to LE.    Dermatologic: No open lesions, lacerations or wounds. Interdigital spaces clean, dry and intact. No erythema, rubor, calor noted LE    Musculoskeletal: No calf tenderness LE, Compartments soft/compressible.  RLE: HAV, trackbound at 1st MPJ. Pain over medial 1st met head. No pain at 1st MPJ during ROM. Mild pain over 2nd MPJ dorsally. No crepitus at 2nd MPJ.     Neurological: Light touch, proprioception, and sharp/dull sensation are all intact. Protective threshold with the [place]ienstein monofilament is intact. Vibratory sensation intact.         Assessment:       Encounter Diagnoses   Name Primary?    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    58 y.o. male with R foot pain, 1st MPJ and 2nd MPJ.    -R foot MRI reviewed. OA changes to 2nd MPJ.   -R foot xrays reviewed. (Both WB, NWB). HAV with some flattening noted at 2nd MPJ. No OA changes to 1st MPJ.  -discussed different treatment options. Both conservative and surgical options were discussed w/ pt. Pt is looking for surgical options. Based on clinical and xrays findings I recommend MIS metatarsal osteotomy. He is only having mild symptoms of 2nd  MPJ. We decided to monitor 2nd MPJ for now. Will plan for surgery on 3/24th. Surgical clearance pending.     -Long discussion with patient regarding the procedure in detail.  Informed consent discussed in detail  including all alternatives, risks and complications not limited to consent form. These included but is not limited to bleeding, pain, infection, swelling, scarring, nerve entrapment, RSD, paralysis, loss of function of part or all of foot, brain damage and death.  Patient understands all risks, potential complications, and alternatives, including, but not limited to those listed on the consent form. All questions were answered. No guarantees given or implied as to outcome. Informed verbal and written consent was obtained. Consent forms read, signed, witnessed.   -During today's patient's visit, Greater than 50% of the time was spent discussing the items listed including chart review. The patient was evaluated and treated. I discussed diagnoses, prognosis and treatment with patient and family and reviewed diagnostic images. I have recommended surgery for patient. The risks vs. benefits of a surgical procedure to address the patient's concerns were discussed as well as alternative management options. Patient desires surgery and arrangements will be made accordingly. The alternatives, risks and complications of surgery were discussed including but not limited to  infection, swelling, numbness, post-operative pain, along with the fact that no guarantees can be given. All the patients' questions were answered and the patient seemed comfortable with my explanation. The patient was also instructed that prior to surgery if at any time more questions were to come up patient should contact the office and we'll be happy to answer them. The types of surgical approaches available were reviewed as well as alternatives to a surgical approach. The patient will be scheduled for surgery.The informed surgical consent was  reviewed and read aloud to the patient. I have reviewed the films and I have discussed my findings with the patient in great detail. I have discussed all risks including but not limited to infection, stiffness, scarring, limp, disability, deformity, damage to blood vessels or nerves, numbness, poor healing, need for braces, arthritis, chronic pain, amputation, and death. All benefits and realistic expectations discussed in great detail. I have made no promises as to the outcome. I have provided realistic expectations. I have offered the patient a second opinion which they have declined and have assured me they prefer to proceed despite the risks.  -I stressed the importance of usage of narcotics. I told patient I will be able to dispense narcotics to control the acute phase of pain but I WILL NOT be able to prescribe long term pain medications. patient verbalized understanding.   -f/u 2 wks after surgery     Note dictated with voice recognition software, please excuse any grammatical errors.

## 2023-03-07 ENCOUNTER — TELEPHONE (OUTPATIENT)
Dept: PAIN MEDICINE | Facility: CLINIC | Age: 58
End: 2023-03-07
Payer: OTHER GOVERNMENT

## 2023-03-07 DIAGNOSIS — M47.816 LUMBAR SPONDYLOSIS: Primary | ICD-10-CM

## 2023-03-07 NOTE — TELEPHONE ENCOUNTER
LVM asking pt to contact clinic to discuss scheduling options for bilat L3-5 MBB #1- phone number included.

## 2023-03-08 ENCOUNTER — TELEPHONE (OUTPATIENT)
Dept: PAIN MEDICINE | Facility: CLINIC | Age: 58
End: 2023-03-08
Payer: OTHER GOVERNMENT

## 2023-03-08 RX ORDER — CEFAZOLIN SODIUM 2 G/50ML
2 SOLUTION INTRAVENOUS
Status: CANCELLED | OUTPATIENT
Start: 2023-03-08

## 2023-03-08 RX ORDER — SODIUM CHLORIDE 0.9 % (FLUSH) 0.9 %
10 SYRINGE (ML) INJECTION
Status: CANCELLED | OUTPATIENT
Start: 2023-03-08

## 2023-03-08 RX ORDER — SODIUM CHLORIDE 9 MG/ML
INJECTION, SOLUTION INTRAVENOUS CONTINUOUS
Status: CANCELLED | OUTPATIENT
Start: 2023-03-08

## 2023-03-13 ENCOUNTER — PATIENT MESSAGE (OUTPATIENT)
Dept: PAIN MEDICINE | Facility: CLINIC | Age: 58
End: 2023-03-13
Payer: OTHER GOVERNMENT
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
## 2023-03-15 ENCOUNTER — OFFICE VISIT (OUTPATIENT)
Dept: FAMILY MEDICINE | Facility: CLINIC | Age: 58
End: 2023-03-15
Payer: OTHER GOVERNMENT

## 2023-03-15 VITALS
OXYGEN SATURATION: 96 % | WEIGHT: 232.94 LBS | SYSTOLIC BLOOD PRESSURE: 120 MMHG | BODY MASS INDEX: 31.55 KG/M2 | HEART RATE: 73 BPM | HEIGHT: 72 IN | DIASTOLIC BLOOD PRESSURE: 70 MMHG | TEMPERATURE: 99 F

## 2023-03-15 DIAGNOSIS — B96.89 ACUTE BACTERIAL SINUSITIS: ICD-10-CM

## 2023-03-15 DIAGNOSIS — D12.6 TUBULAR ADENOMA OF COLON: ICD-10-CM

## 2023-03-15 DIAGNOSIS — J01.90 ACUTE BACTERIAL SINUSITIS: ICD-10-CM

## 2023-03-15 DIAGNOSIS — E78.00 HYPERCHOLESTEROLEMIA: ICD-10-CM

## 2023-03-15 DIAGNOSIS — Z00.00 NORMAL PHYSICAL EXAM: Primary | ICD-10-CM

## 2023-03-15 DIAGNOSIS — G47.00 INSOMNIA, UNSPECIFIED TYPE: ICD-10-CM

## 2023-03-15 DIAGNOSIS — M54.16 LUMBAR RADICULOPATHY: ICD-10-CM

## 2023-03-15 DIAGNOSIS — K21.9 GASTROESOPHAGEAL REFLUX DISEASE WITHOUT ESOPHAGITIS: ICD-10-CM

## 2023-03-15 DIAGNOSIS — I10 ESSENTIAL HYPERTENSION: ICD-10-CM

## 2023-03-15 PROCEDURE — 99214 OFFICE O/P EST MOD 30 MIN: CPT | Mod: PBBFAC,PO | Performed by: INTERNAL MEDICINE

## 2023-03-15 PROCEDURE — 99396 PR PREVENTIVE VISIT,EST,40-64: ICD-10-PCS | Mod: S$PBB,,, | Performed by: INTERNAL MEDICINE

## 2023-03-15 PROCEDURE — 99396 PREV VISIT EST AGE 40-64: CPT | Mod: S$PBB,,, | Performed by: INTERNAL MEDICINE

## 2023-03-15 PROCEDURE — 99999 PR PBB SHADOW E&M-EST. PATIENT-LVL IV: CPT | Mod: PBBFAC,,, | Performed by: INTERNAL MEDICINE

## 2023-03-15 PROCEDURE — 99999 PR PBB SHADOW E&M-EST. PATIENT-LVL IV: ICD-10-PCS | Mod: PBBFAC,,, | Performed by: INTERNAL MEDICINE

## 2023-03-15 RX ORDER — ATORVASTATIN CALCIUM 40 MG/1
40 TABLET, FILM COATED ORAL NIGHTLY
Qty: 90 TABLET | Refills: 3 | Status: SHIPPED | OUTPATIENT
Start: 2023-03-15 | End: 2024-03-15 | Stop reason: SDUPTHER

## 2023-03-15 RX ORDER — OMEPRAZOLE 20 MG/1
CAPSULE, DELAYED RELEASE ORAL
Qty: 90 CAPSULE | Refills: 3 | Status: SHIPPED | OUTPATIENT
Start: 2023-03-15 | End: 2024-03-15 | Stop reason: SDUPTHER

## 2023-03-15 RX ORDER — METOPROLOL SUCCINATE 25 MG/1
25 TABLET, EXTENDED RELEASE ORAL DAILY
Qty: 90 TABLET | Refills: 3 | Status: SHIPPED | OUTPATIENT
Start: 2023-03-15 | End: 2024-03-15 | Stop reason: SDUPTHER

## 2023-03-15 RX ORDER — TRAZODONE HYDROCHLORIDE 50 MG/1
100 TABLET ORAL NIGHTLY PRN
Qty: 180 TABLET | Refills: 3 | Status: SHIPPED | OUTPATIENT
Start: 2023-03-15 | End: 2024-03-14

## 2023-03-15 RX ORDER — AMOXICILLIN AND CLAVULANATE POTASSIUM 875; 125 MG/1; MG/1
1 TABLET, FILM COATED ORAL 2 TIMES DAILY
Qty: 20 TABLET | Refills: 0 | Status: SHIPPED | OUTPATIENT
Start: 2023-03-15 | End: 2023-03-23

## 2023-03-23 ENCOUNTER — DOCUMENTATION ONLY (OUTPATIENT)
Dept: CARDIOLOGY | Facility: CLINIC | Age: 58
End: 2023-03-23

## 2023-03-23 NOTE — PROGRESS NOTES
Patient with no angina, exercise tolerance greater than 4 Mets, going for low-to-moderate risk surgery.  Patient may proceed for surgery at low-to-moderate risk for coronary ischemia

## 2023-03-27 ENCOUNTER — PATIENT MESSAGE (OUTPATIENT)
Dept: PODIATRY | Facility: CLINIC | Age: 58
End: 2023-03-27
Payer: OTHER GOVERNMENT

## 2023-03-28 ENCOUNTER — TELEPHONE (OUTPATIENT)
Dept: PODIATRY | Facility: CLINIC | Age: 58
End: 2023-03-28
Payer: OTHER GOVERNMENT

## 2023-03-28 NOTE — TELEPHONE ENCOUNTER
----- Message from Marifer Rachel sent at 3/28/2023  2:32 PM CDT -----  Type:  Needs Medical Advice    Who Called:  pt  Symptoms (please be specific):    How long has patient had these symptoms:    Pharmacy name and phone #:    Would the patient rather a call back or a response via [facility]?   Best Call Back Number: [phone] ()   Additional Information: pt is following up on prev msg he send on the portal about getting a scooter instead.

## 2023-04-06 ENCOUNTER — TELEPHONE (OUTPATIENT)
Dept: PAIN MEDICINE | Facility: CLINIC | Age: 58
End: 2023-04-06
Payer: OTHER GOVERNMENT

## 2023-04-06 NOTE — TELEPHONE ENCOUNTER
Reviewed pre-procedure instructions with Mr. Kendall, as well as provided arrival time of 12:30p for 4/14/23.  All questions answered- verbalized understanding.

## 2023-04-10 ENCOUNTER — PATIENT MESSAGE (OUTPATIENT)
Dept: PODIATRY | Facility: CLINIC | Age: 58
End: 2023-04-10

## 2023-04-10 ENCOUNTER — OFFICE VISIT (OUTPATIENT)
Dept: PODIATRY | Facility: CLINIC | Age: 58
End: 2023-04-10
Payer: OTHER GOVERNMENT

## 2023-04-10 VITALS — HEIGHT: 72 IN | WEIGHT: 229 LBS | BODY MASS INDEX: 31.02 KG/M2

## 2023-04-10 DIAGNOSIS — Z98.890 STATUS POST BUNIONECTOMY: Primary | ICD-10-CM

## 2023-04-10 PROCEDURE — 99024 POSTOP FOLLOW-UP VISIT: CPT | Mod: ,,, | Performed by: PODIATRIST

## 2023-04-10 PROCEDURE — 99999 PR PBB SHADOW E&M-EST. PATIENT-LVL III: CPT | Mod: PBBFAC,,, | Performed by: PODIATRIST

## 2023-04-10 PROCEDURE — 99213 OFFICE O/P EST LOW 20 MIN: CPT | Mod: PBBFAC,PN | Performed by: PODIATRIST

## 2023-04-10 PROCEDURE — 99024 PR POST-OP FOLLOW-UP VISIT: ICD-10-PCS | Mod: ,,, | Performed by: PODIATRIST

## 2023-04-10 PROCEDURE — 99999 PR PBB SHADOW E&M-EST. PATIENT-LVL III: ICD-10-PCS | Mod: PBBFAC,,, | Performed by: PODIATRIST

## 2023-04-10 NOTE — PROGRESS NOTES
Subjective:      Patient ID: Enoch Barr is a 58 y.o. male.    Chief Complaint: post op    58 y.o. male presenting with R foot pain. Points 1st MPJ and 2nd MPJ area for pain. Aching and throbbing pain. Has been dealing with pain for 3 months. Known to Dr. Nixon. Previous treatments include NSAIDs, oral steroid which helped with some symptoms. R foot pain continues to get worse. Pain gets worse with pressure.  Following up with pain management for back pain. History of taking gabapentin which caused swelling on b/l LE thus stopped. 1st MPJ pain is worse than 2nd MPJ pain.     3/6/23 f/u R foot pain. Most of his pain is located on medial aspect of 1st met head. Aching and throbbing pain. Here to discuss MRI and surgical options. MRI noted OA changes to 2nd MPJ. Mild OA changes to 1 MPJ. Tells me he has minimal pain over 2nd MPJ.     4/10/23 s/p R foot MIS bunionectomy with Andrzej (DOS 3/24/23 GP: 6/24/23). Pain is well controlled. In surgical shoes. Here for suture removal. Denies pain today. Denies slips since the surgery.     Level of ambulation/Occupation:   Smoking status: no  Janeen-D Def: ?  DM:no  Other:     Review of Systems   Constitutional: Negative for decreased appetite and malaise/fatigue.   Cardiovascular:  Negative for claudication and leg swelling.   Musculoskeletal:  Positive for arthritis, back pain, joint pain and stiffness. Negative for joint swelling and muscle weakness.   Neurological:  Negative for numbness and weakness.   Psychiatric/Behavioral:  Negative for altered mental status.            Past Medical History:   Diagnosis Date    GERD (gastroesophageal reflux disease)     Hypercholesteremia     Hypertension     on medication    Lumbar spondylosis 9/21/2020    Migraines     Sleep apnea        Past Surgical History:   Procedure Laterality Date    COLONOSCOPY N/A 8/10/2020    Procedure: COLONOSCOPY;  Surgeon: April Dos Santos MD;  Location: [facility];  Service: Endoscopy;  Laterality: N/A;     EPIDURAL STEROID INJECTION Left 2/17/2023    Procedure: Left L5 Transforaminal Epidural Steroid Injection;  Surgeon: Santana Rojo Jr., MD;  Location: [facility];  Service: Pain Management;  Laterality: Left;  @1245  No ATC or DM    HERNIA REPAIR      LAPAROSCOPIC CHOLECYSTECTOMY N/A 1/25/2022    Procedure: CHOLECYSTECTOMY, LAPAROSCOPIC;  Surgeon: Jarrod Martinez MD;  Location: [facility] OR;  Service: General;  Laterality: N/A;    REPAIR OF TRICEPS TENDON Left 10/2/2020    Procedure: REPAIR, TENDON, TRICEPS;  Surgeon: Keysha Galvez MD;  Location: [facility] OR;  Service: Orthopedics;  Laterality: Left;  RN PRE OP DONE 9/29/2020----COVID NEGATIVE ON 9/29  Arthrex Rep: Delma [phone]    SINUS SURGERY  2012    SURGICAL REMOVAL OF BUNION WITH OSTEOTOMY OF METATARSAL BONE Right 3/24/2023    Procedure: BUNIONECTOMY, WITH METATARSAL OSTEOTOMY;  Surgeon: Shannan An DPM;  Location: [facility] OR;  Service: Podiatry;  Laterality: Right;       Family History   Problem Relation Age of Onset    Coronary artery disease Mother     Hypertension Father     No Known Problems Sister     No Known Problems Brother     Valvular heart disease Sister     No Known Problems Brother     No Known Problems Brother     No Known Problems Maternal Aunt     No Known Problems Maternal Uncle     No Known Problems Paternal Aunt     No Known Problems Paternal Uncle     No Known Problems Maternal Grandmother     No Known Problems Maternal Grandfather     No Known Problems Paternal Grandmother     No Known Problems Paternal Grandfather     Amblyopia Neg Hx     Blindness Neg Hx     Cancer Neg Hx     Cataracts Neg Hx     Diabetes Neg Hx     Glaucoma Neg Hx     Macular degeneration Neg Hx     Retinal detachment Neg Hx     Strabismus Neg Hx     Stroke Neg Hx     Thyroid disease Neg Hx        Social History     Socioeconomic History    Marital status:    Occupational History    Occupation: production      Employer: [employer]   Tobacco Use    Smoking status: Former     Types: Cigarettes     Quit date: 1999     Years since quittin.9    Smokeless tobacco: Never   Substance and Sexual Activity    Alcohol use: Yes     Alcohol/week: 7.0 standard drinks     Types: 7 Glasses of wine per week     Comment: occasionally    Drug use: Never    Sexual activity: Yes     Partners: Female     Social Determinants of Health     Financial Resource Strain: Unknown    Difficulty of Paying Living Expenses: Patient refused   Food Insecurity: Unknown    Worried About Running Out of Food in the Last Year: Patient refused    Ran Out of Food in the Last Year: Patient refused   Transportation Needs: No Transportation Needs    Lack of Transportation (Medical): No    Lack of Transportation (Non-Medical): No   Physical Activity: Sufficiently Active    Days of Exercise per Week: 4 days    Minutes of Exercise per Session: 150+ min   Stress: Stress Concern Present    Feeling of Stress : Very much   Social Connections: Unknown    Frequency of Communication with Friends and Family: Patient refused    Frequency of Social Gatherings with Friends and Family: Patient refused    Active Member of Clubs or Organizations: No    Attends Club or Organization Meetings: Never    Marital Status:    Housing Stability: Unknown    Unable to Pay for Housing in the Last Year: Patient refused    Number of Places Lived in the Last Year: 1    Unstable Housing in the Last Year: No       Current Outpatient Medications   Medication Sig Dispense Refill    atorvastatin (LIPITOR) 40 MG tablet Take 1 tablet (40 mg total) by mouth every evening. 90 tablet 3    celecoxib (CELEBREX) 200 MG capsule Take 1 capsule (200 mg total) by mouth 2 (two) times daily. May take 650 mg of Tylenol at the same time 180 capsule 0    cromolyn (NASALCHROM) 5.2 mg/spray (4 %) nasal spray 1 spray by Nasal route 4 (four) times daily. 26 mL 1    diclofenac sodium (VOLTAREN) 1 % Gel Apply 2 g topically 4  (four) times daily. 100 g 0    fluticasone propionate (FLONASE) 50 mcg/actuation nasal spray 1 spray (50 mcg total) by Each Nostril route once daily. 16 g 3    ibuprofen (ADVIL,MOTRIN) 800 MG tablet Take 1 tablet (800 mg total) by mouth every 8 (eight) hours as needed for Pain. 30 tablet 0    lisinopriL-hydrochlorothiazide (PRINZIDE,ZESTORETIC) 20-12.5 mg per tablet TAKE 1 TABLET BY MOUTH EVERY DAY 90 tablet 3    metoprolol succinate (TOPROL-XL) 25 MG 24 hr tablet Take 1 tablet (25 mg total) by mouth once daily. 90 tablet 3    omeprazole (PRILOSEC) 20 MG capsule TAKE 1 CAPSULE(20 MG) BY MOUTH EVERY DAY. DECREASED DOSE 90 capsule 3    oxyCODONE-acetaminophen (PERCOCET) 5-325 mg per tablet Take 1 tablet by mouth every 6 (six) hours as needed for Pain. 20 tablet 0    traZODone (DESYREL) 50 MG tablet Take 2 tablets (100 mg total) by mouth nightly as needed for Insomnia. 180 tablet 3     No current facility-administered medications for this visit.       Review of patient's allergies indicates:  No Known Allergies    Vitals:    04/10/23 1320   Weight: 103.9 kg (229 lb)   Height: 6' (1.829 m)   PainSc:   2   PainLoc: Foot         Objective:      Physical Exam  Constitutional:       General: He is not in acute distress.     Appearance: He is well-developed.   HENT:      Nose: Nose normal.   Eyes:      Conjunctiva/sclera: Conjunctivae normal.   Pulmonary:      Effort: Pulmonary effort is normal.   Chest:      Chest wall: No tenderness.   Abdominal:      Tenderness: There is no abdominal tenderness.   Musculoskeletal:      Cervical back: Normal range of motion.   Neurological:      Mental Status: He is alert and oriented to person, place, and time.   Psychiatric:         Behavior: Behavior normal.       Vascular: Distal DP/PT pulses palpable 2/4. CRT < 3 sec to tips of toes. No vericosities noted to LEs. Hair growth present LE, warm to touch LE.  RLE: mild edema noted to LE.    Dermatologic: No open lesions, lacerations or  wounds. Interdigital spaces clean, dry and intact. No erythema, rubor, calor noted LE  RLE: suture intact.     Musculoskeletal: No calf tenderness LE, Compartments soft/compressible.  RLE: s/p bunionectomy. Decreased ROM of 1st MPJ.     Neurological: Light touch, proprioception, and sharp/dull sensation are all intact. Protective threshold with the Imperial-Wienstein monofilament is intact. Vibratory sensation intact.               Assessment:       Encounter Diagnosis   Name Primary?    Status post bunionectomy - Right Foot Yes             Plan:       Enoch was seen today for post-op evaluation.    Diagnoses and all orders for this visit:    Status post bunionectomy - Right Foot  -     X-Ray Foot Complete Right; Future        I counseled the patient on his conditions, their implications and medical management.    58 y.o. male with s/p R foot bunionectomy, [place].    -rx. R foot xrays for next visit.   -postop x-ray reviewed with the patient.  -suture removed.  The patient tolerated well.  Partial weight-bearing to heel in surgical shoe for 2 more weeks.    -I reviewed imaging, clinical history, and diagnosis as above with the patient. I attempted to use layman's terms to educate the patient as well as utilize foot models and/or pictures. I personally went through imaging with the patient.    -The nature of the condition, options for management, as well as potential risks and complications were discussed in detail with patient. Patient was amenable to my recommendations and left my office fully informed and will follow up as instructed or sooner if necessary.    -f/u 2 wks     Note dictated with voice recognition software, please excuse any grammatical errors.

## 2023-04-11 ENCOUNTER — TELEPHONE (OUTPATIENT)
Dept: PAIN MEDICINE | Facility: CLINIC | Age: 58
End: 2023-04-11
Payer: OTHER GOVERNMENT

## 2023-04-11 NOTE — TELEPHONE ENCOUNTER
----- Message from Karey Oleary sent at 4/11/2023  9:25 AM CDT -----  .Type: Patient Call Back    Who called:Self     What is the request in detail: Stated his foot Doctor approved for him to have procedure done on 4/14/2023    Can the clinic reply by [facility]? No     Would the patient rather a call back or a response via [facility]? Call back     Best call back number:.[phone] (home)       Additional Information:

## 2023-04-12 NOTE — DISCHARGE INSTRUCTIONS

## 2023-04-14 ENCOUNTER — HOSPITAL ENCOUNTER (OUTPATIENT)
Facility: HOSPITAL | Age: 58
Discharge: HOME | End: 2023-04-14
Attending: PAIN MEDICINE | Admitting: PAIN MEDICINE
Payer: OTHER GOVERNMENT

## 2023-04-14 VITALS
WEIGHT: 230 LBS | TEMPERATURE: 98 F | OXYGEN SATURATION: 94 % | DIASTOLIC BLOOD PRESSURE: 90 MMHG | SYSTOLIC BLOOD PRESSURE: 154 MMHG | BODY MASS INDEX: 31.15 KG/M2 | HEART RATE: 66 BPM | RESPIRATION RATE: 20 BRPM | HEIGHT: 72 IN

## 2023-04-14 DIAGNOSIS — M54.16 LUMBAR RADICULOPATHY: Primary | ICD-10-CM

## 2023-04-14 DIAGNOSIS — M47.816 LUMBAR SPONDYLOSIS: ICD-10-CM

## 2023-04-14 PROCEDURE — 25000003 PHARM REV CODE 250: Performed by: PAIN MEDICINE

## 2023-04-14 PROCEDURE — 64494 PR INJ DX/THER AGNT PARAVERT FACET JOINT,IMG GUIDE,LUMBAR/SAC, 2ND LEVEL: ICD-10-PCS | Mod: 50,,, | Performed by: PAIN MEDICINE

## 2023-04-14 PROCEDURE — 64493 INJ PARAVERT F JNT L/S 1 LEV: CPT | Mod: LT | Performed by: PAIN MEDICINE

## 2023-04-14 PROCEDURE — 64493 INJ PARAVERT F JNT L/S 1 LEV: CPT | Mod: RT | Performed by: PAIN MEDICINE

## 2023-04-14 PROCEDURE — 64494 INJ PARAVERT F JNT L/S 2 LEV: CPT | Mod: LT | Performed by: PAIN MEDICINE

## 2023-04-14 PROCEDURE — 64494 INJ PARAVERT F JNT L/S 2 LEV: CPT | Mod: 50,,, | Performed by: PAIN MEDICINE

## 2023-04-14 PROCEDURE — 64493 INJ PARAVERT F JNT L/S 1 LEV: CPT | Mod: 50,,, | Performed by: PAIN MEDICINE

## 2023-04-14 PROCEDURE — 64493 PR INJ DX/THER AGNT PARAVERT FACET JOINT,IMG GUIDE,LUMBAR/SAC,1ST LVL: ICD-10-PCS | Mod: 50,,, | Performed by: PAIN MEDICINE

## 2023-04-14 RX ORDER — LIDOCAINE HYDROCHLORIDE 10 MG/ML
INJECTION INFILTRATION; PERINEURAL
Status: DISCONTINUED
Start: 2023-04-14 | End: 2023-04-14 | Stop reason: HOSPADM

## 2023-04-14 RX ORDER — BUPIVACAINE HYDROCHLORIDE 2.5 MG/ML
10 INJECTION, SOLUTION EPIDURAL; INFILTRATION; INTRACAUDAL ONCE
Status: COMPLETED | OUTPATIENT
Start: 2023-04-14 | End: 2023-04-14

## 2023-04-14 RX ORDER — LIDOCAINE HYDROCHLORIDE 10 MG/ML
10 INJECTION INFILTRATION; PERINEURAL ONCE
Status: COMPLETED | OUTPATIENT
Start: 2023-04-14 | End: 2023-04-14

## 2023-04-14 RX ORDER — BUPIVACAINE HYDROCHLORIDE 2.5 MG/ML
INJECTION, SOLUTION EPIDURAL; INFILTRATION; INTRACAUDAL
Status: DISCONTINUED
Start: 2023-04-14 | End: 2023-04-14 | Stop reason: HOSPADM

## 2023-04-14 RX ORDER — LIDOCAINE HYDROCHLORIDE 20 MG/ML
10 INJECTION, SOLUTION INFILTRATION; PERINEURAL ONCE
Status: DISCONTINUED | OUTPATIENT
Start: 2023-04-14 | End: 2023-04-14 | Stop reason: RX

## 2023-04-14 NOTE — PLAN OF CARE
Procedure and recovery complete. Wife at bedside. Discharge instructions given. Verbalized understanding. Reports pain as 2. Band aid x2 to lower back. Dry and intact. No redness, swelling or drainage noted to site. No acute distress noted. Able to ambulate without assistance.

## 2023-04-14 NOTE — H&P
Subjective:     Patient ID: Enoch Barr is a 58 y.o. male    Chief Complaint: Low back pain    Referred by: Martinez Robertson PA-C      HPI:    Enoch Barr is a 58 y.o. male who presents today for b/l L3, L4 and L5 MBBs. He denies any changes in the quality or location of the pain.        Review of Systems   Constitutional:  Negative for activity change, appetite change, chills, fatigue, fever and unexpected weight change.   HENT:  Negative for hearing loss.    Eyes:  Negative for visual disturbance.   Respiratory:  Negative for chest tightness and shortness of breath.    Cardiovascular:  Negative for chest pain.   Gastrointestinal:  Negative for abdominal pain, constipation, diarrhea, nausea and vomiting.   Genitourinary:  Negative for difficulty urinating.   Musculoskeletal:  Positive for back pain, gait problem and myalgias. Negative for neck pain.   Skin:  Negative for rash.   Neurological:  Negative for dizziness, weakness, light-headedness, numbness and headaches.   Psychiatric/Behavioral:  Positive for sleep disturbance. Negative for hallucinations and suicidal ideas. The patient is not nervous/anxious.      Past Medical History:   Diagnosis Date    GERD (gastroesophageal reflux disease)     Hypercholesteremia     Hypertension     on medication    Lumbar spondylosis 9/21/2020    Migraines     Sleep apnea        Past Surgical History:   Procedure Laterality Date    COLONOSCOPY N/A 8/10/2020    Procedure: COLONOSCOPY;  Surgeon: April Dos Santos MD;  Location: [facility];  Service: Endoscopy;  Laterality: N/A;    EPIDURAL STEROID INJECTION Left 2/17/2023    Procedure: Left L5 Transforaminal Epidural Steroid Injection;  Surgeon: Santana Rojo Jr., MD;  Location: [facility] ENDO;  Service: Pain Management;  Laterality: Left;  @1245  No ATC or DM    HERNIA REPAIR      LAPAROSCOPIC CHOLECYSTECTOMY N/A 1/25/2022    Procedure: CHOLECYSTECTOMY, LAPAROSCOPIC;  Surgeon: Jarrod Martinez MD;  Location: [facility] OR;   Service: General;  Laterality: N/A;    REPAIR OF TRICEPS TENDON Left 10/2/2020    Procedure: REPAIR, TENDON, TRICEPS;  Surgeon: Keysha Galvez MD;  Location: [facility] OR;  Service: Orthopedics;  Laterality: Left;  RN PRE OP DONE 2020----COVID NEGATIVE ON   [facility] Rep: Delma [phone]    SINUS SURGERY      SURGICAL REMOVAL OF BUNION WITH OSTEOTOMY OF METATARSAL BONE Right 3/24/2023    Procedure: BUNIONECTOMY, WITH METATARSAL OSTEOTOMY;  Surgeon: Shannan An DPM;  Location: [facility] OR;  Service: Podiatry;  Laterality: Right;       Social History     Socioeconomic History    Marital status:    Occupational History    Occupation: production      Employer: [employer]   Tobacco Use    Smoking status: Former     Types: Cigarettes     Quit date: 1999     Years since quittin.9    Smokeless tobacco: Never   Substance and Sexual Activity    Alcohol use: Yes     Alcohol/week: 7.0 standard drinks     Types: 7 Glasses of wine per week     Comment: occasionally    Drug use: Never    Sexual activity: Yes     Partners: Female     Social Determinants of Health     Financial Resource Strain: Unknown    Difficulty of Paying Living Expenses: Patient refused   Food Insecurity: Unknown    Worried About Running Out of Food in the Last Year: Patient refused    Ran Out of Food in the Last Year: Patient refused   Transportation Needs: No Transportation Needs    Lack of Transportation (Medical): No    Lack of Transportation (Non-Medical): No   Physical Activity: Sufficiently Active    Days of Exercise per Week: 4 days    Minutes of Exercise per Session: 150+ min   Stress: Stress Concern Present    Feeling of Stress : Very much   Social Connections: Unknown    Frequency of Communication with Friends and Family: Patient refused    Frequency of Social Gatherings with Friends and Family: Patient refused    Active Member of Clubs or Organizations: No    Attends Club or Organization  Meetings: Never    Marital Status:    Housing Stability: Unknown    Unable to Pay for Housing in the Last Year: Patient refused    Number of Places Lived in the Last Year: 1    Unstable Housing in the Last Year: No       Review of patient's allergies indicates:  No Known Allergies    No current facility-administered medications on file prior to encounter.     Current Outpatient Medications on File Prior to Encounter   Medication Sig Dispense Refill    celecoxib (CELEBREX) 200 MG capsule Take 1 capsule (200 mg total) by mouth 2 (two) times daily. May take 650 mg of Tylenol at the same time 180 capsule 0    cromolyn (NASALCHROM) 5.2 mg/spray (4 %) nasal spray 1 spray by Nasal route 4 (four) times daily. 26 mL 1    diclofenac sodium (VOLTAREN) 1 % Gel Apply 2 g topically 4 (four) times daily. 100 g 0    fluticasone propionate (FLONASE) 50 mcg/actuation nasal spray 1 spray (50 mcg total) by Each Nostril route once daily. 16 g 3    ibuprofen (ADVIL,MOTRIN) 800 MG tablet Take 1 tablet (800 mg total) by mouth every 8 (eight) hours as needed for Pain. 30 tablet 0    lisinopriL-hydrochlorothiazide (PRINZIDE,ZESTORETIC) 20-12.5 mg per tablet TAKE 1 TABLET BY MOUTH EVERY DAY 90 tablet 3       Objective:      BP (!) 134/96   Pulse 83   Temp 98.1 °F (36.7 °C)   Resp 16   Ht 6' (1.829 m)   Wt 104.3 kg (230 lb)   SpO2 95%   BMI 31.19 kg/m²     Exam:  AAOx3 NAD  Mood and affect normal  Memory and language intact  Breathing nonlabored      Assessment:       Lumbar spondylosis      Plan:         Enoch Barr is a 58 y.o. male with lumbar facet pain.    Proceed with MBBs as planned.

## 2023-04-14 NOTE — DISCHARGE SUMMARY
[facility] [place]  [place]    Procedure(s) (LRB):  Bilateral L3, L4, L5 Medial Branch Blocks #1 (Bilateral)      OUTCOME: Patient tolerated treatment/procedure well without complication and is now ready for discharge.    DISPOSITION: Home or Self Care    FINAL DIAGNOSIS:  <principal problem not specified>    FOLLOWUP: In clinic    DISCHARGE INSTRUCTIONS:  No discharge procedures on file.       Discharge Diagnosis:Lumbar spondylosis [M47.816]  Condition on Discharge: Stable.  Diet on Discharge: Same as before.  Activity: as per instruction sheet.  Discharge to: Home with a responsible adult.  Follow up: as per Discharge instructions

## 2023-04-14 NOTE — OP NOTE
LUMBAR Medial Branch Block Under Fluoroscopy  Time-out taken to identify patient and procedure side prior to starting the procedure.   I attest that I have reviewed the patient's home medications prior to the procedure and no contraindication have been identified. I  re-evaluated the patient after the patient was positioned for the procedure in the procedure room immediately before the procedural time-out. The vital signs are current and represent the current state of the patient which has not significantly changed since the preprocedure assessment.            Date of Service: 04/14/2023    PCP: Dipesh Clements MD    Referring Physician:                                                           PROCEDURE:  Bilateral  L3, L4 and L5 medial branch block    REASON FOR PROCEDURE: Lumbar spondylosis [M47.816]    PHYSICIAN: Santana Rojo MD  ASSISTANTS: None    MEDICATIONS INJECTED: Bupivicaine 0.25% 1.5ml at each level      LOCAL ANESTHETIC USED: Xylocaine 1% 3ml each site.    SEDATION MEDICATIONS: None    ESTIMATED BLOOD LOSS:  None.    COMPLICATIONS:  None.    TECHNIQUE: Laying in a prone position, the patient was prepped and draped in the usual sterile fashion using ChloraPrep and fenestrated drape.  The level was determined under fluoroscopic guidance.  Local anesthetic was given by going down to the hub of the 27-gauge 1.25in needle and raising a wheal.  A 25-gauge 4.75 inch needle was introduced to the anatomic site of the medial branch at the lateral mass utilizing live fluoroscopy. Medication was then injected slowly at each level as stated above. The patient tolerated the procedure well.       The patient was monitored after the procedure.  Patient was given post procedure and discharge instructions to follow at home.  We will see the patient back in two weeks or the patient may call to inform of status. The patient was discharged in a stable condition

## 2023-04-17 ENCOUNTER — TELEPHONE (OUTPATIENT)
Dept: PAIN MEDICINE | Facility: CLINIC | Age: 58
End: 2023-04-17
Payer: OTHER GOVERNMENT

## 2023-04-17 DIAGNOSIS — M47.816 LUMBAR SPONDYLOSIS: Primary | ICD-10-CM

## 2023-04-17 NOTE — TELEPHONE ENCOUNTER
Mr. Kendall reports 25-40% reduction in pain following the bilat L3-5 MBB performed Friday.  He does not feel as though the improvement was significant.  Clinic appt scheduled for pt to discuss other treatment options with provider.

## 2023-04-21 ENCOUNTER — OFFICE VISIT (OUTPATIENT)
Dept: PAIN MEDICINE | Facility: CLINIC | Age: 58
End: 2023-04-21
Payer: OTHER GOVERNMENT

## 2023-04-21 VITALS
DIASTOLIC BLOOD PRESSURE: 91 MMHG | WEIGHT: 238.19 LBS | HEIGHT: 72 IN | SYSTOLIC BLOOD PRESSURE: 138 MMHG | BODY MASS INDEX: 32.26 KG/M2 | HEART RATE: 62 BPM

## 2023-04-21 DIAGNOSIS — M54.16 LUMBAR RADICULOPATHY: ICD-10-CM

## 2023-04-21 DIAGNOSIS — M43.16 SPONDYLOLISTHESIS OF LUMBAR REGION: ICD-10-CM

## 2023-04-21 DIAGNOSIS — M47.816 LUMBAR SPONDYLOSIS: ICD-10-CM

## 2023-04-21 DIAGNOSIS — M51.36 DDD (DEGENERATIVE DISC DISEASE), LUMBAR: Primary | ICD-10-CM

## 2023-04-21 PROCEDURE — 99214 OFFICE O/P EST MOD 30 MIN: CPT | Mod: S$PBB,,,

## 2023-04-21 PROCEDURE — 99999 PR PBB SHADOW E&M-EST. PATIENT-LVL IV: ICD-10-PCS | Mod: PBBFAC,,,

## 2023-04-21 PROCEDURE — 99999 PR PBB SHADOW E&M-EST. PATIENT-LVL IV: CPT | Mod: PBBFAC,,,

## 2023-04-21 PROCEDURE — 99214 OFFICE O/P EST MOD 30 MIN: CPT | Mod: PBBFAC,PN

## 2023-04-21 PROCEDURE — 99214 PR OFFICE/OUTPT VISIT, EST, LEVL IV, 30-39 MIN: ICD-10-PCS | Mod: S$PBB,,,

## 2023-04-21 RX ORDER — METHOCARBAMOL 750 MG/1
750 TABLET, FILM COATED ORAL 3 TIMES DAILY
Qty: 60 TABLET | Refills: 0 | Status: SHIPPED | OUTPATIENT
Start: 2023-04-21 | End: 2023-06-01 | Stop reason: SDUPTHER
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
## 2023-04-29 ENCOUNTER — PATIENT MESSAGE (OUTPATIENT)
Dept: FAMILY MEDICINE | Facility: CLINIC | Age: 58
End: 2023-04-29
Payer: OTHER GOVERNMENT

## 2023-05-01 ENCOUNTER — OFFICE VISIT (OUTPATIENT)
Dept: PODIATRY | Facility: CLINIC | Age: 58
End: 2023-05-01
Payer: OTHER GOVERNMENT

## 2023-05-01 VITALS — HEIGHT: 72 IN | BODY MASS INDEX: 32.23 KG/M2 | WEIGHT: 238 LBS

## 2023-05-01 DIAGNOSIS — Z98.890 STATUS POST BUNIONECTOMY: Primary | ICD-10-CM

## 2023-05-01 DIAGNOSIS — M25.60 DECREASED RANGE OF MOTION: ICD-10-CM

## 2023-05-01 DIAGNOSIS — Z09 FOLLOW-UP EXAMINATION FOLLOWING SURGERY: ICD-10-CM

## 2023-05-01 PROCEDURE — 99999 PR PBB SHADOW E&M-EST. PATIENT-LVL IV: ICD-10-PCS | Mod: PBBFAC,,, | Performed by: PODIATRIST

## 2023-05-01 PROCEDURE — 99024 PR POST-OP FOLLOW-UP VISIT: ICD-10-PCS | Mod: ,,, | Performed by: PODIATRIST

## 2023-05-01 PROCEDURE — 99024 POSTOP FOLLOW-UP VISIT: CPT | Mod: ,,, | Performed by: PODIATRIST

## 2023-05-01 PROCEDURE — 99999 PR PBB SHADOW E&M-EST. PATIENT-LVL IV: CPT | Mod: PBBFAC,,, | Performed by: PODIATRIST

## 2023-05-01 PROCEDURE — 99214 OFFICE O/P EST MOD 30 MIN: CPT | Mod: PBBFAC,PN | Performed by: PODIATRIST

## 2023-05-01 NOTE — PROGRESS NOTES
Subjective:      Patient ID: Enoch Barr is a 58 y.o. male.    Chief Complaint: post op    58 y.o. male presenting with R foot pain. Points 1st MPJ and 2nd MPJ area for pain. Aching and throbbing pain. Has been dealing with pain for 3 months. Known to Dr. Nixon. Previous treatments include NSAIDs, oral steroid which helped with some symptoms. R foot pain continues to get worse. Pain gets worse with pressure.  Following up with pain management for back pain. History of taking gabapentin which caused swelling on b/l LE thus stopped. 1st MPJ pain is worse than 2nd MPJ pain.     3/6/23 f/u R foot pain. Most of his pain is located on medial aspect of 1st met head. Aching and throbbing pain. Here to discuss MRI and surgical options. MRI noted OA changes to 2nd MPJ. Mild OA changes to 1 MPJ. Tells me he has minimal pain over 2nd MPJ.     4/10/23 s/p R foot MIS bunionectomy with Andrzej (DOS 3/24/23 GP: 6/24/23). Pain is well controlled. In surgical shoes. Here for suture removal. Denies pain today. Denies slips since the surgery.     5/1/23 s/p R foot surgery. In short CAM. Pain and swelling present but slowly improving. Tells me his dog (weighs 80 pounds) stepped on right foot the other day.     Level of ambulation/Occupation:   Smoking status: no  Janeen-D Def: ?  DM:no  Other:     Review of Systems   Constitutional: Negative for decreased appetite and malaise/fatigue.   Cardiovascular:  Negative for claudication and leg swelling.   Musculoskeletal:  Positive for arthritis, back pain, joint pain and stiffness. Negative for joint swelling and muscle weakness.   Neurological:  Negative for numbness and weakness.   Psychiatric/Behavioral:  Negative for altered mental status.            Past Medical History:   Diagnosis Date    GERD (gastroesophageal reflux disease)     Hypercholesteremia     Hypertension     on medication    Lumbar spondylosis 9/21/2020    Migraines     Sleep apnea        Past Surgical History:   Procedure  Laterality Date    COLONOSCOPY N/A 8/10/2020    Procedure: COLONOSCOPY;  Surgeon: April Dos Santos MD;  Location: [facility] ENDO;  Service: Endoscopy;  Laterality: N/A;    EPIDURAL STEROID INJECTION Left 2/17/2023    Procedure: Left L5 Transforaminal Epidural Steroid Injection;  Surgeon: Santana Rojo Jr., MD;  Location: [facility] ENDO;  Service: Pain Management;  Laterality: Left;  @1245  No ATC or DM    EPIDURAL STEROID INJECTION Bilateral 4/14/2023    Procedure: Bilateral L3, L4, L5 Medial Branch Blocks #1;  Surgeon: Santana Rojo Jr., MD;  Location: [facility] ENDO;  Service: Pain Management;  Laterality: Bilateral;  @1230 (requests afternoon)  No ATC or DM    HERNIA REPAIR      LAPAROSCOPIC CHOLECYSTECTOMY N/A 1/25/2022    Procedure: CHOLECYSTECTOMY, LAPAROSCOPIC;  Surgeon: Jarrod Martinez MD;  Location: [facility] OR;  Service: General;  Laterality: N/A;    REPAIR OF TRICEPS TENDON Left 10/2/2020    Procedure: REPAIR, TENDON, TRICEPS;  Surgeon: Keysha Galvez MD;  Location: [facility];  Service: Orthopedics;  Laterality: Left;  RN PRE OP DONE 9/29/2020----COVID NEGATIVE ON 9/29  Arth[employer] Rep: Delma [phone]    SINUS SURGERY  2012    SURGICAL REMOVAL OF BUNION WITH OSTEOTOMY OF METATARSAL BONE Right 3/24/2023    Procedure: BUNIONECTOMY, WITH METATARSAL OSTEOTOMY;  Surgeon: Shannan An DPM;  Location: [facility] OR;  Service: Podiatry;  Laterality: Right;       Family History   Problem Relation Age of Onset    Coronary artery disease Mother     Hypertension Father     No Known Problems Sister     No Known Problems Brother     Valvular heart disease Sister     No Known Problems Brother     No Known Problems Brother     No Known Problems Maternal Aunt     No Known Problems Maternal Uncle     No Known Problems Paternal Aunt     No Known Problems Paternal Uncle     No Known Problems Maternal Grandmother     No Known Problems Maternal Grandfather     No Known Problems Paternal Grandmother     No Known Problems Paternal  Grandfather     Amblyopia Neg Hx     Blindness Neg Hx     Cancer Neg Hx     Cataracts Neg Hx     Diabetes Neg Hx     Glaucoma Neg Hx     Macular degeneration Neg Hx     Retinal detachment Neg Hx     Strabismus Neg Hx     Stroke Neg Hx     Thyroid disease Neg Hx        Social History     Socioeconomic History    Marital status:    Occupational History    Occupation: production      Employer: [employer]   Tobacco Use    Smoking status: Former     Types: Cigarettes     Quit date: 1999     Years since quittin.9    Smokeless tobacco: Never   Substance and Sexual Activity    Alcohol use: Yes     Alcohol/week: 7.0 standard drinks     Types: 7 Glasses of wine per week     Comment: occasionally    Drug use: Never    Sexual activity: Yes     Partners: Female     Social Determinants of Health     Financial Resource Strain: Unknown    Difficulty of Paying Living Expenses: Patient refused   Food Insecurity: Unknown    Worried About Running Out of Food in the Last Year: Patient refused    Ran Out of Food in the Last Year: Patient refused   Transportation Needs: No Transportation Needs    Lack of Transportation (Medical): No    Lack of Transportation (Non-Medical): No   Physical Activity: Sufficiently Active    Days of Exercise per Week: 4 days    Minutes of Exercise per Session: 150+ min   Stress: Stress Concern Present    Feeling of Stress : Very much   Social Connections: Unknown    Frequency of Communication with Friends and Family: Patient refused    Frequency of Social Gatherings with Friends and Family: Patient refused    Active Member of Clubs or Organizations: No    Attends Club or Organization Meetings: Never    Marital Status:    Housing Stability: Unknown    Unable to Pay for Housing in the Last Year: Patient refused    Number of Places Lived in the Last Year: 1    Unstable Housing in the Last Year: No       Current Outpatient Medications   Medication Sig Dispense  Refill    atorvastatin (LIPITOR) 40 MG tablet Take 1 tablet (40 mg total) by mouth every evening. 90 tablet 3    celecoxib (CELEBREX) 200 MG capsule Take 1 capsule (200 mg total) by mouth 2 (two) times daily. May take 650 mg of Tylenol at the same time 180 capsule 0    cromolyn (NASALCHROM) 5.2 mg/spray (4 %) nasal spray 1 spray by Nasal route 4 (four) times daily. 26 mL 1    diclofenac sodium (VOLTAREN) 1 % Gel Apply 2 g topically 4 (four) times daily. 100 g 0    fluticasone propionate (FLONASE) 50 mcg/actuation nasal spray 1 spray (50 mcg total) by Each Nostril route once daily. 16 g 3    ibuprofen (ADVIL,MOTRIN) 800 MG tablet Take 1 tablet (800 mg total) by mouth every 8 (eight) hours as needed for Pain. 30 tablet 0    lisinopriL-hydrochlorothiazide (PRINZIDE,ZESTORETIC) 20-12.5 mg per tablet TAKE 1 TABLET BY MOUTH EVERY DAY 90 tablet 3    methocarbamoL (ROBAXIN) 750 MG Tab Take 1 tablet (750 mg total) by mouth 3 (three) times daily. As needed for muscle spasms for 60 doses 60 tablet 0    metoprolol succinate (TOPROL-XL) 25 MG 24 hr tablet Take 1 tablet (25 mg total) by mouth once daily. 90 tablet 3    omeprazole (PRILOSEC) 20 MG capsule TAKE 1 CAPSULE(20 MG) BY MOUTH EVERY DAY. DECREASED DOSE 90 capsule 3    traZODone (DESYREL) 50 MG tablet Take 2 tablets (100 mg total) by mouth nightly as needed for Insomnia. 180 tablet 3    oxyCODONE-acetaminophen (PERCOCET) 5-325 mg per tablet Take 1 tablet by mouth every 6 (six) hours as needed for Pain. 20 tablet 0     No current facility-administered medications for this visit.       Review of patient's allergies indicates:  No Known Allergies    Vitals:    05/01/23 1309   Weight: 108 kg (238 lb)   Height: 6' (1.829 m)   PainSc:   3   PainLoc: Foot           Objective:      Physical Exam  Constitutional:       General: He is not in acute distress.     Appearance: He is well-developed.   HENT:      Nose: Nose normal.   Eyes:      Conjunctiva/sclera: Conjunctivae normal.    Pulmonary:      Effort: Pulmonary effort is normal.   Chest:      Chest wall: No tenderness.   Abdominal:      Tenderness: There is no abdominal tenderness.   Musculoskeletal:      Cervical back: Normal range of motion.   Neurological:      Mental Status: He is alert and oriented to person, place, and time.   Psychiatric:         Behavior: Behavior normal.       Vascular: Distal DP/PT pulses palpable 2/4. CRT < 3 sec to tips of toes. No vericosities noted to LEs. Hair growth present LE, warm to touch LE.  RLE: mild edema noted to LE.    Dermatologic: No open lesions, lacerations or wounds. Interdigital spaces clean, dry and intact. No erythema, rubor, calor noted LE  RLE: incision healed.    Musculoskeletal: No calf tenderness LE, Compartments soft/compressible.  RLE: s/p bunionectomy. Decreased ROM of 1st MPJ.     Neurological: Light touch, proprioception, and sharp/dull sensation are all intact. Protective threshold with the Rodeo-Wienstein monofilament is intact. Vibratory sensation intact.                  Assessment:       Encounter Diagnoses   Name Primary?    Status post bunionectomy Yes    Decreased range of motion     Follow-up examination following surgery            Plan:       Enoch was seen today for follow-up.    Diagnoses and all orders for this visit:    Status post bunionectomy    Decreased range of motion  -     Ambulatory referral/consult to Physical/Occupational Therapy; Future    Follow-up examination following surgery      I counseled the patient on his conditions, their implications and medical management.    58 y.o. male with s/p R foot bunionectomy, Akin.    -rx. PT   -right foot x-ray reviewed.  Hardware intact.  Mild displacement of capital fragment on lateral view. Pain and swelling improving slowly. Recommend PT for 1st MPJ ROM. Ok to transition to normal WB as he can tolerate.     -I reviewed imaging, clinical history, and diagnosis as above with the patient. I attempted to use  layman's terms to educate the patient as well as utilize foot models and/or pictures. I personally went through imaging with the patient.    -The nature of the condition, options for management, as well as potential risks and complications were discussed in detail with patient. Patient was amenable to my recommendations and left my office fully informed and will follow up as instructed or sooner if necessary.    -It was discussed the importance of wearing shoes with adequate room in toe box to accommodate toe deformities. Recommended New Balance/[employer]ics shoe brands with adequate arch supports to alleviate abnormal pressure and improve stability of foot while walking. Avoid flat shoes and barefoot walking as these will exacerbate or worsen symptoms.   -f/u 3 months     Note dictated with voice recognition software, please excuse any grammatical errors.

## 2023-05-03 ENCOUNTER — CLINICAL SUPPORT (OUTPATIENT)
Dept: REHABILITATION | Facility: HOSPITAL | Age: 58
End: 2023-05-03
Attending: PODIATRIST
Payer: OTHER GOVERNMENT

## 2023-05-03 DIAGNOSIS — M20.21 HALLUX RIGIDUS OF RIGHT FOOT: ICD-10-CM

## 2023-05-03 DIAGNOSIS — R26.89 ANTALGIC GAIT: ICD-10-CM

## 2023-05-03 DIAGNOSIS — M25.60 DECREASED RANGE OF MOTION: ICD-10-CM

## 2023-05-03 DIAGNOSIS — M62.81 MUSCLE WEAKNESS AFFECTING MOVEMENT OF FOOT: ICD-10-CM

## 2023-05-03 DIAGNOSIS — R29.898 WEAKNESS OF RIGHT FOOT: ICD-10-CM

## 2023-05-03 DIAGNOSIS — Z98.890 HISTORY OF BUNIONECTOMY OF RIGHT GREAT TOE: ICD-10-CM

## 2023-05-03 PROBLEM — M72.2 PLANTAR FASCIITIS OF LEFT FOOT: Status: RESOLVED | Noted: 2022-03-29 | Resolved: 2023-05-03

## 2023-05-03 PROBLEM — M79.672 LEFT FOOT PAIN: Status: RESOLVED | Noted: 2022-03-29 | Resolved: 2023-05-03

## 2023-05-03 PROBLEM — G89.29 CHRONIC BILATERAL LOW BACK PAIN WITH RIGHT-SIDED SCIATICA: Status: RESOLVED | Noted: 2022-10-20 | Resolved: 2023-05-03

## 2023-05-03 PROBLEM — M54.41 CHRONIC BILATERAL LOW BACK PAIN WITH RIGHT-SIDED SCIATICA: Status: RESOLVED | Noted: 2022-10-20 | Resolved: 2023-05-03

## 2023-05-03 PROCEDURE — 97161 PT EVAL LOW COMPLEX 20 MIN: CPT | Mod: PN

## 2023-05-03 PROCEDURE — 97140 MANUAL THERAPY 1/> REGIONS: CPT | Mod: PN

## 2023-05-03 PROCEDURE — 97110 THERAPEUTIC EXERCISES: CPT | Mod: PN

## 2023-05-03 NOTE — PROGRESS NOTES
[facility]  Physical Therapy Initial Evaluation  Date: 5/3/2023   Name: Enoch Barr  [facility] Number: [id number]    Therapy Diagnosis:   Encounter Diagnoses   Name Primary?    Decreased range of motion     History of bunionectomy of right great toe     Hallux rigidus of right foot     Antalgic gait     Weakness of right foot     Muscle weakness affecting movement of foot      Physician: Shannan An DPM    Physician Orders: PT Eval and Treat   Medical Diagnosis from Referral: M25.60 (ICD-10-CM) - Decreased range of motion   Evaluation Date: 5/3/2023  Authorization Period Expiration: 12/31/23  Plan of Care Expiration: 7/3/23  Visit # / Visits authorized: 1/ 1 Progress Note Due: 6/3/23  FOTO: 1/ 1 ---- FIRST VISIT!!!!     Precautions: Standard    Time In: 0800  Time Out: 0900  Total Appointment Time (timed & untimed codes): 60 minutes    Subjective   Date of onset: R bunionectomy s/p 3/24/23  History of current condition - Enoch reports: Enoch is ambulating in short CAM boot reporting MD recommended slowly weaning out of CAM boot and may start attempting to wear standard shoes. Pt states he ambulates on lateral border of R foot to avoid toe off which increases R foot/great toe pain. Pt states he has been performing ankle ROM and using ice as needed. He has plans to go deep sea diving in September and would like to ensure he is physically ready to do so by that time.     ADRIAN: surgical intervention  Any falls: denies   Any pain Plantar fascia pain: minimal   Any pain Deltoid ligament: no  Any pain ATFL, calcaneou fibular or posterior talofibular L: no  Any ankle bruise: no  Pain radiates: localized to R great toe   Pain constant or intermittent: intermittent   Any injection: no      Current 4/10, worst 7/10, best 2/10   Location: right great toe/foot    Description: Aching, Throbbing, Tight, and Deep  Aggravating Factors: Standing, Walking, Morning, Extension, and Flexing  Easing Factors:  relaxation, pain medication, and ice    Prior Therapy: no  Social History:  lives with their family  Occupation: retired from    Prior Level of Function: full   Current Level of Function: ambulates in CAM boot, unable to tolerate standard shoe, antalgic gait, lacks efficient biomechanics for functional mobility 2/2 s/p R bunionectomy     Pts goals: return to standard shoe wear, normalize gait, decrease pain in R toe      Imaging, X-ray: TECHNIQUE:  AP, lateral, and oblique views of the right foot were performed.     FINDINGS:  There is osseous spur at the plantar fascia attachment to the calcaneus.  There is postop change within the 1st metatarsal and toe with hardware in place and no evidence of hardware failure.     Impression:     As above.        Electronically signed by: Jeffrey Gonzalez MD  Date:                                            05/01/2023    Medical History:   Past Medical History:   Diagnosis Date    GERD (gastroesophageal reflux disease)     Hypercholesteremia     Hypertension     on medication    Lumbar spondylosis 9/21/2020    Migraines     Sleep apnea        Surgical History:   Enoch Barr  has a past surgical history that includes Sinus surgery (2012); Hernia repair; Colonoscopy (N/A, 8/10/2020); Repair of triceps tendon (Left, 10/2/2020); Laparoscopic cholecystectomy (N/A, 1/25/2022); Epidural steroid injection (Left, 2/17/2023); Surgical removal of bunion with osteotomy of metatarsal bone (Right, 3/24/2023); and Epidural steroid injection (Bilateral, 4/14/2023).    Medications:   Enoch has a current medication list which includes the following prescription(s): atorvastatin, celecoxib, cromolyn, diclofenac sodium, fluticasone propionate, ibuprofen, lisinopril-hydrochlorothiazide, methocarbamol, metoprolol succinate, omeprazole, oxycodone-acetaminophen, and trazodone.    Allergies:   Review of patient's allergies indicates:  No Known Allergies         Objective     Observation: antalgic  gait, presents wearing short CAM boot on RLE, ambulates on lateral border of R foot to avoid toe off/great toe extension, holds R hip in ER, early offloading of RLE in stance     Sensation: Light touch:intact to light touch    Ankle Range of Motion:   Ankle Right Left   Dorsiflexion 8 8   Plantarflexion 55 55   Inversion 22 28   Eversion 10 15   Great toe extension 40deg PROM 60deg PROM   Great toe flexion  20deg PROM 40deg PROM       Strength:   Right Ankle   Left Ankle    Dorsiflexion: 4-/5 Dorsiflexion: 5/5   Plantarflexion:  2/5 Plantarflexion: 4/5   Inversion: 4-/5 Inversion: 5/5   Eversion: 4-/5 Eversion: 5/5       Right LE  Left LE    Hip flexion: 5/5 Hip flexion: 5/5   Hip extension:  4-/5 Hip extension: 4-/5   Hip abduction: 4-/5 Hip abduction: 4-/5   Knee extension: 4/5 Knee extension: 5/5   Knee flexion: 4-/5 Knee flexion: 4/5       Joint Mobility: 1st MTP: hypomobile to AP/PA mobilizations     Palpation: R foot: TTP: 1st MTP, abductor hallucis, plantar fascia, intrinsics; incisions TTP with increased sensitivity     Flexibility: good TC and subtalar mobility maintained - limited 1st MTP mobility that is expected s/p R bunionectomy       CMS Impairment/Limitation/Restriction for FOTO -- Survey    Therapist reviewed FOTO scores for Enoch Barr on 5/3/2023.   FOTO documents entered into [employer] - see Media section.    Limitation Score: --%    First visit        TREATMENT     Total Treatment time separate from Evaluation: 25 minutes    Enoch received therapeutic exercises to develop strength, endurance, ROM, flexibility, posture, and core stabilization for 10 minutes including:  Seated heel raises with focus on driving through great toe/2nd toe - sub max lifting x10  Toe curls to splays x20  Seated or Supine ankle circles CW/CC x30 ea R only  Seated or supine ankle pumps x30 B  Manual stretch of great toe into extension and flexion [id number] holds ea direction    Enoch received the following manual therapy  techniques: Joint mobilizations, Manual traction, and Soft tissue Mobilization were applied to the: R great toe/foot  for 15 minutes, including:  Great toe distraction with gentle oscillations, AP/PA gr III glides, PROM into extension; STM abductor hallucis and plantar fascia         Home Exercises and Patient Education Provided    Education provided:   - HEP  - bring standard shoe in next visit  - how to perform manual distraction of 1st MTP for pain modulation  - importance of normalizing gait mechanics to minimize effects on low back pain    Written Home Exercises Provided: yes.  Exercises were reviewed and Enoch was able to demonstrate them prior to the end of the session.  Enoch demonstrated good  understanding of the education provided.     See EMR under Patient Instructions for exercises provided 5/3/2023.    Assessment   Enoch is a 58 y.o. male referred to outpatient Physical Therapy with a medical diagnosis of M25.60 (ICD-10-CM) - Decreased range of motion . Pt presents with R hallux rigidus following recent bunionectomy with fair joint mobility that responded quickly to manual intervention with improvements in great toe extension as well as subjective pain reduction. Pt presents ambulating in CAM boot ambulating on lateral border of R foot to avoid toe off. Hesitancy noted in pt's willingness to attempt CKC great toe extension as well as full weight bearing without CAM boot donned. Pt is cleared for WBAT without CAM boot and will require ongoing rehab and education to assist in normalizing functional mobility and gait. Swelling is minimal however time spent in dependent position increased swelling as well as redness. DPT provided pt with education on how this can be normal for several months after surgery and pt verbalized his understanding. Mr. Barr is a good candidate for skilled PT and will benefit from manual intervention to enhance mobility and decrease tissue restrictions as well as introduction of  gait training and CKC strength training for global LE strength and to progress towards functional goals.     Pt prognosis is Good.   Pt will benefit from skilled outpatient Physical Therapy to address the deficits stated above and in the chart below, provide pt/family education, and to maximize pt's level of independence.     Plan of care discussed with patient: Yes  Pt's spiritual, cultural and educational needs considered and patient is agreeable to the plan of care and goals as stated below:     Anticipated Barriers for therapy: none    Medical Necessity is demonstrated by the following  History  Co-morbidities and personal factors that may impact the plan of care Co-morbidities:   Past Medical History:   Diagnosis Date    GERD (gastroesophageal reflux disease)     Hypercholesteremia     Hypertension     on medication    Lumbar spondylosis 9/21/2020    Migraines     Sleep apnea        Personal Factors:   no deficits     Complexity: low   Examination  Body Structures and Functions, activity limitations and participation restrictions that may impact the plan of care Body Regions:   lower extremities  trunk    Body Systems:    gross symmetry  ROM  strength  gross coordinated movement  balance  gait  transfers  transitions  motor control  motor learning  edema  scar formation  skin integrity  skin color    Participation Restrictions:   none    Activity limitations:   Learning and applying knowledge  no deficits    General Tasks and Commands  no deficits    Communication  no deficits    Mobility  lifting and carrying objects  walking  driving (bike, car, motorcycle)    Self care  no deficits    Domestic Life  shopping  cooking  doing house work (cleaning house, washing dishes, laundry)  assisting others    Interactions/Relationships  no deficits    Life Areas  no deficits    Community and Social Life  community life  recreation and leisure         Complexity: low  See FOTO outcome assessment   Clinical Presentation  stable and uncomplicated low   Decision Making/ Complexity Score: low     Goals:  GOALS: Short Term Goals:  4 weeks  1.Report decreased  in  pain at worse less than  <   / =  4  /10  to increase tolerance for improved functional actvities. On going  2. Pt to improve great toe range of motion to 50deg extension to allow for improved functional mobility to allow for improvement in IADLs. On going  3. Increased BLE MMT 1/2 grade  to increase tolerance for ADL and work activities.On going  4. Pt to demonstrate ability to ambulate in standard shoe x15min on level ground at tolerable pace with < or = 4/10 R foot pain. Ongoing   5. Pt to tolerate HEP to improve ROM and independence with ADL's. On going    Long Term Goals: 8 weeks  1.Report decreased  in  pain at worse  less than  <   / =  2  /10  to increase tolerance for improved functional actvities. On going  2.Pt to improve great toe range of motion to 60deg extension to allow for improved functional mobility to allow for improvement in IADLs. On going  3.Increased BLE MMT 1 grade  to increase tolerance for ADL and work activities.On going  4. Pt will report clinically significant improvements on  FOTO outcome assessment  to increase functional activities and mobility. On going  5. Pt to be Independent with HEP to improve ROM and independence with ADL's. On going      Plan   Plan of care Certification: 5/3/2023 to 7/3/23.    Outpatient Physical Therapy 2 times weekly for 8 weeks to include the following interventions: Gait Training, Manual Therapy, Moist Heat/ Ice, Neuromuscular Re-ed, Orthotic Management and Training, Patient Education, Self Care, Therapeutic Activities, and Therapeutic Exercise. Dry needling.     Adele Sawyer, PT, DPT, Cert DN      I CERTIFY THE NEED FOR THESE SERVICES FURNISHED UNDER THIS PLAN OF TREATMENT AND WHILE UNDER MY CARE   Physician's comments:     Physician's Signature: ___________________________________________________

## 2023-05-08 PROBLEM — R26.89 ANTALGIC GAIT: Status: ACTIVE | Noted: 2023-05-08

## 2023-05-08 PROBLEM — R29.898 WEAKNESS OF RIGHT FOOT: Status: ACTIVE | Noted: 2023-05-08

## 2023-05-08 PROBLEM — Z98.890 HISTORY OF BUNIONECTOMY OF RIGHT GREAT TOE: Status: ACTIVE | Noted: 2023-05-08

## 2023-05-08 PROBLEM — M62.81 MUSCLE WEAKNESS AFFECTING MOVEMENT OF FOOT: Status: ACTIVE | Noted: 2023-05-08

## 2023-05-08 PROBLEM — M20.21 HALLUX RIGIDUS OF RIGHT FOOT: Status: ACTIVE | Noted: 2023-05-08

## 2023-05-08 NOTE — PLAN OF CARE
[facility]  Physical Therapy Initial Evaluation  Date: 5/3/2023   Name: Enoch Barr  [facility] Number: [id number]    Therapy Diagnosis:   Encounter Diagnoses  Name Primary?   Decreased range of motion    History of bunionectomy of right great toe    Hallux rigidus of right foot    Antalgic gait    Weakness of right foot    Muscle weakness affecting movement of foot     Physician: Shannan An DPM    Physician Orders: PT Eval and Treat   Medical Diagnosis from Referral: M25.60 (ICD-10-CM) - Decreased range of motion   Evaluation Date: 5/3/2023  Authorization Period Expiration: 12/31/23  Plan of Care Expiration: 7/3/23  Visit # / Visits authorized: 1/ 1 Progress Note Due: 6/3/23  FOTO: 1/ 1 ---- FIRST VISIT!!!!     Precautions: Standard    Time In: 0800  Time Out: 0900  Total Appointment Time (timed & untimed codes): 60 minutes    Subjective  Date of onset: R bunionectomy s/p 3/24/23  History of current condition - Enoch reports: Enoch is ambulating in short CAM boot reporting MD recommended slowly weaning out of CAM boot and may start attempting to wear standard shoes. Pt states he ambulates on lateral border of R foot to avoid toe off which increases R foot/great toe pain. Pt states he has been performing ankle ROM and using ice as needed. He has plans to go deep sea diving in September and would like to ensure he is physically ready to do so by that time.     ADRIAN: surgical intervention  Any falls: denies   Any pain Plantar fascia pain: minimal   Any pain Deltoid ligament: no  Any pain ATFL, calcaneou fibular or posterior talofibular L: no  Any ankle bruise: no  Pain radiates: localized to R great toe   Pain constant or intermittent: intermittent   Any injection: no      Current 4/10, worst 7/10, best 2/10   Location: right great toe/foot    Description: Aching, Throbbing, Tight, and Deep  Aggravating Factors: Standing, Walking, Morning, Extension, and Flexing  Easing Factors:  relaxation, pain medication, and ice    Prior Therapy: no  Social History:  lives with their family  Occupation: retired from    Prior Level of Function: full   Current Level of Function: ambulates in CAM boot, unable to tolerate standard shoe, antalgic gait, lacks efficient biomechanics for functional mobility 2/2 s/p R bunionectomy     Pts goals: return to standard shoe wear, normalize gait, decrease pain in R toe      Imaging, X-ray: TECHNIQUE:  AP, lateral, and oblique views of the right foot were performed.     FINDINGS:  There is osseous spur at the plantar fascia attachment to the calcaneus.  There is postop change within the 1st metatarsal and toe with hardware in place and no evidence of hardware failure.     Impression:     As above.        Electronically signed by: Jeffrey Gonzalez MD  Date:                                            05/01/2023    Medical History:   Past Medical History:  Diagnosis Date   GERD (gastroesophageal reflux disease)    Hypercholesteremia    Hypertension    on medication   Lumbar spondylosis 9/21/2020   Migraines    Sleep apnea       Surgical History:   Enoch Barr  has a past surgical history that includes Sinus surgery (2012); Hernia repair; Colonoscopy (N/A, 8/10/2020); Repair of triceps tendon (Left, 10/2/2020); Laparoscopic cholecystectomy (N/A, 1/25/2022); Epidural steroid injection (Left, 2/17/2023); Surgical removal of bunion with osteotomy of metatarsal bone (Right, 3/24/2023); and Epidural steroid injection (Bilateral, 4/14/2023).    Medications:   Enoch has a current medication list which includes the following prescription(s): atorvastatin, celecoxib, cromolyn, diclofenac sodium, fluticasone propionate, ibuprofen, lisinopril-hydrochlorothiazide, methocarbamol, metoprolol succinate, omeprazole, oxycodone-acetaminophen, and trazodone.    Allergies:   Review of patient's allergies indicates:  No Known Allergies         Objective    Observation: antalgic gait,  presents wearing short CAM boot on RLE, ambulates on lateral border of R foot to avoid toe off/great toe extension, holds R hip in ER, early offloading of RLE in stance     Sensation: Light touch:intact to light touch    Ankle Range of Motion:   Ankle Right Left  Dorsiflexion 8 8  Plantarflexion 55 55  Inversion 22 28  Eversion 10 15  Great toe extension 40deg PROM 60deg PROM  Great toe flexion  20deg PROM 40deg PROM      Strength:   Right Ankle   Left Ankle   Dorsiflexion: 4-/5 Dorsiflexion: 5/5  Plantarflexion:  2/5 Plantarflexion: 4/5  Inversion: 4-/5 Inversion: 5/5  Eversion: 4-/5 Eversion: 5/5      Right LE  Left LE   Hip flexion: 5/5 Hip flexion: 5/5  Hip extension:  4-/5 Hip extension: 4-/5  Hip abduction: 4-/5 Hip abduction: 4-/5  Knee extension: 4/5 Knee extension: 5/5  Knee flexion: 4-/5 Knee flexion: 4/5      Joint Mobility: 1st MTP: hypomobile to AP/PA mobilizations     Palpation: R foot: TTP: 1st MTP, abductor hallucis, plantar fascia, intrinsics; incisions TTP with increased sensitivity     Flexibility: good TC and subtalar mobility maintained - limited 1st MTP mobility that is expected s/p R bunionectomy       CMS Impairment/Limitation/Restriction for FOTO -- Survey    Therapist reviewed FOTO scores for Enoch Barr on 5/3/2023.   FOTO documents entered into [employer] - see Media section.    Limitation Score: --%    First visit       TREATMENT    Total Treatment time separate from Evaluation: 25 minutes    Enoch received therapeutic exercises to develop strength, endurance, ROM, flexibility, posture, and core stabilization for 10 minutes including:  Seated heel raises with focus on driving through great toe/2nd toe - sub max lifting x10  Toe curls to splays x20  Seated or Supine ankle circles CW/CC x30 ea R only  Seated or supine ankle pumps x30 B  Manual stretch of great toe into extension and flexion [id number] holds ea direction    Enoch received the following manual therapy techniques: Joint  mobilizations, Manual traction, and Soft tissue Mobilization were applied to the: R great toe/foot  for 15 minutes, including:  Great toe distraction with gentle oscillations, AP/PA gr III glides, PROM into extension; STM abductor hallucis and plantar fascia         Home Exercises and Patient Education Provided    Education provided:   - HEP  - bring standard shoe in next visit  - how to perform manual distraction of 1st MTP for pain modulation  - importance of normalizing gait mechanics to minimize effects on low back pain    Written Home Exercises Provided: yes.  Exercises were reviewed and Enoch was able to demonstrate them prior to the end of the session.  Enoch demonstrated good  understanding of the education provided.     See EMR under Patient Instructions for exercises provided 5/3/2023.    Assessment  Enoch is a 58 y.o. male referred to outpatient Physical Therapy with a medical diagnosis of M25.60 (ICD-10-CM) - Decreased range of motion . Pt presents with R hallux rigidus following recent bunionectomy with fair joint mobility that responded quickly to manual intervention with improvements in great toe extension as well as subjective pain reduction. Pt presents ambulating in CAM boot ambulating on lateral border of R foot to avoid toe off. Hesitancy noted in pt's willingness to attempt CKC great toe extension as well as full weight bearing without CAM boot donned. Pt is cleared for WBAT without CAM boot and will require ongoing rehab and education to assist in normalizing functional mobility and gait. Swelling is minimal however time spent in dependent position increased swelling as well as redness. DPT provided pt with education on how this can be normal for several months after surgery and pt verbalized his understanding. Mr. Barr is a good candidate for skilled PT and will benefit from manual intervention to enhance mobility and decrease tissue restrictions as well as introduction of gait training and  [facility] strength training for global LE strength and to progress towards functional goals.     Pt prognosis is Good.   Pt will benefit from skilled outpatient Physical Therapy to address the deficits stated above and in the chart below, provide pt/family education, and to maximize pt's level of independence.     Plan of care discussed with patient: Yes  Pt's spiritual, cultural and educational needs considered and patient is agreeable to the plan of care and goals as stated below:     Anticipated Barriers for therapy: none    Medical Necessity is demonstrated by the following  History  Co-morbidities and personal factors that may impact the plan of care Co-morbidities:   Past Medical History:  Diagnosis Date   GERD (gastroesophageal reflux disease)    Hypercholesteremia    Hypertension    on medication   Lumbar spondylosis 9/21/2020   Migraines    Sleep apnea       Personal Factors:   no deficits     Complexity: low  Examination  Body Structures and Functions, activity limitations and participation restrictions that may impact the plan of care Body Regions:   lower extremities  trunk    Body Systems:    gross symmetry  ROM  strength  gross coordinated movement  balance  gait  transfers  transitions  motor control  motor learning  edema  scar formation  skin integrity  skin color    Participation Restrictions:   none    Activity limitations:   Learning and applying knowledge  no deficits    General Tasks and Commands  no deficits    Communication  no deficits    Mobility  lifting and carrying objects  walking  driving (bike, car, motorcycle)    Self care  no deficits    Domestic Life  shopping  cooking  doing house work (cleaning house, washing dishes, laundry)  assisting others    Interactions/Relationships  no deficits    Life Areas  no deficits    Community and Social Life  community life  recreation and leisure         Complexity: low  See FOTO outcome assessment  Clinical Presentation stable and  uncomplicated low  Decision Making/ Complexity Score: low    Goals:  GOALS: Short Term Goals:  4 weeks  1.Report decreased  in  pain at worse less than  <   / =  4  /10  to increase tolerance for improved functional actvities. On going  2. Pt to improve great toe range of motion to 50deg extension to allow for improved functional mobility to allow for improvement in IADLs. On going  3. Increased BLE MMT 1/2 grade  to increase tolerance for ADL and work activities.On going  4. Pt to demonstrate ability to ambulate in standard shoe x15min on level ground at tolerable pace with < or = 4/10 R foot pain. Ongoing   5. Pt to tolerate HEP to improve ROM and independence with ADL's. On going    Long Term Goals: 8 weeks  1.Report decreased  in  pain at worse  less than  <   / =  2  /10  to increase tolerance for improved functional actvities. On going  2.Pt to improve great toe range of motion to 60deg extension to allow for improved functional mobility to allow for improvement in IADLs. On going  3.Increased BLE MMT 1 grade  to increase tolerance for ADL and work activities.On going  4. Pt will report clinically significant improvements on  FOTO outcome assessment  to increase functional activities and mobility. On going  5. Pt to be Independent with HEP to improve ROM and independence with ADL's. On going      Plan  Plan of care Certification: 5/3/2023 to 7/3/23.    Outpatient Physical Therapy 2 times weekly for 8 weeks to include the following interventions: Gait Training, Manual Therapy, Moist Heat/ Ice, Neuromuscular Re-ed, Orthotic Management and Training, Patient Education, Self Care, Therapeutic Activities, and Therapeutic Exercise. Dry needling.     Adele Sawyer, PT, DPT, Cert DN      I CERTIFY THE NEED FOR THESE SERVICES FURNISHED UNDER THIS PLAN OF TREATMENT AND WHILE UNDER MY CARE   Physician's comments:     Physician's Signature: ___________________________________________________

## 2023-05-12 ENCOUNTER — CLINICAL SUPPORT (OUTPATIENT)
Dept: REHABILITATION | Facility: HOSPITAL | Age: 58
End: 2023-05-12
Payer: OTHER GOVERNMENT

## 2023-05-12 DIAGNOSIS — M20.21 HALLUX RIGIDUS OF RIGHT FOOT: ICD-10-CM

## 2023-05-12 DIAGNOSIS — R29.898 WEAKNESS OF RIGHT FOOT: ICD-10-CM

## 2023-05-12 DIAGNOSIS — R26.89 ANTALGIC GAIT: ICD-10-CM

## 2023-05-12 DIAGNOSIS — M62.81 MUSCLE WEAKNESS AFFECTING MOVEMENT OF FOOT: ICD-10-CM

## 2023-05-12 DIAGNOSIS — Z98.890 HISTORY OF BUNIONECTOMY OF RIGHT GREAT TOE: Primary | ICD-10-CM

## 2023-05-12 PROCEDURE — 97140 MANUAL THERAPY 1/> REGIONS: CPT | Mod: PN

## 2023-05-12 PROCEDURE — 97110 THERAPEUTIC EXERCISES: CPT | Mod: PN

## 2023-05-12 PROCEDURE — 97112 NEUROMUSCULAR REEDUCATION: CPT | Mod: PN

## 2023-05-12 NOTE — PROGRESS NOTES
[facility]   Physical Therapy Treatment Note      Name: Enoch DELUCA Momo  [facility] Number: [id number]    Therapy Diagnosis:   Encounter Diagnoses   Name Primary?    History of bunionectomy of right great toe Yes    Hallux rigidus of right foot     Antalgic gait     Weakness of right foot     Muscle weakness affecting movement of foot      Physician: Shannan An DPM    Visit Date: 5/12/2023       Therapy Diagnosis:   Encounter Diagnoses  Name   Primary?             Decreased range of motion                 History of bunionectomy of right great toe                  Hallux rigidus of right foot                    Antalgic gait                 Weakness of right foot                         Muscle weakness affecting movement of foot                Physician: Shannan An DPM     Physician Orders: PT Eval and Treat   Medical Diagnosis from Referral: M25.60 (ICD-10-CM) - Decreased range of motion   Evaluation Date: 5/3/2023  Authorization Period Expiration: 12/31/23  Plan of Care Expiration: 7/3/23  Visit # / Visits authorized: 1/ 20   Progress Note Due: 6/3/23  FOTO: 1/ 1     Precautions: Standard       PTA Visit #: 0/5     Time In: 1:20  Time Out: 2:20  Total Billable Time: 40 minutes    Subjective     Pt reports: slowly improving ROM but still difficulty with great toe flexion.    He was compliant with home exercise program.  Response to previous treatment: decreased pain  Functional change: slowly improving ROM    Pain: 2/10  Location:  R big toe MCP     Objective      Objective Measures updated at progress report unless specified.     Treatment     Enoch received the treatments listed below:      therapeutic exercises to develop strength, endurance, ROM, and flexibility for 30 minutes including:    Inv/Ev/DF, 3x15 RTB  Seated calf raise, 3x20  Standing calf raise, 3x20    manual therapy techniques: Joint mobilizations, Myofacial release, and Soft tissue Mobilization were applied to the:  RLE for 15 minutes, including:    AP glides to talocrural grade 3 to 4  Great Toe MCP AP/PA glides 3 to 4  Great Toe MCP Abd/Add glides grade 3 to 4    neuromuscular re-education activities to improve: Balance, Coordination, Kinesthetic, Sense, and Proprioception for 15 minutes. The following activities were included:    Foot doming, 3x20 w/ 5sec hold  Toe curls, 3x20 w/ 5 sec hold  Great toe isometric adduction, 3x20 w/ 5sec hold      Patient Education and Home Exercises       Education provided:   - HEP  - education on slowly wearing sneaker vs boot    Written Home Exercises Provided: yes. Exercises were reviewed and Enoch was able to demonstrate them prior to the end of the session.  Enoch demonstrated good  understanding of the education provided. See EMR under Patient Instructions for exercises provided during therapy sessions    Assessment     Pt presents 1 week after evaluation. He reports a continued ache in big toe MCP and stiffness in ankle and big toe but feels like it's improving. He tolerated routine well. He has difficulty completing great toe flex and abduction but shows good extension. Will transition him to more strength based HEP at next visit if no residual soreness after this session.     Enoch Is progressing well towards his goals.   Pt prognosis is Excellent.     Pt will continue to benefit from skilled outpatient physical therapy to address the deficits listed in the problem list box on initial evaluation, provide pt/family education and to maximize pt's level of independence in the home and community environment.     Pt's spiritual, cultural and educational needs considered and pt agreeable to plan of care and goals.     Anticipated barriers to physical therapy: None    Goals:  GOALS: Short Term Goals:  4 weeks  1.Report decreased  in  pain at worse less than  <   / =  4  /10  to increase tolerance for improved functional actvities. On going  2. Pt to improve great toe range of motion to  50deg extension to allow for improved functional mobility to allow for improvement in IADLs. On going  3. Increased BLE MMT 1/2 grade  to increase tolerance for ADL and work activities.On going  4. Pt to demonstrate ability to ambulate in standard shoe x15min on level ground at tolerable pace with < or = 4/10 R foot pain. Ongoing   5. Pt to tolerate HEP to improve ROM and independence with ADL's. On going     Long Term Goals: 8 weeks  1.Report decreased  in  pain at worse  less than  <   / =  2  /10  to increase tolerance for improved functional actvities. On going  2.Pt to improve great toe range of motion to 60deg extension to allow for improved functional mobility to allow for improvement in IADLs. On going  3.Increased BLE MMT 1 grade  to increase tolerance for ADL and work activities.On going  4. Pt will report clinically significant improvements on  FOTO outcome assessment  to increase functional activities and mobility. On going  5. Pt to be Independent with HEP to improve ROM and independence with ADL's. On going       Plan     Plan of care Certification: 5/3/2023 to 7/3/23.     Outpatient Physical Therapy 2 times weekly for 8 weeks to include the following interventions: Gait Training, Manual Therapy, Moist Heat/ Ice, Neuromuscular Re-ed, Orthotic Management and Training, Patient Education, Self Care, Therapeutic Activities, and Therapeutic Exercise. Dry needling.        Madan Danielle, PT

## 2023-05-19 ENCOUNTER — CLINICAL SUPPORT (OUTPATIENT)
Dept: REHABILITATION | Facility: HOSPITAL | Age: 58
End: 2023-05-19
Payer: OTHER GOVERNMENT

## 2023-05-19 DIAGNOSIS — M20.21 HALLUX RIGIDUS OF RIGHT FOOT: ICD-10-CM

## 2023-05-19 DIAGNOSIS — R26.89 ANTALGIC GAIT: ICD-10-CM

## 2023-05-19 DIAGNOSIS — R29.898 WEAKNESS OF RIGHT FOOT: ICD-10-CM

## 2023-05-19 DIAGNOSIS — M62.81 MUSCLE WEAKNESS AFFECTING MOVEMENT OF FOOT: ICD-10-CM

## 2023-05-19 DIAGNOSIS — Z98.890 HISTORY OF BUNIONECTOMY OF RIGHT GREAT TOE: Primary | ICD-10-CM

## 2023-05-19 PROCEDURE — 97140 MANUAL THERAPY 1/> REGIONS: CPT | Mod: PN

## 2023-05-19 PROCEDURE — 97112 NEUROMUSCULAR REEDUCATION: CPT | Mod: PN

## 2023-05-19 PROCEDURE — 97110 THERAPEUTIC EXERCISES: CPT | Mod: PN

## 2023-05-19 NOTE — PROGRESS NOTES
[facility]   Physical Therapy Treatment Note      Name: Enoch DELUCA Momo  [facility] Number: [id number]    Therapy Diagnosis:   Encounter Diagnoses   Name Primary?    History of bunionectomy of right great toe Yes    Hallux rigidus of right foot     Antalgic gait     Weakness of right foot     Muscle weakness affecting movement of foot      Physician: Shannan An DPM    Visit Date: 5/19/2023       Therapy Diagnosis:   Encounter Diagnoses  Name   Primary?             Decreased range of motion                 History of bunionectomy of right great toe                  Hallux rigidus of right foot                    Antalgic gait                 Weakness of right foot                         Muscle weakness affecting movement of foot                Physician: Shannan An DPM     Physician Orders: PT Eval and Treat   Medical Diagnosis from Referral: M25.60 (ICD-10-CM) - Decreased range of motion   Evaluation Date: 5/3/2023  Authorization Period Expiration: 12/31/23  Plan of Care Expiration: 7/3/23  Visit # / Visits authorized: 1/ 20   Progress Note Due: 6/3/23  FOTO: 1/ 1     Precautions: Standard       PTA Visit #: 0/5     Time In: 1:25  Time Out: 2:25  Total Billable Time: 40 minutes    Subjective     Pt reports: slowly improving ROM but still difficulty with great toe flexion.    He was compliant with home exercise program.  Response to previous treatment: decreased pain  Functional change: slowly improving ROM    Pain: 2/10  Location:  R big toe MCP     Objective      Objective Measures updated at progress report unless specified.     Treatment     Enoch received the treatments listed below:      therapeutic exercises to develop strength, endurance, ROM, and flexibility for 30 minutes including:    Inv/Ev/DF, 3x15 RTB  Seated calf raise, 3x20, 25lb KB  Standing calf raise, 3x20    manual therapy techniques: Joint mobilizations, Myofacial release, and Soft tissue Mobilization were applied  to the: RLE for 15 minutes, including:    AP glides to talocrural grade 3 to 4  Great Toe MCP AP/PA glides 3 to 4  Great Toe MCP Abd/Add glides grade 3 to 4    neuromuscular re-education activities to improve: Balance, Coordination, Kinesthetic, Sense, and Proprioception for 15 minutes. The following activities were included:    Foot doming, 3x20 w/ 5sec hold  Toe curls, 3x20 w/ 5 sec hold  Great toe ext/flex superset, 3x20  Great toe isometric adduction, 3x20 w/ 5sec hold      Patient Education and Home Exercises       Education provided:   - HEP  - education on slowly wearing sneaker vs boot    Written Home Exercises Provided: yes. Exercises were reviewed and Enoch was able to demonstrate them prior to the end of the session.  Enoch demonstrated good  understanding of the education provided. See EMR under Patient Instructions for exercises provided during therapy sessions    Assessment     Pt presents 2 weeks after evaluation. He reports a decreasing ache in big toe MCP and stiffness in ankle and big toe but feels like it's improving. He tolerated routine well. He has difficulty completing great toe flex and abduction but shows good extension. We transitioned him to more strength based HEP.     Enoch Is progressing well towards his goals.   Pt prognosis is Excellent.     Pt will continue to benefit from skilled outpatient physical therapy to address the deficits listed in the problem list box on initial evaluation, provide pt/family education and to maximize pt's level of independence in the home and community environment.     Pt's spiritual, cultural and educational needs considered and pt agreeable to plan of care and goals.     Anticipated barriers to physical therapy: None    Goals:  GOALS: Short Term Goals:  4 weeks  1.Report decreased  in  pain at worse less than  <   / =  4  /10  to increase tolerance for improved functional actvities. On going  2. Pt to improve great toe range of motion to 50deg extension  to allow for improved functional mobility to allow for improvement in IADLs. On going  3. Increased BLE MMT 1/2 grade  to increase tolerance for ADL and work activities.On going  4. Pt to demonstrate ability to ambulate in standard shoe x15min on level ground at tolerable pace with < or = 4/10 R foot pain. Ongoing   5. Pt to tolerate HEP to improve ROM and independence with ADL's. On going     Long Term Goals: 8 weeks  1.Report decreased  in  pain at worse  less than  <   / =  2  /10  to increase tolerance for improved functional actvities. On going  2.Pt to improve great toe range of motion to 60deg extension to allow for improved functional mobility to allow for improvement in IADLs. On going  3.Increased BLE MMT 1 grade  to increase tolerance for ADL and work activities.On going  4. Pt will report clinically significant improvements on  FOTO outcome assessment  to increase functional activities and mobility. On going  5. Pt to be Independent with HEP to improve ROM and independence with ADL's. On going       Plan     Plan of care Certification: 5/3/2023 to 7/3/23.     Outpatient Physical Therapy 2 times weekly for 8 weeks to include the following interventions: Gait Training, Manual Therapy, Moist Heat/ Ice, Neuromuscular Re-ed, Orthotic Management and Training, Patient Education, Self Care, Therapeutic Activities, and Therapeutic Exercise. Dry needling.        Madan Danielle, PT

## 2023-05-22 ENCOUNTER — PATIENT MESSAGE (OUTPATIENT)
Dept: PAIN MEDICINE | Facility: CLINIC | Age: 58
End: 2023-05-22

## 2023-05-22 ENCOUNTER — OFFICE VISIT (OUTPATIENT)
Dept: PAIN MEDICINE | Facility: CLINIC | Age: 58
End: 2023-05-22
Payer: OTHER GOVERNMENT

## 2023-05-22 ENCOUNTER — TELEPHONE (OUTPATIENT)
Dept: PAIN MEDICINE | Facility: CLINIC | Age: 58
End: 2023-05-22
Payer: OTHER GOVERNMENT

## 2023-05-22 VITALS
HEART RATE: 62 BPM | HEIGHT: 72 IN | SYSTOLIC BLOOD PRESSURE: 112 MMHG | DIASTOLIC BLOOD PRESSURE: 54 MMHG | RESPIRATION RATE: 17 BRPM | OXYGEN SATURATION: 98 % | BODY MASS INDEX: 30.73 KG/M2 | WEIGHT: 226.88 LBS

## 2023-05-22 DIAGNOSIS — M50.30 DDD (DEGENERATIVE DISC DISEASE), CERVICAL: ICD-10-CM

## 2023-05-22 DIAGNOSIS — M54.16 LUMBAR RADICULOPATHY: ICD-10-CM

## 2023-05-22 DIAGNOSIS — M51.36 DDD (DEGENERATIVE DISC DISEASE), LUMBAR: Primary | ICD-10-CM

## 2023-05-22 DIAGNOSIS — M47.816 LUMBAR SPONDYLOSIS: ICD-10-CM

## 2023-05-22 PROCEDURE — 99214 PR OFFICE/OUTPT VISIT, EST, LEVL IV, 30-39 MIN: ICD-10-PCS | Mod: S$PBB,,,

## 2023-05-22 PROCEDURE — 99214 OFFICE O/P EST MOD 30 MIN: CPT | Mod: S$PBB,,,

## 2023-05-22 PROCEDURE — 99215 OFFICE O/P EST HI 40 MIN: CPT | Mod: PBBFAC,PN

## 2023-05-22 PROCEDURE — 99999 PR PBB SHADOW E&M-EST. PATIENT-LVL V: ICD-10-PCS | Mod: PBBFAC,,,

## 2023-05-22 PROCEDURE — 99999 PR PBB SHADOW E&M-EST. PATIENT-LVL V: CPT | Mod: PBBFAC,,,

## 2023-05-22 NOTE — PROGRESS NOTES
Mr. Barr will be scheduled for left L5 + S1 TF ANKITA on 6/2/23.  Reviewed the pre-procedure instructions listed below with the patient- copy also provided.  Instructed patient to notify clinic if he begins feeling ill, runs a fever, is prescribed antibiotics, and/or has any outpatient procedures (colonoscopy, endoscopy, OBGYN, dental work, etc.), and/or any vaccinations or booster shots within the two weeks leading up to this procedure.  Instructed patient to report to [facility], 2nd Floor Endoscopy Registration Desk.  All questions answered- patient verbalized understanding.     Day of Procedure  Ensure you have obtained an arrival time from the Pain Management Staff  You will be contacted the week before your scheduled date to review these instructions and receive your arrival time.  Please check any voicemails received.  If you arrive past your scheduled procedure time, you may be asked to reschedule your procedure.    Ensure you have a  with you.  If you arrive without a responsible adult to stay with you and drive you home, you may be asked to reschedule your procedure.     Take all of your prescribed medications that you routinely take for blood pressure, heart medications, thyroid, cholesterol, etc.  You will be informed if blood thinners should be held.    This is NOT a fasting procedure, you may eat the day of your procedure.    Wear comfortable clothing (loose fitting pants).  You may wear glasses, dentures, contact lenses, and/or hearing aids.     Notify the nurse during the intake process if you are allergic to any medications, if you are diabetic, or if you are not feeling well      Diabetic Patients  Please check your blood sugar prior to coming in for your procedure.  If the value is greater than 200, contact the clinic [phone] as the procedure may need to be rescheduled.  The procedure may not be performed if your blood sugar is above 200 even after checking into the  \[facility] [place] [facility]\"".      IF you are taking blood thinners, please make sure our staff is aware so that we can obtain clearance and have you hold the medication accordingly.
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
## 2023-05-22 NOTE — PROGRESS NOTES
Subjective:     Patient ID: Enoch Barr is a 58 y.o. male    Chief Complaint: Back Pain (Left lower back radiates down to above knee; left neck pain as well)      Referred by: No ref. provider found      HPI:    Initial Encounter (***):  Enoch Barr is a 58 y.o. male who presents today with ***. ***.   This pain is described in detail below.    Physical Therapy: ***    Non-pharmacologic Treatment: ***         TENS? ***    Pain Medications:         Currently taking: ***    Has tried in the past:  ***    Has not tried: *** Opioids, NSAIDs, Tylenol, Muscle relaxants, TCAs, SNRIs, anticonvulsants, topical creams    Blood thinners: ***    Interventional Therapies: ***    Relevant Surgeries: ***    Affecting sleep? ***    Affecting daily activities? {YES/NO/WILD CARDS:56199}    Depressive symptoms? {YES/NO:20267}          SI/HI? No    Work status: {Work Status:05377}    Pain Scores:    Best:       ***/10  Worst:     ***/10  Usually:   ***/10  Today:    ***/10    Pain Disability Index  Family/Home Responsibilities:: 6  Recreation:: 6  Social Activity:: 6  Occupation:: 6  Sexual Behavior:: 6  Self Care:: 6  Life-Support Activities:: 5  Pain Disability Index (PDI): 41    Review of Systems    Past Medical History:   Diagnosis Date    GERD (gastroesophageal reflux disease)     Hypercholesteremia     Hypertension     on medication    Lumbar spondylosis 9/21/2020    Migraines     Sleep apnea        Past Surgical History:   Procedure Laterality Date    COLONOSCOPY N/A 8/10/2020    Procedure: COLONOSCOPY;  Surgeon: April Dos Santos MD;  Location: [facility];  Service: Endoscopy;  Laterality: N/A;    EPIDURAL STEROID INJECTION Left 2/17/2023    Procedure: Left L5 Transforaminal Epidural Steroid Injection;  Surgeon: Santana Rojo Jr., MD;  Location: [facility];  Service: Pain Management;  Laterality: Left;  @1245  No ATC or DM    EPIDURAL STEROID INJECTION Bilateral 4/14/2023    Procedure: Bilateral L3, L4, L5 Medial Branch  Blocks #1;  Surgeon: Santana Rojo Jr., MD;  Location: [facility];  Service: Pain Management;  Laterality: Bilateral;  @1230 (requests afternoon)  No ATC or DM    HERNIA REPAIR      LAPAROSCOPIC CHOLECYSTECTOMY N/A 2022    Procedure: CHOLECYSTECTOMY, LAPAROSCOPIC;  Surgeon: Jarrod Martinez MD;  Location: [facility] OR;  Service: General;  Laterality: N/A;    REPAIR OF TRICEPS TENDON Left 10/2/2020    Procedure: REPAIR, TENDON, TRICEPS;  Surgeon: Keysha Galvez MD;  Location: [facility] OR;  Service: Orthopedics;  Laterality: Left;  RN PRE OP DONE 2020----COVID NEGATIVE ON   [facility]th[facility] Rep: Delma [phone]    SINUS SURGERY      SURGICAL REMOVAL OF BUNION WITH OSTEOTOMY OF METATARSAL BONE Right 3/24/2023    Procedure: BUNIONECTOMY, WITH METATARSAL OSTEOTOMY;  Surgeon: Shannan An DPM;  Location: [facility] OR;  Service: Podiatry;  Laterality: Right;       Social History     Socioeconomic History    Marital status:    Occupational History    Occupation: production      Employer: [employer]   Tobacco Use    Smoking status: Former     Types: Cigarettes     Quit date: 1999     Years since quittin.0    Smokeless tobacco: Never   Substance and Sexual Activity    Alcohol use: Yes     Alcohol/week: 7.0 standard drinks     Types: 7 Glasses of wine per week     Comment: occasionally    Drug use: Never    Sexual activity: Yes     Partners: Female     Social Determinants of Health     Financial Resource Strain: Unknown    Difficulty of Paying Living Expenses: Patient refused   Food Insecurity: Unknown    Worried About Running Out of Food in the Last Year: Patient refused    Ran Out of Food in the Last Year: Patient refused   Transportation Needs: No Transportation Needs    Lack of Transportation (Medical): No    Lack of Transportation (Non-Medical): No   Physical Activity: Sufficiently Active    Days of Exercise per Week: 4 days    Minutes of Exercise per Session: 150+  min   Stress: Stress Concern Present    Feeling of Stress : Very much   Social Connections: Unknown    Frequency of Communication with Friends and Family: Patient refused    Frequency of Social Gatherings with Friends and Family: Patient refused    Active Member of Clubs or Organizations: No    Attends Club or Organization Meetings: Never    Marital Status:    Housing Stability: Unknown    Unable to Pay for Housing in the Last Year: Patient refused    Number of Places Lived in the Last Year: 1    Unstable Housing in the Last Year: No       Review of patient's allergies indicates:  No Known Allergies    Current Outpatient Medications on File Prior to Visit   Medication Sig Dispense Refill    atorvastatin (LIPITOR) 40 MG tablet Take 1 tablet (40 mg total) by mouth every evening. 90 tablet 3    celecoxib (CELEBREX) 200 MG capsule Take 1 capsule (200 mg total) by mouth 2 (two) times daily. May take 650 mg of Tylenol at the same time 180 capsule 0    diclofenac sodium (VOLTAREN) 1 % Gel Apply 2 g topically 4 (four) times daily. 100 g 0    fluticasone propionate (FLONASE) 50 mcg/actuation nasal spray 1 spray (50 mcg total) by Each Nostril route once daily. 16 g 3    ibuprofen (ADVIL,MOTRIN) 800 MG tablet Take 1 tablet (800 mg total) by mouth every 8 (eight) hours as needed for Pain. 30 tablet 0    lisinopriL-hydrochlorothiazide (PRINZIDE,ZESTORETIC) 20-12.5 mg per tablet TAKE 1 TABLET BY MOUTH EVERY DAY 90 tablet 3    metoprolol succinate (TOPROL-XL) 25 MG 24 hr tablet Take 1 tablet (25 mg total) by mouth once daily. 90 tablet 3    omeprazole (PRILOSEC) 20 MG capsule TAKE 1 CAPSULE(20 MG) BY MOUTH EVERY DAY. DECREASED DOSE 90 capsule 3    traZODone (DESYREL) 50 MG tablet Take 2 tablets (100 mg total) by mouth nightly as needed for Insomnia. 180 tablet 3    cromolyn (NASALCHROM) 5.2 mg/spray (4 %) nasal spray 1 spray by Nasal route 4 (four) times daily. (Patient not taking: Reported on 5/22/2023) 26 mL 1     oxyCODONE-acetaminophen (PERCOCET) 5-325 mg per tablet Take 1 tablet by mouth every 6 (six) hours as needed for Pain. 20 tablet 0     No current facility-administered medications on file prior to visit.       Objective:      BP (!) 112/54 (BP Location: Left arm, Patient Position: Sitting)   Pulse 62   Resp 17   Ht 6' (1.829 m)   Wt 102.9 kg (226 lb 13.7 oz)   SpO2 98%   BMI 30.77 kg/m²     Exam:  ***    Imaging:  ***    Assessment:       Encounter Diagnoses   Name Primary?    DDD (degenerative disc disease), lumbar Yes    DDD (degenerative disc disease), cervical     Lumbar radiculopathy          Plan:       Enoch was seen today for back pain.    Diagnoses and all orders for this visit:    DDD (degenerative disc disease), lumbar  -     Ambulatory referral/consult to Physical/Occupational Therapy; Future  -     Case Request Endoscopy: Left L5 + S1 Transforaminal Epidural Steroid Injections    DDD (degenerative disc disease), cervical  -     Ambulatory referral/consult to Physical/Occupational Therapy; Future    Lumbar radiculopathy  -     Ambulatory referral/consult to Physical/Occupational Therapy; Future  -     Case Request Endoscopy: Left L5 + S1 Transforaminal Epidural Steroid Injections        Enoch Barr is a 58 y.o. male with ***.    ***          This note was created by combination of typed  and M-Modal dictation. Transcription and phonetic errors may be present.  If there are any questions, please contact me.
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
## 2023-05-22 NOTE — TELEPHONE ENCOUNTER
PT order successfully faxed to [facility] PT @ 1:39p as requested by pt being that [facility] could not schedule him to start until July.

## 2023-05-25 ENCOUNTER — TELEPHONE (OUTPATIENT)
Dept: PAIN MEDICINE | Facility: CLINIC | Age: 58
End: 2023-05-25
Payer: OTHER GOVERNMENT

## 2023-05-25 NOTE — TELEPHONE ENCOUNTER
Message left with review of pre-procedure instructions, as well as provided arrival time of 1:00p.  Information was also sent via [employer].  Asked that patient call clinic to confirm- phone number included.

## 2023-05-26 ENCOUNTER — CLINICAL SUPPORT (OUTPATIENT)
Dept: REHABILITATION | Facility: HOSPITAL | Age: 58
End: 2023-05-26
Payer: OTHER GOVERNMENT

## 2023-05-26 ENCOUNTER — TELEPHONE (OUTPATIENT)
Dept: PAIN MEDICINE | Facility: CLINIC | Age: 58
End: 2023-05-26
Payer: OTHER GOVERNMENT

## 2023-05-26 DIAGNOSIS — R26.89 ANTALGIC GAIT: ICD-10-CM

## 2023-05-26 DIAGNOSIS — R29.898 WEAKNESS OF RIGHT FOOT: ICD-10-CM

## 2023-05-26 DIAGNOSIS — M20.21 HALLUX RIGIDUS OF RIGHT FOOT: ICD-10-CM

## 2023-05-26 DIAGNOSIS — Z98.890 HISTORY OF BUNIONECTOMY OF RIGHT GREAT TOE: Primary | ICD-10-CM

## 2023-05-26 DIAGNOSIS — M62.81 MUSCLE WEAKNESS AFFECTING MOVEMENT OF FOOT: ICD-10-CM

## 2023-05-26 PROCEDURE — 97112 NEUROMUSCULAR REEDUCATION: CPT | Mod: PN

## 2023-05-26 PROCEDURE — 97140 MANUAL THERAPY 1/> REGIONS: CPT | Mod: PN

## 2023-05-26 PROCEDURE — 97110 THERAPEUTIC EXERCISES: CPT | Mod: PN

## 2023-05-26 NOTE — PROGRESS NOTES
[facility]   Physical Therapy Treatment Note      Name: Enoch DELUCA Momo  [facility] Number: [id number]    Therapy Diagnosis:   Encounter Diagnoses   Name Primary?    History of bunionectomy of right great toe Yes    Hallux rigidus of right foot     Antalgic gait     Weakness of right foot     Muscle weakness affecting movement of foot      Physician: Shannan An DPM    Visit Date: 5/26/2023       Therapy Diagnosis:   Encounter Diagnoses  Name   Primary?             Decreased range of motion                 History of bunionectomy of right great toe                  Hallux rigidus of right foot                    Antalgic gait                 Weakness of right foot                         Muscle weakness affecting movement of foot                Physician: Shannan An DPM     Physician Orders: PT Eval and Treat   Medical Diagnosis from Referral: M25.60 (ICD-10-CM) - Decreased range of motion   Evaluation Date: 5/3/2023  Authorization Period Expiration: 12/31/23  Plan of Care Expiration: 7/3/23  Visit # / Visits authorized: 3/ 20   Progress Note Due: 6/3/23  FOTO: 1/ 1     Precautions: Standard       PTA Visit #: 0/5     Time In: 1:30  Time Out: 2:25  Total Billable Time: 55 minutes    Subjective     Pt reports: slowly improving ROM but still difficulty with great toe flexion.    He was compliant with home exercise program.  Response to previous treatment: decreased pain  Functional change: slowly improving ROM    Pain: 0/10  Location:  R big toe MCP     Objective      Objective Measures updated at progress report unless specified.     Treatment     Enoch received the treatments listed below:      therapeutic exercises to develop strength, endurance, ROM, and flexibility for 25 minutes including:    Inv/Ev/DF, 3x20 RTB  Seated calf raise, 3x20, 25lb KB  Standing calf raise, 3x20    manual therapy techniques: Joint mobilizations, Myofacial release, and Soft tissue Mobilization were applied  to the: RLE for 15 minutes, including:    AP glides to talocrural grade 3 to 4  Great Toe MCP AP/PA glides 3 to 4  Great Toe MCP Abd/Add glides grade 3 to 4    neuromuscular re-education activities to improve: Balance, Coordination, Kinesthetic, Sense, and Proprioception for 15 minutes. The following activities were included:    Foot doming, 3x20 w/ 5sec hold  Toe curls, 3x20 w/ 5 sec hold  Great toe ext/flex superset, 3x20  Great toe isometric adduction, 3x20 w/ 5sec hold  Tibialis posterior towel roll up, 3x20      Patient Education and Home Exercises       Education provided:   - HEP  - education on slowly wearing sneaker vs boot    Written Home Exercises Provided: yes. Exercises were reviewed and Enoch was able to demonstrate them prior to the end of the session.  Enoch demonstrated good  understanding of the education provided. See EMR under Patient Instructions for exercises provided during therapy sessions    Assessment     Pt presents 3 weeks after evaluation. He reports a decreasing ache in big toe MCP and stiffness in ankle and big toe but feels like it's improving. He tolerated routine well and we will transition to GTB and add single leg balance activities at next visit. He has difficulty completing great toe flex and abduction but shows good extension. We transitioned him to more strength based HEP.     Enoch Is progressing well towards his goals.   Pt prognosis is Excellent.     Pt will continue to benefit from skilled outpatient physical therapy to address the deficits listed in the problem list box on initial evaluation, provide pt/family education and to maximize pt's level of independence in the home and community environment.     Pt's spiritual, cultural and educational needs considered and pt agreeable to plan of care and goals.     Anticipated barriers to physical therapy: None    Goals:  GOALS: Short Term Goals:  4 weeks  1.Report decreased  in  pain at worse less than  <   / =  4  /10  to  increase tolerance for improved functional actvities. On going  2. Pt to improve great toe range of motion to 50deg extension to allow for improved functional mobility to allow for improvement in IADLs. On going  3. Increased BLE MMT 1/2 grade  to increase tolerance for ADL and work activities.On going  4. Pt to demonstrate ability to ambulate in standard shoe x15min on level ground at tolerable pace with < or = 4/10 R foot pain. Ongoing   5. Pt to tolerate HEP to improve ROM and independence with ADL's. On going     Long Term Goals: 8 weeks  1.Report decreased  in  pain at worse  less than  <   / =  2  /10  to increase tolerance for improved functional actvities. On going  2.Pt to improve great toe range of motion to 60deg extension to allow for improved functional mobility to allow for improvement in IADLs. On going  3.Increased BLE MMT 1 grade  to increase tolerance for ADL and work activities.On going  4. Pt will report clinically significant improvements on  FOTO outcome assessment  to increase functional activities and mobility. On going  5. Pt to be Independent with HEP to improve ROM and independence with ADL's. On going       Plan     Plan of care Certification: 5/3/2023 to 7/3/23.     Outpatient Physical Therapy 2 times weekly for 8 weeks to include the following interventions: Gait Training, Manual Therapy, Moist Heat/ Ice, Neuromuscular Re-ed, Orthotic Management and Training, Patient Education, Self Care, Therapeutic Activities, and Therapeutic Exercise. Dry needling.        Madan Danielle, PT

## 2023-05-26 NOTE — TELEPHONE ENCOUNTER
Message left with review of pre-procedure instructions, as well as provided arrival time of 1:00p.  Information was also sent via [employer] yesterday.  Asked that patient call clinic to confirm- phone number included.

## 2023-05-26 NOTE — TELEPHONE ENCOUNTER
----- Message from Diana Kohler sent at 5/26/2023  9:33 AM CDT -----  Regarding: Return call  .Type:  Patient Returning Call    Who Called: self     Who Left Message for Patient: Mary Sanders RN     Does the patient know what this is regarding?:yes     Would the patient rather a call back or a response via [facility]? call    Best Call Back Number:.[phone]      Additional Information:

## 2023-05-26 NOTE — TELEPHONE ENCOUNTER
Mr. Kendall returned call to confirm arrival time and understanding of instructions for procedure scheduled 6/2/23.

## 2023-05-30 ENCOUNTER — PATIENT MESSAGE (OUTPATIENT)
Dept: PAIN MEDICINE | Facility: CLINIC | Age: 58
End: 2023-05-30
Payer: OTHER GOVERNMENT

## 2023-05-30 ENCOUNTER — CLINICAL SUPPORT (OUTPATIENT)
Dept: REHABILITATION | Facility: HOSPITAL | Age: 58
End: 2023-05-30
Payer: OTHER GOVERNMENT

## 2023-05-30 DIAGNOSIS — M20.21 HALLUX RIGIDUS OF RIGHT FOOT: ICD-10-CM

## 2023-05-30 DIAGNOSIS — R29.898 WEAKNESS OF RIGHT FOOT: ICD-10-CM

## 2023-05-30 DIAGNOSIS — M62.81 MUSCLE WEAKNESS AFFECTING MOVEMENT OF FOOT: ICD-10-CM

## 2023-05-30 DIAGNOSIS — Z98.890 HISTORY OF BUNIONECTOMY OF RIGHT GREAT TOE: Primary | ICD-10-CM

## 2023-05-30 DIAGNOSIS — R26.89 ANTALGIC GAIT: ICD-10-CM

## 2023-05-30 PROCEDURE — 97140 MANUAL THERAPY 1/> REGIONS: CPT | Mod: PN,CQ

## 2023-05-30 PROCEDURE — 97112 NEUROMUSCULAR REEDUCATION: CPT | Mod: PN,CQ

## 2023-05-30 PROCEDURE — 97110 THERAPEUTIC EXERCISES: CPT | Mod: PN,CQ

## 2023-05-30 NOTE — PROGRESS NOTES
[facility]   Physical Therapy Treatment Note      Name: Enoch DELUCA Momo  [facility] Number: [id number]    Therapy Diagnosis:   Encounter Diagnoses   Name Primary?    History of bunionectomy of right great toe Yes    Hallux rigidus of right foot     Antalgic gait     Weakness of right foot     Muscle weakness affecting movement of foot      Physician: Shannan An DPM    Visit Date: 5/30/2023       Therapy Diagnosis:   Encounter Diagnoses  Name   Primary?             Decreased range of motion                 History of bunionectomy of right great toe                  Hallux rigidus of right foot                    Antalgic gait                 Weakness of right foot                         Muscle weakness affecting movement of foot                Physician: Shannan An DPM     Physician Orders: PT Eval and Treat   Medical Diagnosis from Referral: M25.60 (ICD-10-CM) - Decreased range of motion   Evaluation Date: 5/3/2023  Authorization Period Expiration: 12/31/23  Plan of Care Expiration: 7/3/23  Visit # / Visits authorized: 3/ 20   Progress Note Due: 6/3/23  FOTO: 1/ 1     Precautions: Standard       PTA Visit #: 1/5     Time In: 1345PM  Time Out: 1445PM  Total Billable Time: 60 minutes    Subjective     Pt reports: she's doing well and his ROM is getting better. It was a little sore after walking on it for a while.    He was compliant with home exercise program.  Response to previous treatment: decreased pain  Functional change: slowly improving ROM    Pain: 0/10  Location:  R big toe MCP     Objective      Objective Measures updated at progress report unless specified.     Treatment     Enoch received the treatments listed below:      therapeutic exercises to develop strength, endurance, ROM, and flexibility for 25 minutes including:    Inv/Ev/DF, 3x20 RTB  Seated calf raise, 3x20, 25lb KB  Standing calf raise, 3x20    manual therapy techniques: Joint mobilizations, Myofacial release,  "and Soft tissue Mobilization were applied to the: RLE for 15 minutes, including:    AP glides to talocrural grade 3 to 4  Great Toe MTP AP/PA glides 3 to 4  Great Toe MTP Abd/Add glides grade 3 to 4    neuromuscular re-education activities to improve: Balance, Coordination, Kinesthetic, Sense, and Proprioception for 20 minutes. The following activities were included:    Foot doming, 3x20 w/ 5sec hold  Toe curls, 2x10, 3" hold  Toe Yoga 2x10  SLS on MOBO (B 1,3 & 2,4) 3x20"    Resume NV:  Great toe ext/flex superset, 3x20  Great toe isometric adduction, 3x20 w/ 5sec hold  Tibialis posterior towel roll up, 3x20      Patient Education and Home Exercises       Education provided:   - HEP  - education on slowly wearing sneaker vs boot    Written Home Exercises Provided: yes. Exercises were reviewed and Enoch was able to demonstrate them prior to the end of the session.  Enoch demonstrated good  understanding of the education provided. See EMR under Patient Instructions for exercises provided during therapy sessions    Assessment     Enoch tolerated the above tx session well without increased pain. He continues to  report decrease aching in big toe MTP and stiffness in ankle. He continues to tolerate routine well and performed well with the addition to SLS on MOBO. Will continue to progress BLE strengthening and great toe ROM in future sessions.     Enoch Is progressing well towards his goals.   Pt prognosis is Excellent.     Pt will continue to benefit from skilled outpatient physical therapy to address the deficits listed in the problem list box on initial evaluation, provide pt/family education and to maximize pt's level of independence in the home and community environment.     Pt's spiritual, cultural and educational needs considered and pt agreeable to plan of care and goals.     Anticipated barriers to physical therapy: None    Goals:  GOALS: Short Term Goals:  4 weeks  1.Report decreased  in  pain at worse less " than  <   / =  4  /10  to increase tolerance for improved functional actvities. On going  2. Pt to improve great toe range of motion to 50deg extension to allow for improved functional mobility to allow for improvement in IADLs. On going  3. Increased BLE MMT 1/2 grade  to increase tolerance for ADL and work activities.On going  4. Pt to demonstrate ability to ambulate in standard shoe x15min on level ground at tolerable pace with < or = 4/10 R foot pain. Ongoing   5. Pt to tolerate HEP to improve ROM and independence with ADL's. On going     Long Term Goals: 8 weeks  1.Report decreased  in  pain at worse  less than  <   / =  2  /10  to increase tolerance for improved functional actvities. On going  2.Pt to improve great toe range of motion to 60deg extension to allow for improved functional mobility to allow for improvement in IADLs. On going  3.Increased BLE MMT 1 grade  to increase tolerance for ADL and work activities.On going  4. Pt will report clinically significant improvements on  FOTO outcome assessment  to increase functional activities and mobility. On going  5. Pt to be Independent with HEP to improve ROM and independence with ADL's. On going       Plan     Plan of care Certification: 5/3/2023 to 7/3/23.     Outpatient Physical Therapy 2 times weekly for 8 weeks to include the following interventions: Gait Training, Manual Therapy, Moist Heat/ Ice, Neuromuscular Re-ed, Orthotic Management and Training, Patient Education, Self Care, Therapeutic Activities, and Therapeutic Exercise. Dry needling.        Amy Shook, PTA

## 2023-06-01 DIAGNOSIS — M51.36 DDD (DEGENERATIVE DISC DISEASE), LUMBAR: ICD-10-CM

## 2023-06-01 RX ORDER — METHOCARBAMOL 750 MG/1
750 TABLET, FILM COATED ORAL 3 TIMES DAILY
Qty: 60 TABLET | Refills: 0 | Status: SHIPPED | OUTPATIENT
Start: 2023-06-01 | End: 2023-06-19 | Stop reason: SDUPTHER

## 2023-06-02 ENCOUNTER — HOSPITAL ENCOUNTER (OUTPATIENT)
Facility: HOSPITAL | Age: 58
Discharge: HOME | End: 2023-06-02
Attending: PAIN MEDICINE | Admitting: PAIN MEDICINE
Payer: OTHER GOVERNMENT

## 2023-06-02 VITALS
DIASTOLIC BLOOD PRESSURE: 64 MMHG | RESPIRATION RATE: 14 BRPM | TEMPERATURE: 98 F | SYSTOLIC BLOOD PRESSURE: 111 MMHG | OXYGEN SATURATION: 92 % | HEART RATE: 61 BPM

## 2023-06-02 DIAGNOSIS — M54.16 LUMBAR RADICULOPATHY: Primary | ICD-10-CM

## 2023-06-02 PROCEDURE — 64484 PRA INJECT ANES/STEROID FORAMEN LUMBAR/SACRAL W IMG GUIDE ,EA ADD LEVEL: ICD-10-PCS | Mod: LT,,, | Performed by: PAIN MEDICINE

## 2023-06-02 PROCEDURE — 63600175 PHARM REV CODE 636 W HCPCS: Performed by: PAIN MEDICINE

## 2023-06-02 PROCEDURE — 64483 NJX AA&/STRD TFRM EPI L/S 1: CPT | Mod: LT,,, | Performed by: PAIN MEDICINE

## 2023-06-02 PROCEDURE — 64483 PR EPIDURAL INJ, ANES/STEROID, TRANSFORAMINAL, LUMB/SACR, SNGL LEVL: ICD-10-PCS | Mod: LT,,, | Performed by: PAIN MEDICINE

## 2023-06-02 PROCEDURE — 64484 NJX AA&/STRD TFRM EPI L/S EA: CPT | Mod: LT,,, | Performed by: PAIN MEDICINE

## 2023-06-02 PROCEDURE — 64483 NJX AA&/STRD TFRM EPI L/S 1: CPT | Mod: LT | Performed by: PAIN MEDICINE

## 2023-06-02 PROCEDURE — 64484 NJX AA&/STRD TFRM EPI L/S EA: CPT | Mod: LT | Performed by: PAIN MEDICINE

## 2023-06-02 PROCEDURE — 25000003 PHARM REV CODE 250: Performed by: PAIN MEDICINE

## 2023-06-02 PROCEDURE — 25500020 PHARM REV CODE 255: Performed by: PAIN MEDICINE

## 2023-06-02 RX ORDER — DEXAMETHASONE SODIUM PHOSPHATE 10 MG/ML
10 INJECTION INTRAMUSCULAR; INTRAVENOUS ONCE
Status: COMPLETED | OUTPATIENT
Start: 2023-06-02 | End: 2023-06-02

## 2023-06-02 RX ORDER — LIDOCAINE HYDROCHLORIDE 10 MG/ML
INJECTION INFILTRATION; PERINEURAL
Status: DISCONTINUED
Start: 2023-06-02 | End: 2023-06-02 | Stop reason: HOSPADM

## 2023-06-02 RX ORDER — DEXAMETHASONE SODIUM PHOSPHATE 10 MG/ML
INJECTION INTRAMUSCULAR; INTRAVENOUS
Status: DISCONTINUED
Start: 2023-06-02 | End: 2023-06-02 | Stop reason: HOSPADM

## 2023-06-02 RX ORDER — LIDOCAINE HYDROCHLORIDE 10 MG/ML
1 INJECTION, SOLUTION EPIDURAL; INFILTRATION; INTRACAUDAL; PERINEURAL ONCE
Status: COMPLETED | OUTPATIENT
Start: 2023-06-02 | End: 2023-06-02

## 2023-06-02 RX ORDER — ALPRAZOLAM 0.5 MG/1
1 TABLET, ORALLY DISINTEGRATING ORAL ONCE AS NEEDED
Status: COMPLETED | OUTPATIENT
Start: 2023-06-02 | End: 2023-06-02

## 2023-06-02 RX ORDER — LIDOCAINE HYDROCHLORIDE 10 MG/ML
INJECTION, SOLUTION EPIDURAL; INFILTRATION; INTRACAUDAL; PERINEURAL
Status: DISCONTINUED
Start: 2023-06-02 | End: 2023-06-02 | Stop reason: HOSPADM

## 2023-06-02 RX ORDER — LIDOCAINE HYDROCHLORIDE 10 MG/ML
10 INJECTION INFILTRATION; PERINEURAL ONCE
Status: COMPLETED | OUTPATIENT
Start: 2023-06-02 | End: 2023-06-02

## 2023-06-02 RX ADMIN — ALPRAZOLAM 1 MG: 0.5 TABLET, ORALLY DISINTEGRATING ORAL at 01:06

## 2023-06-02 NOTE — DISCHARGE SUMMARY
[facility] [place] - Endoscopy  Discharge Note  Short Stay    Procedure(s) (LRB):  Left L5 + S1 Transforaminal Epidural Steroid Injections (Left)      OUTCOME: Patient tolerated treatment/procedure well without complication and is now ready for discharge.    DISPOSITION: Home or Self Care    FINAL DIAGNOSIS:  <principal problem not specified>    FOLLOWUP: In clinic    DISCHARGE INSTRUCTIONS:  No discharge procedures on file.       Discharge Diagnosis:DDD (degenerative disc disease), lumbar [M51.36]  Lumbar radiculopathy [M54.16]  Condition on Discharge: Stable.  Diet on Discharge: Same as before.  Activity: as per instruction sheet.  Discharge to: Home with a responsible adult.  Follow up: as per Discharge instructions

## 2023-06-02 NOTE — OP NOTE
Lumbar transforaminal ANKITA    Time-out taken to identify patient and procedure side prior to starting the procedure.   I attest that I have reviewed the patient's home medications prior to the procedure and no contraindication have been identified. I  re-evaluated the patient after the patient was positioned for the procedure in the procedure room immediately before the procedural time-out. The vital signs are current and represent the current state of the patient which has not significantly changed since the preprocedure assessment.                              Date of Service: 06/02/2023    PCP: Dipesh Clements MD    Referring Physician:                                     PROCEDURE: Left L5 and S1 transforaminal epidural steroid injection under fluoroscopy    REASON FOR PROCEDURE: Left DDD (degenerative disc disease), lumbar [M51.36]  Lumbar radiculopathy [M54.16]    PHYSICIAN: Santana Rojo MD  ASSISTANTS:None    MEDICATIONS INJECTED:  Preservative-free dexamethasone 10mg, Xylocaine 1% MPF 3-5ml. 3ml per level. Preservative free, sterile normal saline is used to get larger volume as needed.  LOCAL ANESTHETIC INJECTED:  Xylocaine 1% 3ml per site.    SEDATION MEDICATIONS: None  ESTIMATED BLOOD LOSS:  None.    COMPLICATIONS:  None.    TECHNIQUE:   Laying in a prone position, the patient was prepped and draped in the usual sterile fashion using ChloraPrep and fenestrated drape.  The area to be injected was determined under fluoroscopic guidance.  Local anesthetic was given by raising a wheel and going down to the hub of a 27-gauge 1.25 inch needle.  The 4.75 inch 25-gauge spinal needle was introduced towards the transverse process of each above named nerve root level.  The needle was walked medially then hinged into the neural foramen.  Omnipaque was injected to confirm appropriate placement and that there was no vascular runoff.  The medication was then injected after applying negative pressure. The patient  tolerated the procedure well.    PAIN BEFORE THE PROCEDURE: 7/10.    PAIN AFTER THE PROCEDURE: 8/10.    The patient was monitored after the procedure.  Patient was given post procedure and discharge instructions to follow at home.  We will see the patient back in two weeks or the patient may call to inform of status. The patient was discharged in a stable condition.

## 2023-06-02 NOTE — DISCHARGE INSTRUCTIONS

## 2023-06-02 NOTE — PLAN OF CARE
Patient tolerated procedure well. Patient reports 4/10 pain after procedure. Assisted patient up for first time. Gait steady. All discharge instructions given.

## 2023-06-06 ENCOUNTER — CLINICAL SUPPORT (OUTPATIENT)
Dept: REHABILITATION | Facility: HOSPITAL | Age: 58
End: 2023-06-06
Payer: OTHER GOVERNMENT

## 2023-06-06 DIAGNOSIS — M62.81 MUSCLE WEAKNESS AFFECTING MOVEMENT OF FOOT: ICD-10-CM

## 2023-06-06 DIAGNOSIS — R29.898 WEAKNESS OF RIGHT FOOT: ICD-10-CM

## 2023-06-06 DIAGNOSIS — M20.21 HALLUX RIGIDUS OF RIGHT FOOT: ICD-10-CM

## 2023-06-06 DIAGNOSIS — Z98.890 HISTORY OF BUNIONECTOMY OF RIGHT GREAT TOE: Primary | ICD-10-CM

## 2023-06-06 DIAGNOSIS — R26.89 ANTALGIC GAIT: ICD-10-CM

## 2023-06-06 PROCEDURE — 97112 NEUROMUSCULAR REEDUCATION: CPT | Mod: PN,CQ

## 2023-06-06 PROCEDURE — 97140 MANUAL THERAPY 1/> REGIONS: CPT | Mod: PN,CQ

## 2023-06-06 PROCEDURE — 97110 THERAPEUTIC EXERCISES: CPT | Mod: PN,CQ

## 2023-06-06 NOTE — PROGRESS NOTES
"  [facility]   Physical Therapy Treatment Note      Name: Enoch Barr  [facility] Number: [id number]    Therapy Diagnosis:   Encounter Diagnoses   Name Primary?    History of bunionectomy of right great toe Yes    Hallux rigidus of right foot     Antalgic gait     Weakness of right foot     Muscle weakness affecting movement of foot      Physician: Shannan An DPM    Visit Date: 6/6/2023       Therapy Diagnosis:   Encounter Diagnoses  Name   Primary?             Decreased range of motion                 History of bunionectomy of right great toe                  Hallux rigidus of right foot                    Antalgic gait                 Weakness of right foot                         Muscle weakness affecting movement of foot                Physician: Shannan An DPM     Physician Orders: PT Eval and Treat   Medical Diagnosis from Referral: M25.60 (ICD-10-CM) - Decreased range of motion   Evaluation Date: 5/3/2023  Authorization Period Expiration: 12/31/23  Plan of Care Expiration: 7/3/23  Visit # / Visits authorized: 3/ 20   Progress Note Due: 6/3/23  FOTO: 1/ 1     Precautions: Standard       PTA Visit #: 1/5     Time In: 1345PM  Time Out: 1445PM  Total Billable Time: 60 minutes    Subjective     Pt reports: she's doing well and his ROM is getting better. It was a little sore after walking on it for a while.    He was compliant with home exercise program.  Response to previous treatment: decreased pain  Functional change: slowly improving ROM    Pain: 0/10  Location:  R big toe MCP     Objective      Objective Measures updated at progress report unless specified.     Treatment   wing activities were included:    Foot doming, 3x20 w/ 5sec hold  Toe curls, 2x10, 3" hold  Toe Yoga 2x10  SLS on MOBO (B 1,3 & 2,4) 3x20"    Enoch received the treatments listed below:      therapeutic exercises to develop strength, endurance, ROM, and flexibility for 25 minutes including:    Inv/Ev/DF, " 3x20 RTB  Seated calf raise, 3x20, 25lb KB  Standing calf raise, 3x20    manual therapy techniques: Joint mobilizations, Myofacial release, and Soft tissue Mobilization were applied to the: RLE for 15 minutes, including:    AP glides to talocrural grade 3 to 4  Great Toe MTP AP/PA glides 3 to 4  Great Toe MTP Abd/Add glides grade 3 to 4    neuromuscular re-education activities to improve: Balance, Coordination, Kinesthetic, Sense, and Proprioception for 20 minutes. The follo  Resume NV:  Great toe ext/flex superset, 3x20  Great toe isometric adduction, 3x20 w/ 5sec hold  Tibialis posterior towel roll up, 3x20      Patient Education and Home Exercises       Education provided:   - HEP  - education on slowly wearing sneaker vs boot    Written Home Exercises Provided: yes. Exercises were reviewed and Enoch was able to demonstrate them prior to the end of the session.  Enoch demonstrated good  understanding of the education provided. See EMR under Patient Instructions for exercises provided during therapy sessions    Assessment     Enoch tolerated the above tx session well without increased pain. He continues to  report decrease aching in big toe MTP and stiffness in ankle. He continues to tolerate routine well and performed well with the addition to SLS on MOBO. Will continue to progress BLE strengthening and great toe ROM in future sessions.     Enoch Is progressing well towards his goals.   Pt prognosis is Excellent.     Pt will continue to benefit from skilled outpatient physical therapy to address the deficits listed in the problem list box on initial evaluation, provide pt/family education and to maximize pt's level of independence in the home and community environment.     Pt's spiritual, cultural and educational needs considered and pt agreeable to plan of care and goals.     Anticipated barriers to physical therapy: None    Goals:  GOALS: Short Term Goals:  4 weeks  1.Report decreased  in  pain at worse less  than  <   / =  4  /10  to increase tolerance for improved functional actvities. On going  2. Pt to improve great toe range of motion to 50deg extension to allow for improved functional mobility to allow for improvement in IADLs. On going  3. Increased BLE MMT 1/2 grade  to increase tolerance for ADL and work activities.On going  4. Pt to demonstrate ability to ambulate in standard shoe x15min on level ground at tolerable pace with < or = 4/10 R foot pain. Ongoing   5. Pt to tolerate HEP to improve ROM and independence with ADL's. On going     Long Term Goals: 8 weeks  1.Report decreased  in  pain at worse  less than  <   / =  2  /10  to increase tolerance for improved functional actvities. On going  2.Pt to improve great toe range of motion to 60deg extension to allow for improved functional mobility to allow for improvement in IADLs. On going  3.Increased BLE MMT 1 grade  to increase tolerance for ADL and work activities.On going  4. Pt will report clinically significant improvements on  FOTO outcome assessment  to increase functional activities and mobility. On going  5. Pt to be Independent with HEP to improve ROM and independence with ADL's. On going       Plan     Plan of care Certification: 5/3/2023 to 7/3/23.     Outpatient Physical Therapy 2 times weekly for 8 weeks to include the following interventions: Gait Training, Manual Therapy, Moist Heat/ Ice, Neuromuscular Re-ed, Orthotic Management and Training, Patient Education, Self Care, Therapeutic Activities, and Therapeutic Exercise. Dry needling.        Amy Shook, PTA

## 2023-06-06 NOTE — PROGRESS NOTES
"  [facility]   Physical Therapy Treatment Note      Name: Enoch Barr  [facility] Number: [id number]    Therapy Diagnosis:   Encounter Diagnoses   Name Primary?    History of bunionectomy of right great toe Yes    Hallux rigidus of right foot     Antalgic gait     Weakness of right foot     Muscle weakness affecting movement of foot      Physician: Shannan An DPM    Visit Date: 6/6/2023       Therapy Diagnosis:   Encounter Diagnoses  Name   Primary?             Decreased range of motion                 History of bunionectomy of right great toe                  Hallux rigidus of right foot                    Antalgic gait                 Weakness of right foot                         Muscle weakness affecting movement of foot                Physician: Shannan An DPM     Physician Orders: PT Eval and Treat   Medical Diagnosis from Referral: M25.60 (ICD-10-CM) - Decreased range of motion   Evaluation Date: 5/3/2023  Authorization Period Expiration: 12/31/23  Plan of Care Expiration: 7/3/23  Visit # / Visits authorized: 3/ 20   Progress Note Due: 6/3/23  FOTO: 1/ 1     Precautions: Standard       PTA Visit #: 2/5     Time In: 1345PM  Time Out: 1445PM  Total Billable Time: 60 minutes    Subjective     Pt reports: his toe is a little sore today, and it felt swollen.     He was compliant with home exercise program.  Response to previous treatment: decreased pain  Functional change: slowly improving ROM    Pain: 0/10  Location:  R big toe MCP     Objective      Objective Measures updated at progress report unless specified.     Treatment     Enoch received the treatments listed below:      therapeutic exercises to develop strength, endurance, ROM, and flexibility for 25 minutes including:    Inv/Ev/DF, 3x20 RTB  Seated calf raise, 3x20, 25lb KB  Standing calf raise, 3x20  +Gastroc Stretch on Incline 3x30"    manual therapy techniques: Joint mobilizations, Myofacial release, and Soft tissue " "Mobilization were applied to the: RLE for 10 minutes, including:    AP glides to talocrural grade 3 to 4  Great Toe MTP AP/PA glides 3 to 4  Great Toe MTP Abd/Add glides grade 3 to 4  Metatarsal glides     neuromuscular re-education activities to improve: Balance, Coordination, Kinesthetic, Sense, and Proprioception for 25 minutes. The following activities were included:    Foot doming, 3x20 w/ 5sec hold  Toe curls, 2x10, 3" hold  Toe Yoga 2x10  SLS on MOBO (B 1,3 & 2,4) 3x30"    Resume NV:  Great toe ext/flex superset, 3x20  Great toe isometric adduction, 3x20 w/ 5sec hold  Tibialis posterior towel roll up, 3x20      Patient Education and Home Exercises       Education provided:   - HEP  - education on slowly wearing sneaker vs boot    Written Home Exercises Provided: yes. Exercises were reviewed and Enoch was able to demonstrate them prior to the end of the session.  Enoch demonstrated good  understanding of the education provided. See EMR under Patient Instructions for exercises provided during therapy sessions    Assessment     Enoch tolerated the above tx session well without increased pain. Pt reported mild soreness and swelling this date, but was able to tolerate all prescribed exercises with minimal fatigue. He continues to tolerate routine well and performed well with the addition to SLS on MOBO that causes burning sensation, but it subsides after completion of exercise. Will continue to progress BLE strengthening and great toe ROM in future sessions. Pt instructed to ice prn.     Enoch Is progressing well towards his goals.   Pt prognosis is Excellent.     Pt will continue to benefit from skilled outpatient physical therapy to address the deficits listed in the problem list box on initial evaluation, provide pt/family education and to maximize pt's level of independence in the home and community environment.     Pt's spiritual, cultural and educational needs considered and pt agreeable to plan of care and " goals.     Anticipated barriers to physical therapy: None    Goals:  GOALS: Short Term Goals:  4 weeks  1.Report decreased  in  pain at worse less than  <   / =  4  /10  to increase tolerance for improved functional actvities. On going  2. Pt to improve great toe range of motion to 50deg extension to allow for improved functional mobility to allow for improvement in IADLs. On going  3. Increased BLE MMT 1/2 grade  to increase tolerance for ADL and work activities.On going  4. Pt to demonstrate ability to ambulate in standard shoe x15min on level ground at tolerable pace with < or = 4/10 R foot pain. Ongoing   5. Pt to tolerate HEP to improve ROM and independence with ADL's. On going     Long Term Goals: 8 weeks  1.Report decreased  in  pain at worse  less than  <   / =  2  /10  to increase tolerance for improved functional actvities. On going  2.Pt to improve great toe range of motion to 60deg extension to allow for improved functional mobility to allow for improvement in IADLs. On going  3.Increased BLE MMT 1 grade  to increase tolerance for ADL and work activities.On going  4. Pt will report clinically significant improvements on  FOTO outcome assessment  to increase functional activities and mobility. On going  5. Pt to be Independent with HEP to improve ROM and independence with ADL's. On going       Plan     Plan of care Certification: 5/3/2023 to 7/3/23.     Outpatient Physical Therapy 2 times weekly for 8 weeks to include the following interventions: Gait Training, Manual Therapy, Moist Heat/ Ice, Neuromuscular Re-ed, Orthotic Management and Training, Patient Education, Self Care, Therapeutic Activities, and Therapeutic Exercise. Dry needling.        Amy Shook, KELLI    06/06/2023

## 2023-06-11 ENCOUNTER — HOSPITAL ENCOUNTER (EMERGENCY)
Facility: HOSPITAL | Age: 58
Discharge: HOME | End: 2023-06-11
Attending: EMERGENCY MEDICINE
Payer: OTHER GOVERNMENT

## 2023-06-11 VITALS
TEMPERATURE: 99 F | DIASTOLIC BLOOD PRESSURE: 80 MMHG | HEART RATE: 68 BPM | HEIGHT: 72 IN | OXYGEN SATURATION: 99 % | RESPIRATION RATE: 20 BRPM | WEIGHT: 216 LBS | BODY MASS INDEX: 29.26 KG/M2 | SYSTOLIC BLOOD PRESSURE: 126 MMHG

## 2023-06-11 DIAGNOSIS — T24.211A: Primary | ICD-10-CM

## 2023-06-11 DIAGNOSIS — L03.115 CELLULITIS OF RIGHT LOWER EXTREMITY: ICD-10-CM

## 2023-06-11 PROCEDURE — 25000003 PHARM REV CODE 250: Mod: ER | Performed by: NURSE PRACTITIONER

## 2023-06-11 PROCEDURE — 99284 EMERGENCY DEPT VISIT MOD MDM: CPT | Mod: ER

## 2023-06-11 RX ORDER — AMOXICILLIN AND CLAVULANATE POTASSIUM 875; 125 MG/1; MG/1
1 TABLET, FILM COATED ORAL 2 TIMES DAILY
Qty: 14 TABLET | Refills: 0 | Status: SHIPPED | OUTPATIENT
Start: 2023-06-11 | End: 2023-06-22

## 2023-06-11 RX ORDER — BACITRACIN ZINC 500 UNIT/G
OINTMENT (GRAM) TOPICAL 2 TIMES DAILY
Qty: 30 G | Refills: 0 | Status: SHIPPED | OUTPATIENT
Start: 2023-06-11 | End: 2023-06-21

## 2023-06-11 RX ORDER — BACITRACIN 500 [USP'U]/G
OINTMENT TOPICAL
Status: COMPLETED | OUTPATIENT
Start: 2023-06-11 | End: 2023-06-11

## 2023-06-11 RX ORDER — AMOXICILLIN AND CLAVULANATE POTASSIUM 875; 125 MG/1; MG/1
1 TABLET, FILM COATED ORAL
Status: COMPLETED | OUTPATIENT
Start: 2023-06-11 | End: 2023-06-11

## 2023-06-11 RX ORDER — HYDROCODONE BITARTRATE AND ACETAMINOPHEN 5; 325 MG/1; MG/1
1 TABLET ORAL EVERY 4 HOURS PRN
Qty: 18 TABLET | Refills: 0 | Status: SHIPPED | OUTPATIENT
Start: 2023-06-11 | End: 2023-06-14

## 2023-06-11 RX ADMIN — BACITRACIN: 500 OINTMENT TOPICAL at 02:06

## 2023-06-11 RX ADMIN — AMOXICILLIN AND CLAVULANATE POTASSIUM 1 TABLET: 875; 125 TABLET, FILM COATED ORAL at 02:06
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
## 2023-06-19 ENCOUNTER — OFFICE VISIT (OUTPATIENT)
Dept: PAIN MEDICINE | Facility: CLINIC | Age: 58
End: 2023-06-19
Payer: OTHER GOVERNMENT

## 2023-06-19 VITALS
HEART RATE: 77 BPM | DIASTOLIC BLOOD PRESSURE: 95 MMHG | OXYGEN SATURATION: 97 % | RESPIRATION RATE: 18 BRPM | SYSTOLIC BLOOD PRESSURE: 151 MMHG

## 2023-06-19 DIAGNOSIS — M54.16 LUMBAR RADICULOPATHY: ICD-10-CM

## 2023-06-19 DIAGNOSIS — M50.30 DDD (DEGENERATIVE DISC DISEASE), CERVICAL: ICD-10-CM

## 2023-06-19 DIAGNOSIS — M47.816 LUMBAR SPONDYLOSIS: ICD-10-CM

## 2023-06-19 DIAGNOSIS — M51.36 DDD (DEGENERATIVE DISC DISEASE), LUMBAR: Primary | ICD-10-CM

## 2023-06-19 PROCEDURE — 99999 PR PBB SHADOW E&M-EST. PATIENT-LVL V: ICD-10-PCS | Mod: PBBFAC,,,

## 2023-06-19 PROCEDURE — 99214 OFFICE O/P EST MOD 30 MIN: CPT | Mod: S$PBB,,,

## 2023-06-19 PROCEDURE — 99214 PR OFFICE/OUTPT VISIT, EST, LEVL IV, 30-39 MIN: ICD-10-PCS | Mod: S$PBB,,,

## 2023-06-19 PROCEDURE — 99215 OFFICE O/P EST HI 40 MIN: CPT | Mod: PBBFAC,PN

## 2023-06-19 PROCEDURE — 99999 PR PBB SHADOW E&M-EST. PATIENT-LVL V: CPT | Mod: PBBFAC,,,

## 2023-06-19 RX ORDER — METHOCARBAMOL 750 MG/1
750 TABLET, FILM COATED ORAL 3 TIMES DAILY
Qty: 90 TABLET | Refills: 1 | Status: SHIPPED | OUTPATIENT
Start: 2023-06-19 | End: 2023-08-24
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
## 2023-06-20 ENCOUNTER — APPOINTMENT (OUTPATIENT)
Dept: RADIOLOGY | Facility: HOSPITAL | Age: 58
End: 2023-06-20
Payer: OTHER GOVERNMENT

## 2023-06-20 ENCOUNTER — CLINICAL SUPPORT (OUTPATIENT)
Dept: REHABILITATION | Facility: HOSPITAL | Age: 58
End: 2023-06-20
Payer: OTHER GOVERNMENT

## 2023-06-20 DIAGNOSIS — M51.36 DDD (DEGENERATIVE DISC DISEASE), LUMBAR: ICD-10-CM

## 2023-06-20 DIAGNOSIS — R29.898 WEAKNESS OF RIGHT FOOT: ICD-10-CM

## 2023-06-20 DIAGNOSIS — M62.81 MUSCLE WEAKNESS AFFECTING MOVEMENT OF FOOT: ICD-10-CM

## 2023-06-20 DIAGNOSIS — M20.21 HALLUX RIGIDUS OF RIGHT FOOT: ICD-10-CM

## 2023-06-20 DIAGNOSIS — M54.16 LUMBAR RADICULOPATHY: ICD-10-CM

## 2023-06-20 DIAGNOSIS — Z98.890 HISTORY OF BUNIONECTOMY OF RIGHT GREAT TOE: Primary | ICD-10-CM

## 2023-06-20 DIAGNOSIS — R26.89 ANTALGIC GAIT: ICD-10-CM

## 2023-06-20 PROCEDURE — 97140 MANUAL THERAPY 1/> REGIONS: CPT | Mod: PN

## 2023-06-20 PROCEDURE — 72114 XR LUMBAR SPINE 5 VIEW WITH FLEX AND EXT: ICD-10-PCS | Mod: 26,,, | Performed by: RADIOLOGY

## 2023-06-20 PROCEDURE — 97110 THERAPEUTIC EXERCISES: CPT | Mod: PN

## 2023-06-20 PROCEDURE — 72114 X-RAY EXAM L-S SPINE BENDING: CPT | Mod: TC,FY,PN

## 2023-06-20 PROCEDURE — 72114 X-RAY EXAM L-S SPINE BENDING: CPT | Mod: 26,,, | Performed by: RADIOLOGY

## 2023-06-21 ENCOUNTER — TELEPHONE (OUTPATIENT)
Dept: NEUROSURGERY | Facility: CLINIC | Age: 58
End: 2023-06-21
Payer: OTHER GOVERNMENT

## 2023-06-22 ENCOUNTER — OFFICE VISIT (OUTPATIENT)
Dept: NEUROSURGERY | Facility: CLINIC | Age: 58
End: 2023-06-22
Payer: OTHER GOVERNMENT

## 2023-06-22 VITALS
DIASTOLIC BLOOD PRESSURE: 84 MMHG | HEART RATE: 64 BPM | HEIGHT: 73 IN | SYSTOLIC BLOOD PRESSURE: 128 MMHG | BODY MASS INDEX: 29.36 KG/M2 | OXYGEN SATURATION: 99 % | WEIGHT: 221.56 LBS

## 2023-06-22 DIAGNOSIS — M54.16 LUMBAR RADICULOPATHY: ICD-10-CM

## 2023-06-22 DIAGNOSIS — M51.36 DDD (DEGENERATIVE DISC DISEASE), LUMBAR: ICD-10-CM

## 2023-06-22 PROCEDURE — 99999 PR PBB SHADOW E&M-EST. PATIENT-LVL V: ICD-10-PCS | Mod: PBBFAC,,, | Performed by: PHYSICIAN ASSISTANT

## 2023-06-22 PROCEDURE — 99999 PR PBB SHADOW E&M-EST. PATIENT-LVL V: CPT | Mod: PBBFAC,,, | Performed by: PHYSICIAN ASSISTANT

## 2023-06-22 PROCEDURE — 99204 OFFICE O/P NEW MOD 45 MIN: CPT | Mod: S$PBB,,, | Performed by: PHYSICIAN ASSISTANT

## 2023-06-22 PROCEDURE — 99215 OFFICE O/P EST HI 40 MIN: CPT | Mod: PBBFAC | Performed by: PHYSICIAN ASSISTANT

## 2023-06-22 PROCEDURE — 99204 PR OFFICE/OUTPT VISIT, NEW, LEVL IV, 45-59 MIN: ICD-10-PCS | Mod: S$PBB,,, | Performed by: PHYSICIAN ASSISTANT

## 2023-06-22 NOTE — PROGRESS NOTES
[facility]  Neurosurgery    SUBJECTIVE:     History of Present Illness:  Enoch Barr is a 58 y.o. male with below listed PMH who presents with acutely worsened chronic low back and left leg pain.  Pain has been present for a few years and seems to worsen over last 2 years and more notably last few months.  Pain is located in his left > right lower back with radiation down his left lateral leg.  Denies significant numbness or weakness.  Denies b/b dysfunction or saddle anesthesia.  Recent left L5-S1 transforaminal ANKITA provided 90% relief of his symptoms but only for 1 week.  He participated in physical therapy before and after the injection found his symptoms only worsened.  He would like to move forward with surgery    Treatments tried:  -PT:  Tried with no lasting relief; advised to discontinue pain management  -ANKITA:  Left L5-S1 TF ANKITA with significant but temporary relief  -Gabapentin:  None currently  -Muscle relaxer:  Robaxin  -Rx pain medications:  Celebrex and ibuprofen  -Other:  Voltaren gel  -Spine surgery:  Never    Blood thinners:  None    (Not in a hospital admission)      Review of patient's allergies indicates:  No Known Allergies    Past Medical History:   Diagnosis Date    GERD (gastroesophageal reflux disease)     Hypercholesteremia     Hypertension     on medication    Lumbar spondylosis 9/21/2020    Migraines     Sleep apnea      Past Surgical History:   Procedure Laterality Date    COLONOSCOPY N/A 8/10/2020    Procedure: COLONOSCOPY;  Surgeon: April Dos Santos MD;  Location: [facility] ENDO;  Service: Endoscopy;  Laterality: N/A;    EPIDURAL STEROID INJECTION Left 2/17/2023    Procedure: Left L5 Transforaminal Epidural Steroid Injection;  Surgeon: Santana Rojo Jr., MD;  Location: [facility] ENDO;  Service: Pain Management;  Laterality: Left;  @1245  No ATC or DM    EPIDURAL STEROID INJECTION Bilateral 4/14/2023    Procedure: Bilateral L3, L4, L5 Medial Branch Blocks #1;  Surgeon: Santana  DINO Rojo Jr., MD;  Location: [facility] ENDO;  Service: Pain Management;  Laterality: Bilateral;  @1230 (requests afternoon)  No ATC or DM    EPIDURAL STEROID INJECTION Left 6/2/2023    Procedure: Left L5 + S1 Transforaminal Epidural Steroid Injections;  Surgeon: Santana Rojo Jr., MD;  Location: [facility] ENDO;  Service: Pain Management;  Laterality: Left;  @1300  No ATC or DM    HERNIA REPAIR      LAPAROSCOPIC CHOLECYSTECTOMY N/A 1/25/2022    Procedure: CHOLECYSTECTOMY, LAPAROSCOPIC;  Surgeon: Jarrod Martinez MD;  Location: [facility] OR;  Service: General;  Laterality: N/A;    REPAIR OF TRICEPS TENDON Left 10/2/2020    Procedure: REPAIR, TENDON, TRICEPS;  Surgeon: Keysha Galvez MD;  Location: [facility] OR;  Service: Orthopedics;  Laterality: Left;  RN PRE OP DONE 9/29/2020----COVID NEGATIVE ON 9/29  [facility] Rep: Delma [phone]    SINUS SURGERY  2012    SURGICAL REMOVAL OF BUNION WITH OSTEOTOMY OF METATARSAL BONE Right 3/24/2023    Procedure: BUNIONECTOMY, WITH METATARSAL OSTEOTOMY;  Surgeon: Shannan An DPM;  Location: [facility] OR;  Service: Podiatry;  Laterality: Right;     Family History   Problem Relation Age of Onset    Coronary artery disease Mother     Hypertension Father     No Known Problems Sister     No Known Problems Brother     Valvular heart disease Sister     No Known Problems Brother     No Known Problems Brother     No Known Problems Maternal Aunt     No Known Problems Maternal Uncle     No Known Problems Paternal Aunt     No Known Problems Paternal Uncle     No Known Problems Maternal Grandmother     No Known Problems Maternal Grandfather     No Known Problems Paternal Grandmother     No Known Problems Paternal Grandfather     Amblyopia Neg Hx     Blindness Neg Hx     Cancer Neg Hx     Cataracts Neg Hx     Diabetes Neg Hx     Glaucoma Neg Hx     Macular degeneration Neg Hx     Retinal detachment Neg Hx     Strabismus Neg Hx     Stroke Neg Hx     Thyroid disease Neg Hx      Social History     Tobacco  Use    Smoking status: Former     Types: Cigarettes     Quit date: 1999     Years since quittin.1    Smokeless tobacco: Never   Substance Use Topics    Alcohol use: Yes     Alcohol/week: 7.0 standard drinks     Types: 7 Glasses of wine per week     Comment: occasionally    Drug use: Never        Review of Systems:  As noted in HPI    OBJECTIVE:     Vital Signs (Most Recent):  Pulse: 64 (23 0844)  BP: 128/84 (23 0844)  SpO2: 99 % (23 0844)    Physical Exam:  General: well developed, well nourished, no distress  Head: normocephalic, atraumatic  Neurologic: Alert and oriented. Thought content appropriate  GCS: Motor: 6/Verbal: 5/Eyes: 4 GCS Total: 15  Language: No aphasia  Speech: No dysarthria  Cranial nerves: face symmetric, tongue midline, CN II-XII grossly intact.   Eyes: pupils equal, round, reactive to light with accommodation, EOMI.   Pulmonary: normal respirations, not labored, no accessory muscles used  Sensory: intact to light touch throughout  Motor Strength: Moves all extremities spontaneously with good tone.  Full strength upper and lower extremities. No abnormal movements seen.     Strength  Deltoids Triceps Biceps   Hand    Upper: R 5/5 5/5 5/5   5/5    L 5/5 5/5 5/5   5/5     Iliopsoas Quadriceps Knee  Flexion Tibialis  anterior Gastro- cnemius EHL   Lower: R 5/5 5/5 5/5 5/5 5/5 5/5    L 5/5 5/5 5/5 5/5 5/5 5/5     [place]: absent  Clonus: absent    Gait: normal    Lumbar ROM: full   SI Joint tenderness: Negative   Pain on Hip ROM:  Positive  Midline Bony Tenderness:  Positive in lower lumbar    Diagnostic Results:  I have personally reviewed imaging and agree with the findings.     MRI lumbar spine, 1/10/23:     1. Lumbar degenerative changes contributing to multilevel neural foraminal narrowing, most severe at L5-S1 noting a left foraminal zone disc extrusion that contributes to moderate to severe narrowing with questionable impingement of the exiting L5 nerve root.  No  spinal canal stenosis.    lumbar spine xray with flex/ext views, 6/20/23:  Lumbar vertebral body heights appear maintained.  There is modest lumbar levocurvature.  Multilevel discogenic change with variable disc space narrowing and marginal endplate spurring.  Minimal grade 1 degenerative retrolisthesis at L2-L3 and L3-L4.  No significant translation between flexion-extension.  No evidence for lytic or blastic lesion.           ASSESSMENT/PLAN:     Enoch Barr is a 58 y.o. male who presents with persistent left L5 radiculopathy 2/2 a left-sided disc extrusion at L5-S1.  He is failed physical therapy and ANKITA.  He would like to move forward with surgery.  We discussed the possibility of a left MIS laminectomy and diskectomy at L5-S1 with Dr. Pineda.  I will discuss the case with Dr. Pineda and call the patient with a surgery date if Dr. Pineda agrees.    UPDATE: case discussed with Dr. Pineda. He agrees with the plan as noted above. Tentative surgery date of 7/5/23 at [place]. [facility] for patient.     Please feel free to call with any further questions      Mayra Cosme PA-C  [facility]  Department of Neurosurgery  [phone]    Disclaimer: This note was dictated by speech recognition. Minor errors in transcription may be present.  Please call with any questions.

## 2023-06-22 NOTE — Clinical Note
I saw this patient today and is planning to discuss with you next weeks and see you were out today and tomorrow.  Whenever he get a chance, can you take a look at his films?  He has left > right low back pain with pain radiating down the left lateral leg.  Intact on exam but has failed PT. Left L5, S1 TF ANKITA significantly relieved his pain for a week.  Was thinking of adding left MIS lami/disc if you agree

## 2023-06-22 NOTE — H&P (VIEW-ONLY)
[facility]  Neurosurgery    SUBJECTIVE:     History of Present Illness:  Enoch Barr is a 58 y.o. male with below listed PMH who presents with acutely worsened chronic low back and left leg pain.  Pain has been present for a few years and seems to worsen over last 2 years and more notably last few months.  Pain is located in his left > right lower back with radiation down his left lateral leg.  Denies significant numbness or weakness.  Denies b/b dysfunction or saddle anesthesia.  Recent left L5-S1 transforaminal ANKITA provided 90% relief of his symptoms but only for 1 week.  He participated in physical therapy before and after the injection found his symptoms only worsened.  He would like to move forward with surgery    Treatments tried:  -PT:  Tried with no lasting relief; advised to discontinue pain management  -ANKITA:  Left L5-S1 TF ANKITA with significant but temporary relief  -Gabapentin:  None currently  -Muscle relaxer:  Robaxin  -Rx pain medications:  Celebrex and ibuprofen  -Other:  Voltaren gel  -Spine surgery:  Never    Blood thinners:  None    (Not in a hospital admission)      Review of patient's allergies indicates:  No Known Allergies    Past Medical History:   Diagnosis Date    GERD (gastroesophageal reflux disease)     Hypercholesteremia     Hypertension     on medication    Lumbar spondylosis 9/21/2020    Migraines     Sleep apnea      Past Surgical History:   Procedure Laterality Date    COLONOSCOPY N/A 8/10/2020    Procedure: COLONOSCOPY;  Surgeon: April Dos Santos MD;  Location: [facility] ENDO;  Service: Endoscopy;  Laterality: N/A;    EPIDURAL STEROID INJECTION Left 2/17/2023    Procedure: Left L5 Transforaminal Epidural Steroid Injection;  Surgeon: Santana Rojo Jr., MD;  Location: [facility] ENDO;  Service: Pain Management;  Laterality: Left;  @1245  No ATC or DM    EPIDURAL STEROID INJECTION Bilateral 4/14/2023    Procedure: Bilateral L3, L4, L5 Medial Branch Blocks #1;  Surgeon: Santana  DINO Rojo Jr., MD;  Location: [facility] ENDO;  Service: Pain Management;  Laterality: Bilateral;  @1230 (requests afternoon)  No ATC or DM    EPIDURAL STEROID INJECTION Left 6/2/2023    Procedure: Left L5 + S1 Transforaminal Epidural Steroid Injections;  Surgeon: Santana Rojo Jr., MD;  Location: [facility] ENDO;  Service: Pain Management;  Laterality: Left;  @1300  No ATC or DM    HERNIA REPAIR      LAPAROSCOPIC CHOLECYSTECTOMY N/A 1/25/2022    Procedure: CHOLECYSTECTOMY, LAPAROSCOPIC;  Surgeon: Jarrod Martinez MD;  Location: [facility] OR;  Service: General;  Laterality: N/A;    REPAIR OF TRICEPS TENDON Left 10/2/2020    Procedure: REPAIR, TENDON, TRICEPS;  Surgeon: Keysha Galvez MD;  Location: [facility] OR;  Service: Orthopedics;  Laterality: Left;  RN PRE OP DONE 9/29/2020----COVID NEGATIVE ON 9/29  [facility] Rep: Delma [phone]    SINUS SURGERY  2012    SURGICAL REMOVAL OF BUNION WITH OSTEOTOMY OF METATARSAL BONE Right 3/24/2023    Procedure: BUNIONECTOMY, WITH METATARSAL OSTEOTOMY;  Surgeon: Shannan An DPM;  Location: [facility] OR;  Service: Podiatry;  Laterality: Right;     Family History   Problem Relation Age of Onset    Coronary artery disease Mother     Hypertension Father     No Known Problems Sister     No Known Problems Brother     Valvular heart disease Sister     No Known Problems Brother     No Known Problems Brother     No Known Problems Maternal Aunt     No Known Problems Maternal Uncle     No Known Problems Paternal Aunt     No Known Problems Paternal Uncle     No Known Problems Maternal Grandmother     No Known Problems Maternal Grandfather     No Known Problems Paternal Grandmother     No Known Problems Paternal Grandfather     Amblyopia Neg Hx     Blindness Neg Hx     Cancer Neg Hx     Cataracts Neg Hx     Diabetes Neg Hx     Glaucoma Neg Hx     Macular degeneration Neg Hx     Retinal detachment Neg Hx     Strabismus Neg Hx     Stroke Neg Hx     Thyroid disease Neg Hx      Social History     Tobacco  Use    Smoking status: Former     Types: Cigarettes     Quit date: 1999     Years since quittin.1    Smokeless tobacco: Never   Substance Use Topics    Alcohol use: Yes     Alcohol/week: 7.0 standard drinks     Types: 7 Glasses of wine per week     Comment: occasionally    Drug use: Never        Review of Systems:  As noted in HPI    OBJECTIVE:     Vital Signs (Most Recent):  Pulse: 64 (23 0844)  BP: 128/84 (23 0844)  SpO2: 99 % (23 0844)    Physical Exam:  General: well developed, well nourished, no distress  Head: normocephalic, atraumatic  Neurologic: Alert and oriented. Thought content appropriate  GCS: Motor: 6/Verbal: 5/Eyes: 4 GCS Total: 15  Language: No aphasia  Speech: No dysarthria  Cranial nerves: face symmetric, tongue midline, CN II-XII grossly intact.   Eyes: pupils equal, round, reactive to light with accommodation, EOMI.   Pulmonary: normal respirations, not labored, no accessory muscles used  Sensory: intact to light touch throughout  Motor Strength: Moves all extremities spontaneously with good tone.  Full strength upper and lower extremities. No abnormal movements seen.     Strength  Deltoids Triceps Biceps   Hand    Upper: R 5/5 5/5 5/5   5/5    L 5/5 5/5 5/5   5/5     Iliopsoas Quadriceps Knee  Flexion Tibialis  anterior Gastro- cnemius EHL   Lower: R 5/5 5/5 5/5 5/5 5/5 5/5    L 5/5 5/5 5/5 5/5 5/5 5/5     [place]: absent  Clonus: absent    Gait: normal    Lumbar ROM: full   SI Joint tenderness: Negative   Pain on Hip ROM:  Positive  Midline Bony Tenderness:  Positive in lower lumbar    Diagnostic Results:  I have personally reviewed imaging and agree with the findings.     MRI lumbar spine, 1/10/23:     1. Lumbar degenerative changes contributing to multilevel neural foraminal narrowing, most severe at L5-S1 noting a left foraminal zone disc extrusion that contributes to moderate to severe narrowing with questionable impingement of the exiting L5 nerve root.  No  spinal canal stenosis.    lumbar spine xray with flex/ext views, 6/20/23:  Lumbar vertebral body heights appear maintained.  There is modest lumbar levocurvature.  Multilevel discogenic change with variable disc space narrowing and marginal endplate spurring.  Minimal grade 1 degenerative retrolisthesis at L2-L3 and L3-L4.  No significant translation between flexion-extension.  No evidence for lytic or blastic lesion.           ASSESSMENT/PLAN:     Enoch Barr is a 58 y.o. male who presents with persistent left L5 radiculopathy 2/2 a left-sided disc extrusion at L5-S1.  He is failed physical therapy and ANKITA.  He would like to move forward with surgery.  We discussed the possibility of a left MIS laminectomy and diskectomy at L5-S1 with Dr. Pineda.  I will discuss the case with Dr. Pineda and call the patient with a surgery date if Dr. Pineda agrees.    UPDATE: case discussed with Dr. Pineda. He agrees with the plan as noted above. Tentative surgery date of 7/5/23 at [place]. [facility] for patient.     Please feel free to call with any further questions      Mayra Cosme PA-C  [facility]  Department of Neurosurgery  [phone]    Disclaimer: This note was dictated by speech recognition. Minor errors in transcription may be present.  Please call with any questions.

## 2023-06-26 ENCOUNTER — PATIENT MESSAGE (OUTPATIENT)
Dept: NEUROSURGERY | Facility: CLINIC | Age: 58
End: 2023-06-26
Payer: OTHER GOVERNMENT

## 2023-06-27 ENCOUNTER — CLINICAL SUPPORT (OUTPATIENT)
Dept: REHABILITATION | Facility: HOSPITAL | Age: 58
End: 2023-06-27
Payer: OTHER GOVERNMENT

## 2023-06-27 ENCOUNTER — PATIENT MESSAGE (OUTPATIENT)
Dept: NEUROSURGERY | Facility: CLINIC | Age: 58
End: 2023-06-27
Payer: OTHER GOVERNMENT

## 2023-06-27 ENCOUNTER — ANESTHESIA EVENT (OUTPATIENT)
Dept: SURGERY | Facility: HOSPITAL | Age: 58
End: 2023-06-27
Payer: OTHER GOVERNMENT

## 2023-06-27 DIAGNOSIS — R29.898 WEAKNESS OF RIGHT FOOT: ICD-10-CM

## 2023-06-27 DIAGNOSIS — R26.89 ANTALGIC GAIT: ICD-10-CM

## 2023-06-27 DIAGNOSIS — M62.81 MUSCLE WEAKNESS AFFECTING MOVEMENT OF FOOT: ICD-10-CM

## 2023-06-27 DIAGNOSIS — Z98.890 HISTORY OF BUNIONECTOMY OF RIGHT GREAT TOE: Primary | ICD-10-CM

## 2023-06-27 DIAGNOSIS — M20.21 HALLUX RIGIDUS OF RIGHT FOOT: ICD-10-CM

## 2023-06-27 PROCEDURE — 97110 THERAPEUTIC EXERCISES: CPT | Mod: PN,CQ

## 2023-06-27 PROCEDURE — 97140 MANUAL THERAPY 1/> REGIONS: CPT | Mod: PN,CQ

## 2023-06-27 NOTE — PROGRESS NOTES
[facility]   Physical Therapy Progress Note     Name: Enoch Barr  [facility] Number: [id number]    Therapy Diagnosis:   Encounter Diagnoses   Name Primary?    History of bunionectomy of right great toe Yes    Hallux rigidus of right foot     Antalgic gait     Weakness of right foot     Muscle weakness affecting movement of foot      Physician: Shannan An DPM    Visit Date: 6/27/2023       Therapy Diagnosis:   Encounter Diagnoses  Name   Primary?             Decreased range of motion                 History of bunionectomy of right great toe                  Hallux rigidus of right foot                    Antalgic gait                 Weakness of right foot                         Muscle weakness affecting movement of foot                Physician: Shannan An DPM     Physician Orders: PT Eval and Treat   Medical Diagnosis from Referral: M25.60 (ICD-10-CM) - Decreased range of motion   Evaluation Date: 5/3/2023  Authorization Period Expiration: 12/31/23  Plan of Care Expiration: 7/3/23  Visit # / Visits authorized: 4/ 20   Progress Note Due: 7/20/23  FOTO: 1/ 1     Precautions: Standard       PTA Visit #: 2/5     Time In: 1600  Time Out: 1655    Total Billable Time: 55 minutes    Subjective     Pt reports: he is much improved. States the foot is 85% of normal.     He was compliant with home exercise program.  Response to previous treatment: decreased pain  Functional change: increase in activity    Pain: 0/10  Location:  R big toe MCP     Objective      Objective Measures updated at progress report unless specified.     Ankle Range of Motion:   Ankle   Right    Left  Dorsiflexion     12          12  Plantarflexion  55        55  Inversion         25       28  Eversion          14       15  Great toe extension    50deg PROM  60deg PROM  Great toe flexion         30deg PROM  40deg PROM      Strength:   Right Ankle                  Left Ankle          Dorsiflexion:    4+5       "Dorsiflexion:    5/5  Plantarflexion:             2/5       Plantarflexion: 4/5  Inversion:        4+/5      Inversion:        5/5  Eversion:         4+/5      Eversion:         5/5        Right LE                      Left LE   Hip flexion:      5/5       Hip flexion:      5/5  Hip extension:             4-/5      Hip extension: 4+/5  Hip abduction: 4+/5      Hip abduction: 4+/5  Knee extension:          4/5       Knee extension:          5/5  Knee flexion:   4+/5      Knee flexion:   4+/5       Treatment     Enoch received the treatments listed below:      therapeutic exercises to develop strength, endurance, ROM, and flexibility for 45 minutes includin way ankle GTB x 40  Seated calf raise, 3x20, 25lb KB (eccentric emphasis)  Standing calf raise, 3x10 DL @ edge of step  Gastroc Stretch on Incline 3x30"  Mini, retro lunge with slider 3 x 8  Sidestepping w/ RTB 20 ft x 3  Foot doming, x20 w/ 5sec hold  SLS on MOBO (B 1,3 & 2,4) 3x30"  TheraRolling to plantar surface  x 20    manual therapy techniques: Joint mobilizations, Myofacial release, and Soft tissue Mobilization were applied to the: RLE for 15 minutes, including:    AP glides to talocrural grade 3   Great Toe MTP AP/PA glides 3   Great Toe MTP Abd/Add glides grade 3   Metatarsal glides       Patient Education and Home Exercises       Education provided:   --HEP compliance    Written Home Exercises Provided: yes. Exercises were reviewed and Enoch was able to demonstrate them prior to the end of the session.  Enoch demonstrated good  understanding of the education provided. See EMR under Patient Instructions for exercises provided during therapy sessions    Assessment     Enoch tolerated the above tx session well without increased pain in great toe. Demonstrates improved active great toe flex/ext, but this has room to improve further. No c/o increased discomfort with prescribed activities. Continues to respond well to exercises consisting of foot/ankle " ROM and stabilization activities. NV is last day. Will capture FOTO.  Enoch Is progressing well towards his goals.     Pt prognosis is Excellent.     Pt will continue to benefit from skilled outpatient physical therapy to address the deficits listed in the problem list box on initial evaluation, provide pt/family education and to maximize pt's level of independence in the home and community environment.     Pt's spiritual, cultural and educational needs considered and pt agreeable to plan of care and goals.     Anticipated barriers to physical therapy: None    Goals:  GOALS: Short Term Goals:  4 weeks Updated 6/20/23  MET  1.Report decreased  in  pain at worse less than  <   / =  4  /10  to increase tolerance for improved functional actvities. On going  2. Pt to improve great toe range of motion to 50deg extension to allow for improved functional mobility to allow for improvement in IADLs. On going  3. Increased BLE MMT 1/2 grade  to increase tolerance for ADL and work activities.On going  4. Pt to demonstrate ability to ambulate in standard shoe x15min on level ground at tolerable pace with < or = 4/10 R foot pain. Ongoing   5. Pt to tolerate HEP to improve ROM and independence with ADL's. On going     Long Term Goals: 8 weeks in progress  1.Report decreased  in  pain at worse  less than  <   / =  2  /10  to increase tolerance for improved functional actvities. On going  2.Pt to improve great toe range of motion to 60deg extension to allow for improved functional mobility to allow for improvement in IADLs. On going  3.Increased BLE MMT 1 grade  to increase tolerance for ADL and work activities.On going  4. Pt will report clinically significant improvements on  FOTO outcome assessment  to increase functional activities and mobility. On going  5. Pt to be Independent with HEP to improve ROM and independence with ADL's. On going       Plan     Plan of care Certification: 5/3/2023 to 7/3/23.     Continue with  established  PT Plan of Care towards patient goals.  Will continue 1x per week progressing towards d/c planning.     Amy Shook, PTA    06/27/2023

## 2023-06-27 NOTE — ANESTHESIA PREPROCEDURE EVALUATION
06/27/2023  Enoch Barr is a 58 y.o., male.      Pre-op Assessment    I have reviewed the Patient Summary Reports.    I have reviewed the NPO Status.   I have reviewed the Medications.     Review of Systems  Anesthesia Hx:  No problems with previous Anesthesia  Denies Family Hx of Anesthesia complications.   Denies Personal Hx of Anesthesia complications.   Cardiovascular:   Hypertension, well controlled hyperlipidemia NYHA Classification II    Pulmonary:   Sleep Apnea Lung nodule   Education provided regarding risk of obstructive sleep apnea     Renal/:  Renal/ Normal     Hepatic/GI:   GERD, well controlled    Musculoskeletal:   Arthritis   Spine Disorders: cervical and lumbar Disc disease, Degenerative disease and Chronic Pain    Neurological:   Neuromuscular Disease, Headaches    Endocrine:  Endocrine Normal    Dermatological:  Skin Normal    Psych:  Psychiatric Normal         Hypertension Migraines   Hypercholesteremia GERD (gastroesophageal reflux disease)   Lumbar spondylosis Sleep apnea     Surgical History    SINUS SURGERY HERNIA REPAIR   COLONOSCOPY REPAIR OF TRICEPS TENDON   LAPAROSCOPIC CHOLECYSTECTOMY EPIDURAL STEROID INJECTION   SURGICAL REMOVAL OF BUNION WITH OSTEOTOMY OF METATARSAL BONE EPIDURAL STEROID INJECTION   EPIDURAL STEROID INJECTION          Physical Exam  General: Well nourished, Cooperative, Alert and Oriented    Airway:  Mallampati: III / II  Mouth Opening: Normal  TM Distance: Normal  Tongue: Large  Neck ROM: Extension Decreased    Dental:  Intact, Caps / Implants  Bridge upper right      Anesthesia Plan  Type of Anesthesia, risks & benefits discussed:    Anesthesia Type: Gen ETT  Intra-op Monitoring Plan: Standard ASA Monitors  Post Op Pain Control Plan: multimodal analgesia and IV/PO Opioids PRN  Induction:  IV  Airway Plan: Video  Informed Consent: Informed consent signed  with the Patient and all parties understand the risks and agree with anesthesia plan.  All questions answered.   ASA Score: 3  Day of Surgery Review of History & Physical: H&P Update referred to the surgeon/provider.I have interviewed and examined the patient. I have reviewed the patient's H&P dated: There are no significant changes. H&P completed by Anesthesiologist.  Anesthesia Plan Notes: Neutral neck position    Ready For Surgery From Anesthesia Perspective.     .

## 2023-06-28 ENCOUNTER — PATIENT MESSAGE (OUTPATIENT)
Dept: REHABILITATION | Facility: HOSPITAL | Age: 58
End: 2023-06-28
Payer: OTHER GOVERNMENT

## 2023-06-30 ENCOUNTER — CLINICAL SUPPORT (OUTPATIENT)
Dept: REHABILITATION | Facility: HOSPITAL | Age: 58
End: 2023-06-30
Payer: OTHER GOVERNMENT

## 2023-06-30 DIAGNOSIS — R29.898 WEAKNESS OF RIGHT FOOT: ICD-10-CM

## 2023-06-30 DIAGNOSIS — M62.81 MUSCLE WEAKNESS AFFECTING MOVEMENT OF FOOT: ICD-10-CM

## 2023-06-30 DIAGNOSIS — M20.21 HALLUX RIGIDUS OF RIGHT FOOT: ICD-10-CM

## 2023-06-30 DIAGNOSIS — Z98.890 HISTORY OF BUNIONECTOMY OF RIGHT GREAT TOE: Primary | ICD-10-CM

## 2023-06-30 DIAGNOSIS — R26.89 ANTALGIC GAIT: ICD-10-CM

## 2023-06-30 PROCEDURE — 97110 THERAPEUTIC EXERCISES: CPT | Mod: PN

## 2023-06-30 NOTE — PROGRESS NOTES
[facility]   Physical Therapy Discharge Note     Name: Enoch Barr  [facility] Number: [id number]    Therapy Diagnosis:   Encounter Diagnoses   Name Primary?    History of bunionectomy of right great toe Yes    Hallux rigidus of right foot     Antalgic gait     Weakness of right foot     Muscle weakness affecting movement of foot      Physician: Shannan An DPM    Visit Date: 6/30/2023       Therapy Diagnosis:   Encounter Diagnoses  Name   Primary?             Decreased range of motion                 History of bunionectomy of right great toe                  Hallux rigidus of right foot                    Antalgic gait                 Weakness of right foot                         Muscle weakness affecting movement of foot                Physician: Shannan An DPM     Physician Orders: PT Eval and Treat   Medical Diagnosis from Referral: M25.60 (ICD-10-CM) - Decreased range of motion   Evaluation Date: 5/3/2023  Authorization Period Expiration: 12/31/23  Plan of Care Expiration: 7/3/23  Visit # / Visits authorized: 4/ 20   Progress Note Due: 7/20/23  FOTO: 1/ 1     Precautions: Standard       PTA Visit #: 2/5     Time In: 1420  Time Out: 1300    Total Billable Time: 35 minutes    Subjective     Pt reports: he is much improved. States the foot is 85% of normal. States he would like this to be his last visit.     He was compliant with home exercise program.  Response to previous treatment: decreased pain  Functional change: increase in activity    Pain: 0/10  Location:  R big toe MCP     Objective      Objective Measures updated at progress report unless specified.     Ankle Range of Motion:   Ankle   Right    Left  Dorsiflexion     12          12  Plantarflexion  55        55  Inversion         25       28  Eversion          14       15  Great toe extension    50deg PROM  60deg PROM  Great toe flexion         30deg PROM  40deg PROM      Strength:   Right Ankle                  Left  "Ankle          Dorsiflexion:    4+5      Dorsiflexion:    5/5  Plantarflexion:             2/5       Plantarflexion: 4/5  Inversion:        4+/5      Inversion:        5/5  Eversion:         4+/5      Eversion:         5/5        Right LE                      Left LE   Hip flexion:      5/5       Hip flexion:      5/5  Hip extension:             4-/5      Hip extension: 4+/5  Hip abduction: 4+/5      Hip abduction: 4+/5  Knee extension:          4/5       Knee extension:          5/5  Knee flexion:   4+/5      Knee flexion:   4+/5       Treatment     Enoch received the treatments listed below:      therapeutic exercises to develop strength, endurance, ROM, and flexibility for 35 minutes includin way ankle GTB x 40  Seated calf raise, 3x20, 25lb KB (eccentric emphasis)  Standing calf raise, 3x10 DL @ edge of step  Gastroc Stretch on Incline 3x30"  Mini,  lunge with dowel 3 x 8  Sidestepping w/ RTB 20 ft x 3      Patient Education and Home Exercises       Education provided:   --HEP compliance    Written Home Exercises Provided: yes. Exercises were reviewed and Enoch was able to demonstrate them prior to the end of the session.  Enoch demonstrated good  understanding of the education provided. See EMR under Patient Instructions for exercises provided during therapy sessions    Assessment       Goals:    Long Term Goals: 8 weeks in progress  MET  1.Report decreased  in  pain at worse  less than  <   / =  2  /10  to increase tolerance for improved functional actvities.   2.Pt to improve great toe range of motion to 60deg extension to allow for improved functional mobility to allow for improvement in IADLs.  3.Increased BLE MMT 1 grade  to increase tolerance for ADL and work activities.   4. Pt will report clinically significant improvements on  FOTO outcome assessment  to increase functional activities and mobility. NT  5. Pt to be Independent with HEP to improve ROM and independence with ADL's    Mr. Barr has " attended 10 sessions in our clinic beginning 5/3/23 for PT consisting of manual therapy, modalities, ROM activities, therex and HEP instruction. The patient has responded well to physical therapy intervention. Demonstrates a good understanding of home program. Patient will discharged secondary to diminished complaints and goal achievement.         Plan     D/c to comprehensive home program. Pt to follow up with MD if further therapy is warranted.      Yoan Fritz, PT    06/30/2023

## 2023-07-03 NOTE — PRE-PROCEDURE INSTRUCTIONS
Following instructions given via phone:    On the day of surgery please report to [facility] located at [address] [place] [address].  Please park in the lot in front of the building and enter at the main entrance. Proceed to the second floor for registration.    Arrival time: 0500    You may not drive home after your procedure. You may not leave alone in an uber, taxi, etc.  You must be discharged to a responsible adult. You must remain under adult supervision for 24 hours.   If possible, please have your visitor &/or ride home stay during your visit.   The surgeon should speak with your visitors after your procedure.  All visitors must be 18 years of age or older. Please limit your visitors to max of 2 people.    No food, no drink after midnight.    Take the following medications the morning of your procedure: see med list, take with water      If applicable, do not shave the surgical site 5 days prior to your procedure.  Shower the night before and the morning of your procedure with antibacterial soap.  Do not apply any products to your skin nor your hair after you shower the morning of your procedure.  No lotion, no oils, no powders,  Wear loose, comfortable clothing.  No jewelry. No contacts. Bring glasses if necessary.  If you have dentures, bring a case.  Leave all valuables at home.

## 2023-07-05 ENCOUNTER — ANESTHESIA (OUTPATIENT)
Dept: SURGERY | Facility: HOSPITAL | Age: 58
End: 2023-07-05
Payer: OTHER GOVERNMENT

## 2023-07-05 ENCOUNTER — HOSPITAL ENCOUNTER (OUTPATIENT)
Dept: RADIOLOGY | Facility: HOSPITAL | Age: 58
Discharge: HOME | End: 2023-07-05
Attending: NEUROLOGICAL SURGERY | Admitting: NEUROLOGICAL SURGERY
Payer: OTHER GOVERNMENT

## 2023-07-05 ENCOUNTER — HOSPITAL ENCOUNTER (OUTPATIENT)
Facility: HOSPITAL | Age: 58
Discharge: HOME | End: 2023-07-05
Attending: NEUROLOGICAL SURGERY | Admitting: NEUROLOGICAL SURGERY
Payer: OTHER GOVERNMENT

## 2023-07-05 VITALS
WEIGHT: 216 LBS | BODY MASS INDEX: 28.63 KG/M2 | SYSTOLIC BLOOD PRESSURE: 122 MMHG | OXYGEN SATURATION: 100 % | DIASTOLIC BLOOD PRESSURE: 70 MMHG | HEIGHT: 73 IN | RESPIRATION RATE: 13 BRPM | HEART RATE: 62 BPM | TEMPERATURE: 98 F

## 2023-07-05 DIAGNOSIS — M47.816 LUMBAR SPONDYLOSIS: Primary | ICD-10-CM

## 2023-07-05 DIAGNOSIS — M54.16 LUMBAR RADICULOPATHY: ICD-10-CM

## 2023-07-05 DIAGNOSIS — R52 PAIN: ICD-10-CM

## 2023-07-05 PROCEDURE — 25000003 PHARM REV CODE 250: Performed by: NEUROLOGICAL SURGERY

## 2023-07-05 PROCEDURE — 00630 ANES PX LUMBAR REGION NOS: CPT | Performed by: NEUROLOGICAL SURGERY

## 2023-07-05 PROCEDURE — 63600175 PHARM REV CODE 636 W HCPCS: Performed by: NURSE ANESTHETIST, CERTIFIED REGISTERED

## 2023-07-05 PROCEDURE — 36000710: Performed by: NEUROLOGICAL SURGERY

## 2023-07-05 PROCEDURE — D9220A PRA ANESTHESIA: ICD-10-PCS | Mod: ANES,,, | Performed by: ANESTHESIOLOGY

## 2023-07-05 PROCEDURE — 63030 LAMOT DCMPRN NRV RT 1 LMBR: CPT | Mod: LT,,, | Performed by: NEUROLOGICAL SURGERY

## 2023-07-05 PROCEDURE — 37000008 HC ANESTHESIA 1ST 15 MINUTES: Performed by: NEUROLOGICAL SURGERY

## 2023-07-05 PROCEDURE — 94761 N-INVAS EAR/PLS OXIMETRY MLT: CPT | Mod: 59

## 2023-07-05 PROCEDURE — 99900035 HC TECH TIME PER 15 MIN (STAT)

## 2023-07-05 PROCEDURE — 27201423 OPTIME MED/SURG SUP & DEVICES STERILE SUPPLY: Performed by: NEUROLOGICAL SURGERY

## 2023-07-05 PROCEDURE — 36000711: Performed by: NEUROLOGICAL SURGERY

## 2023-07-05 PROCEDURE — D9220A PRA ANESTHESIA: ICD-10-PCS | Mod: CRNA,,, | Performed by: NURSE ANESTHETIST, CERTIFIED REGISTERED

## 2023-07-05 PROCEDURE — 25000003 PHARM REV CODE 250: Performed by: NURSE ANESTHETIST, CERTIFIED REGISTERED

## 2023-07-05 PROCEDURE — 71000033 HC RECOVERY, INTIAL HOUR: Performed by: NEUROLOGICAL SURGERY

## 2023-07-05 PROCEDURE — 63600175 PHARM REV CODE 636 W HCPCS: Performed by: NEUROLOGICAL SURGERY

## 2023-07-05 PROCEDURE — 25000242 PHARM REV CODE 250 ALT 637 W/ HCPCS: Performed by: NURSE ANESTHETIST, CERTIFIED REGISTERED

## 2023-07-05 PROCEDURE — 94799 UNLISTED PULMONARY SVC/PX: CPT

## 2023-07-05 PROCEDURE — 71000015 HC POSTOP RECOV 1ST HR: Performed by: NEUROLOGICAL SURGERY

## 2023-07-05 PROCEDURE — 76000 FLUOROSCOPY <1 HR PHYS/QHP: CPT | Mod: TC

## 2023-07-05 PROCEDURE — 63030 PR LAMINOTOMY,LUMBAR DISK,1 INTRSP: ICD-10-PCS | Mod: LT,,, | Performed by: NEUROLOGICAL SURGERY

## 2023-07-05 PROCEDURE — 25000003 PHARM REV CODE 250: Performed by: ANESTHESIOLOGY

## 2023-07-05 PROCEDURE — 37000009 HC ANESTHESIA EA ADD 15 MINS: Performed by: NEUROLOGICAL SURGERY

## 2023-07-05 PROCEDURE — D9220A PRA ANESTHESIA: Mod: CRNA,,, | Performed by: NURSE ANESTHETIST, CERTIFIED REGISTERED

## 2023-07-05 PROCEDURE — D9220A PRA ANESTHESIA: Mod: ANES,,, | Performed by: ANESTHESIOLOGY

## 2023-07-05 RX ORDER — FENTANYL CITRATE 50 UG/ML
INJECTION, SOLUTION INTRAMUSCULAR; INTRAVENOUS
Status: DISCONTINUED | OUTPATIENT
Start: 2023-07-05 | End: 2023-07-05

## 2023-07-05 RX ORDER — CEFAZOLIN SODIUM 1 G/3ML
INJECTION, POWDER, FOR SOLUTION INTRAMUSCULAR; INTRAVENOUS
Status: DISCONTINUED | OUTPATIENT
Start: 2023-07-05 | End: 2023-07-05

## 2023-07-05 RX ORDER — CEFAZOLIN SODIUM 1 G/3ML
2 INJECTION, POWDER, FOR SOLUTION INTRAMUSCULAR; INTRAVENOUS
Status: DISCONTINUED | OUTPATIENT
Start: 2023-07-05 | End: 2023-07-05 | Stop reason: HOSPADM

## 2023-07-05 RX ORDER — SODIUM CHLORIDE 9 MG/ML
INJECTION, SOLUTION INTRAVENOUS CONTINUOUS
Status: DISCONTINUED | OUTPATIENT
Start: 2023-07-05 | End: 2023-07-05 | Stop reason: HOSPADM

## 2023-07-05 RX ORDER — ALBUTEROL SULFATE 90 UG/1
AEROSOL, METERED RESPIRATORY (INHALATION)
Status: DISCONTINUED | OUTPATIENT
Start: 2023-07-05 | End: 2023-07-05

## 2023-07-05 RX ORDER — LIDOCAINE HYDROCHLORIDE 10 MG/ML
1 INJECTION, SOLUTION EPIDURAL; INFILTRATION; INTRACAUDAL; PERINEURAL ONCE AS NEEDED
Status: DISCONTINUED | OUTPATIENT
Start: 2023-07-05 | End: 2023-07-05 | Stop reason: HOSPADM

## 2023-07-05 RX ORDER — PROPOFOL 10 MG/ML
VIAL (ML) INTRAVENOUS
Status: DISCONTINUED | OUTPATIENT
Start: 2023-07-05 | End: 2023-07-05

## 2023-07-05 RX ORDER — HYDROCODONE BITARTRATE AND ACETAMINOPHEN 5; 325 MG/1; MG/1
1 TABLET ORAL ONCE AS NEEDED
Status: DISCONTINUED | OUTPATIENT
Start: 2023-07-05 | End: 2024-03-15

## 2023-07-05 RX ORDER — KETAMINE HCL IN 0.9 % NACL 50 MG/5 ML
SYRINGE (ML) INTRAVENOUS
Status: DISCONTINUED | OUTPATIENT
Start: 2023-07-05 | End: 2023-07-05

## 2023-07-05 RX ORDER — SUCCINYLCHOLINE CHLORIDE 20 MG/ML
INJECTION INTRAMUSCULAR; INTRAVENOUS
Status: DISCONTINUED | OUTPATIENT
Start: 2023-07-05 | End: 2023-07-05

## 2023-07-05 RX ORDER — GABAPENTIN 300 MG/1
600 CAPSULE ORAL
Status: COMPLETED | OUTPATIENT
Start: 2023-07-05 | End: 2023-07-05

## 2023-07-05 RX ORDER — MIDAZOLAM HYDROCHLORIDE 1 MG/ML
INJECTION, SOLUTION INTRAMUSCULAR; INTRAVENOUS
Status: DISCONTINUED | OUTPATIENT
Start: 2023-07-05 | End: 2023-07-05

## 2023-07-05 RX ORDER — PHENYLEPHRINE HYDROCHLORIDE 10 MG/ML
INJECTION INTRAVENOUS
Status: DISCONTINUED | OUTPATIENT
Start: 2023-07-05 | End: 2023-07-05

## 2023-07-05 RX ORDER — ACETAMINOPHEN 500 MG
1000 TABLET ORAL
Status: COMPLETED | OUTPATIENT
Start: 2023-07-05 | End: 2023-07-05

## 2023-07-05 RX ORDER — BUPIVACAINE HCL/EPINEPHRINE 0.5-1:200K
VIAL (ML) INJECTION
Status: DISCONTINUED | OUTPATIENT
Start: 2023-07-05 | End: 2023-07-05 | Stop reason: HOSPADM

## 2023-07-05 RX ORDER — ONDANSETRON 2 MG/ML
INJECTION INTRAMUSCULAR; INTRAVENOUS
Status: DISCONTINUED | OUTPATIENT
Start: 2023-07-05 | End: 2023-07-05

## 2023-07-05 RX ORDER — ROCURONIUM BROMIDE 10 MG/ML
INJECTION, SOLUTION INTRAVENOUS
Status: DISCONTINUED | OUTPATIENT
Start: 2023-07-05 | End: 2023-07-05

## 2023-07-05 RX ORDER — LIDOCAINE HYDROCHLORIDE 10 MG/ML
INJECTION, SOLUTION INTRAVENOUS
Status: DISCONTINUED | OUTPATIENT
Start: 2023-07-05 | End: 2023-07-05

## 2023-07-05 RX ORDER — HYDROMORPHONE HYDROCHLORIDE 1 MG/ML
0.5 INJECTION, SOLUTION INTRAMUSCULAR; INTRAVENOUS; SUBCUTANEOUS EVERY 6 HOURS PRN
Status: DISCONTINUED | OUTPATIENT
Start: 2023-07-05 | End: 2024-03-15

## 2023-07-05 RX ORDER — VANCOMYCIN HYDROCHLORIDE 1 G/20ML
INJECTION, POWDER, LYOPHILIZED, FOR SOLUTION INTRAVENOUS
Status: DISCONTINUED | OUTPATIENT
Start: 2023-07-05 | End: 2023-07-05 | Stop reason: HOSPADM

## 2023-07-05 RX ORDER — ONDANSETRON 2 MG/ML
4 INJECTION INTRAMUSCULAR; INTRAVENOUS ONCE AS NEEDED
Status: DISCONTINUED | OUTPATIENT
Start: 2023-07-05 | End: 2024-03-15

## 2023-07-05 RX ORDER — HYDROCODONE BITARTRATE AND ACETAMINOPHEN 5; 325 MG/1; MG/1
1 TABLET ORAL EVERY 6 HOURS PRN
Qty: 28 TABLET | Refills: 0 | Status: SHIPPED | OUTPATIENT
Start: 2023-07-05 | End: 2023-12-14

## 2023-07-05 RX ORDER — DEXAMETHASONE SODIUM PHOSPHATE 4 MG/ML
INJECTION, SOLUTION INTRA-ARTICULAR; INTRALESIONAL; INTRAMUSCULAR; INTRAVENOUS; SOFT TISSUE
Status: DISCONTINUED | OUTPATIENT
Start: 2023-07-05 | End: 2023-07-05

## 2023-07-05 RX ADMIN — LIDOCAINE HYDROCHLORIDE 100 MG: 10 INJECTION, SOLUTION INTRAVENOUS at 07:07

## 2023-07-05 RX ADMIN — GABAPENTIN 600 MG: 300 CAPSULE ORAL at 05:07

## 2023-07-05 RX ADMIN — PROPOFOL 200 MG: 10 INJECTION, EMULSION INTRAVENOUS at 07:07

## 2023-07-05 RX ADMIN — ROCURONIUM BROMIDE 20 MG: 10 INJECTION INTRAVENOUS at 07:07

## 2023-07-05 RX ADMIN — FENTANYL CITRATE 50 MCG: 50 INJECTION, SOLUTION INTRAMUSCULAR; INTRAVENOUS at 07:07

## 2023-07-05 RX ADMIN — SUCCINYLCHOLINE CHLORIDE 160 MG: 20 INJECTION, SOLUTION INTRAMUSCULAR; INTRAVENOUS at 07:07

## 2023-07-05 RX ADMIN — PHENYLEPHRINE HYDROCHLORIDE 50 MCG: 10 INJECTION INTRAVENOUS at 07:07

## 2023-07-05 RX ADMIN — Medication 20 MG: at 07:07

## 2023-07-05 RX ADMIN — ACETAMINOPHEN 1000 MG: 500 TABLET ORAL at 05:07

## 2023-07-05 RX ADMIN — MIDAZOLAM HYDROCHLORIDE 2 MG: 1 INJECTION, SOLUTION INTRAMUSCULAR; INTRAVENOUS at 07:07

## 2023-07-05 RX ADMIN — SODIUM CHLORIDE: 0.9 INJECTION, SOLUTION INTRAVENOUS at 07:07

## 2023-07-05 RX ADMIN — CEFAZOLIN 2 G: 330 INJECTION, POWDER, FOR SOLUTION INTRAMUSCULAR; INTRAVENOUS at 07:07

## 2023-07-05 RX ADMIN — DEXAMETHASONE SODIUM PHOSPHATE 8 MG: 4 INJECTION, SOLUTION INTRAMUSCULAR; INTRAVENOUS at 07:07

## 2023-07-05 RX ADMIN — SUGAMMADEX 200 MG: 100 INJECTION, SOLUTION INTRAVENOUS at 08:07

## 2023-07-05 RX ADMIN — ROCURONIUM BROMIDE 10 MG: 10 INJECTION INTRAVENOUS at 07:07

## 2023-07-05 RX ADMIN — ONDANSETRON 4 MG: 2 INJECTION INTRAMUSCULAR; INTRAVENOUS at 07:07

## 2023-07-05 RX ADMIN — ALBUTEROL SULFATE 12 PUFF: 108 INHALANT RESPIRATORY (INHALATION) at 08:07

## 2023-07-05 NOTE — OP NOTE
Date of Procedure: 7/5/2023       Pre-Operative Diagnosis: DDD (degenerative disc disease), lumbar [M51.36]  Lumbar radiculopathy [M54.16]    Post-Operative Diagnosis: Post-Op Diagnosis Codes:     * DDD (degenerative disc disease), lumbar [M51.36]     * Lumbar radiculopathy [M54.16]    Anesthesia: General    Procedures performed:  Left L5/S1 MLD     Surgeon: Richard Pineda MD    Assistant:: Darryl Lee MD resident    Indication for Procedure: 59 yo man with right L5 radicular symptoms refractory to conservative treatment, with corresponding L5/S1 disc herniation on the left foraminal position.    Operative Note:  After informed consent was obtained and all questions were answered, the patient was taken to the operating room where general anesthesia was established.  The patient was flipped into the prone position on the Paul frame.  All pressure points were padded appropriately.  The lumbar spine was prepped and draped in the usual sterile fashion.  Fluoroscopy was used to identify the L5/S1 disc space.  A paramedian incision was made 1 cm to the left of the midline.  The skin was infiltrated with Marcaine with 0.5% epinephrine.  A 10 blade knife was used to incise the skin and sharply incised the lumbodorsal fascia.  Serial dilators were used to dissect the soft tissues over the lamina and a 6 cm retractor was placed over the caudal aspect of the L5 lamina as it intersected with spinous process.  The operating microscope was brought into the field.  Bovie was used to remove soft tissues.  High-speed drill was used to thin the lamina down to the ligamentum flavum.  Kerrison punches were used to complete the laminectomy.  Curettes were used to strip the ligamentum flavum from the ventral surface of the lamina.  Kerrison punches were used to remove the ligamentum flavum.  The dura was identified and retracted medially along with the traversing nerve root bluntly using a Penfield dissector.  The underlying disc  bulge was identified, and an 11 blade knife was used to make a small annulotomy in the disc space.  Pituitary rongeur was used to remove free fragments of the disc.  A paddle dissector was used to explore the ventral epidural space for any other free fragments.  Once we were convinced that all disc fragments were removed, the nerve was replaced in its anatomic position.  The root was noted to be well decompressed with CSF flow pulsations.  We then used bone wax to control bleeding from the bone edges.  We placed FloSeal in the epidural space, then irrigated this out with vancomycin irrigation.  The tube was slowly removed under microscopic visualization and bipolar was used for meticulous hemostasis of the soft tissues.  The lumbodorsal fascia was closed with an 0 Vicryl.  The dermal layer was closed with 3-0 Vicryl.  The skin was closed with a running subcuticular Monocryl.  The incision was covered with Dermabond and a sterile dressing.  All sponge, needle and instrument counts were correct at the end the procedure.    EBL:15 ml

## 2023-07-05 NOTE — ADDENDUM NOTE
Addendum  created 07/05/23 0853 by Maricel Fonseca MD    Order list changed, Pharmacy for encounter modified

## 2023-07-05 NOTE — ANESTHESIA PROCEDURE NOTES
Intubation    Date/Time: 7/5/2023 7:08 AM  Performed by: Renetta Olsen CRNA  Authorized by: Maricel Fonseca MD     Intubation:     Induction:  Intravenous    Intubated:  Postinduction    Mask Ventilation:  Easy with oral airway    Attempts:  2    Attempted By:  CRNA    Method of Intubation:  Video laryngoscopy    Blade:  Medellin 3    Laryngeal View Grade: Grade IIA - cords partially seen      Attempted By (2nd Attempt):  CRNA    Method of Intubation (2nd Attempt):  Bougie and video laryngoscopy    Blade (2nd Attempt):  Medellin 3    Laryngeal View Grade (2nd Attempt): Grade IIa - cords partially seen      Difficult Airway Encountered?: No      Complications:  None    Airway Device:  Oral endotracheal tube    Airway Device Size:  7.5    Style/Cuff Inflation:  Cuffed (inflated to minimal occlusive pressure)    Tube secured:  22    Secured at:  The lips    Placement Verified By:  Capnometry    Complicating Factors:  Poor neck/head extension (small mouth opening)    Findings Post-Intubation:  BS equal bilateral

## 2023-07-05 NOTE — PLAN OF CARE
Discharge instructions given and explained to patient and family with verbalization of understanding all instructions. Prescription given and explained next time and doses of each medication. Incentive spirometry given to patient and educated on how to use with moderate relief with family present. Patient stable on feet and ambulated independently. Patients v/s stable, denies n/v and tolerating po, rates pain level tolerable, IV removed, and family at bedside for patient discharge home.

## 2023-07-05 NOTE — ANESTHESIA POSTPROCEDURE EVALUATION
Anesthesia Post Evaluation    Patient: Enoch Barr    Procedure(s) Performed: Procedure(s) (LRB):  LAMINECTOMY, SPINE (Left)  DISCECTOMY, SPINE, LUMBAR (Left)    Final Anesthesia Type: general      Patient location during evaluation: PACU  Patient participation: Yes- Able to Participate  Level of consciousness: awake and alert  Post-procedure vital signs: reviewed and stable  Pain management: adequate  Airway patency: patent    PONV status at discharge: No PONV  Anesthetic complications: no      Cardiovascular status: blood pressure returned to baseline  Respiratory status: unassisted  Hydration status: euvolemic  Follow-up not needed.          Vitals Value Taken Time   /85 07/05/23 0832   Temp 36.6 °C (97.9 °F) 07/05/23 0830   Pulse 80 07/05/23 0836   Resp 15 07/05/23 0836   SpO2 95 % 07/05/23 0836   Vitals shown include unvalidated device data.      No case tracking events are documented in the log.      Pain/Alexander Score: Pain Rating Prior to Med Admin: 0 (7/5/2023  5:38 AM)

## 2023-07-05 NOTE — DISCHARGE SUMMARY
[facility]  Discharge Note  Short Stay    Procedure(s) (LRB):  LAMINECTOMY, SPINE (Left)  DISCECTOMY, SPINE, LUMBAR (Left)      OUTCOME: Patient tolerated treatment/procedure well without complication and is now ready for discharge.    DISPOSITION: Home or Self Care    FINAL DIAGNOSIS: lumbar herniated disc causing radiculopathy    FOLLOWUP: In clinic    DISCHARGE INSTRUCTIONS:  See instructions    TIME SPENT ON DISCHARGE: 20 minutes

## 2023-07-05 NOTE — PLAN OF CARE
Chart reviewed. Preop nursing care completed per orders. Safe surgery checklist complete. Pt denies any open wounds cuts or sores.Pt has metal in body. Family at bedside and belongings in locker 4. Waiting for procedure consent, anesthesia consent, and H&P update prior to surgery. Pt AAOX3, VSS on room air. Bed locked in lowest position, Call light within reach. Pt denies any needs at this time. Will continue to monitor.

## 2023-07-05 NOTE — TRANSFER OF CARE
"Anesthesia Transfer of Care Note    Patient: Enoch Barr    Procedure(s) Performed: Procedure(s) (LRB):  LAMINECTOMY, SPINE (Left)  DISCECTOMY, SPINE, LUMBAR (Left)    Patient location: PACU    Anesthesia Type: general    Transport from OR: Transported from OR on 6-10 L/min O2 by face mask with adequate spontaneous ventilation    Post pain: adequate analgesia    Post assessment: no apparent anesthetic complications    Post vital signs: stable    Level of consciousness: awake    Nausea/Vomiting: no nausea/vomiting    Complications: none    Transfer of care protocol was followedComments: Patient is awake and answering questions, occasional cough - clear sputum.  Denies discomfort      Last vitals:   Visit Vitals  /85 (BP Location: Left arm, Patient Position: Lying)   Pulse 84   Temp 36.6 °C (97.9 °F) (Temporal)   Resp 16   Ht 6' 1" (1.854 m)   Wt 98 kg (216 lb)   SpO2 97%   BMI 28.50 kg/m²     "

## 2023-07-05 NOTE — PATIENT INSTRUCTIONS
Please follow ONLY the instructions that are checked below.    Activity Restrictions:  [x]  Return to work will be determined on an individual basis.  [x]  No lifting greater than 10 pounds.  [x]  Avoid bending and twisting the area of your surgery more than 45 degrees from neutral position in any direction.  [x]  No driving or operating machinery:  [x]  until cleared by your surgeon.  [x]  while taking narcotic pain medications or muscle relaxants.    [x]  No brace/collar required    [x]  Increase ambulation over the next 2 weeks so that you are walking 2 miles per day at 2 weeks post-operatively.  [x]  Make sure to take two dedicated 5-10 minute walks per day, along with short walks every 1-2 hours, even if just to walk to the restroom and back.   [x]  Walk on paved surfaces only. It is okay to walk up and down stairs while holding onto a side rail.  [x]  No sexual activity for 2-3 weeks.    Discharge Medication/Follow-up:  [x]  Please refer to discharge medication reconciliation form.  [x]  For the first week after surgery: Check your blood pressure before taking any blood pressure medications at home. If BP is below 120/80, do not take your blood pressure medication.   [x]  Do not take ANY non-steroidal anti-inflammatory drugs (NSAIDS), including the following: ibuprofen, naprosyn, Aleve, Advil, Indocin, Mobic, or Celebrex for:  [x]  1 weeks  [x]  Prescriptions for appropriate medication will be given upon discharge.   [x]  Pain control: Norco for severe pain. Over the counter tylenol for mild pain   [x]  Muscle relaxer: Methcarbamol for muscle spasms. Can take every 8 hours if needed, but most helpful at bedtime.    [x]  Take docusate (Colace 100 mg) and miralax daily until bowel activity returns to eleazar. You can get this over the counter.  [x]  If you have not had a bowel movement by day 3 after surgery, try a fleet's enema or suppository, both over the counter. Call neurosurgery if you have not had a bowel  movement by day 5 after surgery.   [x]  Follow-up appointment:  [x]  10-14 days post-op for wound check by PA/nurse  [x]  4-6 weeks with MD    Wound Care:  [x]  Remove dressing or bandaid in  3  days.  [x]  No bandage required once removed. Keep your incision open to the air.  [x]  You may shower on the 3rd day after your surgery. Have the force of water hit you opposite from the incision. Pat the incision dry after your shower; do not scrub the incision. Do not use Hibiclens after surgery.  [x]  You wound is closed with one of the following: skin glue, steri strips, OR staples. Allow skin glue/steri strips to fall off on their own over time (if applicable). If you have staples, these will be removed at your clinic appt in 2 weeks.   [x]  You cannot take a bath until 8 weeks after surgery.    Call your doctor or go to the Emergency Room for any signs of infection, including: increased redness, drainage, pain, or fever (temperature ?101.5 for 24 hours). Call your doctor or go to the Emergency Room if there are any localized neurological changes; problems with speech, vision, numbness, tingling, weakness, or severe headache; or for other concerns.    Special Instructions:  [x]  No use of tobacco products.  [x]  Diet: Please eat a regular diet as tolerated.  []  Other diet:              Specific physician instructions:           Physicians need 3 days' notice for pain medicine to be refilled. Pain medicine will only be refilled between 8 AM and 5 PM, Monday through Friday, due to Food and Drug Administration regulation of documentation.    If you have any questions about this form, please call [phone].

## 2023-07-06 ENCOUNTER — PATIENT MESSAGE (OUTPATIENT)
Dept: NEUROSURGERY | Facility: CLINIC | Age: 58
End: 2023-07-06
Payer: OTHER GOVERNMENT

## 2023-07-08 ENCOUNTER — NURSE TRIAGE (OUTPATIENT)
Dept: ADMINISTRATIVE | Facility: CLINIC | Age: 58
End: 2023-07-08
Payer: OTHER GOVERNMENT

## 2023-07-08 ENCOUNTER — HOSPITAL ENCOUNTER (EMERGENCY)
Facility: HOSPITAL | Age: 58
Discharge: HOME | End: 2023-07-08
Attending: EMERGENCY MEDICINE
Payer: OTHER GOVERNMENT

## 2023-07-08 VITALS
OXYGEN SATURATION: 98 % | WEIGHT: 216 LBS | HEART RATE: 62 BPM | RESPIRATION RATE: 20 BRPM | HEIGHT: 73 IN | TEMPERATURE: 98 F | BODY MASS INDEX: 28.63 KG/M2 | SYSTOLIC BLOOD PRESSURE: 140 MMHG | DIASTOLIC BLOOD PRESSURE: 74 MMHG

## 2023-07-08 DIAGNOSIS — R20.2 PARESTHESIA: ICD-10-CM

## 2023-07-08 DIAGNOSIS — G89.18 POST-OPERATIVE PAIN: Primary | ICD-10-CM

## 2023-07-08 PROCEDURE — 99284 EMERGENCY DEPT VISIT MOD MDM: CPT | Mod: ER

## 2023-07-08 RX ORDER — METHYLPREDNISOLONE 4 MG/1
TABLET ORAL
Qty: 21 EACH | Refills: 0 | Status: SHIPPED | OUTPATIENT
Start: 2023-07-08 | End: 2023-07-29

## 2023-07-08 RX ORDER — GABAPENTIN 300 MG/1
300 CAPSULE ORAL 3 TIMES DAILY
Qty: 90 CAPSULE | Refills: 0 | Status: SHIPPED | OUTPATIENT
Start: 2023-07-08 | End: 2023-12-14

## 2023-07-08 NOTE — ED PROVIDER NOTES
Encounter Date: 7/8/2023    SCRIBE #1 NOTE: I, Mishel Lenz, am scribing for, and in the presence of,  Gabino Gonzalez MD. I have scribed the following portions of the note - Other sections scribed: HPI; ROS; PE.     History     Chief Complaint   Patient presents with    Post-op Problem     Pt reports numbness and tingling on the left side of the body since this morning. Pt had back surgery on Wednesday at [facility].      Enoch Barr is a 58 y.o. male with medical history of Hypertension who presents to the ED for chief complaint of pain down the lateral aspect of the left leg and numbness and tingling to the dorsum of the left foot onset today. Patient reports he had a laminectomy and discectomy to L5, S1 3 days ago. Additional history provided by independent historian, wife, reports she spoke to the nurse on call at the surgeons office and was referred to the emergency department. Patient denies associated weakness, incontinence, or difficulty walking. No further complaints at this time. Patient notes he does have plantar fascitis in the left foot.          The history is provided by the patient and the spouse. No  was used.   Review of patient's allergies indicates:  No Known Allergies  Past Medical History:   Diagnosis Date    GERD (gastroesophageal reflux disease)     Hypercholesteremia     Hypertension     on medication    Lumbar spondylosis 9/21/2020    Migraines     Sleep apnea      Past Surgical History:   Procedure Laterality Date    COLONOSCOPY N/A 8/10/2020    Procedure: COLONOSCOPY;  Surgeon: April Dos Santos MD;  Location: [facility] ENDO;  Service: Endoscopy;  Laterality: N/A;    EPIDURAL STEROID INJECTION Left 2/17/2023    Procedure: Left L5 Transforaminal Epidural Steroid Injection;  Surgeon: Santana Rojo Jr., MD;  Location: [facility] ENDO;  Service: Pain Management;  Laterality: Left;  @1245  No ATC or DM    EPIDURAL STEROID INJECTION Bilateral 4/14/2023     Procedure: Bilateral L3, L4, L5 Medial Branch Blocks #1;  Surgeon: Santana Rojo Jr., MD;  Location: [facility] ENDO;  Service: Pain Management;  Laterality: Bilateral;  @1230 (requests afternoon)  No ATC or DM    EPIDURAL STEROID INJECTION Left 6/2/2023    Procedure: Left L5 + S1 Transforaminal Epidural Steroid Injections;  Surgeon: Santana Rojo Jr., MD;  Location: [facility] ENDO;  Service: Pain Management;  Laterality: Left;  @1300  No ATC or DM    HERNIA REPAIR      LAMINECTOMY Left 7/5/2023    Procedure: LAMINECTOMY, SPINE;  Surgeon: Richard Pineda MD;  Location: [facility];  Service: Neurosurgery;  Laterality: Left;  Left L5/S1 discectomy      LAPAROSCOPIC CHOLECYSTECTOMY N/A 1/25/2022    Procedure: CHOLECYSTECTOMY, LAPAROSCOPIC;  Surgeon: Jarrod Martinez MD;  Location: [facility];  Service: General;  Laterality: N/A;    LUMBAR DISCECTOMY Left 7/5/2023    Procedure: DISCECTOMY, SPINE, LUMBAR;  Surgeon: Richard Pineda MD;  Location: [facility] OR;  Service: Neurosurgery;  Laterality: Left;    REPAIR OF TRICEPS TENDON Left 10/2/2020    Procedure: REPAIR, TENDON, TRICEPS;  Surgeon: Keysha Galvez MD;  Location: [facility];  Service: Orthopedics;  Laterality: Left;  RN PRE OP DONE 9/29/2020----COVID NEGATIVE ON 9/29  [facility] Rep: Delma [phone]    SINUS SURGERY  2012    SURGICAL REMOVAL OF BUNION WITH OSTEOTOMY OF METATARSAL BONE Right 3/24/2023    Procedure: BUNIONECTOMY, WITH METATARSAL OSTEOTOMY;  Surgeon: Shannan An DPM;  Location: [facility] OR;  Service: Podiatry;  Laterality: Right;     Family History   Problem Relation Age of Onset    Coronary artery disease Mother     Hypertension Father     No Known Problems Sister     No Known Problems Brother     Valvular heart disease Sister     No Known Problems Brother     No Known Problems Brother     No Known Problems Maternal Aunt     No Known Problems Maternal Uncle     No Known Problems Paternal Aunt     No Known Problems Paternal Uncle     No Known Problems  Maternal Grandmother     No Known Problems Maternal Grandfather     No Known Problems Paternal Grandmother     No Known Problems Paternal Grandfather     Amblyopia Neg Hx     Blindness Neg Hx     Cancer Neg Hx     Cataracts Neg Hx     Diabetes Neg Hx     Glaucoma Neg Hx     Macular degeneration Neg Hx     Retinal detachment Neg Hx     Strabismus Neg Hx     Stroke Neg Hx     Thyroid disease Neg Hx      Social History     Tobacco Use    Smoking status: Former     Types: Cigarettes     Quit date: 1999     Years since quittin.1    Smokeless tobacco: Never   Substance Use Topics    Alcohol use: Yes     Alcohol/week: 7.0 standard drinks     Types: 7 Glasses of wine per week     Comment: occasionally    Drug use: Never     Review of Systems   Constitutional:  Negative for fever.   HENT:  Negative for sore throat.    Eyes:  Negative for visual disturbance.   Respiratory:  Negative for shortness of breath.    Cardiovascular:  Negative for chest pain.   Gastrointestinal:  Negative for abdominal pain.        Negative for bowel incontinence.    Genitourinary:  Negative for dysuria.        Negative for bladder incontinence.    Musculoskeletal:  Positive for arthralgias and myalgias. Negative for back pain and gait problem.   Skin:  Negative for rash.   Neurological:  Positive for numbness. Negative for weakness.     Physical Exam     Initial Vitals [23 1407]   BP Pulse Resp Temp SpO2   (!) 149/95 60 16 98.1 °F (36.7 °C) 95 %      MAP       --         Physical Exam    Nursing note and vitals reviewed.  Constitutional: He appears well-developed and well-nourished.   HENT:   Head: Atraumatic.   Eyes: EOM are normal. Pupils are equal, round, and reactive to light.   Neck: Neck supple. No JVD present.   Normal range of motion.  Cardiovascular:  Normal rate, regular rhythm, normal heart sounds and intact distal pulses.     Exam reveals no gallop and no friction rub.       No murmur heard.  Abdominal: Abdomen is soft.  Bowel sounds are normal.   Musculoskeletal:         General: Normal range of motion.      Cervical back: Normal range of motion and neck supple.      Comments: Lumbar midspinal incision, clear and dry with surrounding bruising. No drainage or induration. Subjective numbness to dorsum of left foot.      Lymphadenopathy:     He has no cervical adenopathy.   Neurological: He is alert and oriented to person, place, and time. He has normal strength.   Skin: Skin is warm and dry.   Psychiatric: He has a normal mood and affect. Thought content normal.       ED Course   Procedures  Labs Reviewed - No data to display       Imaging Results    None          Medications - No data to display  Medical Decision Making:   History:   Old Medical Records: I decided to obtain old medical records.  Old Records Summarized: records from clinic visits, other records and records from another hospital.       <> Summary of Records: External documents reviewed.   Initial Assessment:   This is an emergent evaluation of a 58 y.o. male who presents with pain down the lateral aspect of the left leg and numbness and tingling to the dorsum of the left foot. The patient was seen and examined. The history and physical exam was obtained. The nursing notes and vital signs were reviewed.      Spoke with Neurosurgery on call Dr. Sierra who recommends Medrol dose pack and neurontin 300mg TID and stable to follow up. No further workup or imaging required today.      Scribe Attestation:   Scribe #1: I performed the above scribed service and the documentation accurately describes the services I performed. I attest to the accuracy of the note.            I, Gabino Gonzalez, personally performed the services described in this documentation. All medical record entries made by the scribe were at my direction and in my presence.  I have reviewed the chart and agree that the record reflects my personal performance and is accurate and complete         Clinical  Impression:   Final diagnoses:  [G89.18] Post-operative pain (Primary)  [R20.2] Paresthesia        ED Disposition Condition    Discharge Stable          ED Prescriptions       Medication Sig Dispense Start Date End Date Auth. Provider    gabapentin (NEURONTIN) 300 MG capsule Take 1 capsule (300 mg total) by mouth 3 (three) times daily. 90 capsule 7/8/2023 7/7/2024 Gabino Gonzalez MD    methylPREDNISolone (MEDROL DOSEPACK) 4 mg tablet use as directed 21 each 7/8/2023 7/29/2023 Gabino Gonzalez MD          Follow-up Information       Follow up With Specialties Details Why Contact Info    Richard Pineda MD Neurosurgery  as scheduled [address] [facility]  [address]  [facility] [address]  [phone]               Gabino Gonzalez MD  07/08/23 1177

## 2023-07-08 NOTE — TELEPHONE ENCOUNTER
Speaking with spouse, states patient had procedure 7/5 laminectomy and discectomy. He started with new onset numbness in L leg from foot to calf and now tingling from calf to thigh. He is in shower. He is able to stand on it. Triage done with limited information via spouse and advised will call on call and then call back.       Spoke with Dr. Sierra report given per Laminectomy and Discectomy  7/5 at [place]. New onset of numbness from left foot to calf and increasing tingling in left left from calf to thigh. ED advised for assessment    Patient's spouse called back, advised of Dr. Sierra, on call neurosurgery advice. Verb understanding.   Reason for Disposition   Sounds like a serious complication to the triager    Additional Information   Negative: Sounds like a life-threatening emergency to the triager    Protocols used: Post-Op Symptoms and [id number]

## 2023-07-11 ENCOUNTER — PATIENT MESSAGE (OUTPATIENT)
Dept: NEUROSURGERY | Facility: CLINIC | Age: 58
End: 2023-07-11
Payer: OTHER GOVERNMENT

## 2023-07-13 ENCOUNTER — OFFICE VISIT (OUTPATIENT)
Dept: NEUROSURGERY | Facility: CLINIC | Age: 58
End: 2023-07-13
Payer: OTHER GOVERNMENT

## 2023-07-13 VITALS
HEART RATE: 65 BPM | OXYGEN SATURATION: 97 % | DIASTOLIC BLOOD PRESSURE: 85 MMHG | HEIGHT: 73 IN | WEIGHT: 212.5 LBS | SYSTOLIC BLOOD PRESSURE: 130 MMHG | BODY MASS INDEX: 28.16 KG/M2

## 2023-07-13 DIAGNOSIS — M54.16 LUMBAR RADICULOPATHY: Primary | ICD-10-CM

## 2023-07-13 DIAGNOSIS — Z98.890 STATUS POST LUMBAR MICRODISCECTOMY: ICD-10-CM

## 2023-07-13 PROCEDURE — 99024 POSTOP FOLLOW-UP VISIT: CPT | Mod: ,,, | Performed by: PHYSICIAN ASSISTANT

## 2023-07-13 PROCEDURE — 99999 PR PBB SHADOW E&M-EST. PATIENT-LVL IV: ICD-10-PCS | Mod: PBBFAC,,, | Performed by: PHYSICIAN ASSISTANT

## 2023-07-13 PROCEDURE — 99024 PR POST-OP FOLLOW-UP VISIT: ICD-10-PCS | Mod: ,,, | Performed by: PHYSICIAN ASSISTANT

## 2023-07-13 PROCEDURE — 99999 PR PBB SHADOW E&M-EST. PATIENT-LVL IV: CPT | Mod: PBBFAC,,, | Performed by: PHYSICIAN ASSISTANT

## 2023-07-13 PROCEDURE — 99214 OFFICE O/P EST MOD 30 MIN: CPT | Mod: PBBFAC | Performed by: PHYSICIAN ASSISTANT

## 2023-07-13 NOTE — PROGRESS NOTES
Wound Check   Neurosurgery     Enoch Barr is a 58 y.o. male who presents to clinic today for 1 week wound check, s/p MIS L5-S1 laminectomy and diskectomy with Dr. Pineda.  Denies fevers, chills, night sweats or N/V. Further denies wound drainage or swelling. Pt has been taking mostly Tylenol for pain relief.  He presents to clinic early today for evaluation of new numbness in his left foot.      Physical Exam:   General: well developed, well nourished, no distress  Neurologic: Alert and oriented. Thought content appropriate.   GCS: Motor: 6/Verbal: 5/Eyes: 4 GCS Total: 15   Mental Status: Awake, Alert, Oriented x3   Cranial nerves: face symmetric, tongue midline, pupils equal, round, reactive to light with accomodation, EOMI.   Motor Strength: moves all extremities with good strength and tone 5/5 BLE  Sensation: response to light touch throughout; decreased to medial and lateral left foot  No gait disturbances     Incision is clean, dry and intact with no signs of swelling or purulent drainage.  All skin edges are completely approximated.  Mild redness underneath remaining Dermabond.      Vitals:    07/13/23 1044   BP: 130/85   Pulse: 65             Assessment/Plan:   Enoch Barr is a 58 y.o. male who presents for 1 week wound check, s/p left MIS L5-S1 laminectomy and diskectomy with Dr. Pineda for lumbar radiculopathy.  He was evaluated in the ER last week for complaints of left-sided numbness and tingling.  He was discharged home but reported similar symptoms via MyChart yesterday.  Patient evaluated in clinic today with full strength in his BLE.  He does have numbness in his left foot, both medial and lateral aspects.  I considered gabapentin, but he states it causes swelling and he can not tolerate it.  I will have the patient follow up in 1 week for repeat evaluation.  If symptoms worsen prior to his visit, he knows to call.    -Keep incision open to air   -Ok to take celebrex or tylenol as needed for pain    -Can shower and get incision wet, just pat dry and no vigorous scrubbing. Do not submerge incision for another 4 weeks.   -No lifting more than 10 lbs or excessive bending/twisting.   -No Driving   -Follow up in 1 week  -Encouraged patient to call if they have any questions or concerns prior to next follow up appt        Mayra Cosme PA-C  [facility]  Department of Neurosurgery  [phone]

## 2023-07-13 NOTE — PATIENT INSTRUCTIONS
-Keep incision open to air   -Ok to take celebrex or tylenol as needed for pain   -Can shower and get incision wet, just pat dry and no vigorous scrubbing. Do not submerge incision for another 4 weeks.   -No lifting more than 10 lbs or excessive bending/twisting.   -No Driving   -Follow up in 1 week  -Please call with any questions or concerns prior to your next appointment.

## 2023-07-20 ENCOUNTER — TELEPHONE (OUTPATIENT)
Dept: NEUROSURGERY | Facility: CLINIC | Age: 58
End: 2023-07-20
Payer: OTHER GOVERNMENT

## 2023-07-21 ENCOUNTER — PATIENT MESSAGE (OUTPATIENT)
Dept: NEUROSURGERY | Facility: CLINIC | Age: 58
End: 2023-07-21

## 2023-07-21 ENCOUNTER — OFFICE VISIT (OUTPATIENT)
Dept: NEUROSURGERY | Facility: CLINIC | Age: 58
End: 2023-07-21
Payer: OTHER GOVERNMENT

## 2023-07-21 VITALS
OXYGEN SATURATION: 97 % | HEIGHT: 73 IN | BODY MASS INDEX: 28.69 KG/M2 | TEMPERATURE: 99 F | DIASTOLIC BLOOD PRESSURE: 84 MMHG | HEART RATE: 57 BPM | WEIGHT: 216.5 LBS | SYSTOLIC BLOOD PRESSURE: 147 MMHG

## 2023-07-21 DIAGNOSIS — Z98.890 STATUS POST LUMBAR MICRODISCECTOMY: Primary | ICD-10-CM

## 2023-07-21 PROCEDURE — 99024 POSTOP FOLLOW-UP VISIT: CPT | Mod: ,,, | Performed by: PHYSICIAN ASSISTANT

## 2023-07-21 PROCEDURE — 99214 OFFICE O/P EST MOD 30 MIN: CPT | Mod: PBBFAC | Performed by: PHYSICIAN ASSISTANT

## 2023-07-21 PROCEDURE — 99999 PR PBB SHADOW E&M-EST. PATIENT-LVL IV: CPT | Mod: PBBFAC,,, | Performed by: PHYSICIAN ASSISTANT

## 2023-07-21 PROCEDURE — 99999 PR PBB SHADOW E&M-EST. PATIENT-LVL IV: ICD-10-PCS | Mod: PBBFAC,,, | Performed by: PHYSICIAN ASSISTANT

## 2023-07-21 PROCEDURE — 99024 PR POST-OP FOLLOW-UP VISIT: ICD-10-PCS | Mod: ,,, | Performed by: PHYSICIAN ASSISTANT

## 2023-07-21 NOTE — PROGRESS NOTES
Wound Check   Neurosurgery     Interval history 7/21/23:  Doing well today. Numbness in his left foot is improving. He believes it may be due to plantar fasciitis. Pre-op back pain is resolved. Mild soreness around incision.     HPI 7/13/23:  Enoch Barr is a 58 y.o. male who presents to clinic today for 1 week wound check, s/p MIS L5-S1 laminectomy and diskectomy with Dr. Pineda.  Denies fevers, chills, night sweats or N/V. Further denies wound drainage or swelling. Pt has been taking mostly Tylenol for pain relief.  He presents to clinic early today for evaluation of new numbness in his left foot.      Physical Exam:   General: well developed, well nourished, no distress  Neurologic: Alert and oriented. Thought content appropriate.   GCS: Motor: 6/Verbal: 5/Eyes: 4 GCS Total: 15   Mental Status: Awake, Alert, Oriented x3   Cranial nerves: face symmetric, tongue midline, pupils equal, round, reactive to light with accomodation, EOMI.   Motor Strength: moves all extremities with good strength and tone 5/5 BLE  Sensation: response to light touch throughout; mild decrease to lateral left foot  No gait disturbances     Incision is clean, dry and intact with no signs of swelling or purulent drainage.  All skin edges are completely approximated.  Mild redness at proximal edge of incision.       Vitals:    07/21/23 1049   BP: (!) 147/84   Pulse: (!) 57   Temp: 98.7 °F (37.1 °C)             Assessment/Plan:   Enoch Barr is a 58 y.o. male who presents for 2 week wound check, s/p left MIS L5-S1 laminectomy and diskectomy with Dr. Pineda for lumbar radiculopathy. Doing very well today. Mild numbness in left foot, improved compared to last week. He now believes this may be due to plantar fasciitis, as it feels similar to what he felt in his right foot in the past.     Apply medihoney twice daily to incision. Mild redness does not look infectious, but he should monitor daily and call with any new or worsening sxs.     -Keep  incision open to air   -OK to take tylenol or ibuprofen for mild pain   -OK to resume home blood thinner, if applicable.   -Can shower and get incision wet, just pat dry and no vigorous scrubbing. Do not submerge incision for another 4 weeks.   -No lifting more than 10 lbs or excessive bending/twisting.   -Can drive after 4 weeks when no longer taking narcotics   -Follow up with Dr. Pineda in 4 weeks   -Encouraged patient to call if they have any questions or concerns prior to next follow up appt        Mayra Cosme PA-C  [facility]  Department of Neurosurgery  [phone]

## 2023-07-21 NOTE — PATIENT INSTRUCTIONS
-Keep incision open to air   -OK to take tylenol or ibuprofen for mild pain   -Apply medihoney twice daily to incision. Mild redness does not look infectious, but please monitor daily and call with any new or worsening symptoms.   -Can shower and get incision wet, just pat dry and no vigorous scrubbing. Do not submerge incision for another 4 weeks.   -No lifting more than 10 lbs or excessive bending/twisting.   -Can drive after 4 weeks when no longer taking narcotics   -Follow up with Dr. Pineda in 4 weeks   -Please call with any questions or concerns prior to your next appointment.

## 2023-08-02 ENCOUNTER — OFFICE VISIT (OUTPATIENT)
Dept: PODIATRY | Facility: CLINIC | Age: 58
End: 2023-08-02
Payer: OTHER GOVERNMENT

## 2023-08-02 VITALS — WEIGHT: 216 LBS | HEIGHT: 73 IN | BODY MASS INDEX: 28.63 KG/M2

## 2023-08-02 DIAGNOSIS — Z98.890 STATUS POST BUNIONECTOMY: Primary | ICD-10-CM

## 2023-08-02 DIAGNOSIS — Z09 FOLLOW-UP EXAMINATION FOLLOWING SURGERY: ICD-10-CM

## 2023-08-02 PROCEDURE — 99213 OFFICE O/P EST LOW 20 MIN: CPT | Mod: S$PBB,,, | Performed by: PODIATRIST

## 2023-08-02 PROCEDURE — 99213 OFFICE O/P EST LOW 20 MIN: CPT | Mod: PBBFAC,PN | Performed by: PODIATRIST

## 2023-08-02 PROCEDURE — 99999 PR PBB SHADOW E&M-EST. PATIENT-LVL III: ICD-10-PCS | Mod: PBBFAC,,, | Performed by: PODIATRIST

## 2023-08-02 PROCEDURE — 99213 PR OFFICE/OUTPT VISIT, EST, LEVL III, 20-29 MIN: ICD-10-PCS | Mod: S$PBB,,, | Performed by: PODIATRIST

## 2023-08-02 PROCEDURE — 99999 PR PBB SHADOW E&M-EST. PATIENT-LVL III: CPT | Mod: PBBFAC,,, | Performed by: PODIATRIST

## 2023-08-02 NOTE — PROGRESS NOTES
Subjective:      Patient ID: Enoch Barr is a 58 y.o. male.    Chief Complaint: post op    58 y.o. male presenting with R foot pain. Points 1st MPJ and 2nd MPJ area for pain. Aching and throbbing pain. Has been dealing with pain for 3 months. Known to Dr. Nixon. Previous treatments include NSAIDs, oral steroid which helped with some symptoms. R foot pain continues to get worse. Pain gets worse with pressure.  Following up with pain management for back pain. History of taking gabapentin which caused swelling on b/l LE thus stopped. 1st MPJ pain is worse than 2nd MPJ pain.     3/6/23 f/u R foot pain. Most of his pain is located on medial aspect of 1st met head. Aching and throbbing pain. Here to discuss MRI and surgical options. MRI noted OA changes to 2nd MPJ. Mild OA changes to 1 MPJ. Tells me he has minimal pain over 2nd MPJ.     4/10/23 s/p R foot MIS bunionectomy with Andrzej (DOS 3/24/23 GP: 6/24/23). Pain is well controlled. In surgical shoes. Here for suture removal. Denies pain today. Denies slips since the surgery.     5/1/23 s/p R foot surgery. In short CAM. Pain and swelling present but slowly improving. Tells me his dog (weighs 80 pounds) stepped on right foot the other day.     8/2/23 s/p R foot surgery. Minimal pain. 2/10 pain level today on R foot. In regular tennis shoes. Recently had surgery on his back.     Level of ambulation/Occupation:   Smoking status: no  Janeen-D Def: ?  DM:no  Other:     Review of Systems   Constitutional: Negative for decreased appetite and malaise/fatigue.   Cardiovascular:  Negative for claudication and leg swelling.   Musculoskeletal:  Positive for arthritis, back pain, joint pain and stiffness. Negative for joint swelling and muscle weakness.   Neurological:  Negative for numbness and weakness.   Psychiatric/Behavioral:  Negative for altered mental status.              Past Medical History:   Diagnosis Date    GERD (gastroesophageal reflux disease)     Hypercholesteremia      Hypertension     on medication    Lumbar spondylosis 9/21/2020    Migraines     Sleep apnea        Past Surgical History:   Procedure Laterality Date    COLONOSCOPY N/A 8/10/2020    Procedure: COLONOSCOPY;  Surgeon: April Dos Santos MD;  Location: [facility] ENDO;  Service: Endoscopy;  Laterality: N/A;    EPIDURAL STEROID INJECTION Left 2/17/2023    Procedure: Left L5 Transforaminal Epidural Steroid Injection;  Surgeon: Santana Rojo Jr., MD;  Location: [facility] ENDO;  Service: Pain Management;  Laterality: Left;  @1245  No ATC or DM    EPIDURAL STEROID INJECTION Bilateral 4/14/2023    Procedure: Bilateral L3, L4, L5 Medial Branch Blocks #1;  Surgeon: Santana Rojo Jr., MD;  Location: [facility] ENDO;  Service: Pain Management;  Laterality: Bilateral;  @1230 (requests afternoon)  No ATC or DM    EPIDURAL STEROID INJECTION Left 6/2/2023    Procedure: Left L5 + S1 Transforaminal Epidural Steroid Injections;  Surgeon: Santana Rojo Jr., MD;  Location: [facility] ENDO;  Service: Pain Management;  Laterality: Left;  @1300  No ATC or DM    HERNIA REPAIR      LAMINECTOMY Left 7/5/2023    Procedure: LAMINECTOMY, SPINE;  Surgeon: Richard Pineda MD;  Location: [facility] OR;  Service: Neurosurgery;  Laterality: Left;  Left L5/S1 discectomy      LAPAROSCOPIC CHOLECYSTECTOMY N/A 1/25/2022    Procedure: CHOLECYSTECTOMY, LAPAROSCOPIC;  Surgeon: Jarrod Martinez MD;  Location: [facility] OR;  Service: General;  Laterality: N/A;    LUMBAR DISCECTOMY Left 7/5/2023    Procedure: DISCECTOMY, SPINE, LUMBAR;  Surgeon: Richard Pineda MD;  Location: [facility] OR;  Service: Neurosurgery;  Laterality: Left;    REPAIR OF TRICEPS TENDON Left 10/2/2020    Procedure: REPAIR, TENDON, TRICEPS;  Surgeon: Keysha Galvez MD;  Location: [facility] OR;  Service: Orthopedics;  Laterality: Left;  RN PRE OP DONE 9/29/2020----COVID NEGATIVE ON 9/29  Arthrex Rep: Delma [phone]    SINUS SURGERY  2012    SURGICAL REMOVAL OF BUNION WITH OSTEOTOMY OF METATARSAL BONE Right  3/24/2023    Procedure: BUNIONECTOMY, WITH METATARSAL OSTEOTOMY;  Surgeon: Shannan An DPM;  Location: [facility];  Service: Podiatry;  Laterality: Right;       Family History   Problem Relation Age of Onset    Coronary artery disease Mother     Hypertension Father     No Known Problems Sister     No Known Problems Brother     Valvular heart disease Sister     No Known Problems Brother     No Known Problems Brother     No Known Problems Maternal Aunt     No Known Problems Maternal Uncle     No Known Problems Paternal Aunt     No Known Problems Paternal Uncle     No Known Problems Maternal Grandmother     No Known Problems Maternal Grandfather     No Known Problems Paternal Grandmother     No Known Problems Paternal Grandfather     Amblyopia Neg Hx     Blindness Neg Hx     Cancer Neg Hx     Cataracts Neg Hx     Diabetes Neg Hx     Glaucoma Neg Hx     Macular degeneration Neg Hx     Retinal detachment Neg Hx     Strabismus Neg Hx     Stroke Neg Hx     Thyroid disease Neg Hx        Social History     Socioeconomic History    Marital status:    Occupational History    Occupation: [employer]      Employer: [employer]   Tobacco Use    Smoking status: Former     Current packs/day: 0.00     Types: Cigarettes     Quit date: 1999     Years since quittin.2    Smokeless tobacco: Never   Substance and Sexual Activity    Alcohol use: Not Currently     Alcohol/week: 7.0 standard drinks of alcohol     Types: 7 Glasses of wine per week     Comment: occasionally    Drug use: Never    Sexual activity: Not Currently     Partners: Female     Social Determinants of Health     Financial Resource Strain: Unknown (2023)    Overall Financial Resource Strain (CARDIA)     Difficulty of Paying Living Expenses: Patient refused   Food Insecurity: Unknown (2023)    Hunger Vital Sign     Worried About Running Out of Food in the Last Year: Never true     Ran Out of Food in the Last Year:  Patient refused   Transportation Needs: No Transportation Needs (7/19/2023)    PRAPARE - Transportation     Lack of Transportation (Medical): No     Lack of Transportation (Non-Medical): No   Physical Activity: Insufficiently Active (7/19/2023)    Exercise Vital Sign     Days of Exercise per Week: 4 days     Minutes of Exercise per Session: 30 min   Stress: No Stress Concern Present (7/19/2023)    [employer] [place] [employer] - Occupational Stress Questionnaire     Feeling of Stress : Only a little   Social Connections: Unknown (7/19/2023)    Social Connection and Isolation Panel [NHANES]     Frequency of Communication with Friends and Family: Three times a week     Frequency of Social Gatherings with Friends and Family: Patient refused     Active Member of Clubs or Organizations: No     Attends Club or Organization Meetings: Never     Marital Status:    Housing Stability: Low Risk  (7/19/2023)    Housing Stability Vital Sign     Unable to Pay for Housing in the Last Year: No     Number of Places Lived in the Last Year: 1     Unstable Housing in the Last Year: No       Current Outpatient Medications   Medication Sig Dispense Refill    atorvastatin (LIPITOR) 40 MG tablet Take 1 tablet (40 mg total) by mouth every evening. 90 tablet 3    cromolyn (NASALCHROM) 5.2 mg/spray (4 %) nasal spray 1 spray by Nasal route 4 (four) times daily. 26 mL 1    diclofenac sodium (VOLTAREN) 1 % Gel Apply 2 g topically 4 (four) times daily. 100 g 0    fluticasone propionate (FLONASE) 50 mcg/actuation nasal spray 1 spray (50 mcg total) by Each Nostril route once daily. 16 g 3    gabapentin (NEURONTIN) 300 MG capsule Take 1 capsule (300 mg total) by mouth 3 (three) times daily. 90 capsule 0    HYDROcodone-acetaminophen (NORCO) 5-325 mg per tablet Take 1 tablet by mouth every 6 (six) hours as needed for Pain. 28 tablet 0    lisinopriL-hydrochlorothiazide (PRINZIDE,ZESTORETIC) 20-12.5 mg per tablet TAKE 1 TABLET BY MOUTH  "EVERY DAY 90 tablet 3    methocarbamoL (ROBAXIN) 750 MG Tab Take 1 tablet (750 mg total) by mouth 3 (three) times daily. As needed for muscle spasms 90 tablet 1    metoprolol succinate (TOPROL-XL) 25 MG 24 hr tablet Take 1 tablet (25 mg total) by mouth once daily. 90 tablet 3    omeprazole (PRILOSEC) 20 MG capsule TAKE 1 CAPSULE(20 MG) BY MOUTH EVERY DAY. DECREASED DOSE 90 capsule 3    traZODone (DESYREL) 50 MG tablet Take 2 tablets (100 mg total) by mouth nightly as needed for Insomnia. 180 tablet 3     No current facility-administered medications for this visit.     Facility-Administered Medications Ordered in Other Visits   Medication Dose Route Frequency Provider Last Rate Last Admin    HYDROcodone-acetaminophen 5-325 mg per tablet 1 tablet  1 tablet Oral Once PRN Maricel Fonseca MD        HYDROmorphone injection 0.5 mg  0.5 mg Intravenous Q6H PRN Maricel Fonseca MD        ondansetron injection 4 mg  4 mg Intravenous Once PRN Maricel Fonseca MD           Review of patient's allergies indicates:  No Known Allergies    Vitals:    08/02/23 1318   Weight: 98 kg (216 lb)   Height: 6' 1" (1.854 m)   PainSc:   1   PainLoc: Foot             Objective:      Physical Exam  Constitutional:       General: He is not in acute distress.     Appearance: He is well-developed.   HENT:      Nose: Nose normal.   Eyes:      Conjunctiva/sclera: Conjunctivae normal.   Pulmonary:      Effort: Pulmonary effort is normal.   Chest:      Chest wall: No tenderness.   Abdominal:      Tenderness: There is no abdominal tenderness.   Musculoskeletal:      Cervical back: Normal range of motion.   Neurological:      Mental Status: He is alert and oriented to person, place, and time.   Psychiatric:         Behavior: Behavior normal.         Vascular: Distal DP/PT pulses palpable 2/4. CRT < 3 sec to tips of toes. No vericosities noted to LEs. Hair growth present LE, warm to touch LE.    Dermatologic: No open lesions, lacerations or wounds. Interdigital " spaces clean, dry and intact. No erythema, rubor, calor noted LE  RLE: incision healed.    Musculoskeletal: No calf tenderness LE, Compartments soft/compressible.  RLE: s/p bunionectomy. No 1st Mpj pain during ROM.     Neurological: Light touch, proprioception, and sharp/dull sensation are all intact. Protective threshold with the [place]ienstein monofilament is intact. Vibratory sensation intact.                  Assessment:       Encounter Diagnoses   Name Primary?    Status post bunionectomy Yes    Follow-up examination following surgery              Plan:       Enoch was seen today for post-op evaluation.    Diagnoses and all orders for this visit:    Status post bunionectomy  -     X-Ray Foot Complete Right; Future    Follow-up examination following surgery        I counseled the patient on his conditions, their implications and medical management.    58 y.o. male with s/p R foot bunionectomy, [place].    -right foot xrays reviewed. Complete healing noted at 1st met osteotomy site. HW intact.   -minimal pain over R foot today. I believe he recovered well from R foot surgery.     -I reviewed imaging, clinical history, and diagnosis as above with the patient. I attempted to use layman's terms to educate the patient as well as utilize foot models and/or pictures. I personally went through imaging with the patient.    -The nature of the condition, options for management, as well as potential risks and complications were discussed in detail with patient. Patient was amenable to my recommendations and left my office fully informed and will follow up as instructed or sooner if necessary.    -It was discussed the importance of wearing shoes with adequate room in toe box to accommodate toe deformities. Recommended New Balance/[employer]ics shoe brands with adequate arch supports to alleviate abnormal pressure and improve stability of foot while walking. Avoid flat shoes and barefoot walking as these will exacerbate or worsen  symptoms.   -f/u prn    Note dictated with voice recognition software, please excuse any grammatical errors.

## 2023-08-17 ENCOUNTER — TELEPHONE (OUTPATIENT)
Dept: NEUROSURGERY | Facility: CLINIC | Age: 58
End: 2023-08-17
Payer: OTHER GOVERNMENT

## 2023-08-18 ENCOUNTER — OFFICE VISIT (OUTPATIENT)
Dept: NEUROSURGERY | Facility: CLINIC | Age: 58
End: 2023-08-18
Payer: OTHER GOVERNMENT

## 2023-08-18 VITALS
SYSTOLIC BLOOD PRESSURE: 124 MMHG | BODY MASS INDEX: 28.98 KG/M2 | OXYGEN SATURATION: 96 % | DIASTOLIC BLOOD PRESSURE: 91 MMHG | HEART RATE: 67 BPM | WEIGHT: 218.69 LBS | HEIGHT: 73 IN

## 2023-08-18 DIAGNOSIS — Z98.890 STATUS POST LUMBAR MICRODISCECTOMY: Primary | ICD-10-CM

## 2023-08-18 PROCEDURE — 99024 PR POST-OP FOLLOW-UP VISIT: ICD-10-PCS | Mod: ,,, | Performed by: NEUROLOGICAL SURGERY

## 2023-08-18 PROCEDURE — 99024 POSTOP FOLLOW-UP VISIT: CPT | Mod: ,,, | Performed by: NEUROLOGICAL SURGERY

## 2023-08-18 RX ORDER — METHYLPREDNISOLONE 4 MG/1
TABLET ORAL
Qty: 21 EACH | Refills: 0 | Status: SHIPPED | OUTPATIENT
Start: 2023-08-18 | End: 2023-09-08

## 2023-08-18 NOTE — PROGRESS NOTES
POV    6 weeks from left L5/S1 MLD for foraminal disc herniation    Back pain and leg pain improved after surgery  Starting 1 week ago, he began to experience mild aching over the low back and some deep aching in the foreleg.   These symptoms are not as severe as preop.  No weakness.  Some continued sensory change over the plantar foot.    Incision well-healed  DF/HL and PF 5/5    A/P:  Lifting restriction 35 lbs  Medrol madeline  PT  RTC 6 weeks

## 2023-08-21 ENCOUNTER — OFFICE VISIT (OUTPATIENT)
Dept: PAIN MEDICINE | Facility: CLINIC | Age: 58
End: 2023-08-21
Payer: OTHER GOVERNMENT

## 2023-08-21 VITALS
DIASTOLIC BLOOD PRESSURE: 84 MMHG | SYSTOLIC BLOOD PRESSURE: 136 MMHG | OXYGEN SATURATION: 95 % | RESPIRATION RATE: 18 BRPM | HEART RATE: 59 BPM

## 2023-08-21 DIAGNOSIS — M50.30 DDD (DEGENERATIVE DISC DISEASE), CERVICAL: ICD-10-CM

## 2023-08-21 DIAGNOSIS — M54.16 LUMBAR RADICULOPATHY: ICD-10-CM

## 2023-08-21 DIAGNOSIS — M51.36 DDD (DEGENERATIVE DISC DISEASE), LUMBAR: Primary | ICD-10-CM

## 2023-08-21 DIAGNOSIS — M43.16 SPONDYLOLISTHESIS OF LUMBAR REGION: ICD-10-CM

## 2023-08-21 DIAGNOSIS — M47.816 LUMBAR SPONDYLOSIS: ICD-10-CM

## 2023-08-21 DIAGNOSIS — Z98.890 STATUS POST LUMBAR MICRODISCECTOMY: ICD-10-CM

## 2023-08-21 PROCEDURE — 99999 PR PBB SHADOW E&M-EST. PATIENT-LVL IV: ICD-10-PCS | Mod: PBBFAC,,,

## 2023-08-21 PROCEDURE — 99214 PR OFFICE/OUTPT VISIT, EST, LEVL IV, 30-39 MIN: ICD-10-PCS | Mod: S$PBB,,,

## 2023-08-21 PROCEDURE — 99214 OFFICE O/P EST MOD 30 MIN: CPT | Mod: S$PBB,,,

## 2023-08-21 PROCEDURE — 99999 PR PBB SHADOW E&M-EST. PATIENT-LVL IV: CPT | Mod: PBBFAC,,,

## 2023-08-21 PROCEDURE — 99214 OFFICE O/P EST MOD 30 MIN: CPT | Mod: PBBFAC,PN
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
## 2023-08-23 ENCOUNTER — CLINICAL SUPPORT (OUTPATIENT)
Dept: REHABILITATION | Facility: HOSPITAL | Age: 58
End: 2023-08-23
Attending: NEUROLOGICAL SURGERY
Payer: OTHER GOVERNMENT

## 2023-08-23 DIAGNOSIS — M54.2 NECK PAIN: ICD-10-CM

## 2023-08-23 DIAGNOSIS — M54.16 LUMBAR RADICULOPATHY: ICD-10-CM

## 2023-08-23 DIAGNOSIS — M50.30 DDD (DEGENERATIVE DISC DISEASE), CERVICAL: ICD-10-CM

## 2023-08-23 DIAGNOSIS — Z98.890 STATUS POST LUMBAR MICRODISCECTOMY: ICD-10-CM

## 2023-08-23 DIAGNOSIS — M54.50 LUMBAR PAIN: ICD-10-CM

## 2023-08-23 PROCEDURE — 97162 PT EVAL MOD COMPLEX 30 MIN: CPT

## 2023-08-23 PROCEDURE — 97112 NEUROMUSCULAR REEDUCATION: CPT

## 2023-08-23 NOTE — PLAN OF CARE
[facility]   Physical Therapy Initial Evaluation      Name: Enoch Barr  [facility] Number: [id number]    Therapy Diagnosis:   Encounter Diagnoses   Name Primary?    Status post lumbar microdiscectomy     DDD (degenerative disc disease), cervical     Lumbar radiculopathy     Neck pain     Lumbar pain         Physician: Martinez Robertson PA-C    Physician Orders: PT Eval and Treat   Medical Diagnosis from Referral:   Z98.890 (ICD-10-CM) - Status post lumbar microdiscectomy   M50.30 (ICD-10-CM) - DDD (degenerative disc disease), cervical   M54.16 (ICD-10-CM) - Lumbar radiculopathy     Evaluation Date: 8/23/2023  Authorization Period Expiration: 12/31/23  Plan of Care Expiration: 10/6/23  Progress Note Due: 9/23/23  Visit # / Visits authorized: 1/ 1   FOTO: 1    Precautions: Standard, spinal, 35# lifting restrictions    Time In: 1:00 pm   Time Out: 1:40 pm   Total Billable Time: 40 minutes    Subjective     Date of onset: DOS: 7/5/23, neck pain - chronic     History of current condition - Enoch reports: that he had microdiscectomy on 7/5/23. Pt stated that prior to surgery he was having back pain and pain radiating into (L) LE for a few years. Pt stated that since surgery he has been experiencing soreness in lower back and sometimes pain in lateral (L) LE to ankle.  Pt stated that he was not given a brace after surgery. Pt stated that MD told him no quick movement, don't stay in same spot too long, and at follow up apt increased lifting restrictions to 35#.   Pt stated that he has been experiencing pain in (L) upper back extending into (L) neck and sometimes experiences pain radiating into posterior (L) UE proximal to elbow. Pt stated that he has been experiencing neck pain off and on for a few years. Pt denies specific injury to his neck. Pt stated that he is (R) hand dominant.  Falls: no     Imaging:  see imaging section in pt chart review     Prior Therapy: yes   Social History: Pt stated that  she he lives with his wife. Pt stated that he has one step to enter his [facility].   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 Pt stated that he is walking, standing, and working on computer at work  Prior Level of Function: lumbar pain and pain radiating into (L) LE for a few years, neck pain off and on for a few years  Current Level of Function: report of pain when performing aggravating factors listed below     Pain:  Current 6/10, worst 8/10, best 0/10   Location: left neck extending into (L) Thoracic region and intermittently radiating into posterior (L) UE proximal to elbow    Description: Aching and Sharp  Aggravating Factors: turning head to (L) and looking down to (L)   Easing Factors: Ibuprofen, time     Current 5/10, worst 5/10, best 0/10   Location: bilateral lumbar with intermittent pain radiating into lateral (L) LE   Description: soreness, achy   Aggravating Factors: twisting  Easing Factors: lying on his back or lying on side with back against couch, changing positions    Patients goals: get pain to go away or go down to a more manageable level     Medical History:   Past Medical History:   Diagnosis Date    GERD (gastroesophageal reflux disease)     Hypercholesteremia     Hypertension     on medication    Lumbar spondylosis 9/21/2020    Migraines     Sleep apnea        Surgical History:   Enoch Barr  has a past surgical history that includes Sinus surgery (2012); Hernia repair; Colonoscopy (N/A, 8/10/2020); Repair of triceps tendon (Left, 10/2/2020); Laparoscopic cholecystectomy (N/A, 1/25/2022); Epidural steroid injection (Left, 2/17/2023); Surgical removal of bunion with osteotomy of metatarsal bone (Right, 3/24/2023); Epidural steroid injection (Bilateral, 4/14/2023); Epidural steroid injection (Left, 6/2/2023); Laminectomy (Left, 7/5/2023); and Lumbar discectomy (Left, 7/5/2023).    Medications:   Enoch has a current medication list which includes the following  prescription(s): atorvastatin, cromolyn, diclofenac sodium, fluticasone propionate, gabapentin, hydrocodone-acetaminophen, lisinopril-hydrochlorothiazide, methylprednisolone, metoprolol succinate, omeprazole, and trazodone, and the following Facility-Administered Medications: hydrocodone-acetaminophen, hydromorphone, and ondansetron.    Allergies:   Review of patient's allergies indicates:  No Known Allergies     Objective      Cervical AROM: Pain/Dysfunction with Movement:   Flexion 45 degrees     Extension 25 degrees Report of soreness in (L) neck    Right side bending 25 degrees Report of stretch in (L) neck/UT    Left side bending 15 degrees Report of pain in (L) neck    Right rotation 65 degrees     Left rotation 65 degrees Report of soreness in (L) neck      Shoulder Right  Left  Pain/Dysfunction with Movement    AROM MMT AROM MMT    flexion WFL 5/5 WFL 5/5    abduction WFL 4+/5 WFL 4+/5    Internal rotation WFL 5/5 WFL 5/5    ER at 90° abd WFL NT WFL NT    ER at 0° abd NT 4+/5 NT 4+/5      Lumbar AROM deferred     Hip Right  Left  Pain/Dysfunction with Movement    AROM MMT AROM MMT    Flexion WFL 4+/5 WFL 4+/5    Extension NT NT NT NT Pt performed fair bridge against gravity and c/o pain in (L) lumbar region and radiating into (L) LE    Abduction WFL 4/5 WFL 4/5    External rotation WFL 4/5 WFL 4/5       Knee Right  Left  Pain/Dysfunction with Movement    AROM MMT AROM MMT    Flexion WFL 5/5 WFL 5/5    Extension WFL 5/5 WFL 5/5      Ankle Right  Left  Pain/Dysfunction with Movement    AROM MMT AROM MMT    Plantarflexion WFL 4+/5 WFL 4+/5    Dorsiflexion WFL 5/5 WFL 5/5      90/90 Hamstring Test:  (B) = lacking 15 degrees     Posture: flexed posture in sitting    Intake Outcome Measure for FOTO Lumbar Survey    Therapist reviewed FOTO scores for Enoch SANDRINE Barr on 8/23/2023.   FOTO report - see Media section or FOTO account episode details.    Intake Score: 53         Treatment     Total Treatment time  "(time-based codes) separate from Evaluation: 9 minutes     Enoch received the treatments listed below:        neuromuscular re-education activities to improve: Posture and stabilization, muscle activation for 9 minutes. The following activities were included:  (B) UE ER w/scap retractions 2x10 3"   Seated chin tucks 2x10 3"   Seated TA bracing 2x10 3"         Patient Education and Home Exercises     Education provided:   - role of PT - pt verbalized understanding  - HEP - pt was instructed to stop performing particular activity if particular activity causes/increases pain - pt verbalized understanding    Written Home Exercises Provided: yes. Exercises were reviewed and Enoch was able to demonstrate them prior to the end of the session.  Enoch demonstrated good  understanding of the education provided. See EMR under Patient Instructions for exercises provided during therapy sessions.    Assessment     Enoch is a 58 y.o. male referred to outpatient Physical Therapy with a medical diagnosis of Status post lumbar microdiscectomy,DDD (degenerative disc disease), cervical, and lumbar radiculopathy. Patient presents with decreased/painful cervical (L) SB AROM, mild decreased (B) shoulder abduction and ER MMT scores, decreased (B) hip MMT scores, and decreased postural awareness. Pt will benefit from skilled PT.     Patient prognosis is Good.   Patient will benefit from skilled outpatient Physical Therapy to address the deficits stated above and in the chart below, provide patient /family education, and to maximize patientt's level of independence.     Plan of care discussed with patient: Yes  Patient's spiritual, cultural and educational needs considered and patient is agreeable to the plan of care and goals as stated below:     Anticipated Barriers for therapy: chronicity of pain     Medical Necessity is demonstrated by the following  History  Co-morbidities and personal factors that may impact the plan of care [] LOW: no " personal factors / co-morbidities  [x] MODERATE: 1-2 personal factors / co-morbidities  [] HIGH: 3+ personal factors / co-morbidities    Moderate / High Support Documentation:   Co-morbidities affecting plan of care: HTN, lumbar surgery    Personal Factors:   no deficits     Examination  Body Structures and Functions, activity limitations and participation restrictions that may impact the plan of care [] LOW: addressing 1-2 elements  [x] MODERATE: 3+ elements  [] HIGH: 4+ elements (please support below)    Moderate / High Support Documentation: ROM, strength, posture     Clinical Presentation [] LOW: stable  [x] MODERATE: Evolving  [] HIGH: Unstable     Decision Making/ Complexity Score: moderate       Goals:  Short Term Goals: 1 weeks   Pt will be compliant with HEP to supplement PT with decreasing pain and improving functional mobility    Long Term Goals: 6 weeks   Pt will improve FOTO score to 57 in order to demo improved functional mobility  Pt will improve LE MMT scores by at least 1/2 grade where deficits noted in order to improve strength for functional tasks   Pt will report lumbar pain and pain radiating into (L) LE </= 2/10 at worst in order to be able to perform ADLs with less difficulty  Pt will perform cervical AROM in all planes measured above without c/o pain/soreness in order to improve functional mobility  Pt will report neck pain and pain radiating into (L) UE </= 4/10 at worst in order to be able to perform ADLs with less difficulty  Plan     Plan of care Certification: 8/23/2023 to 10/6/23.    Outpatient Physical Therapy 2 times weekly for 6 weeks to include the following interventions: Gait Training, Manual Therapy, Moist Heat/ Ice, Neuromuscular Re-ed, Patient Education, Self Care, Therapeutic Activities, Therapeutic Exercise, and IASTM, dry needling, and modalities prn .     Miryam Hogan, PT        Physician's Signature: _________________________________________ Date: ________________

## 2023-08-24 DIAGNOSIS — M51.36 DDD (DEGENERATIVE DISC DISEASE), LUMBAR: ICD-10-CM

## 2023-08-24 RX ORDER — METHOCARBAMOL 750 MG/1
TABLET, FILM COATED ORAL
Qty: 90 TABLET | Refills: 1 | Status: SHIPPED | OUTPATIENT
Start: 2023-08-24

## 2023-08-28 ENCOUNTER — PATIENT MESSAGE (OUTPATIENT)
Dept: PAIN MEDICINE | Facility: CLINIC | Age: 58
End: 2023-08-28
Payer: OTHER GOVERNMENT

## 2023-08-29 ENCOUNTER — OFFICE VISIT (OUTPATIENT)
Dept: CARDIOLOGY | Facility: CLINIC | Age: 58
End: 2023-08-29
Payer: OTHER GOVERNMENT

## 2023-08-29 ENCOUNTER — CLINICAL SUPPORT (OUTPATIENT)
Dept: REHABILITATION | Facility: HOSPITAL | Age: 58
End: 2023-08-29
Payer: OTHER GOVERNMENT

## 2023-08-29 VITALS
SYSTOLIC BLOOD PRESSURE: 118 MMHG | OXYGEN SATURATION: 95 % | WEIGHT: 218.13 LBS | RESPIRATION RATE: 18 BRPM | HEIGHT: 72 IN | BODY MASS INDEX: 29.54 KG/M2 | HEART RATE: 70 BPM | DIASTOLIC BLOOD PRESSURE: 82 MMHG

## 2023-08-29 DIAGNOSIS — K21.9 GASTROESOPHAGEAL REFLUX DISEASE WITHOUT ESOPHAGITIS: ICD-10-CM

## 2023-08-29 DIAGNOSIS — I10 ESSENTIAL HYPERTENSION: Primary | ICD-10-CM

## 2023-08-29 DIAGNOSIS — E78.00 HYPERCHOLESTEROLEMIA: ICD-10-CM

## 2023-08-29 DIAGNOSIS — M54.50 LUMBAR PAIN: ICD-10-CM

## 2023-08-29 DIAGNOSIS — M54.2 NECK PAIN: Primary | ICD-10-CM

## 2023-08-29 DIAGNOSIS — G47.33 OSA (OBSTRUCTIVE SLEEP APNEA): ICD-10-CM

## 2023-08-29 PROCEDURE — 99214 PR OFFICE/OUTPT VISIT, EST, LEVL IV, 30-39 MIN: ICD-10-PCS | Mod: S$PBB,,, | Performed by: INTERNAL MEDICINE

## 2023-08-29 PROCEDURE — 93005 ELECTROCARDIOGRAM TRACING: CPT | Mod: PBBFAC,PO | Performed by: INTERNAL MEDICINE

## 2023-08-29 PROCEDURE — 97110 THERAPEUTIC EXERCISES: CPT | Mod: CQ

## 2023-08-29 PROCEDURE — 93010 ELECTROCARDIOGRAM REPORT: CPT | Mod: S$PBB,,, | Performed by: INTERNAL MEDICINE

## 2023-08-29 PROCEDURE — 99999 PR PBB SHADOW E&M-EST. PATIENT-LVL IV: CPT | Mod: PBBFAC,,, | Performed by: INTERNAL MEDICINE

## 2023-08-29 PROCEDURE — 99214 OFFICE O/P EST MOD 30 MIN: CPT | Mod: S$PBB,,, | Performed by: INTERNAL MEDICINE

## 2023-08-29 PROCEDURE — 93010 EKG 12-LEAD: ICD-10-PCS | Mod: S$PBB,,, | Performed by: INTERNAL MEDICINE

## 2023-08-29 PROCEDURE — 99214 OFFICE O/P EST MOD 30 MIN: CPT | Mod: PBBFAC,PO | Performed by: INTERNAL MEDICINE

## 2023-08-29 PROCEDURE — 99999 PR PBB SHADOW E&M-EST. PATIENT-LVL IV: ICD-10-PCS | Mod: PBBFAC,,, | Performed by: INTERNAL MEDICINE

## 2023-08-29 NOTE — PROGRESS NOTES
CARDIOVASCULAR CONSULTATION    REASON FOR CONSULT:   Enoch Barr is a 58 y.o. male who presents for evaluation chest tightness.      HISTORY OF PRESENT ILLNESS:     Patient is a pleasant 55-year-old.  Family history significant for coronary artery disease and mom had a massive heart attack at the age of 60 and  from it.  Has chest tightness at rest, gets worse on going up a flight of stairs.  Associated with shortness of breath with also gets worse on going up 1 flights of stairs.  Denies orthopnea, PND, swelling of feet.  EKG was personally reviewed and demonstrates normal sinus rhythm.    Also has daily palpitations.  Will check Holter monitor for it.    Notes from 2020:  Patient here for follow-up.  Denies any chest pains at rest on exertion, orthopnea, PND. No chest pains    Sinus rhythm with heart rates varying between 40 and 126 bpm with an average of 69 bpm. Total time in sinus rhythm was approximately 48 hours  There were very rare PVCs totalling 7  There were very rare PACs totalling 33  SYMPTOMS OF CHEST TIGHTNESS ASSOCIATED WITH NORMAL SINUS RHYTHM/SINUS TACHYCARDIA    The study shows equivocal myocardial perfusion.  Cannot exclude inferior ischemia vs attenuation.    Perfusion Defect There is a mild intensity, mostly fixed with some reversibilty defect in the basal to distal inferior wall(s).    Visually estimated ejection fraction is normal at stress.    There is normal wall motion post stress.    The EKG portion of this study is abnormal but not diagnostic. (Erwin 13:18, 13.7 METS, 106% MPHR, 1mm upsloping St depression).    The patient reported no chest pain during the stress test.    Consider Cardiac PET vs cath if high clinical suspicion for CAD.     Notes from 2020:  Patient here for follow-up.  Denies any chest pains at rest on exertion, orthopnea, PND.  States that the chest pain is gone away after starting metoprolol.    No evidence of hemodynamically significant  infrainguinal PAD bilaterally.  Tri- and biphasic waveforms throughout.  Normal MAT bilaterally      Normal resting MAT bilaterally.  Normal PVR waveforms bilaterally.  Borderline abnormal exercise MAT bilaterally (R 1.2->0.98; L 1.07->0.95)      Notes from March 2022:  Patient here for follow-up.  Denies any chest pains at rest on exertion, orthopnea, PND.  Has been doing fine.    Notes from February 23: Patient here for follow-up.  Has been doing fine.  Denies any chest pains at rest on exertion, orthopnea, PND.  EKG done today shows normal sinus rhythm, normal EKG.      Notes from August 23: Patient here for follow-up.  Denies any chest pains at rest on exertion, orthopnea, PND, swelling of feet.  Has been doing fine.    PAST MEDICAL HISTORY:     Past Medical History:   Diagnosis Date    GERD (gastroesophageal reflux disease)     Hypercholesteremia     Hypertension     on medication    Lumbar spondylosis 9/21/2020    Migraines     Sleep apnea        PAST SURGICAL HISTORY:     Past Surgical History:   Procedure Laterality Date    COLONOSCOPY N/A 8/10/2020    Procedure: COLONOSCOPY;  Surgeon: April Dos Santos MD;  Location: [facility] ENDO;  Service: Endoscopy;  Laterality: N/A;    EPIDURAL STEROID INJECTION Left 2/17/2023    Procedure: Left L5 Transforaminal Epidural Steroid Injection;  Surgeon: Santana Rojo Jr., MD;  Location: [facility] ENDO;  Service: Pain Management;  Laterality: Left;  @1245  No ATC or DM    EPIDURAL STEROID INJECTION Bilateral 4/14/2023    Procedure: Bilateral L3, L4, L5 Medial Branch Blocks #1;  Surgeon: Santana Rojo Jr., MD;  Location: [facility] ENDO;  Service: Pain Management;  Laterality: Bilateral;  @1230 (requests afternoon)  No ATC or DM    EPIDURAL STEROID INJECTION Left 6/2/2023    Procedure: Left L5 + S1 Transforaminal Epidural Steroid Injections;  Surgeon: Santana Rojo Jr., MD;  Location: [facility] ENDO;  Service: Pain Management;  Laterality: Left;  @1300  No ATC or DM    HERNIA  REPAIR      LAMINECTOMY Left 7/5/2023    Procedure: LAMINECTOMY, SPINE;  Surgeon: Richard Pineda MD;  Location: [facility] OR;  Service: Neurosurgery;  Laterality: Left;  Left L5/S1 discectomy      LAPAROSCOPIC CHOLECYSTECTOMY N/A 1/25/2022    Procedure: CHOLECYSTECTOMY, LAPAROSCOPIC;  Surgeon: Jarrod Martinez MD;  Location: [facility] OR;  Service: General;  Laterality: N/A;    LUMBAR DISCECTOMY Left 7/5/2023    Procedure: DISCECTOMY, SPINE, LUMBAR;  Surgeon: Richard Pineda MD;  Location: [facility] OR;  Service: Neurosurgery;  Laterality: Left;    REPAIR OF TRICEPS TENDON Left 10/2/2020    Procedure: REPAIR, TENDON, TRICEPS;  Surgeon: Keysha Galvez MD;  Location: [facility] OR;  Service: Orthopedics;  Laterality: Left;  RN PRE OP DONE 9/29/2020----COVID NEGATIVE ON 9/29  Arthrex Rep: Delma [phone]    SINUS SURGERY  2012    SURGICAL REMOVAL OF BUNION WITH OSTEOTOMY OF METATARSAL BONE Right 3/24/2023    Procedure: BUNIONECTOMY, WITH METATARSAL OSTEOTOMY;  Surgeon: Shannan An DPM;  Location: [facility] OR;  Service: Podiatry;  Laterality: Right;       ALLERGIES AND MEDICATION:   Review of patient's allergies indicates:  No Known Allergies     Medication List            Accurate as of August 29, 2023  3:01 PM. If you have any questions, ask your nurse or doctor.                CONTINUE taking these medications      atorvastatin 40 MG tablet  Commonly known as: LIPITOR  Take 1 tablet (40 mg total) by mouth every evening.     cromolyn 5.2 mg/spray (4 %) nasal spray  Commonly known as: NASALCHROM  1 spray by Nasal route 4 (four) times daily.     diclofenac sodium 1 % Gel  Commonly known as: VOLTAREN  Apply 2 g topically 4 (four) times daily.     fluticasone propionate 50 mcg/actuation nasal spray  Commonly known as: FLONASE  1 spray (50 mcg total) by Each Nostril route once daily.     gabapentin 300 MG capsule  Commonly known as: NEURONTIN  Take 1 capsule (300 mg total) by mouth 3 (three) times daily.     HYDROcodone-acetaminophen  5-325 mg per tablet  Commonly known as: NORCO  Take 1 tablet by mouth every 6 (six) hours as needed for Pain.     lisinopriL-hydrochlorothiazide 20-12.5 mg per tablet  Commonly known as: PRINZIDE,ZESTORETIC  TAKE 1 TABLET BY MOUTH EVERY DAY     methocarbamoL 750 MG Tab  Commonly known as: ROBAXIN  TAKE 1 TABLET(750 MG) BY MOUTH THREE TIMES DAILY AS NEEDED FOR MUSCLE SPASMS     methylPREDNISolone 4 mg tablet  Commonly known as: MEDROL DOSEPACK  use as directed     metoprolol succinate 25 MG 24 hr tablet  Commonly known as: TOPROL-XL  Take 1 tablet (25 mg total) by mouth once daily.     omeprazole 20 MG capsule  Commonly known as: PRILOSEC  TAKE 1 CAPSULE(20 MG) BY MOUTH EVERY DAY. DECREASED DOSE     traZODone 50 MG tablet  Commonly known as: DESYREL  Take 2 tablets (100 mg total) by mouth nightly as needed for Insomnia.              SOCIAL HISTORY:     Social History     Socioeconomic History    Marital status:    Occupational History    Occupation: production      Employer: [employer]   Tobacco Use    Smoking status: Former     Current packs/day: 0.00     Types: Cigarettes     Quit date: 1999     Years since quittin.2    Smokeless tobacco: Never   Substance and Sexual Activity    Alcohol use: Not Currently     Alcohol/week: 7.0 standard drinks of alcohol     Types: 7 Glasses of wine per week     Comment: occasionally    Drug use: Never    Sexual activity: Not Currently     Partners: Female     Social Determinants of Health     Financial Resource Strain: Unknown (2023)    Overall Financial Resource Strain (CARDIA)     Difficulty of Paying Living Expenses: Patient refused   Food Insecurity: Unknown (2023)    Hunger Vital Sign     Worried About Running Out of Food in the Last Year: Never true     Ran Out of Food in the Last Year: Patient refused   Transportation Needs: No Transportation Needs (2023)    PRAPARE - Transportation     Lack of Transportation  (Medical): No     Lack of Transportation (Non-Medical): No   Physical Activity: Insufficiently Active (7/19/2023)    Exercise Vital Sign     Days of Exercise per Week: 4 days     Minutes of Exercise per Session: 30 min   Stress: No Stress Concern Present (7/19/2023)    [employer] [place] [employer] - Occupational Stress Questionnaire     Feeling of Stress : Only a little   Social Connections: Unknown (7/19/2023)    Social Connection and Isolation Panel [NHANES]     Frequency of Communication with Friends and Family: Three times a week     Frequency of Social Gatherings with Friends and Family: Patient refused     Active Member of Clubs or Organizations: No     Attends Club or Organization Meetings: Never     Marital Status:    Housing Stability: Low Risk  (7/19/2023)    Housing Stability Vital Sign     Unable to Pay for Housing in the Last Year: No     Number of Places Lived in the Last Year: 1     Unstable Housing in the Last Year: No       FAMILY HISTORY:     Family History   Problem Relation Age of Onset    Coronary artery disease Mother     Hypertension Father     No Known Problems Sister     No Known Problems Brother     Valvular heart disease Sister     No Known Problems Brother     No Known Problems Brother     No Known Problems Maternal Aunt     No Known Problems Maternal Uncle     No Known Problems Paternal Aunt     No Known Problems Paternal Uncle     No Known Problems Maternal Grandmother     No Known Problems Maternal Grandfather     No Known Problems Paternal Grandmother     No Known Problems Paternal Grandfather     Amblyopia Neg Hx     Blindness Neg Hx     Cancer Neg Hx     Cataracts Neg Hx     Diabetes Neg Hx     Glaucoma Neg Hx     Macular degeneration Neg Hx     Retinal detachment Neg Hx     Strabismus Neg Hx     Stroke Neg Hx     Thyroid disease Neg Hx        REVIEW OF SYSTEMS:   Review of Systems   Constitutional: Negative.   HENT: Negative.     Eyes: Negative.     Cardiovascular: Negative.    Respiratory: Negative.     Endocrine: Negative.    Hematologic/Lymphatic: Negative.    Skin: Negative.    Musculoskeletal: Negative.    Gastrointestinal: Negative.    Genitourinary: Negative.    Neurological: Negative.    Psychiatric/Behavioral: Negative.     Allergic/Immunologic: Negative.        A 10 point review of systems was performed and all the pertinent positives have been mentioned. Rest of review of systems was negative.        PHYSICAL EXAM:     Vitals:    08/29/23 1427   BP: 118/82   Pulse: 70   Resp: 18    Body mass index is 29.59 kg/m².  Weight: 98.9 kg (218 lb 2.3 oz)   Height: 6' (182.9 cm)     Physical Exam  Vitals reviewed.   Constitutional:       Appearance: He is well-developed.   HENT:      Head: Normocephalic.   Eyes:      Conjunctiva/sclera: Conjunctivae normal.      Pupils: Pupils are equal, round, and reactive to light.   Cardiovascular:      Rate and Rhythm: Normal rate and regular rhythm.      Heart sounds: Normal heart sounds.   Pulmonary:      Effort: Pulmonary effort is normal.      Breath sounds: Normal breath sounds.   Abdominal:      General: Bowel sounds are normal.      Palpations: Abdomen is soft.   Musculoskeletal:      Cervical back: Normal range of motion and neck supple.   Skin:     General: Skin is warm.   Neurological:      Mental Status: He is alert and oriented to person, place, and time.           DATA:     Laboratory:  CBC:  Recent Labs   Lab 01/25/22  0548 01/26/22  0520 02/24/23  1048   WBC 5.92 8.80 6.20   Hemoglobin 14.1 14.7 15.0   Hematocrit 42.1 43.4 44.5   Platelets 209 234 269         CHEMISTRIES:  Recent Labs   Lab 01/23/22  1230 01/24/22  0632 01/25/22  0548 01/26/22  0520 02/24/23  1048   Glucose 93 93 106 107  107 82   Sodium 139 138 139 137  137 140   Potassium 4.2 4.3 4.2 4.1  4.1 4.1   BUN 11 11 15 13  13 15   Creatinine 1.2 1.0 1.1 1.0  1.0 0.8   eGFR if African American >60 >60 >60 >60  >60  --    eGFR if non African  "American >60 >60 >60 >60  >60  --    Calcium 8.9 8.6 L 8.8 9.1  9.1 8.8   Magnesium 1.9  --  2.3 2.3  --          CARDIAC BIOMARKERS:        COAGS:  Recent Labs   Lab 01/23/22  1230   INR 1.1         LIPIDS/LFTS:  Recent Labs   Lab 01/11/21  0828 01/23/22  1230 01/25/22  0548 01/26/22  0520 03/23/22  0703 02/24/23  1048   Cholesterol 151  --   --   --  136 153   Triglycerides 154 H  --   --   --  153 H 105   HDL 42  --   --   --  34 L 45   LDL Cholesterol 78.2  --   --   --  71.4 87.0   Non-HDL Cholesterol 109  --   --   --  102 108   AST 28   < > 41 H 35  --  36   ALT 34   < > 134 H 104 H  --  58 H    < > = values in this interval not displayed.         No results found for: "HGBA1C"    TSH        The 10-year ASCVD risk score (Sandra LEMA, et al., 2019) is: 5.6%    Values used to calculate the score:      Age: 58 years      Sex: Male      Is Non- : No      Diabetic: No      Tobacco smoker: No      Systolic Blood Pressure: 118 mmHg      Is BP treated: Yes      HDL Cholesterol: 45 mg/dL      Total Cholesterol: 153 mg/dL             ASSESSMENT AND PLAN     Patient Active Problem List   Diagnosis    Refractive error    Other chronic sinusitis uses budesonide for this NOT for lungs    Essential hypertension    Gastroesophageal reflux disease without esophagitis    Chronic cluster headache, not intractable followed by neurology    Tubular adenoma of colon 08/10/2020 colonoscopy ascending colon tubular adenoma sigmoid hyperplastic polyps.  Rectal hyperplastic polyp.    Pulmonary nodule 2mm on CT; no further imaging    Lumbar spondylosis    DDD (degenerative disc disease), lumbar    Triceps tendon rupture, left, initial encounter    GM (obstructive sleep apnea)    Cholelithiasis    Hypercholesterolemia    Tension type headache    Cervical spine pain    Lumbar radiculopathy    History of bunionectomy of right great toe    Hallux rigidus of right foot    Antalgic gait    Weakness of right foot    " Muscle weakness affecting movement of foot    Neck pain    Lumbar pain       Chest tightness and dyspnea on exertion in a patient with multiple risk factors for coronary artery disease.  equivocal stress test.  Symptoms resolved after starting metoprolol.  Continue current therapy.        Dyslipidemia: lipitor.     Palpitations:  No significant arrhythmia.        Follow-up in 6 months              Thank you very much for involving me in the care of your patient.  Please do not hesitate to contact me if there are any questions.      Mario Moore MD, FACC, [facility]  Interventional Cardiologist, [facility].           This note was dictated with the help of speech recognition software.  There might be un-intended errors and/or substitutions.

## 2023-08-29 NOTE — PROGRESS NOTES
"[facility]   Physical Therapy Treatment Note      Name: Enoch Barr  [facility] Number: [id number]    Therapy Diagnosis:   Encounter Diagnoses   Name Primary?    Neck pain Yes    Lumbar pain      Physician: Martinez Robertson PA-C    Visit Date: 8/29/2023  Physician: Martinez Robertson PA-C     Physician Orders: PT Eval and Treat   Medical Diagnosis from Referral:   Z98.890 (ICD-10-CM) - Status post lumbar microdiscectomy   M50.30 (ICD-10-CM) - DDD (degenerative disc disease), cervical   M54.16 (ICD-10-CM) - Lumbar radiculopathy      Evaluation Date: 8/23/2023  Authorization Period Expiration: 12/31/23  Plan of Care Expiration: 10/6/23  Progress Note Due: 9/23/23  Visit # / Visits authorized: 1/ 1 1/15  FOTO: 1     Precautions: Standard, spinal, 35# lifting restrictions     Time In: 4:45 pm   Time Out: 5:25 pm   Total Billable Time: 40 minutes    PTA Visit #: 1/5       Subjective     Pt reports: he isn't getting the intense pain down the (L) leg like he did before the surgery. He is better since having the surgery. He is also sleeping better now.  He was compliant with home exercise program.  Response to previous treatment: no adverse affect  Functional change: progressing    Pain: 3/10, 4/10  Location: left neck, B lumbar       Objective      Objective Measures updated at progress report unless specified.     Treatment     Enoch received the treatments listed below:      therapeutic exercises to develop strength, endurance, ROM, flexibility, posture, and core stabilization for 40 minutes including:  (B) UE ER RTB w/scap retractions 2x10 3"   Seated chin tucks 2x10 3"   Seated TA bracing 2x10 3"   UT stretch B 3x30"  LS stretch B 3x30"  Scapular retractions 2x10 3"  Supine clams 2x10  Glute Sets 2x10 5"    Patient Education and Home Exercises       Education provided:   - log rolling for supine to sit transfer  - ice for pain    Written Home Exercises Provided: Patient instructed to cont prior HEP. " Exercises were reviewed and Enoch was able to demonstrate them prior to the end of the session.  Enoch demonstrated good  understanding of the education provided. See EMR under Patient Instructions for exercises provided during therapy sessions    Assessment     Overall patient is better since having the surgery. He presents with (L) lumbar and (L) cervical general soreness. Implemented therapeutic exercises this date to address patients physical deficits, muscle imbalances. He required cueing to correctly perform TrA activation due to new learning. He has not been regular with his new HEP issued at evalulation.    Enoch Is progressing well towards his goals.   Pt prognosis is Good.     Pt will continue to benefit from skilled outpatient physical therapy to address the deficits listed in the problem list box on initial evaluation, provide pt/family education and to maximize pt's level of independence in the home and community environment.     Pt's spiritual, cultural and educational needs considered and pt agreeable to plan of care and goals.     Anticipated barriers to physical therapy: chronicity of pain     Goals:   Short Term Goals: 1 weeks   Pt will be compliant with HEP to supplement PT with decreasing pain and improving functional mobility     Long Term Goals: 6 weeks   Pt will improve FOTO score to 57 in order to demo improved functional mobility  Pt will improve LE MMT scores by at least 1/2 grade where deficits noted in order to improve strength for functional tasks   Pt will report lumbar pain and pain radiating into (L) LE </= 2/10 at worst in order to be able to perform ADLs with less difficulty  Pt will perform cervical AROM in all planes measured above without c/o pain/soreness in order to improve functional mobility  Pt will report neck pain and pain radiating into (L) UE </= 4/10 at worst in order to be able to perform ADLs with less difficulty  Plan     Plan of care Certification: 8/23/2023 to  10/6/23.     Outpatient Physical Therapy 2 times weekly for 6 weeks to include the following interventions: Gait Training, Manual Therapy, Moist Heat/ Ice, Neuromuscular Re-ed, Patient Education, Self Care, Therapeutic Activities, Therapeutic Exercise, and IASTM, dry needling, and modalities prn .        Santana Kaufman, PTA

## 2023-08-29 NOTE — PROGRESS NOTES
[facility]   Physical Therapy Discharge Note     Name: Enoch Barr  [facility] Number: [id number]    Therapy Diagnosis:   Encounter Diagnoses   Name Primary?    Neck pain Yes    Lumbar pain      Physician: Martinez Robertson PA-C    Visit Date: 8/29/2023       Therapy Diagnosis:   Encounter Diagnoses  Name   Primary?             Decreased range of motion                 History of bunionectomy of right great toe                  Hallux rigidus of right foot                    Antalgic gait                 Weakness of right foot                         Muscle weakness affecting movement of foot                Physician: Shannan An DPM     Physician Orders: PT Eval and Treat   Medical Diagnosis from Referral: M25.60 (ICD-10-CM) - Decreased range of motion   Evaluation Date: 5/3/2023  Authorization Period Expiration: 12/31/23  Plan of Care Expiration: 7/3/23  Visit # / Visits authorized: 4/ 20   Progress Note Due: 7/20/23  FOTO: 1/ 1     Precautions: Standard       PTA Visit #: 2/5     Time In: 1420  Time Out: 1300    Total Billable Time: 35 minutes    Subjective     Pt reports: he is much improved. States the foot is 85% of normal. States he would like this to be his last visit.     He was compliant with home exercise program.  Response to previous treatment: decreased pain  Functional change: increase in activity    Pain: 0/10  Location:  R big toe MCP     Objective      Objective Measures updated at progress report unless specified.     Ankle Range of Motion:   Ankle   Right    Left  Dorsiflexion     12          12  Plantarflexion  55        55  Inversion         25       28  Eversion          14       15  Great toe extension    50deg PROM  60deg PROM  Great toe flexion         30deg PROM  40deg PROM      Strength:   Right Ankle                  Left Ankle          Dorsiflexion:    4+5      Dorsiflexion:    5/5  Plantarflexion:             2/5       Plantarflexion: 4/5  Inversion:         "4+/5      Inversion:        5/5  Eversion:         4+/5      Eversion:         5/5        Right LE                      Left LE   Hip flexion:      5/5       Hip flexion:      5/5  Hip extension:             4-/5      Hip extension: 4+/5  Hip abduction: 4+/5      Hip abduction: 4+/5  Knee extension:          4/5       Knee extension:          5/5  Knee flexion:   4+/5      Knee flexion:   4+/5       Treatment     Enoch received the treatments listed below:      therapeutic exercises to develop strength, endurance, ROM, and flexibility for 35 minutes includin way ankle GTB x 40  Seated calf raise, 3x20, 25lb KB (eccentric emphasis)  Standing calf raise, 3x10 DL @ edge of step  Gastroc Stretch on Incline 3x30"  Mini,  lunge with dowel 3 x 8  Sidestepping w/ RTB 20 ft x 3      Patient Education and Home Exercises       Education provided:   --HEP compliance    Written Home Exercises Provided: yes. Exercises were reviewed and Enoch was able to demonstrate them prior to the end of the session.  Enoch demonstrated good  understanding of the education provided. See EMR under Patient Instructions for exercises provided during therapy sessions    Assessment       Goals:    Long Term Goals: 8 weeks in progress  MET  1.Report decreased  in  pain at worse  less than  <   / =  2  /10  to increase tolerance for improved functional actvities.   2.Pt to improve great toe range of motion to 60deg extension to allow for improved functional mobility to allow for improvement in IADLs.  3.Increased BLE MMT 1 grade  to increase tolerance for ADL and work activities.   4. Pt will report clinically significant improvements on  FOTO outcome assessment  to increase functional activities and mobility. NT  5. Pt to be Independent with HEP to improve ROM and independence with ADL's    Mr. Barr has attended 10 sessions in our clinic beginning 5/3/23 for PT consisting of manual therapy, modalities, ROM activities, therex and HEP " instruction. The patient has responded well to physical therapy intervention. Demonstrates a good understanding of home program. Patient will discharged secondary to diminished complaints and goal achievement.         Plan     D/c to comprehensive home program. Pt to follow up with MD if further therapy is warranted.      Santana Kaufman, PTA    08/29/2023

## 2023-09-01 ENCOUNTER — APPOINTMENT (OUTPATIENT)
Dept: RADIOLOGY | Facility: HOSPITAL | Age: 58
End: 2023-09-01
Payer: OTHER GOVERNMENT

## 2023-09-01 DIAGNOSIS — M50.30 DDD (DEGENERATIVE DISC DISEASE), CERVICAL: ICD-10-CM

## 2023-09-01 PROCEDURE — 72052 X-RAY EXAM NECK SPINE 6/>VWS: CPT | Mod: 26,,, | Performed by: RADIOLOGY

## 2023-09-01 PROCEDURE — 72052 XR CERVICAL SPINE 5 VIEW WITH FLEX AND EXT: ICD-10-PCS | Mod: 26,,, | Performed by: RADIOLOGY

## 2023-09-01 PROCEDURE — 72052 X-RAY EXAM NECK SPINE 6/>VWS: CPT | Mod: TC,FY,PN

## 2023-09-06 ENCOUNTER — CLINICAL SUPPORT (OUTPATIENT)
Dept: REHABILITATION | Facility: HOSPITAL | Age: 58
End: 2023-09-06
Payer: OTHER GOVERNMENT

## 2023-09-06 DIAGNOSIS — M54.2 NECK PAIN: Primary | ICD-10-CM

## 2023-09-06 DIAGNOSIS — M54.50 LUMBAR PAIN: ICD-10-CM

## 2023-09-06 PROCEDURE — 97112 NEUROMUSCULAR REEDUCATION: CPT

## 2023-09-06 PROCEDURE — 97110 THERAPEUTIC EXERCISES: CPT

## 2023-09-06 NOTE — PROGRESS NOTES
"  Physical Therapy Daily Treatment Note     Name: Enoch DELUCA [facility] Number: [id number]    Therapy Diagnosis:   Encounter Diagnoses   Name Primary?    Neck pain Yes    Lumbar pain      Physician: Martinez Robertson PA-C    Visit Date: 9/6/2023    Physician Orders: PT Eval and Treat   Medical Diagnosis from Referral:   Z98.890 (ICD-10-CM) - Status post lumbar microdiscectomy   M50.30 (ICD-10-CM) - DDD (degenerative disc disease), cervical   M54.16 (ICD-10-CM) - Lumbar radiculopathy      Evaluation Date: 8/23/2023  Authorization Period Expiration: 12/31/23  Plan of Care Expiration: 10/6/23  Progress Note Due: 9/23/23  Visit # / Visits authorized: 1/ 1 2/15  FOTO: 3    Time In: 2:31 pm   Time Out: 3:11 pm   Total Billable Time: 40 minutes    Precautions:  Standard, spinal, 35# lifting restrictions    Subjective     Pt reports: that his neck and back are feeling sore today.   He was compliant with home exercise program.  Response to previous treatment: no adverse effects  Functional change: progressing     Pain: 4/10 (L) neck, 5/10 mid-left lumbar       Objective     therapeutic exercises to develop strength, ROM, flexibility for 25 minutes including:    UT stretch B 3x30"  LS stretch B 3x30"  SL clams 2x10 3"  Standing hip abduction 2x10 2#  Standing hip extension 2x10 2#    Enoch participated in neuromuscular re-education activities to improve: Posture and stabilization for 15 minutes. The following activities were included:  (B) UE ER RTB w/scap retractions 2x10 3"   Seated chin tucks 2x10 3"   Seated TA bracing 2x10 5"   Rows w/scap retractions 2x10 3" GTB   Standing braced marches 2x10 GTB   Palloff punches double PTB 2x10 B     Home Exercises Provided and Patient Education Provided     Education provided:   - HEP     Written Home Exercises Provided: Patient instructed to cont prior HEP.    Enoch demonstrated good  understanding of the education provided.     See EMR under Patient Instructions for exercises provided " prior visit.      Assessment     Pt was able to tolerate additional TE and NMR activities today. Pt tolerated therapy session without c/o increased pain.   Enoch Is progressing  towards his goals.   Pt prognosis is Good.     Pt will continue to benefit from skilled outpatient physical therapy to address the deficits listed in the problem list box on initial evaluation, provide pt/family education and to maximize pt's level of independence in the home and community environment.     Pt's spiritual, cultural and educational needs considered and pt agreeable to plan of care and goals.    Anticipated barriers to physical therapy: chronicity of pain     Goals:   Short Term Goals: 1 weeks   Pt will be compliant with HEP to supplement PT with decreasing pain and improving functional mobility     Long Term Goals: 6 weeks   Pt will improve FOTO score to 57 in order to demo improved functional mobility  Pt will improve LE MMT scores by at least 1/2 grade where deficits noted in order to improve strength for functional tasks   Pt will report lumbar pain and pain radiating into (L) LE </= 2/10 at worst in order to be able to perform ADLs with less difficulty  Pt will perform cervical AROM in all planes measured above without c/o pain/soreness in order to improve functional mobility  Pt will report neck pain and pain radiating into (L) UE </= 4/10 at worst in order to be able to perform ADLs with less difficulty    Plan     Continue per POC, progress as tolerated     Miryam Hogan, PT

## 2023-09-08 ENCOUNTER — CLINICAL SUPPORT (OUTPATIENT)
Dept: REHABILITATION | Facility: HOSPITAL | Age: 58
End: 2023-09-08
Payer: OTHER GOVERNMENT

## 2023-09-08 DIAGNOSIS — M54.50 LUMBAR PAIN: ICD-10-CM

## 2023-09-08 DIAGNOSIS — M54.2 NECK PAIN: Primary | ICD-10-CM

## 2023-09-08 PROCEDURE — 97112 NEUROMUSCULAR REEDUCATION: CPT | Mod: CQ

## 2023-09-08 PROCEDURE — 97110 THERAPEUTIC EXERCISES: CPT | Mod: CQ

## 2023-09-08 NOTE — PROGRESS NOTES
"  Physical Therapy Daily Treatment Note     Name: Enoch DELUCA [place]  [facility] Number: [id number]    Therapy Diagnosis:   Encounter Diagnoses   Name Primary?    Neck pain Yes    Lumbar pain        Physician: Martinez Robertson PA-C    Visit Date: 9/8/2023    Physician Orders: PT Eval and Treat   Medical Diagnosis from Referral:   Z98.890 (ICD-10-CM) - Status post lumbar microdiscectomy   M50.30 (ICD-10-CM) - DDD (degenerative disc disease), cervical   M54.16 (ICD-10-CM) - Lumbar radiculopathy      Evaluation Date: 8/23/2023  Authorization Period Expiration: 12/31/23  Plan of Care Expiration: 10/6/23  Progress Note Due: 9/23/23  Visit # / Visits authorized: 1/ 1 4/15  FOTO: 3    Time In: 1:00 pm   Time Out: 1:46 pm   Total Billable Time: 44 minutes (2 minutes not billed d/t phone call)     Precautions:  Standard, spinal, 35# lifting restrictions    Subjective     Pt reports: that he has minimal pain in his neck at rest, however it increased when moving his head to the side. Pt states that he has been sore following his therapy sessions but it tapers off.   He was compliant with home exercise program.  Response to previous treatment: no adverse effects  Functional change: progressing     Pain: 4/10 (L) neck, 5/10 mid-left lumbar       Objective     therapeutic exercises to develop strength, ROM, flexibility for 25 minutes including:    UT stretch B 3x30"  LS stretch B 3x30"  SL clams 2x10 3"  Standing hip abduction 2x10 2#  Standing hip extension 2x10 2#    Enoch participated in neuromuscular re-education activities to improve: Posture and stabilization for 19 minutes. The following activities were included:  (B) UE ER RTB w/scap retractions 2x10 3"   Seated chin tucks 2x10 3"   Seated TA bracing 2x10 5"   Rows w/scap retractions 2x10 3" BTB   Standing braced marches 2x10 BTB   Palloff punches double PTB 2x10 B     Home Exercises Provided and Patient Education Provided     Education provided:   - HEP     Written Home Exercises " Provided: Patient instructed to cont prior HEP.    Enoch demonstrated good  understanding of the education provided.     See EMR under Patient Instructions for exercises provided prior visit.      Assessment     Progressed theraband resistance during rows and pull downs with marches with pt tolerance. Verbal cues provided for breathing during transverse abdominal bracing and tactile cues provided for scapular setting during several activities. Will continue to progress pt per tolerance during upcoming sessions.     Enoch Is progressing  towards his goals.   Pt prognosis is Good.     Pt will continue to benefit from skilled outpatient physical therapy to address the deficits listed in the problem list box on initial evaluation, provide pt/family education and to maximize pt's level of independence in the home and community environment.     Pt's spiritual, cultural and educational needs considered and pt agreeable to plan of care and goals.    Anticipated barriers to physical therapy: chronicity of pain     Goals:   Short Term Goals: 1 weeks   Pt will be compliant with HEP to supplement PT with decreasing pain and improving functional mobility     Long Term Goals: 6 weeks   Pt will improve FOTO score to 57 in order to demo improved functional mobility  Pt will improve LE MMT scores by at least 1/2 grade where deficits noted in order to improve strength for functional tasks   Pt will report lumbar pain and pain radiating into (L) LE </= 2/10 at worst in order to be able to perform ADLs with less difficulty  Pt will perform cervical AROM in all planes measured above without c/o pain/soreness in order to improve functional mobility  Pt will report neck pain and pain radiating into (L) UE </= 4/10 at worst in order to be able to perform ADLs with less difficulty    Plan     Continue per POC, progress as tolerated     Janet Gurrola, KELLI

## 2023-09-11 ENCOUNTER — CLINICAL SUPPORT (OUTPATIENT)
Dept: REHABILITATION | Facility: HOSPITAL | Age: 58
End: 2023-09-11
Payer: OTHER GOVERNMENT

## 2023-09-11 DIAGNOSIS — M54.2 NECK PAIN: Primary | ICD-10-CM

## 2023-09-11 DIAGNOSIS — M54.50 LUMBAR PAIN: ICD-10-CM

## 2023-09-11 PROCEDURE — 97110 THERAPEUTIC EXERCISES: CPT | Mod: CQ

## 2023-09-11 PROCEDURE — 97112 NEUROMUSCULAR REEDUCATION: CPT | Mod: CQ

## 2023-09-11 NOTE — PROGRESS NOTES
"  Physical Therapy Daily Treatment Note     Name: Enoch Barr  [facility] Number: [id number]    Therapy Diagnosis:   Encounter Diagnoses   Name Primary?    Neck pain Yes    Lumbar pain          Physician: Martinez Robertson PA-C    Visit Date: 9/11/2023    Physician Orders: PT Eval and Treat   Medical Diagnosis from Referral:   Z98.890 (ICD-10-CM) - Status post lumbar microdiscectomy   M50.30 (ICD-10-CM) - DDD (degenerative disc disease), cervical   M54.16 (ICD-10-CM) - Lumbar radiculopathy      Evaluation Date: 8/23/2023  Authorization Period Expiration: 12/31/23  Plan of Care Expiration: 10/6/23  Progress Note Due: 9/23/23  Visit # / Visits authorized: 1/ 1 4/15  FOTO: 4    Time In: 4:40 pm   Time Out: 5:27 pm   Total Billable Time: 47 minutes     Precautions:  Standard, spinal, 35# lifting restrictions    Subjective     Pt reports: that he continues to have some pain in his neck and back, however feels that it is starting to lessen in intensity. Pt states that he would like to get back to the gym.    He was compliant with home exercise program.  Response to previous treatment: no adverse effects  Functional change: progressing     Pain: 3/10 (L) neck, 3/10 mid-left lumbar       Objective     therapeutic exercises to develop strength, ROM, flexibility for 25 minutes including:  UBE fwd/bkwd  2'/2'   UT stretch B 3x30"  LS stretch B 3x30"  SL clams 2x10 3"  Standing hip abduction 2x10 2#  Standing hip extension 2x10 2#  Monster walks GTB 2 laps     Enoch participated in neuromuscular re-education activities to improve: Posture and stabilization for 22 minutes. The following activities were included:  (B) UE ER RTB w/scap retractions 2x10 3"   Seated chin tucks 2x10 3"   Seated TA bracing 2x10 5"   Rows w/scap retractions 2x10 3" BTB   Standing braced marches 2x10 BTB   Palloff punches double PTB 2x10 B         Home Exercises Provided and Patient Education Provided     Education provided:   - HEP     Written Home " Exercises Provided: Patient instructed to cont prior HEP.    Enoch demonstrated good  understanding of the education provided.     See EMR under Patient Instructions for exercises provided prior visit.      Assessment     Pt exhibited tolerance to progressions this session without adverse effects. These progressions included monster walks for increased LE strength and UBE for upper extremity strength and muscular and cardiovascular endurance. No complaints of increased pain noted during session. Will continue to progress pt per his tolerance during future sessions.       Enoch Is progressing  towards his goals.   Pt prognosis is Good.     Pt will continue to benefit from skilled outpatient physical therapy to address the deficits listed in the problem list box on initial evaluation, provide pt/family education and to maximize pt's level of independence in the home and community environment.     Pt's spiritual, cultural and educational needs considered and pt agreeable to plan of care and goals.    Anticipated barriers to physical therapy: chronicity of pain     Goals:   Short Term Goals: 1 weeks   Pt will be compliant with HEP to supplement PT with decreasing pain and improving functional mobility     Long Term Goals: 6 weeks   Pt will improve FOTO score to 57 in order to demo improved functional mobility  Pt will improve LE MMT scores by at least 1/2 grade where deficits noted in order to improve strength for functional tasks   Pt will report lumbar pain and pain radiating into (L) LE </= 2/10 at worst in order to be able to perform ADLs with less difficulty  Pt will perform cervical AROM in all planes measured above without c/o pain/soreness in order to improve functional mobility  Pt will report neck pain and pain radiating into (L) UE </= 4/10 at worst in order to be able to perform ADLs with less difficulty    Plan     Continue per POC, progress as tolerated     Janet Gurrola PTA

## 2023-09-13 ENCOUNTER — CLINICAL SUPPORT (OUTPATIENT)
Dept: REHABILITATION | Facility: HOSPITAL | Age: 58
End: 2023-09-13
Payer: OTHER GOVERNMENT

## 2023-09-13 DIAGNOSIS — M54.50 LUMBAR PAIN: ICD-10-CM

## 2023-09-13 DIAGNOSIS — M54.2 NECK PAIN: Primary | ICD-10-CM

## 2023-09-13 PROCEDURE — 97110 THERAPEUTIC EXERCISES: CPT | Mod: CQ

## 2023-09-13 PROCEDURE — 97112 NEUROMUSCULAR REEDUCATION: CPT | Mod: CQ

## 2023-09-13 NOTE — PROGRESS NOTES
"  Physical Therapy Daily Treatment Note     Name: Enohc Barr  [facility] Number: [id number]    Therapy Diagnosis:   Encounter Diagnoses   Name Primary?    Neck pain Yes    Lumbar pain          Physician: Martinez Robertson PA-C    Visit Date: 9/13/2023    Physician Orders: PT Eval and Treat   Medical Diagnosis from Referral:   Z98.890 (ICD-10-CM) - Status post lumbar microdiscectomy   M50.30 (ICD-10-CM) - DDD (degenerative disc disease), cervical   M54.16 (ICD-10-CM) - Lumbar radiculopathy      Evaluation Date: 8/23/2023  Authorization Period Expiration: 12/31/23  Plan of Care Expiration: 10/6/23  Progress Note Due: 9/23/23  Visit # / Visits authorized: 1/ 1 5/15  FOTO: 4    Time In: 1:45 pm   Time Out: 2:31 pm   Total Billable Time: 46 minutes     Precautions:  Standard, spinal, 35# lifting restrictions    Subjective     Pt reports: that he continues to have some pain in his neck and back, however feels that it is starting to lessen in intensity. Pt states that he would like to get back to the gym.    He was compliant with home exercise program.  Response to previous treatment: no adverse effects  Functional change: progressing     Pain: 3/10 (L) neck, 3/10 mid-left lumbar       Objective     therapeutic exercises to develop strength, ROM, flexibility for 23 minutes including:  UBE fwd/bkwd  2'/2' NT  UT stretch B 3x30" NT  LS stretch B 3x30" NT  SL clams 2x10 3"  Standing hip abduction 2x10 2#  Standing hip extension 2x10 2#  Monster walks GTB 2 laps     Enoch participated in neuromuscular re-education activities to improve: Posture and stabilization for 23 minutes. The following activities were included:  (B) UE ER RTB w/scap retractions 2x10 3" NT  Seated chin tucks 2x10 3"   Supine TA bracing   - 10x SB  - 10x (L) (R)  Rows w/scap retractions 2x10 3" BTB   Standing braced marches 2x10 BTB   Palloff punches double PTB 2x10 B         Home Exercises Provided and Patient Education Provided     Education provided:   - " HEP     Written Home Exercises Provided: Patient instructed to cont prior HEP.    Enoch demonstrated good  understanding of the education provided.     See EMR under Patient Instructions for exercises provided prior visit.      Assessment     Pt requested to avoid UBE d/t R shoulder discomfort. He also wanted to do more for the low back, therefore majority of session was concentrated on core strength. Plan to continue with POC and educate on correct posture mechanics next date.    Enoch Is progressing  towards his goals.   Pt prognosis is Good.     Pt will continue to benefit from skilled outpatient physical therapy to address the deficits listed in the problem list box on initial evaluation, provide pt/family education and to maximize pt's level of independence in the home and community environment.     Pt's spiritual, cultural and educational needs considered and pt agreeable to plan of care and goals.    Anticipated barriers to physical therapy: chronicity of pain     Goals:   Short Term Goals: 1 weeks   Pt will be compliant with HEP to supplement PT with decreasing pain and improving functional mobility     Long Term Goals: 6 weeks   Pt will improve FOTO score to 57 in order to demo improved functional mobility  Pt will improve LE MMT scores by at least 1/2 grade where deficits noted in order to improve strength for functional tasks   Pt will report lumbar pain and pain radiating into (L) LE </= 2/10 at worst in order to be able to perform ADLs with less difficulty  Pt will perform cervical AROM in all planes measured above without c/o pain/soreness in order to improve functional mobility  Pt will report neck pain and pain radiating into (L) UE </= 4/10 at worst in order to be able to perform ADLs with less difficulty    Plan     Continue per POC, progress as tolerated     Santana Kaufman, PTA

## 2023-09-18 ENCOUNTER — CLINICAL SUPPORT (OUTPATIENT)
Dept: REHABILITATION | Facility: HOSPITAL | Age: 58
End: 2023-09-18
Payer: OTHER GOVERNMENT

## 2023-09-18 DIAGNOSIS — M54.50 LUMBAR PAIN: ICD-10-CM

## 2023-09-18 DIAGNOSIS — M54.2 NECK PAIN: Primary | ICD-10-CM

## 2023-09-18 PROCEDURE — 97530 THERAPEUTIC ACTIVITIES: CPT | Mod: CQ

## 2023-09-18 PROCEDURE — 97112 NEUROMUSCULAR REEDUCATION: CPT | Mod: CQ

## 2023-09-18 PROCEDURE — 97110 THERAPEUTIC EXERCISES: CPT | Mod: CQ

## 2023-09-18 NOTE — PROGRESS NOTES
"  Physical Therapy Daily Treatment Note     Name: Enoch Barr  Clinic Number: [id number]    Therapy Diagnosis:   Encounter Diagnoses   Name Primary?    Neck pain Yes    Lumbar pain            Physician: Martinez Robertson PA-C    Visit Date: 9/18/2023    Physician Orders: PT Eval and Treat   Medical Diagnosis from Referral:   Z98.890 (ICD-10-CM) - Status post lumbar microdiscectomy   M50.30 (ICD-10-CM) - DDD (degenerative disc disease), cervical   M54.16 (ICD-10-CM) - Lumbar radiculopathy      Evaluation Date: 8/23/2023  Authorization Period Expiration: 12/31/23  Plan of Care Expiration: 10/6/23  Progress Note Due: 9/23/23  Visit # / Visits authorized: 1/ 1 6/15  FOTO: 5    Time In: 4:00 pm   Time Out: 4:43 pm   Total Billable Time: 43 minutes     Precautions:  Standard, spinal, 35# lifting restrictions    Subjective     Pt reports: that he continues with soreness in his back and neck.    He was compliant with home exercise program.  Response to previous treatment: no adverse effects  Functional change: progressing     Pain: 3/10 (L) neck, 3/10 mid-left lumbar       Objective     therapeutic exercises to develop strength, ROM, flexibility for 23 minutes including:  UBE fwd/bkwd  2'/2' NT  UT stretch B 3x30" NT  LS stretch B 3x30" NT  SL clams GTB  2x10 3"  Standing hip abduction 2x10 2#  Standing hip extension 2x10 2#  Monster walks GTB 3 laps   Shuttle squats 1.5 cords 3x15     therapeutic activities to improve functional performance for 9  minutes, including:  STS from chair c/ 10lb 15x   Dead lift from lvl 4 step 10lb KB 15x         Enoch participated in neuromuscular re-education activities to improve: Posture and stabilization for 11 minutes. The following activities were included:  (B) UE ER RTB w/scap retractions 2x10 3" NT  Seated chin tucks 2x10 3"   Supine TA bracing   - 10x SB  - 10x (L) (R)  Rows w/scap retractions 2x10 3" BTB   Standing braced marches 2x10 BTB   Palloff punches double PTB 2x10 B "         Home Exercises Provided and Patient Education Provided     Education provided:   - HEP     Written Home Exercises Provided: Patient instructed to cont prior HEP.    Enoch demonstrated good  understanding of the education provided.     See EMR under Patient Instructions for exercises provided prior visit.      Assessment     Incorporated additional therex and introduced therapeutic activities to patient's program this session in order to aid in pt's return to gym related activities and functional mobility. Pt noted some discomfort with dead lifts, however no increased pain nor did it inhibit pt's ability to complete activity. Moderate verbal cues and demonstrations provided for pt to complete dead lifts with slight knee flexion during descent of dead lift and to avoid forward flexion of shoulders. Will continue to slowly progress pt towards return to PLOF as tolerated.     Enoch Is progressing  towards his goals.   Pt prognosis is Good.     Pt will continue to benefit from skilled outpatient physical therapy to address the deficits listed in the problem list box on initial evaluation, provide pt/family education and to maximize pt's level of independence in the home and community environment.     Pt's spiritual, cultural and educational needs considered and pt agreeable to plan of care and goals.    Anticipated barriers to physical therapy: chronicity of pain     Goals:   Short Term Goals: 1 weeks   Pt will be compliant with HEP to supplement PT with decreasing pain and improving functional mobility     Long Term Goals: 6 weeks   Pt will improve FOTO score to 57 in order to demo improved functional mobility  Pt will improve LE MMT scores by at least 1/2 grade where deficits noted in order to improve strength for functional tasks   Pt will report lumbar pain and pain radiating into (L) LE </= 2/10 at worst in order to be able to perform ADLs with less difficulty  Pt will perform cervical AROM in all planes  measured above without c/o pain/soreness in order to improve functional mobility  Pt will report neck pain and pain radiating into (L) UE </= 4/10 at worst in order to be able to perform ADLs with less difficulty    Plan     Continue per POC, progress as tolerated     Janet Gurrola, KELLI

## 2023-09-20 ENCOUNTER — CLINICAL SUPPORT (OUTPATIENT)
Dept: REHABILITATION | Facility: HOSPITAL | Age: 58
End: 2023-09-20
Payer: OTHER GOVERNMENT

## 2023-09-20 DIAGNOSIS — M54.2 NECK PAIN: Primary | ICD-10-CM

## 2023-09-20 DIAGNOSIS — M54.50 LUMBAR PAIN: ICD-10-CM

## 2023-09-20 PROCEDURE — 97110 THERAPEUTIC EXERCISES: CPT

## 2023-09-20 PROCEDURE — 97112 NEUROMUSCULAR REEDUCATION: CPT

## 2023-09-20 NOTE — PROGRESS NOTES
Physical Therapy Daily Treatment Note     Name: Enoch Barr  [facility] Number: [id number]    Therapy Diagnosis:   Encounter Diagnoses   Name Primary?    Neck pain Yes    Lumbar pain      Physician: Martinez Robertson PA-C    Visit Date: 9/20/2023    Physician Orders: PT Eval and Treat   Medical Diagnosis from Referral:   Z98.890 (ICD-10-CM) - Status post lumbar microdiscectomy   M50.30 (ICD-10-CM) - DDD (degenerative disc disease), cervical   M54.16 (ICD-10-CM) - Lumbar radiculopathy      Evaluation Date: 8/23/2023  Authorization Period Expiration: 12/31/23  Plan of Care Expiration: 10/6/23  Progress Note Due: 9/23/23  Visit # / Visits authorized: 1/ 1 7/15  FOTO: 8    Time In: 2:33 pm   Time Out: 3:14 pm   Total Billable Time: 24 minutes    Precautions: Standard, spinal, 35# lifting restrictions    Subjective     Pt reports: feeling a little better. Pt stated that his (B) lower back is feeling sore and (L) side of neck is feeling sore, but not painful.   He was compliant with home exercise program.  Response to previous treatment: legs were sore  Functional change: improvements in LE MMT scores and cervical AROM    Pain: 0/10 (B) back and neck       Objective     Cervical AROM: Pain/Dysfunction with Movement:   Flexion 50 degrees  Report of soreness in (L) neck   Extension 40 degrees Report of soreness in (L) neck    Right side bending 35 degrees Report of soreness in (L) neck   Left side bending 30 degrees Report of soreness in (L) neck    Right rotation 75 degrees      Left rotation 80 degrees Report of soreness in (L) neck       Shoulder Right   Left   Pain/Dysfunction with Movement     AROM MMT AROM MMT     flexion WFL 5/5 WFL 5/5     abduction WFL 5/5 WFL 5/5     Internal rotation WFL 5/5 WFL 5/5     ER at 90° abd WFL NT WFL NT     ER at 0° abd NT 5/5 NT 5/5        Lumbar AROM deferred      Hip Right   Left   Pain/Dysfunction with Movement     AROM MMT AROM MMT     Flexion WFL 5/5 WFL 5/5     Extension NT NT NT  NT Pt performed good bridge against gravity   Abduction WFL 4+/5 WFL 4+/5     External rotation WFL 4+/5 WFL 4+/5        Knee Right   Left   Pain/Dysfunction with Movement     AROM MMT AROM MMT     Flexion WFL 5/5 WFL 5/5     Extension WFL 5/5 WFL 5/5        Ankle Right   Left   Pain/Dysfunction with Movement     AROM MMT AROM MMT     Plantarflexion WFL 5/5 WFL 5/5     Dorsiflexion WFL 5/5 WFL 5/5        90/90 Hamstring Test:  (B) = lacking 15 degrees     Objective measurements and therapeutic exercises to develop strength, ROM, flexibility for 24 minutes including:    Standing hip abduction 2x10 2#  Standing hip extension 2x10 2#  Monster walks GTB 3 laps     Enoch participated in neuromuscular re-education activities to improve: Posture and stabilization for 17 minutes. The following activities were included:    Seated chin tucks 2x10 w/horizontal abduction RTB   Palloff punches double PTB 2x10 B   Dying bugs 2x10 B   Bird dogs 1x10 B   Out out in in with 10lb for core activation 2x to fatigue     Home Exercises Provided and Patient Education Provided     Education provided:   - HEP     Written Home Exercises Provided: Patient instructed to cont prior HEP.   Enoch demonstrated good  understanding of the education provided.     See EMR under Patient Instructions for exercises provided prior visit.      Assessment     Progressed NMR activities noted above. After performing bird dogs pt reported experiencing pain in (L) lumbar region, so additional set deferred. Pt was able to tolerate all other therex/activities without c/o pain. Updated measurements taken and pt demo improvements in cervical AROM and improvements in (B) hip abduction and ER MMT scores compared to initial evaluation. Pt was also able to perform good bridge today without c/o pain.   Enoch Is progressing well towards his goals.   Pt prognosis is Good.     Pt will continue to benefit from skilled outpatient physical therapy to address the deficits  listed in the problem list box on initial evaluation, provide pt/family education and to maximize pt's level of independence in the home and community environment.     Pt's spiritual, cultural and educational needs considered and pt agreeable to plan of care and goals.  Anticipated barriers to physical therapy: chronicity of pain     Goals:   Short Term Goals: 1 weeks   Pt will be compliant with HEP to supplement PT with decreasing pain and improving functional mobility     Long Term Goals: 6 weeks   Pt will improve FOTO score to 57 in order to demo improved functional mobility  Pt will improve LE MMT scores by at least 1/2 grade where deficits noted in order to improve strength for functional tasks   Pt will report lumbar pain and pain radiating into (L) LE </= 2/10 at worst in order to be able to perform ADLs with less difficulty  Pt will perform cervical AROM in all planes measured above without c/o pain/soreness in order to improve functional mobility  Pt will report neck pain and pain radiating into (L) UE </= 4/10 at worst in order to be able to perform ADLs with less difficulty      Plan     Continue per POC, progress as tolerated     Miryam Hogan, PT

## 2023-10-03 ENCOUNTER — CLINICAL SUPPORT (OUTPATIENT)
Dept: REHABILITATION | Facility: HOSPITAL | Age: 58
End: 2023-10-03
Payer: OTHER GOVERNMENT

## 2023-10-03 DIAGNOSIS — M54.2 NECK PAIN: Primary | ICD-10-CM

## 2023-10-03 DIAGNOSIS — M54.50 LUMBAR PAIN: ICD-10-CM

## 2023-10-03 PROCEDURE — 97112 NEUROMUSCULAR REEDUCATION: CPT | Mod: CQ

## 2023-10-03 PROCEDURE — 97110 THERAPEUTIC EXERCISES: CPT | Mod: CQ

## 2023-10-03 NOTE — PROGRESS NOTES
"  Physical Therapy Daily Treatment Note     Name: Enoch Barr  [facility] Number: [id number]    Therapy Diagnosis:   Encounter Diagnoses   Name Primary?    Neck pain Yes    Lumbar pain      Physician: Martinez Robertson PA-C    Visit Date: 10/3/2023    Physician Orders: PT Eval and Treat   Medical Diagnosis from Referral:   Z98.890 (ICD-10-CM) - Status post lumbar microdiscectomy   M50.30 (ICD-10-CM) - DDD (degenerative disc disease), cervical   M54.16 (ICD-10-CM) - Lumbar radiculopathy      Evaluation Date: 8/23/2023  Authorization Period Expiration: 12/31/23  Plan of Care Expiration: 10/6/23  Progress Note Due: 9/23/23  Visit # / Visits authorized: 1/ 1 8/15  FOTO: 8    Time In: 4:40 pm   Time Out: 5:25 pm   Total Billable Time: 45 minutes    Precautions: Standard, spinal, 35# lifting restrictions    Subjective     Pt reports: patient has been hurting since last Tuesday  He was compliant with home exercise program.  Response to previous treatment: low back pain  Functional change: improvements in LE MMT scores and cervical AROM    Pain: 7/10 (B) back       Objective         therapeutic exercises to develop strength, ROM, flexibility for 20 minutes including:  Supine serratus 2# 3x10  Wall SB squats 2x10   Standing hip abduction 2x10 2#  Standing hip extension 2x10 2#  Monster walks GTB 3 laps NP    Enoch participated in neuromuscular re-education activities to improve: Posture and stabilization for 25 minutes. The following activities were included:  SL clam iso B 1x1'  Sl hip abd iso B 1x1'  TrA activation 2x10 3"  - Oblique B 10 ea  Seated YMB press outs 2x10  Seated marching with YMB overhead x20  Seated chin tucks 2x10 w/horizontal abduction RTB   Palloff punches double PTB 2x10 B NP  Dying bugs 2x10 B NP  Bird dogs 1x10 B NP  Out out in in with 10lb for core activation 2x to fatigue     Home Exercises Provided and Patient Education Provided     Education provided:   - HEP     Written Home Exercises Provided: " Patient instructed to cont prior HEP.   Enoch demonstrated good  understanding of the education provided.     See EMR under Patient Instructions for exercises provided prior visit.      Assessment     Pt arrved to clinic with reports of increased symptoms possibly stemming from last session. Therefore modified program to still achieve targeted goals without aggravating symptoms. No c/o of pain today but he did report LB and hip flexor tightness.  Enoch Is progressing well towards his goals.   Pt prognosis is Good.     Pt will continue to benefit from skilled outpatient physical therapy to address the deficits listed in the problem list box on initial evaluation, provide pt/family education and to maximize pt's level of independence in the home and community environment.     Pt's spiritual, cultural and educational needs considered and pt agreeable to plan of care and goals.  Anticipated barriers to physical therapy: chronicity of pain     Goals:   Short Term Goals: 1 weeks   Pt will be compliant with HEP to supplement PT with decreasing pain and improving functional mobility     Long Term Goals: 6 weeks   Pt will improve FOTO score to 57 in order to demo improved functional mobility  Pt will improve LE MMT scores by at least 1/2 grade where deficits noted in order to improve strength for functional tasks   Pt will report lumbar pain and pain radiating into (L) LE </= 2/10 at worst in order to be able to perform ADLs with less difficulty  Pt will perform cervical AROM in all planes measured above without c/o pain/soreness in order to improve functional mobility  Pt will report neck pain and pain radiating into (L) UE </= 4/10 at worst in order to be able to perform ADLs with less difficulty      Plan     Continue per POC, progress as tolerated     Santana Kaufman, KELLI

## 2023-10-06 ENCOUNTER — CLINICAL SUPPORT (OUTPATIENT)
Dept: REHABILITATION | Facility: HOSPITAL | Age: 58
End: 2023-10-06
Payer: OTHER GOVERNMENT

## 2023-10-06 DIAGNOSIS — M54.2 NECK PAIN: Primary | ICD-10-CM

## 2023-10-06 DIAGNOSIS — M54.50 LUMBAR PAIN: ICD-10-CM

## 2023-10-06 PROCEDURE — 97110 THERAPEUTIC EXERCISES: CPT | Mod: CQ

## 2023-10-06 PROCEDURE — 97112 NEUROMUSCULAR REEDUCATION: CPT | Mod: CQ

## 2023-10-06 NOTE — PROGRESS NOTES
"  Physical Therapy Daily Treatment Note     Name: Enoch Barr  [facility] Number: [id number]    Therapy Diagnosis:   Encounter Diagnoses   Name Primary?    Neck pain Yes    Lumbar pain      Physician: Martinez Robertson PA-C    Visit Date: 10/6/2023    Physician Orders: PT Eval and Treat   Medical Diagnosis from Referral:   Z98.890 (ICD-10-CM) - Status post lumbar microdiscectomy   M50.30 (ICD-10-CM) - DDD (degenerative disc disease), cervical   M54.16 (ICD-10-CM) - Lumbar radiculopathy      Evaluation Date: 8/23/2023  Authorization Period Expiration: 12/31/23  Plan of Care Expiration: 10/6/23  Progress Note Due: 9/23/23  Visit # / Visits authorized: 1/ 1 9/15  FOTO: 8    Time In: 3:08 pm   Time Out: 3:50 pm   Total Billable Time: 42 minutes    Precautions: Standard, spinal, 35# lifting restrictions    Subjective     Pt reports: he is better today  He was compliant with home exercise program.  Response to previous treatment: low back pain  Functional change: improvements in LE MMT scores and cervical AROM    Pain: 5/10 (B) back       Objective         therapeutic exercises to develop strength, ROM, flexibility for 17 minutes including:  Supine serratus 2# 3x10 NP  Wall SB squats 2x10   Standing hip abduction 2x10 2#  Standing hip extension 2x10 2#  Monster walks GTB 3 laps NP    Enoch participated in neuromuscular re-education activities to improve: Posture and stabilization for 25 minutes. The following activities were included:  SL clam iso B 1x1'  Sl hip abd iso B 1x1'  TrA activation 2x10 3" NP  - Oblique B 10 ea  Seated YMB press outs 2x10  Seated marching with YMB overhead x20  Seated chin tucks 2x10 w/horizontal abduction RTB   Palloff punches CC 10# 2x10 B NP  Dying bugs 2x10 B NP  Bird dogs 1x10 B NP  Out out in in with 10lb for core activation 2x to fatigue     Home Exercises Provided and Patient Education Provided     Education provided:   - HEP     Written Home Exercises Provided: Patient instructed to cont " prior HEP.   Enoch demonstrated good  understanding of the education provided.     See EMR under Patient Instructions for exercises provided prior visit.      Assessment     Pt arrved to clinic with reports of feeling somewhat better this date. He was able to tolerate more activities today without discomfort.  Enoch Is progressing well towards his goals.   Pt prognosis is Good.     Pt will continue to benefit from skilled outpatient physical therapy to address the deficits listed in the problem list box on initial evaluation, provide pt/family education and to maximize pt's level of independence in the home and community environment.     Pt's spiritual, cultural and educational needs considered and pt agreeable to plan of care and goals.  Anticipated barriers to physical therapy: chronicity of pain     Goals:   Short Term Goals: 1 weeks   Pt will be compliant with HEP to supplement PT with decreasing pain and improving functional mobility     Long Term Goals: 6 weeks   Pt will improve FOTO score to 57 in order to demo improved functional mobility  Pt will improve LE MMT scores by at least 1/2 grade where deficits noted in order to improve strength for functional tasks   Pt will report lumbar pain and pain radiating into (L) LE </= 2/10 at worst in order to be able to perform ADLs with less difficulty  Pt will perform cervical AROM in all planes measured above without c/o pain/soreness in order to improve functional mobility  Pt will report neck pain and pain radiating into (L) UE </= 4/10 at worst in order to be able to perform ADLs with less difficulty      Plan     Continue per POC, progress as tolerated     Santana Kaufman, KELLI

## 2023-10-11 ENCOUNTER — TELEPHONE (OUTPATIENT)
Dept: NEUROSURGERY | Facility: CLINIC | Age: 58
End: 2023-10-11
Payer: OTHER GOVERNMENT

## 2023-10-16 ENCOUNTER — OFFICE VISIT (OUTPATIENT)
Dept: PAIN MEDICINE | Facility: CLINIC | Age: 58
End: 2023-10-16
Payer: OTHER GOVERNMENT

## 2023-10-16 VITALS
HEART RATE: 66 BPM | HEIGHT: 72 IN | OXYGEN SATURATION: 95 % | WEIGHT: 222.88 LBS | TEMPERATURE: 98 F | DIASTOLIC BLOOD PRESSURE: 77 MMHG | SYSTOLIC BLOOD PRESSURE: 119 MMHG | BODY MASS INDEX: 30.19 KG/M2

## 2023-10-16 DIAGNOSIS — M47.816 LUMBAR SPONDYLOSIS: ICD-10-CM

## 2023-10-16 DIAGNOSIS — M51.36 DDD (DEGENERATIVE DISC DISEASE), LUMBAR: Primary | ICD-10-CM

## 2023-10-16 DIAGNOSIS — M50.30 DDD (DEGENERATIVE DISC DISEASE), CERVICAL: ICD-10-CM

## 2023-10-16 DIAGNOSIS — Z98.890 STATUS POST LUMBAR MICRODISCECTOMY: ICD-10-CM

## 2023-10-16 DIAGNOSIS — M54.16 LUMBAR RADICULOPATHY: ICD-10-CM

## 2023-10-16 DIAGNOSIS — M47.812 CERVICAL SPONDYLOSIS: ICD-10-CM

## 2023-10-16 PROCEDURE — 99999 PR PBB SHADOW E&M-EST. PATIENT-LVL IV: ICD-10-PCS | Mod: PBBFAC,,,

## 2023-10-16 PROCEDURE — 99213 OFFICE O/P EST LOW 20 MIN: CPT | Mod: S$PBB,,,

## 2023-10-16 PROCEDURE — 99999 PR PBB SHADOW E&M-EST. PATIENT-LVL IV: CPT | Mod: PBBFAC,,,

## 2023-10-16 PROCEDURE — 99214 OFFICE O/P EST MOD 30 MIN: CPT | Mod: PBBFAC,PN

## 2023-10-16 PROCEDURE — 99213 PR OFFICE/OUTPT VISIT, EST, LEVL III, 20-29 MIN: ICD-10-PCS | Mod: S$PBB,,,

## 2023-10-16 NOTE — PROGRESS NOTES
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 exercise program and being mindful of how they use their back throughout the day.  Patient expressed understanding and agreement.  Patient also with chronic left lower back and extremity pain and is s/p L5-S1 microdiskectomy.  Continue to follow up with Neurosurgery postop.  May consider additional interventional procedures in the future pending further neurosurgical evaluation.  Patient can continue with medications for now since they are providing benefit, using them appropriately, and without adverse effects.  Return to clinic in 1 month or sooner if needed.          Martinez Robertson PA-C  [facility]  Interventional Pain Management   10/16/2023    This note was created by combination of typed  and M-Modal dictation.  Transcription and phonetic errors may be present.  If there are any questions, please contact me.

## 2023-10-19 ENCOUNTER — TELEPHONE (OUTPATIENT)
Dept: PAIN MEDICINE | Facility: CLINIC | Age: 58
End: 2023-10-19
Payer: OTHER GOVERNMENT

## 2023-10-19 ENCOUNTER — DOCUMENTATION ONLY (OUTPATIENT)
Dept: PAIN MEDICINE | Facility: CLINIC | Age: 58
End: 2023-10-19
Payer: OTHER GOVERNMENT

## 2023-10-19 ENCOUNTER — OFFICE VISIT (OUTPATIENT)
Dept: NEUROSURGERY | Facility: CLINIC | Age: 58
End: 2023-10-19
Payer: OTHER GOVERNMENT

## 2023-10-19 ENCOUNTER — DOCUMENTATION ONLY (OUTPATIENT)
Dept: REHABILITATION | Facility: HOSPITAL | Age: 58
End: 2023-10-19
Payer: OTHER GOVERNMENT

## 2023-10-19 VITALS
DIASTOLIC BLOOD PRESSURE: 82 MMHG | BODY MASS INDEX: 30.07 KG/M2 | HEART RATE: 70 BPM | OXYGEN SATURATION: 97 % | SYSTOLIC BLOOD PRESSURE: 132 MMHG | HEIGHT: 72 IN | WEIGHT: 222 LBS

## 2023-10-19 DIAGNOSIS — M54.16 LUMBAR RADICULOPATHY, CHRONIC: Primary | ICD-10-CM

## 2023-10-19 DIAGNOSIS — M51.36 DDD (DEGENERATIVE DISC DISEASE), LUMBAR: ICD-10-CM

## 2023-10-19 DIAGNOSIS — Z98.890 STATUS POST LUMBAR MICRODISCECTOMY: Primary | ICD-10-CM

## 2023-10-19 PROCEDURE — 99024 PR POST-OP FOLLOW-UP VISIT: ICD-10-PCS | Mod: S$GLB,,, | Performed by: PHYSICIAN ASSISTANT

## 2023-10-19 PROCEDURE — 99024 POSTOP FOLLOW-UP VISIT: CPT | Mod: S$GLB,,, | Performed by: PHYSICIAN ASSISTANT

## 2023-10-19 NOTE — PROGRESS NOTES
[facility]  Neurosurgery     Interval history 10/19/2023:   Patient returns to clinic today approximately 3 months status post left L5-S1 laminectomy and diskectomy with Dr. Pineda.  Symptoms remain much improved compared to before surgery, but he continues to have mid to left low back pain that will wrap around his hip into the lateral thigh and sometimes as far down as the ankle.  He generally can make the pain better with a hot shower and some movement.  He is able to go about his day by taking 1 Aleve per day. It is most bothersome in the morning.  He also notes a specific spot in his left low back that is sore when rolling over in bed.  He has been participating in physical therapy and found it helpful for quite a while, but his pain did seem to worsen a bit about 3 weeks ago with no known inciting event or trauma.  He was recently seen by pain management who considered an ANKITA but wanted to wait until his three-month postop before moving forward.    Interval history with Dr. Pineda 08/18/2023:   6 weeks from left L5/S1 MLD for foraminal disc herniation    Back pain and leg pain improved after surgery  Starting 1 week ago, he began to experience mild aching over the low back and some deep aching in the foreleg.   These symptoms are not as severe as preop.  No weakness.  Some continued sensory change over the plantar foot.    Interval history 7/21/23:  Doing well today. Numbness in his left foot is improving. He believes it may be due to plantar fasciitis. Pre-op back pain is resolved. Mild soreness around incision.     HPI 7/13/23:  Enoch Barr is a 58 y.o. male who presents to clinic today for 1 week wound check, s/p MIS L5-S1 laminectomy and diskectomy with Dr. Pineda.  Denies fevers, chills, night sweats or N/V. Further denies wound drainage or swelling. Pt has been taking mostly Tylenol for pain relief.  He presents to clinic early today for evaluation of new numbness in his left foot.      Physical Exam:    General: well developed, well nourished, no distress  Neurologic: Alert and oriented. Thought content appropriate.   GCS: Motor: 6/Verbal: 5/Eyes: 4 GCS Total: 15   Mental Status: Awake, Alert, Oriented x3   Cranial nerves: face symmetric, tongue midline, pupils equal, round, reactive to light with accomodation, EOMI.   Motor Strength: moves all extremities with good strength and tone 5/5 BLE  Sensation: response to light touch throughout; mild decrease to medial right great toe (reports previous surgery at this location)  No gait disturbances     Incision well healed.       Vitals:    10/19/23 1400   BP: 132/82   Pulse: 70             Assessment/Plan:   Enoch Barr is a 58 y.o. male who presents for 3 month postop evaluation, s/p left MIS L5-S1 laminectomy and diskectomy with Dr. Pineda for lumbar radiculopathy.  Pain is much improved compared to before surgery.  He does still have some residual left-sided low back pain and left leg pain in the L5 distribution.  This is currently managed with a hot shower in the morning, PT, and 1 Aleve per day.     Pt is aware of and in agreement with the following plan:  -physical therapy sessions now complete.  Okay to transition to home exercise plan and return to the gym.  No residual lifting restrictions, but patient should advance activities cautiously and with good biomechanics.    -okay to proceed with ANKITA with pain management   -follow up with Dr. Pineda in 1 month at the [facility].  If symptoms are not improved at that time, may consider repeat lumbar MRI.    -Encouraged patient to call if they have any questions or concerns prior to next follow up appt        Mayra Cosme PA-C  [facility]  Department of Neurosurgery  [phone]

## 2023-10-19 NOTE — Clinical Note
Carlee Henriquez,  I think ANKITA would be a good option for this patient.  His description of the pain sounds much better than before surgery, but I think he would take any continued improvement we can offer.  Thanks,  Mayra

## 2023-10-19 NOTE — PROGRESS NOTES
PT/PTA met face to face to discuss pt's treatment plan and progress towards established goals. Pt will be seen by a physical therapist minimally every 6th visit or every 30 days.    Janet Gurrola PTA      Face to Face PTA Conference performed with Janet Gurrola PTA regarding patient's current status, overall progress, and plan of care.    Miryam Hogan, PT

## 2023-10-19 NOTE — TELEPHONE ENCOUNTER
Called and left voicemail.  Called to discuss potential interventional procedures.  Patient has followed up with Neurosurgery postoperative from L5-S1 microdiskectomy.  Advised that I have ordered an updated lumbar MRI and we may consider interventional procedures when we review imaging.

## 2023-10-30 ENCOUNTER — HOSPITAL ENCOUNTER (EMERGENCY)
Facility: HOSPITAL | Age: 58
Discharge: HOME | End: 2023-10-30
Attending: EMERGENCY MEDICINE
Payer: OTHER GOVERNMENT

## 2023-10-30 VITALS
RESPIRATION RATE: 16 BRPM | TEMPERATURE: 99 F | WEIGHT: 215 LBS | DIASTOLIC BLOOD PRESSURE: 95 MMHG | SYSTOLIC BLOOD PRESSURE: 137 MMHG | HEART RATE: 85 BPM | HEIGHT: 72 IN | OXYGEN SATURATION: 97 % | BODY MASS INDEX: 29.12 KG/M2

## 2023-10-30 DIAGNOSIS — U07.1 COVID-19 VIRUS DETECTED: ICD-10-CM

## 2023-10-30 DIAGNOSIS — R05.1 ACUTE COUGH: Primary | ICD-10-CM

## 2023-10-30 DIAGNOSIS — U07.1 COVID-19: ICD-10-CM

## 2023-10-30 LAB
CTP QC/QA: YES
INFLUENZA A ANTIGEN, POC: NEGATIVE
INFLUENZA B ANTIGEN, POC: NEGATIVE
SARS-COV-2 RDRP RESP QL NAA+PROBE: POSITIVE

## 2023-10-30 PROCEDURE — 87804 INFLUENZA ASSAY W/OPTIC: CPT | Mod: 59,ER

## 2023-10-30 PROCEDURE — 87635 SARS-COV-2 COVID-19 AMP PRB: CPT | Mod: ER

## 2023-10-30 PROCEDURE — 99283 EMERGENCY DEPT VISIT LOW MDM: CPT | Mod: ER

## 2023-10-30 RX ORDER — FLUTICASONE PROPIONATE 50 MCG
1 SPRAY, SUSPENSION (ML) NASAL 2 TIMES DAILY PRN
Qty: 15 G | Refills: 0 | Status: SHIPPED | OUTPATIENT
Start: 2023-10-30 | End: 2024-03-15 | Stop reason: SDUPTHER

## 2023-10-30 RX ORDER — CETIRIZINE HYDROCHLORIDE 10 MG/1
10 TABLET ORAL DAILY
Qty: 30 TABLET | Refills: 0 | Status: SHIPPED | OUTPATIENT
Start: 2023-10-30 | End: 2024-03-13

## 2023-10-30 RX ORDER — GUAIFENESIN 100 MG/5ML
100-200 SOLUTION ORAL EVERY 4 HOURS PRN
Qty: 60 ML | Refills: 0 | Status: SHIPPED | OUTPATIENT
Start: 2023-10-30 | End: 2023-11-09

## 2023-10-30 RX ORDER — BENZONATATE 100 MG/1
100 CAPSULE ORAL 3 TIMES DAILY PRN
Qty: 20 CAPSULE | Refills: 0 | Status: SHIPPED | OUTPATIENT
Start: 2023-10-30 | End: 2023-11-09

## 2023-10-30 RX ORDER — ACETAMINOPHEN 500 MG
500 TABLET ORAL EVERY 6 HOURS PRN
Qty: 20 TABLET | Refills: 0 | Status: SHIPPED | OUTPATIENT
Start: 2023-10-30

## 2023-10-30 NOTE — DISCHARGE INSTRUCTIONS
You may use Zyrtec and Flonase as prescribed for postnasal drip and allergies.  You may use Tylenol as prescribed for fever.  You may use Robitussin and Tessalon Perles for cough.  You may use benzocaine menthol lozenges for sore throat.  Please follow up with your primary care provider within 1 week.  Thank you for coming to our Emergency Department today. It is important to remember that some problems are difficult to diagnose and may not be found during your Emergency Department visit. Be sure to follow up with your primary care doctor and review all labs/imaging/tests that were performed during this visit with them. Some labs/tests may be outside of the normal range and require non-emergent follow-up and further investigation to help diagnose/exclude/prevent complications or other medical conditions.    If you do not have a primary care doctor, you may contact the one listed on your discharge paperwork or you may also call the [facility] Appointment Desk at [phone] to schedule an appointment and establish care with one. It is important to your health that you have a primary care doctor.    Please take all medications as directed. All medications may potentially have side-effects and it is impossible to predict which medications may give you side-effects or what side-effects (if any) they will give you.. If you feel that you are having a negative effect or side-effect of any medication you should immediately stop taking them and seek medical attention. If you feel that you are having a life-threatening reaction call 911.    Return to the ER with any questions/concerns, new/concerning symptoms, worsening or failure to improve.     Do not drive, swim, climb to height, take a bath or make any important decisions for 24 hours if you have received any pain medications, sedatives or mood altering drugs during your ER visit.

## 2023-10-30 NOTE — ED PROVIDER NOTES
Encounter Date: 10/30/2023    SCRIBE #1 NOTE: IElle, am scribing for, and in the presence of,  Johnathan Chaney PA-C. I have scribed the following portions of the note - Other sections scribed: HPI, ROS, PE.       History     Chief Complaint   Patient presents with    Cough     Complains of frontal headache, nasal congestion, and productive cough x3 days. Denies chest pain or SOB.     Enoch Barr is a 58 y.o. male, with a PMHx of HTN, GERD, HLD, who presents to the ED with productive cough with yellow sputum x3 days. Patient reports associated sore throat, sneezing, congestion in ears, and postnasal drip. Patient states he suffers from allergies and is susceptible to sinus infections, endorses compliance w/ Allegra and Flonase daily. Also endorses compliance w/ omeprazole. No other exacerbating or alleviating factors. Denies fever, chills, SOB, trouble swallowing, nausea, vomiting, diarrhea, CP, abdominal pain or other associated symptoms.       The history is provided by the patient. No  was used.     Review of patient's allergies indicates:  No Known Allergies  Past Medical History:   Diagnosis Date    GERD (gastroesophageal reflux disease)     Hypercholesteremia     Hypertension     on medication    Lumbar spondylosis 9/21/2020    Migraines     Sleep apnea      Past Surgical History:   Procedure Laterality Date    COLONOSCOPY N/A 8/10/2020    Procedure: COLONOSCOPY;  Surgeon: April Dos Santos MD;  Location: [facility];  Service: Endoscopy;  Laterality: N/A;    EPIDURAL STEROID INJECTION Left 2/17/2023    Procedure: Left L5 Transforaminal Epidural Steroid Injection;  Surgeon: Santana Rojo Jr., MD;  Location: [facility];  Service: Pain Management;  Laterality: Left;  @1245  No ATC or DM    EPIDURAL STEROID INJECTION Bilateral 4/14/2023    Procedure: Bilateral L3, L4, L5 Medial Branch Blocks #1;  Surgeon: Santana Rojo Jr., MD;  Location: [facility];  Service: Pain Management;   Laterality: Bilateral;  @1230 (requests afternoon)  No ATC or DM    EPIDURAL STEROID INJECTION Left 6/2/2023    Procedure: Left L5 + S1 Transforaminal Epidural Steroid Injections;  Surgeon: Santana oRjo Jr., MD;  Location: [facility] ENDO;  Service: Pain Management;  Laterality: Left;  @1300  No ATC or DM    HERNIA REPAIR      LAMINECTOMY Left 7/5/2023    Procedure: LAMINECTOMY, SPINE;  Surgeon: Richard Pineda MD;  Location: [facility] OR;  Service: Neurosurgery;  Laterality: Left;  Left L5/S1 discectomy      LAPAROSCOPIC CHOLECYSTECTOMY N/A 1/25/2022    Procedure: CHOLECYSTECTOMY, LAPAROSCOPIC;  Surgeon: Jarrod Martinez MD;  Location: [facility] OR;  Service: General;  Laterality: N/A;    LUMBAR DISCECTOMY Left 7/5/2023    Procedure: DISCECTOMY, SPINE, LUMBAR;  Surgeon: Richard Pineda MD;  Location: [facility] OR;  Service: Neurosurgery;  Laterality: Left;    REPAIR OF TRICEPS TENDON Left 10/2/2020    Procedure: REPAIR, TENDON, TRICEPS;  Surgeon: Keysha Galvez MD;  Location: [facility] OR;  Service: Orthopedics;  Laterality: Left;  RN PRE OP DONE 9/29/2020----COVID NEGATIVE ON 9/29  [facility] Rep: Delma [phone]    SINUS SURGERY  2012    SURGICAL REMOVAL OF BUNION WITH OSTEOTOMY OF METATARSAL BONE Right 3/24/2023    Procedure: BUNIONECTOMY, WITH METATARSAL OSTEOTOMY;  Surgeon: Shannan An DPM;  Location: [facility] OR;  Service: Podiatry;  Laterality: Right;     Family History   Problem Relation Age of Onset    Coronary artery disease Mother     Hypertension Father     No Known Problems Sister     No Known Problems Brother     Valvular heart disease Sister     No Known Problems Brother     No Known Problems Brother     No Known Problems Maternal Aunt     No Known Problems Maternal Uncle     No Known Problems Paternal Aunt     No Known Problems Paternal Uncle     No Known Problems Maternal Grandmother     No Known Problems Maternal Grandfather     No Known Problems Paternal Grandmother     No Known Problems Paternal Grandfather      Amblyopia Neg Hx     Blindness Neg Hx     Cancer Neg Hx     Cataracts Neg Hx     Diabetes Neg Hx     Glaucoma Neg Hx     Macular degeneration Neg Hx     Retinal detachment Neg Hx     Strabismus Neg Hx     Stroke Neg Hx     Thyroid disease Neg Hx      Social History     Tobacco Use    Smoking status: Former     Current packs/day: 0.00     Types: Cigarettes     Quit date: 1999     Years since quittin.4    Smokeless tobacco: Never   Substance Use Topics    Alcohol use: Yes     Alcohol/week: 7.0 standard drinks of alcohol     Types: 7 Glasses of wine per week     Comment: occasionally    Drug use: Never     Review of Systems   Constitutional:  Negative for chills and fever.   HENT:  Positive for congestion, postnasal drip, sneezing and sore throat. Negative for rhinorrhea and trouble swallowing.    Respiratory:  Positive for cough. Negative for shortness of breath.    Cardiovascular:  Negative for chest pain.   Gastrointestinal:  Negative for abdominal pain, diarrhea, nausea and vomiting.   Musculoskeletal:  Negative for neck pain.   Allergic/Immunologic: Positive for environmental allergies.   Neurological:  Negative for headaches.       Physical Exam     Initial Vitals   BP Pulse Resp Temp SpO2   10/30/23 0827 10/30/23 0825 10/30/23 0825 10/30/23 0825 10/30/23 0825   (!) 137/95 85 16 98.6 °F (37 °C) 97 %      MAP       --                Physical Exam    Nursing note and vitals reviewed.  Constitutional: He appears well-developed and well-nourished.   HENT:   Head: Normocephalic and atraumatic.   Right Ear: Tympanic membrane and external ear normal.   Left Ear: Tympanic membrane and external ear normal.   Mouth/Throat: Normal dentition. Posterior oropharyngeal erythema present. No oropharyngeal exudate or posterior oropharyngeal edema.   Postnasal drip.    Neck: Neck supple.   Normal range of motion.  Cardiovascular:  Normal rate, regular rhythm, normal heart sounds and intact distal pulses.     Exam  reveals no gallop and no friction rub.       No murmur heard.  Pulmonary/Chest: Breath sounds normal. No respiratory distress. He has no wheezes. He has no rhonchi. He has no rales.   Abdominal: Abdomen is soft. Bowel sounds are normal. He exhibits no distension. There is no abdominal tenderness. There is no rebound and no guarding.   Musculoskeletal:         General: Normal range of motion.      Cervical back: Normal range of motion and neck supple.     Neurological: He is alert and oriented to person, place, and time. GCS score is 15. GCS eye subscore is 4. GCS verbal subscore is 5. GCS motor subscore is 6.   Psychiatric: He has a normal mood and affect.         ED Course   Procedures  Labs Reviewed   SARS-COV-2 RDRP GENE - Abnormal; Notable for the following components:       Result Value    POC Rapid COVID Positive (*)     All other components within normal limits    Narrative:     This test utilizes isothermal nucleic acid amplification technology to detect the SARS-CoV-2 RdRp nucleic acid segment. The analytical sensitivity (limit of detection) is 500 copies/swab.     A POSITIVE result is indicative of the presence of SARS-CoV-2 RNA; clinical correlation with patient history and other diagnostic information is necessary to determine patient infection status.    A NEGATIVE result means that SARS-CoV-2 nucleic acids are not present above the limit of detection. A NEGATIVE result should be treated as presumptive. It does not rule out the possibility of COVID-19 and should not be the sole basis for treatment decisions. If COVID-19 is strongly suspected based on clinical and exposure history, re-testing using an alternate molecular assay should be considered.     This test is only for use under the Food and Drug Administration s Emergency Use Authorization (EUA).     Commercial kits are provided by [employer]. Performance characteristics of the EUA have been independently verified by [facility]  Department of Pathology and Laboratory Medicine.   _________________________________________________________________   The authorized Fact Sheet for Healthcare Providers and the authorized Fact Sheet for Patients of the ID NOW COVID-19 are available on the FDA website:    https://www.fda.gov/media/827904/download      https://www.fda.gov/media/347269/download      POCT INFLUENZA A/B MOLECULAR   POCT RAPID INFLUENZA A/B          Imaging Results    None          Medications - No data to display  Medical Decision Making  This is an emergent evaluation of a 58-year-old male with a past medical history of GERD, hypertension, hyperlipidemia who presents to the emergency department for evaluation of productive cough with yellow sputum, sore throat, rhinorrhea x 3 days.  Physical exam reveals mild posterior oropharyngeal erythema with postnasal drip, no oropharyngeal exudates, no tonsillar swelling, uvula is midline.  No trismus on exam.  No muffled voice.  Patient is tolerating secretions without difficulty and breathing comfortably on exam.  Bilateral tympanic membranes pearly gray without erythema, bulging, perforation. Regular rate rhythm without murmurs.  Lungs are clear to auscultation bilaterally.  Abdomen is soft, nontender, non distended, with normal bowel sounds.  Differential diagnosis includes but is not limited to COVID, flu, strep, viral URI. Considered peritonsillar abscess but highly doubtful given no trismus, muffled voice, no tonsillar swelling, and midline uvula on exam. Considered retropharyngeal abscess but highly doubtful given patient is up to date on immunizations, has stable vital signs, is without neck pain/stiffness on exam, and has no difficulty breathing on exam. Considered abilio's angina but highly doubtful given normal dentition, no submandibular swelling, stable vital signs, and no trismus/muffled voice or difficulty breathing on exam.  Workup initiated with viral swabs.  Flu negative.   COVID positive.  Discussed Paxlovid therapy with patient, however he states he would like to hold off at this time.  States he would just like medications for symptoms.  Will send home with Tylenol for fever.  Will send home with Tessalon Perles and Robitussin for cough.  Will send home with benzocaine menthol lozenges for sore throat.  Will send home with Flonase and Zyrtec for postnasal drip and allergies.  Instructed patient to isolate per CDC guidelines.  Work note provided. Patient is very well appearing, and in no acute distress. Vital signs are reassuring here in the emergency department, patient is afebrile, breathing comfortable, satting 97 % on room air. Patient is stable for discharge at this time. Patient verbalizes understanding of care plan. All questions and concerns were addressed. Discussed strict return precautions with the patient. Instructed follow up with primary care provider within 1 week.      Johnathan Chaney PA-C    DISCLAIMER: This note was prepared with [employer] voice recognition transcription software. Garbled syntax, mangled pronouns, and other bizarre constructions may be attributed to that software system.     Amount and/or Complexity of Data Reviewed  Labs: ordered.    Risk  OTC drugs.  Prescription drug management.            Scribe Attestation:   Scribe #1: I performed the above scribed service and the documentation accurately describes the services I performed. I attest to the accuracy of the note.                      I, Johnathan Chaney PA-C, personally performed the services described in this documentation.  All medical record entries made by the scribe were at my direction and in my presence.  I have reviewed the chart and agree that the record reflects my personal performance and is accurate and complete.    Clinical Impression:   Final diagnoses:  [R05.1] Acute cough (Primary)  [U07.1] COVID-19        ED Disposition Condition    Discharge Stable          ED Prescriptions        Medication Sig Dispense Start Date End Date Auth. Provider    guaiFENesin 100 mg/5 ml (ROBITUSSIN) 100 mg/5 mL syrup Take 5-10 mLs (100-200 mg total) by mouth every 4 (four) hours as needed for Cough. 60 mL 10/30/2023 11/9/2023 Johnathan Chaney PA-C    benzonatate (TESSALON) 100 MG capsule Take 1 capsule (100 mg total) by mouth 3 (three) times daily as needed for Cough. 20 capsule 10/30/2023 11/9/2023 Johnathan Chaney PA-C    benzocaine-menthoL 15-3.6 mg Lozg Please follow directions on packaging. 18 lozenge 10/30/2023 -- Johnathan Chaney PA-C    acetaminophen (TYLENOL) 500 MG tablet Take 1 tablet (500 mg total) by mouth every 6 (six) hours as needed for Pain. 20 tablet 10/30/2023 -- Johnathan Chaney PA-C    cetirizine (ZYRTEC) 10 MG tablet Take 1 tablet (10 mg total) by mouth once daily. 30 tablet 10/30/2023 11/29/2023 Johnathan Chaney PA-C    fluticasone propionate (FLONASE) 50 mcg/actuation nasal spray 1 spray (50 mcg total) by Each Nostril route 2 (two) times daily as needed for Rhinitis. 15 g 10/30/2023 -- Johnathan Chaney PA-C          Follow-up Information       Follow up With Specialties Details Why Contact Info    Dipesh Clements MD Internal Medicine   [address] [place] [address]  [place] [address]  [phone]      [facility] - Emergency Dept Emergency Medicine Go to  Go to [facility] emergency department on [facility] if symptoms worsen or do not resolve, If symptoms worsen [address] [facility] [address] [phone]             Johnathan Chaney PA-C  10/30/23 [id number]

## 2023-10-30 NOTE — Clinical Note
"Enoch"Pilar Barr was seen and treated in our emergency department on 10/30/2023.     COVID-19 is present in our communities across the state. There is limited testing for COVID at this time, so not all patients can be tested. In this situation, your employee meets the following criteria:    Enoch Barr has met the criteria for COVID-19 testing and has a POSITIVE result. He can return to work once they are asymptomatic for 24 hours without the use of fever reducing medications AND at least five days from the first positive result. A mask is recommended for 5 days post quarantine.     If you have any questions or concerns, or if I can be of further assistance, please do not hesitate to contact me.    Sincerely,             Johnathan Chaney PA-C"

## 2023-10-31 ENCOUNTER — NURSE TRIAGE (OUTPATIENT)
Dept: ADMINISTRATIVE | Facility: CLINIC | Age: 58
End: 2023-10-31
Payer: OTHER GOVERNMENT

## 2023-10-31 NOTE — TELEPHONE ENCOUNTER
Pt calling with c/o testing positive for covid yesterday and symptoms not worse but has a sinus infection. Pt is concerned that he has appt tomorrow with Dr Pineda for post op and will need to r/s along with MRI. I will route message to Dr Pineda's office to r/s pt and pt will r/s MRI. Pt to call and reach out    As needed. Pt denies any new complaints       Reason for Disposition   Requesting lab results and adult stable (no new symptoms, not worsening)    Protocols used: Information Only Call - No Triage-A-[place]

## 2023-11-03 ENCOUNTER — PATIENT MESSAGE (OUTPATIENT)
Dept: RESEARCH | Facility: HOSPITAL | Age: 58
End: 2023-11-03
Payer: OTHER GOVERNMENT

## 2023-11-07 ENCOUNTER — TELEPHONE (OUTPATIENT)
Dept: NEUROSURGERY | Facility: CLINIC | Age: 58
End: 2023-11-07
Payer: OTHER GOVERNMENT

## 2023-11-07 NOTE — TELEPHONE ENCOUNTER
Returned pt's call, informed pt that  will not be in office tomorrow at 320 and moved his appointment to 140 tomorrow. Pt confirmed and voiced understanding

## 2023-11-08 ENCOUNTER — HOSPITAL ENCOUNTER (OUTPATIENT)
Dept: RADIOLOGY | Facility: HOSPITAL | Age: 58
Discharge: HOME | End: 2023-11-08
Payer: OTHER GOVERNMENT

## 2023-11-08 DIAGNOSIS — M51.36 DDD (DEGENERATIVE DISC DISEASE), LUMBAR: ICD-10-CM

## 2023-11-08 DIAGNOSIS — M54.16 LUMBAR RADICULOPATHY, CHRONIC: ICD-10-CM

## 2023-11-08 PROCEDURE — 72148 MRI LUMBAR SPINE W/O DYE: CPT | Mod: TC

## 2023-11-08 PROCEDURE — 72148 MRI LUMBAR SPINE WITHOUT CONTRAST: ICD-10-PCS | Mod: 26,,, | Performed by: RADIOLOGY

## 2023-11-08 PROCEDURE — 72148 MRI LUMBAR SPINE W/O DYE: CPT | Mod: 26,,, | Performed by: RADIOLOGY

## 2023-11-08 NOTE — PROGRESS NOTES
[facility]  Neurosurgery     Interval history 11/09/2023:   Patient returns to clinic today for postop evaluation, s/p left L5-S1 laminectomy and diskectomy with Dr. Pineda on 7/5/23.  He was scheduled to see Dr. Pineda in clinic yesterday but the appointment was canceled due to an emergency, so he presents today for routine follow-up.  Reports improvement in his left-sided low back and left leg pain since his last visit.  The pain is less intense and also covered a shorter distribution along his lateral left leg.  He takes Tylenol, Celebrex, and/or Robaxin as needed in the evenings to help with the pain.  Scheduled for follow-up next week with pain management to review recent lumbar MRI and discuss the need for injections.      Interval history 10/19/2023:   Patient returns to clinic today approximately 3 months status post left L5-S1 laminectomy and diskectomy with Dr. Pineda.  Symptoms remain much improved compared to before surgery, but he continues to have mid to left low back pain that will wrap around his hip into the lateral thigh and sometimes as far down as the ankle.  He generally can make the pain better with a hot shower and some movement.  He is able to go about his day by taking 1 Aleve per day. It is most bothersome in the morning.  He also notes a specific spot in his left low back that is sore when rolling over in bed.  He has been participating in physical therapy and found it helpful for quite a while, but his pain did seem to worsen a bit about 3 weeks ago with no known inciting event or trauma.  He was recently seen by pain management who considered an ANKITA but wanted to wait until his three-month postop before moving forward.    Interval history with Dr. Pineda 08/18/2023:   6 weeks from left L5/S1 MLD for foraminal disc herniation    Back pain and leg pain improved after surgery  Starting 1 week ago, he began to experience mild aching over the low back and some deep aching in the foreleg.   These  symptoms are not as severe as preop.  No weakness.  Some continued sensory change over the plantar foot.    Interval history 7/21/23:  Doing well today. Numbness in his left foot is improving. He believes it may be due to plantar fasciitis. Pre-op back pain is resolved. Mild soreness around incision.     HPI 7/13/23:  Enoch Barr is a 58 y.o. male who presents to clinic today for 1 week wound check, s/p MIS L5-S1 laminectomy and diskectomy with Dr. Pineda.  Denies fevers, chills, night sweats or N/V. Further denies wound drainage or swelling. Pt has been taking mostly Tylenol for pain relief.  He presents to clinic early today for evaluation of new numbness in his left foot.      Physical Exam:   General: well developed, well nourished, no distress  Neurologic: Alert and oriented. Thought content appropriate.   GCS: Motor: 6/Verbal: 5/Eyes: 4 GCS Total: 15   Mental Status: Awake, Alert, Oriented x3   Cranial nerves: face symmetric, tongue midline, pupils equal, round, reactive to light with accomodation, EOMI.   Motor Strength: moves all extremities with good strength and tone 5/5 BLE  Sensation: response to light touch throughout; mild decrease to medial right great toe (reports previous surgery at this location)  No gait disturbances     Incision well healed.       Vitals:    11/09/23 1258   BP: 133/84   Pulse: 78   SpO2: 96%   Weight: 100 kg (220 lb 7.4 oz)   Height: 6' (1.829 m)   PainSc:   4   PainLoc: Back     Imaging:   I have personally reviewed imaging and agree with the findings.     MRI lumbar spine 11/08/2023   Unchanged minimal retrolisthesis L2 on L3.  focal disc extrusion at L3-4 (encroaching but not compressing the descending right L4 nerve in the lateral recess)  No severe central stenosis at any level   Left L5-S1 laminectomy defect with mild residual left foraminal stenosis    Assessment/Plan:   Enoch Barr is a 58 y.o. male who presents for routine postop evaluation, s/p left MIS L5-S1  laminectomy and diskectomy with Dr. Pineda for lumbar radiculopathy.  Pain continues to improve compared to preop.  Since his last visit, the left-sided low back and left lateral leg pain has decreased in both intensity and distribution.  He is able to make it until the evening before taking anything for pain and even then it is generally mild.  He is hopeful that his symptoms will continue to improve with time.  He is scheduled for follow up with pain management next week to discuss the need for injections.    Pt is aware of and in agreement with the following plan:  -no further activity restrictions.  Use proper lifting techniques to avoid excess back strain and reduce the risk of repeat herniations   -Cleared for injections as needed with pain management  -Follow up in 3 months for final postop evaluation    -Encouraged patient to call if they have any questions or concerns prior to next follow up appt        Mayra Cosme PA-C  [facility]  Department of Neurosurgery  [phone]

## 2023-11-09 ENCOUNTER — OFFICE VISIT (OUTPATIENT)
Dept: NEUROSURGERY | Facility: CLINIC | Age: 58
End: 2023-11-09
Payer: OTHER GOVERNMENT

## 2023-11-09 VITALS
OXYGEN SATURATION: 96 % | HEIGHT: 72 IN | HEART RATE: 78 BPM | DIASTOLIC BLOOD PRESSURE: 84 MMHG | SYSTOLIC BLOOD PRESSURE: 133 MMHG | WEIGHT: 220.44 LBS | BODY MASS INDEX: 29.86 KG/M2

## 2023-11-09 DIAGNOSIS — M54.16 LUMBAR RADICULOPATHY: ICD-10-CM

## 2023-11-09 DIAGNOSIS — Z98.890 STATUS POST LUMBAR MICRODISCECTOMY: Primary | ICD-10-CM

## 2023-11-09 PROCEDURE — 99213 OFFICE O/P EST LOW 20 MIN: CPT | Mod: S$GLB,,, | Performed by: PHYSICIAN ASSISTANT

## 2023-11-09 PROCEDURE — 99213 PR OFFICE/OUTPT VISIT, EST, LEVL III, 20-29 MIN: ICD-10-PCS | Mod: S$GLB,,, | Performed by: PHYSICIAN ASSISTANT

## 2023-11-09 NOTE — PATIENT INSTRUCTIONS
-no further activity restrictions.  Use proper lifting techniques to avoid excess back strain and reduce the risk of repeat herniations   -Cleared for injections as needed with pain management  -Follow up in 3 months for final postop evaluation

## 2023-11-16 ENCOUNTER — OFFICE VISIT (OUTPATIENT)
Dept: PAIN MEDICINE | Facility: CLINIC | Age: 58
End: 2023-11-16
Payer: OTHER GOVERNMENT

## 2023-11-16 VITALS
HEART RATE: 58 BPM | SYSTOLIC BLOOD PRESSURE: 125 MMHG | OXYGEN SATURATION: 95 % | DIASTOLIC BLOOD PRESSURE: 86 MMHG | RESPIRATION RATE: 18 BRPM

## 2023-11-16 DIAGNOSIS — M51.36 DDD (DEGENERATIVE DISC DISEASE), LUMBAR: ICD-10-CM

## 2023-11-16 DIAGNOSIS — M54.16 LUMBAR RADICULOPATHY, CHRONIC: Primary | ICD-10-CM

## 2023-11-16 DIAGNOSIS — M54.16 LUMBAR RADICULOPATHY: ICD-10-CM

## 2023-11-16 DIAGNOSIS — M47.816 LUMBAR SPONDYLOSIS: ICD-10-CM

## 2023-11-16 DIAGNOSIS — Z98.890 STATUS POST LUMBAR MICRODISCECTOMY: ICD-10-CM

## 2023-11-16 PROCEDURE — 99999 PR PBB SHADOW E&M-EST. PATIENT-LVL IV: ICD-10-PCS | Mod: PBBFAC,,,

## 2023-11-16 PROCEDURE — 99214 PR OFFICE/OUTPT VISIT, EST, LEVL IV, 30-39 MIN: ICD-10-PCS | Mod: S$PBB,,,

## 2023-11-16 PROCEDURE — 99214 OFFICE O/P EST MOD 30 MIN: CPT | Mod: PBBFAC,PN

## 2023-11-16 PROCEDURE — 99214 OFFICE O/P EST MOD 30 MIN: CPT | Mod: S$PBB,,,

## 2023-11-16 PROCEDURE — 99999 PR PBB SHADOW E&M-EST. PATIENT-LVL IV: CPT | Mod: PBBFAC,,,

## 2023-11-16 NOTE — PROGRESS NOTES
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 retrolisthesis.  Left subarticular/foraminal zone broad-based protrusion causes mass effect on the left lateral recess and closely approximates the left descending L3 nerve root.  Bilateral facet arthropathy and bilateral ligamentum flavum buckling.  Mild to moderate left lateral recess stenosis.  No neural foraminal narrowing.     L3-L4: Mild grade 1 retrolisthesis.  Circumferential disc bulge causes mild mass effect on the right lateral recess and closely approximates the right descending L4 nerve root.  Bilateral facet arthropathy and bilateral ligamentum flavum buckling.  No spinal canal stenosis.  Mild bilateral neural foraminal narrowing.     L4-L5: Mild grade 1 retrolisthesis.  Circumferential disc bulge.  Bilateral facet arthropathy and bilateral ligamentum flavum buckling.  No spinal canal stenosis.  Mild bilateral neural foraminal narrowing.     L5-S1: Left foraminal, cranially extending disc extrusion closely approximates the left exiting L5 nerve root.  Bilateral facet arthropathy.  No spinal canal stenosis.  Moderate to severe left neural foraminal narrowing with questionable impingement of the exiting L5 nerve root.     Impression:     1. Lumbar degenerative changes contributing to multilevel neural foraminal narrowing, most severe at L5-S1  noting a left foraminal zone disc extrusion that contributes to moderate to severe narrowing with questionable impingement of the exiting L5 nerve root.  No spinal canal stenosis.        Electronically signed by: Christian Gleason MD  Date:                                            01/10/2023  Time:                                           09:16       EXAMINATION:  XR LUMBAR SPINE AP AND LAT WITH FLEX/EXT     CLINICAL HISTORY:  Other intervertebral disc degeneration, lumbar region     TECHNIQUE:  AP and lateral views as well as lateral flexion and extension images are performed through the lumbar spine.     COMPARISON:  None     FINDINGS:  There is mild curvature of the lumbar spine to the left.  Slight retrolisthesis is noted at L2-3 and L3-4 which does not change with flexion and extension.  Minor anterior and posterior osteophytes are present at L2 through L5.  Flexion and extension views show no instability.  No fracture is seen.     Impression:     Degenerative changes as above        Electronically signed by: Enoch Parikh MD  Date:                                            09/21/2020  Time:                                           15:42    Assessment:       Encounter Diagnoses   Name Primary?    Lumbar radiculopathy, chronic Yes    DDD (degenerative disc disease), lumbar     Lumbar radiculopathy     Status post lumbar microdiscectomy     Lumbar spondylosis      Plan:       Enoch was seen today for follow-up.    Diagnoses and all orders for this visit:    Lumbar radiculopathy, chronic  -     Case Request Endoscopy: Left L5 + S1 Transforaminal Epidural Steroid Injections    DDD (degenerative disc disease), lumbar  -     Case Request Endoscopy: Left L5 + S1 Transforaminal Epidural Steroid Injections    Lumbar radiculopathy    Status post lumbar microdiscectomy    Lumbar spondylosis      Enoch Barr is a 58 y.o. male with multiple pain complaints.  Patient with chronic lower back pain consistent with a left  L5 radiculopathy.  Underwent a left L5-S1 laminectomy and microdiskectomy with Dr. Pineda, with good relief initially although having some residual pain.  Pain continues to be in a left L5 dermatomal pattern.  Updated lumbar MRI with multilevel degenerative changes, some residual left-sided foraminal stenosis at L5-S1.  Patient also with chronic intermittent neck pain which appears to be related to facet arthropathy.  Cervical x-ray showing spondylosis most notable at C5 and C6.  Neck pain significantly improved with physical therapy.    Prior records reviewed.  Pertinent imaging studies reviewed by me. Imaging results were discussed with patient.  Schedule for left L5, S1 transforaminal epidural steroid injection.    Stressed the importance of maintaining regular home exercise program and being mindful of how they use their back throughout the day.  Patient expressed understanding and agreement.  Return to clinic 2 weeks postprocedure to discuss efficacy.        Martinez Robertson PA-C  [facility]  Interventional Pain Management   11/16/2023    This note was created by combination of typed  and M-Modal dictation.  Transcription and phonetic errors may be present.  If there are any questions, please contact me.

## 2023-11-16 NOTE — H&P (VIEW-ONLY)
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 retrolisthesis.  Left subarticular/foraminal zone broad-based protrusion causes mass effect on the left lateral recess and closely approximates the left descending L3 nerve root.  Bilateral facet arthropathy and bilateral ligamentum flavum buckling.  Mild to moderate left lateral recess stenosis.  No neural foraminal narrowing.     L3-L4: Mild grade 1 retrolisthesis.  Circumferential disc bulge causes mild mass effect on the right lateral recess and closely approximates the right descending L4 nerve root.  Bilateral facet arthropathy and bilateral ligamentum flavum buckling.  No spinal canal stenosis.  Mild bilateral neural foraminal narrowing.     L4-L5: Mild grade 1 retrolisthesis.  Circumferential disc bulge.  Bilateral facet arthropathy and bilateral ligamentum flavum buckling.  No spinal canal stenosis.  Mild bilateral neural foraminal narrowing.     L5-S1: Left foraminal, cranially extending disc extrusion closely approximates the left exiting L5 nerve root.  Bilateral facet arthropathy.  No spinal canal stenosis.  Moderate to severe left neural foraminal narrowing with questionable impingement of the exiting L5 nerve root.     Impression:     1. Lumbar degenerative changes contributing to multilevel neural foraminal narrowing, most severe at L5-S1  noting a left foraminal zone disc extrusion that contributes to moderate to severe narrowing with questionable impingement of the exiting L5 nerve root.  No spinal canal stenosis.        Electronically signed by: Christian Gleason MD  Date:                                            01/10/2023  Time:                                           09:16       EXAMINATION:  XR LUMBAR SPINE AP AND LAT WITH FLEX/EXT     CLINICAL HISTORY:  Other intervertebral disc degeneration, lumbar region     TECHNIQUE:  AP and lateral views as well as lateral flexion and extension images are performed through the lumbar spine.     COMPARISON:  None     FINDINGS:  There is mild curvature of the lumbar spine to the left.  Slight retrolisthesis is noted at L2-3 and L3-4 which does not change with flexion and extension.  Minor anterior and posterior osteophytes are present at L2 through L5.  Flexion and extension views show no instability.  No fracture is seen.     Impression:     Degenerative changes as above        Electronically signed by: Enoch Parikh MD  Date:                                            09/21/2020  Time:                                           15:42    Assessment:       Encounter Diagnoses   Name Primary?    Lumbar radiculopathy, chronic Yes    DDD (degenerative disc disease), lumbar     Lumbar radiculopathy     Status post lumbar microdiscectomy     Lumbar spondylosis      Plan:       Enoch was seen today for follow-up.    Diagnoses and all orders for this visit:    Lumbar radiculopathy, chronic  -     Case Request Endoscopy: Left L5 + S1 Transforaminal Epidural Steroid Injections    DDD (degenerative disc disease), lumbar  -     Case Request Endoscopy: Left L5 + S1 Transforaminal Epidural Steroid Injections    Lumbar radiculopathy    Status post lumbar microdiscectomy    Lumbar spondylosis      Enoch Barr is a 58 y.o. male with multiple pain complaints.  Patient with chronic lower back pain consistent with a left  L5 radiculopathy.  Underwent a left L5-S1 laminectomy and microdiskectomy with Dr. Pineda, with good relief initially although having some residual pain.  Pain continues to be in a left L5 dermatomal pattern.  Updated lumbar MRI with multilevel degenerative changes, some residual left-sided foraminal stenosis at L5-S1.  Patient also with chronic intermittent neck pain which appears to be related to facet arthropathy.  Cervical x-ray showing spondylosis most notable at C5 and C6.  Neck pain significantly improved with physical therapy.    Prior records reviewed.  Pertinent imaging studies reviewed by me. Imaging results were discussed with patient.  Schedule for left L5, S1 transforaminal epidural steroid injection.    Stressed the importance of maintaining regular home exercise program and being mindful of how they use their back throughout the day.  Patient expressed understanding and agreement.  Return to clinic 2 weeks postprocedure to discuss efficacy.        Martinez Robertson PA-C  [facility]  Interventional Pain Management   11/16/2023    This note was created by combination of typed  and M-Modal dictation.  Transcription and phonetic errors may be present.  If there are any questions, please contact me.

## 2023-11-16 NOTE — PROGRESS NOTES
Mr. Kendall will be scheduled for left L5 + S1 TF ANKITA on 11/29/2023, checking in at 1:00pm.  Reviewed the pre-procedure instructions listed below with the patient- copy also provided.  Instructed patient to notify clinic if he begins feeling ill, runs a fever, is prescribed antibiotics, and/or has any outpatient procedures (colonoscopy, endoscopy, OBGYN, dental work, etc.), and/or any vaccinations or booster shots within the two weeks leading up to this procedure.  Instructed patient to report to [facility], 2nd Floor Endoscopy Registration Desk.  All questions answered- patient verbalized understanding.     Day of Procedure  Ensure you have obtained an arrival time from the Pain Management Staff  You will be contacted the week before your scheduled date to review these instructions and receive your arrival time.  Please check any voicemails received.  If you arrive past your scheduled procedure time, you may be asked to reschedule your procedure.    Ensure you have a  with you.  If you arrive without a responsible adult to stay with you and drive you home, you may be asked to reschedule your procedure.     Take all of your prescribed medications that you routinely take for blood pressure, heart medications, thyroid, cholesterol, etc.  You will be informed if blood thinners should be held.    This is NOT a fasting procedure, you may eat the day of your procedure.    Wear comfortable clothing (loose fitting pants).  You may wear glasses, dentures, contact lenses, and/or hearing aids.     Notify the nurse during the intake process if you are allergic to any medications, if you are diabetic, or if you are not feeling well      Diabetic Patients  Please check your blood sugar prior to coming in for your procedure.  If the value is greater than 200, contact the clinic [phone] as the procedure may need to be rescheduled.  The procedure may not be performed if your blood sugar is above 200 even  after checking into the hospital.      IF you are taking blood thinners, please make sure our staff is aware so that we can obtain clearance and have you hold the medication accordingly.

## 2023-11-22 ENCOUNTER — PATIENT MESSAGE (OUTPATIENT)
Dept: PAIN MEDICINE | Facility: CLINIC | Age: 58
End: 2023-11-22
Payer: OTHER GOVERNMENT

## 2023-11-24 ENCOUNTER — PATIENT MESSAGE (OUTPATIENT)
Dept: FAMILY MEDICINE | Facility: CLINIC | Age: 58
End: 2023-11-24
Payer: OTHER GOVERNMENT

## 2023-11-28 NOTE — DISCHARGE INSTRUCTIONS

## 2023-11-29 ENCOUNTER — HOSPITAL ENCOUNTER (OUTPATIENT)
Facility: HOSPITAL | Age: 58
Discharge: HOME | End: 2023-11-29
Attending: PAIN MEDICINE | Admitting: PAIN MEDICINE
Payer: OTHER GOVERNMENT

## 2023-11-29 VITALS
OXYGEN SATURATION: 97 % | RESPIRATION RATE: 18 BRPM | SYSTOLIC BLOOD PRESSURE: 157 MMHG | TEMPERATURE: 98 F | DIASTOLIC BLOOD PRESSURE: 83 MMHG | HEART RATE: 57 BPM

## 2023-11-29 DIAGNOSIS — M54.16 LUMBAR RADICULOPATHY: Primary | ICD-10-CM

## 2023-11-29 PROCEDURE — 64483 NJX AA&/STRD TFRM EPI L/S 1: CPT | Mod: LT,,, | Performed by: PAIN MEDICINE

## 2023-11-29 PROCEDURE — 64483 NJX AA&/STRD TFRM EPI L/S 1: CPT | Mod: LT | Performed by: PAIN MEDICINE

## 2023-11-29 PROCEDURE — 64483 PR EPIDURAL INJ, ANES/STEROID, TRANSFORAMINAL, LUMB/SACR, SNGL LEVL: ICD-10-PCS | Mod: LT,,, | Performed by: PAIN MEDICINE

## 2023-11-29 PROCEDURE — 64484 PRA INJECT ANES/STEROID FORAMEN LUMBAR/SACRAL W IMG GUIDE ,EA ADD LEVEL: ICD-10-PCS | Mod: LT,,, | Performed by: PAIN MEDICINE

## 2023-11-29 PROCEDURE — 64484 NJX AA&/STRD TFRM EPI L/S EA: CPT | Mod: LT,,, | Performed by: PAIN MEDICINE

## 2023-11-29 PROCEDURE — 64484 NJX AA&/STRD TFRM EPI L/S EA: CPT | Mod: LT | Performed by: PAIN MEDICINE

## 2023-11-29 RX ORDER — DEXAMETHASONE SODIUM PHOSPHATE 10 MG/ML
10 INJECTION INTRAMUSCULAR; INTRAVENOUS ONCE
Status: COMPLETED | OUTPATIENT
Start: 2023-11-29 | End: 2023-11-29

## 2023-11-29 RX ORDER — LIDOCAINE HYDROCHLORIDE 10 MG/ML
20 INJECTION INFILTRATION; PERINEURAL ONCE
Status: COMPLETED | OUTPATIENT
Start: 2023-11-29 | End: 2023-11-29

## 2023-11-29 RX ORDER — DEXAMETHASONE SODIUM PHOSPHATE 10 MG/ML
INJECTION INTRAMUSCULAR; INTRAVENOUS
Status: DISCONTINUED
Start: 2023-11-29 | End: 2023-11-29 | Stop reason: HOSPADM

## 2023-11-29 RX ORDER — ALPRAZOLAM 1 MG/1
1 TABLET, ORALLY DISINTEGRATING ORAL ONCE AS NEEDED
Status: DISCONTINUED | OUTPATIENT
Start: 2023-11-29 | End: 2023-11-29 | Stop reason: HOSPADM

## 2023-11-29 RX ORDER — LIDOCAINE HYDROCHLORIDE 10 MG/ML
INJECTION INFILTRATION; PERINEURAL
Status: DISCONTINUED
Start: 2023-11-29 | End: 2023-11-29 | Stop reason: HOSPADM

## 2023-11-29 RX ORDER — LIDOCAINE HYDROCHLORIDE 10 MG/ML
1 INJECTION, SOLUTION EPIDURAL; INFILTRATION; INTRACAUDAL; PERINEURAL ONCE
Status: COMPLETED | OUTPATIENT
Start: 2023-11-29 | End: 2023-11-29

## 2023-11-29 RX ORDER — LIDOCAINE HYDROCHLORIDE 10 MG/ML
INJECTION, SOLUTION EPIDURAL; INFILTRATION; INTRACAUDAL; PERINEURAL
Status: DISCONTINUED
Start: 2023-11-29 | End: 2023-11-29 | Stop reason: HOSPADM

## 2023-11-29 NOTE — DISCHARGE SUMMARY
[facility] [place] - Pain Management  Discharge Note  Short Stay    Procedure(s) (LRB):  Left L5 + S1 Transforaminal Epidural Steroid Injections (Left)      OUTCOME: Patient tolerated treatment/procedure well without complication and is now ready for discharge.    DISPOSITION: Home or Self Care    FINAL DIAGNOSIS:  <principal problem not specified>    FOLLOWUP: In clinic    DISCHARGE INSTRUCTIONS:  No discharge procedures on file.       Discharge Diagnosis:Lumbar radiculopathy, chronic [M54.16]  DDD (degenerative disc disease), lumbar [M51.36]  Condition on Discharge: Stable.  Diet on Discharge: Same as before.  Activity: as per instruction sheet.  Discharge to: Home with a responsible adult.  Follow up: as per Discharge instructions

## 2023-11-29 NOTE — OP NOTE
Lumbar transforaminal ANKITA    Time-out taken to identify patient and procedure side prior to starting the procedure.   I attest that I have reviewed the patient's home medications prior to the procedure and no contraindication have been identified. I  re-evaluated the patient after the patient was positioned for the procedure in the procedure room immediately before the procedural time-out. The vital signs are current and represent the current state of the patient which has not significantly changed since the preprocedure assessment.                              Date of Service: 11/29/2023    PCP: Dipesh Clements MD    Referring Physician:                                   PROCEDURE: Left L5 and S1 transforaminal epidural steroid injection under fluoroscopy    REASON FOR PROCEDURE: Lumbar Radiculopathy    PHYSICIAN: Santana Rojo MD    ASSISTANTS:None    MEDICATIONS INJECTED:  Preservative-free dexamethasone 10mg, Xylocaine 1% MPF 3-5ml. 3ml of the mixture per level.     LOCAL ANESTHETIC INJECTED:  Xylocaine 1% 3ml per site.    SEDATION MEDICATIONS: None    ESTIMATED BLOOD LOSS:  None.    COMPLICATIONS:  None.    TECHNIQUE:   Laying in a prone position, the patient was prepped and draped in the usual sterile fashion using ChloraPrep and fenestrated drape.  The area to be injected was determined under fluoroscopic guidance.  Local anesthetic was given by raising a wheal and going down to the hub of a 27-gauge 1.25 inch needle.  The 4.75 inch 25-gauge spinal needle was introduced towards the transverse process of each above named nerve root level.  The needle was walked medially then hinged into the neural foramen.  Omnipaque was injected to confirm appropriate placement and that there was no vascular runoff.  The medication was then injected after applying negative pressure. The patient tolerated the procedure well.    PAIN BEFORE THE PROCEDURE: 5/10.    PAIN AFTER THE PROCEDURE: 2/10.    The patient was  monitored after the procedure.  Patient was given post procedure and discharge instructions to follow at home.  We will see the patient back in two weeks or the patient may call to inform of status. The patient was discharged in a stable condition.

## 2023-12-07 ENCOUNTER — OFFICE VISIT (OUTPATIENT)
Dept: OPTOMETRY | Facility: CLINIC | Age: 58
End: 2023-12-07
Payer: OTHER GOVERNMENT

## 2023-12-07 DIAGNOSIS — I10 ESSENTIAL HYPERTENSION: Primary | ICD-10-CM

## 2023-12-07 DIAGNOSIS — H52.4 PRESBYOPIA: ICD-10-CM

## 2023-12-07 DIAGNOSIS — H11.151 PINGUECULA OF RIGHT EYE: ICD-10-CM

## 2023-12-07 DIAGNOSIS — G47.33 OSA (OBSTRUCTIVE SLEEP APNEA): ICD-10-CM

## 2023-12-07 DIAGNOSIS — H52.203 ASTIGMATISM OF BOTH EYES, UNSPECIFIED TYPE: ICD-10-CM

## 2023-12-07 DIAGNOSIS — H04.123 DRY EYE SYNDROME, BILATERAL: ICD-10-CM

## 2023-12-07 PROCEDURE — 99214 OFFICE O/P EST MOD 30 MIN: CPT | Mod: S$PBB,,, | Performed by: OPTOMETRIST

## 2023-12-07 PROCEDURE — 92015 PR REFRACTION: ICD-10-PCS | Mod: ,,, | Performed by: OPTOMETRIST

## 2023-12-07 PROCEDURE — 99212 OFFICE O/P EST SF 10 MIN: CPT | Mod: PBBFAC,PO | Performed by: OPTOMETRIST

## 2023-12-07 PROCEDURE — 99999 PR PBB SHADOW E&M-EST. PATIENT-LVL II: ICD-10-PCS | Mod: PBBFAC,,, | Performed by: OPTOMETRIST

## 2023-12-07 PROCEDURE — 99999 PR PBB SHADOW E&M-EST. PATIENT-LVL II: CPT | Mod: PBBFAC,,, | Performed by: OPTOMETRIST

## 2023-12-07 PROCEDURE — 99214 PR OFFICE/OUTPT VISIT, EST, LEVL IV, 30-39 MIN: ICD-10-PCS | Mod: S$PBB,,, | Performed by: OPTOMETRIST

## 2023-12-07 PROCEDURE — 92015 DETERMINE REFRACTIVE STATE: CPT | Mod: ,,, | Performed by: OPTOMETRIST

## 2023-12-07 NOTE — PROGRESS NOTES
MARA    CON: 11/11/2022 - Dr. Chapman    CC: Pt is here today for a routine eye exam. Pt states that near and   distance vision has become more blurry.    (-)Dryness  (-)Burning  (+)Itchiness - slight  (-)Tearing  (-)Flashes  (+)Floaters   (-)Photophobia  (-)Eye Pain      Diabetic: no    Contact Lens: no    Eye Meds: no  Last edited by Ana Andrade MA on 12/7/2023  3:38 PM.            Assessment /Plan     For exam results, see Encounter Report.    Essential hypertension   No retinopathy, monitor yearly    GM (obstructive sleep apnea)  Pinguecula of right eye  Dry eye syndrome, bilateral   Start IVIZIA PRN    Presbyopia  Astigmatism of both eyes, unspecified type   Rx specs, increase add, change astig      RTC 1 year, sooner PRN

## 2023-12-14 ENCOUNTER — OFFICE VISIT (OUTPATIENT)
Dept: PAIN MEDICINE | Facility: CLINIC | Age: 58
End: 2023-12-14
Payer: OTHER GOVERNMENT

## 2023-12-14 VITALS — HEART RATE: 71 BPM | DIASTOLIC BLOOD PRESSURE: 90 MMHG | OXYGEN SATURATION: 95 % | SYSTOLIC BLOOD PRESSURE: 140 MMHG

## 2023-12-14 DIAGNOSIS — Z98.890 STATUS POST LUMBAR MICRODISCECTOMY: ICD-10-CM

## 2023-12-14 DIAGNOSIS — M51.36 DDD (DEGENERATIVE DISC DISEASE), LUMBAR: Primary | ICD-10-CM

## 2023-12-14 DIAGNOSIS — M47.816 LUMBAR SPONDYLOSIS: ICD-10-CM

## 2023-12-14 DIAGNOSIS — M54.16 LUMBAR RADICULOPATHY: ICD-10-CM

## 2023-12-14 PROCEDURE — 99213 PR OFFICE/OUTPT VISIT, EST, LEVL III, 20-29 MIN: ICD-10-PCS | Mod: S$PBB,,,

## 2023-12-14 PROCEDURE — 99213 OFFICE O/P EST LOW 20 MIN: CPT | Mod: PBBFAC,PN

## 2023-12-14 PROCEDURE — 99999 PR PBB SHADOW E&M-EST. PATIENT-LVL III: ICD-10-PCS | Mod: PBBFAC,,,

## 2023-12-14 PROCEDURE — 99213 OFFICE O/P EST LOW 20 MIN: CPT | Mod: S$PBB,,,

## 2023-12-14 PROCEDURE — 99999 PR PBB SHADOW E&M-EST. PATIENT-LVL III: CPT | Mod: PBBFAC,,,

## 2023-12-14 NOTE — PROGRESS NOTES
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  Cauda equina appears normal without findings to suggest arachnoiditis.  Conus medullaris terminates at L1.     Limited evaluation of the visualized abdominal organs demonstrates no significant abnormalities.  SI joints are symmetric.  Paraspinal musculature demonstrates normal bulk and signal intensity.     T12-L1: No spinal canal stenosis.  No neural foraminal narrowing.     L1-L2: No spinal canal stenosis.  No neural foraminal narrowing.     L2-L3: Mild grade 1 retrolisthesis.  Left subarticular/foraminal zone broad-based protrusion causes mass effect on the left lateral recess and closely approximates the left descending L3 nerve root.  Bilateral facet arthropathy and bilateral ligamentum flavum buckling.  Mild to moderate left lateral recess stenosis.  No neural foraminal narrowing.     L3-L4: Mild grade 1 retrolisthesis.  Circumferential disc bulge causes mild mass effect on the right lateral recess and closely approximates the right descending L4 nerve root.  Bilateral facet arthropathy and bilateral ligamentum flavum buckling.  No spinal canal stenosis.  Mild bilateral neural foraminal narrowing.     L4-L5: Mild grade 1 retrolisthesis.  Circumferential disc bulge.  Bilateral facet arthropathy and bilateral ligamentum flavum buckling.  No spinal canal stenosis.  Mild  bilateral neural foraminal narrowing.     L5-S1: Left foraminal, cranially extending disc extrusion closely approximates the left exiting L5 nerve root.  Bilateral facet arthropathy.  No spinal canal stenosis.  Moderate to severe left neural foraminal narrowing with questionable impingement of the exiting L5 nerve root.     Impression:     1. Lumbar degenerative changes contributing to multilevel neural foraminal narrowing, most severe at L5-S1 noting a left foraminal zone disc extrusion that contributes to moderate to severe narrowing with questionable impingement of the exiting L5 nerve root.  No spinal canal stenosis.        Electronically signed by: Christian Gleason MD  Date:                                            01/10/2023  Time:                                           09:16       EXAMINATION:  XR LUMBAR SPINE AP AND LAT WITH FLEX/EXT     CLINICAL HISTORY:  Other intervertebral disc degeneration, lumbar region     TECHNIQUE:  AP and lateral views as well as lateral flexion and extension images are performed through the lumbar spine.     COMPARISON:  None     FINDINGS:  There is mild curvature of the lumbar spine to the left.  Slight retrolisthesis is noted at L2-3 and L3-4 which does not change with flexion and extension.  Minor anterior and posterior osteophytes are present at L2 through L5.  Flexion and extension views show no instability.  No fracture is seen.     Impression:     Degenerative changes as above        Electronically signed by: Enoch Parikh MD  Date:                                            09/21/2020  Time:                                           15:42    Assessment:       Encounter Diagnoses   Name Primary?    DDD (degenerative disc disease), lumbar Yes    Lumbar radiculopathy     Status post lumbar microdiscectomy     Lumbar spondylosis        Plan:       Enoch was seen today for follow-up.    Diagnoses and all orders for this visit:    DDD (degenerative disc disease),  lumbar    Lumbar radiculopathy    Status post lumbar microdiscectomy    Lumbar spondylosis        Enoch Barr is a 58 y.o. male with multiple pain complaints.  Patient with chronic lower back pain consistent with a left L5 radiculopathy.  Underwent a left L5-S1 laminectomy and microdiskectomy with Dr. Pineda, with good relief initially although having some residual pain.  Pain continues to be in a left L5 dermatomal pattern.  Updated lumbar MRI with multilevel degenerative changes, some residual left-sided foraminal stenosis at L5-S1.  Patient also with chronic intermittent neck pain which appears to be related to facet arthropathy.  Cervical x-ray showing spondylosis most notable at C5 and C6.  Neck pain significantly improved with physical therapy.  Good relief with left L5, S1 TF ANKITA    Prior records reviewed.  Pertinent imaging studies reviewed by me. Imaging results were discussed with patient.  Patient is s/p left L5, S1 transforaminal epidural steroid injection with 60% relief.  Patient noting improvement with ADLs.  Consider repeating in the future if appropriate.  Stressed the importance of maintaining regular home exercise program and being mindful of how they use their back throughout the day.  Patient expressed understanding and agreement.  Return to clinic as needed.        Martinez Robertson PA-C  [facility]  Interventional Pain Management   12/14/2023    This note was created by combination of typed  and M-Modal dictation.  Transcription and phonetic errors may be present.  If there are any questions, please contact me.

## 2024-01-10 ENCOUNTER — OFFICE VISIT (OUTPATIENT)
Dept: PAIN MEDICINE | Facility: CLINIC | Age: 59
End: 2024-01-10
Payer: OTHER GOVERNMENT

## 2024-01-10 VITALS — DIASTOLIC BLOOD PRESSURE: 98 MMHG | OXYGEN SATURATION: 95 % | SYSTOLIC BLOOD PRESSURE: 132 MMHG | HEART RATE: 70 BPM

## 2024-01-10 DIAGNOSIS — M51.36 DDD (DEGENERATIVE DISC DISEASE), LUMBAR: Primary | ICD-10-CM

## 2024-01-10 DIAGNOSIS — Z98.890 STATUS POST LUMBAR MICRODISCECTOMY: ICD-10-CM

## 2024-01-10 DIAGNOSIS — M47.816 LUMBAR SPONDYLOSIS: ICD-10-CM

## 2024-01-10 DIAGNOSIS — M54.16 LUMBAR RADICULOPATHY: ICD-10-CM

## 2024-01-10 DIAGNOSIS — M54.16 LUMBAR RADICULOPATHY: Primary | ICD-10-CM

## 2024-01-10 PROCEDURE — 99213 OFFICE O/P EST LOW 20 MIN: CPT | Mod: PBBFAC,PN

## 2024-01-10 PROCEDURE — 99214 OFFICE O/P EST MOD 30 MIN: CPT | Mod: S$PBB,,,

## 2024-01-10 PROCEDURE — 99999 PR PBB SHADOW E&M-EST. PATIENT-LVL III: CPT | Mod: PBBFAC,,,

## 2024-01-10 RX ORDER — CELECOXIB 200 MG/1
200 CAPSULE ORAL 2 TIMES DAILY
Qty: 60 CAPSULE | Refills: 1 | Status: SHIPPED | OUTPATIENT
Start: 2024-01-10 | End: 2024-03-10

## 2024-01-10 RX ORDER — PREGABALIN 75 MG/1
75 CAPSULE ORAL 2 TIMES DAILY
Qty: 60 CAPSULE | Refills: 1 | Status: SHIPPED | OUTPATIENT
Start: 2024-01-10 | End: 2024-02-12 | Stop reason: SDUPTHER

## 2024-01-10 NOTE — PROGRESS NOTES
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                                            09/01/2023  Time:                                           10:43       Narrative & Impression    EXAMINATION:  MRI LUMBAR SPINE WITHOUT CONTRAST     CLINICAL HISTORY:  Lumbar radiculopathy, symptoms persist with conservative treatment; Radiculopathy, lumbar region     TECHNIQUE:  Multiplanar, multisequence MR images were acquired from the thoracolumbar junction to the sacrum without the administration of contrast.     COMPARISON:  Radiograph 09/30/2022.     FINDINGS:  Lumbar spine alignment demonstrates mild levoscoliosis with grade 1 anterolisthesis of L2 on L3, L3 on L4 and L4 on L5.  No spondylolysis.  Vertebral body heights are well maintained without evidence for fracture.  No marrow signal abnormality to suggest an infiltrative process.     There is degenerative disc space narrowing and desiccation from L2-L3 through L5-S1.  Mild-to-moderate degenerative endplate edema at the right lateral aspect of L3-L4.  Annular fissures from L2-L3 through L5-S1.     Distal spinal cord demonstrates normal contour and signal intensity.  Cauda equina appears normal without findings to suggest arachnoiditis.  Conus medullaris terminates at L1.     Limited evaluation of the visualized abdominal organs demonstrates no significant abnormalities.   SI joints are symmetric.  Paraspinal musculature demonstrates normal bulk and signal intensity.     T12-L1: No spinal canal stenosis.  No neural foraminal narrowing.     L1-L2: No spinal canal stenosis.  No neural foraminal narrowing.     L2-L3: Mild grade 1 retrolisthesis.  Left subarticular/foraminal zone broad-based protrusion causes mass effect on the left lateral recess and closely approximates the left descending L3 nerve root.  Bilateral facet arthropathy and bilateral ligamentum flavum buckling.  Mild to moderate left lateral recess stenosis.  No neural foraminal narrowing.     L3-L4: Mild grade 1 retrolisthesis.  Circumferential disc bulge causes mild mass effect on the right lateral recess and closely approximates the right descending L4 nerve root.  Bilateral facet arthropathy and bilateral ligamentum flavum buckling.  No spinal canal stenosis.  Mild bilateral neural foraminal narrowing.     L4-L5: Mild grade 1 retrolisthesis.  Circumferential disc bulge.  Bilateral facet arthropathy and bilateral ligamentum flavum buckling.  No spinal canal stenosis.  Mild bilateral neural foraminal narrowing.     L5-S1: Left foraminal, cranially extending disc extrusion closely approximates the left exiting L5 nerve root.  Bilateral facet arthropathy.  No spinal canal stenosis.  Moderate to severe left neural foraminal narrowing with questionable impingement of the exiting L5 nerve root.     Impression:     1. Lumbar degenerative changes contributing to multilevel neural foraminal narrowing, most severe at L5-S1 noting a left foraminal zone disc extrusion that contributes to moderate to severe narrowing with questionable impingement of the exiting L5 nerve root.  No spinal canal stenosis.        Electronically signed by: Christian Gleason MD  Date:                                            01/10/2023  Time:                                           09:16       EXAMINATION:  XR LUMBAR SPINE AP AND LAT WITH FLEX/EXT      CLINICAL HISTORY:  Other intervertebral disc degeneration, lumbar region     TECHNIQUE:  AP and lateral views as well as lateral flexion and extension images are performed through the lumbar spine.     COMPARISON:  None     FINDINGS:  There is mild curvature of the lumbar spine to the left.  Slight retrolisthesis is noted at L2-3 and L3-4 which does not change with flexion and extension.  Minor anterior and posterior osteophytes are present at L2 through L5.  Flexion and extension views show no instability.  No fracture is seen.     Impression:     Degenerative changes as above        Electronically signed by: Enoch Parikh MD  Date:                                            09/21/2020  Time:                                           15:42    Assessment:       Encounter Diagnoses   Name Primary?    DDD (degenerative disc disease), lumbar Yes    Lumbar radiculopathy     Status post lumbar microdiscectomy     Lumbar spondylosis          Plan:       Enoch was seen today for follow-up.    Diagnoses and all orders for this visit:    DDD (degenerative disc disease), lumbar  -     celecoxib (CELEBREX) 200 MG capsule; Take 1 capsule (200 mg total) by mouth 2 (two) times daily.    Lumbar radiculopathy  -     celecoxib (CELEBREX) 200 MG capsule; Take 1 capsule (200 mg total) by mouth 2 (two) times daily.    Status post lumbar microdiscectomy  -     celecoxib (CELEBREX) 200 MG capsule; Take 1 capsule (200 mg total) by mouth 2 (two) times daily.    Lumbar spondylosis          Enoch Barr is a 58 y.o. male with multiple pain complaints.  Patient with chronic lower back pain consistent with a left L5 radiculopathy.  Underwent a left L5-S1 laminectomy and microdiskectomy with Dr. Pineda, with good relief initially although having some residual pain.  Pain continues to be in a left L5 dermatomal pattern.  Updated lumbar MRI with multilevel degenerative changes, some residual left-sided foraminal stenosis at L5-S1.  Patient  also with chronic intermittent neck pain which appears to be related to facet arthropathy.  Cervical x-ray showing spondylosis most notable at C5 and C6.  Neck pain significantly improved with physical therapy.  Good relief with left L5, S1 TF ANKITA although short-lived.    Prior records reviewed.  Pertinent imaging studies reviewed by me. Imaging results were discussed with patient.  Recent left L5, S1 TF ANKITA with good relief initially.  Relief lasting approximately one-month before returning to baseline.  Advised patient that I would not recommend any additional interventional procedures at this time.  He does appear to have some degree of axial pain radiating to lower lumbar facet arthropathy, specifically on the left.  Although his main concern appears related to lower lumbar radiculopathy.  Start Lyrica 75 mg b.i.d..  Sedation precautions discussed.  Start Celebrex 200 mg b.i.d. p.r.n..  We discussed potential GI adverse effects of NSAID use.  I advised patient to take this medication with food and to discontinue this medication if they experiences any GI upset or black/bloody stools.  Patient expressed understanding and agreement.  Discussed having patient follow up with Neurosurgery to discuss any additional options.  If no additional surgery is recommended the patient may be candidate for spinal cord stimulator trial.  Stressed the importance of maintaining regular home exercise program and being mindful of how they use their back throughout the day.  Patient expressed understanding and agreement.  Return to clinic in 1 month or sooner if needed.  At that time we will discuss efficacy of medication changes and discuss neurosurgical recommendations.        Martinez Robertson PA-C  [facility]  Interventional Pain Management   01/10/2024    This note was created by combination of typed  and M-Modal dictation.  Transcription and phonetic errors may be present.  If there are any  questions, please contact me.

## 2024-01-19 NOTE — ED NOTES
A (GUIDEWIRE HTORQ BAL MDWT UNV 2 3CM 190CM J CRV VASC) guidewire was introduced. LOC: The patient is awake, alert, and oriented to place, time, situation. Affect is appropriate.  Speech is appropriate and clear.     APPEARANCE: Patient resting comfortably in no acute distress.  Patient is clean and well groomed.    SKIN: The skin is warm and dry; color consistent with ethnicity.  Patient has normal skin turgor and moist mucus membranes.  Pt with road rash to the left shoulder, left upper arm, left forearm, lower abdomen, and right forearm. Extensive wound noted to the left elbow, bleeding not controlled, Abrasion noted to the right knee. No bruising noted.     MUSCULOSKELETAL: Patient moving upper and lower extremities without difficulty though complains of pain in the left shoulder, left upper arm, left elbow, right wrist and right ankle.  Denies weakness.     RESPIRATORY: Airway is open and patent. Respirations spontaneous, even, easy, and non-labored.  Patient has a normal effort and rate.  No accessory muscle use noted. Denies cough.     CARDIAC:  Normal rhythm and rate noted.  No peripheral edema noted. No complaints of chest pain.      ABDOMEN: Soft and non tender to palpation.  No distention noted. Road rash abrasion noted across the entire lower abdomen.    NEUROLOGIC: Eyes open spontaneously.  Behavior appropriate to situation.  Follows commands; facial expression symmetrical.  Purposeful motor response noted; normal sensation in all extremities.

## 2024-01-30 ENCOUNTER — OFFICE VISIT (OUTPATIENT)
Dept: NEUROSURGERY | Facility: CLINIC | Age: 59
End: 2024-01-30
Payer: OTHER GOVERNMENT

## 2024-01-30 VITALS
SYSTOLIC BLOOD PRESSURE: 139 MMHG | HEART RATE: 56 BPM | DIASTOLIC BLOOD PRESSURE: 94 MMHG | OXYGEN SATURATION: 96 % | BODY MASS INDEX: 31.32 KG/M2 | HEIGHT: 72 IN | WEIGHT: 231.25 LBS

## 2024-01-30 DIAGNOSIS — G89.29 CHRONIC LEFT-SIDED LOW BACK PAIN WITH LEFT-SIDED SCIATICA: ICD-10-CM

## 2024-01-30 DIAGNOSIS — M25.552 LEFT HIP PAIN: ICD-10-CM

## 2024-01-30 DIAGNOSIS — M54.42 CHRONIC LEFT-SIDED LOW BACK PAIN WITH LEFT-SIDED SCIATICA: ICD-10-CM

## 2024-01-30 DIAGNOSIS — Z98.890 STATUS POST LUMBAR MICRODISCECTOMY: Primary | ICD-10-CM

## 2024-01-30 PROCEDURE — 99214 OFFICE O/P EST MOD 30 MIN: CPT | Mod: S$GLB,,, | Performed by: PHYSICIAN ASSISTANT

## 2024-01-30 NOTE — PROGRESS NOTES
[facility]  Neurosurgery     Interval history 01/30/2024:   Patient returns for follow-up of worsened low back and leg pain.  He states the pain radiates from his left low back down his lateral and medial leg, lateral distribution stops with the ankle medial distribution stops at the calf.  He did undergo ANKITA with pain management on November 29th and believes it by the end of December these symptoms were significantly increasing again.  He wonders if surgical options are available.      Interval history 11/09/2023:   Patient returns to clinic today for postop evaluation, s/p left L5-S1 laminectomy and diskectomy with Dr. Pineda on 7/5/23.  He was scheduled to see Dr. Pineda in clinic yesterday but the appointment was canceled due to an emergency, so he presents today for routine follow-up.  Reports improvement in his left-sided low back and left leg pain since his last visit.  The pain is less intense and also covered a shorter distribution along his lateral left leg.  He takes Tylenol, Celebrex, and/or Robaxin as needed in the evenings to help with the pain.  Scheduled for follow-up next week with pain management to review recent lumbar MRI and discuss the need for injections.      Interval history 10/19/2023:   Patient returns to clinic today approximately 3 months status post left L5-S1 laminectomy and diskectomy with Dr. Pineda.  Symptoms remain much improved compared to before surgery, but he continues to have mid to left low back pain that will wrap around his hip into the lateral thigh and sometimes as far down as the ankle.  He generally can make the pain better with a hot shower and some movement.  He is able to go about his day by taking 1 Aleve per day. It is most bothersome in the morning.  He also notes a specific spot in his left low back that is sore when rolling over in bed.  He has been participating in physical therapy and found it helpful for quite a while, but his pain did seem to worsen a bit about 3  weeks ago with no known inciting event or trauma.  He was recently seen by pain management who considered an ANKITA but wanted to wait until his three-month postop before moving forward.    Interval history with Dr. Pineda 08/18/2023:   6 weeks from left L5/S1 MLD for foraminal disc herniation    Back pain and leg pain improved after surgery  Starting 1 week ago, he began to experience mild aching over the low back and some deep aching in the foreleg.   These symptoms are not as severe as preop.  No weakness.  Some continued sensory change over the plantar foot.    Interval history 7/21/23:  Doing well today. Numbness in his left foot is improving. He believes it may be due to plantar fasciitis. Pre-op back pain is resolved. Mild soreness around incision.     HPI 7/13/23:  Enoch Barr is a 59 y.o. male who presents to clinic today for 1 week wound check, s/p MIS L5-S1 laminectomy and diskectomy with Dr. Pineda.  Denies fevers, chills, night sweats or N/V. Further denies wound drainage or swelling. Pt has been taking mostly Tylenol for pain relief.  He presents to clinic early today for evaluation of new numbness in his left foot.      Physical Exam:   General: well developed, well nourished, no distress  Neurologic: Alert and oriented. Thought content appropriate.   GCS: Motor: 6/Verbal: 5/Eyes: 4 GCS Total: 15   Mental Status: Awake, Alert, Oriented x3   Cranial nerves: face symmetric, tongue midline, pupils equal, round, reactive to light with accomodation, EOMI.   Motor Strength: moves all extremities with good strength and tone 5/5 BLE  Sensation: response to light touch throughout; mild decrease to medial right great toe (reports previous surgery at this location)  No gait disturbances     Incision well healed.       Vitals:    01/30/24 1527   BP: (!) 139/94   Pulse: (!) 56   SpO2: 96%   Weight: 104.9 kg (231 lb 4.2 oz)   Height: 6' (1.829 m)   PainSc:   7   PainLoc: Back     Imaging:   I have personally reviewed  imaging and agree with the findings.     MRI lumbar spine 11/08/2023   Unchanged minimal retrolisthesis L2 on L3.  focal disc extrusion at L3-4 (encroaching but not compressing the descending right L4 nerve in the lateral recess)  No severe central stenosis at any level   Left L5-S1 laminectomy defect with mild residual left foraminal stenosis    Assessment/Plan:   Enoch Barr is a 59 y.o. male who is s/p left MIS L5-S1 laminectomy and diskectomy with Dr. Pineda for lumbar radiculopathy in July 2023.  At his last visit, he was doing very well after surgery but did undergo ANKITA on November 29th.  He now reports continued and ongoing pain in his left low back and left leg.  Most recent MRI from November 8, 2023 shows no obvious nerve root compression to explain his current symptoms.  He denies any additional injuries since that MRI to indicate a new MRI would be necessary.    No neurosurgical intervention indicated at this time. Recent MRI negative for nerve impingement   Recommend consideration of SI vs hip injection prior to SCS trial.   -Encouraged patient to call if they have any questions or concerns prior to next follow up appt        Mayra Cosme PA-C  [facility]  Department of Neurosurgery  [phone]

## 2024-02-12 ENCOUNTER — OFFICE VISIT (OUTPATIENT)
Dept: PAIN MEDICINE | Facility: CLINIC | Age: 59
End: 2024-02-12
Payer: OTHER GOVERNMENT

## 2024-02-12 VITALS
HEIGHT: 72 IN | TEMPERATURE: 98 F | OXYGEN SATURATION: 96 % | SYSTOLIC BLOOD PRESSURE: 134 MMHG | HEART RATE: 60 BPM | WEIGHT: 227.5 LBS | BODY MASS INDEX: 30.81 KG/M2 | RESPIRATION RATE: 16 BRPM | DIASTOLIC BLOOD PRESSURE: 80 MMHG

## 2024-02-12 DIAGNOSIS — M54.16 LUMBAR RADICULOPATHY: ICD-10-CM

## 2024-02-12 DIAGNOSIS — M25.552 PAIN OF LEFT HIP: ICD-10-CM

## 2024-02-12 DIAGNOSIS — M47.816 LUMBAR SPONDYLOSIS: ICD-10-CM

## 2024-02-12 DIAGNOSIS — M70.62 GREATER TROCHANTERIC BURSITIS OF LEFT HIP: ICD-10-CM

## 2024-02-12 DIAGNOSIS — Z98.890 STATUS POST LUMBAR MICRODISCECTOMY: Primary | ICD-10-CM

## 2024-02-12 DIAGNOSIS — M51.36 DDD (DEGENERATIVE DISC DISEASE), LUMBAR: Primary | ICD-10-CM

## 2024-02-12 DIAGNOSIS — Z98.890 STATUS POST LUMBAR MICRODISCECTOMY: ICD-10-CM

## 2024-02-12 DIAGNOSIS — M43.16 SPONDYLOLISTHESIS OF LUMBAR REGION: ICD-10-CM

## 2024-02-12 PROCEDURE — 99214 OFFICE O/P EST MOD 30 MIN: CPT | Mod: 25,S$PBB,,

## 2024-02-12 PROCEDURE — 99999 PR PBB SHADOW E&M-EST. PATIENT-LVL V: CPT | Mod: PBBFAC,,,

## 2024-02-12 PROCEDURE — 99215 OFFICE O/P EST HI 40 MIN: CPT | Mod: PBBFAC,PN,25

## 2024-02-12 PROCEDURE — 20610 DRAIN/INJ JOINT/BURSA W/O US: CPT | Mod: S$PBB,LT,,

## 2024-02-12 PROCEDURE — 99999PBSHW PR PBB SHADOW TECHNICAL ONLY FILED TO HB: Mod: PBBFAC,,,

## 2024-02-12 PROCEDURE — 20610 DRAIN/INJ JOINT/BURSA W/O US: CPT | Mod: PBBFAC,PN

## 2024-02-12 RX ORDER — TRIAMCINOLONE ACETONIDE 40 MG/ML
40 INJECTION, SUSPENSION INTRA-ARTICULAR; INTRAMUSCULAR
Status: COMPLETED | OUTPATIENT
Start: 2024-02-12 | End: 2024-02-12

## 2024-02-12 RX ORDER — PREGABALIN 75 MG/1
75 CAPSULE ORAL 2 TIMES DAILY
Qty: 60 CAPSULE | Refills: 5 | Status: SHIPPED | OUTPATIENT
Start: 2024-02-19 | End: 2024-08-17

## 2024-02-12 RX ADMIN — TRIAMCINOLONE ACETONIDE 40 MG: 40 INJECTION, SUSPENSION INTRA-ARTICULAR; INTRAMUSCULAR at 10:02

## 2024-02-12 NOTE — PROGRESS NOTES
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 changes.  Mild right neural foraminal narrowing.     L5-S1: Bilateral facet degenerative change.  At least moderate left neural foraminal narrowing.     Impression:     1. Unchanged minimal retrolisthesis L2 on L3.  2. Interval postsurgical change at the L4-5 level.  3. Residual degenerative change including focal disc protrusion at L2-3 and disc extrusion at L3-4 (encroaching descending right L4 nerve in the lateral recess).  Degenerative findings contribute to severe left neural foraminal narrowing at L5-S1.  4. No high-grade spinal canal stenosis.        Electronically signed by: Fox Mccauley  Date:                                            11/09/2023  Time:                                           11:39      Narrative & Impression    EXAMINATION:  XR CERVICAL SPINE 5 VIEW WITH FLEX AND EXT     CLINICAL HISTORY:  Other cervical disc degeneration, unspecified cervical region     TECHNIQUE:  Five views of the cervical spine plus flexion and extension views were performed.     COMPARISON:  09/30/2022     FINDINGS:  Calcified plaque left common carotid bifurcation region, mild foraminal stenosis due to uncinate process hypertrophy right C2 C4 and left C3 and C5.  Additional less prominent hypertrophy of uncinate processes, mild  moderate degenerative disc spondylosis particularly to C5 and C6 with minimal mild posterior spur formation particularly C C6.  C1-C2 intact.  Airway normal.  No fracture, subluxation.     Impression:     Degenerative disc spondylosis particularly C5 and C6 disc levels.  Additional findings above.        Electronically signed by: Morgan Leslie MD  Date:                                            09/01/2023  Time:                                           10:43       Narrative & Impression    EXAMINATION:  MRI LUMBAR SPINE WITHOUT CONTRAST     CLINICAL HISTORY:  Lumbar radiculopathy, symptoms persist with conservative treatment; Radiculopathy, lumbar region     TECHNIQUE:  Multiplanar, multisequence MR images were acquired from the thoracolumbar junction to the sacrum without the administration of contrast.     COMPARISON:  Radiograph 09/30/2022.     FINDINGS:  Lumbar spine alignment demonstrates mild levoscoliosis with grade 1 anterolisthesis of L2 on L3, L3 on L4 and L4 on L5.  No spondylolysis.  Vertebral body heights are well maintained without evidence for fracture.  No marrow signal abnormality to suggest an infiltrative process.     There is degenerative disc space narrowing and desiccation from L2-L3 through L5-S1.  Mild-to-moderate degenerative endplate edema at the right lateral aspect of L3-L4.  Annular fissures from L2-L3 through L5-S1.     Distal spinal cord demonstrates normal contour and signal intensity.  Cauda equina appears normal without findings to suggest arachnoiditis.  Conus medullaris terminates at L1.     Limited evaluation of the visualized abdominal organs demonstrates no significant abnormalities.  SI joints are symmetric.  Paraspinal musculature demonstrates normal bulk and signal intensity.     T12-L1: No spinal canal stenosis.  No neural foraminal narrowing.     L1-L2: No spinal canal stenosis.  No neural foraminal narrowing.     L2-L3: Mild grade 1 retrolisthesis.  Left  subarticular/foraminal zone broad-based protrusion causes mass effect on the left lateral recess and closely approximates the left descending L3 nerve root.  Bilateral facet arthropathy and bilateral ligamentum flavum buckling.  Mild to moderate left lateral recess stenosis.  No neural foraminal narrowing.     L3-L4: Mild grade 1 retrolisthesis.  Circumferential disc bulge causes mild mass effect on the right lateral recess and closely approximates the right descending L4 nerve root.  Bilateral facet arthropathy and bilateral ligamentum flavum buckling.  No spinal canal stenosis.  Mild bilateral neural foraminal narrowing.     L4-L5: Mild grade 1 retrolisthesis.  Circumferential disc bulge.  Bilateral facet arthropathy and bilateral ligamentum flavum buckling.  No spinal canal stenosis.  Mild bilateral neural foraminal narrowing.     L5-S1: Left foraminal, cranially extending disc extrusion closely approximates the left exiting L5 nerve root.  Bilateral facet arthropathy.  No spinal canal stenosis.  Moderate to severe left neural foraminal narrowing with questionable impingement of the exiting L5 nerve root.     Impression:     1. Lumbar degenerative changes contributing to multilevel neural foraminal narrowing, most severe at L5-S1 noting a left foraminal zone disc extrusion that contributes to moderate to severe narrowing with questionable impingement of the exiting L5 nerve root.  No spinal canal stenosis.        Electronically signed by: Christian Gleason MD  Date:                                            01/10/2023  Time:                                           09:16       EXAMINATION:  XR LUMBAR SPINE AP AND LAT WITH FLEX/EXT     CLINICAL HISTORY:  Other intervertebral disc degeneration, lumbar region     TECHNIQUE:  AP and lateral views as well as lateral flexion and extension images are performed through the lumbar spine.     COMPARISON:  None     FINDINGS:  There is mild curvature of the lumbar spine to  the left.  Slight retrolisthesis is noted at L2-3 and L3-4 which does not change with flexion and extension.  Minor anterior and posterior osteophytes are present at L2 through L5.  Flexion and extension views show no instability.  No fracture is seen.     Impression:     Degenerative changes as above        Electronically signed by: Enoch Parikh MD  Date:                                            09/21/2020  Time:                                           15:42    Assessment:       Encounter Diagnoses   Name Primary?    Pain of left hip     DDD (degenerative disc disease), lumbar Yes    Greater trochanteric bursitis of left hip     Lumbar radiculopathy     Status post lumbar microdiscectomy     Lumbar spondylosis     Spondylolisthesis of lumbar region            Plan:       Enoch was seen today for leg pain and back pain.    Diagnoses and all orders for this visit:    DDD (degenerative disc disease), lumbar    Pain of left hip  -     X-Ray Hip 2 or 3 views Left (with Pelvis when performed); Future    Greater trochanteric bursitis of left hip  -     triamcinolone acetonide injection 40 mg    Lumbar radiculopathy    Status post lumbar microdiscectomy    Lumbar spondylosis    Spondylolisthesis of lumbar region        Enoch Barr is a 59 y.o. male with multiple pain complaints.  Patient with chronic lower back pain consistent with a left L5 radiculopathy.  Underwent a left L5-S1 laminectomy and microdiskectomy with Dr. Pineda, with good relief initially although having some residual pain.  Pain continues to be in a left L5 dermatomal pattern.  Updated lumbar MRI with multilevel degenerative changes, some residual left-sided foraminal stenosis at L5-S1.  Patient also with chronic intermittent neck pain which appears to be related to facet arthropathy.  Cervical x-ray showing spondylosis most notable at C5 and C6.  Neck pain significantly improved with physical therapy.  Good relief with left L5, S1 TF ANKITA although  short-lived.  Patient also appears to have some degree of left greater trochanteric bursitis on examination today.  Subjectively does have some symptoms consistent with left intra-articular hip dysfunction although negative on examination today.    Prior records reviewed.  Pertinent imaging studies reviewed by me. Imaging results were discussed with patient.  Ordered left hip x-ray to get baseline imaging.  Discussed with patient that I do not have any additional interventional procedure recommendations at this time.  Previous left L5, S1 TF ANKITA did provide significant relief however short-lived only lasting one-month before returned to baseline.  Left GTB steroid injection performed in clinic today.  See note below.  Continue Lyrica 75 mg b.i.d..  Patient taking with benefit, denies any adverse effects.  Refills provided.  Continue Celebrex 200 mg b.i.d. p.r.n..  We discussed potential GI adverse effects of NSAID use.  I advised patient to take this medication with food and to discontinue this medication if they experiences any GI upset or black/bloody stools.  Patient expressed understanding and agreement.  Discussed spinal cord stimulator trial.  May further consider this option at our next follow up appointment.  Return to clinic in 1 month or sooner if needed.      left Greater Trochanteric bursa Steroid Injection     Risks vs. Benefits were discussed with patient. Patient expressed understanding and written consent was obtained. The left greater trochanter was palpated and marked.  The lateral hip(s) was/were draped and prepped with chrolaprep. A 27 gauge 1.5 inch needle was advanced to the left greater trochanter. After negative aspiration, 40 mg of Kenalog and 2.0 cc of 1% lidocaine were injected.  The patient tolerated the procedure well, without any complications or bleeding. She was discharged from clinic in good condition.      Martinez Robertson PA-C  [facility]  Interventional  Pain Management   02/12/2024    This note was created by combination of typed  and M-Modal dictation.  Transcription and phonetic errors may be present.  If there are any questions, please contact me.

## 2024-02-29 ENCOUNTER — OFFICE VISIT (OUTPATIENT)
Dept: CARDIOLOGY | Facility: CLINIC | Age: 59
End: 2024-02-29
Payer: OTHER GOVERNMENT

## 2024-02-29 VITALS
RESPIRATION RATE: 18 BRPM | OXYGEN SATURATION: 98 % | BODY MASS INDEX: 30.01 KG/M2 | SYSTOLIC BLOOD PRESSURE: 122 MMHG | HEART RATE: 65 BPM | WEIGHT: 221.56 LBS | DIASTOLIC BLOOD PRESSURE: 76 MMHG | HEIGHT: 72 IN

## 2024-02-29 DIAGNOSIS — E78.5 HYPERLIPIDEMIA, UNSPECIFIED HYPERLIPIDEMIA TYPE: ICD-10-CM

## 2024-02-29 DIAGNOSIS — G47.33 OSA (OBSTRUCTIVE SLEEP APNEA): ICD-10-CM

## 2024-02-29 DIAGNOSIS — E78.00 HYPERCHOLESTEROLEMIA: Primary | ICD-10-CM

## 2024-02-29 DIAGNOSIS — I10 ESSENTIAL HYPERTENSION: ICD-10-CM

## 2024-02-29 DIAGNOSIS — M51.36 DDD (DEGENERATIVE DISC DISEASE), LUMBAR: ICD-10-CM

## 2024-02-29 DIAGNOSIS — K21.9 GASTROESOPHAGEAL REFLUX DISEASE WITHOUT ESOPHAGITIS: ICD-10-CM

## 2024-02-29 PROCEDURE — 93010 ELECTROCARDIOGRAM REPORT: CPT | Mod: S$PBB,,, | Performed by: INTERNAL MEDICINE

## 2024-02-29 PROCEDURE — 93005 ELECTROCARDIOGRAM TRACING: CPT | Mod: PBBFAC,PO | Performed by: INTERNAL MEDICINE

## 2024-02-29 PROCEDURE — 99214 OFFICE O/P EST MOD 30 MIN: CPT | Mod: PBBFAC,PO | Performed by: INTERNAL MEDICINE

## 2024-02-29 PROCEDURE — 99214 OFFICE O/P EST MOD 30 MIN: CPT | Mod: S$PBB,25,, | Performed by: INTERNAL MEDICINE

## 2024-02-29 PROCEDURE — 99999 PR PBB SHADOW E&M-EST. PATIENT-LVL IV: CPT | Mod: PBBFAC,,, | Performed by: INTERNAL MEDICINE

## 2024-02-29 NOTE — PROGRESS NOTES
CARDIOVASCULAR CONSULTATION    REASON FOR CONSULT:   Enoch Barr is a 59 y.o. male who presents for evaluation chest tightness.      HISTORY OF PRESENT ILLNESS:     Patient is a pleasant 55-year-old.  Family history significant for coronary artery disease and mom had a massive heart attack at the age of 60 and  from it.  Has chest tightness at rest, gets worse on going up a flight of stairs.  Associated with shortness of breath with also gets worse on going up 1 flights of stairs.  Denies orthopnea, PND, swelling of feet.  EKG was personally reviewed and demonstrates normal sinus rhythm.    Also has daily palpitations.  Will check Holter monitor for it.    Notes from 2020:  Patient here for follow-up.  Denies any chest pains at rest on exertion, orthopnea, PND. No chest pains    Sinus rhythm with heart rates varying between 40 and 126 bpm with an average of 69 bpm. Total time in sinus rhythm was approximately 48 hours  There were very rare PVCs totalling 7  There were very rare PACs totalling 33  SYMPTOMS OF CHEST TIGHTNESS ASSOCIATED WITH NORMAL SINUS RHYTHM/SINUS TACHYCARDIA    The study shows equivocal myocardial perfusion.  Cannot exclude inferior ischemia vs attenuation.    Perfusion Defect There is a mild intensity, mostly fixed with some reversibilty defect in the basal to distal inferior wall(s).    Visually estimated ejection fraction is normal at stress.    There is normal wall motion post stress.    The EKG portion of this study is abnormal but not diagnostic. (Erwin 13:18, 13.7 METS, 106% MPHR, 1mm upsloping St depression).    The patient reported no chest pain during the stress test.    Consider Cardiac PET vs cath if high clinical suspicion for CAD.     Notes from 2020:  Patient here for follow-up.  Denies any chest pains at rest on exertion, orthopnea, PND.  States that the chest pain is gone away after starting metoprolol.    No evidence of hemodynamically significant  infrainguinal PAD bilaterally.  Tri- and biphasic waveforms throughout.  Normal MAT bilaterally      Normal resting MAT bilaterally.  Normal PVR waveforms bilaterally.  Borderline abnormal exercise MAT bilaterally (R 1.2->0.98; L 1.07->0.95)      Notes from March 2022:  Patient here for follow-up.  Denies any chest pains at rest on exertion, orthopnea, PND.  Has been doing fine.    Notes from February 23: Patient here for follow-up.  Has been doing fine.  Denies any chest pains at rest on exertion, orthopnea, PND.  EKG done today shows normal sinus rhythm, normal EKG.      Notes from August 23: Patient here for follow-up.  Denies any chest pains at rest on exertion, orthopnea, PND, swelling of feet.  Has been doing fine.    Notes from February 2024:  Patient here for follow-up.  Doing fine.  Denies any chest pains at rest on exertion, orthopnea, PND.    PAST MEDICAL HISTORY:     Past Medical History:   Diagnosis Date    GERD (gastroesophageal reflux disease)     Hypercholesteremia     Hypertension     on medication    Lumbar spondylosis 9/21/2020    Migraines     Sleep apnea        PAST SURGICAL HISTORY:     Past Surgical History:   Procedure Laterality Date    COLONOSCOPY N/A 8/10/2020    Procedure: COLONOSCOPY;  Surgeon: April Dos Santos MD;  Location: [facility] ENDO;  Service: Endoscopy;  Laterality: N/A;    EPIDURAL STEROID INJECTION Left 2/17/2023    Procedure: Left L5 Transforaminal Epidural Steroid Injection;  Surgeon: Santana Rojo Jr., MD;  Location: [facility] ENDO;  Service: Pain Management;  Laterality: Left;  @1245  No ATC or DM    EPIDURAL STEROID INJECTION Bilateral 4/14/2023    Procedure: Bilateral L3, L4, L5 Medial Branch Blocks #1;  Surgeon: Santana Rojo Jr., MD;  Location: [facility] ENDO;  Service: Pain Management;  Laterality: Bilateral;  @1230 (requests afternoon)  No ATC or DM    EPIDURAL STEROID INJECTION Left 6/2/2023    Procedure: Left L5 + S1 Transforaminal Epidural Steroid Injections;  Surgeon:  Santana Rojo Jr., MD;  Location: [facility] ENDO;  Service: Pain Management;  Laterality: Left;  @1300  No ATC or DM    HERNIA REPAIR      INJECTION, SPINE, LUMBOSACRAL, TRANSFORAMINAL APPROACH Left 11/29/2023    Procedure: Left L5 + S1 Transforaminal Epidural Steroid Injections;  Surgeon: Santana Rojo Jr., MD;  Location: [facility] PAIN MANAGEMENT;  Service: Pain Management;  Laterality: Left;  @1300  No ATC or DM  MD Sign.    LAMINECTOMY Left 7/5/2023    Procedure: LAMINECTOMY, SPINE;  Surgeon: Richard Pineda MD;  Location: [facility] OR;  Service: Neurosurgery;  Laterality: Left;  Left L5/S1 discectomy      LAPAROSCOPIC CHOLECYSTECTOMY N/A 1/25/2022    Procedure: CHOLECYSTECTOMY, LAPAROSCOPIC;  Surgeon: Jarrod Martinez MD;  Location: [facility] OR;  Service: General;  Laterality: N/A;    LUMBAR DISCECTOMY Left 7/5/2023    Procedure: DISCECTOMY, SPINE, LUMBAR;  Surgeon: Richard Pineda MD;  Location: [facility] OR;  Service: Neurosurgery;  Laterality: Left;    REPAIR OF TRICEPS TENDON Left 10/2/2020    Procedure: REPAIR, TENDON, TRICEPS;  Surgeon: Keysha Galvez MD;  Location: [facility] OR;  Service: Orthopedics;  Laterality: Left;  RN PRE OP DONE 9/29/2020----COVID NEGATIVE ON 9/29  Arthrex Rep: Delma [phone]    SINUS SURGERY  2012    SURGICAL REMOVAL OF BUNION WITH OSTEOTOMY OF METATARSAL BONE Right 3/24/2023    Procedure: BUNIONECTOMY, WITH METATARSAL OSTEOTOMY;  Surgeon: Shannan An DPM;  Location: [facility] OR;  Service: Podiatry;  Laterality: Right;       ALLERGIES AND MEDICATION:   Review of patient's allergies indicates:  No Known Allergies     Medication List            Accurate as of February 29, 2024  4:30 PM. If you have any questions, ask your nurse or doctor.                CONTINUE taking these medications      acetaminophen 500 MG tablet  Commonly known as: TYLENOL  Take 1 tablet (500 mg total) by mouth every 6 (six) hours as needed for Pain.     atorvastatin 40 MG tablet  Commonly known as: LIPITOR  Take 1  tablet (40 mg total) by mouth every evening.     benzocaine-menthoL 15-3.6 mg Lozg  Please follow directions on packaging.     celecoxib 200 MG capsule  Commonly known as: CeleBREX  Take 1 capsule (200 mg total) by mouth 2 (two) times daily.     cetirizine 10 MG tablet  Commonly known as: ZYRTEC  Take 1 tablet (10 mg total) by mouth once daily.     cromolyn 5.2 mg/spray (4 %) nasal spray  Commonly known as: NASALCHROM  1 spray by Nasal route 4 (four) times daily.     diclofenac sodium 1 % Gel  Commonly known as: VOLTAREN  Apply 2 g topically 4 (four) times daily.     * fluticasone propionate 50 mcg/actuation nasal spray  Commonly known as: FLONASE  1 spray (50 mcg total) by Each Nostril route once daily.     * fluticasone propionate 50 mcg/actuation nasal spray  Commonly known as: FLONASE  1 spray (50 mcg total) by Each Nostril route 2 (two) times daily as needed for Rhinitis.     lisinopriL-hydrochlorothiazide 20-12.5 mg per tablet  Commonly known as: PRINZIDE,ZESTORETIC  TAKE 1 TABLET BY MOUTH EVERY DAY     methocarbamoL 750 MG Tab  Commonly known as: ROBAXIN  TAKE 1 TABLET(750 MG) BY MOUTH THREE TIMES DAILY AS NEEDED FOR MUSCLE SPASMS     metoprolol succinate 25 MG 24 hr tablet  Commonly known as: TOPROL-XL  Take 1 tablet (25 mg total) by mouth once daily.     omeprazole 20 MG capsule  Commonly known as: PRILOSEC  TAKE 1 CAPSULE(20 MG) BY MOUTH EVERY DAY. DECREASED DOSE     pregabalin 75 MG capsule  Commonly known as: LYRICA  Take 1 capsule (75 mg total) by mouth 2 (two) times daily.     traZODone 50 MG tablet  Commonly known as: DESYREL  Take 2 tablets (100 mg total) by mouth nightly as needed for Insomnia.           * This list has 2 medication(s) that are the same as other medications prescribed for you. Read the directions carefully, and ask your doctor or other care provider to review them with you.                  SOCIAL HISTORY:     Social History     Socioeconomic History    Marital status:     Occupational History    Occupation: production      Employer: [employer]   Tobacco Use    Smoking status: Former     Current packs/day: 0.00     Types: Cigarettes     Quit date: 1999     Years since quittin.7    Smokeless tobacco: Never   Substance and Sexual Activity    Alcohol use: Yes     Alcohol/week: 7.0 standard drinks of alcohol     Types: 7 Glasses of wine per week     Comment: occasionally    Drug use: Never    Sexual activity: Not Currently     Partners: Female     Social Determinants of Health     Financial Resource Strain: Patient Declined (2023)    Overall Financial Resource Strain (CARDIA)     Difficulty of Paying Living Expenses: Patient declined   Food Insecurity: Unknown (2023)    Hunger Vital Sign     Worried About Running Out of Food in the Last Year: Never true     Ran Out of Food in the Last Year: Patient declined   Transportation Needs: No Transportation Needs (2023)    PRAPARE - Transportation     Lack of Transportation (Medical): No     Lack of Transportation (Non-Medical): No   Physical Activity: Insufficiently Active (2023)    Exercise Vital Sign     Days of Exercise per Week: 4 days     Minutes of Exercise per Session: 30 min   Stress: No Stress Concern Present (2023)    [employer] [place] [employer] - Occupational Stress Questionnaire     Feeling of Stress : Only a little   Social Connections: Unknown (2023)    Social Connection and Isolation Panel [NHANES]     Frequency of Communication with Friends and Family: Three times a week     Frequency of Social Gatherings with Friends and Family: Patient declined     Active Member of Clubs or Organizations: No     Attends Club or Organization Meetings: Never     Marital Status:    Housing Stability: Low Risk  (2023)    Housing Stability Vital Sign     Unable to Pay for Housing in the Last Year: No     Number of Places Lived in the Last Year: 1      Unstable Housing in the Last Year: No       FAMILY HISTORY:     Family History   Problem Relation Age of Onset    Coronary artery disease Mother     Hypertension Father     No Known Problems Sister     No Known Problems Brother     Valvular heart disease Sister     No Known Problems Brother     No Known Problems Brother     No Known Problems Maternal Aunt     No Known Problems Maternal Uncle     No Known Problems Paternal Aunt     No Known Problems Paternal Uncle     No Known Problems Maternal Grandmother     No Known Problems Maternal Grandfather     No Known Problems Paternal Grandmother     No Known Problems Paternal Grandfather     Amblyopia Neg Hx     Blindness Neg Hx     Cancer Neg Hx     Cataracts Neg Hx     Diabetes Neg Hx     Glaucoma Neg Hx     Macular degeneration Neg Hx     Retinal detachment Neg Hx     Strabismus Neg Hx     Stroke Neg Hx     Thyroid disease Neg Hx        REVIEW OF SYSTEMS:   Review of Systems   Constitutional: Negative.   HENT: Negative.     Eyes: Negative.    Cardiovascular: Negative.    Respiratory: Negative.     Endocrine: Negative.    Hematologic/Lymphatic: Negative.    Skin: Negative.    Musculoskeletal: Negative.    Gastrointestinal: Negative.    Genitourinary: Negative.    Neurological: Negative.    Psychiatric/Behavioral: Negative.     Allergic/Immunologic: Negative.        A 10 point review of systems was performed and all the pertinent positives have been mentioned. Rest of review of systems was negative.        PHYSICAL EXAM:     Vitals:    02/29/24 1521   BP: 122/76   Pulse: 65   Resp: 18    Body mass index is 30.05 kg/m².  Weight: 100.5 kg (221 lb 9 oz)   Height: 6' (182.9 cm)     Physical Exam  Vitals reviewed.   Constitutional:       Appearance: He is well-developed.   HENT:      Head: Normocephalic.   Eyes:      Conjunctiva/sclera: Conjunctivae normal.      Pupils: Pupils are equal, round, and reactive to light.   Cardiovascular:      Rate and Rhythm: Normal rate and  "regular rhythm.      Heart sounds: Normal heart sounds.   Pulmonary:      Effort: Pulmonary effort is normal.      Breath sounds: Normal breath sounds.   Abdominal:      General: Bowel sounds are normal.      Palpations: Abdomen is soft.   Musculoskeletal:      Cervical back: Normal range of motion and neck supple.   Skin:     General: Skin is warm.   Neurological:      Mental Status: He is alert and oriented to person, place, and time.           DATA:     Laboratory:  CBC:  Recent Labs   Lab 01/25/22  0548 01/26/22  0520 02/24/23  1048   WBC 5.92 8.80 6.20   Hemoglobin 14.1 14.7 15.0   Hematocrit 42.1 43.4 44.5   Platelets 209 234 269       CHEMISTRIES:  Recent Labs   Lab 01/23/22  1230 01/24/22  0632 01/25/22  0548 01/26/22  0520 02/24/23  1048   Glucose 93 93 106 107  107 82   Sodium 139 138 139 137  137 140   Potassium 4.2 4.3 4.2 4.1  4.1 4.1   BUN 11 11 15 13  13 15   Creatinine 1.2 1.0 1.1 1.0  1.0 0.8   eGFR if African American >60 >60 >60 >60  >60  --    eGFR if non African American >60 >60 >60 >60  >60  --    Calcium 8.9 8.6 L 8.8 9.1  9.1 8.8   Magnesium 1.9  --  2.3 2.3  --        CARDIAC BIOMARKERS:        COAGS:  Recent Labs   Lab 01/23/22  1230   INR 1.1       LIPIDS/LFTS:  Recent Labs   Lab 01/25/22  0548 01/26/22  0520 03/23/22  0703 02/24/23  1048   Cholesterol  --   --  136 153   Triglycerides  --   --  153 H 105   HDL  --   --  34 L 45   LDL Cholesterol  --   --  71.4 87.0   Non-HDL Cholesterol  --   --  102 108   AST 41 H 35  --  36    H 104 H  --  58 H       No results found for: "HGBA1C"    TSH        The 10-year ASCVD risk score (Sandra LEMA, et al., 2019) is: 6.5%    Values used to calculate the score:      Age: 59 years      Sex: Male      Is Non- : No      Diabetic: No      Tobacco smoker: No      Systolic Blood Pressure: 122 mmHg      Is BP treated: Yes      HDL Cholesterol: 45 mg/dL      Total Cholesterol: 153 mg/dL             ASSESSMENT AND PLAN "     Patient Active Problem List   Diagnosis    Refractive error    Other chronic sinusitis uses budesonide for this NOT for lungs    Essential hypertension    Gastroesophageal reflux disease without esophagitis    Chronic cluster headache, not intractable followed by neurology    Tubular adenoma of colon 08/10/2020 colonoscopy ascending colon tubular adenoma sigmoid hyperplastic polyps.  Rectal hyperplastic polyp.    Pulmonary nodule 2mm on CT; no further imaging    Lumbar spondylosis    DDD (degenerative disc disease), lumbar    Triceps tendon rupture, left, initial encounter    GM (obstructive sleep apnea)    Cholelithiasis    Hypercholesterolemia    Tension type headache    Cervical spine pain    Lumbar radiculopathy    History of bunionectomy of right great toe    Hallux rigidus of right foot    Antalgic gait    Weakness of right foot    Muscle weakness affecting movement of foot    Neck pain    Lumbar pain       Chest tightness and dyspnea on exertion in a patient with multiple risk factors for coronary artery disease.  equivocal stress test.  Symptoms resolved after starting metoprolol.  Continue current therapy.        Dyslipidemia: lipitor.     Palpitations:  No significant arrhythmia.        Follow-up in 6 months              Thank you very much for involving me in the care of your patient.  Please do not hesitate to contact me if there are any questions.      Mario Moore MD, FACC, [facility]  Interventional Cardiologist, [facility].           This note was dictated with the help of speech recognition software.  There might be un-intended errors and/or substitutions.

## 2024-03-01 LAB
OHS QRS DURATION: 92 MS
OHS QTC CALCULATION: 412 MS

## 2024-03-06 ENCOUNTER — OFFICE VISIT (OUTPATIENT)
Dept: ORTHOPEDICS | Facility: CLINIC | Age: 59
End: 2024-03-06
Payer: OTHER GOVERNMENT

## 2024-03-06 DIAGNOSIS — M70.62 TROCHANTERIC BURSITIS OF LEFT HIP: Primary | ICD-10-CM

## 2024-03-06 PROCEDURE — 99213 OFFICE O/P EST LOW 20 MIN: CPT | Mod: S$PBB,,, | Performed by: ORTHOPAEDIC SURGERY

## 2024-03-06 PROCEDURE — 99999 PR PBB SHADOW E&M-EST. PATIENT-LVL III: CPT | Mod: PBBFAC,,, | Performed by: ORTHOPAEDIC SURGERY

## 2024-03-06 PROCEDURE — 99213 OFFICE O/P EST LOW 20 MIN: CPT | Mod: PBBFAC,PN | Performed by: ORTHOPAEDIC SURGERY

## 2024-03-06 NOTE — PROGRESS NOTES
Follow up visit    History of Present Illness:   Enoch comes to the office for  evaluation of left hip pain    L hip pain   Lateral hip with radiation towards groin   Injection of bursa done by Martinez at last pain management visit   Somewhat better after injection   Started a couple weeks ago when he helped his son move   Worse with weight bearing activity particularly when he was been walking for a while  Not rally worse when he first stands up from sitting    ROS: unremarkable and no change since last visit    Physical Examination:    NAD  Left hip  Tender over greater troch bursa   No pain with stinchfield or log roll    Radiographic imaging: 3 views of the left hip negative for hip OA    Assessment/Plan:  59 y.o. male  with Left greater trochanteric bursitis     We discussed the etiology of persistent pain and further treatment options.  NSAIDS prn   PT referral   Can repeat injection in 3 months if needed  Return for follow up visit: PRN    All questions were answered in detail. The patient  verbalized the understanding of the treatment plan and is in full agreement with the treatment plan.

## 2024-03-09 ENCOUNTER — LAB VISIT (OUTPATIENT)
Dept: LAB | Facility: HOSPITAL | Age: 59
End: 2024-03-09
Attending: INTERNAL MEDICINE
Payer: OTHER GOVERNMENT

## 2024-03-09 DIAGNOSIS — Z00.00 NORMAL PHYSICAL EXAM: ICD-10-CM

## 2024-03-09 DIAGNOSIS — E78.00 HYPERCHOLESTEROLEMIA: ICD-10-CM

## 2024-03-09 LAB
ALBUMIN SERPL BCP-MCNC: 3.9 G/DL (ref 3.5–5.2)
ALP SERPL-CCNC: 89 U/L (ref 55–135)
ALT SERPL W/O P-5'-P-CCNC: 58 U/L (ref 10–44)
ANION GAP SERPL CALC-SCNC: 10 MMOL/L (ref 8–16)
AST SERPL-CCNC: 36 U/L (ref 10–40)
BASOPHILS # BLD AUTO: 0.05 K/UL (ref 0–0.2)
BASOPHILS NFR BLD: 1 % (ref 0–1.9)
BILIRUB SERPL-MCNC: 0.5 MG/DL (ref 0.1–1)
BUN SERPL-MCNC: 17 MG/DL (ref 6–20)
CALCIUM SERPL-MCNC: 9.8 MG/DL (ref 8.7–10.5)
CHLORIDE SERPL-SCNC: 103 MMOL/L (ref 95–110)
CHOLEST SERPL-MCNC: 152 MG/DL (ref 120–199)
CHOLEST/HDLC SERPL: 3.3 {RATIO} (ref 2–5)
CO2 SERPL-SCNC: 28 MMOL/L (ref 23–29)
CREAT SERPL-MCNC: 0.9 MG/DL (ref 0.5–1.4)
DIFFERENTIAL METHOD BLD: ABNORMAL
EOSINOPHIL # BLD AUTO: 0.2 K/UL (ref 0–0.5)
EOSINOPHIL NFR BLD: 4.4 % (ref 0–8)
ERYTHROCYTE [DISTWIDTH] IN BLOOD BY AUTOMATED COUNT: 12.4 % (ref 11.5–14.5)
EST. GFR  (NO RACE VARIABLE): >60 ML/MIN/1.73 M^2
ESTIMATED AVG GLUCOSE: 111 MG/DL (ref 68–131)
GLUCOSE SERPL-MCNC: 83 MG/DL (ref 70–110)
HBA1C MFR BLD: 5.5 % (ref 4–5.6)
HCT VFR BLD AUTO: 47.9 % (ref 40–54)
HDLC SERPL-MCNC: 46 MG/DL (ref 40–75)
HDLC SERPL: 30.3 % (ref 20–50)
HGB BLD-MCNC: 15.8 G/DL (ref 14–18)
IMM GRANULOCYTES # BLD AUTO: 0.01 K/UL (ref 0–0.04)
IMM GRANULOCYTES NFR BLD AUTO: 0.2 % (ref 0–0.5)
LDLC SERPL CALC-MCNC: 83.8 MG/DL (ref 63–159)
LYMPHOCYTES # BLD AUTO: 1.5 K/UL (ref 1–4.8)
LYMPHOCYTES NFR BLD: 28.8 % (ref 18–48)
MCH RBC QN AUTO: 33.3 PG (ref 27–31)
MCHC RBC AUTO-ENTMCNC: 33 G/DL (ref 32–36)
MCV RBC AUTO: 101 FL (ref 82–98)
MONOCYTES # BLD AUTO: 0.6 K/UL (ref 0.3–1)
MONOCYTES NFR BLD: 12.2 % (ref 4–15)
NEUTROPHILS # BLD AUTO: 2.8 K/UL (ref 1.8–7.7)
NEUTROPHILS NFR BLD: 53.4 % (ref 38–73)
NONHDLC SERPL-MCNC: 106 MG/DL
NRBC BLD-RTO: 0 /100 WBC
PLATELET # BLD AUTO: 303 K/UL (ref 150–450)
PMV BLD AUTO: 9.6 FL (ref 9.2–12.9)
POTASSIUM SERPL-SCNC: 4.2 MMOL/L (ref 3.5–5.1)
PROT SERPL-MCNC: 7.2 G/DL (ref 6–8.4)
RBC # BLD AUTO: 4.75 M/UL (ref 4.6–6.2)
SODIUM SERPL-SCNC: 141 MMOL/L (ref 136–145)
TRIGL SERPL-MCNC: 111 MG/DL (ref 30–150)
WBC # BLD AUTO: 5.18 K/UL (ref 3.9–12.7)

## 2024-03-09 PROCEDURE — 36415 COLL VENOUS BLD VENIPUNCTURE: CPT | Mod: PO | Performed by: INTERNAL MEDICINE

## 2024-03-09 PROCEDURE — 80053 COMPREHEN METABOLIC PANEL: CPT | Performed by: INTERNAL MEDICINE

## 2024-03-09 PROCEDURE — 85025 COMPLETE CBC W/AUTO DIFF WBC: CPT | Performed by: INTERNAL MEDICINE

## 2024-03-09 PROCEDURE — 80061 LIPID PANEL: CPT | Performed by: INTERNAL MEDICINE

## 2024-03-09 PROCEDURE — 83036 HEMOGLOBIN GLYCOSYLATED A1C: CPT | Performed by: INTERNAL MEDICINE

## 2024-03-10 DIAGNOSIS — I10 ESSENTIAL HYPERTENSION: ICD-10-CM

## 2024-03-12 RX ORDER — LISINOPRIL AND HYDROCHLOROTHIAZIDE 12.5; 2 MG/1; MG/1
TABLET ORAL
Qty: 90 TABLET | Refills: 3 | Status: SHIPPED | OUTPATIENT
Start: 2024-03-12 | End: 2024-03-15 | Stop reason: SDUPTHER

## 2024-03-13 ENCOUNTER — OFFICE VISIT (OUTPATIENT)
Dept: PAIN MEDICINE | Facility: CLINIC | Age: 59
End: 2024-03-13
Payer: OTHER GOVERNMENT

## 2024-03-13 VITALS
BODY MASS INDEX: 30.03 KG/M2 | HEIGHT: 72 IN | DIASTOLIC BLOOD PRESSURE: 90 MMHG | HEART RATE: 48 BPM | WEIGHT: 221.69 LBS | SYSTOLIC BLOOD PRESSURE: 118 MMHG | RESPIRATION RATE: 18 BRPM | OXYGEN SATURATION: 94 %

## 2024-03-13 DIAGNOSIS — M47.816 LUMBAR SPONDYLOSIS: ICD-10-CM

## 2024-03-13 DIAGNOSIS — M43.16 SPONDYLOLISTHESIS OF LUMBAR REGION: ICD-10-CM

## 2024-03-13 DIAGNOSIS — M25.552 PAIN OF LEFT HIP: ICD-10-CM

## 2024-03-13 DIAGNOSIS — M70.62 GREATER TROCHANTERIC BURSITIS OF LEFT HIP: ICD-10-CM

## 2024-03-13 DIAGNOSIS — M51.36 DDD (DEGENERATIVE DISC DISEASE), LUMBAR: Primary | ICD-10-CM

## 2024-03-13 DIAGNOSIS — M54.16 LUMBAR RADICULOPATHY: ICD-10-CM

## 2024-03-13 DIAGNOSIS — Z98.890 STATUS POST LUMBAR MICRODISCECTOMY: ICD-10-CM

## 2024-03-13 PROCEDURE — 99214 OFFICE O/P EST MOD 30 MIN: CPT | Mod: S$PBB,,,

## 2024-03-13 PROCEDURE — 99214 OFFICE O/P EST MOD 30 MIN: CPT | Mod: PBBFAC,PN

## 2024-03-13 PROCEDURE — 99999 PR PBB SHADOW E&M-EST. PATIENT-LVL IV: CPT | Mod: PBBFAC,,,

## 2024-03-13 NOTE — PROGRESS NOTES
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 centered at L4.     Vertebrae: Left L5 hemilaminectomy.  No acute fracture or marrow replacement.  Discogenic sub endplate degenerative changes about the L3-4 and L5-S1 levels.     Discs: Multilevel disc desiccation.  Disc height loss is moderate at L3-4 and L5-S1 and mild at L2-3.     Cord: Normal.  Conus terminates at L1.     Paraspinal muscles & soft tissues: Unremarkable.     Degenerative findings:     T12-L1:     L1-L2:     L2-L3: There is broad-based posterior disc bulge with superimposed left broad-based disc protrusion (foraminal and extraforaminal zones).  This can be seen axial series 6, image 19 and sagittal series 3 images 6-8.  Left facet degenerative change.  Minimal spinal canal stenosis.     L3-L4: Broad-based posterior disc bulge.  Superimposed right paracentral disc extrusion with disc material extending 6 mm below the disc level within the right anterolateral epidural space.  This can be seen best on sagittal series 3, image 13.  Minimal bilateral facet degenerative change.  Narrowed right lateral recess.  Minimal spinal canal stenosis and left neural foraminal narrowing.     L4-L5: Prior partial discectomy.  Mild bilateral facet degenerative changes.  Mild right neural foraminal narrowing.     L5-S1: Bilateral facet degenerative change.   At least moderate left neural foraminal narrowing.     Impression:     1. Unchanged minimal retrolisthesis L2 on L3.  2. Interval postsurgical change at the L4-5 level.  3. Residual degenerative change including focal disc protrusion at L2-3 and disc extrusion at L3-4 (encroaching descending right L4 nerve in the lateral recess).  Degenerative findings contribute to severe left neural foraminal narrowing at L5-S1.  4. No high-grade spinal canal stenosis.        Electronically signed by: Fox Mccauley  Date:                                            11/09/2023  Time:                                           11:39      Narrative & Impression    EXAMINATION:  XR CERVICAL SPINE 5 VIEW WITH FLEX AND EXT     CLINICAL HISTORY:  Other cervical disc degeneration, unspecified cervical region     TECHNIQUE:  Five views of the cervical spine plus flexion and extension views were performed.     COMPARISON:  09/30/2022     FINDINGS:  Calcified plaque left common carotid bifurcation region, mild foraminal stenosis due to uncinate process hypertrophy right C2 C4 and left C3 and C5.  Additional less prominent hypertrophy of uncinate processes, mild moderate degenerative disc spondylosis particularly to C5 and C6 with minimal mild posterior spur formation particularly C C6.  C1-C2 intact.  Airway normal.  No fracture, subluxation.     Impression:     Degenerative disc spondylosis particularly C5 and C6 disc levels.  Additional findings above.        Electronically signed by: Morgan Leslie MD  Date:                                            09/01/2023  Time:                                           10:43       Narrative & Impression    EXAMINATION:  MRI LUMBAR SPINE WITHOUT CONTRAST     CLINICAL HISTORY:  Lumbar radiculopathy, symptoms persist with conservative treatment; Radiculopathy, lumbar region     TECHNIQUE:  Multiplanar, multisequence MR images were acquired from the thoracolumbar junction to the sacrum without the  administration of contrast.     COMPARISON:  Radiograph 09/30/2022.     FINDINGS:  Lumbar spine alignment demonstrates mild levoscoliosis with grade 1 anterolisthesis of L2 on L3, L3 on L4 and L4 on L5.  No spondylolysis.  Vertebral body heights are well maintained without evidence for fracture.  No marrow signal abnormality to suggest an infiltrative process.     There is degenerative disc space narrowing and desiccation from L2-L3 through L5-S1.  Mild-to-moderate degenerative endplate edema at the right lateral aspect of L3-L4.  Annular fissures from L2-L3 through L5-S1.     Distal spinal cord demonstrates normal contour and signal intensity.  Cauda equina appears normal without findings to suggest arachnoiditis.  Conus medullaris terminates at L1.     Limited evaluation of the visualized abdominal organs demonstrates no significant abnormalities.  SI joints are symmetric.  Paraspinal musculature demonstrates normal bulk and signal intensity.     T12-L1: No spinal canal stenosis.  No neural foraminal narrowing.     L1-L2: No spinal canal stenosis.  No neural foraminal narrowing.     L2-L3: Mild grade 1 retrolisthesis.  Left subarticular/foraminal zone broad-based protrusion causes mass effect on the left lateral recess and closely approximates the left descending L3 nerve root.  Bilateral facet arthropathy and bilateral ligamentum flavum buckling.  Mild to moderate left lateral recess stenosis.  No neural foraminal narrowing.     L3-L4: Mild grade 1 retrolisthesis.  Circumferential disc bulge causes mild mass effect on the right lateral recess and closely approximates the right descending L4 nerve root.  Bilateral facet arthropathy and bilateral ligamentum flavum buckling.  No spinal canal stenosis.  Mild bilateral neural foraminal narrowing.     L4-L5: Mild grade 1 retrolisthesis.  Circumferential disc bulge.  Bilateral facet arthropathy and bilateral ligamentum flavum buckling.  No spinal canal stenosis.  Mild  bilateral neural foraminal narrowing.     L5-S1: Left foraminal, cranially extending disc extrusion closely approximates the left exiting L5 nerve root.  Bilateral facet arthropathy.  No spinal canal stenosis.  Moderate to severe left neural foraminal narrowing with questionable impingement of the exiting L5 nerve root.     Impression:     1. Lumbar degenerative changes contributing to multilevel neural foraminal narrowing, most severe at L5-S1 noting a left foraminal zone disc extrusion that contributes to moderate to severe narrowing with questionable impingement of the exiting L5 nerve root.  No spinal canal stenosis.        Electronically signed by: Christian Gleason MD  Date:                                            01/10/2023  Time:                                           09:16       EXAMINATION:  XR LUMBAR SPINE AP AND LAT WITH FLEX/EXT     CLINICAL HISTORY:  Other intervertebral disc degeneration, lumbar region     TECHNIQUE:  AP and lateral views as well as lateral flexion and extension images are performed through the lumbar spine.     COMPARISON:  None     FINDINGS:  There is mild curvature of the lumbar spine to the left.  Slight retrolisthesis is noted at L2-3 and L3-4 which does not change with flexion and extension.  Minor anterior and posterior osteophytes are present at L2 through L5.  Flexion and extension views show no instability.  No fracture is seen.     Impression:     Degenerative changes as above        Electronically signed by: Enoch Parikh MD  Date:                                            09/21/2020  Time:                                           15:42    Assessment:       Encounter Diagnoses   Name Primary?    DDD (degenerative disc disease), lumbar Yes    Pain of left hip     Greater trochanteric bursitis of left hip     Lumbar radiculopathy     Lumbar spondylosis     Status post lumbar microdiscectomy     Spondylolisthesis of lumbar region        Plan:       Enoch was seen today  for follow-up.    Diagnoses and all orders for this visit:    DDD (degenerative disc disease), lumbar    Pain of left hip    Greater trochanteric bursitis of left hip    Lumbar radiculopathy    Lumbar spondylosis    Status post lumbar microdiscectomy    Spondylolisthesis of lumbar region      Enoch Barr is a 59 y.o. male with multiple pain complaints.  Patient with chronic lower back pain consistent with a left L5 radiculopathy.  Underwent a left L5-S1 laminectomy and microdiskectomy with Dr. Pineda, with good relief initially although having some residual pain.  Pain continues to be in a left L5 dermatomal pattern.  Updated lumbar MRI with multilevel degenerative changes, some residual left-sided foraminal stenosis at L5-S1.  Patient also with chronic intermittent neck pain which appears to be related to facet arthropathy.  Cervical x-ray showing spondylosis most notable at C5 and C6.  Neck pain significantly improved with physical therapy.  Good relief with left L5, S1 TF ANKITA although short-lived.  Patient also appears to have some degree of left greater trochanteric bursitis.  Good relief with left GTB steroid injection..     Prior records reviewed.  Pertinent imaging studies reviewed by me. Imaging results were discussed with patient.  Good relief with recent left GTB steroid injection, consider repeating in the future if appropriate.  No additional interventional procedures recommended at this time.  Previous left L5, S1 TF ANKITA with good relief however short-lived.  He notes his pain is overall manageable at this time.  Can continue with additional conservative measures as well as begin physical therapy.  Continue with scheduled physical therapy and home exercise program.  Continue Lyrica 75 mg b.i.d..  Patient taking with benefit, denies any adverse effects.  Discussed spinal cord stimulator trial in detail.  We did discuss continuing with above conservative measures prior to further consideration of the  spinal cord stimulator trial.  He is scheduled to begin physical therapy in the next couple weeks.  May further consider this option in the future.  Return to clinic as needed.      Martinez Robertson PA-C  [facility]  Interventional Pain Management   03/13/2024    This note was created by combination of typed  and M-Modal dictation.  Transcription and phonetic errors may be present.  If there are any questions, please contact me.

## 2024-03-14 DIAGNOSIS — G47.00 INSOMNIA, UNSPECIFIED TYPE: ICD-10-CM

## 2024-03-14 PROBLEM — E78.5 DYSLIPIDEMIA: Status: ACTIVE | Noted: 2022-01-23

## 2024-03-14 RX ORDER — TRAZODONE HYDROCHLORIDE 50 MG/1
TABLET ORAL
Qty: 180 TABLET | Refills: 3 | Status: SHIPPED | OUTPATIENT
Start: 2024-03-14
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
## 2024-03-14 NOTE — TELEPHONE ENCOUNTER
No care due was identified.  [facility] Embedded Care Due Messages. Reference number: [id number].   3/14/2024 3:29:36 AM CDT

## 2024-03-15 ENCOUNTER — OFFICE VISIT (OUTPATIENT)
Dept: FAMILY MEDICINE | Facility: CLINIC | Age: 59
End: 2024-03-15
Payer: OTHER GOVERNMENT

## 2024-03-15 VITALS
OXYGEN SATURATION: 96 % | BODY MASS INDEX: 29.8 KG/M2 | HEIGHT: 72 IN | DIASTOLIC BLOOD PRESSURE: 72 MMHG | HEART RATE: 87 BPM | TEMPERATURE: 99 F | WEIGHT: 220 LBS | SYSTOLIC BLOOD PRESSURE: 124 MMHG

## 2024-03-15 DIAGNOSIS — D12.6 TUBULAR ADENOMA OF COLON: ICD-10-CM

## 2024-03-15 DIAGNOSIS — G47.33 OSA (OBSTRUCTIVE SLEEP APNEA): ICD-10-CM

## 2024-03-15 DIAGNOSIS — Z00.00 NORMAL PHYSICAL EXAM: Primary | ICD-10-CM

## 2024-03-15 DIAGNOSIS — E78.5 DYSLIPIDEMIA: ICD-10-CM

## 2024-03-15 DIAGNOSIS — M47.816 LUMBAR SPONDYLOSIS: ICD-10-CM

## 2024-03-15 DIAGNOSIS — K21.9 GASTROESOPHAGEAL REFLUX DISEASE WITHOUT ESOPHAGITIS: ICD-10-CM

## 2024-03-15 DIAGNOSIS — R91.1 PULMONARY NODULE: ICD-10-CM

## 2024-03-15 DIAGNOSIS — Z23 NEED FOR SHINGLES VACCINE: ICD-10-CM

## 2024-03-15 DIAGNOSIS — I10 ESSENTIAL HYPERTENSION: ICD-10-CM

## 2024-03-15 DIAGNOSIS — R09.81 NASAL CONGESTION: ICD-10-CM

## 2024-03-15 PROCEDURE — 99999PBSHW ZOSTER RECOMBINANT VACCINE: Mod: PBBFAC,,,

## 2024-03-15 PROCEDURE — 99999 PR PBB SHADOW E&M-EST. PATIENT-LVL IV: CPT | Mod: PBBFAC,,, | Performed by: INTERNAL MEDICINE

## 2024-03-15 PROCEDURE — 99214 OFFICE O/P EST MOD 30 MIN: CPT | Mod: PBBFAC,PO,25 | Performed by: INTERNAL MEDICINE

## 2024-03-15 PROCEDURE — 90750 HZV VACC RECOMBINANT IM: CPT | Mod: PBBFAC,PO

## 2024-03-15 PROCEDURE — 99396 PREV VISIT EST AGE 40-64: CPT | Mod: S$PBB,,, | Performed by: INTERNAL MEDICINE

## 2024-03-15 RX ORDER — ATORVASTATIN CALCIUM 40 MG/1
40 TABLET, FILM COATED ORAL NIGHTLY
Qty: 90 TABLET | Refills: 3 | Status: SHIPPED | OUTPATIENT
Start: 2024-03-15

## 2024-03-15 RX ORDER — FLUTICASONE PROPIONATE 50 MCG
1 SPRAY, SUSPENSION (ML) NASAL 2 TIMES DAILY PRN
Qty: 15 G | Refills: 11 | Status: SHIPPED | OUTPATIENT
Start: 2024-03-15

## 2024-03-15 RX ORDER — OMEPRAZOLE 20 MG/1
CAPSULE, DELAYED RELEASE ORAL
Qty: 90 CAPSULE | Refills: 3 | Status: SHIPPED | OUTPATIENT
Start: 2024-03-15 | End: 2024-04-03

## 2024-03-15 RX ORDER — LISINOPRIL AND HYDROCHLOROTHIAZIDE 12.5; 2 MG/1; MG/1
1 TABLET ORAL DAILY
Qty: 90 TABLET | Refills: 3 | Status: SHIPPED | OUTPATIENT
Start: 2024-03-15

## 2024-03-15 RX ORDER — METOPROLOL SUCCINATE 25 MG/1
25 TABLET, EXTENDED RELEASE ORAL DAILY
Qty: 90 TABLET | Refills: 3 | Status: SHIPPED | OUTPATIENT
Start: 2024-03-15

## 2024-03-19 ENCOUNTER — HOSPITAL ENCOUNTER (EMERGENCY)
Facility: HOSPITAL | Age: 59
Discharge: HOME | End: 2024-03-19
Attending: EMERGENCY MEDICINE
Payer: OTHER GOVERNMENT

## 2024-03-19 VITALS
HEIGHT: 72 IN | WEIGHT: 219 LBS | DIASTOLIC BLOOD PRESSURE: 98 MMHG | RESPIRATION RATE: 18 BRPM | TEMPERATURE: 98 F | HEART RATE: 69 BPM | SYSTOLIC BLOOD PRESSURE: 148 MMHG | BODY MASS INDEX: 29.66 KG/M2 | OXYGEN SATURATION: 95 %

## 2024-03-19 DIAGNOSIS — I10 ESSENTIAL HYPERTENSION: ICD-10-CM

## 2024-03-19 DIAGNOSIS — L30.9 DERMATITIS: Primary | ICD-10-CM

## 2024-03-19 PROCEDURE — 99283 EMERGENCY DEPT VISIT LOW MDM: CPT | Mod: ER

## 2024-03-19 RX ORDER — TRIAMCINOLONE ACETONIDE 1 MG/G
CREAM TOPICAL 2 TIMES DAILY
Qty: 80 G | Refills: 0 | Status: SHIPPED | OUTPATIENT
Start: 2024-03-19

## 2024-03-19 RX ORDER — METOPROLOL SUCCINATE 25 MG/1
TABLET, EXTENDED RELEASE ORAL
Qty: 90 TABLET | Refills: 3 | OUTPATIENT
Start: 2024-03-19

## 2024-03-19 NOTE — TELEPHONE ENCOUNTER
No care due was identified.  [facility] Embedded Care Due Messages. Reference number: [id number].   3/19/2024 5:47:17 AM CDT

## 2024-03-19 NOTE — TELEPHONE ENCOUNTER
Refill Decision Note   Enoch Momo  is requesting a refill authorization.  Brief Assessment and Rationale for Refill:  Quick Discontinue     Medication Therapy Plan: E-Prescribing Status: Receipt confirmed by pharmacy (3/15/2024  3:57 PM CDT)      Comments:     Note composed:5:39 PM 03/19/2024

## 2024-03-20 ENCOUNTER — CLINICAL SUPPORT (OUTPATIENT)
Dept: REHABILITATION | Facility: HOSPITAL | Age: 59
End: 2024-03-20
Attending: ORTHOPAEDIC SURGERY
Payer: OTHER GOVERNMENT

## 2024-03-20 DIAGNOSIS — M70.62 TROCHANTERIC BURSITIS OF LEFT HIP: ICD-10-CM

## 2024-03-20 DIAGNOSIS — M25.552 HIP PAIN, LEFT: Primary | ICD-10-CM

## 2024-03-20 PROCEDURE — 97162 PT EVAL MOD COMPLEX 30 MIN: CPT

## 2024-03-20 PROCEDURE — 97110 THERAPEUTIC EXERCISES: CPT

## 2024-03-20 NOTE — PLAN OF CARE
"[facility]   Physical Therapy Initial Evaluation      Name: Enoch DELUCA Momo  [facility] Number: [id number]    Therapy Diagnosis:   Encounter Diagnoses   Name Primary?    Trochanteric bursitis of left hip     Hip pain, left Yes        Physician: Keysha Galvez MD    Physician Orders: PT Eval and Treat   Medical Diagnosis from Referral:   M70.62 (ICD-10-CM) - Trochanteric bursitis of left hip   Evaluation Date: 3/20/2024  Authorization Period Expiration: TBD  Plan of Care Expiration: 6/20/2024  Progress Note Due: 4/20/2024  Visit # / Visits authorized: 1/1  FOTO: 1/ 3    Precautions: Standard     Time In: 2:22pm  Time Out: 2:55pm  Total Billable Time: 33 minutes    Subjective     Date of onset: chronic     History of current condition - Enoch reports: he got a steroid shot into the left hip with good relief. He is still sensitive to the touch. He feels like the pain comes and goes and lately it hasn't been going back down to normal. He does get radicular pain from the back. Pregablin has been helping the back lately. He does feel like he's lost strength in the L leg but he is going to the gym to help this. He does have N/T in both feet.     Falls: none recently    Imaging: Hip X-ray: Per EMR "FINDINGS:  Three views left hip: No fracture, dislocation, or bone destruction seen."    Prior Therapy: yes   Social History: Pt stated that she he lives with his wife. Pt stated that he has one step to enter his [facility].   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 Pt stated that he is walking, standing, and working on computer at work  Prior Level of Function: independent  Current Level of Function: independent with inc'd pain    Pain:  Current 2/10, worst 6/10, best 0/10   Location: left hip (the upper outside area) and B back   Description: Sharp  Aggravating Factors: pressure and being on it and extended periods of activity  Easing Factors: rest and " medication    Patients goals: to learn some things to do to help the pain in his hip     Medical History:   Past Medical History:   Diagnosis Date    GERD (gastroesophageal reflux disease)     Hypercholesteremia     Hypertension     on medication    Lumbar spondylosis 9/21/2020    Migraines     Sleep apnea        Surgical History:   Enoch Barr  has a past surgical history that includes Sinus surgery (2012); Hernia repair; Colonoscopy (N/A, 8/10/2020); Repair of triceps tendon (Left, 10/2/2020); Laparoscopic cholecystectomy (N/A, 1/25/2022); Epidural steroid injection (Left, 2/17/2023); Surgical removal of bunion with osteotomy of metatarsal bone (Right, 3/24/2023); Epidural steroid injection (Bilateral, 4/14/2023); Epidural steroid injection (Left, 6/2/2023); Laminectomy (Left, 7/5/2023); Lumbar discectomy (Left, 7/5/2023); and injection, spine, lumbosacral, transforaminal approach (Left, 11/29/2023).    Medications:   Enoch has a current medication list which includes the following prescription(s): acetaminophen, atorvastatin, benzocaine-menthol, cetirizine, cromolyn, diclofenac sodium, fluticasone propionate, lisinopril-hydrochlorothiazide, methocarbamol, metoprolol succinate, omeprazole, pregabalin, trazodone, and triamcinolone acetonide 0.1%.    Allergies:   Review of patient's allergies indicates:  No Known Allergies     Objective        Hip Right Left Pain/Dysfunction with Movement    AROM AROM    Flexion WFL WFL    Extension WFL WFL        Right Left Comment: ! = pain   Hip Flexion: 4+/5 4+/5    Hip ABD: 4+/5 4-/5    Hip ADD: 4+/5 4+/5    Hip Extension: 4/5 4/5    Hip IR: 4+/5 4-/5    Hip ER: 4+/5 4-/5    *Grossly mildly painful with L hip MMT*       Right Left Comment ! = pain   Knee extension: 5/5 5/5    Knee flexion: 5/5 5/5    Ankle DF: 5/5 5/5      ROSANNE: positive L    FADIR: negative    TEZ'S: positive R      Intake Outcome Measure for FOTO Survey    Therapist reviewed FOTO scores for Enoch DELUCA  Momo on 3/20/2024.   FOTO report - see Media section or FOTO account episode details.    Intake Score: 43%         Treatment     Total Treatment time (time-based codes) separate from Evaluation: 10 minutes     Enoch received the treatments listed below:      therapeutic exercises to develop strength, endurance, ROM, flexibility, posture, and core stabilization for 10 minutes including:  Review and demonstration of HEP including the following:   - TrA activation 3x10, 5s   - SL clams 1x1' B      Next session:   Hip iso progressions  TrA activation progressions  Wall clams   Piriformis stretch    Patient Education and Home Exercises     Education provided:   - Role of PT  - PT POC  - HEP    Written Home Exercises Provided: yes. Exercises were reviewed and Enoch was able to demonstrate them prior to the end of the session.  Enoch demonstrated good  understanding of the education provided. See EMR under Patient Instructions for exercises provided during therapy sessions.    Assessment     Enoch is a 59 y.o. male referred to outpatient Physical Therapy with a medical diagnosis of trochanteric bursitis of L hip. Patient presents with Symptoms consistent with medical diagnoses and impairments that are effecting their daily life. He is limited in strength and tissue extensibility of L hip as well as in core control.     Patient prognosis is Good.   Patient will benefit from skilled outpatient Physical Therapy to address the deficits stated above and in the chart below, provide patient /family education, and to maximize patientt's level of independence.     Plan of care discussed with patient: Yes  Patient's spiritual, cultural and educational needs considered and patient is agreeable to the plan of care and goals as stated below:     Anticipated Barriers for therapy: chronicity and pain    Medical Necessity is demonstrated by the following  History  Co-morbidities and personal factors that may impact the plan of care []  LOW: no personal factors / co-morbidities  [x] MODERATE: 1-2 personal factors / co-morbidities  [] HIGH: 3+ personal factors / co-morbidities    Moderate / High Support Documentation:   Co-morbidities affecting plan of care: hx of lumbar surgery    Personal Factors:   no deficits     Examination  Body Structures and Functions, activity limitations and participation restrictions that may impact the plan of care [] LOW: addressing 1-2 elements  [x] MODERATE: 3+ elements  [] HIGH: 4+ elements (please support below)    Moderate / High Support Documentation: pain, strength, tissue extensibility     Clinical Presentation [] LOW: stable  [x] MODERATE: Evolving  [] HIGH: Unstable     Decision Making/ Complexity Score: moderate       Goals:  Short Term Goals (4 Weeks):   1. Pt will be compliant with HEP to supplement PT in restoring pain free function.  2. Pt will improve impaired LE MMTs by 1/2 grade  to improve strength for functional tasks  3. Pt will have negative Frida's test to improve tissue extensibility of ITB and improve functional mobility of LEs.   Long Term Goals (8 Weeks):  1. Pt will improve FOTO score to </= 65% limited to decrease perceived limitation with mobility  2. Pt will improve impaired LE MMTs by 1 grade to improve strength for functional tasks.  3. Pt improve impaired hip ROM to WNL in all planes to improve mobility for normal movement patterns.      Plan     Plan of care Certification: 3/20/2024 to 6/20/2024.    Outpatient Physical Therapy 2 times weekly for 12 weeks to include the following interventions: Cervical/Lumbar Traction, Electrical Stimulation, Gait Training, Manual Therapy, Moist Heat/ Ice, Neuromuscular Re-ed, Therapeutic Activities, Therapeutic Exercise, Ultrasound, and dry needling, IASTM, and kinesiotaping PRN.       Erick Lance, PT        Physician's Signature: _________________________________________ Date: ________________

## 2024-03-20 NOTE — DISCHARGE INSTRUCTIONS
I am unsure what is causing your symptoms.  As we discussed it is important that you see a dermatologist.  If symptoms worsen you can return to the emergency department.    Wash in lukewarm water with 2 cups of Epson salt.  Bathe body with mineral oil and Cetaphil body wash.  When you get out of the bath tub, pat yourself dry.  Within 2 minutes you need to apply CeraVe lotion from head to toe.  In the rash affected region, apply the steroids.  Do not put steroids on your face.  Apply Aquaphor on top of these regions.  Do this twice a day.

## 2024-03-20 NOTE — ED PROVIDER NOTES
Encounter Date: 3/19/2024       History     Chief Complaint   Patient presents with    Rash     A 60 y/o male presents to the ER c/o bilateral arm rash and ABD rash x 2 weeks.Pt reports receiving Shingles vaccine and redness to area after administration on Friday.      Patient is a 59-year-old male with history of hypertension, hyperlipidemia and chronic back pain who presents to the emergency department with rash.  Patient reports over the last 3 weeks he has had a worsening rash to his elbows and abdomen.  Reports it is very itchy especially at night.  Denies any fevers.  Denies any new soaps, laundry detergents, lotions, or other body products.  Denies any new medications.  Reports he did have his 2nd shingles shot a week ago, but he had no complications with the 1st shot.  Reports he has had no history of autoimmune diseases.    The history is provided by the patient.     Review of patient's allergies indicates:  No Known Allergies  Past Medical History:   Diagnosis Date    GERD (gastroesophageal reflux disease)     Hypercholesteremia     Hypertension     on medication    Lumbar spondylosis 9/21/2020    Migraines     Sleep apnea      Past Surgical History:   Procedure Laterality Date    COLONOSCOPY N/A 8/10/2020    Procedure: COLONOSCOPY;  Surgeon: April Dos Santos MD;  Location: [facility] ENDO;  Service: Endoscopy;  Laterality: N/A;    EPIDURAL STEROID INJECTION Left 2/17/2023    Procedure: Left L5 Transforaminal Epidural Steroid Injection;  Surgeon: Santana Rojo Jr., MD;  Location: [facility] ENDO;  Service: Pain Management;  Laterality: Left;  @1245  No ATC or DM    EPIDURAL STEROID INJECTION Bilateral 4/14/2023    Procedure: Bilateral L3, L4, L5 Medial Branch Blocks #1;  Surgeon: Santana Rojo Jr., MD;  Location: [facility] ENDO;  Service: Pain Management;  Laterality: Bilateral;  @1230 (requests afternoon)  No ATC or DM    EPIDURAL STEROID INJECTION Left 6/2/2023    Procedure: Left L5 + S1 Transforaminal Epidural  Steroid Injections;  Surgeon: Santana Rojo Jr., MD;  Location: [facility] ENDO;  Service: Pain Management;  Laterality: Left;  @1300  No ATC or DM    HERNIA REPAIR      INJECTION, SPINE, LUMBOSACRAL, TRANSFORAMINAL APPROACH Left 11/29/2023    Procedure: Left L5 + S1 Transforaminal Epidural Steroid Injections;  Surgeon: Santana Rojo Jr., MD;  Location: [facility] PAIN MANAGEMENT;  Service: Pain Management;  Laterality: Left;  @1300  No ATC or DM  MD Sign.    LAMINECTOMY Left 7/5/2023    Procedure: LAMINECTOMY, SPINE;  Surgeon: Richard Pineda MD;  Location: [facility] OR;  Service: Neurosurgery;  Laterality: Left;  Left L5/S1 discectomy      LAPAROSCOPIC CHOLECYSTECTOMY N/A 1/25/2022    Procedure: CHOLECYSTECTOMY, LAPAROSCOPIC;  Surgeon: Jarrod Martinez MD;  Location: [facility] OR;  Service: General;  Laterality: N/A;    LUMBAR DISCECTOMY Left 7/5/2023    Procedure: DISCECTOMY, SPINE, LUMBAR;  Surgeon: Richard Pineda MD;  Location: [facility] OR;  Service: Neurosurgery;  Laterality: Left;    REPAIR OF TRICEPS TENDON Left 10/2/2020    Procedure: REPAIR, TENDON, TRICEPS;  Surgeon: Keysha Galvez MD;  Location: [facility] OR;  Service: Orthopedics;  Laterality: Left;  RN PRE OP DONE 9/29/2020----COVID NEGATIVE ON 9/29  [facility] Rep: Delma [phone]    SINUS SURGERY  2012    SURGICAL REMOVAL OF BUNION WITH OSTEOTOMY OF METATARSAL BONE Right 3/24/2023    Procedure: BUNIONECTOMY, WITH METATARSAL OSTEOTOMY;  Surgeon: Shannan An DPM;  Location: [facility] OR;  Service: Podiatry;  Laterality: Right;     Family History   Problem Relation Age of Onset    Coronary artery disease Mother     Hypertension Father     No Known Problems Sister     No Known Problems Brother     Valvular heart disease Sister     No Known Problems Brother     No Known Problems Brother     No Known Problems Maternal Aunt     No Known Problems Maternal Uncle     No Known Problems Paternal Aunt     No Known Problems Paternal Uncle     No Known Problems Maternal  Grandmother     No Known Problems Maternal Grandfather     No Known Problems Paternal Grandmother     No Known Problems Paternal Grandfather     Amblyopia Neg Hx     Blindness Neg Hx     Cancer Neg Hx     Cataracts Neg Hx     Diabetes Neg Hx     Glaucoma Neg Hx     Macular degeneration Neg Hx     Retinal detachment Neg Hx     Strabismus Neg Hx     Stroke Neg Hx     Thyroid disease Neg Hx      Social History     Tobacco Use    Smoking status: Former     Current packs/day: 0.00     Types: Cigarettes     Quit date: 1999     Years since quittin.8    Smokeless tobacco: Never   Substance Use Topics    Alcohol use: Yes     Alcohol/week: 7.0 standard drinks of alcohol     Types: 7 Glasses of wine per week     Comment: occasionally    Drug use: Never     Review of Systems   Constitutional:  Negative for activity change, appetite change, chills, fatigue and fever.   HENT:  Negative for congestion, ear discharge, ear pain, postnasal drip, rhinorrhea and sore throat.    Respiratory:  Negative for cough and shortness of breath.    Cardiovascular:  Negative for chest pain.   Gastrointestinal:  Negative for abdominal pain.   Genitourinary:  Negative for dysuria, flank pain and hematuria.   Musculoskeletal:  Negative for back pain.   Skin:  Positive for rash.   Neurological:  Negative for dizziness, light-headedness and headaches.       Physical Exam     Initial Vitals [24 1840]   BP Pulse Resp Temp SpO2   (!) 148/98 69 18 98.2 °F (36.8 °C) 95 %      MAP       --         Physical Exam    Nursing note and vitals reviewed.  Constitutional: He appears well-developed and well-nourished. He is not diaphoretic.  Non-toxic appearance. No distress.   HENT:   Head: Normocephalic.   Right Ear: External ear normal.   Left Ear: External ear normal.   Eyes: Conjunctivae are normal. Pupils are equal, round, and reactive to light.   Neck:   Normal range of motion.  Cardiovascular:  Normal rate and regular rhythm.            Pulmonary/Chest: Breath sounds normal.   Abdominal: Abdomen is soft. Bowel sounds are normal. There is no abdominal tenderness.   Musculoskeletal:         General: Normal range of motion.      Cervical back: Normal range of motion.     Neurological: He is alert and oriented to person, place, and time.   Skin: Skin is warm and dry. Capillary refill takes less than 2 seconds.        Psychiatric: He has a normal mood and affect.         ED Course   Procedures  Labs Reviewed - No data to display       Imaging Results    None          Medications - No data to display  Medical Decision Making  Urgent evaluation of a 59-year-old male who presents to the emergency department with rash.  Patient is afebrile and nontoxic appearing.  No change in medications.  No recent illnesses.  No new soaps, laundry detergents, lotions, or other body products.  Rash is affecting the extensor surfaces of the elbows and abdomen.  Differential diagnosis includes psoriasis versus eczema versus contact dermatitis.  Unsure of exact etiology.  Will give steroids and moisture regimen.  Will place urgent referral to Dermatology.  Advised to return to the emergency department with any worsening symptoms or concerns.                                      Clinical Impression:  Final diagnoses:  [L30.9] Dermatitis (Primary)          ED Disposition Condition    Discharge Stable          ED Prescriptions    None       Follow-up Information    None          AustinGenesis Gilmore PA-C  03/19/24 1924

## 2024-03-21 ENCOUNTER — TELEPHONE (OUTPATIENT)
Dept: DERMATOLOGY | Facility: CLINIC | Age: 59
End: 2024-03-21
Payer: OTHER GOVERNMENT

## 2024-03-21 NOTE — TELEPHONE ENCOUNTER
----- Message from Isi Segovia sent at 3/21/2024 12:52 PM CDT -----  Type:  Sooner Apoointment Request    Caller is requesting a sooner appointment.  Caller declined first available appointment listed below.  Caller will not accept being placed on the waitlist and is requesting a message be sent to doctor.    Name of Caller:pt  When is the first available appointment?July  Symptoms: Dermatitis [L30.9]  Would the patient rather a call back or a response via [facility]? call  Best Call Back Number: [phone]  Additional Information: referral

## 2024-03-22 ENCOUNTER — PATIENT MESSAGE (OUTPATIENT)
Dept: DERMATOLOGY | Facility: CLINIC | Age: 59
End: 2024-03-22
Payer: OTHER GOVERNMENT

## 2024-03-27 NOTE — PROGRESS NOTES
[facility]   Physical Therapy Treatment Note      Name: Enoch Barr  [facility] Number: [id number]    Therapy Diagnosis: No diagnosis found.  Physician: Keysha Galvez MD    Visit Date: 3/28/2024    Physician Orders: PT Eval and Treat   Medical Diagnosis from Referral:   M70.62 (ICD-10-CM) - Trochanteric bursitis of left hip   Evaluation Date: 3/20/2024  Authorization Period Expiration: 12/31/24  Plan of Care Expiration: 6/20/2024  Progress Note Due: 4/20/2024  Visit # / Visits authorized: 1/1 1/20  FOTO: 1/ 3     Precautions: Standard     PTA Visit #: 1/5     Time In: 425 pm  Time Out: 515 pm  Total Billable Time: 50 minutes    Subjective     Pt reports: he walked quite a bit today which irritated the hip.  He was not compliant with home exercise program.  Response to previous treatment: no adverse affect  Functional change: progressing    Pain: 6/10  Location: left hip      Objective      Objective Measures updated at progress report unless specified.     Treatment     Enoch received the treatments listed below:      therapeutic exercises to develop strength, endurance, ROM, flexibility, posture, and core stabilization for 23 minutes including:  Nu Step 6 min (joint approximation)  Piriformis stretch 3x30s  Wall clams (L) 10x   Prone 90* hip ext B 2x10  Bridges 3x10    manual therapy techniques: Soft tissue Mobilization were applied to the: L hip R HS for 04 minutes, including:  Roller stick    neuromuscular re-education activities to improve: Balance, Coordination, Kinesthetic, Sense, Proprioception, and Posture for 23 minutes. The following activities were included:  TrA activation 3x10 5s  - SB x 10  - SB oblique B x 10   SL iso clams 1'x1 B  SL hip abd iso 1'x1 B      therapeutic activities to improve functional performance for 0  minutes, including:      Patient Education and Home Exercises       Education provided:   - exercise in pain free ranges    Written Home Exercises  Provided: Patient instructed to cont prior HEP. Exercises were reviewed and Enoch was able to demonstrate them prior to the end of the session.  Enoch demonstrated good  understanding of the education provided. See EMR under Patient Instructions for exercises provided during therapy sessions    Assessment     Pt tolerated first session of PT following initial evaluation well. Reviewed HEP with good understanding and followed up with progressions to target hip and core stability. Pt with intermittent (R) HS cramping but able to manage. Plan to monitor and progress as able.    Enoch Is progressing well towards his goals.   Pt prognosis is Good.     Pt will continue to benefit from skilled outpatient physical therapy to address the deficits listed in the problem list box on initial evaluation, provide pt/family education and to maximize pt's level of independence in the home and community environment.     Pt's spiritual, cultural and educational needs considered and pt agreeable to plan of care and goals.     Anticipated barriers to physical therapy: chronicity and pain     Goals:   Short Term Goals (4 Weeks):   1. Pt will be compliant with HEP to supplement PT in restoring pain free function.  2. Pt will improve impaired LE MMTs by 1/2 grade  to improve strength for functional tasks  3. Pt will have negative Frida's test to improve tissue extensibility of ITB and improve functional mobility of LEs.   Long Term Goals (8 Weeks):  1. Pt will improve FOTO score to </= 65% limited to decrease perceived limitation with mobility  2. Pt will improve impaired LE MMTs by 1 grade to improve strength for functional tasks.  3. Pt improve impaired hip ROM to WNL in all planes to improve mobility for normal movement patterns.     Plan     Plan of care Certification: 3/20/2024 to 6/20/2024.     Outpatient Physical Therapy 2 times weekly for 12 weeks to include the following interventions: Cervical/Lumbar Traction, Electrical  Stimulation, Gait Training, Manual Therapy, Moist Heat/ Ice, Neuromuscular Re-ed, Therapeutic Activities, Therapeutic Exercise, Ultrasound, and dry needling, IASTM, and kinesiotaping PRN.     PT/PTA met face to face to discuss pt's treatment plan and progress towards established goals. Pt will be seen by a physical therapist minimally every 6th visit or every 30 days.      Santana Kaufman, PTA

## 2024-03-28 ENCOUNTER — CLINICAL SUPPORT (OUTPATIENT)
Dept: REHABILITATION | Facility: HOSPITAL | Age: 59
End: 2024-03-28
Payer: OTHER GOVERNMENT

## 2024-03-28 DIAGNOSIS — M25.552 HIP PAIN, LEFT: ICD-10-CM

## 2024-03-28 DIAGNOSIS — M70.62 TROCHANTERIC BURSITIS OF LEFT HIP: Primary | ICD-10-CM

## 2024-03-28 PROCEDURE — 97110 THERAPEUTIC EXERCISES: CPT | Mod: CQ

## 2024-03-28 PROCEDURE — 97112 NEUROMUSCULAR REEDUCATION: CPT | Mod: CQ

## 2024-04-01 ENCOUNTER — OFFICE VISIT (OUTPATIENT)
Dept: DERMATOLOGY | Facility: CLINIC | Age: 59
End: 2024-04-01
Payer: OTHER GOVERNMENT

## 2024-04-01 DIAGNOSIS — L30.9 DERMATITIS, UNSPECIFIED: Primary | ICD-10-CM

## 2024-04-01 PROCEDURE — 99213 OFFICE O/P EST LOW 20 MIN: CPT | Mod: PBBFAC | Performed by: STUDENT IN AN ORGANIZED HEALTH CARE EDUCATION/TRAINING PROGRAM

## 2024-04-01 PROCEDURE — 99203 OFFICE O/P NEW LOW 30 MIN: CPT | Mod: S$PBB,,, | Performed by: STUDENT IN AN ORGANIZED HEALTH CARE EDUCATION/TRAINING PROGRAM

## 2024-04-01 PROCEDURE — 99999 PR PBB SHADOW E&M-EST. PATIENT-LVL III: CPT | Mod: PBBFAC,,, | Performed by: STUDENT IN AN ORGANIZED HEALTH CARE EDUCATION/TRAINING PROGRAM

## 2024-04-01 NOTE — PATIENT INSTRUCTIONS
Recommend switching to All Free and Clear detergent    Start zyrtec or allegra one pill twice daily, can add additional benadryl at night if having trouble sleeping due to itching    For rash- triamcinolone cream twice daily    Recommend bathing in lukewarm water (not scalding hot) no more than 5-10 minutes per day. Recommend fragrance-free detergent (I.e. All Free and Clear) and soap (I.e. Dove sensitive skin bar soap) only to soiled areas of body. Recommend liberal use of moisturizing cream (I.e. CeraVe cream, Cetaphil cream) especially within 5 minutes of bathing.

## 2024-04-01 NOTE — PROGRESS NOTES
Subjective:      Patient ID:  Enoch Barr is a 59 y.o. male who presents for   Chief Complaint   Patient presents with    Rash     59 y.o. male presents today for a rash.    New patient    1. Rash  Location: both arms, abdomen, and chest  Duration: 3-4 weeks   Symptoms: redness, itching, feels warm to the touch  Current treatments: TAC 0.1% cream BID (helping)  Prior treatments tried: none  Other pertinent history: patient states the rash began around the time when he started taking Lyrica (has since stopped taking- started about 1 week prior to rash) and began using a new Tide laundry detergent (has since stopped using).   He takes 1 antihistamine every day already for allergies   Rash improving, no longer keeping him up at night, still a little itchy        Review of Systems   Skin:  Positive for itching and rash.       Objective:   Physical Exam     Diagram Legend     Erythematous scaling macule/papule c/w actinic keratosis       Vascular papule c/w angioma      Pigmented verrucoid papule/plaque c/w seborrheic keratosis      Yellow umbilicated papule c/w sebaceous hyperplasia      Irregularly shaped tan macule c/w lentigo     1-2 mm smooth white papules consistent with Milia      Movable subcutaneous cyst with punctum c/w epidermal inclusion cyst      Subcutaneous movable cyst c/w pilar cyst      Firm pink to brown papule c/w dermatofibroma      Pedunculated fleshy papule(s) c/w skin tag(s)      Evenly pigmented macule c/w junctional nevus     Mildly variegated pigmented, slightly irregular-bordered macule c/w mildly atypical nevus      Flesh colored to evenly pigmented papule c/w intradermal nevus       Pink pearly papule/plaque c/w basal cell carcinoma      Erythematous hyperkeratotic cursted plaque c/w SCC      Surgical scar with no sign of skin cancer recurrence      Open and closed comedones      Inflammatory papules and pustules      Verrucoid papule consistent consistent with wart     Erythematous  eczematous patches and plaques     Dystrophic onycholytic nail with subungual debris c/w onychomycosis     Umbilicated papule    Erythematous-base heme-crusted tan verrucoid plaque consistent with inflamed seborrheic keratosis     Erythematous Silvery Scaling Plaque c/w Psoriasis     See annotation      Assessment / Plan:        Dermatitis, unspecified  It is hard to tell because he has cleared so much since his ED visit  A few erythematous excoriated papules on extensor arms  Ddx ACD/ICD v resolving morbilliform drug eruption 2/2 lyrica (1 week after starting) v urticaria  He has no pictures of rash to show me- recommend to take pictures and send to me if rash flares again  I can also add him to clinic when rash flaring- hard to tell when clear    Recommend switching to All Free and Clear detergent    Start zyrtec or allegra one pill twice daily until itching/rash resolves, can add additional benadryl at night if having trouble sleeping due to itching    For rash- triamcinolone cream twice daily (continue)    Recommend bathing in lukewarm water (not scalding hot) no more than 5-10 minutes per day. Recommend fragrance-free detergent (I.e. All Free and Clear) and soap (I.e. Dove sensitive skin bar soap) only to soiled areas of body. Recommend liberal use of moisturizing cream (I.e. CeraVe cream, Cetaphil cream) especially within 5 minutes of bathing.    RTC prn if condition worsens or fails to improve

## 2024-04-02 NOTE — PROGRESS NOTES
[facility]   Physical Therapy Treatment Note      Name: Enoch Barr  [facility] Number: [id number]    Therapy Diagnosis:   Encounter Diagnoses   Name Primary?    Trochanteric bursitis of left hip Yes    Hip pain, left      Physician: Keysha Galvez MD    Visit Date: 4/3/2024    Physician Orders: PT Eval and Treat   Medical Diagnosis from Referral:   M70.62 (ICD-10-CM) - Trochanteric bursitis of left hip   Evaluation Date: 3/20/2024  Authorization Period Expiration: 12/31/24  Plan of Care Expiration: 6/20/2024  Progress Note Due: 4/20/2024  Visit # / Visits authorized: 1/1 2/20  FOTO: 1/ 3     Precautions: Standard     PTA Visit #: 2/5     Time In: 4:47 pm  Time Out: 2:25 pm  Total Billable Time: 38 minutes    Subjective     Pt reports: no pain unless he palpates it. Pt states that after  about an hour and a half of riding his motorcycle he has to shift and readjust his leg due to pain and discomfort.   He was not compliant with home exercise program.  Response to previous treatment: no adverse affect  Functional change: progressing    Pain: 4/10 *with palpation*   Location: left hip      Objective      Objective Measures updated at progress report unless specified.     Treatment     Enoch received the treatments listed below:      therapeutic exercises to develop strength, endurance, ROM, flexibility, posture, and core stabilization for 15 minutes including:  Upright bike 6 min (joint approximation)  Piriformis stretch 3x30s  Wall clams (L) 10x   Prone 90* hip ext B 2x10  Bridges + GTB 3x10    manual therapy techniques: Soft tissue Mobilization were applied to the: L hip R HS for 00 minutes, including:  Roller stick    neuromuscular re-education activities to improve: Balance, Coordination, Kinesthetic, Sense, Proprioception, and Posture for 23 minutes. The following activities were included:  TrA activation 3x10 5s  - SB x 10  - SB oblique B x 10   SL iso clams 1'x1 B  SL hip abd iso  1'x1 B  Lateral monster walks GTB 3 laps   Backwards diagonal walks GTB 1 lap   Sidelying shuttle squats LLE 1.5 cords 2x10       therapeutic activities to improve functional performance for 0  minutes, including:      Patient Education and Home Exercises       Education provided:   - exercise in pain free ranges    Written Home Exercises Provided: Patient instructed to cont prior HEP. Exercises were reviewed and Enoch was able to demonstrate them prior to the end of the session.  Enoch demonstrated good  understanding of the education provided. See EMR under Patient Instructions for exercises provided during therapy sessions    Assessment     Pt exhibited tolerance to all therex and NMR activities performed this session. Progress NMR activities to aid in additional glute activation sidelying shuttle and monster walk variations. Also incorporated resistance band to bridges for added hip abduction activation. Pt noted slight cramping with hip extensions, however resolved with short rest breaks. Will continue to progress towards goals as tolerated.      Enoch Is progressing well towards his goals.   Pt prognosis is Good.     Pt will continue to benefit from skilled outpatient physical therapy to address the deficits listed in the problem list box on initial evaluation, provide pt/family education and to maximize pt's level of independence in the home and community environment.     Pt's spiritual, cultural and educational needs considered and pt agreeable to plan of care and goals.     Anticipated barriers to physical therapy: chronicity and pain     Goals:   Short Term Goals (4 Weeks):   1. Pt will be compliant with HEP to supplement PT in restoring pain free function.  2. Pt will improve impaired LE MMTs by 1/2 grade  to improve strength for functional tasks  3. Pt will have negative Frida's test to improve tissue extensibility of ITB and improve functional mobility of LEs.   Long Term Goals (8 Weeks):  1. Pt will  improve FOTO score to </= 65% limited to decrease perceived limitation with mobility  2. Pt will improve impaired LE MMTs by 1 grade to improve strength for functional tasks.  3. Pt improve impaired hip ROM to WNL in all planes to improve mobility for normal movement patterns.     Plan     Plan of care Certification: 3/20/2024 to 6/20/2024.     Outpatient Physical Therapy 2 times weekly for 12 weeks to include the following interventions: Cervical/Lumbar Traction, Electrical Stimulation, Gait Training, Manual Therapy, Moist Heat/ Ice, Neuromuscular Re-ed, Therapeutic Activities, Therapeutic Exercise, Ultrasound, and dry needling, IASTM, and kinesiotaping PRN.     PT/PTA met face to face to discuss pt's treatment plan and progress towards established goals. Pt will be seen by a physical therapist minimally every 6th visit or every 30 days.      Janet Gurrola PTA

## 2024-04-03 ENCOUNTER — CLINICAL SUPPORT (OUTPATIENT)
Dept: REHABILITATION | Facility: HOSPITAL | Age: 59
End: 2024-04-03
Payer: OTHER GOVERNMENT

## 2024-04-03 DIAGNOSIS — M70.62 TROCHANTERIC BURSITIS OF LEFT HIP: Primary | ICD-10-CM

## 2024-04-03 DIAGNOSIS — M25.552 HIP PAIN, LEFT: ICD-10-CM

## 2024-04-03 DIAGNOSIS — K21.9 GASTROESOPHAGEAL REFLUX DISEASE WITHOUT ESOPHAGITIS: ICD-10-CM

## 2024-04-03 PROCEDURE — 97112 NEUROMUSCULAR REEDUCATION: CPT | Mod: CQ

## 2024-04-03 PROCEDURE — 97110 THERAPEUTIC EXERCISES: CPT | Mod: CQ

## 2024-04-03 RX ORDER — OMEPRAZOLE 20 MG/1
CAPSULE, DELAYED RELEASE ORAL
Qty: 90 CAPSULE | Refills: 3 | Status: SHIPPED | OUTPATIENT
Start: 2024-04-03

## 2024-04-03 NOTE — TELEPHONE ENCOUNTER
No care due was identified.  [facility] Embedded Care Due Messages. Reference number: [id number].   4/03/2024 3:29:01 AM CDT

## 2024-04-09 ENCOUNTER — CLINICAL SUPPORT (OUTPATIENT)
Dept: REHABILITATION | Facility: HOSPITAL | Age: 59
End: 2024-04-09
Payer: OTHER GOVERNMENT

## 2024-04-09 DIAGNOSIS — M25.552 HIP PAIN, LEFT: ICD-10-CM

## 2024-04-09 DIAGNOSIS — M70.62 TROCHANTERIC BURSITIS OF LEFT HIP: Primary | ICD-10-CM

## 2024-04-09 PROCEDURE — 97530 THERAPEUTIC ACTIVITIES: CPT

## 2024-04-09 PROCEDURE — 97110 THERAPEUTIC EXERCISES: CPT

## 2024-04-09 PROCEDURE — 97112 NEUROMUSCULAR REEDUCATION: CPT

## 2024-04-09 NOTE — PROGRESS NOTES
[facility]   Physical Therapy Treatment Note      Name: Enoch Barr  [facility] Number: [id number]    Therapy Diagnosis:   Encounter Diagnoses   Name Primary?    Trochanteric bursitis of left hip Yes    Hip pain, left        Physician: Keysha Galvez MD    Visit Date: 4/9/2024    Physician Orders: PT Eval and Treat   Medical Diagnosis from Referral:   M70.62 (ICD-10-CM) - Trochanteric bursitis of left hip   Evaluation Date: 3/20/2024  Authorization Period Expiration: 12/31/24  Plan of Care Expiration: 6/20/2024  Progress Note Due: 4/20/2024  Visit # / Visits authorized: 1/1 2/20  FOTO: 1/ 3     Precautions: Standard     PTA Visit #: 2/5     Time In: 4:47 pm  Time Out: 2:25 pm  Total Billable Time: 38 minutes    Subjective     Pt reports: no pain unless he palpates it. Pt states that after  about an hour and a half of riding his motorcycle he has to shift and readjust his leg due to pain and discomfort.   He was compliant with home exercise program.  Response to previous treatment: no adverse affect  Functional change: progressing    Pain: 4/10 *with palpation*   Location: left hip      Objective      Objective Measures updated at progress report unless specified.     Treatment     Enoch received the treatments listed below:      therapeutic exercises to develop strength, endurance, ROM, flexibility, posture, and core stabilization for 15 minutes including:  Upright bike 6 min (joint approximation)  Modified piriformis stretch 5x15s  Wall clams (B) 10x   Prone 90* hip ext B 2x10  Bridges + GTB 3x10  Stool hip IR/ER x15 B    manual therapy techniques: Soft tissue Mobilization were applied to the: L hip R HS for 00 minutes, including:  Roller stick    neuromuscular re-education activities to improve: Balance, Coordination, Kinesthetic, Sense, Proprioception, and Posture for 15 minutes. The following activities were included:  TrA activation 3x10 5s  - SB x 10  - SB oblique B x 10   SL iso  clams 1'x1 B  SL hip abd iso 1'x1 B  Side plank clams 1x1' B         therapeutic activities to improve functional performance for 8 minutes, including:  Side stepping with maroon theracord x10 B  Sidelying shuttle squats LLE 1.5 cords 3x10       Patient Education and Home Exercises       Education provided:   - exercise in pain free ranges    Written Home Exercises Provided: Patient instructed to cont prior HEP. Exercises were reviewed and Enoch was able to demonstrate them prior to the end of the session.  Enoch demonstrated good  understanding of the education provided. See EMR under Patient Instructions for exercises provided during therapy sessions    Assessment     Good tolerance to all interventions today with progressions in functional strength with appropriate muscular fatigue. Discussed returning to gym for assessment to activity tolerance.     Enoch Is progressing well towards his goals.   Pt prognosis is Good.     Pt will continue to benefit from skilled outpatient physical therapy to address the deficits listed in the problem list box on initial evaluation, provide pt/family education and to maximize pt's level of independence in the home and community environment.     Pt's spiritual, cultural and educational needs considered and pt agreeable to plan of care and goals.     Anticipated barriers to physical therapy: chronicity and pain     Goals:   Short Term Goals (4 Weeks):   1. Pt will be compliant with HEP to supplement PT in restoring pain free function.  2. Pt will improve impaired LE MMTs by 1/2 grade  to improve strength for functional tasks  3. Pt will have negative Frida's test to improve tissue extensibility of ITB and improve functional mobility of LEs.   Long Term Goals (8 Weeks):  1. Pt will improve FOTO score to </= 65% limited to decrease perceived limitation with mobility  2. Pt will improve impaired LE MMTs by 1 grade to improve strength for functional tasks.  3. Pt improve impaired hip  ROM to WNL in all planes to improve mobility for normal movement patterns.     Plan     Plan of care Certification: 3/20/2024 to 6/20/2024.     Outpatient Physical Therapy 2 times weekly for 12 weeks to include the following interventions: Cervical/Lumbar Traction, Electrical Stimulation, Gait Training, Manual Therapy, Moist Heat/ Ice, Neuromuscular Re-ed, Therapeutic Activities, Therapeutic Exercise, Ultrasound, and dry needling, IASTM, and kinesiotaping PRN.     PT/PTA met face to face to discuss pt's treatment plan and progress towards established goals. Pt will be seen by a physical therapist minimally every 6th visit or every 30 days.      Erick Lance, PT      No

## 2024-04-11 ENCOUNTER — CLINICAL SUPPORT (OUTPATIENT)
Dept: REHABILITATION | Facility: HOSPITAL | Age: 59
End: 2024-04-11
Payer: OTHER GOVERNMENT

## 2024-04-11 DIAGNOSIS — M25.552 HIP PAIN, LEFT: ICD-10-CM

## 2024-04-11 DIAGNOSIS — M70.62 TROCHANTERIC BURSITIS OF LEFT HIP: Primary | ICD-10-CM

## 2024-04-11 PROCEDURE — 97112 NEUROMUSCULAR REEDUCATION: CPT | Mod: CQ

## 2024-04-11 PROCEDURE — 97530 THERAPEUTIC ACTIVITIES: CPT | Mod: CQ

## 2024-04-11 PROCEDURE — 97110 THERAPEUTIC EXERCISES: CPT | Mod: CQ

## 2024-04-11 NOTE — PROGRESS NOTES
[facility]   Physical Therapy Treatment Note      Name: Enoch Barr  [facility] Number: [id number]    Therapy Diagnosis:   Encounter Diagnoses   Name Primary?    Trochanteric bursitis of left hip Yes    Hip pain, left        Physician: Keysha Galvze MD    Visit Date: 4/11/2024    Physician Orders: PT Eval and Treat   Medical Diagnosis from Referral:   M70.62 (ICD-10-CM) - Trochanteric bursitis of left hip   Evaluation Date: 3/20/2024  Authorization Period Expiration: 12/31/24  Plan of Care Expiration: 6/20/2024  Progress Note Due: 4/20/2024  Visit # / Visits authorized: 1/1 2/20  FOTO: 1/ 3     Precautions: Standard     PTA Visit #: 1/5     Time In: 4:05 pm  Time Out: 450 pm  Total Billable Time: 45 minutes    Subjective     Pt reports: no pain just soreness.  He was compliant with home exercise program.  Response to previous treatment: no adverse affect  Functional change: progressing    Pain: 0/10  Location: left hip      Objective      Objective Measures updated at progress report unless specified.     Treatment     Enoch received the treatments listed below:      therapeutic exercises to develop strength, endurance, ROM, flexibility, posture, and core stabilization for 15 minutes including:  Upright bike 6 min (joint approximation)  Modified piriformis stretch 5x15s  Wall clams (B) 10x   Prone 90* hip ext B 2x10  Bridges + GTB 3x10  Stool hip IR/ER x15 B    manual therapy techniques: Soft tissue Mobilization were applied to the: L hip R HS for 00 minutes, including:  Roller stick    neuromuscular re-education activities to improve: Balance, Coordination, Kinesthetic, Sense, Proprioception, and Posture for 15 minutes. The following activities were included:  TrA activation 3x10 5s  - SB x 10  - SB oblique B x 10   SL iso clams 1'x1 B  SL hip abd iso 1'x1 B  Side plank clams 1x1' B  Side plank iso hip abd 1x1' B (deferred R side d/t R shld pain)       therapeutic activities to  improve functional performance for 8 minutes, including:  Side stepping with maroon theracord x10 B  Sidelying shuttle squats LLE 1.5 cords 3x10       Patient Education and Home Exercises       Education provided:   - exercise in pain free ranges    Written Home Exercises Provided: Patient instructed to cont prior HEP. Exercises were reviewed and Enoch was able to demonstrate them prior to the end of the session.  Enoch demonstrated good  understanding of the education provided. See EMR under Patient Instructions for exercises provided during therapy sessions    Assessment   Patient without hip pain today. Deferred R SL planks d/t R shoulder pain. Pt demonstrated good form with side lying planks and clams. Will continue to progress as able.      Enoch Is progressing well towards his goals.   Pt prognosis is Good.     Pt will continue to benefit from skilled outpatient physical therapy to address the deficits listed in the problem list box on initial evaluation, provide pt/family education and to maximize pt's level of independence in the home and community environment.     Pt's spiritual, cultural and educational needs considered and pt agreeable to plan of care and goals.     Anticipated barriers to physical therapy: chronicity and pain     Goals:   Short Term Goals (4 Weeks):   1. Pt will be compliant with HEP to supplement PT in restoring pain free function.  2. Pt will improve impaired LE MMTs by 1/2 grade  to improve strength for functional tasks  3. Pt will have negative Frida's test to improve tissue extensibility of ITB and improve functional mobility of LEs.   Long Term Goals (8 Weeks):  1. Pt will improve FOTO score to </= 65% limited to decrease perceived limitation with mobility  2. Pt will improve impaired LE MMTs by 1 grade to improve strength for functional tasks.  3. Pt improve impaired hip ROM to WNL in all planes to improve mobility for normal movement patterns.     Plan     Plan of care  Certification: 3/20/2024 to 6/20/2024.     Outpatient Physical Therapy 2 times weekly for 12 weeks to include the following interventions: Cervical/Lumbar Traction, Electrical Stimulation, Gait Training, Manual Therapy, Moist Heat/ Ice, Neuromuscular Re-ed, Therapeutic Activities, Therapeutic Exercise, Ultrasound, and dry needling, IASTM, and kinesiotaping PRN.     PT/PTA met face to face to discuss pt's treatment plan and progress towards established goals. Pt will be seen by a physical therapist minimally every 6th visit or every 30 days.      Santana Kaufman, PTA

## 2024-04-15 ENCOUNTER — CLINICAL SUPPORT (OUTPATIENT)
Dept: REHABILITATION | Facility: HOSPITAL | Age: 59
End: 2024-04-15
Payer: OTHER GOVERNMENT

## 2024-04-15 DIAGNOSIS — M25.552 HIP PAIN, LEFT: ICD-10-CM

## 2024-04-15 DIAGNOSIS — M70.62 TROCHANTERIC BURSITIS OF LEFT HIP: Primary | ICD-10-CM

## 2024-04-15 PROCEDURE — 97110 THERAPEUTIC EXERCISES: CPT | Mod: CQ

## 2024-04-15 PROCEDURE — 97112 NEUROMUSCULAR REEDUCATION: CPT | Mod: CQ

## 2024-04-15 PROCEDURE — 97530 THERAPEUTIC ACTIVITIES: CPT | Mod: CQ

## 2024-04-15 NOTE — PROGRESS NOTES
[facility]   Physical Therapy Treatment Note      Name: Enoch Barr  [facility] Number: [id number]    Therapy Diagnosis:   Encounter Diagnoses   Name Primary?    Trochanteric bursitis of left hip Yes    Hip pain, left        Physician: Keysha Galvez MD    Visit Date: 4/15/2024    Physician Orders: PT Eval and Treat   Medical Diagnosis from Referral:   M70.62 (ICD-10-CM) - Trochanteric bursitis of left hip   Evaluation Date: 3/20/2024  Authorization Period Expiration: 12/31/24  Plan of Care Expiration: 6/20/2024  Progress Note Due: 4/20/2024  Visit # / Visits authorized: 1/1 2/20  FOTO: 1/ 3     Precautions: Standard     PTA Visit #: 1/5     Time In: 3:35 pm  Time Out: 415 pm  Total Billable Time: 40 minutes    Subjective     Pt reports: no pain just soreness.  He was compliant with home exercise program.  Response to previous treatment: no adverse affect  Functional change: progressing    Pain: 0/10  Location: left hip      Objective      Objective Measures updated at progress report unless specified.     Treatment     Enoch received the treatments listed below:      therapeutic exercises to develop strength, endurance, ROM, flexibility, posture, and core stabilization for 15 minutes including:  Upright bike 6 min (joint approximation)  Modified piriformis stretch 5x15s  Wall clams (B) 10x   Prone 90* hip ext B 2x10  Bridges + GTB 3x10  Stool hip IR/ER x15 B    manual therapy techniques: Soft tissue Mobilization were applied to the: L hip R HS for 00 minutes, including:  Roller stick    neuromuscular re-education activities to improve: Balance, Coordination, Kinesthetic, Sense, Proprioception, and Posture for 15 minutes. The following activities were included:  TrA activation 3x10 5s  - SB x 10  - SB oblique B x 10   SL iso clams 1'x1 B  SL hip abd iso 1'x1 B  Side plank clams 1x1' B  Side plank iso hip abd 1x1' B (held d/t LBP)      therapeutic activities to improve functional  performance for 8 minutes, including:  Side stepping with maroon theracord x10 B  Sidelying shuttle squats LLE 1.5 cords 3x10       Patient Education and Home Exercises       Education provided:   - exercise in pain free ranges    Written Home Exercises Provided: Patient instructed to cont prior HEP. Exercises were reviewed and Enoch was able to demonstrate them prior to the end of the session.  Enoch demonstrated good  understanding of the education provided. See EMR under Patient Instructions for exercises provided during therapy sessions    Assessment   Patient without hip pain today. Deferred iso hip abd planks d/t LB pain, cueing needed with planking to avoid forward rotation. Will continue to progress as able.      Enoch Is progressing well towards his goals.   Pt prognosis is Good.     Pt will continue to benefit from skilled outpatient physical therapy to address the deficits listed in the problem list box on initial evaluation, provide pt/family education and to maximize pt's level of independence in the home and community environment.     Pt's spiritual, cultural and educational needs considered and pt agreeable to plan of care and goals.     Anticipated barriers to physical therapy: chronicity and pain     Goals:   Short Term Goals (4 Weeks):   1. Pt will be compliant with HEP to supplement PT in restoring pain free function.  2. Pt will improve impaired LE MMTs by 1/2 grade  to improve strength for functional tasks  3. Pt will have negative Frida's test to improve tissue extensibility of ITB and improve functional mobility of LEs.   Long Term Goals (8 Weeks):  1. Pt will improve FOTO score to </= 65% limited to decrease perceived limitation with mobility  2. Pt will improve impaired LE MMTs by 1 grade to improve strength for functional tasks.  3. Pt improve impaired hip ROM to WNL in all planes to improve mobility for normal movement patterns.     Plan     Plan of care Certification: 3/20/2024 to  6/20/2024.     Outpatient Physical Therapy 2 times weekly for 12 weeks to include the following interventions: Cervical/Lumbar Traction, Electrical Stimulation, Gait Training, Manual Therapy, Moist Heat/ Ice, Neuromuscular Re-ed, Therapeutic Activities, Therapeutic Exercise, Ultrasound, and dry needling, IASTM, and kinesiotaping PRN.     PT/PTA met face to face to discuss pt's treatment plan and progress towards established goals. Pt will be seen by a physical therapist minimally every 6th visit or every 30 days.      Santana Kaufman PTA

## 2024-04-17 ENCOUNTER — CLINICAL SUPPORT (OUTPATIENT)
Dept: REHABILITATION | Facility: HOSPITAL | Age: 59
End: 2024-04-17
Payer: OTHER GOVERNMENT

## 2024-04-17 DIAGNOSIS — M70.62 TROCHANTERIC BURSITIS OF LEFT HIP: Primary | ICD-10-CM

## 2024-04-17 DIAGNOSIS — M25.552 HIP PAIN, LEFT: ICD-10-CM

## 2024-04-17 PROCEDURE — 97110 THERAPEUTIC EXERCISES: CPT

## 2024-04-17 PROCEDURE — 97530 THERAPEUTIC ACTIVITIES: CPT

## 2024-04-17 NOTE — PROGRESS NOTES
[facility]   Physical Therapy Discharge and Treatment Note      Name: Enoch Barr  [facility] Number: [id number]    Therapy Diagnosis:   Encounter Diagnoses   Name Primary?    Trochanteric bursitis of left hip Yes    Hip pain, left        Physician: Keysha Galvez MD    Visit Date: 4/17/2024    Physician Orders: PT Eval and Treat   Medical Diagnosis from Referral:   M70.62 (ICD-10-CM) - Trochanteric bursitis of left hip   Evaluation Date: 3/20/2024  Authorization Period Expiration: 12/31/24  Plan of Care Expiration: 6/20/2024  Progress Note Due: 4/20/2024  Visit # / Visits authorized: 1/1, 6/20  FOTO: 1/ 3     Precautions: Standard     PTA Visit #: 0/5     Time In: 4:45 pm  Time Out: 5:28 pm  Total Billable Time: 43 minutes    Subjective     Pt reports: he feels like the hip is about the same. He plans on returning t the gym once he discharges from here. He feels confident he can manage his symptoms with an HEP. He is ready to discharge from PT today.   He was compliant with home exercise program.  Response to previous treatment: no adverse affect  Functional change: progressing    Pain: 0/10  Location: left hip      Objective      Objective Measures updated at progress report unless specified.         Right Left Comment: ! = pain   Hip Flexion: 4+/5 4+/5     Hip ABD: 4+/5 4-/5     Hip ADD: 4+/5 4+/5     Hip Extension: 4/5 4/5     Hip IR: 4+/5 4-/5     Hip ER: 4+/5 4-/5     *Grossly mildly painful with L hip MMT*     L/E Strength w/ MicroFET Muscle Frankie Dynamometer Right Left Pain/Dysfunction with Movement   (approx 4 sec hold w/ max contraction)   Hip Flexion 26.8 kg  20.8 kg     Hip Abduction 32.0 kg  29.3 kg     Quadriceps 27.6 kg  25.6 kg     Hamstrings 23.9 kg  21.2 kg              Right Left Comment ! = pain   Knee extension: 5/5 5/5     Knee flexion: 5/5 5/5     Ankle DF: 5/5 5/5        FOTO: 53%    Treatment     Enoch received the treatments listed below:      therapeutic  exercises to develop strength, endurance, ROM, flexibility, posture, and core stabilization for 23 minutes including:  Upright bike 6 min (joint approximation)  Modified piriformis stretch 5x15s  Bridges + GTB 3x10  Objective measures above      neuromuscular re-education activities to improve: Balance, Coordination, Kinesthetic, Sense, Proprioception, and Posture for 20 minutes. The following activities were included:  TrA activation - review only  SL iso clams 1'x1 B  SL hip abd iso 1'x1 B  HEP review      Patient Education and Home Exercises       Education provided:   - exercise in pain free ranges    Written Home Exercises Provided: Patient instructed to cont prior HEP. Exercises were reviewed and Enoch was able to demonstrate them prior to the end of the session.  Enoch demonstrated good  understanding of the education provided. See EMR under Patient Instructions for exercises provided during therapy sessions    Assessment   Pt presents with significant improvements in subjective reports and objective measures as compared to initial evaluation as indicated above. Pt has met strength goals and demonstrates good understanding and performance of all HEP exercises. Pt is ready to be discharged from skilled PT services with HEP at this time. Pt is agreeable to plan.       Pt's spiritual, cultural and educational needs considered and pt agreeable to plan of care and goals.     Anticipated barriers to physical therapy: chronicity and pain     Goals:   Short Term Goals (4 Weeks):   1. Pt will be compliant with HEP to supplement PT in restoring pain free function. - MET  2. Pt will improve impaired LE MMTs by 1/2 grade  to improve strength for functional tasks - MET  3. Pt will have negative Frida's test to improve tissue extensibility of ITB and improve functional mobility of LEs. - NT   Long Term Goals (8 Weeks):  1. Pt will improve FOTO score to </= 65% limited to decrease perceived limitation with mobility - NT  2.  Pt will improve impaired LE MMTs by 1 grade to improve strength for functional tasks. - NT  3. Pt improve impaired hip ROM to WNL in all planes to improve mobility for normal movement patterns. - MET    Plan     Pt to be discharged from outpatient physical therapy services with HEP at this time.       Erick Lance, PT

## 2024-04-22 ENCOUNTER — PATIENT MESSAGE (OUTPATIENT)
Dept: DERMATOLOGY | Facility: CLINIC | Age: 59
End: 2024-04-22
Payer: OTHER GOVERNMENT

## 2024-04-23 ENCOUNTER — TELEPHONE (OUTPATIENT)
Dept: DERMATOLOGY | Facility: CLINIC | Age: 59
End: 2024-04-23
Payer: OTHER GOVERNMENT

## 2024-04-23 NOTE — TELEPHONE ENCOUNTER
Called and spoke to pt regarding appt request for flaring rash. Offered pt appt for tomorrow or Friday with Dr. Miranda. Pt stated that rash should still be present tomorrow, so he would prefer then. Pt confirmed appt date and time. He thanked me for the call.

## 2024-05-21 ENCOUNTER — OFFICE VISIT (OUTPATIENT)
Dept: OTOLARYNGOLOGY | Facility: CLINIC | Age: 59
End: 2024-05-21
Payer: OTHER GOVERNMENT

## 2024-05-21 VITALS
HEART RATE: 67 BPM | WEIGHT: 228.63 LBS | DIASTOLIC BLOOD PRESSURE: 95 MMHG | BODY MASS INDEX: 31.01 KG/M2 | SYSTOLIC BLOOD PRESSURE: 145 MMHG

## 2024-05-21 DIAGNOSIS — G47.33 OSA (OBSTRUCTIVE SLEEP APNEA): ICD-10-CM

## 2024-05-21 DIAGNOSIS — J32.9 CHRONIC SINUSITIS, UNSPECIFIED LOCATION: Primary | ICD-10-CM

## 2024-05-21 DIAGNOSIS — J34.3 HYPERTROPHY OF BOTH INFERIOR NASAL TURBINATES: ICD-10-CM

## 2024-05-21 DIAGNOSIS — J34.2 NASAL SEPTAL DEVIATION: ICD-10-CM

## 2024-05-21 PROCEDURE — 99213 OFFICE O/P EST LOW 20 MIN: CPT | Mod: PBBFAC | Performed by: STUDENT IN AN ORGANIZED HEALTH CARE EDUCATION/TRAINING PROGRAM

## 2024-05-21 PROCEDURE — 99214 OFFICE O/P EST MOD 30 MIN: CPT | Mod: 25,S$PBB,, | Performed by: STUDENT IN AN ORGANIZED HEALTH CARE EDUCATION/TRAINING PROGRAM

## 2024-05-21 PROCEDURE — 99999 PR PBB SHADOW E&M-EST. PATIENT-LVL III: CPT | Mod: PBBFAC,,, | Performed by: STUDENT IN AN ORGANIZED HEALTH CARE EDUCATION/TRAINING PROGRAM

## 2024-05-21 PROCEDURE — 31231 NASAL ENDOSCOPY DX: CPT | Mod: PBBFAC | Performed by: STUDENT IN AN ORGANIZED HEALTH CARE EDUCATION/TRAINING PROGRAM

## 2024-05-21 PROCEDURE — 87070 CULTURE OTHR SPECIMN AEROBIC: CPT | Performed by: STUDENT IN AN ORGANIZED HEALTH CARE EDUCATION/TRAINING PROGRAM

## 2024-05-21 PROCEDURE — 87077 CULTURE AEROBIC IDENTIFY: CPT | Performed by: STUDENT IN AN ORGANIZED HEALTH CARE EDUCATION/TRAINING PROGRAM

## 2024-05-21 PROCEDURE — 87075 CULTR BACTERIA EXCEPT BLOOD: CPT | Performed by: STUDENT IN AN ORGANIZED HEALTH CARE EDUCATION/TRAINING PROGRAM

## 2024-05-21 PROCEDURE — 87186 SC STD MICRODIL/AGAR DIL: CPT | Performed by: STUDENT IN AN ORGANIZED HEALTH CARE EDUCATION/TRAINING PROGRAM

## 2024-05-21 PROCEDURE — 31231 NASAL ENDOSCOPY DX: CPT | Mod: S$PBB,,, | Performed by: STUDENT IN AN ORGANIZED HEALTH CARE EDUCATION/TRAINING PROGRAM

## 2024-05-21 RX ORDER — SULFAMETHOXAZOLE AND TRIMETHOPRIM 800; 160 MG/1; MG/1
1 TABLET ORAL 2 TIMES DAILY
Qty: 20 TABLET | Refills: 0 | Status: SHIPPED | OUTPATIENT
Start: 2024-05-21 | End: 2024-05-31

## 2024-05-21 NOTE — PROGRESS NOTES
Otolaryngology - Head and Neck Surgery New Patient Visit    5/21/2024    Referring Provider: No ref. provider found    Chief Complaint   Patient presents with    Possible sinus infection       History of Present Illness, Otolaryngology Specialty-Specific Exam, and Assessment and Plan:     Enoch Barr is a 59 y.o. male who presents for evaluation of sinus infection, which has been present for the last month. He complains of right sided facial pressure, head aches, and purueent nasal drainage. Reports nasal congestion that is worse at night when laying down. He denies vision changes, anosmia, previous nasal trauma. Reports some epistaxis when his nose drys out. He has been treated with Flonase and z pack in the past. He reports anouit 1 sinus infection a year around this time of year. He has had allergy testing done in the past and was told he is allergic to grasses and pollen . He denies previous skullbase surgery but reports having previous sinus surgery at [facility] many years ago. He has a history of obstructive sleep apnea and uses a CPAP. The assessment of quality and severity of symptoms as measured by the SNOT-22 score is deferred.     On exam today, the ears are normal. The oral cavity is clear. The neck is clear. The nasopharynx, hypopharynx, and larynx are normal. Nasal endoscopy reveals partial ethmoidectomy and maxillary antrostomies bilaterally. Purulent drainage eminating from the right maxillary sinus. No nasal masses or polyposis.     Impression today is chronic sinusitis. I have recommended that he start on a course of bactrim. Cultures of the sinuses were obtained today, if the antibiotics need to be changed based on the sensitivities I will adjust them as needed.     Thank you for allowing us to participate in the care of your patient. We will continue to keep you informed of his progress. He will follow up with me in 1 month.     Sincerely yours,    Humberto Fam MD      Objective      Physical Examination  Vitals -  weight is 103.7 kg (228 lb 9.9 oz). His blood pressure is 145/95 (abnormal) and his pulse is 67.   Constitutional - General appearance: Normal. Ability to communicate: Normal.  Head & Face - Overall appearance, scars, masses: Normal. Palpation &/or percussion of face: Normal. Salivary glands: Normal. Facial strength: Normal  ENMT - Otoscopic exam: Normal. Assessment of hearing: Normal. External inspection: Normal. Nasal mucosa, septum, turbinates: Abnormal see exam details. Lips, teeth, gums: Normal. Oropharynx: Normal. Pharyngeal walls/pyriform sinus: Normal. Larynx: Normal. Nasopharynx: Normal  Neck - Neck: Normal. Thyroid: Normal  Lymphatic - Palpation of lymph nodes: Normal  Eyes - Ocular mobility: Normal  Respiratory - Inspection of Chest: Normal  Cardiovascular - Peripheral vascular system: Normal  Neurological/Psychiatric - Orientation: Normal    Review of Systems  ROS     A complete review of systems was obtained 05/22/2024 and reviewed.  The review of systems is negative for symptoms except as described above.    BP (!) 145/95 (BP Location: Left arm, Patient Position: Sitting, BP Method: Large (Automatic))   Pulse 67   Wt 103.7 kg (228 lb 9.9 oz)   BMI 31.01 kg/m²      Nasal Endoscopy:  5/21/2024    The use of diagnostic nasal endoscopy was considered medically necessary for the evaluation and visualization of the nasal anatomy for symptoms suggestive of nasal or sinus origin. Physical examination (including a nasal speculum evaluation) did not provide sufficient clinical information to establish a diagnosis, or symptoms did not improve or worsened following treatment.     The nasal cavity was decongested with topical 1% phenylephrine and anesthetized with 4% lidocaine.  A rigid 0-degree endoscope was introduced into the nasal cavity.    The patient was seated in the examination chair. After discussion of risks and benefits, a nasal endoscope was inserted into the nose  "the endoscope was passed along the left nasal floor to the nasopharynx. It was then passed between the middle and superior meatus, nasal turbinates, nasal septum, nasopharynx and sphenoethmoid region. The nasal endoscope was withdrawn and there was no complications. An identical procedure was performed on the right side. I was present for the entire procedure.The patient tolerated the above procedure well. The findings of this procedure can be found in the dictated note from 5/21/2024 visit.                                Data Reviewed    WBC (K/uL)   Date Value   03/09/2024 5.18     Eosinophil % (%)   Date Value   03/09/2024 4.4     Eos # (K/uL)   Date Value   03/09/2024 0.2     Platelets (K/uL)   Date Value   03/09/2024 303     Glucose (mg/dL)   Date Value   03/09/2024 83     No results found for: "IGE"    No sinus imaging available.    "

## 2024-05-24 LAB — BACTERIA SPEC AEROBE CULT: ABNORMAL

## 2024-05-27 LAB — BACTERIA SPEC ANAEROBE CULT: NORMAL

## 2024-07-01 ENCOUNTER — HOSPITAL ENCOUNTER (OUTPATIENT)
Dept: RADIOLOGY | Facility: HOSPITAL | Age: 59
Discharge: HOME | End: 2024-07-01
Attending: STUDENT IN AN ORGANIZED HEALTH CARE EDUCATION/TRAINING PROGRAM
Payer: OTHER GOVERNMENT

## 2024-07-01 ENCOUNTER — OFFICE VISIT (OUTPATIENT)
Dept: OTOLARYNGOLOGY | Facility: CLINIC | Age: 59
End: 2024-07-01
Payer: OTHER GOVERNMENT

## 2024-07-01 VITALS
DIASTOLIC BLOOD PRESSURE: 88 MMHG | BODY MASS INDEX: 31.28 KG/M2 | HEART RATE: 58 BPM | SYSTOLIC BLOOD PRESSURE: 135 MMHG | WEIGHT: 230.63 LBS

## 2024-07-01 DIAGNOSIS — J32.9 CHRONIC SINUSITIS, UNSPECIFIED LOCATION: ICD-10-CM

## 2024-07-01 DIAGNOSIS — J34.2 NASAL SEPTAL DEVIATION: ICD-10-CM

## 2024-07-01 DIAGNOSIS — J32.4 CHRONIC PANSINUSITIS: ICD-10-CM

## 2024-07-01 DIAGNOSIS — J34.3 HYPERTROPHY OF BOTH INFERIOR NASAL TURBINATES: ICD-10-CM

## 2024-07-01 DIAGNOSIS — J32.4 CHRONIC PANSINUSITIS: Primary | ICD-10-CM

## 2024-07-01 PROCEDURE — 99214 OFFICE O/P EST MOD 30 MIN: CPT | Mod: 25,S$PBB,, | Performed by: STUDENT IN AN ORGANIZED HEALTH CARE EDUCATION/TRAINING PROGRAM

## 2024-07-01 PROCEDURE — 99999 PR PBB SHADOW E&M-EST. PATIENT-LVL IV: CPT | Mod: PBBFAC,,, | Performed by: STUDENT IN AN ORGANIZED HEALTH CARE EDUCATION/TRAINING PROGRAM

## 2024-07-01 PROCEDURE — 31231 NASAL ENDOSCOPY DX: CPT | Mod: S$PBB,,, | Performed by: STUDENT IN AN ORGANIZED HEALTH CARE EDUCATION/TRAINING PROGRAM

## 2024-07-01 PROCEDURE — 70486 CT MAXILLOFACIAL W/O DYE: CPT | Mod: 26,,, | Performed by: STUDENT IN AN ORGANIZED HEALTH CARE EDUCATION/TRAINING PROGRAM

## 2024-07-01 PROCEDURE — 99214 OFFICE O/P EST MOD 30 MIN: CPT | Mod: PBBFAC,25 | Performed by: STUDENT IN AN ORGANIZED HEALTH CARE EDUCATION/TRAINING PROGRAM

## 2024-07-01 PROCEDURE — 70486 CT MAXILLOFACIAL W/O DYE: CPT | Mod: TC

## 2024-07-01 PROCEDURE — 31231 NASAL ENDOSCOPY DX: CPT | Mod: PBBFAC | Performed by: STUDENT IN AN ORGANIZED HEALTH CARE EDUCATION/TRAINING PROGRAM

## 2024-07-01 NOTE — PROGRESS NOTES
Subjective:      Enoch is a 59 y.o. male who comes for follow-up of sinusitis.  His last visit with me was on 5/21/2024.  Cultures showed staph (MSSA), was started on bactrim. Still feels that he has right maxillary sinus pressure and PND. Feels that he is about 50% improved since our visit about a month ago.     His current sinus regime consists of: Saline irrigations & floanse.    The assessment of quality and severity of symptoms as measured by the SNOT-22 score is 44.    The patient's medications, allergies, past medical, surgical, social and family histories were reviewed and updated as appropriate.    ROS     A detailed review of systems was obtained with pertinent positives as per the above HPI, and otherwise negative.        Objective:     /88 (BP Location: Right arm, Patient Position: Sitting, BP Method: Large (Automatic))   Pulse (!) 58   Wt 104.6 kg (230 lb 9.6 oz)   BMI 31.28 kg/m²        Constitutional:   Vital signs are normal. He appears well-developed and well-nourished.     Head:  Normocephalic and atraumatic.     Ears:  Hearing normal to normal and whispered voice; external ear normal without scars, lesions, or masses; ear canal, tympanic membrane, and middle ear normal..   Right Ear: No swelling. Tympanic membrane is not perforated and not bulging. No middle ear effusion.   Left Ear: No swelling. Tympanic membrane is not perforated and not bulging.  No middle ear effusion.     Nose:  Nose normal including turbinates, nasal mucosa, sinuses and nasal septum. No epistaxis.     Mouth/Throat  Oropharynx clear and moist without lesions or asymmetry and normal uvula midline. Normal dentition. No tonsillar abscesses. Tonsillar exudate.      Neck:  Neck normal without thyromegaly masses, asymmetry, normal tracheal structure, crepitus, and tenderness, thyroid normal, trachea normal, phonation normal, full range of motion with neck supple and no adenopathy. No stridor present.        Head (right  side): No submental adenopathy present.        Head (left side): No submental adenopathy present.     He has no cervical adenopathy.     Pulmonary/Chest:   No stridor.       Procedure    Nasal endoscopy performed.  See procedure note.    Nasal Endoscopy:  7/1/2024    The use of diagnostic nasal endoscopy was considered medically necessary for the evaluation and visualization of the nasal anatomy for symptoms suggestive of nasal or sinus origin. Physical examination (including a nasal speculum evaluation) did not provide sufficient clinical information to establish a diagnosis, or symptoms did not improve or worsened following treatment.     The nasal cavity was decongested with topical 1% phenylephrine and anesthetized with 4% lidocaine.  A rigid 0-degree endoscope was introduced into the nasal cavity.    The patient was seated in the examination chair. After discussion of risks and benefits, a nasal endoscope was inserted into the nose the endoscope was passed along the left nasal floor to the nasopharynx. It was then passed between the middle and superior meatus, nasal turbinates, nasal septum, nasopharynx and sphenoethmoid region. The nasal endoscope was withdrawn and there was no complications. An identical procedure was performed on the right side. I was present for the entire procedure.The patient tolerated the above procedure well. The findings of this procedure can be found in the dictated note from 7/1/2024 visit.                              Patent maxillary antrostomies bilaterally, residual uncinate process on the left.  Postsurgical changes to the ethmoid cavities bilaterally.  Some edema noted within the maxillary sinus on the right and polypoid change of the mucosa.  No kellie purulence noted.    Data Reviewed    WBC (K/uL)   Date Value   03/09/2024 5.18     Eosinophil % (%)   Date Value   03/09/2024 4.4     Eos # (K/uL)   Date Value   03/09/2024 0.2     Platelets (K/uL)   Date Value   03/09/2024 303  "    Glucose (mg/dL)   Date Value   03/09/2024 83     No results found for: "IGE"    I independently reviewed the images of the CT sinuses dated 7/1/24. Pertinent findings include postsurgical changes to the ethmoid and maxillary sinuses bilaterally.  Some residual uncinate present on the left.  Mild maxillary mucosal thickening present on along the right maxillary sinus.  Possible dehiscence along the floor with connection to residual maxillary tooth on the right.  Hypoplastic frontal sinuses bilaterally.  No significant opacification involving the sphenoid sinus.      Assessment:     1. Chronic pansinusitis    2. Chronic sinusitis, unspecified location    3. Hypertrophy of both inferior nasal turbinates    4. Nasal septal deviation         Plan:     - Discussed need for dental eval/clearance.  - Will provide course of topical mupirocin irrigations from PAP.  - RTC 1 months    Humberto Fam MD     "

## 2024-07-11 ENCOUNTER — TELEPHONE (OUTPATIENT)
Dept: PAIN MEDICINE | Facility: CLINIC | Age: 59
End: 2024-07-11
Payer: OTHER GOVERNMENT

## 2024-07-11 ENCOUNTER — OFFICE VISIT (OUTPATIENT)
Dept: PAIN MEDICINE | Facility: CLINIC | Age: 59
End: 2024-07-11
Payer: OTHER GOVERNMENT

## 2024-07-11 VITALS — SYSTOLIC BLOOD PRESSURE: 138 MMHG | HEART RATE: 60 BPM | OXYGEN SATURATION: 97 % | DIASTOLIC BLOOD PRESSURE: 84 MMHG

## 2024-07-11 DIAGNOSIS — M70.62 GREATER TROCHANTERIC BURSITIS OF LEFT HIP: ICD-10-CM

## 2024-07-11 DIAGNOSIS — M54.16 LUMBAR RADICULOPATHY: ICD-10-CM

## 2024-07-11 DIAGNOSIS — M47.816 LUMBAR SPONDYLOSIS: ICD-10-CM

## 2024-07-11 DIAGNOSIS — M25.552 PAIN OF LEFT HIP: ICD-10-CM

## 2024-07-11 DIAGNOSIS — M51.36 DDD (DEGENERATIVE DISC DISEASE), LUMBAR: Primary | ICD-10-CM

## 2024-07-11 DIAGNOSIS — Z98.890 STATUS POST LUMBAR MICRODISCECTOMY: ICD-10-CM

## 2024-07-11 PROCEDURE — 99999PBSHW PR PBB SHADOW TECHNICAL ONLY FILED TO HB: Mod: PBBFAC,,,

## 2024-07-11 PROCEDURE — 99999 PR PBB SHADOW E&M-EST. PATIENT-LVL IV: CPT | Mod: PBBFAC,,,

## 2024-07-11 PROCEDURE — 20610 DRAIN/INJ JOINT/BURSA W/O US: CPT | Mod: PBBFAC,PN

## 2024-07-11 PROCEDURE — 99214 OFFICE O/P EST MOD 30 MIN: CPT | Mod: PBBFAC,PN

## 2024-07-11 RX ORDER — TRIAMCINOLONE ACETONIDE 40 MG/ML
40 INJECTION, SUSPENSION INTRA-ARTICULAR; INTRAMUSCULAR
Status: COMPLETED | OUTPATIENT
Start: 2024-07-11 | End: 2024-07-11

## 2024-07-11 RX ADMIN — TRIAMCINOLONE ACETONIDE 40 MG: 40 INJECTION, SUSPENSION INTRA-ARTICULAR; INTRAMUSCULAR at 10:07

## 2024-07-11 NOTE — TELEPHONE ENCOUNTER
----- Message from Frank Geller MA sent at 7/11/2024 10:00 AM CDT -----  Regarding: Establish Care  Good morning,   Please reach out to patient he would like to transfer care to your clinic.          Thanks  Frank IBRAHIM

## 2024-07-11 NOTE — PROGRESS NOTES
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                                            02/12/2024  Time:                                           10:53      Narrative & Impression  EXAMINATION:  MRI LUMBAR SPINE WITHOUT CONTRAST     CLINICAL HISTORY:  Lumbar radiculopathy, symptoms persist with conservative treatment; Radiculopathy, lumbar region     TECHNIQUE:  Multiplanar, multisequence MR images were acquired from the thoracolumbar junction to the sacrum without the administration of contrast.     COMPARISON:  01/10/2023     FINDINGS:  Alignment: 2 mm retrolisthesis L2 on L3. slight levocurvature centered at L4.     Vertebrae: Left L5 hemilaminectomy.  No acute fracture or marrow replacement.  Discogenic sub endplate degenerative changes about the L3-4 and L5-S1 levels.     Discs: Multilevel disc desiccation.  Disc height loss is moderate at L3-4 and L5-S1 and mild at L2-3.     Cord: Normal.  Conus terminates at L1.     Paraspinal muscles & soft tissues: Unremarkable.     Degenerative findings:     T12-L1:     L1-L2:     L2-L3: There is broad-based posterior disc bulge with superimposed left broad-based disc protrusion (foraminal and extraforaminal zones).  This can be seen axial series 6, image 19 and sagittal series 3 images 6-8.  Left facet degenerative change.  Minimal spinal canal stenosis.     L3-L4:  Broad-based posterior disc bulge.  Superimposed right paracentral disc extrusion with disc material extending 6 mm below the disc level within the right anterolateral epidural space.  This can be seen best on sagittal series 3, image 13.  Minimal bilateral facet degenerative change.  Narrowed right lateral recess.  Minimal spinal canal stenosis and left neural foraminal narrowing.     L4-L5: Prior partial discectomy.  Mild bilateral facet degenerative changes.  Mild right neural foraminal narrowing.     L5-S1: Bilateral facet degenerative change.  At least moderate left neural foraminal narrowing.     Impression:     1. Unchanged minimal retrolisthesis L2 on L3.  2. Interval postsurgical change at the L4-5 level.  3. Residual degenerative change including focal disc protrusion at L2-3 and disc extrusion at L3-4 (encroaching descending right L4 nerve in the lateral recess).  Degenerative findings contribute to severe left neural foraminal narrowing at L5-S1.  4. No high-grade spinal canal stenosis.        Electronically signed by: Fox Mccauley  Date:                                            11/09/2023  Time:                                           11:39      Narrative & Impression    EXAMINATION:  XR CERVICAL SPINE 5 VIEW WITH FLEX AND EXT     CLINICAL HISTORY:  Other cervical disc degeneration, unspecified cervical region     TECHNIQUE:  Five views of the cervical spine plus flexion and extension views were performed.     COMPARISON:  09/30/2022     FINDINGS:  Calcified plaque left common carotid bifurcation region, mild foraminal stenosis due to uncinate process hypertrophy right C2 C4 and left C3 and C5.  Additional less prominent hypertrophy of uncinate processes, mild moderate degenerative disc spondylosis particularly to C5 and C6 with minimal mild posterior spur formation particularly C C6.  C1-C2 intact.  Airway normal.  No fracture, subluxation.     Impression:     Degenerative disc spondylosis  particularly C5 and C6 disc levels.  Additional findings above.        Electronically signed by: Morgan Leslie MD  Date:                                            09/01/2023  Time:                                           10:43       Narrative & Impression    EXAMINATION:  MRI LUMBAR SPINE WITHOUT CONTRAST     CLINICAL HISTORY:  Lumbar radiculopathy, symptoms persist with conservative treatment; Radiculopathy, lumbar region     TECHNIQUE:  Multiplanar, multisequence MR images were acquired from the thoracolumbar junction to the sacrum without the administration of contrast.     COMPARISON:  Radiograph 09/30/2022.     FINDINGS:  Lumbar spine alignment demonstrates mild levoscoliosis with grade 1 anterolisthesis of L2 on L3, L3 on L4 and L4 on L5.  No spondylolysis.  Vertebral body heights are well maintained without evidence for fracture.  No marrow signal abnormality to suggest an infiltrative process.     There is degenerative disc space narrowing and desiccation from L2-L3 through L5-S1.  Mild-to-moderate degenerative endplate edema at the right lateral aspect of L3-L4.  Annular fissures from L2-L3 through L5-S1.     Distal spinal cord demonstrates normal contour and signal intensity.  Cauda equina appears normal without findings to suggest arachnoiditis.  Conus medullaris terminates at L1.     Limited evaluation of the visualized abdominal organs demonstrates no significant abnormalities.  SI joints are symmetric.  Paraspinal musculature demonstrates normal bulk and signal intensity.     T12-L1: No spinal canal stenosis.  No neural foraminal narrowing.     L1-L2: No spinal canal stenosis.  No neural foraminal narrowing.     L2-L3: Mild grade 1 retrolisthesis.  Left subarticular/foraminal zone broad-based protrusion causes mass effect on the left lateral recess and closely approximates the left descending L3 nerve root.  Bilateral facet arthropathy and bilateral ligamentum flavum buckling.  Mild to moderate  left lateral recess stenosis.  No neural foraminal narrowing.     L3-L4: Mild grade 1 retrolisthesis.  Circumferential disc bulge causes mild mass effect on the right lateral recess and closely approximates the right descending L4 nerve root.  Bilateral facet arthropathy and bilateral ligamentum flavum buckling.  No spinal canal stenosis.  Mild bilateral neural foraminal narrowing.     L4-L5: Mild grade 1 retrolisthesis.  Circumferential disc bulge.  Bilateral facet arthropathy and bilateral ligamentum flavum buckling.  No spinal canal stenosis.  Mild bilateral neural foraminal narrowing.     L5-S1: Left foraminal, cranially extending disc extrusion closely approximates the left exiting L5 nerve root.  Bilateral facet arthropathy.  No spinal canal stenosis.  Moderate to severe left neural foraminal narrowing with questionable impingement of the exiting L5 nerve root.     Impression:     1. Lumbar degenerative changes contributing to multilevel neural foraminal narrowing, most severe at L5-S1 noting a left foraminal zone disc extrusion that contributes to moderate to severe narrowing with questionable impingement of the exiting L5 nerve root.  No spinal canal stenosis.        Electronically signed by: Christian Gleason MD  Date:                                            01/10/2023  Time:                                           09:16       EXAMINATION:  XR LUMBAR SPINE AP AND LAT WITH FLEX/EXT     CLINICAL HISTORY:  Other intervertebral disc degeneration, lumbar region     TECHNIQUE:  AP and lateral views as well as lateral flexion and extension images are performed through the lumbar spine.     COMPARISON:  None     FINDINGS:  There is mild curvature of the lumbar spine to the left.  Slight retrolisthesis is noted at L2-3 and L3-4 which does not change with flexion and extension.  Minor anterior and posterior osteophytes are present at L2 through L5.  Flexion and extension views show no instability.  No fracture is  seen.     Impression:     Degenerative changes as above        Electronically signed by: Enoch Parikh MD  Date:                                            09/21/2020  Time:                                           15:42    Assessment:       Encounter Diagnoses   Name Primary?    Greater trochanteric bursitis of left hip     Pain of left hip     DDD (degenerative disc disease), lumbar Yes    Lumbar radiculopathy     Status post lumbar microdiscectomy     Lumbar spondylosis          Plan:       Enoch was seen today for low-back pain.    Diagnoses and all orders for this visit:    DDD (degenerative disc disease), lumbar    Greater trochanteric bursitis of left hip  -     triamcinolone acetonide injection 40 mg    Pain of left hip    Lumbar radiculopathy    Status post lumbar microdiscectomy    Lumbar spondylosis        Enoch Barr is a 59 y.o. male with multiple pain complaints.  Patient with chronic lower back pain consistent with a left L5 radiculopathy.  Underwent a left L5-S1 laminectomy and microdiskectomy with Dr. Pineda, with good relief initially although having some residual pain.  Pain continues to be in a left L5 dermatomal pattern.  Updated lumbar MRI with multilevel degenerative changes, some residual left-sided foraminal stenosis at L5-S1.  Patient also with chronic intermittent neck pain which appears to be related to facet arthropathy.  Cervical x-ray showing spondylosis most notable at C5 and C6.  Neck pain significantly improved with physical therapy.  Good relief with left L5, S1 TF ANKITA although short-lived.  Patient also appears to have some degree of left greater trochanteric bursitis.  Good relief with previous left GTB steroid injection..     Prior records reviewed.  Pertinent imaging studies reviewed by me. Imaging results were discussed with patient.  Left GTB steroid injection performed in clinic today.  See note below.  Stressed the importance of maintaining regular home exercise program  and being mindful of how they use their back throughout the day.  Patient expressed understanding and agreement.  Continue Lyrica 75 mg b.i.d..  Patient taking with benefit, denies any adverse effects.  We did briefly discuss spinal cord stimulator trial.  Patient previously noting overall improvement with left GTB steroid injection.  May reconsider spinal cord stimulator trial given improvement with GTB injection.  Patient requesting transfer care to [facility].    Return to clinic TBD.    left Greater Trochanteric bursa Steroid Injection     Risks vs. Benefits were discussed with patient. Patient expressed understanding and written consent was obtained. The left greater trochanter was palpated and marked.  The lateral hip(s) was/were draped and prepped with chrolaprep. A 27 gauge 1.5 inch needle was advanced to the left greater trochanter. After negative aspiration, 40 mg of Kenalog and 2.0 cc of 1% lidocaine were injected.    The patient tolerated the procedure well, without any complications or bleeding. She was discharged from clinic in good condition.      Martinez Robertson PA-C  [facility]  Interventional Pain Management   07/11/2024    This note was created by combination of typed  and M-Modal dictation.  Transcription and phonetic errors may be present.  If there are any questions, please contact me.

## 2024-07-11 NOTE — TELEPHONE ENCOUNTER
Staff spoke with patient and got him scheduled with  next available to consult for lower back pain radiating to his left leg and discuss the scs implant.

## 2024-08-13 ENCOUNTER — OFFICE VISIT (OUTPATIENT)
Dept: PAIN MEDICINE | Facility: CLINIC | Age: 59
End: 2024-08-13
Payer: OTHER GOVERNMENT

## 2024-08-13 ENCOUNTER — OFFICE VISIT (OUTPATIENT)
Dept: OTOLARYNGOLOGY | Facility: CLINIC | Age: 59
End: 2024-08-13
Payer: OTHER GOVERNMENT

## 2024-08-13 VITALS
BODY MASS INDEX: 30.5 KG/M2 | DIASTOLIC BLOOD PRESSURE: 86 MMHG | SYSTOLIC BLOOD PRESSURE: 146 MMHG | HEART RATE: 73 BPM | WEIGHT: 224.88 LBS

## 2024-08-13 VITALS
HEART RATE: 52 BPM | RESPIRATION RATE: 18 BRPM | BODY MASS INDEX: 30.16 KG/M2 | HEIGHT: 72 IN | WEIGHT: 222.69 LBS | DIASTOLIC BLOOD PRESSURE: 98 MMHG | OXYGEN SATURATION: 100 % | SYSTOLIC BLOOD PRESSURE: 153 MMHG | TEMPERATURE: 98 F

## 2024-08-13 DIAGNOSIS — Z98.890 STATUS POST LUMBAR MICRODISCECTOMY: ICD-10-CM

## 2024-08-13 DIAGNOSIS — M54.16 LUMBAR RADICULOPATHY, CHRONIC: Primary | ICD-10-CM

## 2024-08-13 DIAGNOSIS — M96.1 LUMBAR POST-LAMINECTOMY SYNDROME: ICD-10-CM

## 2024-08-13 DIAGNOSIS — J32.4 CHRONIC PANSINUSITIS: Primary | ICD-10-CM

## 2024-08-13 DIAGNOSIS — G89.4 CHRONIC PAIN SYNDROME: ICD-10-CM

## 2024-08-13 DIAGNOSIS — M50.30 DDD (DEGENERATIVE DISC DISEASE), CERVICAL: ICD-10-CM

## 2024-08-13 DIAGNOSIS — G47.33 OSA (OBSTRUCTIVE SLEEP APNEA): ICD-10-CM

## 2024-08-13 DIAGNOSIS — J34.3 HYPERTROPHY OF BOTH INFERIOR NASAL TURBINATES: ICD-10-CM

## 2024-08-13 DIAGNOSIS — J34.2 NASAL SEPTAL DEVIATION: ICD-10-CM

## 2024-08-13 PROCEDURE — 99213 OFFICE O/P EST LOW 20 MIN: CPT | Mod: 25,S$PBB,, | Performed by: STUDENT IN AN ORGANIZED HEALTH CARE EDUCATION/TRAINING PROGRAM

## 2024-08-13 PROCEDURE — 99215 OFFICE O/P EST HI 40 MIN: CPT | Mod: PBBFAC,27,25 | Performed by: ANESTHESIOLOGY

## 2024-08-13 PROCEDURE — 99214 OFFICE O/P EST MOD 30 MIN: CPT | Mod: S$PBB,,, | Performed by: ANESTHESIOLOGY

## 2024-08-13 PROCEDURE — 99999 PR PBB SHADOW E&M-EST. PATIENT-LVL V: CPT | Mod: PBBFAC,,, | Performed by: ANESTHESIOLOGY

## 2024-08-13 PROCEDURE — 87075 CULTR BACTERIA EXCEPT BLOOD: CPT | Performed by: STUDENT IN AN ORGANIZED HEALTH CARE EDUCATION/TRAINING PROGRAM

## 2024-08-13 PROCEDURE — 31231 NASAL ENDOSCOPY DX: CPT | Mod: PBBFAC | Performed by: STUDENT IN AN ORGANIZED HEALTH CARE EDUCATION/TRAINING PROGRAM

## 2024-08-13 PROCEDURE — 31231 NASAL ENDOSCOPY DX: CPT | Mod: S$PBB,,, | Performed by: STUDENT IN AN ORGANIZED HEALTH CARE EDUCATION/TRAINING PROGRAM

## 2024-08-13 PROCEDURE — 87070 CULTURE OTHR SPECIMN AEROBIC: CPT | Performed by: STUDENT IN AN ORGANIZED HEALTH CARE EDUCATION/TRAINING PROGRAM

## 2024-08-13 PROCEDURE — 99214 OFFICE O/P EST MOD 30 MIN: CPT | Mod: PBBFAC,25 | Performed by: STUDENT IN AN ORGANIZED HEALTH CARE EDUCATION/TRAINING PROGRAM

## 2024-08-13 PROCEDURE — 99999 PR PBB SHADOW E&M-EST. PATIENT-LVL IV: CPT | Mod: PBBFAC,,, | Performed by: STUDENT IN AN ORGANIZED HEALTH CARE EDUCATION/TRAINING PROGRAM

## 2024-08-13 NOTE — PROGRESS NOTES
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 facility-administered medications on file prior to visit.       Objective:      BP (!) 153/98   Pulse (!) 52   Temp 98 °F (36.7 °C)   Resp 18   Ht 6' (1.829 m)   Wt 101 kg (222 lb 10.6 oz)   SpO2 100%   BMI 30.20 kg/m²      Exam:  GEN:  Well developed, well nourished.  No acute distress.  Normal pain behavior.  HEENT:  No trauma.  Mucous membranes moist.  Nares patent bilaterally.  PSYCH: Normal affect. Thought content appropriate.  CHEST:  Breathing symmetric.  No audible wheezing.  ABD: Soft, non-distended.  SKIN:  Warm, pink, dry.  No rash on exposed areas.    EXT:  No cyanosis, clubbing, or edema.  No color change or changes in nail or hair growth.  NEURO: Fully alert, oriented, and appropriate. Speech normal ashley.  No atrophy or muscle wasting noted. 5/5 strength and coordination in upper and lower extremities. No changes to sensation. Plantar reflexes downgoing. Bilateral patellar and achilles tendon reflexes 1+. No clonus.   MUSCULOSKELETAL: Full ROM of neck and spine. Negative Spurling.  Tenderness to palpation on lumbar spinous processes and bilateral SI joint L>R. No paraspinal tenderness. Pain reproduced with flexion and side bending. + Facet loading L>R. +Bilateral FABERs, +SI distraction, +SI compression.  Bilateral straight leg raise negative in supine  position - notes hamstring and calf tightness.  Gait:  Normal.  No trendelenburg sign bilaterally.       Imaging:  Narrative & Impression  EXAMINATION:  XR HIP WITH PELVIS WHEN PERFORMED, 2 OR 3 VIEWS LEFT     CLINICAL HISTORY:  Pain in left hip     FINDINGS:  Three views left hip: No fracture, dislocation, or bone destruction seen.        Electronically signed by: Adolfo James MD  Date:                                            02/12/2024  Time:                                           10:53      Narrative & Impression  EXAMINATION:  MRI LUMBAR SPINE WITHOUT CONTRAST     CLINICAL HISTORY:  Lumbar radiculopathy, symptoms persist with conservative treatment; Radiculopathy, lumbar region     TECHNIQUE:  Multiplanar, multisequence MR images were acquired from the thoracolumbar junction to the sacrum without the administration of contrast.     COMPARISON:  01/10/2023     FINDINGS:  Alignment: 2 mm retrolisthesis L2 on L3. slight levocurvature centered at L4.     Vertebrae: Left L5 hemilaminectomy.  No acute fracture or marrow replacement.  Discogenic sub endplate degenerative changes about the L3-4 and L5-S1 levels.     Discs: Multilevel disc desiccation.  Disc height loss is moderate at L3-4 and L5-S1 and mild at L2-3.     Cord: Normal.  Conus terminates at L1.     Paraspinal muscles & soft tissues: Unremarkable.     Degenerative findings:     T12-L1:     L1-L2:     L2-L3: There is broad-based posterior disc bulge with superimposed left broad-based disc protrusion (foraminal and extraforaminal zones).  This can be seen axial series 6, image 19 and sagittal series 3 images 6-8.  Left facet degenerative change.  Minimal spinal canal stenosis.     L3-L4: Broad-based posterior disc bulge.  Superimposed right paracentral disc extrusion with disc material extending 6 mm below the disc level within the right anterolateral epidural space.  This can be seen best on sagittal series 3, image 13.  Minimal bilateral facet  degenerative change.  Narrowed right lateral recess.  Minimal spinal canal stenosis and left neural foraminal narrowing.     L4-L5: Prior partial discectomy.  Mild bilateral facet degenerative changes.  Mild right neural foraminal narrowing.     L5-S1: Bilateral facet degenerative change.  At least moderate left neural foraminal narrowing.     Impression:     1. Unchanged minimal retrolisthesis L2 on L3.  2. Interval postsurgical change at the L4-5 level.  3. Residual degenerative change including focal disc protrusion at L2-3 and disc extrusion at L3-4 (encroaching descending right L4 nerve in the lateral recess).  Degenerative findings contribute to severe left neural foraminal narrowing at L5-S1.  4. No high-grade spinal canal stenosis.        Electronically signed by: Fox Mccauley  Date:                                            11/09/2023  Time:                                           11:39      Narrative & Impression    EXAMINATION:  XR CERVICAL SPINE 5 VIEW WITH FLEX AND EXT     CLINICAL HISTORY:  Other cervical disc degeneration, unspecified cervical region     TECHNIQUE:  Five views of the cervical spine plus flexion and extension views were performed.     COMPARISON:  09/30/2022     FINDINGS:  Calcified plaque left common carotid bifurcation region, mild foraminal stenosis due to uncinate process hypertrophy right C2 C4 and left C3 and C5.  Additional less prominent hypertrophy of uncinate processes, mild moderate degenerative disc spondylosis particularly to C5 and C6 with minimal mild posterior spur formation particularly C C6.  C1-C2 intact.  Airway normal.  No fracture, subluxation.     Impression:     Degenerative disc spondylosis particularly C5 and C6 disc levels.  Additional findings above.        Electronically signed by: Morgan Leslie MD  Date:                                            09/01/2023  Time:                                           10:43       Narrative &  Impression    EXAMINATION:  MRI LUMBAR SPINE WITHOUT CONTRAST     CLINICAL HISTORY:  Lumbar radiculopathy, symptoms persist with conservative treatment; Radiculopathy, lumbar region     TECHNIQUE:  Multiplanar, multisequence MR images were acquired from the thoracolumbar junction to the sacrum without the administration of contrast.     COMPARISON:  Radiograph 09/30/2022.     FINDINGS:  Lumbar spine alignment demonstrates mild levoscoliosis with grade 1 anterolisthesis of L2 on L3, L3 on L4 and L4 on L5.  No spondylolysis.  Vertebral body heights are well maintained without evidence for fracture.  No marrow signal abnormality to suggest an infiltrative process.     There is degenerative disc space narrowing and desiccation from L2-L3 through L5-S1.  Mild-to-moderate degenerative endplate edema at the right lateral aspect of L3-L4.  Annular fissures from L2-L3 through L5-S1.     Distal spinal cord demonstrates normal contour and signal intensity.  Cauda equina appears normal without findings to suggest arachnoiditis.  Conus medullaris terminates at L1.     Limited evaluation of the visualized abdominal organs demonstrates no significant abnormalities.  SI joints are symmetric.  Paraspinal musculature demonstrates normal bulk and signal intensity.     T12-L1: No spinal canal stenosis.  No neural foraminal narrowing.     L1-L2: No spinal canal stenosis.  No neural foraminal narrowing.     L2-L3: Mild grade 1 retrolisthesis.  Left subarticular/foraminal zone broad-based protrusion causes mass effect on the left lateral recess and closely approximates the left descending L3 nerve root.  Bilateral facet arthropathy and bilateral ligamentum flavum buckling.  Mild to moderate left lateral recess stenosis.  No neural foraminal narrowing.     L3-L4: Mild grade 1 retrolisthesis.  Circumferential disc bulge causes mild mass effect on the right lateral recess and closely approximates the right descending L4 nerve root.   Bilateral facet arthropathy and bilateral ligamentum flavum buckling.  No spinal canal stenosis.  Mild bilateral neural foraminal narrowing.     L4-L5: Mild grade 1 retrolisthesis.  Circumferential disc bulge.  Bilateral facet arthropathy and bilateral ligamentum flavum buckling.  No spinal canal stenosis.  Mild bilateral neural foraminal narrowing.     L5-S1: Left foraminal, cranially extending disc extrusion closely approximates the left exiting L5 nerve root.  Bilateral facet arthropathy.  No spinal canal stenosis.  Moderate to severe left neural foraminal narrowing with questionable impingement of the exiting L5 nerve root.     Impression:     1. Lumbar degenerative changes contributing to multilevel neural foraminal narrowing, most severe at L5-S1 noting a left foraminal zone disc extrusion that contributes to moderate to severe narrowing with questionable impingement of the exiting L5 nerve root.  No spinal canal stenosis.        Electronically signed by: Christian Gleason MD  Date:                                            01/10/2023  Time:                                           09:16       EXAMINATION:  XR LUMBAR SPINE AP AND LAT WITH FLEX/EXT     CLINICAL HISTORY:  Other intervertebral disc degeneration, lumbar region     TECHNIQUE:  AP and lateral views as well as lateral flexion and extension images are performed through the lumbar spine.     COMPARISON:  None     FINDINGS:  There is mild curvature of the lumbar spine to the left.  Slight retrolisthesis is noted at L2-3 and L3-4 which does not change with flexion and extension.  Minor anterior and posterior osteophytes are present at L2 through L5.  Flexion and extension views show no instability.  No fracture is seen.     Impression:     Degenerative changes as above        Electronically signed by: Enoch Parikh MD  Date:                                            09/21/2020  Time:                                           15:42    Assessment:        Encounter Diagnoses   Name Primary?    Lumbar radiculopathy, chronic Yes    DDD (degenerative disc disease), cervical     Status post lumbar microdiscectomy     Lumbar post-laminectomy syndrome     Chronic pain syndrome            Plan:       Enoch Barr is a 59 y.o. male with chronic lower back pain that radiates to the lower extremities. Patient pain is reproduced with flexion and side bending. Patient has had several pain injections including left L5,S1 transforaminal ANKITA and L3,L3,L5 MBB that provided limited relief. Patient is s/p L5-S1 microdiscectomy with persistent pain post-op. Patient has had orthopedic and neurosurgery evaluation and there are no surgical indications at this time.    Reviewed imaging with patient.  Discussed spinal cord stimulator process and implantation extensively with patient.  Referred to psychiatry for spinal cord stimulator clearance  [facility]  Discussed Intracept procedure as a treatment option in the future for axial pain.    Joan Galindo, MS4  Medical Student    I spent a total of 30 minutes on the day of the visit.  This includes face to face time and non-face to face time preparing to see the patient by reviewing previous labs/imaging, obtaining and/or reviewing separately obtained history, documenting clinical information in the electronic or other health record, independently interpreting results and communicating results to the patient/family/caregiver.    Dashawn Behtea

## 2024-08-13 NOTE — PROGRESS NOTES
Subjective:      Enoch is a 59 y.o. male who comes for follow-up of sinusitis.  His last visit with me was on 7/1/2024.  Feels that his sinuses feel congested.  He complains of right greater than left facial pressure and nasal congestion.  He was she was dentist the day before seeing me, however had to reschedule.    His current sinus regime consists of: Mupirocin irrigations & floanse.    The assessment of quality and severity of symptoms as measured by the SNOT-22 score: : (P) 29    The patient's medications, allergies, past medical, surgical, social and family histories were reviewed and updated as appropriate.    Review of Systems   Constitutional: Negative.    Eyes: Negative.    Respiratory:  Positive for cough.    Cardiovascular: Negative.    Gastrointestinal:  Positive for diarrhea.   Genitourinary: Negative.    Musculoskeletal:  Positive for back pain.   Skin: Negative.    Neurological: Negative.    Psychiatric/Behavioral: Negative.        Answers submitted by the patient for this visit:  Review of Symptoms Questionnaire  (Submitted on 8/13/2024)  sinus pressure : Yes  Sinus infection(s)?: Yes  postnasal drip: Yes  Snoring?: Yes  Seasonal Allergies?: Yes  None of these : Yes  None of these: Yes    A detailed review of systems was obtained with pertinent positives as per the above HPI, and otherwise negative.        Objective:     BP (!) 146/86 (BP Location: Left arm, Patient Position: Sitting)   Pulse 73   Wt 102 kg (224 lb 13.9 oz)   BMI 30.50 kg/m²        Constitutional:   Vital signs are normal. He appears well-developed and well-nourished.     Head:  Normocephalic and atraumatic.     Ears:  Hearing normal to normal and whispered voice; external ear normal without scars, lesions, or masses; ear canal, tympanic membrane, and middle ear normal..   Right Ear: No swelling. Tympanic membrane is not perforated and not bulging. No middle ear effusion.   Left Ear: No swelling. Tympanic membrane is not  perforated and not bulging.  No middle ear effusion.     Nose:  Nose normal including turbinates, nasal mucosa, sinuses and nasal septum. No epistaxis.     Mouth/Throat  Oropharynx clear and moist without lesions or asymmetry and normal uvula midline. Normal dentition. No tonsillar abscesses. Tonsillar exudate.      Neck:  Neck normal without thyromegaly masses, asymmetry, normal tracheal structure, crepitus, and tenderness, thyroid normal, trachea normal, phonation normal, full range of motion with neck supple and no adenopathy. No stridor present.        Head (right side): No submental adenopathy present.        Head (left side): No submental adenopathy present.     He has no cervical adenopathy.     Pulmonary/Chest:   No stridor.       Procedure    Nasal endoscopy performed.  See procedure note.    Nasal Endoscopy:  8/13/2024    The use of diagnostic nasal endoscopy was considered medically necessary for the evaluation and visualization of the nasal anatomy for symptoms suggestive of nasal or sinus origin. Physical examination (including a nasal speculum evaluation) did not provide sufficient clinical information to establish a diagnosis, or symptoms did not improve or worsened following treatment.     The nasal cavity was decongested with topical 1% phenylephrine and anesthetized with 4% lidocaine.  A rigid 0-degree endoscope was introduced into the nasal cavity.    The patient was seated in the examination chair. After discussion of risks and benefits, a nasal endoscope was inserted into the nose the endoscope was passed along the left nasal floor to the nasopharynx. It was then passed between the middle and superior meatus, nasal turbinates, nasal septum, nasopharynx and sphenoethmoid region. The nasal endoscope was withdrawn and there was no complications. An identical procedure was performed on the right side. I was present for the entire procedure.The patient tolerated the above procedure well. The findings  "of this procedure can be found in the dictated note from 8/13/2024 visit.                                      Patent maxillary and ethmoid sinuses bilaterally.  Scant drainage more prominent from the right maxillary sinus.  Crusts seen along the anterior ethmoidectomy surgical site.  Cultures were obtained from this area.    Data Reviewed    WBC (K/uL)   Date Value   03/09/2024 5.18     Eosinophil % (%)   Date Value   03/09/2024 4.4     Eos # (K/uL)   Date Value   03/09/2024 0.2     Platelets (K/uL)   Date Value   03/09/2024 303     Glucose (mg/dL)   Date Value   03/09/2024 83     No results found for: "IGE"    No sinus imaging available.      Assessment:     1. Chronic pansinusitis    2. Hypertrophy of both inferior nasal turbinates    3. Nasal septal deviation    4. GM (obstructive sleep apnea)      Plan:     - Continue irrigations.  - Cultures of the sinuses were obtained today, if the antibiotics need to be changed based on the sensitivities I will adjust them as needed.    - He will reach out back to us after he was had his dental eval.    Humberto Fam MD       "

## 2024-08-16 LAB
BACTERIA SPEC AEROBE CULT: ABNORMAL
BACTERIA SPEC ANAEROBE CULT: NORMAL

## 2024-09-04 ENCOUNTER — CLINICAL SUPPORT (OUTPATIENT)
Dept: PSYCHIATRY | Facility: CLINIC | Age: 59
End: 2024-09-04
Payer: OTHER GOVERNMENT

## 2024-09-04 DIAGNOSIS — Z00.8 ENCOUNTER FOR PRE-SURGICAL PSYCHOLOGICAL ASSESSMENT: Primary | ICD-10-CM

## 2024-09-05 ENCOUNTER — OFFICE VISIT (OUTPATIENT)
Dept: PSYCHIATRY | Facility: CLINIC | Age: 59
End: 2024-09-05
Payer: OTHER GOVERNMENT

## 2024-09-05 ENCOUNTER — DOCUMENTATION ONLY (OUTPATIENT)
Dept: PSYCHIATRY | Facility: CLINIC | Age: 59
End: 2024-09-05
Payer: OTHER GOVERNMENT

## 2024-09-05 DIAGNOSIS — M50.30 DDD (DEGENERATIVE DISC DISEASE), CERVICAL: ICD-10-CM

## 2024-09-05 DIAGNOSIS — Z00.8 ENCOUNTER FOR PRE-SURGICAL PSYCHOLOGICAL ASSESSMENT: Primary | ICD-10-CM

## 2024-09-05 DIAGNOSIS — M54.16 LUMBAR RADICULOPATHY, CHRONIC: ICD-10-CM

## 2024-09-05 DIAGNOSIS — Z98.890 STATUS POST LUMBAR MICRODISCECTOMY: ICD-10-CM

## 2024-09-05 DIAGNOSIS — F51.01 PRIMARY INSOMNIA: ICD-10-CM

## 2024-09-05 PROCEDURE — 99999 PR PBB SHADOW E&M-EST. PATIENT-LVL I: CPT | Mod: PBBFAC,,, | Performed by: PSYCHOLOGIST

## 2024-09-05 PROCEDURE — 99211 OFF/OP EST MAY X REQ PHY/QHP: CPT | Mod: PBBFAC | Performed by: PSYCHOLOGIST

## 2024-09-05 NOTE — LETTER
September 5, 2024        Pain Management             [facility] [address]  [address]  [facility] [address]  Phone: [phone]   Patient: Enoch Barr   MR Number: [id number]   YOB: 1965   Date of Visit: 9/5/2024       Dear Dr. Bethea:    Thank you for referring Eonch Barr to me for evaluation. Below are the relevant portions of my assessment and plan of care.    Mr. Barr is a suitable candidate from a psychological perspective. There are no absolute psychological contraindications to SCS. There are no significant testing risks, psychiatric problems, or major psychosocial stressors that would contraindicate surgery. He has multiple protective factors that should help him during surgery and recovery. There are no recommendations for psychological intervention at this time.        If you have questions, please do not hesitate to call me.     Sincerely,      Alivia Tidwell, PhD  Clinical Psychologist

## 2024-09-05 NOTE — PROGRESS NOTES
Mr. Barr is a suitable candidate from a psychological perspective. There are no absolute psychological contraindications to SCS. There are no significant testing risks, psychiatric problems, or major psychosocial stressors that would contraindicate surgery. He has multiple protective factors that should help him during surgery and recovery. There are no recommendations for psychological intervention at this time.

## 2024-09-05 NOTE — PROGRESS NOTES
PRESURGICAL PSYCHOLOGICAL EVALUATION - PAIN MANAGEMENT  Psychiatry Initial Visit (PhD)  Psychological Intake and Assessment    Site: [facility], Department of Psychiatry, Psychology Section  [address] [place] [address]    CPT Codes:   12682 (1 hour): Psychiatric Diagnostic Evaluation  30029 (1 hour): Integration of patient data, interpretation of standardized test results and clinical data, clinical decision-making, treatment planning and report, and interactive feedback to the patient  64407 (30 minutes), 96353 (30 minutes): Psychological test administration and scoring by technician, two or more tests, any method      NAME: Enoch Barr  MRN: [id number]  : 1965     Date:  2024  Evaluation Length (direct face-to-face time): 60 minutes  Total Time including report writing, test scoring, chart review, integration of data and feedback: 120 minutes    Clinical status of patient: Outpatient  Met with: Patient  Referred by: Dashawn Bethea M.D.    Chief complaint/reason for encounter: Psychological Evaluation prior to surgical implantation of a spinal cord stimulator (SCS) to address chronic pain.     Before this evaluation was initiated, the purposes and process of the assessment and the limits of confidentiality were discussed with the patient who expressed understanding of these issues and verbally consented to proceed with the evaluation.      HISTORY OF PRESENT ILLNESS:  Mr. Enoch Barr is a 59-year-old male referred for Psychological Evaluation prior to surgical implantation of a spinal cord stimulator (SCS) to address chronic pain. He has chronic pain in his lower back on the left side, his legs, and his feet (occasionally). His pain has been present for the past five to seven years. He has tried physical therapy, medications, injections, and surgery (it fixed a bulging disc that was pressing ont he nerve, and that helped) without receiving sufficient relief. His  quality of life is greatly impacted by the pain, but he tries to continue daily activities. The pain limits things he would like to be able to do at home, in the gym, and for recreation. He reports, We can't fix the damage that was done, the only thing we can do is make sure it doesn't get worse I'm normally a pretty active person. I still force myself to be more active now. I try to go to the gym every day. I ride [my bicycle] every week. I try to do stuff out in the [employer], wood-working.    Mr. Barr is now interested in a trial of SCS. He has reviewed the educational materials and is familiar with the procedures involved (My wife just had the permanent stimulator I got to see the beginning up until the final surgery.). He understood risks of surgery including bleeding, infection, failure of surgery, misplaced hardware, migration of hardware, need for reoperation, etc. When asked about potential concerns regarding SCS, he indicated no significant concerns. His expectations regarding SCS include improvements in his ability to feel comfortable with his current activities. He is motivated to improving ability to gradulally work up to other activities. If SCS is not effective for him he noted he would not consider suicide as an option and would look forward to future treatment options. His wife will be available to help his during recovery after surgery.    When asked how pain affects his mood, Mr. Barr reported, I would say it does but I try not to let it. It kind of affects my sleep, and I already have sleep issues I make a conscious effort to try not to [curse or get angry]. Regarding mental health history, he denied past psychiatric treatment and history. He does not report current psychiatric problems or major psychosocial stressors.       MEDICAL HISTORY:  Relevant Medical History: Lumbar radiculopathy, chronic; DDD (degenerative disc disease), cervical; Status post lumbar microdiscectomy; Lumbar  post-laminectomy syndrome; Chronic pain syndrome    Pain Scales:   Current level of pain: 6/10  Worst pain ratin/10  Best pain ratin/10    Current Health Behaviors:  Compliant with medical regimens and appointments: Yes  Prescription medication misuse: No  Exercise: Yes  Adequate sleep: No - He estimates having sleep problems for 15 years.   Adequate cognitive functioning: Yes    Current and Past Substance Use/Abuse:  Alcohol: He drinks alcohol on the weekend (six to eight cocktails). He denied negative consequences from his current alcohol use. He drank alcohol excessively in the , but denied history of abuse or dependence.   Drugs: Denied current use. He used substances recreationally before he joined the  around age 19, but denied abuse or dependence.  Tobacco: None.   Caffeine: He drinks one cup of green tea each morning. On weekends he will drink two to three cups of coffee in the morning.    Social History and Current Social Situation:    Mr. Barr was born in [place] and raised in [place] by his biological mother and father along with two older siblings. His mother was previously  before her father and he had three older half-siblings.  He described his childhood as good. He denied childhood trauma, abuse, and neglect. He did really good in school and earned a high school diploma. He is currently working in performance [employer] full-time. He served in the  in the [employer] from age 19 to 39. He is not on disability and finances are stable and stressful (they're stable but you can always do better). He has been  to his spouse for 30 years, and his marriage is supportive and stable. He has three children (ages 30, 28, 24). He currently lives with his wife. Pentecostal is not important to him.  Legal history: He was arrested in the 11th and 12th grades (breaking and entering) and again when he was 22 years old (DUI). He denied current involvement in  litigation.  Access to guns: Yes, in a safe place.      PAST AND CURRENT MENTAL HEALTH:  Past Mental Health History:  Previous diagnosis: Denied.  Inpatient treatment: Denied.  Prior substance abuse treatment: Denied.  Outpatient treatment: A few years ago he experienced significant work stress and attended therapy to help him manage negative thoughts.  Suicide attempt: Denied.  Non-suicidal self-injury: Denied.    Trauma History: Denied.    Family Mental Health History:  Psychiatric illness: Half-brother - Depression, Bipolar  Substance abuse: Denied.  Suicide: Paternal great-uncle  by suicide    Current Mental Health Treatment:  Medications: trazodone for sleep  Psychotherapy: Denied.    Current Mental Health Symptoms:  Depression: Denied. He denied episodes of depressed mood or depression-related anhedonia.   Claire/Hypomania: Denied. He denied periods of elevated mood or abnormally increased energy or goal-directed activity.  Anxiety:   Generalized Anxiety: He denied experiencing excessive, exaggerated anxiety that was unmanageable. He endorsed a period of time after his daughter's birth in which he experienced stress and anxiety. Around that time he was also moving to [email or web address] with the [employer]. He started experiencing alopecia from stress, and a luna helped him to gain insight and cope with anxiety.   Panic Disorder: Denied.  OCD: Denied.  Insomnia: He has had sleep problems for the past 15 years. He has difficulty staying asleep. He is unsure anything that triggered his sleep problems. He sleeps around four to five hours each day. He feels tired and lethargic during the day. He believes his mind has a difficult time shutting down (I'm always thinking.).  Thought dysfunction: Denied delusions, hallucinations.  Non-suicidal or Suicidal thoughts/behaviors: Denied.  Personality functioning: The patient does not display any personality characteristics which would be an impediment to pursuing SCS.    Current Stress  Management:  Current psychosocial stressors: long-term saving for it all of it I don't want to live [in [place]]. They are planning to do their last move within the next few years.   Report of coping skills: Watch movies, Ride bikes, Exercise, Woodwork, Spend time with wife  Support system: Wife, Brother, Colleagues  Strengths and liabilities: Strength: Patient accepts guidance/feedback, Strength: Patient is expressive/articulate., Strength: Patient is motivated for change., Strength: Patient has positive support network., Strength: Patient has reasonable judgment., Strength: Patient is stable.       PSYCHOLOGICAL ASSESSMENT/TESTING:  All tests were administered according to standardized procedures and were selected based on the reason for referral. Effort on all tests was satisfactory to produce valid results.    MMPI-3. The MMPI-3 provides an assessment of personality and psychopathology with specific evaluation of psychosocial risk factors associated with outcomes of SCS. Mr. Barr produced a valid and interpretable MMPI-3 profile, answering items relevantly based on content. Therefore, his responses should be considered a relatively accurate reflection of his current psychological functioning.   TEST RESULTS. Mr. Barr reports behavioral problems and interpersonal issues. There are no indications of emotional distress, disordered thinking, or somatic complaints. He reports a below average level of stress.  Behavioral. He reports a pattern of disconstrained behavior, juvenile conduct problems, and problematic impulsive behavior.  Interpersonal. He reports a high sense of esteem and may be seen as asserting superiority at times. He does not enjoy social events and likely identifies as introverted.  RISK ASSESSMENTS. The following likelihoods are based on test results and research, and are correlational rather than causational. His profile is associated with no significant risks for adverse  postsurgical outcomes.    PAIRS and PCS. The PAIRS and PCS reveal beliefs and attitudes related to pain that may impact outcomes of SCS. Elevated scores indicate the need to query the ability to cope with the pain experience, high levels of emotional distress when pain occurs, and a negative mental set and persistent attention brought to bear during the experience of pain.   The PAIRS indicated non-significant perceptions of impairment with a total score of 60 (a score over 75 is clinically significant). These results do NOT suggest a tendency toward negative beliefs and attitudes about the ability to function and enjoy life despite discomfort from pain.  The PCS indicated non-significant catastrophizing of pain with a total score of 17 (a score over 30 is in the 75th percentile and is clinically significant). These results do NOT suggest a tendency toward elevated pain perception and a tendency to focus on one's pain.     FEEDBACK. Mr. Barr was provided with test results, and offered the opportunity to respond to feedback and clarify results if needed. He acknowledged the test results regarding impulsivity, disconstraint, and juvenile conduct problems as being accurate for behavioral issues prior to age 19 and during his early years in the . He denied negative consequences related to behavioral problems (he served in the  for 20 years) and denied significant behavioral problems currently.       MENTAL STATUS EXAM:  General appearance: appears stated age, neatly dressed, well groomed  Speech: normal rate and tone  Level of cooperation: cooperative  Thought processes: logical, goal-directed  Mood: dysthymic  Thought content: no illusions, no visual hallucinations, no auditory hallucinations, no delusions, no active or passive homicidal thoughts, no active or passive suicidal ideation, no obsessions, no compulsions, no violence  Affect: appropriate  Orientation: oriented to person, place, and  date  Memory:  Recent memory: intact   Remote memory: - intact  Attention span and concentration: good  Fund of general knowledge: good  Abstract reasoning:   Similarities: abstract.   Proverbs: abstract.  Judgment and insight: fair  Language: intact      SUMMARY:  Mr. Enoch Barr is a 59-year-old male referred for presurgical psychological evaluation prior to surgical implantation of a spinal cord stimulator (SCS) to address chronic pain.    There are no indications of disabling psychopathology, substance use/abuse, cognitive problems, or disabilities that would prevent understanding and competence with medical treatment. There are no reports of major psychosocial stressors that would interfere with his engagement in treatment. There is no evidence of suicidality.  He exhibits medium social stability and good social support. He has adequate coping strategies to deal with stress and the demands of surgery and recovery.  He has good knowledge about SCS, appropriate expectations for surgery and recovery, adequate understanding of possible risks of this treatment option, and a high willingness to sustain effort for lifestyle changes and health adaptations required. He reports adequate compliance with prior medical regimens.  Results from psychological assessment/testing revealed minimal risks. Based on research and recommendations regarding presurgical psychological screening for pain control procedures, his test results and reports are within the expected range to predict adequate outcomes and patient satisfaction.      IMPRESSIONS AND RECOMMENDATIONS:  Mr. Enoch Barr is a suitable candidate from a psychological perspective.     There are no absolute psychological contraindications to SCS. Mr. Barr currently reports no psychiatric problems, major psychosocial stressors, or other problems that would contraindicate surgery or impact his ability to engage in treatment. He has adequate and appropriate knowledge and  expectations regarding surgery, and is motivated and willing to engage in behaviors to achieve successful outcomes with SCS. He has multiple protective factors that should help him during surgery and recovery. There are no recommendations for psychological intervention at this time.    Diagnostic impressions:    ICD-10-CM ICD-9-CM   1. Encounter for pre-surgical psychological assessment  Z00.8 V70.2   2. Lumbar radiculopathy, chronic  M54.16 724.4   3. DDD (degenerative disc disease), cervical  M50.30 722.4   4. Status post lumbar microdiscectomy  Z98.890 V45.89   5. Primary insomnia  F51.01 307.42        Plan: This report will be sent to the referring provider with impressions and recommendations. It will be the referring team's decision whether the patient proceeds with surgery. Services related to the presurgical psychological evaluation are now concluded.              Alivia Tidwell, PhD  Clinical Psychologist

## 2024-09-27 ENCOUNTER — OFFICE VISIT (OUTPATIENT)
Dept: SPORTS MEDICINE | Facility: CLINIC | Age: 59
End: 2024-09-27
Payer: OTHER GOVERNMENT

## 2024-09-27 ENCOUNTER — HOSPITAL ENCOUNTER (OUTPATIENT)
Dept: RADIOLOGY | Facility: HOSPITAL | Age: 59
Discharge: HOME | End: 2024-09-27
Attending: ORTHOPAEDIC SURGERY
Payer: OTHER GOVERNMENT

## 2024-09-27 VITALS
DIASTOLIC BLOOD PRESSURE: 79 MMHG | HEART RATE: 68 BPM | WEIGHT: 224.88 LBS | BODY MASS INDEX: 30.46 KG/M2 | SYSTOLIC BLOOD PRESSURE: 122 MMHG | HEIGHT: 72 IN

## 2024-09-27 DIAGNOSIS — M25.311 INSTABILITY OF RIGHT SHOULDER JOINT: ICD-10-CM

## 2024-09-27 DIAGNOSIS — M25.511 RIGHT SHOULDER PAIN, UNSPECIFIED CHRONICITY: ICD-10-CM

## 2024-09-27 DIAGNOSIS — M25.511 ACUTE PAIN OF RIGHT SHOULDER: ICD-10-CM

## 2024-09-27 DIAGNOSIS — M12.811 ROTATOR CUFF ARTHROPATHY OF RIGHT SHOULDER: ICD-10-CM

## 2024-09-27 DIAGNOSIS — M25.511 RIGHT SHOULDER PAIN, UNSPECIFIED CHRONICITY: Primary | ICD-10-CM

## 2024-09-27 PROCEDURE — 99214 OFFICE O/P EST MOD 30 MIN: CPT | Mod: PBBFAC,25 | Performed by: ORTHOPAEDIC SURGERY

## 2024-09-27 PROCEDURE — 73030 X-RAY EXAM OF SHOULDER: CPT | Mod: TC,RT

## 2024-09-27 PROCEDURE — 73030 X-RAY EXAM OF SHOULDER: CPT | Mod: 26,RT,, | Performed by: RADIOLOGY

## 2024-09-27 PROCEDURE — 99999 PR PBB SHADOW E&M-EST. PATIENT-LVL IV: CPT | Mod: PBBFAC,,, | Performed by: ORTHOPAEDIC SURGERY

## 2024-09-27 NOTE — H&P (VIEW-ONLY)
CC: RIGHT shoulder pain     59 y.o. Male with a  history of right shoulder atraumatic pain. 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 He endorses feelings of instability and mechanical symptoms.    He states that the pain is severe and not responding to any conservative care.      He reports that the pain and weakness is worse with overhead activity. It also bothers him at night.    Is affecting ADLs.  Pain is 8/10 at it's worst.      Past Medical History:   Diagnosis Date    GERD (gastroesophageal reflux disease)     Hypercholesteremia     Hypertension     on medication    Lumbar spondylosis 09/21/2020    Migraines     Sleep apnea     Stroke        Past Surgical History:   Procedure Laterality Date    COLONOSCOPY N/A 8/10/2020    Procedure: COLONOSCOPY;  Surgeon: April Dos Santos MD;  Location: [facility] ENDO;  Service: Endoscopy;  Laterality: N/A;    EPIDURAL STEROID INJECTION Left 2/17/2023    Procedure: Left L5 Transforaminal Epidural Steroid Injection;  Surgeon: Santana Rojo Jr., MD;  Location: [facility] ENDO;  Service: Pain Management;  Laterality: Left;  @1245  No ATC or DM    EPIDURAL STEROID INJECTION Bilateral 4/14/2023    Procedure: Bilateral L3, L4, L5 Medial Branch Blocks #1;  Surgeon: Santana Rojo Jr., MD;  Location: [facility] ENDO;  Service: Pain Management;  Laterality: Bilateral;  @1230 (requests afternoon)  No ATC or DM    EPIDURAL STEROID INJECTION Left 6/2/2023    Procedure: Left L5 + S1 Transforaminal Epidural Steroid Injections;  Surgeon: Santana Rojo Jr., MD;  Location: [facility] ENDO;  Service: Pain Management;  Laterality: Left;  @1300  No ATC or DM    HERNIA REPAIR      INJECTION, SPINE, LUMBOSACRAL, TRANSFORAMINAL APPROACH Left 11/29/2023    Procedure: Left L5 + S1 Transforaminal  Epidural Steroid Injections;  Surgeon: Santana Rojo Jr., MD;  Location: [facility] PAIN MANAGEMENT;  Service: Pain Management;  Laterality: Left;  @1300  No ATC or DM  MD Sign.    LAMINECTOMY Left 7/5/2023    Procedure: LAMINECTOMY, SPINE;  Surgeon: Richard Pineda MD;  Location: [facility] OR;  Service: Neurosurgery;  Laterality: Left;  Left L5/S1 discectomy      LAPAROSCOPIC CHOLECYSTECTOMY N/A 1/25/2022    Procedure: CHOLECYSTECTOMY, LAPAROSCOPIC;  Surgeon: Jarrod Martinez MD;  Location: [facility] OR;  Service: General;  Laterality: N/A;    LUMBAR DISCECTOMY Left 7/5/2023    Procedure: DISCECTOMY, SPINE, LUMBAR;  Surgeon: Richard Pineda MD;  Location: [facility] OR;  Service: Neurosurgery;  Laterality: Left;    REPAIR OF TRICEPS TENDON Left 10/2/2020    Procedure: REPAIR, TENDON, TRICEPS;  Surgeon: Keysha Galvez MD;  Location: [facility] OR;  Service: Orthopedics;  Laterality: Left;  RN PRE OP DONE 9/29/2020----COVID NEGATIVE ON 9/29  Arthrex Rep: Delma [phone]    SINUS SURGERY  2012    SURGICAL REMOVAL OF BUNION WITH OSTEOTOMY OF METATARSAL BONE Right 3/24/2023    Procedure: BUNIONECTOMY, WITH METATARSAL OSTEOTOMY;  Surgeon: Shannan An DPM;  Location: [facility] OR;  Service: Podiatry;  Laterality: Right;       Family History   Problem Relation Name Age of Onset    Coronary artery disease Mother      Hypertension Father      No Known Problems Sister      No Known Problems Brother      Valvular heart disease Sister      No Known Problems Brother      No Known Problems Brother      No Known Problems Maternal Aunt      No Known Problems Maternal Uncle      No Known Problems Paternal Aunt      No Known Problems Paternal Uncle      No Known Problems Maternal Grandmother      No Known Problems Maternal Grandfather      No Known Problems Paternal Grandmother      No Known Problems Paternal Grandfather      Amblyopia Neg Hx      Blindness Neg Hx      Cancer Neg Hx      Cataracts Neg Hx      Diabetes Neg Hx      Glaucoma Neg Hx       Macular degeneration Neg Hx      Retinal detachment Neg Hx      Strabismus Neg Hx      Stroke Neg Hx      Thyroid disease Neg Hx           Current Outpatient Medications:     acetaminophen (TYLENOL) 500 MG tablet, Take 1 tablet (500 mg total) by mouth every 6 (six) hours as needed for Pain., Disp: 20 tablet, Rfl: 0    atorvastatin (LIPITOR) 40 MG tablet, Take 1 tablet (40 mg total) by mouth every evening., Disp: 90 tablet, Rfl: 3    cromolyn (NASALCHROM) 5.2 mg/spray (4 %) nasal spray, 1 spray by Nasal route 4 (four) times daily., Disp: 26 mL, Rfl: 1    diclofenac sodium (VOLTAREN) 1 % Gel, Apply 2 g topically 4 (four) times daily., Disp: 100 g, Rfl: 0    fluticasone propionate (FLONASE) 50 mcg/actuation nasal spray, 1 spray (50 mcg total) by Each Nostril route 2 (two) times daily as needed for Rhinitis., Disp: 15 g, Rfl: 11    lisinopriL-hydrochlorothiazide (PRINZIDE,ZESTORETIC) 20-12.5 mg per tablet, Take 1 tablet by mouth once daily., Disp: 90 tablet, Rfl: 3    methocarbamoL (ROBAXIN) 750 MG Tab, TAKE 1 TABLET(750 MG) BY MOUTH THREE TIMES DAILY AS NEEDED FOR MUSCLE SPASMS, Disp: 90 tablet, Rfl: 1    metoprolol succinate (TOPROL-XL) 25 MG 24 hr tablet, Take 1 tablet (25 mg total) by mouth once daily., Disp: 90 tablet, Rfl: 3    omeprazole (PRILOSEC) 20 MG capsule, TAKE 1 CAPSULE(20 MG) BY MOUTH EVERY DAY. DECREASED DOSE, Disp: 90 capsule, Rfl: 3    traZODone (DESYREL) 50 MG tablet, TAKE 2 TABLETS(100 MG) BY MOUTH EVERY NIGHT AS NEEDED FOR INSOMNIA, Disp: 180 tablet, Rfl: 3    triamcinolone acetonide 0.1% (KENALOG) 0.1 % cream, Apply topically 2 (two) times daily., Disp: 80 g, Rfl: 0    benzocaine-menthoL 15-3.6 mg Lozg, Please follow directions on packaging. (Patient not taking: Reported on 8/13/2024), Disp: 18 lozenge, Rfl: 0    cetirizine (ZYRTEC) 10 MG tablet, Take 1 tablet (10 mg total) by mouth once daily., Disp: 30 tablet, Rfl: 0    pregabalin (LYRICA) 75 MG capsule, Take 1 capsule (75 mg total) by mouth  2 (two) times daily. (Patient not taking: Reported on 8/13/2024), Disp: 60 capsule, Rfl: 5    Review of patient's allergies indicates:  No Known Allergies       REVIEW OF SYSTEMS:  Constitution: Negative. Negative for chills, fever and night sweats.   HENT: Negative for congestion and headaches.    Eyes: Negative for blurred vision, left vision loss and right vision loss.   Cardiovascular: Negative for chest pain and syncope.   Respiratory: Negative for cough and shortness of breath.    Endocrine: Negative for polydipsia, polyphagia and polyuria.   Hematologic/Lymphatic: Negative for bleeding problem. Does not bruise/bleed easily.   Skin: Negative for dry skin, itching and rash.   Musculoskeletal: Negative for falls.  Positive for right shoulder pain and muscle weakness.   Gastrointestinal: Negative for abdominal pain and bowel incontinence.   Genitourinary: Negative for bladder incontinence and nocturia.   Neurological: Negative for disturbances in coordination, loss of balance and seizures.   Psychiatric/Behavioral: Negative for depression. The patient does not have insomnia.    Allergic/Immunologic: Negative for hives and persistent infections.      PHYSICAL EXAMINATION:  Vitals:  /79   Pulse 68   Ht 6' (1.829 m)   Wt 102 kg (224 lb 13.9 oz)   BMI 30.50 kg/m²    General: The patient is alert and oriented x 3.  Mood is pleasant.  Observation of ears, eyes and nose reveal no gross abnormalities.  No labored breathing observed.  Gait is coordinated. Patient can toe walk and heel walk without difficulty.      RIGHT SHOULDER / UPPER EXTREMITY EXAM    OBSERVATION:     Swelling  none  Deformity  none   Discoloration  none   Scapular winging none   Scars   none  Atrophy  none    TENDERNESS / CREPITUS (T/C):          T/C      T/C   Clavicle   -/-  SUPRAspinatus    -/-     AC Jt.    +/-  INFRAspinatus  -/-    SC Jt.    -/-  Deltoid    -/-      G. Tuberosity  -/-  LH BICEP groove  +/-   Acromion:  -/-  Midline  Neck   -/-     Scapular Spine -/-  Trapezium   -/-   SMA Scapula  -/-  GH jt. line - post  -/-     Scapulothoracic  -/-         ROM: (* = with pain)  Left shoulder   Right shoulder        AROM (PROM)   AROM (PROM)   FE    170° (175°)     160° (160°)     ER at 0°    60°  (65°)    60°  (65°)   ER at 90° ABD  90°  (90°)    90°  (90°)   IR at 90°  ABD   NA  (40°)     NA  (40°)      IR (spine level)   T10     T10    STRENGTH: (* = with pain) Left shoulder   Right shoulder   SCAPTION   5/5    5/5    IR    5/5    5/5   ER    5/5    5/5*   BICEPS   5/5    5/5   Deltoid    5/5    5/5     SIGNS:  Painful side       NEER   +    OVALERIAS  + (pain with bpth)    SALCEDO   +    SPEEDS  +     DROP ARM   -   BELLY PRESS neg   Superior escape none    LIFT-OFF  neg   X-Body ADD    +    MOVING VALGUS neg        STABILITY TESTING    Left shoulder   Right shoulder    Translation     Anterior  up face     up face    Posterior  up face    up face    Sulcus   < 10mm    < 10 mm     Signs   Apprehension   neg      neg       Relocation   no change     no change      Jerk test  neg     neg    EXTREMITY NEURO-VASCULAR EXAM:    Sensation grossly intact to light touch all dermatomal regions.    DTR 2+ Biceps, Triceps, BR and Negative Sanfords sign   Grossly intact motor function at Elbow, Wrist and Hand   Distal pulses radial and ulnar 2+, brisk cap refill, symmetric.      NECK:  Painless FROM and spinous processes non-tender. Negative Spurlings sign.      OTHER FINDINGS:      XRAYS:  Xrays including AP, Outlet and Axillary Lateral of shoulder are ordered / images reviewed by me:   No fracture dislocation or other pathology   Acromion type 1   Proximal migration of humeral head: None   GH arthritis: None          ASSESSMENT:   Right shoulder pain:  1. Right shoulder pain, unspecified chronicity    2. Rotator cuff arthropathy of right shoulder    3. Acute pain of right shoulder    4. Instability of right shoulder joint        PLAN:      1.  MRIarthrogram  to rule out labrum/rotator cuff tear  2. Follow up in clinic to discuss MRI results    All questions were answered, patient will contact us for questions or concerns in the interim.

## 2024-09-27 NOTE — PROGRESS NOTES
CC: RIGHT shoulder pain     59 y.o. Male with a  history of right shoulder atraumatic pain. 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 He endorses feelings of instability and mechanical symptoms.    He states that the pain is severe and not responding to any conservative care.      He reports that the pain and weakness is worse with overhead activity. It also bothers him at night.    Is affecting ADLs.  Pain is 8/10 at it's worst.      Past Medical History:   Diagnosis Date    GERD (gastroesophageal reflux disease)     Hypercholesteremia     Hypertension     on medication    Lumbar spondylosis 09/21/2020    Migraines     Sleep apnea     Stroke        Past Surgical History:   Procedure Laterality Date    COLONOSCOPY N/A 8/10/2020    Procedure: COLONOSCOPY;  Surgeon: April Dos Santos MD;  Location: [facility] ENDO;  Service: Endoscopy;  Laterality: N/A;    EPIDURAL STEROID INJECTION Left 2/17/2023    Procedure: Left L5 Transforaminal Epidural Steroid Injection;  Surgeon: Santana Rojo Jr., MD;  Location: [facility] ENDO;  Service: Pain Management;  Laterality: Left;  @1245  No ATC or DM    EPIDURAL STEROID INJECTION Bilateral 4/14/2023    Procedure: Bilateral L3, L4, L5 Medial Branch Blocks #1;  Surgeon: Santana Rojo Jr., MD;  Location: [facility] ENDO;  Service: Pain Management;  Laterality: Bilateral;  @1230 (requests afternoon)  No ATC or DM    EPIDURAL STEROID INJECTION Left 6/2/2023    Procedure: Left L5 + S1 Transforaminal Epidural Steroid Injections;  Surgeon: Santana Rojo Jr., MD;  Location: [facility] ENDO;  Service: Pain Management;  Laterality: Left;  @1300  No ATC or DM    HERNIA REPAIR      INJECTION, SPINE, LUMBOSACRAL, TRANSFORAMINAL APPROACH Left 11/29/2023    Procedure: Left L5 + S1 Transforaminal  Epidural Steroid Injections;  Surgeon: Santana Rojo Jr., MD;  Location: [facility] PAIN MANAGEMENT;  Service: Pain Management;  Laterality: Left;  @1300  No ATC or DM  MD Sign.    LAMINECTOMY Left 7/5/2023    Procedure: LAMINECTOMY, SPINE;  Surgeon: Richard Pineda MD;  Location: [facility] OR;  Service: Neurosurgery;  Laterality: Left;  Left L5/S1 discectomy      LAPAROSCOPIC CHOLECYSTECTOMY N/A 1/25/2022    Procedure: CHOLECYSTECTOMY, LAPAROSCOPIC;  Surgeon: Jarrod Martinez MD;  Location: [facility] OR;  Service: General;  Laterality: N/A;    LUMBAR DISCECTOMY Left 7/5/2023    Procedure: DISCECTOMY, SPINE, LUMBAR;  Surgeon: Richard Pineda MD;  Location: [facility] OR;  Service: Neurosurgery;  Laterality: Left;    REPAIR OF TRICEPS TENDON Left 10/2/2020    Procedure: REPAIR, TENDON, TRICEPS;  Surgeon: Keysha Galvez MD;  Location: [facility] OR;  Service: Orthopedics;  Laterality: Left;  RN PRE OP DONE 9/29/2020----COVID NEGATIVE ON 9/29  Arthrex Rep: Delma [phone]    SINUS SURGERY  2012    SURGICAL REMOVAL OF BUNION WITH OSTEOTOMY OF METATARSAL BONE Right 3/24/2023    Procedure: BUNIONECTOMY, WITH METATARSAL OSTEOTOMY;  Surgeon: Shannan An DPM;  Location: [facility] OR;  Service: Podiatry;  Laterality: Right;       Family History   Problem Relation Name Age of Onset    Coronary artery disease Mother      Hypertension Father      No Known Problems Sister      No Known Problems Brother      Valvular heart disease Sister      No Known Problems Brother      No Known Problems Brother      No Known Problems Maternal Aunt      No Known Problems Maternal Uncle      No Known Problems Paternal Aunt      No Known Problems Paternal Uncle      No Known Problems Maternal Grandmother      No Known Problems Maternal Grandfather      No Known Problems Paternal Grandmother      No Known Problems Paternal Grandfather      Amblyopia Neg Hx      Blindness Neg Hx      Cancer Neg Hx      Cataracts Neg Hx      Diabetes Neg Hx      Glaucoma Neg Hx       Macular degeneration Neg Hx      Retinal detachment Neg Hx      Strabismus Neg Hx      Stroke Neg Hx      Thyroid disease Neg Hx           Current Outpatient Medications:     acetaminophen (TYLENOL) 500 MG tablet, Take 1 tablet (500 mg total) by mouth every 6 (six) hours as needed for Pain., Disp: 20 tablet, Rfl: 0    atorvastatin (LIPITOR) 40 MG tablet, Take 1 tablet (40 mg total) by mouth every evening., Disp: 90 tablet, Rfl: 3    cromolyn (NASALCHROM) 5.2 mg/spray (4 %) nasal spray, 1 spray by Nasal route 4 (four) times daily., Disp: 26 mL, Rfl: 1    diclofenac sodium (VOLTAREN) 1 % Gel, Apply 2 g topically 4 (four) times daily., Disp: 100 g, Rfl: 0    fluticasone propionate (FLONASE) 50 mcg/actuation nasal spray, 1 spray (50 mcg total) by Each Nostril route 2 (two) times daily as needed for Rhinitis., Disp: 15 g, Rfl: 11    lisinopriL-hydrochlorothiazide (PRINZIDE,ZESTORETIC) 20-12.5 mg per tablet, Take 1 tablet by mouth once daily., Disp: 90 tablet, Rfl: 3    methocarbamoL (ROBAXIN) 750 MG Tab, TAKE 1 TABLET(750 MG) BY MOUTH THREE TIMES DAILY AS NEEDED FOR MUSCLE SPASMS, Disp: 90 tablet, Rfl: 1    metoprolol succinate (TOPROL-XL) 25 MG 24 hr tablet, Take 1 tablet (25 mg total) by mouth once daily., Disp: 90 tablet, Rfl: 3    omeprazole (PRILOSEC) 20 MG capsule, TAKE 1 CAPSULE(20 MG) BY MOUTH EVERY DAY. DECREASED DOSE, Disp: 90 capsule, Rfl: 3    traZODone (DESYREL) 50 MG tablet, TAKE 2 TABLETS(100 MG) BY MOUTH EVERY NIGHT AS NEEDED FOR INSOMNIA, Disp: 180 tablet, Rfl: 3    triamcinolone acetonide 0.1% (KENALOG) 0.1 % cream, Apply topically 2 (two) times daily., Disp: 80 g, Rfl: 0    benzocaine-menthoL 15-3.6 mg Lozg, Please follow directions on packaging. (Patient not taking: Reported on 8/13/2024), Disp: 18 lozenge, Rfl: 0    cetirizine (ZYRTEC) 10 MG tablet, Take 1 tablet (10 mg total) by mouth once daily., Disp: 30 tablet, Rfl: 0    pregabalin (LYRICA) 75 MG capsule, Take 1 capsule (75 mg total) by mouth  2 (two) times daily. (Patient not taking: Reported on 8/13/2024), Disp: 60 capsule, Rfl: 5    Review of patient's allergies indicates:  No Known Allergies       REVIEW OF SYSTEMS:  Constitution: Negative. Negative for chills, fever and night sweats.   HENT: Negative for congestion and headaches.    Eyes: Negative for blurred vision, left vision loss and right vision loss.   Cardiovascular: Negative for chest pain and syncope.   Respiratory: Negative for cough and shortness of breath.    Endocrine: Negative for polydipsia, polyphagia and polyuria.   Hematologic/Lymphatic: Negative for bleeding problem. Does not bruise/bleed easily.   Skin: Negative for dry skin, itching and rash.   Musculoskeletal: Negative for falls.  Positive for right shoulder pain and muscle weakness.   Gastrointestinal: Negative for abdominal pain and bowel incontinence.   Genitourinary: Negative for bladder incontinence and nocturia.   Neurological: Negative for disturbances in coordination, loss of balance and seizures.   Psychiatric/Behavioral: Negative for depression. The patient does not have insomnia.    Allergic/Immunologic: Negative for hives and persistent infections.      PHYSICAL EXAMINATION:  Vitals:  /79   Pulse 68   Ht 6' (1.829 m)   Wt 102 kg (224 lb 13.9 oz)   BMI 30.50 kg/m²    General: The patient is alert and oriented x 3.  Mood is pleasant.  Observation of ears, eyes and nose reveal no gross abnormalities.  No labored breathing observed.  Gait is coordinated. Patient can toe walk and heel walk without difficulty.      RIGHT SHOULDER / UPPER EXTREMITY EXAM    OBSERVATION:     Swelling  none  Deformity  none   Discoloration  none   Scapular winging none   Scars   none  Atrophy  none    TENDERNESS / CREPITUS (T/C):          T/C      T/C   Clavicle   -/-  SUPRAspinatus    -/-     AC Jt.    +/-  INFRAspinatus  -/-    SC Jt.    -/-  Deltoid    -/-      G. Tuberosity  -/-  LH BICEP groove  +/-   Acromion:  -/-  Midline  Neck   -/-     Scapular Spine -/-  Trapezium   -/-   SMA Scapula  -/-  GH jt. line - post  -/-     Scapulothoracic  -/-         ROM: (* = with pain)  Left shoulder   Right shoulder        AROM (PROM)   AROM (PROM)   FE    170° (175°)     160° (160°)     ER at 0°    60°  (65°)    60°  (65°)   ER at 90° ABD  90°  (90°)    90°  (90°)   IR at 90°  ABD   NA  (40°)     NA  (40°)      IR (spine level)   T10     T10    STRENGTH: (* = with pain) Left shoulder   Right shoulder   SCAPTION   5/5    5/5    IR    5/5    5/5   ER    5/5    5/5*   BICEPS   5/5    5/5   Deltoid    5/5    5/5     SIGNS:  Painful side       NEER   +    OVALERIAS  + (pain with bpth)    SALCEDO   +    SPEEDS  +     DROP ARM   -   BELLY PRESS neg   Superior escape none    LIFT-OFF  neg   X-Body ADD    +    MOVING VALGUS neg        STABILITY TESTING    Left shoulder   Right shoulder    Translation     Anterior  up face     up face    Posterior  up face    up face    Sulcus   < 10mm    < 10 mm     Signs   Apprehension   neg      neg       Relocation   no change     no change      Jerk test  neg     neg    EXTREMITY NEURO-VASCULAR EXAM:    Sensation grossly intact to light touch all dermatomal regions.    DTR 2+ Biceps, Triceps, BR and Negative Sanfords sign   Grossly intact motor function at Elbow, Wrist and Hand   Distal pulses radial and ulnar 2+, brisk cap refill, symmetric.      NECK:  Painless FROM and spinous processes non-tender. Negative Spurlings sign.      OTHER FINDINGS:      XRAYS:  Xrays including AP, Outlet and Axillary Lateral of shoulder are ordered / images reviewed by me:   No fracture dislocation or other pathology   Acromion type 1   Proximal migration of humeral head: None   GH arthritis: None          ASSESSMENT:   Right shoulder pain:  1. Right shoulder pain, unspecified chronicity    2. Rotator cuff arthropathy of right shoulder    3. Acute pain of right shoulder    4. Instability of right shoulder joint        PLAN:      1.  MRIarthrogram  to rule out labrum/rotator cuff tear  2. Follow up in clinic to discuss MRI results    All questions were answered, patient will contact us for questions or concerns in the interim.

## 2024-09-30 ENCOUNTER — OFFICE VISIT (OUTPATIENT)
Dept: OTOLARYNGOLOGY | Facility: CLINIC | Age: 59
End: 2024-09-30
Payer: OTHER GOVERNMENT

## 2024-09-30 ENCOUNTER — TELEPHONE (OUTPATIENT)
Dept: OTOLARYNGOLOGY | Facility: CLINIC | Age: 59
End: 2024-09-30

## 2024-09-30 DIAGNOSIS — J34.3 HYPERTROPHY OF BOTH INFERIOR NASAL TURBINATES: ICD-10-CM

## 2024-09-30 DIAGNOSIS — J32.9 CHRONIC SINUSITIS, UNSPECIFIED LOCATION: ICD-10-CM

## 2024-09-30 DIAGNOSIS — J34.2 NASAL SEPTAL DEVIATION: Primary | ICD-10-CM

## 2024-09-30 DIAGNOSIS — J32.4 CHRONIC PANSINUSITIS: ICD-10-CM

## 2024-09-30 DIAGNOSIS — J34.2 NASAL SEPTAL DEVIATION: ICD-10-CM

## 2024-09-30 DIAGNOSIS — J32.9 CHRONIC SINUSITIS, UNSPECIFIED LOCATION: Primary | ICD-10-CM

## 2024-09-30 PROCEDURE — 99213 OFFICE O/P EST LOW 20 MIN: CPT | Mod: PBBFAC | Performed by: STUDENT IN AN ORGANIZED HEALTH CARE EDUCATION/TRAINING PROGRAM

## 2024-09-30 PROCEDURE — 31231 NASAL ENDOSCOPY DX: CPT | Mod: PBBFAC | Performed by: STUDENT IN AN ORGANIZED HEALTH CARE EDUCATION/TRAINING PROGRAM

## 2024-09-30 PROCEDURE — 31231 NASAL ENDOSCOPY DX: CPT | Mod: S$PBB,,, | Performed by: STUDENT IN AN ORGANIZED HEALTH CARE EDUCATION/TRAINING PROGRAM

## 2024-09-30 PROCEDURE — 99999 PR PBB SHADOW E&M-EST. PATIENT-LVL III: CPT | Mod: PBBFAC,,, | Performed by: STUDENT IN AN ORGANIZED HEALTH CARE EDUCATION/TRAINING PROGRAM

## 2024-09-30 PROCEDURE — 99214 OFFICE O/P EST MOD 30 MIN: CPT | Mod: 25,S$PBB,, | Performed by: STUDENT IN AN ORGANIZED HEALTH CARE EDUCATION/TRAINING PROGRAM

## 2024-09-30 NOTE — PROGRESS NOTES
Subjective:      Enoch is a 59 y.o. male who comes for follow-up of sinusitis.  His last visit with me was on 8/13/2024.  Cultures that his last visit showed Curvularia species & Cutibacterium.  So reporting nasal congestion, fullness in his sinuses.  He has been using his irrigations but feels that the additionally trapped in his nose.  He was evaluated by his dentist with a any need for any intervention.    His current sinus regime consists of:  Flonase and saline irrigations.    The assessment of quality and severity of symptoms as measured by the SNOT-22 score: : (P) 37    The patient's medications, allergies, past medical, surgical, social and family histories were reviewed and updated as appropriate.    Review of Systems   Constitutional:  Positive for malaise/fatigue.   Eyes: Negative.    Cardiovascular: Negative.    Genitourinary: Negative.    Musculoskeletal:  Positive for back pain and neck pain.   Skin: Negative.    Neurological: Negative.    Psychiatric/Behavioral: Negative.        Answers submitted by the patient for this visit:  Review of Symptoms Questionnaire  (Submitted on 9/26/2024)  facial swelling: Yes  sinus pressure : Yes  Sinus infection(s)?: Yes  postnasal drip: Yes  Snoring?: Yes  Sleep Apnea?: Yes  Acid Reflux?: Yes  Muscle aches / pain?: Yes  Seasonal Allergies?: Yes  None of these : Yes  None of these: Yes    A detailed review of systems was obtained with pertinent positives as per the above HPI, and otherwise negative.        Objective:     There were no vitals taken for this visit.       Constitutional:   Vital signs are normal. He appears well-developed and well-nourished.     Head:  Normocephalic and atraumatic.     Ears:  Hearing normal to normal and whispered voice; external ear normal without scars, lesions, or masses; ear canal, tympanic membrane, and middle ear normal..   Right Ear: No swelling. Tympanic membrane is not perforated and not bulging. No middle ear effusion.    Left Ear: No swelling. Tympanic membrane is not perforated and not bulging.  No middle ear effusion.     Nose:  Nose normal including turbinates, nasal mucosa, sinuses and nasal septum. No epistaxis.     Mouth/Throat  Oropharynx clear and moist without lesions or asymmetry and normal uvula midline. Normal dentition. No tonsillar abscesses. Tonsillar exudate.      Neck:  Neck normal without thyromegaly masses, asymmetry, normal tracheal structure, crepitus, and tenderness, thyroid normal, trachea normal, phonation normal, full range of motion with neck supple and no adenopathy. No stridor present.        Head (right side): No submental adenopathy present.        Head (left side): No submental adenopathy present.     He has no cervical adenopathy.     Pulmonary/Chest:   No stridor.       Procedure    Nasal endoscopy performed.  See procedure note.    Nasal Endoscopy:  9/30/2024    The use of diagnostic nasal endoscopy was considered medically necessary for the evaluation and visualization of the nasal anatomy for symptoms suggestive of nasal or sinus origin. Physical examination (including a nasal speculum evaluation) did not provide sufficient clinical information to establish a diagnosis, or symptoms did not improve or worsened following treatment.     The nasal cavity was decongested with topical 1% phenylephrine and anesthetized with 4% lidocaine.  A rigid 0-degree endoscope was introduced into the nasal cavity.    The patient was seated in the examination chair. After discussion of risks and benefits, a nasal endoscope was inserted into the nose the endoscope was passed along the left nasal floor to the nasopharynx. It was then passed between the middle and superior meatus, nasal turbinates, nasal septum, nasopharynx and sphenoethmoid region. The nasal endoscope was withdrawn and there was no complications. An identical procedure was performed on the right side. I was present for the entire procedure.The  "patient tolerated the above procedure well. The findings of this procedure can be found in the dictated note from 9/30/2024 visit.                                      Data Reviewed    WBC (K/uL)   Date Value   03/09/2024 5.18     Eosinophil % (%)   Date Value   03/09/2024 4.4     Eos # (K/uL)   Date Value   03/09/2024 0.2     Platelets (K/uL)   Date Value   03/09/2024 303     Glucose (mg/dL)   Date Value   03/09/2024 83     No results found for: "IGE"    I independently reviewed the images of the CT sinuses dated 7/1/24. Pertinent findings include postsurgical changes to the ethmoid and maxillary sinuses bilaterally.  Residual uncinate present on the left.  Mild maxillary mucosal thickening present on along the right maxillary sinus, 7mm in greatest dimension.  Possible dehiscence along the floor with connection to residual maxillary tooth on the right.  Hypoplastic frontal sinuses bilaterally.  No significant opacification involving the sphenoid sinus.    Assessment:     1. Nasal septal deviation    2. Hypertrophy of both inferior nasal turbinates    3. Chronic sinusitis, unspecified location         Plan:     - to date none of his sinus cultures have shown any oral bacteria.  He has been cleared by his dentist.  It was unlikely that he has any odontogenic sinusitis given information has been collected today.  - He would benefit from revSTESS for the treatment of his condition.  I discussed the risks, benefits and alternatives to surgery with the patient, as well as the expected postoperative course.  I gave him the opportunity to ask questions and I answered all of them.  I provided relevant printed information on his condition for him to review at home.  Same-day discharge is anticipated.  He may have anesthesia triage by telephone.   The surgery will be tentatively scheduled for 10/23/24.  He will return for a postoperative visit 1 week after surgery.  - The risks and benefits of endoscopic surgery were " discussed with the patient in detail, which include but are not limited to pain, bleeding, infection, the need for reoperation, injury to orbit or orbital contents, injury to brain or meninges, and unforeseeable complications of surgery including myocardial infarction, stroke or pulmonary embolus.    No follow-ups on file.

## 2024-10-07 DIAGNOSIS — Z01.818 PRE-OP TESTING: Primary | ICD-10-CM

## 2024-10-07 NOTE — ANESTHESIA PAT ROS NOTE
10/07/2024  Enoch Barr is a 59 y.o., male.      Pre-op Assessment          Review of Systems  Anesthesia Hx:  No problems with previous Anesthesia   History of prior surgery of interest to airway management or planning:  Previous anesthesia: General LAMINECTOMY, SPINE (Left: Spine Lumbar)  DISCECTOMY, SPINE, LUMBAR (Left: Spine Lumbar) 7/5/23 with general anesthesia.  Procedure performed at an [facility].      Airway issues documented on chart review include mask, easy, videolaryngoscope used  , view on video-laryngoscopy Grade II    Denies Family Hx of Anesthesia complications.    Denies Personal Hx of Anesthesia complications.                    Social:  Former Smoker, Alcohol Use       Hematology/Oncology:  Hematology Normal   Oncology Normal                                   EENT/Dental:   NASAL SEPTAL DEVIATION. HYPERTROPHY OF BOTH INFERIOR NASAL TURBINATES. CHRONIC SINUSITIS. CHRONIC PANSINUSITIS.           Cardiovascular:     Hypertension       Denies Angina.     hyperlipidemia     Functional Capacity good / => 4 METS                         Pulmonary:       Denies Shortness of breath.  Denies Recent URI. Sleep Apnea                Renal/:  Renal/ Normal                 Hepatic/GI:     GERD             Musculoskeletal:            Lumbar Spine Disorders, Lumbar Disc Disease   Neurological:    Denies CVA.   Headaches                                 Endocrine:        Obesity / BMI > 30  Psych:  Psychiatric Normal                         Anesthesia Assessment: Preoperative EQUATION    Planned Procedure: Procedure(s) (LRB):  FESS, WITH -NASAL SEPTOPLASTY AND TURBINATE REDUCTION, WITH IMAGING GUIDANCE- AALIYAH (Bilateral)  ENDOSCOPY, NOSE OR PARANASAL SINUS, WITH MAXILLARY ANTROSTOMY (Bilateral)  ETHMOIDECTOMY, TOTAL, ENDOSCOPIC (Bilateral)  Requested Anesthesia Type:General  Surgeon: Sarwat  Humberto ÁLVAREZ MD  Service: ENT  Known or anticipated Date of Surgery:10/23/2024    Surgeon notes: reviewed    Electronic QUestionnaire Assessment completed via nurse interview with patient.        Triage considerations:     The patient has no apparent active cardiac condition (No unstable coronary Syndrome such as severe unstable angina or recent [<1 month] myocardial infarction, decompensated CHF, severe valvular   disease or significant arrhythmia)    Previous anesthesia records:GETA and VIDEO LARYNGOSCOPE USED    Airway:  Mallampati: III / II  Mouth Opening: Normal  TM Distance: Normal  Tongue: Large  Neck ROM: Extension Decreased   Dental:  Intact, Caps / Implants  Bridge upper right    Intubation     Date/Time: 7/5/2023 7:08 AM  Performed by: Renetta Olsen CRNA  Authorized by: Maricel Fonseca MD      Intubation:     Induction:  Intravenous    Intubated:  Postinduction    Mask Ventilation:  Easy with oral airway    Attempts:  2    Attempted By:  CRNA    Method of Intubation:  Video laryngoscopy    Blade:  Medellin 3    Laryngeal View Grade: Grade IIA - cords partially seen      Attempted By (2nd Attempt):  CRNA    Method of Intubation (2nd Attempt):  Bougie and video laryngoscopy    Blade (2nd Attempt):  Medellin 3    Laryngeal View Grade (2nd Attempt): Grade IIa - cords partially seen      Difficult Airway Encountered?: No      Complications:  None    Airway Device:  Oral endotracheal tube    Airway Device Size:  7.5    Style/Cuff Inflation:  Cuffed (inflated to minimal occlusive pressure)    Tube secured:  22    Secured at:  The lips    Placement Verified By:  Capnometry    Complicating Factors:  Poor neck/head extension (small mouth opening)    Findings Post-Intubation:  BS equal bilateral    Last PCP note: 6-12 months ago , within [facility]   Subspecialty notes: Cardiology: General, ENT, Pain Management, Psychiatry, SPORTS MEDICINE    Other important co-morbidities: GERD, HLD, HTN, Obesity, and GM      Tests  already available:  No recent tests.             Instructions given. (See in Nurse's note)    Optimization:  Anesthesia Preop Clinic Assessment NOT Indicated    Medical Opinion Indicated [facility] CARDIOLOGY             Plan:    Testing:  BMP and EKG        Consultation: CARDIOLOGY     Patient  has previously scheduled Medical Appointment: 10/15    Navigation: Tests Scheduled.              Consults scheduled.             Results will be tracked by Preop Clinic.    Chest tightness and dyspnea on exertion in a patient with multiple risk factors for coronary artery disease.  equivocal stress test.  Symptoms resolved after starting metoprolol.  Continue current therapy.  Has been chest pain-free since then with exercise tolerance greater than 4 Mets     Preoperative risk assessment:  Exercise tolerance greater than 4 Mets.  May proceed for surgery next week at low-to-moderate risk for coronary ischemia.  Mario Moore MD, FACC, [facility]

## 2024-10-10 ENCOUNTER — PATIENT MESSAGE (OUTPATIENT)
Dept: CARDIOLOGY | Facility: CLINIC | Age: 59
End: 2024-10-10
Payer: OTHER GOVERNMENT

## 2024-10-15 ENCOUNTER — HOSPITAL ENCOUNTER (OUTPATIENT)
Dept: RADIOLOGY | Facility: HOSPITAL | Age: 59
Discharge: HOME | End: 2024-10-15
Attending: ORTHOPAEDIC SURGERY
Payer: OTHER GOVERNMENT

## 2024-10-15 ENCOUNTER — HOSPITAL ENCOUNTER (OUTPATIENT)
Dept: CARDIOLOGY | Facility: CLINIC | Age: 59
Discharge: HOME | End: 2024-10-15
Payer: OTHER GOVERNMENT

## 2024-10-15 DIAGNOSIS — M25.311 INSTABILITY OF RIGHT SHOULDER JOINT: ICD-10-CM

## 2024-10-15 DIAGNOSIS — Z01.818 PRE-OP TESTING: ICD-10-CM

## 2024-10-15 DIAGNOSIS — M25.511 ACUTE PAIN OF RIGHT SHOULDER: ICD-10-CM

## 2024-10-15 DIAGNOSIS — M12.811 ROTATOR CUFF ARTHROPATHY OF RIGHT SHOULDER: ICD-10-CM

## 2024-10-15 LAB
OHS QRS DURATION: 94 MS
OHS QTC CALCULATION: 405 MS

## 2024-10-15 PROCEDURE — 63600175 PHARM REV CODE 636 W HCPCS: Performed by: ORTHOPAEDIC SURGERY

## 2024-10-15 PROCEDURE — A9585 GADOBUTROL INJECTION: HCPCS | Performed by: ORTHOPAEDIC SURGERY

## 2024-10-15 PROCEDURE — 25500020 PHARM REV CODE 255: Performed by: ORTHOPAEDIC SURGERY

## 2024-10-15 PROCEDURE — 93010 ELECTROCARDIOGRAM REPORT: CPT | Mod: S$PBB,,, | Performed by: INTERNAL MEDICINE

## 2024-10-15 PROCEDURE — 93005 ELECTROCARDIOGRAM TRACING: CPT | Mod: PBBFAC | Performed by: INTERNAL MEDICINE

## 2024-10-15 PROCEDURE — 73222 MRI JOINT UPR EXTREM W/DYE: CPT | Mod: TC,RT

## 2024-10-15 PROCEDURE — 73222 MRI JOINT UPR EXTREM W/DYE: CPT | Mod: 26,RT,, | Performed by: INTERNAL MEDICINE

## 2024-10-15 RX ORDER — LIDOCAINE HYDROCHLORIDE 10 MG/ML
3 INJECTION, SOLUTION INFILTRATION; PERINEURAL ONCE
Status: COMPLETED | OUTPATIENT
Start: 2024-10-15 | End: 2024-10-15

## 2024-10-15 RX ORDER — GADOBUTROL 604.72 MG/ML
2 INJECTION INTRAVENOUS
Status: COMPLETED | OUTPATIENT
Start: 2024-10-15 | End: 2024-10-15

## 2024-10-15 RX ADMIN — LIDOCAINE HYDROCHLORIDE 3 ML: 10 INJECTION, SOLUTION INFILTRATION; PERINEURAL at 02:10

## 2024-10-15 RX ADMIN — IOHEXOL 7 ML: 300 INJECTION, SOLUTION INTRAVENOUS at 02:10

## 2024-10-15 RX ADMIN — GADOBUTROL 2 ML: 604.72 INJECTION INTRAVENOUS at 02:10

## 2024-10-16 ENCOUNTER — TELEPHONE (OUTPATIENT)
Dept: SPORTS MEDICINE | Facility: CLINIC | Age: 59
End: 2024-10-16
Payer: OTHER GOVERNMENT

## 2024-10-16 DIAGNOSIS — M25.519 PAIN IN UNSPECIFIED SHOULDER: Primary | ICD-10-CM

## 2024-10-16 DIAGNOSIS — M12.811 ROTATOR CUFF ARTHROPATHY OF RIGHT SHOULDER: ICD-10-CM

## 2024-10-18 ENCOUNTER — OFFICE VISIT (OUTPATIENT)
Dept: CARDIOLOGY | Facility: CLINIC | Age: 59
End: 2024-10-18
Payer: OTHER GOVERNMENT

## 2024-10-18 VITALS
DIASTOLIC BLOOD PRESSURE: 62 MMHG | WEIGHT: 228.81 LBS | OXYGEN SATURATION: 95 % | RESPIRATION RATE: 18 BRPM | BODY MASS INDEX: 30.99 KG/M2 | SYSTOLIC BLOOD PRESSURE: 120 MMHG | HEART RATE: 81 BPM | HEIGHT: 72 IN

## 2024-10-18 DIAGNOSIS — Z01.818 PRE-OP TESTING: Primary | ICD-10-CM

## 2024-10-18 DIAGNOSIS — I10 ESSENTIAL HYPERTENSION: ICD-10-CM

## 2024-10-18 DIAGNOSIS — E78.00 HYPERCHOLESTEROLEMIA: ICD-10-CM

## 2024-10-18 DIAGNOSIS — G47.33 OSA (OBSTRUCTIVE SLEEP APNEA): ICD-10-CM

## 2024-10-18 DIAGNOSIS — E78.5 HYPERLIPIDEMIA, UNSPECIFIED HYPERLIPIDEMIA TYPE: ICD-10-CM

## 2024-10-18 PROCEDURE — 99214 OFFICE O/P EST MOD 30 MIN: CPT | Mod: PBBFAC | Performed by: INTERNAL MEDICINE

## 2024-10-18 PROCEDURE — 99999 PR PBB SHADOW E&M-EST. PATIENT-LVL IV: CPT | Mod: PBBFAC,,, | Performed by: INTERNAL MEDICINE

## 2024-10-18 RX ORDER — AMLODIPINE BESYLATE 2.5 MG/1
2.5 TABLET ORAL DAILY
Qty: 90 TABLET | Refills: 3 | Status: SHIPPED | OUTPATIENT
Start: 2024-10-18 | End: 2024-10-18

## 2024-10-18 NOTE — PROGRESS NOTES
CARDIOVASCULAR CONSULTATION    REASON FOR CONSULT:   Enoch Barr is a 59 y.o. male who presents for evaluation chest tightness.      HISTORY OF PRESENT ILLNESS:     Patient is a pleasant 55-year-old.  Family history significant for coronary artery disease and mom had a massive heart attack at the age of 60 and  from it.  Has chest tightness at rest, gets worse on going up a flight of stairs.  Associated with shortness of breath with also gets worse on going up 1 flights of stairs.  Denies orthopnea, PND, swelling of feet.  EKG was personally reviewed and demonstrates normal sinus rhythm.    Also has daily palpitations.  Will check Holter monitor for it.    Notes from 2020:  Patient here for follow-up.  Denies any chest pains at rest on exertion, orthopnea, PND. No chest pains    Sinus rhythm with heart rates varying between 40 and 126 bpm with an average of 69 bpm. Total time in sinus rhythm was approximately 48 hours  There were very rare PVCs totalling 7  There were very rare PACs totalling 33  SYMPTOMS OF CHEST TIGHTNESS ASSOCIATED WITH NORMAL SINUS RHYTHM/SINUS TACHYCARDIA    The study shows equivocal myocardial perfusion.  Cannot exclude inferior ischemia vs attenuation.    Perfusion Defect There is a mild intensity, mostly fixed with some reversibilty defect in the basal to distal inferior wall(s).    Visually estimated ejection fraction is normal at stress.    There is normal wall motion post stress.    The EKG portion of this study is abnormal but not diagnostic. (Erwin 13:18, 13.7 METS, 106% MPHR, 1mm upsloping St depression).    The patient reported no chest pain during the stress test.    Consider Cardiac PET vs cath if high clinical suspicion for CAD.     Notes from 2020:  Patient here for follow-up.  Denies any chest pains at rest on exertion, orthopnea, PND.  States that the chest pain is gone away after starting metoprolol.    No evidence of hemodynamically significant  infrainguinal PAD bilaterally.  Tri- and biphasic waveforms throughout.  Normal MAT bilaterally      Normal resting MAT bilaterally.  Normal PVR waveforms bilaterally.  Borderline abnormal exercise MAT bilaterally (R 1.2->0.98; L 1.07->0.95)      Notes from March 2022:  Patient here for follow-up.  Denies any chest pains at rest on exertion, orthopnea, PND.  Has been doing fine.    Notes from February 23: Patient here for follow-up.  Has been doing fine.  Denies any chest pains at rest on exertion, orthopnea, PND.  EKG done today shows normal sinus rhythm, normal EKG.      Notes from August 23: Patient here for follow-up.  Denies any chest pains at rest on exertion, orthopnea, PND, swelling of feet.  Has been doing fine.    Notes from February 2024:  Patient here for follow-up.  Doing fine.  Denies any chest pains at rest on exertion, orthopnea, PND.    Notes from October 24:  Patient here for follow-up.  Denies chest pains at rest on exertion, orthopnea, PND.  Can go up a couple of flights of stairs without any issues.  Going for surgery next week    PAST MEDICAL HISTORY:     Past Medical History:   Diagnosis Date    GERD (gastroesophageal reflux disease)     Hypercholesteremia     Hypertension     on medication    Lumbar spondylosis 09/21/2020    Migraines     Sleep apnea     Stroke        PAST SURGICAL HISTORY:     Past Surgical History:   Procedure Laterality Date    COLONOSCOPY N/A 8/10/2020    Procedure: COLONOSCOPY;  Surgeon: April Dos Santos MD;  Location: [facility];  Service: Endoscopy;  Laterality: N/A;    EPIDURAL STEROID INJECTION Left 2/17/2023    Procedure: Left L5 Transforaminal Epidural Steroid Injection;  Surgeon: Santana Rojo Jr., MD;  Location: [facility];  Service: Pain Management;  Laterality: Left;  @1245  No ATC or DM    EPIDURAL STEROID INJECTION Bilateral 4/14/2023    Procedure: Bilateral L3, L4, L5 Medial Branch Blocks #1;  Surgeon: Santana Rojo Jr., MD;  Location: [facility];   Service: Pain Management;  Laterality: Bilateral;  @1230 (requests afternoon)  No ATC or DM    EPIDURAL STEROID INJECTION Left 6/2/2023    Procedure: Left L5 + S1 Transforaminal Epidural Steroid Injections;  Surgeon: Santana Rojo Jr., MD;  Location: [facility] ENDO;  Service: Pain Management;  Laterality: Left;  @1300  No ATC or DM    HERNIA REPAIR      INJECTION, SPINE, LUMBOSACRAL, TRANSFORAMINAL APPROACH Left 11/29/2023    Procedure: Left L5 + S1 Transforaminal Epidural Steroid Injections;  Surgeon: Santana Rojo Jr., MD;  Location: [facility] PAIN MANAGEMENT;  Service: Pain Management;  Laterality: Left;  @1300  No ATC or DM  MD Sign.    LAMINECTOMY Left 7/5/2023    Procedure: LAMINECTOMY, SPINE;  Surgeon: Richard Pineda MD;  Location: [facility];  Service: Neurosurgery;  Laterality: Left;  Left L5/S1 discectomy      LAPAROSCOPIC CHOLECYSTECTOMY N/A 1/25/2022    Procedure: CHOLECYSTECTOMY, LAPAROSCOPIC;  Surgeon: Jarrod Martinez MD;  Location: [facility] OR;  Service: General;  Laterality: N/A;    LUMBAR DISCECTOMY Left 7/5/2023    Procedure: DISCECTOMY, SPINE, LUMBAR;  Surgeon: Richard Pineda MD;  Location: [facility] OR;  Service: Neurosurgery;  Laterality: Left;    REPAIR OF TRICEPS TENDON Left 10/2/2020    Procedure: REPAIR, TENDON, TRICEPS;  Surgeon: Keysha Galvez MD;  Location: [facility] OR;  Service: Orthopedics;  Laterality: Left;  RN PRE OP DONE 9/29/2020----COVID NEGATIVE ON 9/29  [facility] Rep: Delma [phone]    SINUS SURGERY  2012    SURGICAL REMOVAL OF BUNION WITH OSTEOTOMY OF METATARSAL BONE Right 3/24/2023    Procedure: BUNIONECTOMY, WITH METATARSAL OSTEOTOMY;  Surgeon: Shannan An DPM;  Location: [facility] OR;  Service: Podiatry;  Laterality: Right;       ALLERGIES AND MEDICATION:   Review of patient's allergies indicates:  No Known Allergies     Medication List            Accurate as of October 18, 2024  1:45 PM. If you have any questions, ask your nurse or doctor.                CONTINUE taking these  medications      acetaminophen 500 MG tablet  Commonly known as: TYLENOL  Take 1 tablet (500 mg total) by mouth every 6 (six) hours as needed for Pain.     atorvastatin 40 MG tablet  Commonly known as: LIPITOR  Take 1 tablet (40 mg total) by mouth every evening.     benzocaine-menthoL 15-3.6 mg Lozg  Please follow directions on packaging.     cetirizine 10 MG tablet  Commonly known as: ZYRTEC  Take 1 tablet (10 mg total) by mouth once daily.     cromolyn 5.2 mg/spray (4 %) nasal spray  Commonly known as: NASALCHROM  1 spray by Nasal route 4 (four) times daily.     diclofenac sodium 1 % Gel  Commonly known as: VOLTAREN  Apply 2 g topically 4 (four) times daily.     fluticasone propionate 50 mcg/actuation nasal spray  Commonly known as: FLONASE  1 spray (50 mcg total) by Each Nostril route 2 (two) times daily as needed for Rhinitis.     lisinopriL-hydrochlorothiazide 20-12.5 mg per tablet  Commonly known as: PRINZIDE,ZESTORETIC  Take 1 tablet by mouth once daily.     methocarbamoL 750 MG Tab  Commonly known as: ROBAXIN  TAKE 1 TABLET(750 MG) BY MOUTH THREE TIMES DAILY AS NEEDED FOR MUSCLE SPASMS     metoprolol succinate 25 MG 24 hr tablet  Commonly known as: TOPROL-XL  Take 1 tablet (25 mg total) by mouth once daily.     omeprazole 20 MG capsule  Commonly known as: PRILOSEC  TAKE 1 CAPSULE(20 MG) BY MOUTH EVERY DAY. DECREASED DOSE     pregabalin 75 MG capsule  Commonly known as: LYRICA  Take 1 capsule (75 mg total) by mouth 2 (two) times daily.     traZODone 50 MG tablet  Commonly known as: DESYREL  TAKE 2 TABLETS(100 MG) BY MOUTH EVERY NIGHT AS NEEDED FOR INSOMNIA     triamcinolone acetonide 0.1% 0.1 % cream  Commonly known as: KENALOG  Apply topically 2 (two) times daily.              SOCIAL HISTORY:     Social History     Socioeconomic History    Marital status:    Occupational History    Occupation: production      Employer: [employer]   Tobacco Use    Smoking status: Former      Current packs/day: 0.00     Types: Cigarettes     Quit date: 1999     Years since quittin.4    Smokeless tobacco: Never   Substance and Sexual Activity    Alcohol use: Yes     Alcohol/week: 7.0 standard drinks of alcohol     Types: 7 Glasses of wine per week     Comment: occasionally    Drug use: Never    Sexual activity: Not Currently     Partners: Female     Social Drivers of Health     Financial Resource Strain: Low Risk  (2024)    Overall Financial Resource Strain (CARDIA)     Difficulty of Paying Living Expenses: Not very hard   Food Insecurity: Patient Declined (2024)    Hunger Vital Sign     Worried About Running Out of Food in the Last Year: Patient declined     Ran Out of Food in the Last Year: Patient declined   Transportation Needs: No Transportation Needs (2023)    PRAPARE - Transportation     Lack of Transportation (Medical): No     Lack of Transportation (Non-Medical): No   Physical Activity: Sufficiently Active (2024)    Exercise Vital Sign     Days of Exercise per Week: 5 days     Minutes of Exercise per Session: 60 min   Stress: Stress Concern Present (2024)    [employer] [place] [employer] - Occupational Stress Questionnaire     Feeling of Stress : To some extent   Housing Stability: Unknown (2024)    Housing Stability Vital Sign     Unable to Pay for Housing in the Last Year: Patient declined       FAMILY HISTORY:     Family History   Problem Relation Name Age of Onset    Coronary artery disease Mother      Hypertension Father      No Known Problems Sister      No Known Problems Brother      Valvular heart disease Sister      No Known Problems Brother      No Known Problems Brother      No Known Problems Maternal Aunt      No Known Problems Maternal Uncle      No Known Problems Paternal Aunt      No Known Problems Paternal Uncle      No Known Problems Maternal Grandmother      No Known Problems Maternal Grandfather      No Known Problems Paternal  Grandmother      No Known Problems Paternal Grandfather      Amblyopia Neg Hx      Blindness Neg Hx      Cancer Neg Hx      Cataracts Neg Hx      Diabetes Neg Hx      Glaucoma Neg Hx      Macular degeneration Neg Hx      Retinal detachment Neg Hx      Strabismus Neg Hx      Stroke Neg Hx      Thyroid disease Neg Hx         REVIEW OF SYSTEMS:   Review of Systems   Constitutional: Negative.   HENT: Negative.     Eyes: Negative.    Cardiovascular: Negative.    Respiratory: Negative.     Endocrine: Negative.    Hematologic/Lymphatic: Negative.    Skin: Negative.    Musculoskeletal: Negative.    Gastrointestinal: Negative.    Genitourinary: Negative.    Neurological: Negative.    Psychiatric/Behavioral: Negative.     Allergic/Immunologic: Negative.        A 10 point review of systems was performed and all the pertinent positives have been mentioned. Rest of review of systems was negative.        PHYSICAL EXAM:     Vitals:    10/18/24 1330   BP: 120/62   Pulse: 81   Resp: 18    Body mass index is 31.04 kg/m².  Weight: 103.8 kg (228 lb 13.4 oz)   Height: 6' (182.9 cm)     Physical Exam  Vitals reviewed.   Constitutional:       Appearance: He is well-developed.   HENT:      Head: Normocephalic.   Eyes:      Conjunctiva/sclera: Conjunctivae normal.      Pupils: Pupils are equal, round, and reactive to light.   Cardiovascular:      Rate and Rhythm: Normal rate and regular rhythm.      Heart sounds: Normal heart sounds.   Pulmonary:      Effort: Pulmonary effort is normal.      Breath sounds: Normal breath sounds.   Abdominal:      General: Bowel sounds are normal.      Palpations: Abdomen is soft.   Musculoskeletal:      Cervical back: Normal range of motion and neck supple.   Skin:     General: Skin is warm.   Neurological:      Mental Status: He is alert and oriented to person, place, and time.         DATA:     Laboratory:  CBC:  Recent Labs   Lab 01/26/22  0520 02/24/23  1048 03/09/24  0818   WBC 8.80 6.20 5.18    Hemoglobin 14.7 15.0 15.8   Hematocrit 43.4 44.5 47.9   Platelets 234 269 303       CHEMISTRIES:  Recent Labs   Lab 01/23/22  1230 01/24/22  0632 01/25/22  0548 01/26/22  0520 02/24/23  1048 03/09/24  0818 10/15/24  1338   Glucose 93 93 106 107  107 82 83 104   Sodium 139 138 139 137  137 140 141 139   Potassium 4.2 4.3 4.2 4.1  4.1 4.1 4.2 3.6   BUN 11 11 15 13  13 15 17 12   Creatinine 1.2 1.0 1.1 1.0  1.0 0.8 0.9 0.8   eGFR if African American >60 >60 >60 >60  >60  --   --   --    eGFR if non African American >60 >60 >60 >60  >60  --   --   --    Calcium 8.9 8.6 L 8.8 9.1  9.1 8.8 9.8 9.6   Magnesium 1.9  --  2.3 2.3  --   --   --        CARDIAC BIOMARKERS:        COAGS:  Recent Labs   Lab 01/23/22  1230   INR 1.1       LIPIDS/LFTS:  Recent Labs   Lab 01/26/22  0520 03/23/22  0703 02/24/23  1048 03/09/24  0818   Cholesterol  --  136 153 152   Triglycerides  --  153 H 105 111   HDL  --  34 L 45 46   LDL Cholesterol  --  71.4 87.0 83.8   Non-HDL Cholesterol  --  102 108 106   AST 35  --  36 36    H  --  58 H 58 H       Hemoglobin A1C   Date Value Ref Range Status   03/09/2024 5.5 4.0 - 5.6 % Final     Comment:     ADA Screening Guidelines:  5.7-6.4%  Consistent with prediabetes  >or=6.5%  Consistent with diabetes    High levels of fetal hemoglobin interfere with the HbA1C  assay. Heterozygous hemoglobin variants (HbS, HgC, etc)do  not significantly interfere with this assay.   However, presence of multiple variants may affect accuracy.         TSH        The ASCVD Risk score (Sandra DK, et al., 2019) failed to calculate for the following reasons:    Risk score cannot be calculated because patient has a medical history suggesting prior/existing ASCVD             ASSESSMENT AND PLAN     Patient Active Problem List   Diagnosis    Refractive error    Other chronic sinusitis uses budesonide for this NOT for lungs    Essential hypertension    Gastroesophageal reflux disease without esophagitis    Chronic  cluster headache, not intractable followed by neurology    Tubular adenoma of colon 08/10/2020 colonoscopy ascending colon tubular adenoma sigmoid hyperplastic polyps.  Rectal hyperplastic polyp.    Pulmonary nodule 2mm on CT; no further imaging    Lumbar spondylosis    DDD (degenerative disc disease), lumbar    Triceps tendon rupture, left, initial encounter    GM (obstructive sleep apnea)    Cholelithiasis    Dyslipidemia    Tension type headache    Cervical spine pain    Lumbar radiculopathy    History of bunionectomy of right great toe    Hallux rigidus of right foot    Antalgic gait    Weakness of right foot    Muscle weakness affecting movement of foot    Neck pain    Lumbar pain    Trochanteric bursitis of left hip    Hip pain, left       Chest tightness and dyspnea on exertion in a patient with multiple risk factors for coronary artery disease.  equivocal stress test.  Symptoms resolved after starting metoprolol.  Continue current therapy.  Has been chest pain-free since then with exercise tolerance greater than 4 Mets    Preoperative risk assessment:  Exercise tolerance greater than 4 Mets.  May proceed for surgery next week at low-to-moderate risk for coronary ischemia.      Dyslipidemia: lipitor.     Palpitations:  No significant arrhythmia.        Follow-up in 6 months              Thank you very much for involving me in the care of your patient.  Please do not hesitate to contact me if there are any questions.      Mario Moore MD, FACC, [facility]  Interventional Cardiologist, [facility].           This note was dictated with the help of speech recognition software.  There might be un-intended errors and/or substitutions.

## 2024-10-21 ENCOUNTER — PATIENT MESSAGE (OUTPATIENT)
Dept: CARDIOLOGY | Facility: CLINIC | Age: 59
End: 2024-10-21
Payer: OTHER GOVERNMENT

## 2024-10-22 ENCOUNTER — PATIENT MESSAGE (OUTPATIENT)
Dept: OTOLARYNGOLOGY | Facility: CLINIC | Age: 59
End: 2024-10-22
Payer: OTHER GOVERNMENT

## 2024-10-22 ENCOUNTER — HOSPITAL ENCOUNTER (OUTPATIENT)
Dept: RADIOLOGY | Facility: HOSPITAL | Age: 59
Discharge: HOME | End: 2024-10-22
Attending: ORTHOPAEDIC SURGERY
Payer: OTHER GOVERNMENT

## 2024-10-22 ENCOUNTER — ANESTHESIA EVENT (OUTPATIENT)
Dept: SURGERY | Facility: HOSPITAL | Age: 59
End: 2024-10-22
Payer: OTHER GOVERNMENT

## 2024-10-22 DIAGNOSIS — M12.811 ROTATOR CUFF ARTHROPATHY OF RIGHT SHOULDER: ICD-10-CM

## 2024-10-22 PROCEDURE — 77002 NEEDLE LOCALIZATION BY XRAY: CPT | Mod: 26,RT,, | Performed by: RADIOLOGY

## 2024-10-22 PROCEDURE — 73222 MRI JOINT UPR EXTREM W/DYE: CPT | Mod: 26,RT,, | Performed by: RADIOLOGY

## 2024-10-22 PROCEDURE — 25500020 PHARM REV CODE 255: Performed by: ORTHOPAEDIC SURGERY

## 2024-10-22 PROCEDURE — 73222 MRI JOINT UPR EXTREM W/DYE: CPT | Mod: TC,RT

## 2024-10-22 PROCEDURE — 63600175 PHARM REV CODE 636 W HCPCS: Performed by: ORTHOPAEDIC SURGERY

## 2024-10-22 PROCEDURE — A9585 GADOBUTROL INJECTION: HCPCS | Performed by: ORTHOPAEDIC SURGERY

## 2024-10-22 PROCEDURE — 23350 INJECTION FOR SHOULDER X-RAY: CPT | Mod: RT,,, | Performed by: RADIOLOGY

## 2024-10-22 RX ORDER — GADOBUTROL 604.72 MG/ML
7 INJECTION INTRAVENOUS
Status: COMPLETED | OUTPATIENT
Start: 2024-10-22 | End: 2024-10-22

## 2024-10-22 RX ORDER — LIDOCAINE HYDROCHLORIDE 10 MG/ML
1 INJECTION, SOLUTION INFILTRATION; PERINEURAL ONCE
Status: COMPLETED | OUTPATIENT
Start: 2024-10-22 | End: 2024-10-22

## 2024-10-22 RX ADMIN — LIDOCAINE HYDROCHLORIDE 1 ML: 10 INJECTION, SOLUTION INFILTRATION; PERINEURAL at 11:10

## 2024-10-22 RX ADMIN — IOHEXOL 8 ML: 300 INJECTION, SOLUTION INTRAVENOUS at 11:10

## 2024-10-22 RX ADMIN — GADOBUTROL 7 ML: 604.72 INJECTION INTRAVENOUS at 11:10

## 2024-10-23 ENCOUNTER — HOSPITAL ENCOUNTER (OUTPATIENT)
Facility: HOSPITAL | Age: 59
Discharge: HOME | End: 2024-10-23
Attending: STUDENT IN AN ORGANIZED HEALTH CARE EDUCATION/TRAINING PROGRAM | Admitting: STUDENT IN AN ORGANIZED HEALTH CARE EDUCATION/TRAINING PROGRAM
Payer: OTHER GOVERNMENT

## 2024-10-23 ENCOUNTER — ANESTHESIA (OUTPATIENT)
Dept: SURGERY | Facility: HOSPITAL | Age: 59
End: 2024-10-23
Payer: OTHER GOVERNMENT

## 2024-10-23 VITALS
DIASTOLIC BLOOD PRESSURE: 78 MMHG | RESPIRATION RATE: 20 BRPM | SYSTOLIC BLOOD PRESSURE: 127 MMHG | HEIGHT: 72 IN | OXYGEN SATURATION: 93 % | TEMPERATURE: 98 F | BODY MASS INDEX: 30.48 KG/M2 | HEART RATE: 78 BPM | WEIGHT: 225 LBS

## 2024-10-23 DIAGNOSIS — J32.8 OTHER CHRONIC SINUSITIS: Primary | ICD-10-CM

## 2024-10-23 DIAGNOSIS — J34.89 NASAL OBSTRUCTION: ICD-10-CM

## 2024-10-23 DIAGNOSIS — J34.2 NASAL SEPTAL DEVIATION: ICD-10-CM

## 2024-10-23 PROCEDURE — 30520 REPAIR OF NASAL SEPTUM: CPT | Mod: 51,,, | Performed by: STUDENT IN AN ORGANIZED HEALTH CARE EDUCATION/TRAINING PROGRAM

## 2024-10-23 PROCEDURE — 63600175 PHARM REV CODE 636 W HCPCS: Performed by: STUDENT IN AN ORGANIZED HEALTH CARE EDUCATION/TRAINING PROGRAM

## 2024-10-23 PROCEDURE — 25000003 PHARM REV CODE 250: Performed by: STUDENT IN AN ORGANIZED HEALTH CARE EDUCATION/TRAINING PROGRAM

## 2024-10-23 PROCEDURE — 88305 TISSUE EXAM BY PATHOLOGIST: CPT | Performed by: STUDENT IN AN ORGANIZED HEALTH CARE EDUCATION/TRAINING PROGRAM

## 2024-10-23 PROCEDURE — 37000009 HC ANESTHESIA EA ADD 15 MINS: Performed by: STUDENT IN AN ORGANIZED HEALTH CARE EDUCATION/TRAINING PROGRAM

## 2024-10-23 PROCEDURE — 61782 SCAN PROC CRANIAL EXTRA: CPT | Mod: ,,, | Performed by: STUDENT IN AN ORGANIZED HEALTH CARE EDUCATION/TRAINING PROGRAM

## 2024-10-23 PROCEDURE — 63600175 PHARM REV CODE 636 W HCPCS

## 2024-10-23 PROCEDURE — 71000015 HC POSTOP RECOV 1ST HR: Performed by: STUDENT IN AN ORGANIZED HEALTH CARE EDUCATION/TRAINING PROGRAM

## 2024-10-23 PROCEDURE — 71000044 HC DOSC ROUTINE RECOVERY FIRST HOUR: Performed by: STUDENT IN AN ORGANIZED HEALTH CARE EDUCATION/TRAINING PROGRAM

## 2024-10-23 PROCEDURE — 27201423 OPTIME MED/SURG SUP & DEVICES STERILE SUPPLY: Performed by: STUDENT IN AN ORGANIZED HEALTH CARE EDUCATION/TRAINING PROGRAM

## 2024-10-23 PROCEDURE — D9220A PRA ANESTHESIA: Mod: ,,, | Performed by: ANESTHESIOLOGY

## 2024-10-23 PROCEDURE — 31256 EXPLORATION MAXILLARY SINUS: CPT | Mod: 50,51,, | Performed by: STUDENT IN AN ORGANIZED HEALTH CARE EDUCATION/TRAINING PROGRAM

## 2024-10-23 PROCEDURE — 31276 NSL/SINS NDSC FRNT TISS RMVL: CPT | Mod: 50,51,, | Performed by: STUDENT IN AN ORGANIZED HEALTH CARE EDUCATION/TRAINING PROGRAM

## 2024-10-23 PROCEDURE — 36000710: Performed by: STUDENT IN AN ORGANIZED HEALTH CARE EDUCATION/TRAINING PROGRAM

## 2024-10-23 PROCEDURE — 25000003 PHARM REV CODE 250

## 2024-10-23 PROCEDURE — 36000711: Performed by: STUDENT IN AN ORGANIZED HEALTH CARE EDUCATION/TRAINING PROGRAM

## 2024-10-23 PROCEDURE — 37000008 HC ANESTHESIA 1ST 15 MINUTES: Performed by: STUDENT IN AN ORGANIZED HEALTH CARE EDUCATION/TRAINING PROGRAM

## 2024-10-23 PROCEDURE — 31257 NSL/SINS NDSC TOT W/SPHENDT: CPT | Mod: 50,,, | Performed by: STUDENT IN AN ORGANIZED HEALTH CARE EDUCATION/TRAINING PROGRAM

## 2024-10-23 RX ORDER — VITAMIN A 3000 MCG
2 CAPSULE ORAL DAILY
Qty: 44 ML | Refills: 5 | Status: SHIPPED | OUTPATIENT
Start: 2024-10-23

## 2024-10-23 RX ORDER — OXYMETAZOLINE HCL 0.05 %
SPRAY, NON-AEROSOL (ML) NASAL
Status: DISCONTINUED | OUTPATIENT
Start: 2024-10-23 | End: 2024-10-23 | Stop reason: HOSPADM

## 2024-10-23 RX ORDER — HYDROMORPHONE HYDROCHLORIDE 1 MG/ML
INJECTION, SOLUTION INTRAMUSCULAR; INTRAVENOUS; SUBCUTANEOUS
Status: DISCONTINUED | OUTPATIENT
Start: 2024-10-23 | End: 2024-10-23

## 2024-10-23 RX ORDER — PROPOFOL 10 MG/ML
VIAL (ML) INTRAVENOUS
Status: DISCONTINUED | OUTPATIENT
Start: 2024-10-23 | End: 2024-10-23

## 2024-10-23 RX ORDER — ROCURONIUM BROMIDE 10 MG/ML
INJECTION, SOLUTION INTRAVENOUS
Status: DISCONTINUED | OUTPATIENT
Start: 2024-10-23 | End: 2024-10-23

## 2024-10-23 RX ORDER — ACETAMINOPHEN 500 MG
500 TABLET ORAL EVERY 6 HOURS PRN
Qty: 50 TABLET | Refills: 0 | Status: SHIPPED | OUTPATIENT
Start: 2024-10-23

## 2024-10-23 RX ORDER — MIDAZOLAM HYDROCHLORIDE 1 MG/ML
INJECTION INTRAMUSCULAR; INTRAVENOUS
Status: DISCONTINUED | OUTPATIENT
Start: 2024-10-23 | End: 2024-10-23

## 2024-10-23 RX ORDER — MUPIROCIN 20 MG/G
OINTMENT TOPICAL 2 TIMES DAILY
Qty: 22 G | Refills: 0 | Status: SHIPPED | OUTPATIENT
Start: 2024-10-23 | End: 2024-11-06

## 2024-10-23 RX ORDER — HYDROCODONE BITARTRATE AND ACETAMINOPHEN 5; 325 MG/1; MG/1
1 TABLET ORAL EVERY 6 HOURS PRN
Qty: 5 TABLET | Refills: 0 | Status: SHIPPED | OUTPATIENT
Start: 2024-10-23

## 2024-10-23 RX ORDER — IBUPROFEN 600 MG/1
600 TABLET ORAL EVERY 6 HOURS PRN
Qty: 50 TABLET | Refills: 0 | Status: SHIPPED | OUTPATIENT
Start: 2024-10-23

## 2024-10-23 RX ORDER — TRIAMCINOLONE ACETONIDE 40 MG/ML
INJECTION, SUSPENSION INTRA-ARTICULAR; INTRAMUSCULAR
Status: DISCONTINUED | OUTPATIENT
Start: 2024-10-23 | End: 2024-10-23 | Stop reason: HOSPADM

## 2024-10-23 RX ORDER — MUPIROCIN 20 MG/G
OINTMENT TOPICAL
Status: DISCONTINUED | OUTPATIENT
Start: 2024-10-23 | End: 2024-10-23 | Stop reason: HOSPADM

## 2024-10-23 RX ORDER — ONDANSETRON 4 MG/1
4 TABLET, ORALLY DISINTEGRATING ORAL EVERY 8 HOURS PRN
Qty: 20 TABLET | Refills: 0 | Status: SHIPPED | OUTPATIENT
Start: 2024-10-23

## 2024-10-23 RX ORDER — EPINEPHRINE 1 MG/ML
INJECTION, SOLUTION, CONCENTRATE INTRAVENOUS
Status: DISCONTINUED | OUTPATIENT
Start: 2024-10-23 | End: 2024-10-23 | Stop reason: HOSPADM

## 2024-10-23 RX ORDER — DEXAMETHASONE SODIUM PHOSPHATE 4 MG/ML
INJECTION, SOLUTION INTRA-ARTICULAR; INTRALESIONAL; INTRAMUSCULAR; INTRAVENOUS; SOFT TISSUE
Status: DISCONTINUED | OUTPATIENT
Start: 2024-10-23 | End: 2024-10-23

## 2024-10-23 RX ORDER — FENTANYL CITRATE 50 UG/ML
INJECTION, SOLUTION INTRAMUSCULAR; INTRAVENOUS
Status: DISCONTINUED | OUTPATIENT
Start: 2024-10-23 | End: 2024-10-23

## 2024-10-23 RX ORDER — LIDOCAINE HYDROCHLORIDE 20 MG/ML
INJECTION INTRAVENOUS
Status: DISCONTINUED | OUTPATIENT
Start: 2024-10-23 | End: 2024-10-23

## 2024-10-23 RX ORDER — DEXMEDETOMIDINE HYDROCHLORIDE 100 UG/ML
INJECTION, SOLUTION INTRAVENOUS
Status: DISCONTINUED | OUTPATIENT
Start: 2024-10-23 | End: 2024-10-23

## 2024-10-23 RX ORDER — LIDOCAINE HYDROCHLORIDE AND EPINEPHRINE 10; 10 MG/ML; UG/ML
INJECTION, SOLUTION INFILTRATION; PERINEURAL
Status: DISCONTINUED | OUTPATIENT
Start: 2024-10-23 | End: 2024-10-23 | Stop reason: HOSPADM

## 2024-10-23 RX ORDER — ONDANSETRON HYDROCHLORIDE 2 MG/ML
INJECTION, SOLUTION INTRAVENOUS
Status: DISCONTINUED | OUTPATIENT
Start: 2024-10-23 | End: 2024-10-23

## 2024-10-23 RX ADMIN — ROCURONIUM BROMIDE 20 MG: 10 INJECTION, SOLUTION INTRAVENOUS at 10:10

## 2024-10-23 RX ADMIN — ONDANSETRON 4 MG: 2 INJECTION INTRAMUSCULAR; INTRAVENOUS at 12:10

## 2024-10-23 RX ADMIN — DEXMEDETOMIDINE 8 MCG: 200 INJECTION, SOLUTION INTRAVENOUS at 11:10

## 2024-10-23 RX ADMIN — FENTANYL CITRATE 100 MCG: 50 INJECTION, SOLUTION INTRAMUSCULAR; INTRAVENOUS at 09:10

## 2024-10-23 RX ADMIN — ROCURONIUM BROMIDE 20 MG: 10 INJECTION, SOLUTION INTRAVENOUS at 11:10

## 2024-10-23 RX ADMIN — LIDOCAINE HYDROCHLORIDE 100 MG: 20 INJECTION INTRAVENOUS at 09:10

## 2024-10-23 RX ADMIN — ROCURONIUM BROMIDE 50 MG: 10 INJECTION, SOLUTION INTRAVENOUS at 09:10

## 2024-10-23 RX ADMIN — PROPOFOL 120 MCG/KG/MIN: 10 INJECTION, EMULSION INTRAVENOUS at 09:10

## 2024-10-23 RX ADMIN — MIDAZOLAM HYDROCHLORIDE 1 MG: 2 INJECTION, SOLUTION INTRAMUSCULAR; INTRAVENOUS at 09:10

## 2024-10-23 RX ADMIN — HYDROMORPHONE HYDROCHLORIDE 0.2 MG: 1 INJECTION, SOLUTION INTRAMUSCULAR; INTRAVENOUS; SUBCUTANEOUS at 12:10

## 2024-10-23 RX ADMIN — SUGAMMADEX 200 MG: 100 INJECTION, SOLUTION INTRAVENOUS at 12:10

## 2024-10-23 RX ADMIN — SODIUM CHLORIDE: 9 INJECTION, SOLUTION INTRAVENOUS at 09:10

## 2024-10-23 RX ADMIN — PROPOFOL 200 MG: 10 INJECTION, EMULSION INTRAVENOUS at 09:10

## 2024-10-23 RX ADMIN — DEXAMETHASONE SODIUM PHOSPHATE 4 MG: 4 INJECTION, SOLUTION INTRAMUSCULAR; INTRAVENOUS at 09:10

## 2024-10-23 RX ADMIN — LIDOCAINE HYDROCHLORIDE 25 MG: 20 INJECTION INTRAVENOUS at 12:10

## 2024-10-23 NOTE — DISCHARGE INSTRUCTIONS
INSTRUCTIONS TO FOLLOW AFTER SINUS AND NASAL SURGERY  DR. BARTON - [facility]     Office hours:  Weekdays 8:00 am to 5:00 pm.  Please call [phone] and ask to speak with his nurse, Trang.     After-hours & weekends:  Please call [phone] and ask to speak with the ENT resident doctor.     Your first office visit with Dr. Fam after surgery should have been already scheduled.  If you dont know when it is, call Dr. Fam's nurse Trang at [phone].     Please call IMMMEDIATELY if you have:  Temperature of 101° F or greater  Any unusual, painful swelling  Any active bleeding that saturates more than a 4x4 gauze  Any thick drainage green or yellow drainage  Changes in vision or swelling around the eye  Pain not relieved by your prescribed pain medication     ACTIVITY:     Sleep on your back with the head of the bead elevated, up on 2-3 pillows, or in a recliner for the first 3 to 5 days. This will help with swelling.      After surgery you may have a lot of nasal drainage. This is normal. You may breathe through your nose if youre able but avoid inhaling forcibly. Let all drainage fall on your mustache dressing and change it as needed.     You may wake up after surgery with thick white stockings on. Wear them until you are walking around more. It is important to walk around often while at home to keep your blood circulating and prevent blood clots.     If you use CPAP or BiPAP to sleep at night, you should wait at least 48 hours before resuming use.  Dr. Fam will advise you when it is safe to do this.     You may shower 24 hours after surgery.     RESTRICTED ACTIVITIES AFTER SURGERY:     Do NOT blow your nose until you see Dr. Fam in clinic. If you have to sneeze or cough, do so with your mouth open.      AVOID all heavy lifting, straining or bending for 2 weeks.      AVOID any sexual activity for 2 weeks after surgery.     AVOID semi-contact sports or vigorous exercising for 3-4 weeks. Dr. Fam  will let you know when you are cleared to resume exercise.     AVOID flying or swimming for 2 weeks.       Do NOT operate a motor vehicle or any type of heavy machinery within 24 hours of taking pain medication.     DO NOT smoke or be around smokers.     AVOID irritating substances that might make you sneeze, such as dust, chalk, harsh chemicals, and allergic triggers.  This might also include spicy foods.     DRESSINGS:     Change the gauze mustache dressing under your nose as needed. (If unsure what this dressing is or how to do this, ask your doctor or nurse before you leave the hospital.) You may have pinkish-red drainage for 2-3 days.     Usually there is no gauze packing placed inside the nose.  If packing is necessary, you will be informed by your surgeon.  Do not touch or pull at the packing. The packing will be removed by your doctor at your first visit after surgery.      You may also have a dissolvable stent or dissolvable sponge placed into the sinuses during surgery.  These usually do not need to be removed unless you are told otherwise by Dr. Fam.  You may notice small fragments of these items come out of your nose in the weeks following surgery.     MEDICATIONS:     After surgery, you will be sent home with prescriptions for pain medication and an anti-nausea medication.  Antibiotics are usually not necessary.     Most people need pain medication for the first few days after surgery, although a narcotic is rarely necessary.  The best pain control comes from a combination of acetaminophen (Tylenol) and ibuprofen (Motrin).  You will be given prescriptions for these at the recommended dose.  These can be alternated so that you are taking something every 2 or 3 hours.     Some people have problems with bowel movements after surgery. If you have NOT had a bowel movement 3-5 days after surgery, go to your local pharmacy and purchase an over the counter stool softener such as COLACE. You can also ask the  pharmacist for his or her recommendation. If you still do not have a bowel movement after starting the softener, please call the office.     You will need these over-the-counter medications after surgery:     SALINE SINUS RINSE ([employer] brand):  You will use this to rinse out your nose and sinuses after surgery.  Begin doing this the day after surgery, unless instructed otherwise by Dr. Fam.  You should do this 2 times a day, following the instructions on the box.  AFRIN (regular strength): Only use if you have nasal congestion or bleeding. Use 2 times per day for 3 days, stop for 1 day, continue 2 times per day for 3 days, then stop completely.  NOTE:  You may not need to do this at all.     DIET:     Avoid hot and spicy foods for 1 week after surgery.     Begin with bland foods the evening after surgery and advance to your regular diet as tolerated.  It is not necessary to take only soft food unless you are recovering from tonsil surgery.     Drink plenty of fluids (water is best).      Avoid alcoholic and caffeinated beverages for 1 week after surgery because they can cause you to become dehydrated.

## 2024-10-23 NOTE — OP NOTE
10/23/2024    PREOPERATIVE DIAGNOSIS(ES):    1.   Chronic pansinusitis.  2.   Right septal deviation.     POSTOPERATIVE DIAGNOSIS(ES):    1.   Same.     PROCEDURE:    1.   Bilateral maxillary antrostomy with removal of tissue.  2.   Bilateral ethmoidectomy, complete.  3.   Bilateral sphenoidotomy with removal of tissue.  4.   Bilateral frontal sinusotomy.  5.   Septoplasty.  6.   Use of [employer] neuronavigation.     ANESTHESIA:  General endotracheal.     SURGEON(S):  Humberto Fam MD     ASSISTANT:  Eliecer Lyons MD     ESTIMATED BLOOD LOSS:  Minimal.     COMPLICATIONS:  None.      FINDINGS:    1.   Residual uncinate process bilaterally.  2.   Previous evidence of very limited anterior ethmoidectomy with essentially resection of the ethmoid bulla bilaterally.  3.   Very narrow frontal sinuses bilaterally.  4.   Right high septal deviation blocking access to the right middle meatus.     INDICATIONS FOR PROCEDURE:  This is a 59-year-old male with a history of chronic sinus infections.  He had previously had limited endoscopic sinus surgery with continued to have significant symptoms.  He had positive cultures for multiple different bacteria.  Despite maximal medical therapy with intranasal steroids and antibiotics he continued to have significant symptoms.  CT scan showed previous evidence of endoscopic sinus surgery and mucosal thickening most permanent in the maxillary sinuses.    The risks, benefits, and alternatives of surgery were discussed at length with the patient and include, but are not limited to bleeding, infection, need for revision surgery, injury to the eye or brain, double vision, loss of vision, cerebrospinal fluid leakage, meningitis, chronic sinusitis, nasal obstruction, nasal septal perforation, numbness to teeth or cheek, or need for time and expense off work.  The patient demonstrates understanding and wishes to proceed.  All of his questions were answered to his satisfaction.     DESCRIPTION  OF PROCEDURE:  The patient was taken back to the operating room and after successful induction of general anesthesia, was prepped and draped in a sterile fashion.  The [employer] neuronavigation system was employed.  The images were uploaded, the instruments were calibrated, and accuracy was confirmed.  Afrin pledgets were used intranasally for decongestion, and approximately 10 cc of 1% lidocaine with epinephrine 1:100,000 was used intranasally.     A pre-operative timeout was taken to confirm the correct patient and procedure being performed.      Attention was first turned to the right side.  There was a high right septal deformity, which was preventing access into the paranasal sinuses.  As such, a septoplasty was performed.  A right-sided hemitransfixion incision was made and a mucoperichondrial/mucoperiosteal flap was elevated. The bony cartilaginous junction was divided and the bony deviated septum was removed leading to good straightening of the nasal septum.  Once the septum was straight and both nasal cavities could be accessed, the right-sided hemitransfixion incision was closed using a 4-0 chromic gut suture.     Attention was then turned to the left side.  Was previous evidence of the left maxillary antrostomy and very limited anterior ethmoidectomy.  There was still residual amount of uncinate which need to be removed in order to access the natural maxillary ostium.  The residual uncinate process was then identified and taken down in a standard fashion, and the natural maxillary sinus ostium was identified and opened widely using straight and backbiting instruments. Attention was then returned to the middle meatus.  Using a Xomed microdebrider, the residual ethmoid bulla was perforated and dissection was carried posteriorly until the sphenoid face was reached.  Dissection then proceeded anteriorly along the skull base and lamina papyracea in order to skeletonize these structures.  Of note, the ethmoid  labyrinth was ostitic.  Next, the natural sphenoid ostium was identified and the sphenoid sinus was opened widely using 2 and 3 mm Koros Kerrison rongeurs and communicated into the ethmoid cavity.   The posterior septal branch of the sphenopalatine artery was cauterized to ensure hemostasis.  Next, a 30-degree endoscope and frontal sinus suction were then used to complete the anterior ethmoidectomy.  The natural frontal sinus outflow tract was identified, cannulated, and the frontal sinus was opened widely in a submucosal fashion.       Attention was then turned to the right side and a similar procedure was performed.  Again there was evidence of a partial right a maxillary antrostomy and anterior ethmoidectomy.  There was significant amount of residual uncinate process present and a large band between the maxillary ostium and the posterior fontanelle.  The residual uncinate process was identified and taken down in a standard fashion.  The natural maxillary sinus ostium was identified and opened widely using straight and backbiting instruments.    Next, attention was returned to the ethmoid.  Using a Xomed microdebrider, the residual ethmoid bulla was perforated and dissection was carried posteriorly until the sphenoid face was reached.  Dissection then was carried anteriorly along the skull base and lamina papyracea in order to skeletonize these structures.  Next, the natural sphenoid ostium was identified and the sphenoid sinus was opened widely using 2 and 3 mm Koros Kerrison rongeurs and communicated into the ethmoid cavity.  Next, a 30-degree endoscope and frontal sinus suction were then used to complete the anterior ethmoidectomy.  The natural frontal sinus outflow tract was identified, cannulated, and the frontal sinus was opened widely in a submucosal fashion       At the conclusion of the case, the nose was copiously irrigated with saline solution to remove all debris and both inferior turbinates were  outfractured.  Hemostasis was obtained throughout using suction monopolar cautery as well as dilute epinephrine/saline pledgets until hemostasis was achieved. Vectra stents soaked with Kenalog were placed in the bilateral frontal outflow tracts to maintain patency and Novapaks was placed in the middle meatus to ensure appropriate middle turbinate medialization.     The patient was then awakened, extubated, and taken to recovery room in stable condition.      I was scrubbed and personally present during all portions of this procedure, and I was immediately available throughout the entire procedure.    Humberto Fam MD

## 2024-10-23 NOTE — BRIEF OP NOTE
[facility] - Surgery ([facility])  Brief Operative Note    Surgery Date: 10/23/2024     Surgeons and Role:     * Humberto Fam MD - Primary     * Eliecer Lyons MD - Resident - Assisting        Pre-op Diagnosis:  Nasal septal deviation [J34.2]  Hypertrophy of both inferior nasal turbinates [J34.3]  Chronic sinusitis, unspecified location [J32.9]  Chronic pansinusitis [J32.4]    Post-op Diagnosis:  Post-Op Diagnosis Codes:     * Nasal septal deviation [J34.2]     * Hypertrophy of both inferior nasal turbinates [J34.3]     * Chronic sinusitis, unspecified location [J32.9]     * Chronic pansinusitis [J32.4]    Procedure(s) (LRB):  FESS, WITH -NASAL SEPTOPLASTY, WITH IMAGING GUIDANCE- AALIYAH (Bilateral)    Anesthesia: General    Operative Findings: revision STESS    Estimated Blood Loss: * No values recorded between 10/23/2024 10:12 AM and 10/23/2024 12:41 PM *         Specimens:   Specimen (24h ago, onward)       Start     Ordered    10/23/24 1221  Specimen to Pathology, Surgery ENT  Once        Comments: Pre-op Diagnosis: Nasal septal deviation [J34.2]Hypertrophy of both inferior nasal turbinates [J34.3]Chronic sinusitis, unspecified location [J32.9]Chronic pansinusitis [J32.4]Procedure(s):FESS, WITH -NASAL SEPTOPLASTY AND TURBINATE REDUCTION, WITH IMAGING GUIDANCE- STRYKERENDOSCOPY, NOSE OR PARANASAL SINUS, WITH MAXILLARY ANTROSTOMYETHMOIDECTOMY, TOTAL, ENDOSCOPIC Number of specimens: 8Name of specimens: 1. Left maxillary sinus - perm2. Left ethmoid - perm3. Left sphenoid - perm4. Left frontal- perm5. Right maxillary - perm6. Right ethmoid - perm7. Right sphenoid-perm8. Right frontal-perm     References:    Click here for ordering Quick Tip   Question Answer Comment   Procedure Type: ENT    Release to patient Immediate        10/23/24 1232                      Discharge Note    OUTCOME: Patient tolerated treatment/procedure well without complication and is now ready for discharge.    DISPOSITION: Home or Self  Care    FINAL DIAGNOSIS:  <principal problem not specified>    FOLLOWUP: In clinic    DISCHARGE INSTRUCTIONS:  No discharge procedures on file.

## 2024-10-24 ENCOUNTER — TELEPHONE (OUTPATIENT)
Dept: OTOLARYNGOLOGY | Facility: CLINIC | Age: 59
End: 2024-10-24
Payer: OTHER GOVERNMENT

## 2024-10-24 RX ORDER — AMOXICILLIN AND CLAVULANATE POTASSIUM 875; 125 MG/1; MG/1
1 TABLET, FILM COATED ORAL 2 TIMES DAILY
Qty: 14 TABLET | Refills: 0 | Status: SHIPPED | OUTPATIENT
Start: 2024-10-24 | End: 2024-10-31

## 2024-10-24 NOTE — TELEPHONE ENCOUNTER
----- Message from Humberto Fam MD sent at 10/24/2024 10:03 AM CDT -----  Regarding: RE: Pt advice  Contact: [phone]  yes  ----- Message -----  From: Shira Mccormick MA  Sent: 10/24/2024   9:57 AM CDT  To: Humberto Fam MD  Subject: FW: Pt advice                                    Yes or no?  ----- Message -----  From: Pankaj Bello  Sent: 10/24/2024   9:36 AM CDT  To: Sarwat Paul Staff  Subject: Pt advice                                        Pt calling stating he had surgery on yesterday. Would like to know if he can use a Navage Nasal rinse. Please call [phone]

## 2024-10-25 LAB
FINAL PATHOLOGIC DIAGNOSIS: NORMAL
GROSS: NORMAL
Lab: NORMAL

## 2024-10-31 ENCOUNTER — OFFICE VISIT (OUTPATIENT)
Dept: OTOLARYNGOLOGY | Facility: CLINIC | Age: 59
End: 2024-10-31
Payer: OTHER GOVERNMENT

## 2024-10-31 VITALS
SYSTOLIC BLOOD PRESSURE: 126 MMHG | WEIGHT: 226.88 LBS | DIASTOLIC BLOOD PRESSURE: 89 MMHG | BODY MASS INDEX: 30.77 KG/M2

## 2024-10-31 DIAGNOSIS — J34.3 HYPERTROPHY OF BOTH INFERIOR NASAL TURBINATES: ICD-10-CM

## 2024-10-31 DIAGNOSIS — Z98.890 STATUS POST FUNCTIONAL ENDOSCOPIC SINUS SURGERY (FESS): ICD-10-CM

## 2024-10-31 DIAGNOSIS — J32.9 CHRONIC SINUSITIS, UNSPECIFIED LOCATION: ICD-10-CM

## 2024-10-31 DIAGNOSIS — J34.2 NASAL SEPTAL DEVIATION: Primary | ICD-10-CM

## 2024-10-31 PROCEDURE — 99999 PR PBB SHADOW E&M-EST. PATIENT-LVL III: CPT | Mod: PBBFAC,,, | Performed by: STUDENT IN AN ORGANIZED HEALTH CARE EDUCATION/TRAINING PROGRAM

## 2024-10-31 PROCEDURE — 31237 NSL/SINS NDSC SURG BX POLYPC: CPT | Mod: 50,PBBFAC | Performed by: STUDENT IN AN ORGANIZED HEALTH CARE EDUCATION/TRAINING PROGRAM

## 2024-10-31 PROCEDURE — 99213 OFFICE O/P EST LOW 20 MIN: CPT | Mod: PBBFAC | Performed by: STUDENT IN AN ORGANIZED HEALTH CARE EDUCATION/TRAINING PROGRAM

## 2024-11-18 ENCOUNTER — PATIENT MESSAGE (OUTPATIENT)
Dept: SPORTS MEDICINE | Facility: CLINIC | Age: 59
End: 2024-11-18
Payer: OTHER GOVERNMENT

## 2024-11-20 ENCOUNTER — TELEPHONE (OUTPATIENT)
Dept: SPORTS MEDICINE | Facility: CLINIC | Age: 59
End: 2024-11-20
Payer: OTHER GOVERNMENT

## 2024-11-22 ENCOUNTER — OFFICE VISIT (OUTPATIENT)
Dept: OTOLARYNGOLOGY | Facility: CLINIC | Age: 59
End: 2024-11-22
Payer: OTHER GOVERNMENT

## 2024-11-22 DIAGNOSIS — J32.4 CHRONIC PANSINUSITIS: ICD-10-CM

## 2024-11-22 DIAGNOSIS — Z98.890 STATUS POST FUNCTIONAL ENDOSCOPIC SINUS SURGERY (FESS): Primary | ICD-10-CM

## 2024-11-22 DIAGNOSIS — J34.2 NASAL SEPTAL DEVIATION: ICD-10-CM

## 2024-11-22 DIAGNOSIS — J34.3 HYPERTROPHY OF BOTH INFERIOR NASAL TURBINATES: ICD-10-CM

## 2024-11-22 PROCEDURE — 99211 OFF/OP EST MAY X REQ PHY/QHP: CPT | Mod: PBBFAC | Performed by: STUDENT IN AN ORGANIZED HEALTH CARE EDUCATION/TRAINING PROGRAM

## 2024-11-22 PROCEDURE — 99999 PR PBB SHADOW E&M-EST. PATIENT-LVL I: CPT | Mod: PBBFAC,,, | Performed by: STUDENT IN AN ORGANIZED HEALTH CARE EDUCATION/TRAINING PROGRAM

## 2024-11-22 NOTE — PROGRESS NOTES
POSTOP STESS 2    11/22/2024     I had the pleasure of seeing Enoch Barr in follow up today.  He recently underwent STESS on 10/23/24.  Continues to do well.  Reports continued improvement in his nasal congestion.  Does feel that his sinuses may be swollen up high.  No purulence noted does have some taste of blood the back of his mouth occasionally.  No clear nasal drainage or rhinorrhea.    Review of Systems   Constitutional:  Positive for malaise/fatigue.   Eyes: Negative.    Respiratory:  Positive for cough.    Cardiovascular: Negative.    Gastrointestinal: Negative.    Genitourinary: Negative.    Musculoskeletal:  Positive for back pain and neck pain.   Skin: Negative.    Neurological: Negative.    Psychiatric/Behavioral: Negative.       On physical exam, nasal endoscopy was performed and debris was removed from the bilateral sinus cavities.  The underlying sinuses appeared to be healing very well.  Well medialized middle turbinates bilaterally.  Continued patent ethmoid sinuses.  There was some minimal crusting present within the left and right maxillary and ethmoid sinuses.  This was removed that showed some very mildly edematous mucosa ethmoid sinuses bilaterally.  No purulence was appreciated.    Impression today is consistent with doing well status post STESS.  I have recommended he continues on his Sinus Rinse twice daily and start on intranasal Flonase and return to see us in 2 months time.    Thank you for allowing me to participate in his care.  I will keep you abreast of his progress.      Sincerely,    Humberto Fam MD    Nasal Endoscopy with Debridement, Bilateral.      The nasal passageway was anesthetized with topical Lidocaine 4% and decongested with phenylephrine nasal spray. A rigid nasal endoscope was inserted into the nose to visualize the nasal passageway and paranasal sinuses. Under endoscopic visualization, a combination of instruments including suction and grasping forceps were used  to debride crust, debris, inflammatory tissue and nasal polyps from the nasal cavity, paranasal sinuses and sinus drainage pathways. This was performed bilaterally to aid in healing and optimize the patency and function of the sinus cavities and nasal passageways. This is necessary to avoid scar formation, infection, and mucocele formation. In addition, this facilitates in the optimal instillation of topical therapies, saline irrigations, long-term disease surveillance, and endoscopically-derived cultures. The endoscope was withdrawn without sequelae. The procedure was well tolerated by the patient.

## 2024-11-26 ENCOUNTER — OFFICE VISIT (OUTPATIENT)
Dept: SPORTS MEDICINE | Facility: CLINIC | Age: 59
End: 2024-11-26
Payer: OTHER GOVERNMENT

## 2024-11-26 VITALS
SYSTOLIC BLOOD PRESSURE: 129 MMHG | WEIGHT: 226.88 LBS | BODY MASS INDEX: 30.73 KG/M2 | DIASTOLIC BLOOD PRESSURE: 89 MMHG | HEART RATE: 90 BPM | HEIGHT: 72 IN

## 2024-11-26 DIAGNOSIS — G89.29 CHRONIC RIGHT SHOULDER PAIN: ICD-10-CM

## 2024-11-26 DIAGNOSIS — M25.511 CHRONIC RIGHT SHOULDER PAIN: ICD-10-CM

## 2024-11-26 DIAGNOSIS — M54.16 LUMBAR RADICULOPATHY: ICD-10-CM

## 2024-11-26 DIAGNOSIS — G89.4 CHRONIC PAIN SYNDROME: Primary | ICD-10-CM

## 2024-11-26 DIAGNOSIS — M54.16 LUMBAR RADICULOPATHY: Primary | ICD-10-CM

## 2024-11-26 DIAGNOSIS — M12.811 ROTATOR CUFF ARTHROPATHY OF RIGHT SHOULDER: Primary | ICD-10-CM

## 2024-11-26 PROCEDURE — 99214 OFFICE O/P EST MOD 30 MIN: CPT | Mod: PBBFAC | Performed by: ORTHOPAEDIC SURGERY

## 2024-11-26 PROCEDURE — 99999PBSHW PR PBB SHADOW TECHNICAL ONLY FILED TO HB: Mod: PBBFAC,,,

## 2024-11-26 PROCEDURE — 99999 PR PBB SHADOW E&M-EST. PATIENT-LVL IV: CPT | Mod: PBBFAC,,, | Performed by: ORTHOPAEDIC SURGERY

## 2024-11-26 PROCEDURE — 20610 DRAIN/INJ JOINT/BURSA W/O US: CPT | Mod: PBBFAC | Performed by: ORTHOPAEDIC SURGERY

## 2024-11-26 RX ORDER — TRIAMCINOLONE ACETONIDE 40 MG/ML
40 INJECTION, SUSPENSION INTRA-ARTICULAR; INTRAMUSCULAR
Status: DISCONTINUED | OUTPATIENT
Start: 2024-11-26 | End: 2024-11-26 | Stop reason: HOSPADM

## 2024-11-26 RX ADMIN — TRIAMCINOLONE ACETONIDE 40 MG: 40 INJECTION, SUSPENSION INTRA-ARTICULAR; INTRAMUSCULAR at 02:11

## 2024-11-26 NOTE — PROCEDURES
Large Joint Aspiration/Injection: R subacromial bursa    Date/Time: 11/26/2024 2:00 PM    Performed by: Vernon Barth MD  Authorized by: Vernon Barth MD    Consent Done?:  Yes (Verbal)  Indications:  Pain  Site marked: the procedure site was marked    Timeout: prior to procedure the correct patient, procedure, and site was verified    Prep: patient was prepped and draped in usual sterile fashion    Local anesthesia used?: No      Details:  Needle Size:  22 G  Ultrasonic Guidance for needle placement?: No    Approach:  Posterior  Location:  Shoulder  Site:  R subacromial bursa  Medications:  40 mg triamcinolone acetonide 40 mg/mL  Patient tolerance:  Patient tolerated the procedure well with no immediate complications

## 2024-11-29 ENCOUNTER — PATIENT MESSAGE (OUTPATIENT)
Dept: PAIN MEDICINE | Facility: OTHER | Age: 59
End: 2024-11-29
Payer: OTHER GOVERNMENT

## 2024-12-02 ENCOUNTER — TELEPHONE (OUTPATIENT)
Dept: OTOLARYNGOLOGY | Facility: CLINIC | Age: 59
End: 2024-12-02
Payer: OTHER GOVERNMENT

## 2024-12-04 ENCOUNTER — PATIENT MESSAGE (OUTPATIENT)
Dept: PAIN MEDICINE | Facility: OTHER | Age: 59
End: 2024-12-04
Payer: OTHER GOVERNMENT

## 2024-12-04 DIAGNOSIS — M54.16 LUMBAR RADICULOPATHY: ICD-10-CM

## 2024-12-04 DIAGNOSIS — G89.4 CHRONIC PAIN SYNDROME: Primary | ICD-10-CM

## 2024-12-10 ENCOUNTER — CLINICAL SUPPORT (OUTPATIENT)
Dept: REHABILITATION | Facility: HOSPITAL | Age: 59
End: 2024-12-10
Attending: ORTHOPAEDIC SURGERY
Payer: OTHER GOVERNMENT

## 2024-12-10 DIAGNOSIS — M12.811 ROTATOR CUFF ARTHROPATHY OF RIGHT SHOULDER: ICD-10-CM

## 2024-12-10 PROCEDURE — 97140 MANUAL THERAPY 1/> REGIONS: CPT

## 2024-12-10 PROCEDURE — 97161 PT EVAL LOW COMPLEX 20 MIN: CPT

## 2024-12-10 PROCEDURE — 97110 THERAPEUTIC EXERCISES: CPT

## 2024-12-16 ENCOUNTER — CLINICAL SUPPORT (OUTPATIENT)
Dept: REHABILITATION | Facility: HOSPITAL | Age: 59
End: 2024-12-16
Payer: OTHER GOVERNMENT

## 2024-12-16 DIAGNOSIS — M12.811 ROTATOR CUFF ARTHROPATHY OF RIGHT SHOULDER: Primary | ICD-10-CM

## 2024-12-16 PROCEDURE — 97530 THERAPEUTIC ACTIVITIES: CPT

## 2024-12-16 PROCEDURE — 97140 MANUAL THERAPY 1/> REGIONS: CPT

## 2024-12-16 PROCEDURE — 97112 NEUROMUSCULAR REEDUCATION: CPT

## 2024-12-16 PROCEDURE — 97110 THERAPEUTIC EXERCISES: CPT

## 2024-12-18 NOTE — PLAN OF CARE
[facility]   Physical Therapy Initial Evaluation     Date: 12/10/2024   Name: Enoch Barr  Clinic Number: [id number]    Therapy Diagnosis:   Encounter Diagnosis   Name Primary?    Rotator cuff arthropathy of right shoulder      Physician: Vernon Barth MD    Physician Orders: PT Eval and Treat   Medical Diagnosis from Referral: M12.811 (ICD-10-CM) - Rotator cuff arthropathy of right shoulder   Evaluation Date: 12/10/2024  Authorization Period Expiration: 12/31/2024  Plan of Care Expiration: 3/30/2025  Progress Note Due: 1/10/2025  Visit # / Visits authorized: 1/ 1   FOTO: 1/1    Precautions: Standard     Time In: 5:00 PM  Time Out: 6:00 PM  Total Appointment Time (timed & untimed codes): 60 minutes      SUBJECTIVE     Date of onset: Chronic    History of current condition - Enoch reports: Pt is a 60 yo male who presents to clinic w/ c/o R shoulder pain. 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 Noticed clicking and popping with using R shoulder. States instability sensation with rasing arm over shoulder height. Pain aggravates with sleeping on the R side, overhead head movement, reaching behind back. Pain improves with rest and pain med. Denies numbness, tingling and weakness. States he would like to improve shoulder pain with daily activities.  Falls: NA    Imaging, MRI studies: Small partial-thickness tear of the subscapularis.  Supraspinatus and infraspinatus are intact.     Large degenerate labral tear with labral fraying predominantly inferiorly, extending anterior inferior and posterior inferiorly.  Associated labral fraying, cartilage loss and subchondral glenoid edema/subchondral cystic change at the inferior glenoid.     Intra-articular biceps tendinopathy.     Advanced arthrosis of the AC joint.    Prior Therapy: NA  Social History:  lives with their family  Occupation: [employer]   Prior Level of Function: independent    Current Level of Function: limited due to shoulder pain     Pain:  Current 3/10, worst 6/10, best 2/10   Location: right  shoulder  Description: Aching and Dull  Aggravating Factors: overhead movement, reaching out, reaching behind his back  Easing Factors: pain medication and ice    Patients goals: he would like to improve shoulder pain with daily activities.     Medical History:   Past Medical History:   Diagnosis Date    Colon polyps     GERD (gastroesophageal reflux disease)     Hypercholesteremia     Hypertension     on medication    Lumbar spondylosis 09/21/2020    Migraines     Sleep apnea     Stroke        Surgical History:   Enoch Barr  has a past surgical history that includes Sinus surgery (2012); Hernia repair; Colonoscopy (N/A, 8/10/2020); Repair of triceps tendon (Left, 10/2/2020); Laparoscopic cholecystectomy (N/A, 1/25/2022); Epidural steroid injection (Left, 2/17/2023); Surgical removal of bunion with osteotomy of metatarsal bone (Right, 3/24/2023); Epidural steroid injection (Bilateral, 4/14/2023); Epidural steroid injection (Left, 6/2/2023); Laminectomy (Left, 7/5/2023); Lumbar discectomy (Left, 7/5/2023); injection, spine, lumbosacral, transforaminal approach (Left, 11/29/2023); and fess, with nasal septoplasty and turbinate reduction, with imaging [place] (Bilateral, 10/23/2024).    Medications:   Enoch has a current medication list which includes the following prescription(s): acetaminophen, acetaminophen, atorvastatin, benzocaine-menthol, cetirizine, cromolyn, diclofenac sodium, fluticasone propionate, hydrocodone-acetaminophen, ibuprofen, lisinopril-hydrochlorothiazide, methocarbamol, metoprolol succinate, omeprazole, pregabalin, saline nasal, trazodone, and triamcinolone acetonide 0.1%.    Allergies:   Review of patient's allergies indicates:  No Known Allergies     Objective   Posture: R humeral IR. Increased thoracic kyphosis with slight protracted head. R scapular depression and  downward rotation         Shoulder Elevation: Decreased posterior tilt and upward rotation of scapula. Improved with scapular assist for starting elevation and posterior scapular tilt as well as centering humeral glide         Shoulder Active Range of Motion:   Shoulder Right Left   Flexion    145 170   ER at 0    50 60   Behind the back Reach    L4 T7   Scapula Upward Rotation 45 55      Shoulder Passive Range of Motion:   Shoulder Right Left   Flexion    160 175   ER at 0    60 70   ER at 90    75 90   IR    45 45         Seated Thoracolumbar Range of Motion:     % Observation Pain   Flexion 100   N   Extension 50   N         Strength:    Right Left   Scaption 3+/5 4+/5   Shoulder ER at side 4-/5 4/5   Shoulder IR at side  5/5 5/5   Deltoid 4/5 4/5   Scapula Upward rotation force couple- UT/LT/SA (determined due to inability to reach full ROM) 3/5 4/5            Special Tests:    Right Left   Pickett- Edison + -   Neer's + -   Full Can + -         Right Left   Drop Arm Test - -   ER Lag Sign - -   Bear Hug + -        Right Left   Active Compression - -   Cross Body Adduction - -         Joint Mobility: Hypomobility R GH inferior/posterior glide      Palpation: tenderness over bicipital groove and lateral deltoid      Flexibility:   Post Cuff: R + ; L +              Lat: R + ; L +              Pec Minor: R + ; L +       Limitation/Restriction for FOTO Shoulder Survey    Therapist reviewed FOTO scores for Enoch DELUCA Momo on 12/10/2024.   FOTO documents entered into [employer] - see Media section.    Limitation Score: 21.7%         TREATMENT   Treatment Time In: 5:40  Treatment Time Out: 6:00  Total Treatment time (time-based codes) separate from Evaluation: 20 minutes    Enoch received therapeutic exercises to develop strength, endurance, ROM, flexibility and posture for 12 minutes including:  ER/IR walk out with ORG 3 x 10  Wall slides x 2 x 15  Prone modified T 2 x 15    Enoch received the following manual therapy  techniques: Joint mobilizations, Myofacial release and Soft tissue Mobilization were applied to the: (R) shoulder for 8 minutes, including:  Posterior glide grade III  Isometric ER/IR supine       Home Exercises and Patient Education Provided    Education provided:   - Home Exercise Program, prognosis, activity modification     Written Home Exercises Provided: yes.  Exercises were reviewed and Enoch was able to demonstrate them prior to the end of the session.  Enoch demonstrated good  understanding of the education provided.     See EMR under Media for exercises provided 12/10/2024.    Assessment   Enoch is a 59 y.o. male referred to outpatient Physical Therapy with a medical diagnosis of M12.811 (ICD-10-CM) - Rotator cuff arthropathy of right shoulder  Pt presents with limited and/or painful R shoulder ROM, RC and periscapular weakness, tenderness/tightness of posterior capsule, and functional limitations of lifting, reaching, and overhead activities.   Pt prognosis is Good.   Pt will benefit from skilled outpatient Physical Therapy to address the deficits stated above and in the chart below, provide pt/family education, and to maximize pt's level of independence.     Plan of care discussed with patient: Yes  Pt's spiritual, cultural and educational needs considered and patient is agreeable to the plan of care and goals as stated below:     Anticipated Barriers for therapy: NA  Medical Necessity is demonstrated by the following  History  Co-morbidities and personal factors that may impact the plan of care [x] LOW: no personal factors / co-morbidities  [] MODERATE: 1-2 personal factors / co-morbidities  [] HIGH: 3+ personal factors / co-morbidities    Moderate / High Support Documentation:   Co-morbidities affecting plan of care:     Personal Factors:   no deficits     Examination  Body Structures and Functions, activity limitations and participation restrictions that may impact the plan of care [x] LOW: addressing  1-2 elements  [] MODERATE: 3+ elements  [] HIGH: 4+ elements (please support below)    Moderate / High Support Documentation:      Clinical Presentation [x] LOW: stable  [] MODERATE: Evolving  [] HIGH: Unstable     Decision Making/ Complexity Score: low       Goals:    Short Term Goals: 2-4 weeks  1. Pt will be compliant with HEP 50% of prescribed amount.   2. The pt to demo improvement in R shoulder PROM/AROM to 80% of uninvolved side   3.  Pt will improve FOTO score to meet or exceed MCID      Long Term Goals: 16 weeks   Pt will be compliant with % of prescribed amount.   Pt will improve shoulder girdle strength by 2/3 MMT grade   Pt will report ability to reach into cabinets and to bathe/groom her hair with her right UE   The pt will report full participation in ADLs and IADLs without restrictions related to R shoulder     Plan   Plan of care Certification: 12/10/2024 to 3/30/2025.    Outpatient Physical Therapy 1-2 times weekly for 12 weeks to include the following interventions: Manual Therapy, Neuromuscular Re-ed, Patient Education, Therapeutic Activities, and Therapeutic Exercise.     Juvencio Crowley, PT

## 2024-12-19 ENCOUNTER — CLINICAL SUPPORT (OUTPATIENT)
Dept: REHABILITATION | Facility: HOSPITAL | Age: 59
End: 2024-12-19
Payer: OTHER GOVERNMENT

## 2024-12-19 DIAGNOSIS — M12.811 ROTATOR CUFF ARTHROPATHY OF RIGHT SHOULDER: Primary | ICD-10-CM

## 2024-12-19 PROCEDURE — 97110 THERAPEUTIC EXERCISES: CPT

## 2024-12-19 PROCEDURE — 97530 THERAPEUTIC ACTIVITIES: CPT

## 2024-12-19 PROCEDURE — 97140 MANUAL THERAPY 1/> REGIONS: CPT

## 2024-12-19 PROCEDURE — 97112 NEUROMUSCULAR REEDUCATION: CPT

## 2024-12-23 NOTE — PROGRESS NOTES
[facility]   Physical Therapy Treatment Note      Name: Enoch DELUCA Momo  [facility] Number: [id number]    Therapy Diagnosis:   Encounter Diagnosis   Name Primary?    Rotator cuff arthropathy of right shoulder Yes     Physician: Vernon Barth MD    Visit Date: 12/16/2024    Physician Orders: PT Eval and Treat   Medical Diagnosis from Referral: M12.811 (ICD-10-CM) - Rotator cuff arthropathy of right shoulder   Evaluation Date: 12/10/2024  Authorization Period Expiration: 12/31/2024  Plan of Care Expiration: 3/30/2025  Progress Note Due: 1/10/2025  Visit # / Visits authorized: 1/ 20  FOTO: 1/3    Time In: 4:30 PM  Time Out: 5:30 PM  Total Billable Time: 60 minutes    Subjective     Pt reports: doing okay, pain stays the same from initial eval.  He was compliant with home exercise program.  Response to previous treatment: NA  Functional change: too soon    Pain: 3/10  Location: right shoulder     Objective      Objective Measures updated at progress report unless specified.     Treatment     Enoch received the treatments listed below:      therapeutic exercises to develop strength, endurance, ROM, and flexibility for 16 minutes including:  Supine shoulder flexion with cane x 30  Half kneeling open book x 30  Thoracic extension on the chair x 30     manual therapy techniques: Joint mobilizations were applied to the: shoulder for 8 minutes, including:  Posterior/inferior GHJ glide grade III  Supine thoracic manipulation grade V         neuromuscular re-education activities to improve: Balance, Coordination, Kinesthetic, Sense, and Proprioception for 28  minutes. The following activities were included:  SA wall slide x 30  Prone modified T 2 x 15   ER at 90 degrees with elbow supported with ORG x 30  Prone IR with 1# 3 x 10  Landmine press with 5# x 30     therapeutic activities to improve functional performance for 8  minutes, including:  Shoulder taps 3 x 15  Wall bal 3 x 30 sec     Patient  Education and Home Exercises       Education provided:   - Reviewed HEP    Written Home Exercises Provided: yes. Exercises were reviewed and Enoch was able to demonstrate them prior to the end of the session.  Enoch demonstrated good  understanding of the education provided. See EMR under Patient Instructions for exercises provided during therapy sessions    Assessment     Pt presented to clinic w/ improved shoulder ROM and symptom  from last session. Session today focus on shoulder RC engagement and periscapular strengthening with emphasis subscap activation. Verbal cuing required to avoid anterior scapula tipping through the session.  Pt donal session well w/o increased shoulder pain. Will continue to progress per tolerance.        Enoch Is progressing well towards his goals.   Pt prognosis is Good.     Pt will continue to benefit from skilled outpatient physical therapy to address the deficits listed in the problem list box on initial evaluation, provide pt/family education and to maximize pt's level of independence in the home and community environment.     Pt's spiritual, cultural and educational needs considered and pt agreeable to plan of care and goals.     Anticipated barriers to physical therapy: NA    Goals: Short Term Goals: 2-4 weeks  1. Pt will be compliant with HEP 50% of prescribed amount.   2. The pt to demo improvement in R shoulder PROM/AROM to 80% of uninvolved side   3.  Pt will improve FOTO score to meet or exceed MCID      Long Term Goals: 16 weeks   Pt will be compliant with % of prescribed amount.   Pt will improve shoulder girdle strength by 2/3 MMT grade   Pt will report ability to reach into cabinets and to bathe/groom her hair with her right UE   The pt will report full participation in ADLs and IADLs without restrictions related to R shoulder     Plan     Plan of care Certification: 12/10/2024 to 3/30/2025.     Outpatient Physical Therapy 1-2 times weekly for 12 weeks to include  the following interventions: Manual Therapy, Neuromuscular Re-ed, Patient Education, Therapeutic Activities, and Therapeutic Exercise.     Juvencio Crowley, PT

## 2024-12-26 ENCOUNTER — CLINICAL SUPPORT (OUTPATIENT)
Dept: REHABILITATION | Facility: HOSPITAL | Age: 59
End: 2024-12-26
Payer: OTHER GOVERNMENT

## 2024-12-26 DIAGNOSIS — M12.811 ROTATOR CUFF ARTHROPATHY OF RIGHT SHOULDER: Primary | ICD-10-CM

## 2024-12-26 PROCEDURE — 97112 NEUROMUSCULAR REEDUCATION: CPT

## 2024-12-26 PROCEDURE — 97140 MANUAL THERAPY 1/> REGIONS: CPT

## 2024-12-26 PROCEDURE — 97110 THERAPEUTIC EXERCISES: CPT

## 2024-12-26 NOTE — PROGRESS NOTES
[facility]   Physical Therapy Treatment Note      Name: Enoch DELUCA Momo  [facility] Number: [id number]    Therapy Diagnosis:   Encounter Diagnosis   Name Primary?    Rotator cuff arthropathy of right shoulder Yes     Physician: Vernon Barth MD    Visit Date: 12/19/2024    Physician Orders: PT Eval and Treat   Medical Diagnosis from Referral: M12.811 (ICD-10-CM) - Rotator cuff arthropathy of right shoulder   Evaluation Date: 12/10/2024  Authorization Period Expiration: 12/31/2024  Plan of Care Expiration: 3/30/2025  Progress Note Due: 1/10/2025  Visit # / Visits authorized: 1/ 20  FOTO: 1/3    Time In: 4:30 PM  Time Out: 5:30 PM  Total Billable Time: 60 minutes    Subjective     Pt reports: doing okay, pain stays the same from initial eval.  He was compliant with home exercise program.  Response to previous treatment: NA  Functional change: too soon    Pain: 3/10  Location: right shoulder     Objective      Objective Measures updated at progress report unless specified.     Treatment     Enoch received the treatments listed below:      therapeutic exercises to develop strength, endurance, ROM, and flexibility for 16 minutes including:  Supine shoulder flexion with cane x 30  Half kneeling open book x 30  Thoracic extension on the chair x 30     manual therapy techniques: Joint mobilizations were applied to the: shoulder for 12 minutes, including:  Posterior/inferior GHJ glide grade III  RA stabilization shoulder ER/IR 3 x 20 sec   Supine thoracic manipulation grade V         neuromuscular re-education activities to improve: Balance, Coordination, Kinesthetic, Sense, and Proprioception for 26  minutes. The following activities were included:  SA wall slide with band x 30  Prone modified T 2 x 15   ER at 90 degrees with elbow supported with ORG x 30  Prone IR with 1# 3 x 10  Landmine press with 5# x 30     therapeutic activities to improve functional performance for 8  minutes,  including:  Shoulder taps 3 x 15  Wall ball 3 x 30 sec     Patient Education and Home Exercises       Education provided:   - Reviewed HEP    Written Home Exercises Provided: yes. Exercises were reviewed and Enoch was able to demonstrate them prior to the end of the session.  Enoch demonstrated good  understanding of the education provided. See EMR under Patient Instructions for exercises provided during therapy sessions    Assessment     Pt presented to clinic w/ improved shoulder ROM and symptom  from last session. Session today focus on shoulder RC engagement and periscapular strengthening with emphasis subscap activation. Verbal cuing required to avoid anterior scapula tipping through the session.  Pt donal session well w/o increased shoulder pain. Will continue to progress per tolerance.        Enoch Is progressing well towards his goals.   Pt prognosis is Good.     Pt will continue to benefit from skilled outpatient physical therapy to address the deficits listed in the problem list box on initial evaluation, provide pt/family education and to maximize pt's level of independence in the home and community environment.     Pt's spiritual, cultural and educational needs considered and pt agreeable to plan of care and goals.     Anticipated barriers to physical therapy: NA    Goals: Short Term Goals: 2-4 weeks  1. Pt will be compliant with HEP 50% of prescribed amount.   2. The pt to demo improvement in R shoulder PROM/AROM to 80% of uninvolved side   3.  Pt will improve FOTO score to meet or exceed MCID      Long Term Goals: 16 weeks   Pt will be compliant with % of prescribed amount.   Pt will improve shoulder girdle strength by 2/3 MMT grade   Pt will report ability to reach into cabinets and to bathe/groom her hair with her right UE   The pt will report full participation in ADLs and IADLs without restrictions related to R shoulder     Plan     Plan of care Certification: 12/10/2024 to 3/30/2025.      Outpatient Physical Therapy 1-2 times weekly for 12 weeks to include the following interventions: Manual Therapy, Neuromuscular Re-ed, Patient Education, Therapeutic Activities, and Therapeutic Exercise.     Juvencio Crowley, PT

## 2024-12-30 ENCOUNTER — CLINICAL SUPPORT (OUTPATIENT)
Dept: REHABILITATION | Facility: HOSPITAL | Age: 59
End: 2024-12-30
Payer: OTHER GOVERNMENT

## 2024-12-30 DIAGNOSIS — M12.811 ROTATOR CUFF ARTHROPATHY OF RIGHT SHOULDER: Primary | ICD-10-CM

## 2024-12-30 PROCEDURE — 97110 THERAPEUTIC EXERCISES: CPT

## 2024-12-30 PROCEDURE — 97530 THERAPEUTIC ACTIVITIES: CPT

## 2024-12-30 PROCEDURE — 97112 NEUROMUSCULAR REEDUCATION: CPT

## 2024-12-30 PROCEDURE — 97140 MANUAL THERAPY 1/> REGIONS: CPT

## 2024-12-30 NOTE — PROGRESS NOTES
[facility]   Physical Therapy Treatment Note      Name: Enoch DELUCA Momo  [facility] Number: [id number]    Therapy Diagnosis:   Encounter Diagnosis   Name Primary?    Rotator cuff arthropathy of right shoulder Yes     Physician: Vernon Barth MD    Visit Date: 12/26/2024    Physician Orders: PT Eval and Treat   Medical Diagnosis from Referral: M12.811 (ICD-10-CM) - Rotator cuff arthropathy of right shoulder   Evaluation Date: 12/10/2024  Authorization Period Expiration: 12/31/2024  Plan of Care Expiration: 3/30/2025  Progress Note Due: 1/10/2025  Visit # / Visits authorized: 4/ 20  FOTO: 1/3    Time In: 4:30 PM  Time Out: 5:30 PM  Total Billable Time: 60 minutes    Subjective     Pt reports: doing okay, pain stays the same from initial eval.  He was compliant with home exercise program.  Response to previous treatment: NA  Functional change: too soon    Pain: 3/10  Location: right shoulder     Objective      Objective Measures updated at progress report unless specified.     Treatment     Enoch received the treatments listed below:      therapeutic exercises to develop strength, endurance, ROM, and flexibility for 16 minutes including:  Supine shoulder flexion with cane x 30  Half kneeling open book x 30  Thoracic extension on the chair x 30     manual therapy techniques: Joint mobilizations were applied to the: shoulder for 12 minutes, including:  Posterior/inferior GHJ glide grade III  RA stabilization shoulder ER/IR 3 x 20 sec   Supine thoracic manipulation grade V         neuromuscular re-education activities to improve: Balance, Coordination, Kinesthetic, Sense, and Proprioception for 26  minutes. The following activities were included:  SA wall slide with band x 30  Prone modified T 2 x 15   ER at 90 degrees with elbow supported with ORG x 30  Prone IR with 1# 3 x 10  Landmine press with 5# x 30     therapeutic activities to improve functional performance for 8  minutes,  including:  Shoulder taps 3 x 15  Wall ball 3 x 30 sec     Patient Education and Home Exercises       Education provided:   - Reviewed HEP    Written Home Exercises Provided: yes. Exercises were reviewed and Enoch was able to demonstrate them prior to the end of the session.  Enoch demonstrated good  understanding of the education provided. See EMR under Patient Instructions for exercises provided during therapy sessions    Assessment     Pt presented to clinic w/ improved shoulder ROM and symptom  from last session. Session today focus on shoulder RC engagement and periscapular strengthening with emphasis subscap activation. Verbal cuing required to avoid anterior scapula tipping through the session.  Pt donal session well w/o increased shoulder pain. Will continue to progress per tolerance.        Enoch Is progressing well towards his goals.   Pt prognosis is Good.     Pt will continue to benefit from skilled outpatient physical therapy to address the deficits listed in the problem list box on initial evaluation, provide pt/family education and to maximize pt's level of independence in the home and community environment.     Pt's spiritual, cultural and educational needs considered and pt agreeable to plan of care and goals.     Anticipated barriers to physical therapy: NA    Goals: Short Term Goals: 2-4 weeks  1. Pt will be compliant with HEP 50% of prescribed amount.   2. The pt to demo improvement in R shoulder PROM/AROM to 80% of uninvolved side   3.  Pt will improve FOTO score to meet or exceed MCID      Long Term Goals: 16 weeks   Pt will be compliant with % of prescribed amount.   Pt will improve shoulder girdle strength by 2/3 MMT grade   Pt will report ability to reach into cabinets and to bathe/groom her hair with her right UE   The pt will report full participation in ADLs and IADLs without restrictions related to R shoulder     Plan     Plan of care Certification: 12/10/2024 to 3/30/2025.      Outpatient Physical Therapy 1-2 times weekly for 12 weeks to include the following interventions: Manual Therapy, Neuromuscular Re-ed, Patient Education, Therapeutic Activities, and Therapeutic Exercise.     Juvencio Crowley, PT

## 2024-12-31 NOTE — PROGRESS NOTES
[facility]   Physical Therapy Treatment Note      Name: Enoch DELUCA Momo  [facility] Number: [id number]    Therapy Diagnosis:   Encounter Diagnosis   Name Primary?    Rotator cuff arthropathy of right shoulder Yes     Physician: Vernon Barth MD    Visit Date: 12/30/2024    Physician Orders: PT Eval and Treat   Medical Diagnosis from Referral: M12.811 (ICD-10-CM) - Rotator cuff arthropathy of right shoulder   Evaluation Date: 12/10/2024  Authorization Period Expiration: 12/31/2024  Plan of Care Expiration: 3/30/2025  Progress Note Due: 1/10/2025  Visit # / Visits authorized: 4/ 20  FOTO: 1/3    Time In: 4:30 PM  Time Out: 5:30 PM  Total Billable Time: 60 minutes    Subjective     Pt reports: doing okay, improved shoulder motion from PT.  He was compliant with home exercise program.  Response to previous treatment: NA  Functional change: too soon    Pain: 3/10  Location: right shoulder     Objective      Objective Measures updated at progress report unless specified.     Treatment     Enoch received the treatments listed below:      therapeutic exercises to develop strength, endurance, ROM, and flexibility for 16 minutes including:  Supine shoulder flexion with cane x 30  Half kneeling open book x 30  Thoracic extension on the chair x 30     manual therapy techniques: Joint mobilizations were applied to the: shoulder for 12 minutes, including:  Posterior/inferior GHJ glide grade III  RA stabilization shoulder ER/IR 3 x 20 sec   Supine thoracic manipulation grade V         neuromuscular re-education activities to improve: Balance, Coordination, Kinesthetic, Sense, and Proprioception for 26  minutes. The following activities were included:  SA wall slide with band x 30  Prone modified T 2 x 15   ER at 90 degrees with elbow supported with ORG x 30  Prone IR with 1# 3 x 10  Landmine press with 5# x 30     therapeutic activities to improve functional performance for 8  minutes,  including:  Shoulder taps 3 x 15  Wall ball 3 x 30 sec     Patient Education and Home Exercises       Education provided:   - Reviewed HEP    Written Home Exercises Provided: yes. Exercises were reviewed and Enoch was able to demonstrate them prior to the end of the session.  Enoch demonstrated good  understanding of the education provided. See EMR under Patient Instructions for exercises provided during therapy sessions    Assessment     Pt presented to clinic w/ improved shoulder ROM and symptom  from last session. Session today focus on shoulder RC engagement and periscapular strengthening with emphasis subscap activation. Verbal cuing required to avoid anterior scapula tipping through the session.  Pt donal session well w/o increased shoulder pain. Will continue to progress per tolerance.        Enoch Is progressing well towards his goals.   Pt prognosis is Good.     Pt will continue to benefit from skilled outpatient physical therapy to address the deficits listed in the problem list box on initial evaluation, provide pt/family education and to maximize pt's level of independence in the home and community environment.     Pt's spiritual, cultural and educational needs considered and pt agreeable to plan of care and goals.     Anticipated barriers to physical therapy: NA    Goals: Short Term Goals: 2-4 weeks  1. Pt will be compliant with HEP 50% of prescribed amount.   2. The pt to demo improvement in R shoulder PROM/AROM to 80% of uninvolved side   3.  Pt will improve FOTO score to meet or exceed MCID      Long Term Goals: 16 weeks   Pt will be compliant with % of prescribed amount.   Pt will improve shoulder girdle strength by 2/3 MMT grade   Pt will report ability to reach into cabinets and to bathe/groom her hair with her right UE   The pt will report full participation in ADLs and IADLs without restrictions related to R shoulder     Plan     Plan of care Certification: 12/10/2024 to 3/30/2025.      Outpatient Physical Therapy 1-2 times weekly for 12 weeks to include the following interventions: Manual Therapy, Neuromuscular Re-ed, Patient Education, Therapeutic Activities, and Therapeutic Exercise.     Juvencio Crowley, PT

## 2025-01-03 ENCOUNTER — HOSPITAL ENCOUNTER (OUTPATIENT)
Facility: OTHER | Age: 60
Discharge: HOME | End: 2025-01-03
Attending: ANESTHESIOLOGY | Admitting: ANESTHESIOLOGY
Payer: OTHER GOVERNMENT

## 2025-01-03 VITALS
TEMPERATURE: 98 F | SYSTOLIC BLOOD PRESSURE: 134 MMHG | OXYGEN SATURATION: 95 % | DIASTOLIC BLOOD PRESSURE: 79 MMHG | WEIGHT: 225 LBS | BODY MASS INDEX: 27.98 KG/M2 | HEIGHT: 75 IN | RESPIRATION RATE: 16 BRPM | HEART RATE: 60 BPM

## 2025-01-03 DIAGNOSIS — G89.29 CHRONIC PAIN: ICD-10-CM

## 2025-01-03 DIAGNOSIS — M54.17 LUMBOSACRAL RADICULOPATHY: Primary | ICD-10-CM

## 2025-01-03 PROCEDURE — 63650 IMPLANT NEUROELECTRODES: CPT | Performed by: ANESTHESIOLOGY

## 2025-01-03 PROCEDURE — 63600175 PHARM REV CODE 636 W HCPCS: Performed by: ANESTHESIOLOGY

## 2025-01-03 PROCEDURE — 63650 IMPLANT NEUROELECTRODES: CPT | Mod: ,,, | Performed by: ANESTHESIOLOGY

## 2025-01-03 PROCEDURE — 99153 MOD SED SAME PHYS/QHP EA: CPT | Performed by: ANESTHESIOLOGY

## 2025-01-03 PROCEDURE — 99152 MOD SED SAME PHYS/QHP 5/>YRS: CPT | Performed by: ANESTHESIOLOGY

## 2025-01-03 PROCEDURE — C1778 LEAD, NEUROSTIMULATOR: HCPCS | Performed by: ANESTHESIOLOGY

## 2025-01-03 RX ORDER — LIDOCAINE HYDROCHLORIDE 20 MG/ML
INJECTION, SOLUTION INFILTRATION; PERINEURAL
Status: DISCONTINUED | OUTPATIENT
Start: 2025-01-03 | End: 2025-01-03 | Stop reason: HOSPADM

## 2025-01-03 RX ORDER — BUPIVACAINE HYDROCHLORIDE 2.5 MG/ML
INJECTION, SOLUTION EPIDURAL; INFILTRATION; INTRACAUDAL
Status: DISCONTINUED | OUTPATIENT
Start: 2025-01-03 | End: 2025-01-03 | Stop reason: HOSPADM

## 2025-01-03 RX ORDER — CEFAZOLIN SODIUM 1 G/3ML
2 INJECTION, POWDER, FOR SOLUTION INTRAMUSCULAR; INTRAVENOUS ONCE
Status: COMPLETED | OUTPATIENT
Start: 2025-01-03 | End: 2025-01-03

## 2025-01-03 RX ORDER — FENTANYL CITRATE 50 UG/ML
INJECTION, SOLUTION INTRAMUSCULAR; INTRAVENOUS
Status: DISCONTINUED | OUTPATIENT
Start: 2025-01-03 | End: 2025-01-03 | Stop reason: HOSPADM

## 2025-01-03 RX ORDER — MIDAZOLAM HYDROCHLORIDE 1 MG/ML
INJECTION INTRAMUSCULAR; INTRAVENOUS
Status: DISCONTINUED | OUTPATIENT
Start: 2025-01-03 | End: 2025-01-03 | Stop reason: HOSPADM

## 2025-01-03 RX ORDER — SODIUM CHLORIDE 9 MG/ML
INJECTION, SOLUTION INTRAVENOUS CONTINUOUS
Status: DISCONTINUED | OUTPATIENT
Start: 2025-01-03 | End: 2025-01-03 | Stop reason: HOSPADM

## 2025-01-03 RX ADMIN — CEFAZOLIN 2 G: 330 INJECTION, POWDER, FOR SOLUTION INTRAMUSCULAR; INTRAVENOUS at 08:01

## 2025-01-03 NOTE — DISCHARGE INSTRUCTIONS
Thank you for allowing us to care for you today. You may receive a survey about the care we provided. Your feedback is valuable and helps us provide excellent care throughout the community.     Home Care Instructions Pain Management:    1. DIET:   You may resume your normal diet today.   2. BATHING:   Sponge baths ONLY. No showers or tub baths with Spinal Cord Stimulator (SCS) in.   3. DRESSING:   Do not remove bandage. Doctor will remove at follow up visit. You can reinforce edges with band aids or skin tape if they start to come up.  4. ACTIVITY LEVEL:   Nothing strenuous with SCS in place. No bending of the back, no lifting anything heavier than a gallon of milk.    5. MEDICATIONS:   You may resume your normal medications today. DO NOT take any blood thinners with SCS in place.    6. SPECIAL INSTRUCTIONS:   No heat or ice  to the injection site while SCS is in place     If you have received any sedation through your IV, you may not drive for 24 hrs.     Please call the PAIN MANAGEMENT office at [phone] or ON CALL pager at [phone] if you experienced any:   1. Redness or swelling around the injection site.  2. Fever of 100.4 degrees or more  3. Drainage (pus) from the injection site.  4. For any continuous bleeding (some dried blood over the incision is normal.)    FOR EMERGENCIES:   If any unusual problems or difficulties occur during clinic hours, call [phone] or [phone].

## 2025-01-03 NOTE — H&P
HPI  Patient presenting for Procedure(s) (LRB):  SPINAL CORD STIMULATOR TRIAL SALUDA REP (N/A)     Patient on Anti-coagulation No    No health changes since previous encounter    Past Medical History:   Diagnosis Date    Colon polyps     GERD (gastroesophageal reflux disease)     Hypercholesteremia     Hypertension     on medication    Lumbar spondylosis 09/21/2020    Migraines     Sleep apnea     Stroke      Past Surgical History:   Procedure Laterality Date    COLONOSCOPY N/A 8/10/2020    Procedure: COLONOSCOPY;  Surgeon: April Dos Santos MD;  Location: [facility] ENDO;  Service: Endoscopy;  Laterality: N/A;    EPIDURAL STEROID INJECTION Left 2/17/2023    Procedure: Left L5 Transforaminal Epidural Steroid Injection;  Surgeon: Santana Rojo Jr., MD;  Location: [facility] ENDO;  Service: Pain Management;  Laterality: Left;  @1245  No ATC or DM    EPIDURAL STEROID INJECTION Bilateral 4/14/2023    Procedure: Bilateral L3, L4, L5 Medial Branch Blocks #1;  Surgeon: Santana Rojo Jr., MD;  Location: [facility] ENDO;  Service: Pain Management;  Laterality: Bilateral;  @1230 (requests afternoon)  No ATC or DM    EPIDURAL STEROID INJECTION Left 6/2/2023    Procedure: Left L5 + S1 Transforaminal Epidural Steroid Injections;  Surgeon: Santana Rojo Jr., MD;  Location: [facility] ENDO;  Service: Pain Management;  Laterality: Left;  @1300  No ATC or DM    FESS, WITH NASAL SEPTOPLASTY AND TURBINATE REDUCTION, WITH IMAGING ABDIRIZAK Bilateral 10/23/2024    Procedure: FESS, WITH -NASAL SEPTOPLASTY, WITH IMAGING GUIDANCE- JEFRY;  Surgeon: Humberto Fam MD;  Location: [address];  Service: ENT;  Laterality: Bilateral;  Jefry rep conf. 10/1- IR.    HERNIA REPAIR      INJECTION, SPINE, LUMBOSACRAL, TRANSFORAMINAL APPROACH Left 11/29/2023    Procedure: Left L5 + S1 Transforaminal Epidural Steroid Injections;  Surgeon: Santana Rojo Jr., MD;  Location: [facility] PAIN MANAGEMENT;  Service: Pain Management;  Laterality: Left;  @1300  No  "ATC or DM  MD Sign.    LAMINECTOMY Left 7/5/2023    Procedure: LAMINECTOMY, SPINE;  Surgeon: Richard Pineda MD;  Location: [facility] OR;  Service: Neurosurgery;  Laterality: Left;  Left L5/S1 discectomy      LAPAROSCOPIC CHOLECYSTECTOMY N/A 1/25/2022    Procedure: CHOLECYSTECTOMY, LAPAROSCOPIC;  Surgeon: Jarrod Martinez MD;  Location: [facility] OR;  Service: General;  Laterality: N/A;    LUMBAR DISCECTOMY Left 7/5/2023    Procedure: DISCECTOMY, SPINE, LUMBAR;  Surgeon: Richard Pineda MD;  Location: [facility] OR;  Service: Neurosurgery;  Laterality: Left;    REPAIR OF TRICEPS TENDON Left 10/2/2020    Procedure: REPAIR, TENDON, TRICEPS;  Surgeon: Keysha Galvez MD;  Location: [facility] OR;  Service: Orthopedics;  Laterality: Left;  RN PRE OP DONE 9/29/2020----COVID NEGATIVE ON 9/29  Arthrex Rep: Delma [phone]    SINUS SURGERY  2012    SURGICAL REMOVAL OF BUNION WITH OSTEOTOMY OF METATARSAL BONE Right 3/24/2023    Procedure: BUNIONECTOMY, WITH METATARSAL OSTEOTOMY;  Surgeon: Shannan An DPM;  Location: [facility] OR;  Service: Podiatry;  Laterality: Right;     Review of patient's allergies indicates:  No Known Allergies   Current Facility-Administered Medications   Medication    0.9% NaCl infusion    ceFAZolin injection 2 g       PMHx, PSHx, Allergies, Medications reviewed in epic    ROS negative except pain complaints in HPI    OBJECTIVE:    BP (!) 155/93 (BP Location: Right arm, Patient Position: Lying)   Pulse 61   Temp 98.3 °F (36.8 °C) (Oral)   Resp 16   Ht 6' 3" (1.905 m)   Wt 102.1 kg (225 lb)   SpO2 96%   BMI 28.12 kg/m²     PHYSICAL EXAMINATION:    GENERAL: Well appearing, in no acute distress, alert and oriented x3.  PSYCH:  Mood and affect appropriate.  SKIN: Skin color, texture, turgor normal, no rashes or lesions which will impact the procedure.  CV: RRR with palpation of the radial artery.  PULM: No evidence of respiratory difficulty, symmetric chest rise. Clear to auscultation.  NEURO: Cranial nerves " grossly intact.    Plan:    Proceed with procedure as planned Procedure(s) (LRB):  SPINAL CORD STIMULATOR TRIAL CANDI REP (N/A)    Rob Huff  01/03/2025

## 2025-01-03 NOTE — OP NOTE
Spinal Cord Stimulator Trial Fluoroscopic Guidance with CANDI    The procedure, risks, benefits, and options were discussed with the patient. There are no contraindications to the procedure. The patent expressed understanding and agreed to the procedure. Informed written consent was obtained prior to the start of the procedure and can be found in the patient's chart.    PATIENT NAME: Enoch Barr   MRN: [id number]     DATE OF PROCEDURE: 01/03/2025    PROCEDURE:   1) Placement of Octad Electrode by 2   2) Intraoperative and Postoperative Programming, Simple   3) Spinal Cord Stimulator Trial     PRE-OP DIAGNOSIS: Chronic pain syndrome [G89.4]  Lumbar radiculopathy [M54.16]     POST-OP DIAGNOSIS: Chronic pain syndrome [G89.4]  Lumbar radiculopathy [M54.16]    PHYSICIAN: Dashawn Bethea MD    ASSISTANTS: Libby Summers MD  [facility] Fellow      MEDICATIONS INJECTED: Bupivacaine 0.25%     LOCAL ANESTHETIC INJECTED: Xylocaine 2%     SEDATION: Versed 2mg and Fentanyl 100mcg                                                                                                                                                                                     Conscious sedation ordered by M.D. Patient re-evaluation prior to administration of conscious sedation. No changes noted in patient's status from initial evaluation. The patient's vital signs were monitored by RN and patient remained hemodynamically stable throughout the procedure.    Event Time In   Sedation Start 0819   Sedation End 0904       ESTIMATED BLOOD LOSS: None    COMPLICATIONS: None    TECHNIQUE: Time-out was performed to identify the patient and procedure to be performed. The patient was placed in the prone position on the OR table, the area overlying the  lumbar spine was prepped and draped in usual sterile fashion using chlorhexidine. The entry site of the  lumbar spine was identified at the T12/L1 interspace under fluoroscopy guidance. The entry site was  anesthetized with Lidocaine 2% followed by a 25 gauge, 3.5 inch spinal needle to anesthetize the deeper tissue up to the supraspinous ligament. A 14 gauge, 3.5 inch Tuohy needle was then advanced to contact the L1 lamina.  It was walked off in a superior medial direction until it entered the epidural space using loss of resistance to air. The spinal cord stimulator lead was advanced through the Tuohy needle and directed to rest the tip at the superior aspect of T7 vertebral body.  The same procedure was repeated in detail for the left side to insert a second lead within the epidural space to reside adjacent to the first lead.  The patient was allowed to fully awaken from anesthesia.  Testing was carried out by the device representative under the guidance of Dashawn Bethea MD. The patient reported having sensation in the areas of usual pain. Once the leads were adjusted to by within the proper positioning, needles were withdrawn leaving the leads in place and were then secured to the patients skin using Stay-Fix adhesive bandages and tegaderm. The patients back was cleaned. No specimens collected. The patient tolerated the procedure well.       The patient was monitored after the procedure in the recovery area. They were given post-procedure and discharge instructions to follow at home. The patient was discharged in a stable condition.      Libby Summers MD      I reviewed and edited the fellow's note. I conducted my own interview and physical examination. I agree with the findings. I was present and supervising all critical portions of the procedure.    Dashawn Bethea MD

## 2025-01-03 NOTE — DISCHARGE SUMMARY
Discharge Note  Short Stay      SUMMARY     Admit Date: 1/3/2025    Attending Physician: Dashawn Bethea MD    Discharge Physician: Dashawn Bethea MD      Discharge Date: 1/3/2025 9:15 AM    Procedure(s) (LRB):  SPINAL CORD STIMULATOR TRIAL CANDI REP (N/A)    Final Diagnosis: Chronic pain syndrome [G89.4]  Lumbar radiculopathy [M54.16]    Disposition: Home or self care    Patient Instructions:   Current Discharge Medication List        CONTINUE these medications which have NOT CHANGED    Details   !! acetaminophen (TYLENOL) 500 MG tablet Take 1 tablet (500 mg total) by mouth every 6 (six) hours as needed for Pain.  Qty: 20 tablet, Refills: 0      !! acetaminophen (TYLENOL) 500 MG tablet Take 1 tablet (500 mg total) by mouth every 6 (six) hours as needed for Pain or Temperature greater than (100.5). Note do not exceed >3000mg tylenol/acetaminophen in 24hr period.  Qty: 50 tablet, Refills: 0      atorvastatin (LIPITOR) 40 MG tablet Take 1 tablet (40 mg total) by mouth every evening.  Qty: 90 tablet, Refills: 3    Comments: Pharmacy update refills, keep on file, not requesting Rx to be filled today.  Associated Diagnoses: Dyslipidemia      benzocaine-menthoL 15-3.6 mg Lozg Please follow directions on packaging.  Qty: 18 lozenge, Refills: 0      cetirizine (ZYRTEC) 10 MG tablet Take 1 tablet (10 mg total) by mouth once daily.  Qty: 30 tablet, Refills: 0      cromolyn (NASALCHROM) 5.2 mg/spray (4 %) nasal spray 1 spray by Nasal route 4 (four) times daily.  Qty: 26 mL, Refills: 1    Associated Diagnoses: Acute recurrent sinusitis, unspecified location; Nasal congestion      diclofenac sodium (VOLTAREN) 1 % Gel Apply 2 g topically 4 (four) times daily.  Qty: 100 g, Refills: 0      fluticasone propionate (FLONASE) 50 mcg/actuation nasal spray 1 spray (50 mcg total) by Each Nostril route 2 (two) times daily as needed for Rhinitis.  Qty: 15 g, Refills: 11    Comments: Pharmacy update refills, keep on file, not requesting  Rx to be filled today.  Associated Diagnoses: Nasal congestion      HYDROcodone-acetaminophen (NORCO) 5-325 mg per tablet Take 1 tablet by mouth every 6 (six) hours as needed for Pain.  Qty: 5 tablet, Refills: 0    Comments: Quantity prescribed more than 7 day supply? no      ibuprofen (ADVIL,MOTRIN) 600 MG tablet Take 1 tablet (600 mg total) by mouth every 6 (six) hours as needed for Pain.  Qty: 50 tablet, Refills: 0      lisinopriL-hydrochlorothiazide (PRINZIDE,ZESTORETIC) 20-12.5 mg per tablet Take 1 tablet by mouth once daily.  Qty: 90 tablet, Refills: 3    Comments: .  Associated Diagnoses: Essential hypertension      methocarbamoL (ROBAXIN) 750 MG Tab TAKE 1 TABLET(750 MG) BY MOUTH THREE TIMES DAILY AS NEEDED FOR MUSCLE SPASMS  Qty: 90 tablet, Refills: 1    Associated Diagnoses: DDD (degenerative disc disease), lumbar      metoprolol succinate (TOPROL-XL) 25 MG 24 hr tablet Take 1 tablet (25 mg total) by mouth once daily.  Qty: 90 tablet, Refills: 3    Comments: Pharmacy update refills, keep on file, not requesting Rx to be filled today.  Associated Diagnoses: Essential hypertension      omeprazole (PRILOSEC) 20 MG capsule TAKE 1 CAPSULE(20 MG) BY MOUTH EVERY DAY. DECREASED DOSE  Qty: 90 capsule, Refills: 3    Associated Diagnoses: Gastroesophageal reflux disease without esophagitis      pregabalin (LYRICA) 75 MG capsule Take 1 capsule (75 mg total) by mouth 2 (two) times daily.  Qty: 60 capsule, Refills: 5    Associated Diagnoses: Lumbar radiculopathy; Status post lumbar microdiscectomy      sodium chloride (SALINE NASAL) 0.65 % nasal spray 2 sprays by Nasal route once daily. Starting one day after surgery, gentle nasal saline spray to prevent crusting  Qty: 44 mL, Refills: 5      traZODone (DESYREL) 50 MG tablet TAKE 2 TABLETS(100 MG) BY MOUTH EVERY NIGHT AS NEEDED FOR INSOMNIA  Qty: 180 tablet, Refills: 3    Associated Diagnoses: Insomnia, unspecified type      triamcinolone acetonide 0.1% (KENALOG) 0.1 %  cream Apply topically 2 (two) times daily.  Qty: 80 g, Refills: 0       !! - Potential duplicate medications found. Please discuss with provider.              Discharge Diagnosis: Chronic pain syndrome [G89.4]  Lumbar radiculopathy [M54.16]  Condition on Discharge: Stable with no complications to procedure   Diet on Discharge: Same as before.  Activity: as per instruction sheet.  Discharge to: Home with a responsible adult.  Follow up: 2-4 weeks       Please call my office or on call number at [phone] or pager at [phone] if experienced any weakness or loss of sensation, fever > 101.5, pain uncontrolled with oral medications, persistent nausea/vomiting/or diarrhea, redness or drainage from the incisions, or any other worrisome concerns. If physician on call was not reached or could not communicate with our office for any reason please go to the nearest emergency department        Dashawn Bethea MD

## 2025-01-05 ENCOUNTER — PATIENT MESSAGE (OUTPATIENT)
Dept: ADMINISTRATIVE | Facility: OTHER | Age: 60
End: 2025-01-05
Payer: OTHER GOVERNMENT

## 2025-01-10 ENCOUNTER — HOSPITAL ENCOUNTER (OUTPATIENT)
Dept: RADIOLOGY | Facility: OTHER | Age: 60
Discharge: HOME | End: 2025-01-10
Attending: ANESTHESIOLOGY
Payer: OTHER GOVERNMENT

## 2025-01-10 ENCOUNTER — OFFICE VISIT (OUTPATIENT)
Dept: PAIN MEDICINE | Facility: CLINIC | Age: 60
End: 2025-01-10
Payer: OTHER GOVERNMENT

## 2025-01-10 VITALS
WEIGHT: 225.06 LBS | BODY MASS INDEX: 28.13 KG/M2 | DIASTOLIC BLOOD PRESSURE: 96 MMHG | TEMPERATURE: 98 F | HEART RATE: 70 BPM | OXYGEN SATURATION: 100 % | SYSTOLIC BLOOD PRESSURE: 141 MMHG | RESPIRATION RATE: 17 BRPM

## 2025-01-10 DIAGNOSIS — G89.4 CHRONIC PAIN SYNDROME: Primary | ICD-10-CM

## 2025-01-10 DIAGNOSIS — M54.16 LUMBAR RADICULOPATHY: ICD-10-CM

## 2025-01-10 DIAGNOSIS — M96.1 POSTLAMINECTOMY SYNDROME OF LUMBAR REGION: ICD-10-CM

## 2025-01-10 DIAGNOSIS — G89.4 CHRONIC PAIN SYNDROME: ICD-10-CM

## 2025-01-10 PROCEDURE — 99999 PR PBB SHADOW E&M-EST. PATIENT-LVL IV: CPT | Mod: PBBFAC,,, | Performed by: NURSE PRACTITIONER

## 2025-01-10 PROCEDURE — 72070 X-RAY EXAM THORAC SPINE 2VWS: CPT | Mod: 26,,, | Performed by: RADIOLOGY

## 2025-01-10 PROCEDURE — 72100 X-RAY EXAM L-S SPINE 2/3 VWS: CPT | Mod: TC,FY

## 2025-01-10 PROCEDURE — 72100 X-RAY EXAM L-S SPINE 2/3 VWS: CPT | Mod: 26,,, | Performed by: RADIOLOGY

## 2025-01-10 PROCEDURE — 99214 OFFICE O/P EST MOD 30 MIN: CPT | Mod: PBBFAC,25 | Performed by: NURSE PRACTITIONER

## 2025-01-10 PROCEDURE — 72070 X-RAY EXAM THORAC SPINE 2VWS: CPT | Mod: TC,FY

## 2025-01-10 NOTE — PROGRESS NOTES
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 (VOLTAREN) 1 % Gel Apply 2 g topically 4 (four) times daily. 100 g 0    fluticasone propionate (FLONASE) 50 mcg/actuation nasal spray 1 spray (50 mcg total) by Each Nostril route 2 (two) times daily as needed for Rhinitis. 15 g 11    HYDROcodone-acetaminophen (NORCO) 5-325 mg per tablet Take 1 tablet by mouth every 6 (six) hours as needed for Pain. 5 tablet 0    ibuprofen (ADVIL,MOTRIN) 600 MG tablet Take 1 tablet (600 mg total) by mouth every 6 (six) hours as needed for Pain. 50 tablet 0    lisinopriL-hydrochlorothiazide (PRINZIDE,ZESTORETIC) 20-12.5 mg per tablet Take 1 tablet by mouth once daily. 90 tablet 3    methocarbamoL (ROBAXIN) 750 MG Tab TAKE 1 TABLET(750 MG) BY MOUTH THREE TIMES DAILY AS NEEDED FOR MUSCLE SPASMS 90 tablet 1    metoprolol succinate (TOPROL-XL) 25 MG 24 hr tablet Take 1 tablet (25 mg total) by mouth once daily. 90 tablet 3    omeprazole (PRILOSEC) 20 MG capsule TAKE 1 CAPSULE(20 MG) BY MOUTH EVERY DAY. DECREASED DOSE 90 capsule 3    sodium chloride (SALINE NASAL) 0.65 % nasal spray 2 sprays by Nasal route once daily. Starting one day after surgery, gentle nasal saline spray to prevent crusting 44 mL 5    traZODone (DESYREL) 50 MG tablet TAKE 2 TABLETS(100 MG) BY MOUTH EVERY NIGHT AS NEEDED FOR INSOMNIA 180  tablet 3    triamcinolone acetonide 0.1% (KENALOG) 0.1 % cream Apply topically 2 (two) times daily. 80 g 0    cetirizine (ZYRTEC) 10 MG tablet Take 1 tablet (10 mg total) by mouth once daily. 30 tablet 0    pregabalin (LYRICA) 75 MG capsule Take 1 capsule (75 mg total) by mouth 2 (two) times daily. (Patient not taking: Reported on 8/13/2024) 60 capsule 5     No current facility-administered medications on file prior to visit.       Objective:      BP (!) 141/96   Pulse 70   Temp 98 °F (36.7 °C)   Resp 17   Wt 102.1 kg (225 lb 1.4 oz)   SpO2 100%   BMI 28.13 kg/m²      Exam:  GEN:  Well developed, well nourished.  No acute distress.  Normal pain behavior.  HEENT:  No trauma.  Mucous membranes moist.  Nares patent bilaterally.  PSYCH: Normal affect. Thought content appropriate.  CHEST:  Breathing symmetric.  No audible wheezing.  ABD: Soft, non-distended.  SKIN:  Warm, pink, dry.  No rash on exposed areas. SCS site without drainage or signs of infection.  Leads removed without difficulty with tips intact.  Area cleaned with alcohol and Bacitracin applied.    Previous physical exam:   EXT:  No cyanosis, clubbing, or edema.  No color change or changes in nail or hair growth.  NEURO: Fully alert, oriented, and appropriate. Speech normal ashley.  No atrophy or muscle wasting noted. 5/5 strength and coordination in upper and lower extremities. No changes to sensation. Plantar reflexes downgoing. Bilateral patellar and achilles tendon reflexes 1+. No clonus.   MUSCULOSKELETAL: Full ROM of neck and spine. Negative Spurling.  Tenderness to palpation on lumbar spinous processes and bilateral SI joint L>R. No paraspinal tenderness. Pain reproduced with flexion and side bending. + Facet loading L>R. +Bilateral FABERs, +SI distraction, +SI compression.  Bilateral straight leg raise negative in supine position - notes hamstring and calf tightness.  Gait:  Normal.  No trendelenburg sign bilaterally.             Assessment:        Encounter Diagnoses   Name Primary?    Chronic pain syndrome Yes    Postlaminectomy syndrome of lumbar region     Lumbar radiculopathy              Plan:       - Previous imaging was reviewed and discussed with the patient today.    - He is s/p [place] SCS trial with 55% relief. Leads removed today.     - Schedule for [place] SCS implant.     - Continue current medications.     - RTC after implant.     The above plan and management options were discussed at length with patient. Patient is in agreement with the above and verbalized understanding.     Katina Wilde NP  01/10/2025

## 2025-01-14 ENCOUNTER — CLINICAL SUPPORT (OUTPATIENT)
Dept: REHABILITATION | Facility: HOSPITAL | Age: 60
End: 2025-01-14
Payer: OTHER GOVERNMENT

## 2025-01-14 DIAGNOSIS — M12.811 ROTATOR CUFF ARTHROPATHY OF RIGHT SHOULDER: Primary | ICD-10-CM

## 2025-01-14 PROCEDURE — 97112 NEUROMUSCULAR REEDUCATION: CPT | Mod: PO

## 2025-01-14 PROCEDURE — 97530 THERAPEUTIC ACTIVITIES: CPT | Mod: PO

## 2025-01-14 PROCEDURE — 97140 MANUAL THERAPY 1/> REGIONS: CPT | Mod: PO

## 2025-01-14 PROCEDURE — 97110 THERAPEUTIC EXERCISES: CPT | Mod: PO

## 2025-01-16 ENCOUNTER — PATIENT MESSAGE (OUTPATIENT)
Dept: PAIN MEDICINE | Facility: OTHER | Age: 60
End: 2025-01-16
Payer: OTHER GOVERNMENT

## 2025-01-16 DIAGNOSIS — M54.16 LUMBAR RADICULOPATHY: ICD-10-CM

## 2025-01-16 DIAGNOSIS — G89.4 CHRONIC PAIN SYNDROME: Primary | ICD-10-CM

## 2025-01-17 ENCOUNTER — OFFICE VISIT (OUTPATIENT)
Dept: OTOLARYNGOLOGY | Facility: CLINIC | Age: 60
End: 2025-01-17
Payer: OTHER GOVERNMENT

## 2025-01-17 VITALS
HEART RATE: 74 BPM | SYSTOLIC BLOOD PRESSURE: 153 MMHG | WEIGHT: 233.69 LBS | DIASTOLIC BLOOD PRESSURE: 102 MMHG | BODY MASS INDEX: 29.21 KG/M2

## 2025-01-17 DIAGNOSIS — J32.4 CHRONIC PANSINUSITIS: ICD-10-CM

## 2025-01-17 DIAGNOSIS — J34.3 HYPERTROPHY OF BOTH INFERIOR NASAL TURBINATES: ICD-10-CM

## 2025-01-17 DIAGNOSIS — J34.2 NASAL SEPTAL DEVIATION: ICD-10-CM

## 2025-01-17 DIAGNOSIS — Z98.890 STATUS POST FUNCTIONAL ENDOSCOPIC SINUS SURGERY (FESS): Primary | ICD-10-CM

## 2025-01-17 PROCEDURE — 99999 PR PBB SHADOW E&M-EST. PATIENT-LVL III: CPT | Mod: PBBFAC,,, | Performed by: STUDENT IN AN ORGANIZED HEALTH CARE EDUCATION/TRAINING PROGRAM

## 2025-01-17 PROCEDURE — 31231 NASAL ENDOSCOPY DX: CPT | Mod: PBBFAC | Performed by: STUDENT IN AN ORGANIZED HEALTH CARE EDUCATION/TRAINING PROGRAM

## 2025-01-17 PROCEDURE — 31231 NASAL ENDOSCOPY DX: CPT | Mod: 79,S$PBB,, | Performed by: STUDENT IN AN ORGANIZED HEALTH CARE EDUCATION/TRAINING PROGRAM

## 2025-01-17 PROCEDURE — 99024 POSTOP FOLLOW-UP VISIT: CPT | Mod: ,,, | Performed by: STUDENT IN AN ORGANIZED HEALTH CARE EDUCATION/TRAINING PROGRAM

## 2025-01-17 PROCEDURE — 99213 OFFICE O/P EST LOW 20 MIN: CPT | Mod: PBBFAC | Performed by: STUDENT IN AN ORGANIZED HEALTH CARE EDUCATION/TRAINING PROGRAM

## 2025-01-17 NOTE — PROGRESS NOTES
Subjective:      Enoch is a 59 y.o. male who comes for follow-up of sinusitis.  His last visit with me was on 11/22/2024.  He underwent STESS on 10/23/24.   Overall doing well.  Does report some continued facial pressure fullness on the right.  Using irrigations denies any purulent nasal drainage.    His current sinus regime consists of: Flonase & saline.     The assessment of quality and severity of symptoms as measured by the SNOT-22 score: : (Patient-Rptd) (P) 32    The patient's medications, allergies, past medical, surgical, social and family histories were reviewed and updated as appropriate.    Review of Systems   Constitutional:  Positive for malaise/fatigue.   Eyes: Negative.    Respiratory:  Positive for cough.    Cardiovascular: Negative.    Gastrointestinal: Negative.    Genitourinary: Negative.    Musculoskeletal:  Positive for back pain.   Skin: Negative.    Neurological:  Positive for headaches.   Psychiatric/Behavioral: Negative.        Answers submitted by the patient for this visit:  Review of Symptoms Questionnaire  (Submitted on 1/12/2025)  postnasal drip: Yes  Snoring?: Yes  Sleep Apnea?: Yes  Seasonal Allergies?: Yes  None of these : Yes  None of these: Yes    A detailed review of systems was obtained with pertinent positives as per the above HPI, and otherwise negative.        Objective:     There were no vitals taken for this visit.       Constitutional:   Vital signs are normal. He appears well-developed and well-nourished.     Head:  Normocephalic and atraumatic.     Ears:  Hearing normal to normal and whispered voice; external ear normal without scars, lesions, or masses; ear canal, tympanic membrane, and middle ear normal..   Right Ear: No swelling. Tympanic membrane is not perforated and not bulging. No middle ear effusion.   Left Ear: No swelling. Tympanic membrane is not perforated and not bulging.  No middle ear effusion.     Nose:  Nose normal including turbinates, nasal mucosa,  sinuses and nasal septum. No epistaxis.     Mouth/Throat  Oropharynx clear and moist without lesions or asymmetry and normal uvula midline. Normal dentition. No tonsillar abscesses. Tonsillar exudate.      Neck:  Neck normal without thyromegaly masses, asymmetry, normal tracheal structure, crepitus, and tenderness, thyroid normal, trachea normal, phonation normal, full range of motion with neck supple and no adenopathy. No stridor present.        Head (right side): No submental adenopathy present.        Head (left side): No submental adenopathy present.     He has no cervical adenopathy.     Pulmonary/Chest:   No stridor.       Procedure    Nasal endoscopy performed.  See procedure note.    Nasal Endoscopy:  1/17/2025    The use of diagnostic nasal endoscopy was considered medically necessary for the evaluation and visualization of the nasal anatomy for symptoms suggestive of nasal or sinus origin. Physical examination (including a nasal speculum evaluation) did not provide sufficient clinical information to establish a diagnosis, or symptoms did not improve or worsened following treatment.     The nasal cavity was decongested with topical 1% phenylephrine and anesthetized with 4% lidocaine.  A rigid 0-degree endoscope was introduced into the nasal cavity.    The patient was seated in the examination chair. After discussion of risks and benefits, a nasal endoscope was inserted into the nose the endoscope was passed along the left nasal floor to the nasopharynx. It was then passed between the middle and superior meatus, nasal turbinates, nasal septum, nasopharynx and sphenoethmoid region. The nasal endoscope was withdrawn and there was no complications. An identical procedure was performed on the right side. I was present for the entire procedure.The patient tolerated the above procedure well. The findings of this procedure can be found in the dictated note from 1/17/2025 visit.        Widely patent ethmoid  "maxillary sinuses bilaterally.  A small amount of crusting with fungal debris noted on crust sitting in the right ethmoid/posterior maxillary sinus.  This was removed easily without difficulty.  Once removed healthy-appearing mucosa appeared under need for Na some purulence.    Data Reviewed    WBC (K/uL)   Date Value   03/09/2024 5.18     Eosinophil % (%)   Date Value   03/09/2024 4.4     Eos # (K/uL)   Date Value   03/09/2024 0.2     Platelets (K/uL)   Date Value   03/09/2024 303     Glucose (mg/dL)   Date Value   10/15/2024 104     No results found for: "IGE"    No sinus imaging available.      Assessment:     No diagnosis found.     Plan:     - Cont irrigations.   - RTC 3 months    Humberto Fam MD     "

## 2025-01-20 NOTE — PROGRESS NOTES
[facility]   Physical Therapy Treatment Note      Name: Enoch DELUCA Momo  [facility] Number: [id number]    Therapy Diagnosis:   Encounter Diagnosis   Name Primary?    Rotator cuff arthropathy of right shoulder Yes     Physician: Vernon Barth MD    Visit Date: 1/14/2025    Physician Orders: PT Eval and Treat   Medical Diagnosis from Referral: M12.811 (ICD-10-CM) - Rotator cuff arthropathy of right shoulder   Evaluation Date: 12/10/2024  Authorization Period Expiration: 12/31/2024  Plan of Care Expiration: 3/30/2025  Progress Note Due: 1/10/2025  Visit # / Visits authorized: 4/ 20  FOTO: 1/3    Time In: 4:30 PM  Time Out: 5:30 PM  Total Billable Time: 60 minutes    Subjective     Pt reports: doing okay, improved shoulder motion from PT.  He was compliant with home exercise program.  Response to previous treatment: NA  Functional change: too soon    Pain: 3/10  Location: right shoulder     Objective      Objective Measures updated at progress report unless specified.     Treatment     Enoch received the treatments listed below:      therapeutic exercises to develop strength, endurance, ROM, and flexibility for 12 minutes including:  Supine shoulder flexion with cane x 30  Half kneeling open book x 30  Thoracic extension on the chair x 30 NP-     manual therapy techniques: Joint mobilizations were applied to the: shoulder for 12 minutes, including:  Posterior/inferior GHJ glide grade III  RA stabilization shoulder ER/IR 3 x 20 sec   Supine thoracic manipulation grade V         neuromuscular re-education activities to improve: Balance, Coordination, Kinesthetic, Sense, and Proprioception for 26  minutes. The following activities were included:  SA wall slide with band x 30  Prone modified T 2 x 15   ER at 90 degrees with elbow supported with ORG x 30  Prone IR with 1# 3 x 10  Landmine press with 5# x 30     therapeutic activities to improve functional performance for 10 minutes,  including:  Inclined shoulder taps 3 x 15  Wall ball 3 x 30 sec     Patient Education and Home Exercises       Education provided:   - Reviewed HEP    Written Home Exercises Provided: yes. Exercises were reviewed and Enoch was able to demonstrate them prior to the end of the session.  Enoch demonstrated good  understanding of the education provided. See EMR under Patient Instructions for exercises provided during therapy sessions    Assessment     Pt presented to clinic w/ good carry over shoulder ROM but increased shoulder pain  from last session. Session today focus on shoulder RC engagement and periscapular strengthening with emphasis subscap activation. Added shoulder taps ex to improve RC activation through CKC activity. Pt donal session well w/o increased shoulder pain. Will continue to progress per tolerance.        Enoch Is progressing well towards his goals.   Pt prognosis is Good.     Pt will continue to benefit from skilled outpatient physical therapy to address the deficits listed in the problem list box on initial evaluation, provide pt/family education and to maximize pt's level of independence in the home and community environment.     Pt's spiritual, cultural and educational needs considered and pt agreeable to plan of care and goals.     Anticipated barriers to physical therapy: NA    Goals: Short Term Goals: 2-4 weeks  1. Pt will be compliant with HEP 50% of prescribed amount.   2. The pt to demo improvement in R shoulder PROM/AROM to 80% of uninvolved side   3.  Pt will improve FOTO score to meet or exceed MCID      Long Term Goals: 16 weeks   Pt will be compliant with % of prescribed amount.   Pt will improve shoulder girdle strength by 2/3 MMT grade   Pt will report ability to reach into cabinets and to bathe/groom her hair with her right UE   The pt will report full participation in ADLs and IADLs without restrictions related to R shoulder     Plan     Plan of care Certification:  12/10/2024 to 3/30/2025.     Outpatient Physical Therapy 1-2 times weekly for 12 weeks to include the following interventions: Manual Therapy, Neuromuscular Re-ed, Patient Education, Therapeutic Activities, and Therapeutic Exercise.     Juvencio Crowley, PT

## 2025-01-25 ENCOUNTER — CLINICAL SUPPORT (OUTPATIENT)
Dept: REHABILITATION | Facility: HOSPITAL | Age: 60
End: 2025-01-25
Payer: OTHER GOVERNMENT

## 2025-01-25 DIAGNOSIS — M12.811 ROTATOR CUFF ARTHROPATHY OF RIGHT SHOULDER: Primary | ICD-10-CM

## 2025-01-25 PROCEDURE — 97140 MANUAL THERAPY 1/> REGIONS: CPT | Mod: PO

## 2025-01-25 PROCEDURE — 97112 NEUROMUSCULAR REEDUCATION: CPT | Mod: PO

## 2025-01-25 PROCEDURE — 97530 THERAPEUTIC ACTIVITIES: CPT | Mod: PO

## 2025-01-27 NOTE — PROGRESS NOTES
[facility]   Physical Therapy Treatment Note      Name: Enoch DELUCA Momo  [facility] Number: [id number]    Therapy Diagnosis:   Encounter Diagnosis   Name Primary?    Rotator cuff arthropathy of right shoulder Yes     Physician: Vernon Barth MD    Visit Date: 1/25/2025    Physician Orders: PT Eval and Treat   Medical Diagnosis from Referral: M12.811 (ICD-10-CM) - Rotator cuff arthropathy of right shoulder   Evaluation Date: 12/10/2024  Authorization Period Expiration: 12/31/2024  Plan of Care Expiration: 3/30/2025  Progress Note Due: 1/10/2025  Visit # / Visits authorized: 4/ 20  FOTO: 1/3    Time In: 0830  Time Out: 0930  Total Billable Time: 60 minutes    Subjective     Pt reports: doing okay, no new complaints upon arrival.   He was compliant with home exercise program.  Response to previous treatment: NA  Functional change: too soon    Pain: 3/10  Location: right shoulder     Objective      Objective Measures updated at progress report unless specified.   1/25  Shoulder Active Range of Motion: *=pain  Shoulder Right Left   Flexion    150* 170   ER at 0    50* 60   Behind the back Reach    L2 T7     Treatment     Enoch received the treatments listed below:      therapeutic exercises to develop strength, endurance, ROM, and flexibility for 12 minutes including:  Supine shoulder flexion with cane x 30  Half kneeling open book x 30  Thoracic extension on the chair x 30 NP-     manual therapy techniques: Joint mobilizations were applied to the: shoulder for 10 minutes, including:  Posterior/inferior GHJ glide grade III  RA stabilization shoulder ER/IR 3 x 20 sec   Supine thoracic manipulation grade V NP-        neuromuscular re-education activities to improve: Balance, Coordination, Kinesthetic, Sense, and Proprioception for 26  minutes. The following activities were included:  SA wall slide with band x 30  Prone modified T 2 x 15   ER at 90 degrees with elbow supported with ORG x  30  Prone IR with 1# 3 x 10  Landmine press with 5# x 30     therapeutic activities to improve functional performance for 10 minutes, including:  Inclined shoulder taps 3 x 15  Wall ball 3 x 30 sec     Patient Education and Home Exercises       Education provided:   - Reviewed HEP    Written Home Exercises Provided: yes. Exercises were reviewed and Enoch was able to demonstrate them prior to the end of the session.  Enoch demonstrated good  understanding of the education provided. See EMR under Patient Instructions for exercises provided during therapy sessions    Assessment     Pt presented to clinic w/ good carry over shoulder ROM but still reported shoulder pain and apprehension with end range ER and flexion. Session today focus on shoulder RC engagement and periscapular strengthening with emphasis subscap activation to improved shoulder stability. Pt donal session well w/o increased shoulder pain. Will continue to progress per tolerance.        Enoch Is progressing well towards his goals.   Pt prognosis is Good.     Pt will continue to benefit from skilled outpatient physical therapy to address the deficits listed in the problem list box on initial evaluation, provide pt/family education and to maximize pt's level of independence in the home and community environment.     Pt's spiritual, cultural and educational needs considered and pt agreeable to plan of care and goals.     Anticipated barriers to physical therapy: NA    Goals: Short Term Goals: 2-4 weeks  1. Pt will be compliant with HEP 50% of prescribed amount.   2. The pt to demo improvement in R shoulder PROM/AROM to 80% of uninvolved side   3.  Pt will improve FOTO score to meet or exceed MCID      Long Term Goals: 16 weeks   Pt will be compliant with % of prescribed amount.   Pt will improve shoulder girdle strength by 2/3 MMT grade   Pt will report ability to reach into cabinets and to bathe/groom her hair with her right UE   The pt will report  full participation in ADLs and IADLs without restrictions related to R shoulder     Plan     Plan of care Certification: 12/10/2024 to 3/30/2025.     Outpatient Physical Therapy 1-2 times weekly for 12 weeks to include the following interventions: Manual Therapy, Neuromuscular Re-ed, Patient Education, Therapeutic Activities, and Therapeutic Exercise.     Juvencio Crowley, PT

## 2025-01-31 ENCOUNTER — CLINICAL SUPPORT (OUTPATIENT)
Dept: REHABILITATION | Facility: HOSPITAL | Age: 60
End: 2025-01-31
Payer: OTHER GOVERNMENT

## 2025-01-31 DIAGNOSIS — M12.811 ROTATOR CUFF ARTHROPATHY OF RIGHT SHOULDER: Primary | ICD-10-CM

## 2025-01-31 PROCEDURE — 97140 MANUAL THERAPY 1/> REGIONS: CPT | Mod: PO

## 2025-01-31 PROCEDURE — 97112 NEUROMUSCULAR REEDUCATION: CPT | Mod: PO

## 2025-01-31 PROCEDURE — 97110 THERAPEUTIC EXERCISES: CPT | Mod: PO

## 2025-02-06 ENCOUNTER — CLINICAL SUPPORT (OUTPATIENT)
Dept: REHABILITATION | Facility: HOSPITAL | Age: 60
End: 2025-02-06
Payer: OTHER GOVERNMENT

## 2025-02-06 DIAGNOSIS — M25.60 DECREASED RANGE OF MOTION: ICD-10-CM

## 2025-02-06 DIAGNOSIS — M12.811 ROTATOR CUFF ARTHROPATHY OF RIGHT SHOULDER: Primary | ICD-10-CM

## 2025-02-06 PROCEDURE — 97110 THERAPEUTIC EXERCISES: CPT | Mod: PO

## 2025-02-06 PROCEDURE — 97140 MANUAL THERAPY 1/> REGIONS: CPT | Mod: PO

## 2025-02-06 PROCEDURE — 97112 NEUROMUSCULAR REEDUCATION: CPT | Mod: PO

## 2025-02-06 NOTE — PROGRESS NOTES
[facility]   Physical Therapy Treatment Note      Name: Enoch DELUCA Momo  [facility] Number: [id number]    Therapy Diagnosis:   Encounter Diagnosis   Name Primary?    Rotator cuff arthropathy of right shoulder Yes     Physician: Vernon Barth MD    Visit Date: 1/31/2025    Physician Orders: PT Eval and Treat   Medical Diagnosis from Referral: M12.811 (ICD-10-CM) - Rotator cuff arthropathy of right shoulder   Evaluation Date: 12/10/2024  Authorization Period Expiration: 12/31/2024  Plan of Care Expiration: 3/30/2025  Progress Note Due: 1/10/2025  Visit # / Visits authorized: 4/ 20  FOTO: 1/3    Time In: 1100  Time Out: 1200  Total Billable Time: 60 minutes    Subjective     Pt reports: doing okay,no new complaints upon arrival.   He was compliant with home exercise program.  Response to previous treatment: NA  Functional change: too soon    Pain: 3/10  Location: right shoulder     Objective      Objective Measures updated at progress report unless specified.   1/25  Shoulder Active Range of Motion: *=pain  Shoulder Right Left   Flexion    150* 170   ER at 0    50* 60   Behind the back Reach    L2 T7     Treatment     Enoch received the treatments listed below:      therapeutic exercises to develop strength, endurance, ROM, and flexibility for 12 minutes including:  Supine shoulder flexion with cane x 30  Half kneeling open book x 30  Thoracic extension on the chair x 30 NP-     manual therapy techniques: Joint mobilizations were applied to the: shoulder for 10 minutes, including:  Posterior/inferior GHJ glide grade III  RA stabilization shoulder ER/IR 3 x 20 sec   Supine thoracic manipulation grade V NP-        neuromuscular re-education activities to improve: Balance, Coordination, Kinesthetic, Sense, and Proprioception for 26  minutes. The following activities were included:  SA wall slide with band x 30  Prone modified T 2 x 15   ER at 90 degrees with elbow supported with ORG x  30  Prone IR with 1# 3 x 10  Landmine press with 5# x 30     therapeutic activities to improve functional performance for 10 minutes, including:  Inclined shoulder taps 3 x 15  Wall ball 3 x 30 sec     Patient Education and Home Exercises       Education provided:   - Reviewed HEP    Written Home Exercises Provided: yes. Exercises were reviewed and Enoch was able to demonstrate them prior to the end of the session.  Enoch demonstrated good  understanding of the education provided. See EMR under Patient Instructions for exercises provided during therapy sessions    Assessment     Pt presented to clinic w/ good carry over shoulder ROM but still reported shoulder pain and apprehension with end range ER and flexion. Session today focus on shoulder RC engagement and periscapular strengthening with emphasis subscap activation to improved shoulder stability. Pt donal session well w/o increased shoulder pain. Will continue to progress per tolerance.        Enoch Is progressing well towards his goals.   Pt prognosis is Good.     Pt will continue to benefit from skilled outpatient physical therapy to address the deficits listed in the problem list box on initial evaluation, provide pt/family education and to maximize pt's level of independence in the home and community environment.     Pt's spiritual, cultural and educational needs considered and pt agreeable to plan of care and goals.     Anticipated barriers to physical therapy: NA    Goals: Short Term Goals: 2-4 weeks  1. Pt will be compliant with HEP 50% of prescribed amount.   2. The pt to demo improvement in R shoulder PROM/AROM to 80% of uninvolved side   3.  Pt will improve FOTO score to meet or exceed MCID      Long Term Goals: 16 weeks   Pt will be compliant with % of prescribed amount.   Pt will improve shoulder girdle strength by 2/3 MMT grade   Pt will report ability to reach into cabinets and to bathe/groom her hair with her right UE   The pt will report  full participation in ADLs and IADLs without restrictions related to R shoulder     Plan     Plan of care Certification: 12/10/2024 to 3/30/2025.     Outpatient Physical Therapy 1-2 times weekly for 12 weeks to include the following interventions: Manual Therapy, Neuromuscular Re-ed, Patient Education, Therapeutic Activities, and Therapeutic Exercise.     Juvencio Crowley, PT

## 2025-02-10 ENCOUNTER — TELEPHONE (OUTPATIENT)
Dept: RESEARCH | Facility: OTHER | Age: 60
End: 2025-02-10
Payer: OTHER GOVERNMENT

## 2025-02-14 NOTE — PROGRESS NOTES
[facility]   Physical Therapy Treatment Note      Name: Enoch Barr  [facility] Number: [id number]    Therapy Diagnosis:   Encounter Diagnoses   Name Primary?    Rotator cuff arthropathy of right shoulder Yes    Decreased range of motion      Physician: Vernon Barth MD    Visit Date: 2/6/2025    Physician Orders: PT Eval and Treat   Medical Diagnosis from Referral: M12.811 (ICD-10-CM) - Rotator cuff arthropathy of right shoulder   Evaluation Date: 12/10/2024  Authorization Period Expiration: 12/31/2024  Plan of Care Expiration: 3/30/2025  Progress Note Due: 1/10/2025  Visit # / Visits authorized: 4/ 20  FOTO: 1/3    Time In: 1700  Time Out: 1800  Total Billable Time: 60 minutes    Subjective     Pt reports: doing okay, noticed improved muscle soreness from last week.  He was compliant with home exercise program.  Response to previous treatment: NA  Functional change: too soon    Pain: 3/10  Location: right shoulder     Objective      Objective Measures updated at progress report unless specified.   1/25  Shoulder Active Range of Motion: *=pain  Shoulder Right Left   Flexion    150* 170   ER at 0    50* 60   Behind the back Reach    L2 T7     Treatment     Enoch received the treatments listed below:      therapeutic exercises to develop strength, endurance, ROM, and flexibility for 12 minutes including:  Supine shoulder flexion with cane x 30  Half kneeling open book x 30  Thoracic extension on the chair x 30 NP-     manual therapy techniques: Joint mobilizations were applied to the: shoulder for 10 minutes, including:  Posterior/inferior GHJ glide grade III  RA stabilization shoulder ER/IR 3 x 20 sec   Supine thoracic manipulation grade V NP-        neuromuscular re-education activities to improve: Balance, Coordination, Kinesthetic, Sense, and Proprioception for 26  minutes. The following activities were included:  SA wall slide with band x 30  Prone modified T 2 x 15   ER at 90  degrees with elbow supported with ORG x 30  Prone IR with 1# 3 x 10  Landmine press with 5# x 30     therapeutic activities to improve functional performance for 10 minutes, including:  Inclined shoulder taps 3 x 15  Wall ball 3 x 30 sec     Patient Education and Home Exercises       Education provided:   - Reviewed HEP    Written Home Exercises Provided: yes. Exercises were reviewed and Enoch was able to demonstrate them prior to the end of the session.  Enoch demonstrated good  understanding of the education provided. See EMR under Patient Instructions for exercises provided during therapy sessions    Assessment     Pt presented to clinic w/ good carry over shoulder ROM but still reported shoulder pain and apprehension with end range ER and flexion. Session today focus on shoulder RC engagement and periscapular strengthening with emphasis subscap activation to improved shoulder stability. Pt donal session well w/o increased shoulder pain. Will continue to progress per tolerance.        Enoch Is progressing well towards his goals.   Pt prognosis is Good.     Pt will continue to benefit from skilled outpatient physical therapy to address the deficits listed in the problem list box on initial evaluation, provide pt/family education and to maximize pt's level of independence in the home and community environment.     Pt's spiritual, cultural and educational needs considered and pt agreeable to plan of care and goals.     Anticipated barriers to physical therapy: NA    Goals: Short Term Goals: 2-4 weeks  1. Pt will be compliant with HEP 50% of prescribed amount.   2. The pt to demo improvement in R shoulder PROM/AROM to 80% of uninvolved side   3.  Pt will improve FOTO score to meet or exceed MCID      Long Term Goals: 16 weeks   Pt will be compliant with % of prescribed amount.   Pt will improve shoulder girdle strength by 2/3 MMT grade   Pt will report ability to reach into cabinets and to bathe/groom her hair  with her right UE   The pt will report full participation in ADLs and IADLs without restrictions related to R shoulder     Plan     Plan of care Certification: 12/10/2024 to 3/30/2025.     Outpatient Physical Therapy 1-2 times weekly for 12 weeks to include the following interventions: Manual Therapy, Neuromuscular Re-ed, Patient Education, Therapeutic Activities, and Therapeutic Exercise.     Juvencio Crowley, PT

## 2025-02-20 ENCOUNTER — CLINICAL SUPPORT (OUTPATIENT)
Dept: REHABILITATION | Facility: HOSPITAL | Age: 60
End: 2025-02-20
Payer: OTHER GOVERNMENT

## 2025-02-20 DIAGNOSIS — M25.60 DECREASED RANGE OF MOTION: ICD-10-CM

## 2025-02-20 DIAGNOSIS — M12.811 ROTATOR CUFF ARTHROPATHY OF RIGHT SHOULDER: Primary | ICD-10-CM

## 2025-02-20 PROCEDURE — 97110 THERAPEUTIC EXERCISES: CPT | Mod: PO

## 2025-02-20 PROCEDURE — 97530 THERAPEUTIC ACTIVITIES: CPT | Mod: PO

## 2025-02-20 PROCEDURE — 97140 MANUAL THERAPY 1/> REGIONS: CPT | Mod: PO

## 2025-02-20 PROCEDURE — 97112 NEUROMUSCULAR REEDUCATION: CPT | Mod: PO

## 2025-02-28 ENCOUNTER — CLINICAL SUPPORT (OUTPATIENT)
Dept: REHABILITATION | Facility: HOSPITAL | Age: 60
End: 2025-02-28
Payer: OTHER GOVERNMENT

## 2025-02-28 DIAGNOSIS — M12.811 ROTATOR CUFF ARTHROPATHY OF RIGHT SHOULDER: Primary | ICD-10-CM

## 2025-02-28 DIAGNOSIS — M25.60 DECREASED RANGE OF MOTION: ICD-10-CM

## 2025-02-28 PROCEDURE — 97530 THERAPEUTIC ACTIVITIES: CPT | Mod: PO

## 2025-02-28 PROCEDURE — 97112 NEUROMUSCULAR REEDUCATION: CPT | Mod: PO

## 2025-02-28 PROCEDURE — 97140 MANUAL THERAPY 1/> REGIONS: CPT | Mod: PO

## 2025-03-06 NOTE — PROGRESS NOTES
[facility]   Physical Therapy Treatment Note      Name: Enoch Barr  [facility] Number: [id number]    Therapy Diagnosis:   Encounter Diagnoses   Name Primary?    Rotator cuff arthropathy of right shoulder Yes    Decreased range of motion      Physician: Vernon Barth MD    Visit Date: 2/20/2025    Physician Orders: PT Eval and Treat   Medical Diagnosis from Referral: M12.811 (ICD-10-CM) - Rotator cuff arthropathy of right shoulder   Evaluation Date: 12/10/2024  Authorization Period Expiration: 12/31/2024  Plan of Care Expiration: 3/30/2025  Progress Note Due: 1/10/2025  Visit # / Visits authorized: 4/ 20  FOTO: 1/3    Time In: 1700  Time Out: 1800  Total Billable Time: 60 minutes    Subjective     Pt reports: doing okay, still noticed some shoulder instability with shoulder elevation over 120 degrees.  He was compliant with home exercise program.  Response to previous treatment: NA  Functional change: too soon    Pain: 3/10  Location: right shoulder     Objective      Objective Measures updated at progress report unless specified.   1/25  Shoulder Active Range of Motion: *=pain  Shoulder Right Left   Flexion    150* 170   ER at 0    50* 60   Behind the back Reach    L2 T7     Treatment     Enoch received the treatments listed below:      therapeutic exercises to develop strength, endurance, ROM, and flexibility for 12 minutes including:  Supine shoulder flexion with cane x 30  Half kneeling open book x 30  Thoracic extension on the chair x 30 NP-     manual therapy techniques: Joint mobilizations were applied to the: shoulder for 10 minutes, including:  Posterior/inferior GHJ glide grade III  RA stabilization shoulder ER/IR 3 x 20 sec   Supine thoracic manipulation grade V NP-        neuromuscular re-education activities to improve: Balance, Coordination, Kinesthetic, Sense, and Proprioception for 26  minutes. The following activities were included:  SA wall slide with band x  30  Prone modified T 2 x 15   ER at 90 degrees with elbow supported with ORG x 30  Prone IR with 1# 3 x 10  Landmine press with 5# x 30     therapeutic activities to improve functional performance for 10 minutes, including:  Inclined shoulder taps 3 x 15  Wall ball 3 x 30 sec     Patient Education and Home Exercises       Education provided:   - Reviewed HEP    Written Home Exercises Provided: yes. Exercises were reviewed and Enoch was able to demonstrate them prior to the end of the session.  Enoch demonstrated good  understanding of the education provided. See EMR under Patient Instructions for exercises provided during therapy sessions    Assessment     Pt presented to clinic w/ good carry over shoulder ROM but still reported shoulder pain and apprehension with end range ER and flexion. Session today focus on shoulder cuff NM control and stability training at shoulder height.. Pt donal session well w/o increased shoulder pain. Will continue to progress per tolerance.        Enoch Is progressing well towards his goals.   Pt prognosis is Good.     Pt will continue to benefit from skilled outpatient physical therapy to address the deficits listed in the problem list box on initial evaluation, provide pt/family education and to maximize pt's level of independence in the home and community environment.     Pt's spiritual, cultural and educational needs considered and pt agreeable to plan of care and goals.     Anticipated barriers to physical therapy: NA    Goals: Short Term Goals: 2-4 weeks  1. Pt will be compliant with HEP 50% of prescribed amount.   2. The pt to demo improvement in R shoulder PROM/AROM to 80% of uninvolved side   3.  Pt will improve FOTO score to meet or exceed MCID      Long Term Goals: 16 weeks   Pt will be compliant with % of prescribed amount.   Pt will improve shoulder girdle strength by 2/3 MMT grade   Pt will report ability to reach into cabinets and to bathe/groom her hair with her  right UE   The pt will report full participation in ADLs and IADLs without restrictions related to R shoulder     Plan     Plan of care Certification: 12/10/2024 to 3/30/2025.     Outpatient Physical Therapy 1-2 times weekly for 12 weeks to include the following interventions: Manual Therapy, Neuromuscular Re-ed, Patient Education, Therapeutic Activities, and Therapeutic Exercise.     Juvencio Crowley, PT

## 2025-03-07 ENCOUNTER — CLINICAL SUPPORT (OUTPATIENT)
Dept: REHABILITATION | Facility: HOSPITAL | Age: 60
End: 2025-03-07
Payer: OTHER GOVERNMENT

## 2025-03-07 DIAGNOSIS — M12.811 ROTATOR CUFF ARTHROPATHY OF RIGHT SHOULDER: Primary | ICD-10-CM

## 2025-03-07 DIAGNOSIS — M25.60 DECREASED RANGE OF MOTION: ICD-10-CM

## 2025-03-07 PROCEDURE — 97112 NEUROMUSCULAR REEDUCATION: CPT | Mod: PO

## 2025-03-07 PROCEDURE — 97530 THERAPEUTIC ACTIVITIES: CPT | Mod: PO

## 2025-03-07 PROCEDURE — 97140 MANUAL THERAPY 1/> REGIONS: CPT | Mod: PO

## 2025-03-11 ENCOUNTER — ANESTHESIA EVENT (OUTPATIENT)
Dept: SURGERY | Facility: OTHER | Age: 60
End: 2025-03-11
Payer: OTHER GOVERNMENT

## 2025-03-11 ENCOUNTER — LAB VISIT (OUTPATIENT)
Dept: LAB | Facility: HOSPITAL | Age: 60
End: 2025-03-11
Attending: INTERNAL MEDICINE
Payer: OTHER GOVERNMENT

## 2025-03-11 DIAGNOSIS — Z00.00 NORMAL PHYSICAL EXAM: ICD-10-CM

## 2025-03-11 LAB
ALBUMIN SERPL BCP-MCNC: 3.7 G/DL (ref 3.5–5.2)
ALP SERPL-CCNC: 76 U/L (ref 40–150)
ALT SERPL W/O P-5'-P-CCNC: 59 U/L (ref 10–44)
ANION GAP SERPL CALC-SCNC: 8 MMOL/L (ref 8–16)
AST SERPL-CCNC: 41 U/L (ref 10–40)
BILIRUB SERPL-MCNC: 0.7 MG/DL (ref 0.1–1)
BUN SERPL-MCNC: 16 MG/DL (ref 6–20)
CALCIUM SERPL-MCNC: 9 MG/DL (ref 8.7–10.5)
CHLORIDE SERPL-SCNC: 105 MMOL/L (ref 95–110)
CHOLEST SERPL-MCNC: 146 MG/DL (ref 120–199)
CHOLEST/HDLC SERPL: 4.9 {RATIO} (ref 2–5)
CO2 SERPL-SCNC: 27 MMOL/L (ref 23–29)
CREAT SERPL-MCNC: 0.9 MG/DL (ref 0.5–1.4)
ERYTHROCYTE [DISTWIDTH] IN BLOOD BY AUTOMATED COUNT: 12.4 % (ref 11.5–14.5)
EST. GFR  (NO RACE VARIABLE): >60 ML/MIN/1.73 M^2
ESTIMATED AVG GLUCOSE: 117 MG/DL (ref 68–131)
GLUCOSE SERPL-MCNC: 96 MG/DL (ref 70–110)
HBA1C MFR BLD: 5.7 % (ref 4–5.6)
HCT VFR BLD AUTO: 46.5 % (ref 40–54)
HDLC SERPL-MCNC: 30 MG/DL (ref 40–75)
HDLC SERPL: 20.5 % (ref 20–50)
HGB BLD-MCNC: 15.6 G/DL (ref 14–18)
LDLC SERPL CALC-MCNC: 66.8 MG/DL (ref 63–159)
MCH RBC QN AUTO: 33.7 PG (ref 27–31)
MCHC RBC AUTO-ENTMCNC: 33.5 G/DL (ref 32–36)
MCV RBC AUTO: 100 FL (ref 82–98)
NONHDLC SERPL-MCNC: 116 MG/DL
PLATELET # BLD AUTO: 304 K/UL (ref 150–450)
PMV BLD AUTO: 9.6 FL (ref 9.2–12.9)
POTASSIUM SERPL-SCNC: 4.2 MMOL/L (ref 3.5–5.1)
PROT SERPL-MCNC: 6.7 G/DL (ref 6–8.4)
RBC # BLD AUTO: 4.63 M/UL (ref 4.6–6.2)
SODIUM SERPL-SCNC: 140 MMOL/L (ref 136–145)
TRIGL SERPL-MCNC: 246 MG/DL (ref 30–150)
WBC # BLD AUTO: 4.78 K/UL (ref 3.9–12.7)

## 2025-03-11 PROCEDURE — 83036 HEMOGLOBIN GLYCOSYLATED A1C: CPT | Performed by: INTERNAL MEDICINE

## 2025-03-11 PROCEDURE — 85027 COMPLETE CBC AUTOMATED: CPT | Performed by: INTERNAL MEDICINE

## 2025-03-11 PROCEDURE — 80061 LIPID PANEL: CPT | Performed by: INTERNAL MEDICINE

## 2025-03-11 PROCEDURE — 80053 COMPREHEN METABOLIC PANEL: CPT | Performed by: INTERNAL MEDICINE

## 2025-03-11 PROCEDURE — 36415 COLL VENOUS BLD VENIPUNCTURE: CPT | Mod: PO | Performed by: INTERNAL MEDICINE

## 2025-03-12 DIAGNOSIS — I10 ESSENTIAL HYPERTENSION: ICD-10-CM

## 2025-03-12 RX ORDER — METOPROLOL SUCCINATE 25 MG/1
TABLET, EXTENDED RELEASE ORAL
Qty: 90 TABLET | Refills: 0 | Status: SHIPPED | OUTPATIENT
Start: 2025-03-12

## 2025-03-12 NOTE — TELEPHONE ENCOUNTER
No care due was identified.  [facility] Embedded Care Due Messages. Reference number: [id number].   3/12/2025 5:46:34 AM CDT

## 2025-03-12 NOTE — TELEPHONE ENCOUNTER
Refill Routing Note   Medication(s) are not appropriate for processing by [facility] for the following reason(s):        Required vitals abnormal    [facility] action(s):  Defer               Appointments  past 12m or future 3m with PCP    Date Provider   Last Visit   3/15/2024 Dipesh Clements MD   Next Visit   6/6/2025 Dipesh Clements MD   ED visits in past 90 days: 0        Note composed:1:47 PM 03/12/2025

## 2025-03-13 ENCOUNTER — RESULTS FOLLOW-UP (OUTPATIENT)
Dept: FAMILY MEDICINE | Facility: CLINIC | Age: 60
End: 2025-03-13
Payer: OTHER GOVERNMENT

## 2025-03-13 ENCOUNTER — OFFICE VISIT (OUTPATIENT)
Dept: SPORTS MEDICINE | Facility: CLINIC | Age: 60
End: 2025-03-13
Payer: OTHER GOVERNMENT

## 2025-03-13 ENCOUNTER — HOSPITAL ENCOUNTER (OUTPATIENT)
Dept: PREADMISSION TESTING | Facility: OTHER | Age: 60
Discharge: HOME | End: 2025-03-13
Attending: ANESTHESIOLOGY
Payer: OTHER GOVERNMENT

## 2025-03-13 ENCOUNTER — PATIENT MESSAGE (OUTPATIENT)
Dept: PAIN MEDICINE | Facility: OTHER | Age: 60
End: 2025-03-13
Payer: OTHER GOVERNMENT

## 2025-03-13 VITALS
BODY MASS INDEX: 31.32 KG/M2 | HEART RATE: 75 BPM | DIASTOLIC BLOOD PRESSURE: 79 MMHG | WEIGHT: 231.25 LBS | HEIGHT: 72 IN | SYSTOLIC BLOOD PRESSURE: 115 MMHG

## 2025-03-13 VITALS
WEIGHT: 224 LBS | TEMPERATURE: 99 F | HEIGHT: 72 IN | BODY MASS INDEX: 30.34 KG/M2 | SYSTOLIC BLOOD PRESSURE: 127 MMHG | OXYGEN SATURATION: 96 % | DIASTOLIC BLOOD PRESSURE: 86 MMHG | RESPIRATION RATE: 17 BRPM | HEART RATE: 73 BPM

## 2025-03-13 DIAGNOSIS — G89.29 CHRONIC RIGHT SHOULDER PAIN: ICD-10-CM

## 2025-03-13 DIAGNOSIS — M25.511 CHRONIC RIGHT SHOULDER PAIN: ICD-10-CM

## 2025-03-13 DIAGNOSIS — R73.09 ABNORMAL GLUCOSE: ICD-10-CM

## 2025-03-13 DIAGNOSIS — M12.811 ROTATOR CUFF ARTHROPATHY OF RIGHT SHOULDER: Primary | ICD-10-CM

## 2025-03-13 DIAGNOSIS — R74.01 TRANSAMINITIS: Primary | ICD-10-CM

## 2025-03-13 PROCEDURE — 99214 OFFICE O/P EST MOD 30 MIN: CPT | Mod: S$PBB,,, | Performed by: ORTHOPAEDIC SURGERY

## 2025-03-13 PROCEDURE — 99214 OFFICE O/P EST MOD 30 MIN: CPT | Mod: PBBFAC | Performed by: ORTHOPAEDIC SURGERY

## 2025-03-13 PROCEDURE — 99999 PR PBB SHADOW E&M-EST. PATIENT-LVL IV: CPT | Mod: PBBFAC,,, | Performed by: ORTHOPAEDIC SURGERY

## 2025-03-13 RX ORDER — SODIUM CHLORIDE, SODIUM LACTATE, POTASSIUM CHLORIDE, CALCIUM CHLORIDE 600; 310; 30; 20 MG/100ML; MG/100ML; MG/100ML; MG/100ML
INJECTION, SOLUTION INTRAVENOUS CONTINUOUS
OUTPATIENT
Start: 2025-03-13

## 2025-03-13 RX ORDER — LIDOCAINE HYDROCHLORIDE 10 MG/ML
0.5 INJECTION, SOLUTION EPIDURAL; INFILTRATION; INTRACAUDAL; PERINEURAL ONCE
OUTPATIENT
Start: 2025-03-13 | End: 2025-03-13

## 2025-03-13 RX ORDER — AMLODIPINE BESYLATE 2.5 MG/1
2.5 TABLET ORAL
COMMUNITY
Start: 2025-02-02 | End: 2025-03-13 | Stop reason: CLARIF

## 2025-03-13 RX ORDER — SULFAMETHOXAZOLE AND TRIMETHOPRIM 800; 160 MG/1; MG/1
TABLET ORAL
COMMUNITY
Start: 2024-05-21

## 2025-03-13 NOTE — ANESTHESIA PREPROCEDURE EVALUATION
03/13/2025  Enoch Barr is a 60 y.o., male.      Pre-op Assessment    I have reviewed the Patient Summary Reports.     I have reviewed the Nursing Notes. I have reviewed the NPO Status.   I have reviewed the Medications.     Review of Systems  Anesthesia Hx:  No previous Anesthesia   History of prior surgery of interest to airway management or planning:          Denies Family Hx of Anesthesia complications.    Denies Personal Hx of Anesthesia complications.                    Social:  Non-Smoker, Former Smoker, Social Alcohol Use       Hematology/Oncology:  Hematology Normal   Oncology Normal                                   EENT/Dental:  EENT/Dental Normal           Cardiovascular:     Hypertension           hyperlipidemia   ECG has been reviewed. Sinus kishor                           Pulmonary:        Sleep Apnea                Renal/:  Renal/ Normal                 Hepatic/GI:     GERD, well controlled                Musculoskeletal:  Arthritis          Spine Disorders: lumbar Disc disease, Degenerative disease and Chronic Pain           Neurological:   CVA, no residual symptoms Neuromuscular Disease,  Headaches           Chronic Pain Syndrome                         Endocrine:  Endocrine Normal          Obesity / BMI > 30  Dermatological:  Skin Normal    Psych:  Psychiatric Normal                    Physical Exam  General: Cooperative, Alert and Oriented    Airway:  Mallampati: II   Mouth Opening: Normal  TM Distance: Normal  Tongue: Normal  Neck ROM: Normal ROM    Dental:  Caps / Implants  Perm bridge,caps      Anesthesia Plan  Type of Anesthesia, risks & benefits discussed:    Anesthesia Type: Gen ETT  Intra-op Monitoring Plan: Standard ASA Monitors  Post Op Pain Control Plan: multimodal analgesia  Induction:  IV  Airway Plan: Video, Post-Induction  Informed Consent: Informed consent signed with  Physical Therapy E-Visit     Patient verbally declined and initiated e-visit.  This visit was completed via a telephone call.    SUBJECTIVE:   New or change in symptoms/reports since last e-visit: Continued gradual improvement in knee mobility with gradual decrease in stiffness.  Current pain/limitations: Stiffness/achiness toward end of day and with increase in walking distance (able to walk approximately 30 minutes)    OBJECTIVE (per patient report):   Limited knee flexion; progressing overall    Treatment Recommendations:   Continue current HEP.  Trial bicycle on flat surface.  Stretch knee prior to riding bike. Only ride for approximately 10 minutes maximum to prevent exacerbation. Reach out with any questions.  HEP ([employer] access code): [email or web address]    ASSESSMENT (Impression of current status):   Continued presentation of likely internal derangement compounded with prolonged dissuse of knee joint.  Overall continuing to demonstrate slow but steady ROM and activity tolerance improvement.     PLAN:   Follow up via phone call in 2 weeks.  When able, schedule patient for in-clinic appointment for re-assessment.    Time spent in E-visit with patient: 10 minutes      the Patient and all parties understand the risks and agree with anesthesia plan.  All questions answered.   ASA Score: 3  Anesthesia Plan Notes: Labs and EKG in epic    Ready For Surgery From Anesthesia Perspective.     .

## 2025-03-13 NOTE — DISCHARGE INSTRUCTIONS
Information to Prepare you for your Surgery    PRE-ADMIT TESTING -  [phone]    [address]  [facility]          Your surgery has been scheduled at [facility]. We are pleased to have the opportunity to serve you. For Further Information please call [phone].    On the day of surgery please report to the Information Desk on the 1st floor.    CONTACT YOUR PHYSICIAN'S OFFICE THE DAY PRIOR TO YOUR SURGERY TO OBTAIN YOUR ARRIVAL TIME.     The evening before surgery do not eat anything after 9 p.m. ( this includes hard candy, chewing gum and mints).  You may only have GATORADE, POWERADE AND WATER  from 9 p.m. until you leave your home.   DO NOT DRINK ANY LIQUIDS ON THE WAY TO THE HOSPITAL.      Why does your anesthesiologist allow you to drink Gatorade/Powerade before surgery?  Gatorade/Powerade helps to increase your comfort before surgery and to decrease your nausea after surgery. The carbohydrates in Gatorade/Powerade help reduce your body's stress response to surgery.  If you are a diabetic-drink only water prior to surgery.    Outpatient Surgery- May allow 2 adult (18 and older) Support Persons (1 being the designated ) for all surgical/procedural patients. A breastfeeding mother will be allowed her infant and 2 adult Support Persons. No one under the age of 18 will be allowed in the building. No swapping out of visitors in the [facility].      SPECIAL MEDICATION INSTRUCTIONS: TAKE medications checked off by the Anesthesiologist on your Medication List.    Angiogram Patients: Take medications as instructed by your physician, including aspirin.     Surgery Patients:    If you take ASPIRIN - Your PHYSICIAN/SURGEON will need to inform you IF/OR when you need to stop taking aspirin prior to your surgery.     The week prior to surgery do not ot take any medications containing IBUPROFEN or NSAIDS ( Advil, Motrin, Goodys, BC, Aleve, Naproxen etc)  If you are not sure if you should take a medicine please call your surgeon's office.  Ok to take Tylenol    Do Not Wear any make-up (especially eye make-up) to surgery. Please remove any false eyelashes or eyelash extensions. If you arrive the day of surgery with makeup/eyelashes on you will be required to remove prior to surgery. (There is a risk of corneal abrasions if eye makeup/eyelash extensions are not removed)      Leave all valuables at home.   Do Not wear any jewelry or watches, including any metal in body piercings. Jewelry must be removed prior to coming to the hospital.  There is a possibility that rings that are unable to be removed may be cut off if they are on the surgical extremity.    Please remove all hair extensions, wigs, clips and any other metal accessories/ ornaments from your hair.  These items may pose a flammable/fire risk in Surgery and must be removed.    Do not shave your surgical area at least 5 days prior to your surgery. The surgical prep will be performed at the hospital according to Infection Control regulations.    Contact Lens must be removed before surgery. Either do not wear the contact lens or bring a case and solution for storage.  Please bring a container for eyeglasses or dentures as required.  Bring any paperwork your physician has provided, such as consent forms,  history and physicals, doctor's orders, etc.   Bring comfortable clothes that are loose fitting to wear upon discharge. Take into consideration the type of surgery being performed.  Maintain your diet as advised per your physician the day prior to surgery.      Adequate rest the night before surgery is advised.   Park in the Parking lot behind the hospital or in the [facility] across the street from the parking lot. Parking is complimentary.  If you will be discharged the same day as your procedure, please arrange for a responsible adult to drive you home or to accompany you if traveling by taxi.    YOU WILL NOT BE PERMITTED TO DRIVE OR TO LEAVE THE HOSPITAL ALONE AFTER SURGERY.   If you are being discharged the same day, it is strongly recommended that you arrange for someone to remain with you for the first 24 hrs following your surgery.    The Surgeon will speak to your family/visitor after your surgery regarding the outcome of your surgery and post op care.  The Surgeon may speak to you after your surgery, but there is a possibility you may not remember the details.  Please check with your family members regarding the conversation with the Surgeon.    We strongly recommend whoever is bringing you home be present for discharge instructions.  This will ensure a thorough understanding for your post op home care.    If the patient has fever, cough, or signs/symptoms of Flu or Covid please do not come in for your surgery. Contact your surgeon and your primary care physician for further instructions.           Thank you for your cooperation.  The Staff of [facility].            Bathing Instructions with Hibiclens    Shower the evening before and morning of your procedure with Chlorhexidine (Hibiclens)  do not use Chlorhexidine on your face or genitals. Do not get in your eyes.  Wash your face with water and your regular face wash/soap  Use your regular shampoo  Apply Chlorhexidine (Hibiclens) directly on your skin or on a wet washcloth and wash gently. When showering: Move away from the shower stream when applying Chlorhexidine (Hibiclens) to avoid rinsing off too soon.  Rinse thoroughly with warm water  Do not dilute Chlorhexidine (Hibiclens)   Dry off as usual, do not use any deodorant, powder, body lotions, perfume, after shave or cologne.

## 2025-03-13 NOTE — PROGRESS NOTES
CC: RIGHT shoulder pain    60 y.o. male presents for follow up of right shoulder. At his last clinic visit he received a CSI which he notes provided moderate relief. He has been attending PT and reports moderate relief with this. He has 2 visits left and will transition to HEP. He reports pain today is 2/10 and no new injuries have occurred.       Initial Hx:    60 y.o. Male with a  history of right shoulder atraumatic pain. 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 He endorses feelings of instability and mechanical symptoms.    He states that the pain is severe and not responding to any conservative care.      He reports that the pain and weakness is worse with overhead activity. It also bothers him at night.    Is affecting ADLs.  Pain is 8/10 at it's worst.      Past Medical History:   Diagnosis Date    Colon polyps     GERD (gastroesophageal reflux disease)     Hypercholesteremia     Hypertension     on medication    Lumbar spondylosis 09/21/2020    Migraines     Sleep apnea     Stroke     DENIES       Past Surgical History:   Procedure Laterality Date    COLONOSCOPY N/A 8/10/2020    Procedure: COLONOSCOPY;  Surgeon: April Dos Santos MD;  Location: [facility] ENDO;  Service: Endoscopy;  Laterality: N/A;    EPIDURAL STEROID INJECTION Left 2/17/2023    Procedure: Left L5 Transforaminal Epidural Steroid Injection;  Surgeon: Santana Rojo Jr., MD;  Location: [facility] ENDO;  Service: Pain Management;  Laterality: Left;  @1245  No ATC or DM    EPIDURAL STEROID INJECTION Bilateral 4/14/2023    Procedure: Bilateral L3, L4, L5 Medial Branch Blocks #1;  Surgeon: Santana Rojo Jr., MD;  Location: [facility] ENDO;  Service: Pain Management;  Laterality: Bilateral;  @1230 (requests afternoon)  No ATC or DM    EPIDURAL STEROID  INJECTION Left 6/2/2023    Procedure: Left L5 + S1 Transforaminal Epidural Steroid Injections;  Surgeon: Santana Rojo Jr., MD;  Location: [facility] ENDO;  Service: Pain Management;  Laterality: Left;  @1300  No ATC or DM    FESS, WITH NASAL SEPTOPLASTY AND TURBINATE REDUCTION, WITH IMAGING ABDIRIZAK Bilateral 10/23/2024    Procedure: FESS, WITH -NASAL SEPTOPLASTY, WITH IMAGING GUIDANCE- JEFRY;  Surgeon: Humberto Fam MD;  Location: [address];  Service: ENT;  Laterality: Bilateral;  Jefry rep conf. 10/1- IR.    HERNIA REPAIR      INJECTION, SPINE, LUMBOSACRAL, TRANSFORAMINAL APPROACH Left 11/29/2023    Procedure: Left L5 + S1 Transforaminal Epidural Steroid Injections;  Surgeon: aSntana Rojo Jr., MD;  Location: [facility] PAIN MANAGEMENT;  Service: Pain Management;  Laterality: Left;  @1300  No ATC or DM  MD Sign.    LAMINECTOMY Left 7/5/2023    Procedure: LAMINECTOMY, SPINE;  Surgeon: Richard Pineda MD;  Location: [facility] OR;  Service: Neurosurgery;  Laterality: Left;  Left L5/S1 discectomy      LAPAROSCOPIC CHOLECYSTECTOMY N/A 1/25/2022    Procedure: CHOLECYSTECTOMY, LAPAROSCOPIC;  Surgeon: Jarrod Martinez MD;  Location: [facility] OR;  Service: General;  Laterality: N/A;    LUMBAR DISCECTOMY Left 7/5/2023    Procedure: DISCECTOMY, SPINE, LUMBAR;  Surgeon: Richard Pineda MD;  Location: [facility] OR;  Service: Neurosurgery;  Laterality: Left;    REPAIR OF TRICEPS TENDON Left 10/2/2020    Procedure: REPAIR, TENDON, TRICEPS;  Surgeon: Keysha Galvez MD;  Location: [facility] OR;  Service: Orthopedics;  Laterality: Left;  RN PRE OP DONE 9/29/2020----COVID NEGATIVE ON 9/29  Arthrex Rep: Delma [phone]    SINUS SURGERY  2012    SURGICAL REMOVAL OF BUNION WITH OSTEOTOMY OF METATARSAL BONE Right 3/24/2023    Procedure: BUNIONECTOMY, WITH METATARSAL OSTEOTOMY;  Surgeon: Shannan An DPM;  Location: [facility] OR;  Service: Podiatry;  Laterality: Right;    TRIAL OF SPINAL CORD NERVE STIMULATOR N/A 1/3/2025    Procedure:  SPINAL CORD STIMULATOR TRIAL CANDI REP;  Surgeon: Dashawn Bethea MD;  Location: [facility];  Service: Pain Management;  Laterality: N/A;  *SCHEDULE ON 1/3 @ 8AM*  Contact Information [phone]       Family History   Problem Relation Name Age of Onset    Coronary artery disease Mother      Hypertension Father      No Known Problems Sister      No Known Problems Brother      Valvular heart disease Sister      No Known Problems Brother      No Known Problems Brother      No Known Problems Maternal Aunt      No Known Problems Maternal Uncle      No Known Problems Paternal Aunt      No Known Problems Paternal Uncle      No Known Problems Maternal Grandmother      No Known Problems Maternal Grandfather      No Known Problems Paternal Grandmother      No Known Problems Paternal Grandfather      Amblyopia Neg Hx      Blindness Neg Hx      Cancer Neg Hx      Cataracts Neg Hx      Diabetes Neg Hx      Glaucoma Neg Hx      Macular degeneration Neg Hx      Retinal detachment Neg Hx      Strabismus Neg Hx      Stroke Neg Hx      Thyroid disease Neg Hx           Current Outpatient Medications:     acetaminophen (TYLENOL) 500 MG tablet, Take 1 tablet (500 mg total) by mouth every 6 (six) hours as needed for Pain., Disp: 20 tablet, Rfl: 0    acetaminophen (TYLENOL) 500 MG tablet, Take 1 tablet (500 mg total) by mouth every 6 (six) hours as needed for Pain or Temperature greater than (100.5). Note do not exceed >3000mg tylenol/acetaminophen in 24hr period., Disp: 50 tablet, Rfl: 0    atorvastatin (LIPITOR) 40 MG tablet, Take 1 tablet (40 mg total) by mouth every evening., Disp: 90 tablet, Rfl: 0    diclofenac sodium (VOLTAREN) 1 % Gel, Apply 2 g topically 4 (four) times daily., Disp: 100 g, Rfl: 0    fluticasone propionate (FLONASE) 50 mcg/actuation nasal spray, 1 spray (50 mcg total) by Each Nostril route 2 (two) times daily as needed for Rhinitis., Disp: 15 g, Rfl: 11    lisinopriL-hydrochlorothiazide  (PRINZIDE,ZESTORETIC) 20-12.5 mg per tablet, Take 1 tablet by mouth once daily., Disp: 90 tablet, Rfl: 3    methocarbamoL (ROBAXIN) 750 MG Tab, TAKE 1 TABLET(750 MG) BY MOUTH THREE TIMES DAILY AS NEEDED FOR MUSCLE SPASMS, Disp: 90 tablet, Rfl: 1    metoprolol succinate (TOPROL-XL) 25 MG 24 hr tablet, TAKE 1 TABLET(25 MG) BY MOUTH DAILY (Patient taking differently: 25 mg every evening.), Disp: 90 tablet, Rfl: 0    omeprazole (PRILOSEC) 20 MG capsule, TAKE 1 CAPSULE(20 MG) BY MOUTH EVERY DAY. DECREASED DOSE, Disp: 90 capsule, Rfl: 3    sulfamethoxazole-trimethoprim 800-160mg (BACTRIM DS) 800-160 mg Tab, , Disp: , Rfl:     traZODone (DESYREL) 50 MG tablet, TAKE 2 TABLETS(100 MG) BY MOUTH EVERY NIGHT AS NEEDED FOR INSOMNIA, Disp: 180 tablet, Rfl: 3    triamcinolone acetonide 0.1% (KENALOG) 0.1 % cream, Apply topically 2 (two) times daily., Disp: 80 g, Rfl: 0    cetirizine (ZYRTEC) 10 MG tablet, Take 1 tablet (10 mg total) by mouth once daily., Disp: 30 tablet, Rfl: 0    Review of patient's allergies indicates:  No Known Allergies       REVIEW OF SYSTEMS:  Constitution: Negative. Negative for chills, fever and night sweats.   HENT: Negative for congestion and headaches.    Eyes: Negative for blurred vision, left vision loss and right vision loss.   Cardiovascular: Negative for chest pain and syncope.   Respiratory: Negative for cough and shortness of breath.    Endocrine: Negative for polydipsia, polyphagia and polyuria.   Hematologic/Lymphatic: Negative for bleeding problem. Does not bruise/bleed easily.   Skin: Negative for dry skin, itching and rash.   Musculoskeletal: Negative for falls.  Positive for right shoulder pain and muscle weakness.   Gastrointestinal: Negative for abdominal pain and bowel incontinence.   Genitourinary: Negative for bladder incontinence and nocturia.   Neurological: Negative for disturbances in coordination, loss of balance and seizures.   Psychiatric/Behavioral: Negative for depression. The  patient does not have insomnia.    Allergic/Immunologic: Negative for hives and persistent infections.      PHYSICAL EXAMINATION:  Vitals:  /79   Pulse 75   Ht 6' (1.829 m)   Wt 104.9 kg (231 lb 4.2 oz)   BMI 31.36 kg/m²    General: The patient is alert and oriented x 3.  Mood is pleasant.  Observation of ears, eyes and nose reveal no gross abnormalities.  No labored breathing observed.  Gait is coordinated. Patient can toe walk and heel walk without difficulty.      RIGHT SHOULDER / UPPER EXTREMITY EXAM    OBSERVATION:     Swelling  none  Deformity  none   Discoloration  none   Scapular winging none   Scars   none  Atrophy  none    TENDERNESS / CREPITUS (T/C):          T/C      T/C   Clavicle   -/-  SUPRAspinatus    -/-     AC Jt.    +/-  INFRAspinatus  -/-    SC Jt.    -/-  Deltoid    -/-      G. Tuberosity  -/-  LH BICEP groove  +/-   Acromion:  -/-  Midline Neck   -/-     Scapular Spine -/-  Trapezium   -/-   SMA Scapula  -/-  GH jt. line - post  -/-     Scapulothoracic  -/-         ROM: (* = with pain)  Left shoulder   Right shoulder        AROM (PROM)   AROM (PROM)   FE    170° (175°)     160° (160°)     ER at 0°    60°  (65°)    60°  (65°)   ER at 90° ABD  90°  (90°)    90°  (90°)   IR at 90°  ABD   NA  (40°)     NA  (40°)      IR (spine level)   T10     T10    STRENGTH: (* = with pain) Left shoulder   Right shoulder   SCAPTION   5/5    5/5    IR    5/5    5/5   ER    5/5    5/5*   BICEPS   5/5    5/5   Deltoid    5/5    5/5     SIGNS:  Painful side       NEER   +    OVALERIAS  + (pain with bpth)    SALCEDO   +    SPEEDS  +     DROP ARM   -   BELLY PRESS neg   Superior escape none    LIFT-OFF  neg   X-Body ADD    +    MOVING VALGUS neg        STABILITY TESTING    Left shoulder   Right shoulder    Translation     Anterior  up face     up face    Posterior  up face    up face    Sulcus   < 10mm    < 10 mm     Signs   Apprehension   neg      neg       Relocation   no change     no change      Jerk  test  neg     neg    EXTREMITY NEURO-VASCULAR EXAM:    Sensation grossly intact to light touch all dermatomal regions.    DTR 2+ Biceps, Triceps, BR and Negative Sanfords sign   Grossly intact motor function at Elbow, Wrist and Hand   Distal pulses radial and ulnar 2+, brisk cap refill, symmetric.      NECK:  Painless FROM and spinous processes non-tender. Negative Spurlings sign.      OTHER FINDINGS:      XRAYS:  Xrays including AP, Outlet and Axillary Lateral of shoulder are ordered / images reviewed by me:   No fracture dislocation or other pathology   Acromion type 1   Proximal migration of humeral head: None   GH arthritis: None    No results found for this or any previous visit (from the past 2160 hours).            ASSESSMENT:   Right shoulder pain:  1. Rotator cuff arthropathy of right shoulder    2. Chronic right shoulder pain            PLAN:      1. Continue PT transition to HEP     2. F/u PRN     All questions were answered, patient will contact us for questions or concerns in the interim.

## 2025-03-14 DIAGNOSIS — G47.00 INSOMNIA, UNSPECIFIED TYPE: ICD-10-CM

## 2025-03-14 RX ORDER — TRAZODONE HYDROCHLORIDE 50 MG/1
TABLET ORAL
Qty: 180 TABLET | Refills: 0 | Status: SHIPPED | OUTPATIENT
Start: 2025-03-14

## 2025-03-14 NOTE — PROGRESS NOTES
CBC stable macrocytosis  Moderate alcohol use weekends  CMP transaminitis seen previously  Lipid TG high HDL low  A1c 5.7 new preDM    Had Physical examination with me in March but rescheduled for June  Keep that appt  Cut down on ETOH weekends

## 2025-03-14 NOTE — PROGRESS NOTES
[facility]   Physical Therapy Treatment Note      Name: Enoch Barr  [facility] Number: [id number]    Therapy Diagnosis:   Encounter Diagnoses   Name Primary?    Rotator cuff arthropathy of right shoulder Yes    Decreased range of motion      Physician: Vernon Barth MD    Visit Date: 2/28/2025    Physician Orders: PT Eval and Treat   Medical Diagnosis from Referral: M12.811 (ICD-10-CM) - Rotator cuff arthropathy of right shoulder   Evaluation Date: 12/10/2024  Authorization Period Expiration: 12/31/2024  Plan of Care Expiration: 4/30/2025  Progress Note Due: 3/30//2025  Visit # / Visits authorized: 7/12  FOTO: 1/3    Time In: 1100  Time Out: 1200  Total Billable Time: 60 minutes    Subjective     Pt reports: doing okay, no new complaints upon arrival. He was compliant with home exercise program.  Response to previous treatment: NA  Functional change: too soon    Pain: 3/10  Location: right shoulder     Objective      Objective Measures updated at progress report unless specified.   1/25  Shoulder Active Range of Motion: *=pain  Shoulder Right Left   Flexion    150* 170   ER at 0    50* 60   Behind the back Reach    L2 T7     Treatment     Enoch received the treatments listed below:      therapeutic exercises to develop strength, endurance, ROM, and flexibility for 12 minutes including:  Supine shoulder flexion with cane x 30  Half kneeling open book x 30  Thoracic extension on the chair x 30 NP-     manual therapy techniques: Joint mobilizations were applied to the: shoulder for 10 minutes, including:  Posterior/inferior GHJ glide grade III  RA stabilization shoulder ER/IR 3 x 20 sec   Supine thoracic manipulation grade V NP-        neuromuscular re-education activities to improve: Balance, Coordination, Kinesthetic, Sense, and Proprioception for 26  minutes. The following activities were included:  SA wall slide with band x 30  Prone modified T 2 x 15   ER at 90 degrees with  elbow supported with ORG x 30  Prone IR with 1# 3 x 10  Landmine press with 5# x 30     therapeutic activities to improve functional performance for 10 minutes, including:  Inclined shoulder taps 3 x 15  Wall ball 3 x 30 sec     Patient Education and Home Exercises       Education provided:   - Reviewed HEP    Written Home Exercises Provided: yes. Exercises were reviewed and Enoch was able to demonstrate them prior to the end of the session.  Enoch demonstrated good  understanding of the education provided. See EMR under Patient Instructions for exercises provided during therapy sessions    Assessment     Pt presented to clinic w/ good carry over shoulder ROM but still reported shoulder pain and apprehension with end range ER and flexion. Session today focus on shoulder cuff NM control and stability training at shoulder height.. Pt donal session well w/o increased shoulder pain. Will continue to PT to maximize functional mobility and overhead stability.        Enoch Is progressing well towards his goals.   Pt prognosis is Good.     Pt will continue to benefit from skilled outpatient physical therapy to address the deficits listed in the problem list box on initial evaluation, provide pt/family education and to maximize pt's level of independence in the home and community environment.     Pt's spiritual, cultural and educational needs considered and pt agreeable to plan of care and goals.     Anticipated barriers to physical therapy: NA    Goals: Short Term Goals: 2-4 weeks  1. Pt will be compliant with HEP 50% of prescribed amount.   2. The pt to demo improvement in R shoulder PROM/AROM to 80% of uninvolved side   3.  Pt will improve FOTO score to meet or exceed MCID      Long Term Goals: 16 weeks   Pt will be compliant with % of prescribed amount.   Pt will improve shoulder girdle strength by 2/3 MMT grade   Pt will report ability to reach into cabinets and to bathe/groom her hair with her right UE   The pt  will report full participation in ADLs and IADLs without restrictions related to R shoulder     Plan     Plan of care Certification: 12/10/2024 to 4/30/2025.     Outpatient Physical Therapy 1-2 times weekly for 12 weeks to include the following interventions: Manual Therapy, Neuromuscular Re-ed, Patient Education, Therapeutic Activities, and Therapeutic Exercise.     Juvencio Crowley, PT

## 2025-03-14 NOTE — TELEPHONE ENCOUNTER
No care due was identified.  [facility] Embedded Care Due Messages. Reference number: [id number].   3/14/2025 3:32:31 AM CDT

## 2025-03-17 ENCOUNTER — HOSPITAL ENCOUNTER (OUTPATIENT)
Facility: OTHER | Age: 60
Discharge: HOME | End: 2025-03-17
Attending: ANESTHESIOLOGY | Admitting: ANESTHESIOLOGY
Payer: OTHER GOVERNMENT

## 2025-03-17 ENCOUNTER — ANESTHESIA (OUTPATIENT)
Dept: SURGERY | Facility: OTHER | Age: 60
End: 2025-03-17
Payer: OTHER GOVERNMENT

## 2025-03-17 DIAGNOSIS — M54.16 LUMBAR RADICULOPATHY: ICD-10-CM

## 2025-03-17 DIAGNOSIS — G89.4 CHRONIC PAIN SYNDROME: Primary | ICD-10-CM

## 2025-03-17 DIAGNOSIS — M96.1 POST LAMINECTOMY SYNDROME: Primary | ICD-10-CM

## 2025-03-17 DIAGNOSIS — G89.29 CHRONIC PAIN: ICD-10-CM

## 2025-03-17 PROCEDURE — 63600175 PHARM REV CODE 636 W HCPCS

## 2025-03-17 PROCEDURE — D9220A PRA ANESTHESIA: Mod: ANES,,, | Performed by: ANESTHESIOLOGY

## 2025-03-17 PROCEDURE — C1826 OPTIME GENERATOR, W/CLOSED LOOP LEADS AND RECHARGEABLE BATTERY: HCPCS | Performed by: ANESTHESIOLOGY

## 2025-03-17 PROCEDURE — 25000003 PHARM REV CODE 250

## 2025-03-17 PROCEDURE — 37000008 HC ANESTHESIA 1ST 15 MINUTES: Performed by: ANESTHESIOLOGY

## 2025-03-17 PROCEDURE — 63600175 PHARM REV CODE 636 W HCPCS: Performed by: ANESTHESIOLOGY

## 2025-03-17 PROCEDURE — 71000033 HC RECOVERY, INTIAL HOUR: Performed by: ANESTHESIOLOGY

## 2025-03-17 PROCEDURE — 36000707: Performed by: ANESTHESIOLOGY

## 2025-03-17 PROCEDURE — 36000706: Performed by: ANESTHESIOLOGY

## 2025-03-17 PROCEDURE — 63600175 PHARM REV CODE 636 W HCPCS: Performed by: STUDENT IN AN ORGANIZED HEALTH CARE EDUCATION/TRAINING PROGRAM

## 2025-03-17 PROCEDURE — D9220A PRA ANESTHESIA: Mod: CRNA,,,

## 2025-03-17 PROCEDURE — 71000016 HC POSTOP RECOV ADDL HR: Performed by: ANESTHESIOLOGY

## 2025-03-17 PROCEDURE — 63650 IMPLANT NEUROELECTRODES: CPT | Mod: ,,, | Performed by: ANESTHESIOLOGY

## 2025-03-17 PROCEDURE — 71000015 HC POSTOP RECOV 1ST HR: Performed by: ANESTHESIOLOGY

## 2025-03-17 PROCEDURE — 25000003 PHARM REV CODE 250: Performed by: ANESTHESIOLOGY

## 2025-03-17 PROCEDURE — 37000009 HC ANESTHESIA EA ADD 15 MINS: Performed by: ANESTHESIOLOGY

## 2025-03-17 PROCEDURE — 63685 INS/RPLC SPI NPG/RCVR POCKET: CPT | Mod: ,,, | Performed by: ANESTHESIOLOGY

## 2025-03-17 PROCEDURE — 25000242 PHARM REV CODE 250 ALT 637 W/ HCPCS

## 2025-03-17 DEVICE — IMPLANTABLE DEVICE: Type: IMPLANTABLE DEVICE | Site: BACK | Status: FUNCTIONAL

## 2025-03-17 RX ORDER — FENTANYL CITRATE 50 UG/ML
INJECTION, SOLUTION INTRAMUSCULAR; INTRAVENOUS
Status: DISCONTINUED | OUTPATIENT
Start: 2025-03-17 | End: 2025-03-17

## 2025-03-17 RX ORDER — DOXYCYCLINE HYCLATE 100 MG
100 TABLET ORAL 2 TIMES DAILY
Qty: 10 TABLET | Refills: 0 | Status: SHIPPED | OUTPATIENT
Start: 2025-03-17 | End: 2025-03-22

## 2025-03-17 RX ORDER — ALBUTEROL SULFATE 90 UG/1
INHALANT RESPIRATORY (INHALATION)
Status: DISCONTINUED | OUTPATIENT
Start: 2025-03-17 | End: 2025-03-17

## 2025-03-17 RX ORDER — GLUCAGON 1 MG
1 KIT INJECTION
Status: DISCONTINUED | OUTPATIENT
Start: 2025-03-17 | End: 2025-03-17 | Stop reason: HOSPADM

## 2025-03-17 RX ORDER — PROPOFOL 10 MG/ML
VIAL (ML) INTRAVENOUS
Status: DISCONTINUED | OUTPATIENT
Start: 2025-03-17 | End: 2025-03-17

## 2025-03-17 RX ORDER — MEPERIDINE HYDROCHLORIDE 25 MG/ML
12.5 INJECTION INTRAMUSCULAR; INTRAVENOUS; SUBCUTANEOUS ONCE AS NEEDED
Status: DISCONTINUED | OUTPATIENT
Start: 2025-03-17 | End: 2025-03-17 | Stop reason: HOSPADM

## 2025-03-17 RX ORDER — ONDANSETRON HYDROCHLORIDE 2 MG/ML
4 INJECTION, SOLUTION INTRAVENOUS DAILY PRN
Status: DISCONTINUED | OUTPATIENT
Start: 2025-03-17 | End: 2025-03-17 | Stop reason: HOSPADM

## 2025-03-17 RX ORDER — BUPIVACAINE HYDROCHLORIDE 2.5 MG/ML
INJECTION, SOLUTION EPIDURAL; INFILTRATION; INTRACAUDAL; PERINEURAL
Status: DISCONTINUED | OUTPATIENT
Start: 2025-03-17 | End: 2025-03-17 | Stop reason: HOSPADM

## 2025-03-17 RX ORDER — CEFAZOLIN 2 G/1
2 INJECTION, POWDER, FOR SOLUTION INTRAMUSCULAR; INTRAVENOUS
Status: COMPLETED | OUTPATIENT
Start: 2025-03-17 | End: 2025-03-17

## 2025-03-17 RX ORDER — KETOROLAC TROMETHAMINE 30 MG/ML
INJECTION, SOLUTION INTRAMUSCULAR; INTRAVENOUS
Status: DISCONTINUED | OUTPATIENT
Start: 2025-03-17 | End: 2025-03-17

## 2025-03-17 RX ORDER — LIDOCAINE HYDROCHLORIDE 20 MG/ML
INJECTION INTRAVENOUS
Status: DISCONTINUED | OUTPATIENT
Start: 2025-03-17 | End: 2025-03-17

## 2025-03-17 RX ORDER — OXYCODONE HYDROCHLORIDE 5 MG/1
5 TABLET ORAL
Status: DISCONTINUED | OUTPATIENT
Start: 2025-03-17 | End: 2025-03-17 | Stop reason: HOSPADM

## 2025-03-17 RX ORDER — SODIUM CHLORIDE, SODIUM LACTATE, POTASSIUM CHLORIDE, CALCIUM CHLORIDE 600; 310; 30; 20 MG/100ML; MG/100ML; MG/100ML; MG/100ML
INJECTION, SOLUTION INTRAVENOUS CONTINUOUS
Status: DISCONTINUED | OUTPATIENT
Start: 2025-03-17 | End: 2025-03-17 | Stop reason: HOSPADM

## 2025-03-17 RX ORDER — SODIUM CHLORIDE 9 MG/ML
INJECTION, SOLUTION INTRAVENOUS CONTINUOUS
Status: DISCONTINUED | OUTPATIENT
Start: 2025-03-17 | End: 2025-03-17 | Stop reason: HOSPADM

## 2025-03-17 RX ORDER — ROCURONIUM BROMIDE 10 MG/ML
INJECTION, SOLUTION INTRAVENOUS
Status: DISCONTINUED | OUTPATIENT
Start: 2025-03-17 | End: 2025-03-17

## 2025-03-17 RX ORDER — GENTAMICIN 40 MG/ML
INJECTION, SOLUTION INTRAMUSCULAR; INTRAVENOUS
Status: DISCONTINUED | OUTPATIENT
Start: 2025-03-17 | End: 2025-03-17 | Stop reason: HOSPADM

## 2025-03-17 RX ORDER — LIDOCAINE HYDROCHLORIDE 10 MG/ML
0.5 INJECTION, SOLUTION EPIDURAL; INFILTRATION; INTRACAUDAL; PERINEURAL ONCE
Status: DISCONTINUED | OUTPATIENT
Start: 2025-03-17 | End: 2025-03-17 | Stop reason: HOSPADM

## 2025-03-17 RX ORDER — PHENYLEPHRINE HYDROCHLORIDE 10 MG/ML
INJECTION INTRAVENOUS
Status: DISCONTINUED | OUTPATIENT
Start: 2025-03-17 | End: 2025-03-17

## 2025-03-17 RX ORDER — KETAMINE HCL IN 0.9 % NACL 50 MG/5 ML
SYRINGE (ML) INTRAVENOUS
Status: DISCONTINUED | OUTPATIENT
Start: 2025-03-17 | End: 2025-03-17

## 2025-03-17 RX ORDER — HYDROMORPHONE HYDROCHLORIDE 2 MG/ML
0.4 INJECTION, SOLUTION INTRAMUSCULAR; INTRAVENOUS; SUBCUTANEOUS EVERY 5 MIN PRN
Status: DISCONTINUED | OUTPATIENT
Start: 2025-03-17 | End: 2025-03-17 | Stop reason: HOSPADM

## 2025-03-17 RX ORDER — HYDROCODONE BITARTRATE AND ACETAMINOPHEN 10; 325 MG/1; MG/1
1 TABLET ORAL EVERY 8 HOURS PRN
Qty: 21 TABLET | Refills: 0 | Status: SHIPPED | OUTPATIENT
Start: 2025-03-17 | End: 2025-03-24

## 2025-03-17 RX ORDER — SODIUM CHLORIDE 0.9 % (FLUSH) 0.9 %
3 SYRINGE (ML) INJECTION
Status: DISCONTINUED | OUTPATIENT
Start: 2025-03-17 | End: 2025-03-17 | Stop reason: HOSPADM

## 2025-03-17 RX ORDER — EPHEDRINE SULFATE 50 MG/ML
INJECTION, SOLUTION INTRAVENOUS
Status: DISCONTINUED | OUTPATIENT
Start: 2025-03-17 | End: 2025-03-17

## 2025-03-17 RX ORDER — ONDANSETRON HYDROCHLORIDE 2 MG/ML
INJECTION, SOLUTION INTRAVENOUS
Status: DISCONTINUED | OUTPATIENT
Start: 2025-03-17 | End: 2025-03-17

## 2025-03-17 RX ORDER — DEXAMETHASONE SODIUM PHOSPHATE 4 MG/ML
INJECTION, SOLUTION INTRA-ARTICULAR; INTRALESIONAL; INTRAMUSCULAR; INTRAVENOUS; SOFT TISSUE
Status: DISCONTINUED | OUTPATIENT
Start: 2025-03-17 | End: 2025-03-17

## 2025-03-17 RX ORDER — VANCOMYCIN HYDROCHLORIDE 1 G/20ML
INJECTION, POWDER, LYOPHILIZED, FOR SOLUTION INTRAVENOUS
Status: DISCONTINUED | OUTPATIENT
Start: 2025-03-17 | End: 2025-03-17 | Stop reason: HOSPADM

## 2025-03-17 RX ORDER — LIDOCAINE HYDROCHLORIDE AND EPINEPHRINE 10; 10 UG/ML; MG/ML
INJECTION, SOLUTION INFILTRATION; PERINEURAL
Status: DISCONTINUED | OUTPATIENT
Start: 2025-03-17 | End: 2025-03-17 | Stop reason: HOSPADM

## 2025-03-17 RX ORDER — BACITRACIN ZINC 500 UNIT/G
OINTMENT (GRAM) TOPICAL
Status: DISCONTINUED | OUTPATIENT
Start: 2025-03-17 | End: 2025-03-17 | Stop reason: HOSPADM

## 2025-03-17 RX ADMIN — PHENYLEPHRINE HYDROCHLORIDE 100 MCG: 10 INJECTION INTRAVENOUS at 07:03

## 2025-03-17 RX ADMIN — PHENYLEPHRINE HYDROCHLORIDE 200 MCG: 10 INJECTION INTRAVENOUS at 07:03

## 2025-03-17 RX ADMIN — PROPOFOL 200 MG: 10 INJECTION, EMULSION INTRAVENOUS at 07:03

## 2025-03-17 RX ADMIN — GLYCOPYRROLATE 0.1 MG: 0.2 INJECTION, SOLUTION INTRAMUSCULAR; INTRAVITREAL at 07:03

## 2025-03-17 RX ADMIN — SUGAMMADEX 200 MG: 100 INJECTION, SOLUTION INTRAVENOUS at 09:03

## 2025-03-17 RX ADMIN — EPHEDRINE SULFATE 10 MG: 50 INJECTION INTRAVENOUS at 08:03

## 2025-03-17 RX ADMIN — ALBUTEROL SULFATE 2 PUFF: 90 AEROSOL, METERED RESPIRATORY (INHALATION) at 09:03

## 2025-03-17 RX ADMIN — ROCURONIUM BROMIDE 20 MG: 10 INJECTION INTRAVENOUS at 07:03

## 2025-03-17 RX ADMIN — ONDANSETRON HYDROCHLORIDE 4 MG: 2 INJECTION INTRAMUSCULAR; INTRAVENOUS at 08:03

## 2025-03-17 RX ADMIN — FENTANYL CITRATE 50 MCG: 50 INJECTION, SOLUTION INTRAMUSCULAR; INTRAVENOUS at 09:03

## 2025-03-17 RX ADMIN — PROPOFOL 30 MG: 10 INJECTION, EMULSION INTRAVENOUS at 08:03

## 2025-03-17 RX ADMIN — DEXAMETHASONE SODIUM PHOSPHATE 8 MG: 4 INJECTION, SOLUTION INTRAMUSCULAR; INTRAVENOUS at 07:03

## 2025-03-17 RX ADMIN — CEFAZOLIN 2 G: 2 INJECTION, POWDER, FOR SOLUTION INTRAMUSCULAR; INTRAVENOUS at 07:03

## 2025-03-17 RX ADMIN — OXYCODONE HYDROCHLORIDE 5 MG: 5 TABLET ORAL at 09:03

## 2025-03-17 RX ADMIN — LIDOCAINE HYDROCHLORIDE 100 MG: 20 INJECTION, SOLUTION INTRAVENOUS at 07:03

## 2025-03-17 RX ADMIN — FENTANYL CITRATE 50 MCG: 50 INJECTION, SOLUTION INTRAMUSCULAR; INTRAVENOUS at 07:03

## 2025-03-17 RX ADMIN — PROPOFOL 30 MG: 10 INJECTION, EMULSION INTRAVENOUS at 09:03

## 2025-03-17 RX ADMIN — ROCURONIUM BROMIDE 50 MG: 10 INJECTION INTRAVENOUS at 07:03

## 2025-03-17 RX ADMIN — SODIUM CHLORIDE: 0.9 INJECTION, SOLUTION INTRAVENOUS at 07:03

## 2025-03-17 RX ADMIN — FENTANYL CITRATE 100 MCG: 50 INJECTION, SOLUTION INTRAMUSCULAR; INTRAVENOUS at 07:03

## 2025-03-17 RX ADMIN — KETOROLAC TROMETHAMINE 30 MG: 30 INJECTION, SOLUTION INTRAMUSCULAR; INTRAVENOUS at 08:03

## 2025-03-17 RX ADMIN — Medication 30 MG: at 07:03

## 2025-03-17 NOTE — ANESTHESIA POSTPROCEDURE EVALUATION
Anesthesia Post Evaluation    Patient: Enoch Barr    Procedure(s) Performed: Procedure(s) (LRB):  SPINAL CORD STIMULATOR IMPLANT SALUDA REP (N/A)    Final Anesthesia Type: general      Patient location during evaluation: PACU  Patient participation: Yes- Able to Participate  Level of consciousness: awake and alert  Post-procedure vital signs: reviewed and stable  Pain management: adequate  Airway patency: patent    PONV status at discharge: No PONV  Anesthetic complications: no      Cardiovascular status: blood pressure returned to baseline  Respiratory status: unassisted and spontaneous ventilation  Hydration status: euvolemic  Follow-up not needed.              Vitals Value Taken Time   /76 03/17/25 09:47   Temp 36.5 °C (97.7 °F) 03/17/25 09:38   Pulse 70 03/17/25 09:55   Resp 18 03/17/25 09:38   SpO2 98 % 03/17/25 09:55   Vitals shown include unfiled device data.      Event Time   Out of Recovery 09:59:24         Pain/Alexander Score: Pain Rating Prior to Med Admin: 4 (3/17/2025  9:30 AM)  Alexander Score: 10 (3/17/2025  9:38 AM)

## 2025-03-17 NOTE — DISCHARGE INSTRUCTIONS
INSTRUCTIONS    KEEP BINDER AND DRESSING ON UNTIL OFFICE VISIT    SPONGE BATH ONLY until seen by Doctor    Activity: Walk around house to perform normal daily activities NO STRENUOUS ACTIVITY FOR 4 WEEKS OR UNTIL DOCTOR RELEASES YOU    NO REPETITIVE ACTIONS ( no bending or twisting at the waist)       FOLLOW ANY OTHER INSTRUCTIONS GIVEN TO YOU BY DR DE OLIVEIRA

## 2025-03-17 NOTE — PLAN OF CARE
Enoch Barr has met all discharge criteria from Phase II. Vital Signs are stable, ambulating  without difficulty. Discharge instructions given, patient verbalized understanding. Discharged from facility via wheelchair in stable condition.

## 2025-03-17 NOTE — OP NOTE
Spinal Cord Stimulator Implant    Preoperative diagnosis: Chronic pain syndrome [G89.4]  Lumbar radiculopathy [M54.16]     Postoperative diagnosis: Chronic pain syndrome [G89.4]  Lumbar radiculopathy [M54.16]     Procedure: 1) Placement of Octad electrode x2          2) Placement of pulse generator          3) Intraoperative and post operative programming simple     Surgeon: Dashawn Bethea MD     Assistant: Stanley Duque MD  [facility]      Anesthesia: General     Device Company: [employer]    Description of procedure:     After written consent was obtained the patient was brought into the OR and placed in the prone position with all pressure points padded appropriately by the anesthesia team. The area overlying the skin of the back was prepped and draped in usual sterile fashion using chlorhexidine with an Ioban dressing over the skin. A time out was performed and there was confirmation of preoperative antibiotics - 2g Cefazolin IV.     Fluoroscopy was used to identify the T12/L1 intralaminar space this area was marked and an approximately 6 cm incision was planned for this area. The tract was anesthetized at the level of the skin and deeper tissues leading to the prevertebral fascia with 20 mL of a equal mixture of 0.5% bupivacaine with 1:200,000 epinephrine +1% lidocaine through a 25-gauge needle. This was followed with the skin incision with a 10 blade scalpel, followed by sharp and blunt dissection to the prevertebral fascia using cautery for hemostasis.  A modified Touhy needle was then advanced under fluoroscopy and walked off of the lamina at L1 on each side followed by loss of resistance to air to enter the epidural space. Once the epidural space was entered the octad electrode was advanced under live fluoroscopy in the AP and lateral view to the T7 level  in the posterior aspect of the epidural space. The electrodes were then connected with the  and intraoperative programming was  performed to confirm that the electrodes were placed appropriately. After there was confirmation of the appropriate placement the modified Touhy needles were withdrawn and the stylette were removed from the electrodes. The electrodes were then anchored into place using an anchor provided by the stimulator company and the anchor was secured to the prevertebral fascia using Fixate suture.     A pulse generator pocket was planned on the right side superior to the iliac crest, lateral to the transverse processes, and inferior to the ribs. An approximately 6 cm incision was marked out. The skin was anesthetized with an 20 mL of local anesthetic as above via a 25 gauge 1.5 inch needle. This was followed by incising the skin with a 10 blade scalpel and sharp and blunt dissection to create a pocket approximately the size of the pulse generator for the spinal cord stimulator system approximately 1/2 inch deep to the skin. Electrocautery was used for hemostasis. The tunneler provided by the company was used to create a tunnel through the superficial tissues between the midline incision and the pulse generator pocket. The electrodes were then threaded through with a stress relief loop being maintained in the midline incision. The electrodes were connected to the battery and the battery was tested so that the entire system was confirmed to be working appropriately.     Both sites were irrigated with a sterile saline solution and there was care to confirm hemostasis. Both incisions were then closed with a deep and a dermal layer of interrupted 2-0 Vicryl followed by a running 4-0 Vicryl subcuticular suture.  The skin was cleaned wet to dry, steri-strips were placed and then covered with bacitracin ointment and a sterile dressing. An abdominal binder was placed around the patient.     The patient was taken to recovery in stable condition and the stimulator was programmed postoperatively to capture all the usual painful areas.  Patient remained neurologically intact post operatively as well as hemodynamically stable.     EBL: minimal     Complications: none     Specimens: None     The patient was discharged from the hospital in stable condition with follow up scheduled.     Stanley Duque    I reviewed and edited the resident/fellow's note. I conducted my own interview and physical examination. I agree with the findings and documentation. I was present and supervising all critical portions of the procedure.      Dashawn Bethea MD

## 2025-03-17 NOTE — TRANSFER OF CARE
Anesthesia Transfer of Care Note    Patient: Enoch Barr    Procedure(s) Performed: Procedure(s) (LRB):  SPINAL CORD STIMULATOR IMPLANT LIBBYUDA REP (N/A)    Patient location: PACU    Anesthesia Type: general    Transport from OR: Transported from OR on 6-10 L/min O2 by face mask with adequate spontaneous ventilation    Post pain: adequate analgesia    Post assessment: no apparent anesthetic complications and tolerated procedure well    Post vital signs: stable    Level of consciousness: awake    Nausea/Vomiting: no nausea/vomiting    Complications: none    Transfer of care protocol was followed      Last vitals: Visit Vitals  /87 (BP Location: Right arm, Patient Position: Lying)   Pulse 77   Temp 36.2 °C (97.1 °F) (Temporal)   Resp 17   Ht 6' (1.829 m)   Wt 104.9 kg (231 lb 4.2 oz)   SpO2 97%   BMI 31.36 kg/m²

## 2025-03-17 NOTE — H&P
HPI  Patient presenting for Procedure(s) (LRB):  SPINAL CORD STIMULATOR IMPLANT SALUDA REP (N/A)     Patient on Anti-coagulation No    No health changes since previous encounter    Past Medical History:   Diagnosis Date    Colon polyps     GERD (gastroesophageal reflux disease)     Hypercholesteremia     Hypertension     on medication    Lumbar spondylosis 09/21/2020    Migraines     Sleep apnea     Stroke     DENIES     Past Surgical History:   Procedure Laterality Date    COLONOSCOPY N/A 8/10/2020    Procedure: COLONOSCOPY;  Surgeon: April Dos Santos MD;  Location: [facility] ENDO;  Service: Endoscopy;  Laterality: N/A;    EPIDURAL STEROID INJECTION Left 2/17/2023    Procedure: Left L5 Transforaminal Epidural Steroid Injection;  Surgeon: Santana Rojo Jr., MD;  Location: [facility] ENDO;  Service: Pain Management;  Laterality: Left;  @1245  No ATC or DM    EPIDURAL STEROID INJECTION Bilateral 4/14/2023    Procedure: Bilateral L3, L4, L5 Medial Branch Blocks #1;  Surgeon: Santana Rojo Jr., MD;  Location: [facility] ENDO;  Service: Pain Management;  Laterality: Bilateral;  @1230 (requests afternoon)  No ATC or DM    EPIDURAL STEROID INJECTION Left 6/2/2023    Procedure: Left L5 + S1 Transforaminal Epidural Steroid Injections;  Surgeon: Santana Rojo Jr., MD;  Location: [facility] ENDO;  Service: Pain Management;  Laterality: Left;  @1300  No ATC or DM    FESS, WITH NASAL SEPTOPLASTY AND TURBINATE REDUCTION, WITH IMAGING ABDIRIZAK Bilateral 10/23/2024    Procedure: FESS, WITH -NASAL SEPTOPLASTY, WITH IMAGING GUIDANCE- AALIYAH;  Surgeon: Humberto Fam MD;  Location: [address];  Service: ENT;  Laterality: Bilateral;  [place] rep conf. 10/1- IR.    HERNIA REPAIR      INJECTION, SPINE, LUMBOSACRAL, TRANSFORAMINAL APPROACH Left 11/29/2023    Procedure: Left L5 + S1 Transforaminal Epidural Steroid Injections;  Surgeon: Santana Rojo Jr., MD;  Location: [facility]EMENT;  Service: Pain Management;  Laterality: Left;   @1300  No ATC or DM  MD Sign.    LAMINECTOMY Left 7/5/2023    Procedure: LAMINECTOMY, SPINE;  Surgeon: Richard Pineda MD;  Location: [facility] OR;  Service: Neurosurgery;  Laterality: Left;  Left L5/S1 discectomy      LAPAROSCOPIC CHOLECYSTECTOMY N/A 1/25/2022    Procedure: CHOLECYSTECTOMY, LAPAROSCOPIC;  Surgeon: Jarrod Martinez MD;  Location: [facility] OR;  Service: General;  Laterality: N/A;    LUMBAR DISCECTOMY Left 7/5/2023    Procedure: DISCECTOMY, SPINE, LUMBAR;  Surgeon: Richard Pineda MD;  Location: [facility] OR;  Service: Neurosurgery;  Laterality: Left;    REPAIR OF TRICEPS TENDON Left 10/2/2020    Procedure: REPAIR, TENDON, TRICEPS;  Surgeon: Keysha Galvez MD;  Location: [facility] OR;  Service: Orthopedics;  Laterality: Left;  RN PRE OP DONE 9/29/2020----COVID NEGATIVE ON 9/29  [place] Rep: Delma [phone]    SINUS SURGERY  2012    SURGICAL REMOVAL OF BUNION WITH OSTEOTOMY OF METATARSAL BONE Right 3/24/2023    Procedure: BUNIONECTOMY, WITH METATARSAL OSTEOTOMY;  Surgeon: Shannan An DPM;  Location: [facility] OR;  Service: Podiatry;  Laterality: Right;    TRIAL OF SPINAL CORD NERVE STIMULATOR N/A 1/3/2025    Procedure: SPINAL CORD STIMULATOR TRIAL SALUDA REP;  Surgeon: Dashawn Bethea MD;  Location: [facility];  Service: Pain Management;  Laterality: N/A;  *SCHEDULE ON 1/3 @ 8AM*  Contact Information [phone]     Review of patient's allergies indicates:  No Known Allergies   Current Facility-Administered Medications   Medication    0.9% NaCl infusion    ceFAZolin 2 g    lactated ringers infusion    LIDOcaine (PF) 10 mg/ml (1%) injection 5 mg       PMHx, PSHx, Allergies, Medications reviewed in epic    ROS negative except pain complaints in HPI    OBJECTIVE:    /89 (BP Location: Left arm, Patient Position: Sitting)   Pulse 63   Temp 97.8 °F (36.6 °C) (Oral)   Resp 16   Ht 6' (1.829 m)   Wt 104.9 kg (231 lb 4.2 oz)   SpO2 96%   BMI 31.36 kg/m²     PHYSICAL EXAMINATION:    GENERAL: Well  appearing, in no acute distress, alert and oriented x3.  PSYCH:  Mood and affect appropriate.  SKIN: Skin color, texture, turgor normal, no rashes or lesions which will impact the procedure.  CV: RRR with palpation of the radial artery.  PULM: No evidence of respiratory difficulty, symmetric chest rise. Clear to auscultation.  NEURO: Cranial nerves grossly intact.    Plan:    Proceed with procedure as planned Procedure(s) (LRB):  SPINAL CORD STIMULATOR IMPLANT LIBBYUDA REP (N/A)    Stanley Duque  03/17/2025

## 2025-03-17 NOTE — DISCHARGE SUMMARY
Discharge Note  Short Stay      SUMMARY     Admit Date: 3/17/2025    Attending Physician: Dashawn Bethea MD    Discharge Physician: Dashawn Bethea MD      Discharge Date: 3/17/2025 7:09 AM    Procedure(s) (LRB):  SPINAL CORD STIMULATOR IMPLANT CANDI REP (N/A)    Final Diagnosis: Chronic pain syndrome [G89.4]  Lumbar radiculopathy [M54.16]    Disposition: Home or self care    Patient Instructions:   Current Discharge Medication List        CONTINUE these medications which have NOT CHANGED    Details   metoprolol succinate (TOPROL-XL) 25 MG 24 hr tablet TAKE 1 TABLET(25 MG) BY MOUTH DAILY  Qty: 90 tablet, Refills: 0    Comments: .  Associated Diagnoses: Essential hypertension      omeprazole (PRILOSEC) 20 MG capsule TAKE 1 CAPSULE(20 MG) BY MOUTH EVERY DAY. DECREASED DOSE  Qty: 90 capsule, Refills: 3    Associated Diagnoses: Gastroesophageal reflux disease without esophagitis      !! acetaminophen (TYLENOL) 500 MG tablet Take 1 tablet (500 mg total) by mouth every 6 (six) hours as needed for Pain.  Qty: 20 tablet, Refills: 0      !! acetaminophen (TYLENOL) 500 MG tablet Take 1 tablet (500 mg total) by mouth every 6 (six) hours as needed for Pain or Temperature greater than (100.5). Note do not exceed >3000mg tylenol/acetaminophen in 24hr period.  Qty: 50 tablet, Refills: 0      atorvastatin (LIPITOR) 40 MG tablet Take 1 tablet (40 mg total) by mouth every evening.  Qty: 90 tablet, Refills: 0    Comments: Pharmacy update refills, keep on file, not requesting Rx to be filled today.  Associated Diagnoses: Dyslipidemia      cetirizine (ZYRTEC) 10 MG tablet Take 1 tablet (10 mg total) by mouth once daily.  Qty: 30 tablet, Refills: 0      diclofenac sodium (VOLTAREN) 1 % Gel Apply 2 g topically 4 (four) times daily.  Qty: 100 g, Refills: 0      fluticasone propionate (FLONASE) 50 mcg/actuation nasal spray 1 spray (50 mcg total) by Each Nostril route 2 (two) times daily as needed for Rhinitis.  Qty: 15 g, Refills: 11     Comments: Pharmacy update refills, keep on file, not requesting Rx to be filled today.  Associated Diagnoses: Nasal congestion      lisinopriL-hydrochlorothiazide (PRINZIDE,ZESTORETIC) 20-12.5 mg per tablet Take 1 tablet by mouth once daily.  Qty: 90 tablet, Refills: 3    Comments: .  Associated Diagnoses: Essential hypertension      methocarbamoL (ROBAXIN) 750 MG Tab TAKE 1 TABLET(750 MG) BY MOUTH THREE TIMES DAILY AS NEEDED FOR MUSCLE SPASMS  Qty: 90 tablet, Refills: 1    Associated Diagnoses: DDD (degenerative disc disease), lumbar      sulfamethoxazole-trimethoprim 800-160mg (BACTRIM DS) 800-160 mg Tab       traZODone (DESYREL) 50 MG tablet TAKE 2 TABLETS(100 MG) BY MOUTH EVERY NIGHT AS NEEDED FOR INSOMNIA  Qty: 180 tablet, Refills: 0    Associated Diagnoses: Insomnia, unspecified type      triamcinolone acetonide 0.1% (KENALOG) 0.1 % cream Apply topically 2 (two) times daily.  Qty: 80 g, Refills: 0       !! - Potential duplicate medications found. Please discuss with provider.              Discharge Diagnosis: Chronic pain syndrome [G89.4]  Lumbar radiculopathy [M54.16]  Condition on Discharge: Stable with no complications to procedure   Diet on Discharge: Same as before.  Activity: as per instruction sheet.  Discharge to: Home with a responsible adult.  Follow up: 2-4 weeks       Please call my office or pager at [phone] if experienced any weakness or loss of sensation, fever > 101.5, pain uncontrolled with oral medications, persistent nausea/vomiting/or diarrhea, redness or drainage from the incisions, or any other worrisome concerns. If physician on call was not reached or could not communicate with our office for any reason please go to the nearest emergency department     Stanley Duque M.D.  PGY-5  Interventional Pain Management Fellow  [facility]  Pager: [phone]

## 2025-03-17 NOTE — ANESTHESIA PROCEDURE NOTES
Intubation    Date/Time: 3/17/2025 7:14 AM    Performed by: Martin Galindo CRNA  Authorized by: Boubacar Snider MD    Intubation:     Induction:  Intravenous    Intubated:  Postinduction    Mask Ventilation:  Moderately difficult with oral airway    Attempts:  1    Attempted By:  CRNA    Method of Intubation:  Video laryngoscopy    Blade:  Medellin 3    Laryngeal View Grade: Grade I - full view of cords      Difficult Airway Encountered?: No      Complications:  None    Airway Device:  Oral endotracheal tube    Airway Device Size:  8.0    Style/Cuff Inflation:  Cuffed (inflated to minimal occlusive pressure)    Tube secured:  23    Secured at:  The lips    Placement Verified By:  Capnometry    Complicating Factors:  Poor neck/head extension    Findings Post-Intubation:  BS equal bilateral and atraumatic/condition of teeth unchanged

## 2025-03-18 VITALS
DIASTOLIC BLOOD PRESSURE: 58 MMHG | HEART RATE: 70 BPM | TEMPERATURE: 98 F | OXYGEN SATURATION: 98 % | HEIGHT: 72 IN | WEIGHT: 231.25 LBS | RESPIRATION RATE: 18 BRPM | SYSTOLIC BLOOD PRESSURE: 115 MMHG | BODY MASS INDEX: 31.32 KG/M2

## 2025-03-24 NOTE — PROGRESS NOTES
[facility]   Physical Therapy Treatment Note      Name: Enoch Barr  [facility] Number: [id number]    Therapy Diagnosis:   Encounter Diagnoses   Name Primary?    Rotator cuff arthropathy of right shoulder Yes    Decreased range of motion      Physician: Vernon Barth MD    Visit Date: 3/7/2025    Physician Orders: PT Eval and Treat   Medical Diagnosis from Referral: M12.811 (ICD-10-CM) - Rotator cuff arthropathy of right shoulder   Evaluation Date: 12/10/2024  Authorization Period Expiration: 12/31/2024  Plan of Care Expiration: 4/30/2025  Progress Note Due: 3/30//2025  Visit # / Visits authorized: 7/12  FOTO: 1/3    Time In: 1100  Time Out: 1200  Total Billable Time: 60 minutes    Subjective     Pt reports: doing okay, no new complaints upon arrival. He was compliant with home exercise program.  Response to previous treatment: NA  Functional change: too soon    Pain: 3/10  Location: right shoulder     Objective      Objective Measures updated at progress report unless specified.   1/25  Shoulder Active Range of Motion: *=pain  Shoulder Right Left   Flexion    150* 170   ER at 0    50* 60   Behind the back Reach    L2 T7     Treatment     Enoch received the treatments listed below:      therapeutic exercises to develop strength, endurance, ROM, and flexibility for 12 minutes including:  Supine shoulder flexion with cane x 30  Half kneeling open book x 30  Thoracic extension on the chair x 30 NP-     manual therapy techniques: Joint mobilizations were applied to the: shoulder for 10 minutes, including:  Posterior/inferior GHJ glide grade III  RA stabilization shoulder ER/IR 3 x 20 sec   Supine thoracic manipulation grade V NP-        neuromuscular re-education activities to improve: Balance, Coordination, Kinesthetic, Sense, and Proprioception for 26  minutes. The following activities were included:  SA wall slide with band x 30  Prone modified T 2 x 15   ER at 90 degrees with elbow  supported with ORG x 30  Prone IR with 1# 3 x 10  Landmine press with 5# x 30     therapeutic activities to improve functional performance for 10 minutes, including:  Inclined shoulder taps 3 x 15  Wall ball 3 x 30 sec     Patient Education and Home Exercises       Education provided:   - Reviewed HEP    Written Home Exercises Provided: yes. Exercises were reviewed and Enoch was able to demonstrate them prior to the end of the session.  Enoch demonstrated good  understanding of the education provided. See EMR under Patient Instructions for exercises provided during therapy sessions    Assessment     Pt presented to clinic w/ good carry over shoulder ROM but still reported shoulder pain and apprehension with end range ER and flexion. Session today focus on shoulder cuff NM control and stability training at shoulder height. Pt donal session well w/o increased shoulder pain. Will continue to PT to maximize functional mobility and overhead stability.        Enoch Is progressing well towards his goals.   Pt prognosis is Good.     Pt will continue to benefit from skilled outpatient physical therapy to address the deficits listed in the problem list box on initial evaluation, provide pt/family education and to maximize pt's level of independence in the home and community environment.     Pt's spiritual, cultural and educational needs considered and pt agreeable to plan of care and goals.     Anticipated barriers to physical therapy: NA    Goals: Short Term Goals: 2-4 weeks  1. Pt will be compliant with HEP 50% of prescribed amount.   2. The pt to demo improvement in R shoulder PROM/AROM to 80% of uninvolved side   3.  Pt will improve FOTO score to meet or exceed MCID      Long Term Goals: 16 weeks   Pt will be compliant with % of prescribed amount.   Pt will improve shoulder girdle strength by 2/3 MMT grade   Pt will report ability to reach into cabinets and to bathe/groom her hair with her right UE   The pt will  report full participation in ADLs and IADLs without restrictions related to R shoulder     Plan     Plan of care Certification: 12/10/2024 to 4/30/2025.     Outpatient Physical Therapy 1-2 times weekly for 12 weeks to include the following interventions: Manual Therapy, Neuromuscular Re-ed, Patient Education, Therapeutic Activities, and Therapeutic Exercise.     Juvencio Crowley, PT

## 2025-03-27 ENCOUNTER — OFFICE VISIT (OUTPATIENT)
Dept: PAIN MEDICINE | Facility: CLINIC | Age: 60
End: 2025-03-27
Payer: OTHER GOVERNMENT

## 2025-03-27 VITALS
HEART RATE: 62 BPM | SYSTOLIC BLOOD PRESSURE: 143 MMHG | RESPIRATION RATE: 19 BRPM | HEIGHT: 72 IN | BODY MASS INDEX: 31.05 KG/M2 | WEIGHT: 229.25 LBS | OXYGEN SATURATION: 100 % | DIASTOLIC BLOOD PRESSURE: 89 MMHG

## 2025-03-27 DIAGNOSIS — Z48.89 ENCOUNTER FOR POSTOPERATIVE WOUND CHECK: Primary | ICD-10-CM

## 2025-03-27 DIAGNOSIS — Z96.89 S/P INSERTION OF SPINAL CORD STIMULATOR: ICD-10-CM

## 2025-03-27 PROCEDURE — 99024 POSTOP FOLLOW-UP VISIT: CPT | Mod: ,,,

## 2025-03-27 PROCEDURE — 99999 PR PBB SHADOW E&M-EST. PATIENT-LVL IV: CPT | Mod: PBBFAC,,,

## 2025-03-27 PROCEDURE — 99214 OFFICE O/P EST MOD 30 MIN: CPT | Mod: PBBFAC

## 2025-03-27 NOTE — PROGRESS NOTES
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 Year: Patient declined       Review of patient's allergies indicates:  No Known Allergies    Current Outpatient Medications on File Prior to Visit   Medication Sig Dispense Refill    acetaminophen (TYLENOL) 500 MG tablet Take 1 tablet (500 mg total) by mouth every 6 (six) hours as needed for Pain. 20 tablet 0    acetaminophen (TYLENOL) 500 MG tablet Take 1 tablet (500 mg total) by mouth every 6 (six) hours as needed for Pain or Temperature greater than (100.5). Note do not exceed >3000mg tylenol/acetaminophen in 24hr period. 50 tablet 0    atorvastatin (LIPITOR) 40 MG tablet Take 1 tablet (40 mg total) by mouth every evening. 90 tablet 0    diclofenac sodium (VOLTAREN) 1 % Gel Apply 2 g topically 4 (four) times daily. 100 g 0    fluticasone propionate (FLONASE) 50 mcg/actuation nasal spray 1 spray (50 mcg total) by Each Nostril route 2 (two) times daily as needed for Rhinitis. 15 g 11    lisinopriL-hydrochlorothiazide (PRINZIDE,ZESTORETIC) 20-12.5 mg per tablet Take 1 tablet by mouth once daily. 90 tablet 3    methocarbamoL (ROBAXIN) 750 MG Tab TAKE 1 TABLET(750 MG) BY MOUTH THREE TIMES DAILY AS NEEDED FOR MUSCLE SPASMS 90 tablet 1    metoprolol succinate (TOPROL-XL) 25 MG 24 hr tablet TAKE 1 TABLET(25 MG) BY MOUTH DAILY (Patient taking differently: 25 mg every evening.) 90 tablet 0     omeprazole (PRILOSEC) 20 MG capsule TAKE 1 CAPSULE(20 MG) BY MOUTH EVERY DAY. DECREASED DOSE 90 capsule 3    sulfamethoxazole-trimethoprim 800-160mg (BACTRIM DS) 800-160 mg Tab       traZODone (DESYREL) 50 MG tablet TAKE 2 TABLETS(100 MG) BY MOUTH EVERY NIGHT AS NEEDED FOR INSOMNIA 180 tablet 0    triamcinolone acetonide 0.1% (KENALOG) 0.1 % cream Apply topically 2 (two) times daily. 80 g 0    cetirizine (ZYRTEC) 10 MG tablet Take 1 tablet (10 mg total) by mouth once daily. 30 tablet 0     No current facility-administered medications on file prior to visit.       Objective:      BP (!) 143/89 (BP Location: Right arm, Patient Position: Sitting)   Pulse 62   Resp 19   Ht 6' (1.829 m)   Wt 104 kg (229 lb 4.5 oz)   SpO2 100%   BMI 31.10 kg/m²      Exam:  GEN:  Well developed, well nourished.  No acute distress.  Normal pain behavior.  HEENT:  No trauma.  Mucous membranes moist.  Nares patent bilaterally.  PSYCH: Normal affect. Thought content appropriate.  CHEST:  Breathing symmetric.  No audible wheezing.  ABD: Soft, non-distended.  SKIN:  Warm, pink, dry.  No rash on exposed areas. SCS site bandages intact without drainage. Bandages removed and surgical incisions well-healing without drainage, edema, redness or any other signs of infection. No signs of dehiscence. Area cleaned with alcohol, Bacitracin, steristrips and clean bandage applied.     Previous physical exam:   EXT:  No cyanosis, clubbing, or edema.  No color change or changes in nail or hair growth.  NEURO: Fully alert, oriented, and appropriate. Speech normal [place].  No atrophy or muscle wasting noted. 5/5 strength and coordination in upper and lower extremities. No changes to sensation. Plantar reflexes downgoing. Bilateral patellar and achilles tendon reflexes 1+. No clonus.   MUSCULOSKELETAL: Full ROM of neck and spine. Negative Spurling.  Tenderness to palpation on lumbar spinous processes and bilateral SI joint L>R. No paraspinal tenderness.  Pain reproduced with flexion and side bending. + Facet loading L>R. +Bilateral FABERs, +SI distraction, +SI compression.  Bilateral straight leg raise negative in supine position - notes hamstring and calf tightness.  Gait:  Normal.  No trendelenburg sign bilaterally.             Assessment:       Encounter Diagnoses   Name Primary?    Encounter for postoperative wound check Yes    S/P insertion of spinal cord stimulator        Plan:     - He is s/p [place] SCS implantation.    - Continue abdominal binder with activity over the next 6 weeks.     - Continue activity restrictions-no twisting, lifting, bending x 6 weeks.     - No signs or symptoms of infection today. Discussed red flag symptoms.     - He can shower with incisions covered. Do not get wet until fully scarred over.     - RTC 1 week for wound check.    The above plan and management options were discussed at length with patient. Patient is in agreement with the above and verbalized understanding.     Delma Erickson NP  03/27/2025

## 2025-03-31 ENCOUNTER — PATIENT MESSAGE (OUTPATIENT)
Dept: PAIN MEDICINE | Facility: CLINIC | Age: 60
End: 2025-03-31
Payer: OTHER GOVERNMENT

## 2025-04-10 ENCOUNTER — OFFICE VISIT (OUTPATIENT)
Dept: PAIN MEDICINE | Facility: CLINIC | Age: 60
End: 2025-04-10
Payer: OTHER GOVERNMENT

## 2025-04-10 VITALS
HEART RATE: 50 BPM | DIASTOLIC BLOOD PRESSURE: 85 MMHG | RESPIRATION RATE: 16 BRPM | OXYGEN SATURATION: 99 % | BODY MASS INDEX: 31.05 KG/M2 | WEIGHT: 229.25 LBS | TEMPERATURE: 98 F | SYSTOLIC BLOOD PRESSURE: 132 MMHG | HEIGHT: 72 IN

## 2025-04-10 DIAGNOSIS — M47.816 LUMBAR SPONDYLOSIS: ICD-10-CM

## 2025-04-10 DIAGNOSIS — M70.62 GREATER TROCHANTERIC BURSITIS OF LEFT HIP: ICD-10-CM

## 2025-04-10 DIAGNOSIS — Z96.89 S/P INSERTION OF SPINAL CORD STIMULATOR: Primary | ICD-10-CM

## 2025-04-10 DIAGNOSIS — Z48.89 ENCOUNTER FOR POSTOPERATIVE WOUND CHECK: ICD-10-CM

## 2025-04-10 DIAGNOSIS — M96.1 POSTLAMINECTOMY SYNDROME OF LUMBAR REGION: ICD-10-CM

## 2025-04-10 DIAGNOSIS — G89.29 OTHER CHRONIC PAIN: ICD-10-CM

## 2025-04-10 DIAGNOSIS — M54.17 LUMBOSACRAL RADICULOPATHY: ICD-10-CM

## 2025-04-10 DIAGNOSIS — M51.369 DEGENERATION OF INTERVERTEBRAL DISC OF LUMBAR REGION, UNSPECIFIED WHETHER PAIN PRESENT: ICD-10-CM

## 2025-04-10 PROCEDURE — 99214 OFFICE O/P EST MOD 30 MIN: CPT | Mod: PBBFAC

## 2025-04-10 PROCEDURE — 99999 PR PBB SHADOW E&M-EST. PATIENT-LVL IV: CPT | Mod: PBBFAC,,,

## 2025-04-10 PROCEDURE — 99214 OFFICE O/P EST MOD 30 MIN: CPT | Mod: S$PBB,,,

## 2025-04-10 NOTE — PROGRESS NOTES
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 No     Lack of Transportation (Non-Medical): No   Physical Activity: Sufficiently Active (7/11/2024)    Exercise Vital Sign     Days of Exercise per Week: 5 days     Minutes of Exercise per Session: 60 min   Stress: Stress Concern Present (7/11/2024)    [employer] - Occupational Stress Questionnaire     Feeling of Stress : To some extent   Housing Stability: Unknown (7/11/2024)    Housing Stability Vital Sign     Unable to Pay for Housing in the Last Year: Patient declined       Review of patient's allergies indicates:  No Known Allergies    Current Outpatient Medications on File Prior to Visit   Medication Sig Dispense Refill    acetaminophen (TYLENOL) 500 MG tablet Take 1 tablet (500 mg total) by mouth every 6 (six) hours as needed for Pain. 20 tablet 0    acetaminophen (TYLENOL) 500 MG tablet Take 1 tablet (500 mg total) by mouth every 6 (six) hours as needed for Pain or Temperature greater than (100.5). Note do not exceed >3000mg tylenol/acetaminophen in 24hr period. 50 tablet 0    atorvastatin (LIPITOR) 40 MG tablet Take 1 tablet (40 mg total) by mouth every evening. 90 tablet 0    cetirizine (ZYRTEC) 10 MG tablet Take 1 tablet (10 mg total) by mouth once daily. 30 tablet 0    diclofenac sodium (VOLTAREN) 1 % Gel Apply 2 g topically 4 (four) times daily. 100 g 0     fluticasone propionate (FLONASE) 50 mcg/actuation nasal spray 1 spray (50 mcg total) by Each Nostril route 2 (two) times daily as needed for Rhinitis. 15 g 11    lisinopriL-hydrochlorothiazide (PRINZIDE,ZESTORETIC) 20-12.5 mg per tablet Take 1 tablet by mouth once daily. 90 tablet 3    methocarbamoL (ROBAXIN) 750 MG Tab TAKE 1 TABLET(750 MG) BY MOUTH THREE TIMES DAILY AS NEEDED FOR MUSCLE SPASMS 90 tablet 1    metoprolol succinate (TOPROL-XL) 25 MG 24 hr tablet TAKE 1 TABLET(25 MG) BY MOUTH DAILY (Patient taking differently: 25 mg every evening.) 90 tablet 0    omeprazole (PRILOSEC) 20 MG capsule TAKE 1 CAPSULE(20 MG) BY MOUTH EVERY DAY. DECREASED DOSE 90 capsule 3    triamcinolone acetonide 0.1% (KENALOG) 0.1 % cream Apply topically 2 (two) times daily. 80 g 0    sulfamethoxazole-trimethoprim 800-160mg (BACTRIM DS) 800-160 mg Tab  (Patient not taking: Reported on 4/10/2025)      traZODone (DESYREL) 50 MG tablet TAKE 2 TABLETS(100 MG) BY MOUTH EVERY NIGHT AS NEEDED FOR INSOMNIA (Patient not taking: Reported on 4/10/2025) 180 tablet 0     No current facility-administered medications on file prior to visit.       Objective:      /85 (BP Location: Left arm, Patient Position: Sitting)   Pulse (!) 50   Temp 97.9 °F (36.6 °C) (Oral)   Resp 16   Ht 6' (1.829 m)   Wt 104 kg (229 lb 4.5 oz)   SpO2 99%   BMI 31.10 kg/m²      Exam:   GEN:  Well developed, well nourished.  No acute distress.  Normal pain behavior.  HEENT:  No trauma.  Mucous membranes moist.  Nares patent bilaterally.  PSYCH: Normal affect. Thought content appropriate.  CHEST:  Breathing symmetric.  No audible wheezing.  ABD: Soft, non-distended.  SKIN:  Warm, pink, dry.  No rash on exposed areas. SCS site incisions well healing. No signs of dehiscence. No edema, redness, drainage or any other signs of infection. Area cleaned with alcohol and clean bandage applied.   EXT:  No cyanosis, clubbing, or edema.  No color change or changes in nail or hair  growth.  NEURO: Fully alert, oriented, and appropriate. Speech normal ashley.  No atrophy or muscle wasting noted. 5/5 strength and coordination in upper and lower extremities. No changes to sensation. Plantar reflexes downgoing. No clonus.   MUSCULOSKELETAL: Full ROM of neck and spine. Negative Spurling. Tenderness to palpation to left GTB. Bilateral straight leg raise negative in supine position.  Gait:  Normal.        Assessment:       Encounter Diagnoses   Name Primary?    S/P insertion of spinal cord stimulator Yes    Encounter for postoperative wound check     Greater trochanteric bursitis of left hip     Degeneration of intervertebral disc of lumbar region, unspecified whether pain present     Lumbosacral radiculopathy     Postlaminectomy syndrome of lumbar region     Lumbar spondylosis     Other chronic pain        Plan:     - He is s/p Jelm SCS implantation with good relief.     - Continue abdominal binder with activity over the next 4 weeks. He does not need to wear this during sleep.     - Continue activity restrictions-no twisting, lifting, bending x 4 weeks.     - No signs or symptoms of infection today. Discussed red flag symptoms.     - Plan in-office left GTB injection.     - RTC 2-3 weeks after above procedure.     The above plan and management options were discussed at length with patient. Patient is in agreement with the above and verbalized understanding.     Delma Erickson NP  04/10/2025

## 2025-04-11 ENCOUNTER — TELEPHONE (OUTPATIENT)
Dept: PAIN MEDICINE | Facility: CLINIC | Age: 60
End: 2025-04-11
Payer: OTHER GOVERNMENT

## 2025-04-11 DIAGNOSIS — M25.552 HIP PAIN, LEFT: Primary | ICD-10-CM

## 2025-04-11 NOTE — TELEPHONE ENCOUNTER
"Appointments corrected, per Delma Erickson NP.  "  ----- Message from Delma Erickson NP sent at 4/10/2025  2:29 PM CDT -----  Hey this patient was supposed to be a left GTB injection with Eshraghi and then a 2-3 week f/u after that procedure with Eshraghi as well to f/u after that and see how his SCS is doing ThanksDelma  "  "

## 2025-04-12 DIAGNOSIS — E78.5 DYSLIPIDEMIA: ICD-10-CM

## 2025-04-12 NOTE — TELEPHONE ENCOUNTER
No care due was identified.  [facility] Embedded Care Due Messages. Reference number: [id number].   4/12/2025 5:47:41 AM CDT

## 2025-04-13 RX ORDER — ATORVASTATIN CALCIUM 40 MG/1
40 TABLET, FILM COATED ORAL NIGHTLY
Qty: 90 TABLET | Refills: 0 | Status: SHIPPED | OUTPATIENT
Start: 2025-04-13

## 2025-04-22 ENCOUNTER — OFFICE VISIT (OUTPATIENT)
Dept: OTOLARYNGOLOGY | Facility: CLINIC | Age: 60
End: 2025-04-22
Payer: OTHER GOVERNMENT

## 2025-04-22 VITALS — SYSTOLIC BLOOD PRESSURE: 125 MMHG | DIASTOLIC BLOOD PRESSURE: 81 MMHG | WEIGHT: 229.5 LBS | BODY MASS INDEX: 31.13 KG/M2

## 2025-04-22 DIAGNOSIS — Z98.890 STATUS POST FUNCTIONAL ENDOSCOPIC SINUS SURGERY (FESS): ICD-10-CM

## 2025-04-22 DIAGNOSIS — J32.4 CHRONIC PANSINUSITIS: Primary | ICD-10-CM

## 2025-04-22 PROCEDURE — 99213 OFFICE O/P EST LOW 20 MIN: CPT | Mod: 25,S$PBB,, | Performed by: STUDENT IN AN ORGANIZED HEALTH CARE EDUCATION/TRAINING PROGRAM

## 2025-04-22 PROCEDURE — 31231 NASAL ENDOSCOPY DX: CPT | Mod: S$PBB,,, | Performed by: STUDENT IN AN ORGANIZED HEALTH CARE EDUCATION/TRAINING PROGRAM

## 2025-04-22 PROCEDURE — 99999 PR PBB SHADOW E&M-EST. PATIENT-LVL III: CPT | Mod: PBBFAC,,, | Performed by: STUDENT IN AN ORGANIZED HEALTH CARE EDUCATION/TRAINING PROGRAM

## 2025-04-22 PROCEDURE — 99213 OFFICE O/P EST LOW 20 MIN: CPT | Mod: PBBFAC | Performed by: STUDENT IN AN ORGANIZED HEALTH CARE EDUCATION/TRAINING PROGRAM

## 2025-04-22 PROCEDURE — 31231 NASAL ENDOSCOPY DX: CPT | Mod: PBBFAC | Performed by: STUDENT IN AN ORGANIZED HEALTH CARE EDUCATION/TRAINING PROGRAM

## 2025-04-22 RX ORDER — MUPIROCIN 20 MG/G
OINTMENT TOPICAL DAILY
Qty: 22 G | Refills: 0 | Status: SHIPPED | OUTPATIENT
Start: 2025-04-22 | End: 2025-05-06

## 2025-04-22 NOTE — PROGRESS NOTES
Subjective:      Enoch is a 60 y.o. male who comes for follow-up of sinusitis.  His last visit with me was on 1/17/2025.  He underwent STESS on 10/23/24. Overall doing well. Having some right sided nasal tenderness over the last 2 weeks. No purulent nasal drainage, no crusting noted.     His current sinus regime consists of: Flonase & saline.     The assessment of quality and severity of symptoms as measured by the SNOT-22 score: : (Patient-Rptd) (P) 28    The patient's medications, allergies, past medical, surgical, social and family histories were reviewed and updated as appropriate.    Review of Systems   Constitutional:  Positive for malaise/fatigue.   Eyes: Negative.    Respiratory:  Positive for cough.    Cardiovascular: Negative.    Gastrointestinal: Negative.    Genitourinary: Negative.    Musculoskeletal:  Positive for back pain and neck pain.   Skin: Negative.    Neurological:  Positive for headaches.   Psychiatric/Behavioral: Negative.        Answers submitted by the patient for this visit:  Review of Symptoms Questionnaire  (Submitted on 4/15/2025)  Sinus infection(s)?: Yes  postnasal drip: Yes  Snoring?: Yes  Sleep Apnea?: Yes  Acid Reflux?: Yes  Seasonal Allergies?: Yes  None of these : Yes  None of these: Yes    A detailed review of systems was obtained with pertinent positives as per the above HPI, and otherwise negative.        Objective:     /81   Wt 104.1 kg (229 lb 8 oz)   BMI 31.13 kg/m²        Constitutional:   Vital signs are normal. He appears well-developed and well-nourished.     Head:  Normocephalic and atraumatic.     Ears:  Hearing normal to normal and whispered voice; external ear normal without scars, lesions, or masses; ear canal, tympanic membrane, and middle ear normal..   Right Ear: No swelling. Tympanic membrane is not perforated and not bulging. No middle ear effusion.   Left Ear: No swelling. Tympanic membrane is not perforated and not bulging.  No middle ear  effusion.     Nose:  Nose normal including turbinates, nasal mucosa, sinuses and nasal septum. No epistaxis.     Mouth/Throat  Oropharynx clear and moist without lesions or asymmetry and normal uvula midline. Normal dentition. No tonsillar abscesses. Tonsillar exudate.      Neck:  Neck normal without thyromegaly masses, asymmetry, normal tracheal structure, crepitus, and tenderness, thyroid normal, trachea normal, phonation normal, full range of motion with neck supple and no adenopathy. No stridor present.        Head (right side): No submental adenopathy present.        Head (left side): No submental adenopathy present.     He has no cervical adenopathy.     Pulmonary/Chest:   No stridor.       Procedure    Nasal endoscopy performed.  See procedure note.    Nasal Endoscopy:  4/22/2025    The use of diagnostic nasal endoscopy was considered medically necessary for the evaluation and visualization of the nasal anatomy for symptoms suggestive of nasal or sinus origin. Physical examination (including a nasal speculum evaluation) did not provide sufficient clinical information to establish a diagnosis, or symptoms did not improve or worsened following treatment.     The nasal cavity was decongested with topical 1% phenylephrine and anesthetized with 4% lidocaine.  A rigid 0-degree endoscope was introduced into the nasal cavity.    The patient was seated in the examination chair. After discussion of risks and benefits, a nasal endoscope was inserted into the nose the endoscope was passed along the left nasal floor to the nasopharynx. It was then passed between the middle and superior meatus, nasal turbinates, nasal septum, nasopharynx and sphenoethmoid region. The nasal endoscope was withdrawn and there was no complications. An identical procedure was performed on the right side. I was present for the entire procedure.The patient tolerated the above procedure well. The findings of this procedure can be found in the  "dictated note from 4/22/2025 visit.                            Data Reviewed    WBC (K/uL)   Date Value   03/11/2025 4.78     Eosinophil % (%)   Date Value   03/09/2024 4.4     Eos # (K/uL)   Date Value   03/09/2024 0.2     Platelets (K/uL)   Date Value   03/11/2025 304     Glucose (mg/dL)   Date Value   03/11/2025 96     No results found for: "IGE"    No sinus imaging available.      Assessment:     1. Chronic pansinusitis    2. Status post functional endoscopic sinus surgery (FESS)         Plan:     - Mupirocin ointment for nasal irritation.   - Cont irrigations.   - RTC 6 months    Humberto Fam MD     "

## 2025-05-01 ENCOUNTER — PROCEDURE VISIT (OUTPATIENT)
Dept: PAIN MEDICINE | Facility: CLINIC | Age: 60
End: 2025-05-01
Payer: OTHER GOVERNMENT

## 2025-05-01 VITALS
BODY MASS INDEX: 30.46 KG/M2 | WEIGHT: 224.88 LBS | TEMPERATURE: 98 F | OXYGEN SATURATION: 100 % | HEIGHT: 72 IN | SYSTOLIC BLOOD PRESSURE: 132 MMHG | RESPIRATION RATE: 18 BRPM | HEART RATE: 62 BPM | DIASTOLIC BLOOD PRESSURE: 84 MMHG

## 2025-05-01 DIAGNOSIS — M70.62 GREATER TROCHANTERIC BURSITIS OF LEFT HIP: Primary | ICD-10-CM

## 2025-05-01 DIAGNOSIS — G89.4 CHRONIC PAIN SYNDROME: ICD-10-CM

## 2025-05-01 PROCEDURE — 99999PBSHW PR PBB SHADOW TECHNICAL ONLY FILED TO HB: Mod: PBBFAC,,,

## 2025-05-01 PROCEDURE — 20611 DRAIN/INJ JOINT/BURSA W/US: CPT | Mod: PBBFAC | Performed by: ANESTHESIOLOGY

## 2025-05-01 RX ORDER — TRIAMCINOLONE ACETONIDE 40 MG/ML
40 INJECTION, SUSPENSION INTRA-ARTICULAR; INTRAMUSCULAR
Status: COMPLETED | OUTPATIENT
Start: 2025-05-01 | End: 2025-05-01

## 2025-05-01 RX ADMIN — TRIAMCINOLONE ACETONIDE 40 MG: 40 INJECTION, SUSPENSION INTRA-ARTICULAR; INTRAMUSCULAR at 11:05

## 2025-05-01 NOTE — PROGRESS NOTES
"Patient Name: Enoch Barr  MRN: [id number]    INFORMED CONSENT: The procedure, risks, benefits and options were discussed with patient. There are no contraindications to the procedure. The patient expressed understanding and agreed to proceed. The personnel performing the procedure was discussed. I verify that I personally obtained Enoch's consent prior to the start of the procedure and the signed consent can be found on the patient's chart.    Procedure Date: 05/01/2025    Anesthesia: Topical    Pre Procedure diagnosis:   1. Greater trochanteric bursitis of left hip        Post-Procedure diagnosis: same      Sedation: None    PROCEDURE: Left GREATER TROCHANTERIC BURSA INJECTION under ultrasound guidance      DESCRIPTION OF PROCEDURE: The patient was brought to the procedure room. . The patient was positioned left lateral position on table. . . The skin overlying the left greater trochanter was prepped with povidone-iodine three times and draped in a sterile fashion.  A  21 gauge 4." simplex needle was slowly advanced through under ultrasound guidance into the left greater trochanteric bursa. . Negative aspiration was confirmed. . A combination of 5 cc of Bupivacaine 0.25% and 40 mg kenalog was easily injected. The needle was removed and bleeding was nil. A sterile dressing was applied. Patient tolerated procedure with no complications     Blood Loss: Nill  Specimen: None    Stanley Duque MD      "

## 2025-05-01 NOTE — PROCEDURES
Patient Name: Enoch Barr  MRN: [id number]    INFORMED CONSENT: The procedure, risks, benefits and options were discussed with patient. There are no contraindications to the procedure. The patient expressed understanding and agreed to proceed. The personnel performing the procedure was discussed. I verify that I personally obtained Tigists consent prior to the start of the procedure and the signed consent can be found on the patient's chart.    Procedure Date: 05/01/2025    Anesthesia: Topical    Pre Procedure diagnosis:   1. Greater trochanteric bursitis of left hip    2. Chronic pain syndrome        Post-Procedure diagnosis: same      Sedation: None    PROCEDURE: Left GREATER TROCHANTERIC BURSA INJECTION under ultrasound guidance      DESCRIPTION OF PROCEDURE: The patient was brought to the procedure room. . The patient was positioned left lateral position on table. The skin overlying the left greater trochanter was prepped with povidone-iodine three times and draped in a sterile fashion.  A  27G 3.5 inch spinal needle was slowly advanced through under ultrasound guidance into the left greater trochanteric bursa. Negative aspiration was confirmed. A combination of 5 cc of Bupivacaine 0.25% and 40 mg kenalog was easily injected. The needle was removed and bleeding was nil. A sterile dressing was applied. Patient tolerated procedure with no complications     Blood Loss: Nill  Specimen: None    Four week follow up with [facility].     Stanley Duque MD      I reviewed and edited the resident/fellow's note. I conducted my own interview and physical examination. I agree with the findings and documentation. I was present and supervising all critical portions of the procedure.      Dashawn Bethea MD

## 2025-06-06 ENCOUNTER — OFFICE VISIT (OUTPATIENT)
Dept: FAMILY MEDICINE | Facility: CLINIC | Age: 60
End: 2025-06-06
Payer: OTHER GOVERNMENT

## 2025-06-06 VITALS
OXYGEN SATURATION: 95 % | TEMPERATURE: 98 F | HEIGHT: 72 IN | BODY MASS INDEX: 30.44 KG/M2 | DIASTOLIC BLOOD PRESSURE: 80 MMHG | HEART RATE: 106 BPM | SYSTOLIC BLOOD PRESSURE: 128 MMHG | WEIGHT: 224.75 LBS

## 2025-06-06 DIAGNOSIS — R74.01 TRANSAMINITIS: ICD-10-CM

## 2025-06-06 DIAGNOSIS — R53.83 FATIGUE, UNSPECIFIED TYPE: ICD-10-CM

## 2025-06-06 DIAGNOSIS — M54.50 LUMBAR PAIN: ICD-10-CM

## 2025-06-06 DIAGNOSIS — I10 ESSENTIAL HYPERTENSION: ICD-10-CM

## 2025-06-06 DIAGNOSIS — R73.09 ABNORMAL GLUCOSE: ICD-10-CM

## 2025-06-06 DIAGNOSIS — E78.5 DYSLIPIDEMIA: ICD-10-CM

## 2025-06-06 DIAGNOSIS — G47.00 INSOMNIA, UNSPECIFIED TYPE: ICD-10-CM

## 2025-06-06 DIAGNOSIS — D12.6 TUBULAR ADENOMA OF COLON: ICD-10-CM

## 2025-06-06 DIAGNOSIS — M47.816 LUMBAR SPONDYLOSIS: ICD-10-CM

## 2025-06-06 DIAGNOSIS — K80.10 CALCULUS OF GALLBLADDER WITH CHRONIC CHOLECYSTITIS WITHOUT OBSTRUCTION: ICD-10-CM

## 2025-06-06 DIAGNOSIS — Z00.00 NORMAL PHYSICAL EXAM: Primary | ICD-10-CM

## 2025-06-06 PROCEDURE — 99214 OFFICE O/P EST MOD 30 MIN: CPT | Mod: PBBFAC,PO | Performed by: INTERNAL MEDICINE

## 2025-06-06 PROCEDURE — 99999 PR PBB SHADOW E&M-EST. PATIENT-LVL IV: CPT | Mod: PBBFAC,,, | Performed by: INTERNAL MEDICINE

## 2025-06-06 RX ORDER — METOPROLOL SUCCINATE 25 MG/1
25 TABLET, EXTENDED RELEASE ORAL DAILY
Qty: 90 TABLET | Refills: 3 | Status: SHIPPED | OUTPATIENT
Start: 2025-06-06

## 2025-06-06 RX ORDER — DOXEPIN HYDROCHLORIDE 10 MG/ML
3 SOLUTION ORAL NIGHTLY
Qty: 30 ML | Refills: 12 | Status: SHIPPED | OUTPATIENT
Start: 2025-06-06

## 2025-06-06 RX ORDER — METOPROLOL SUCCINATE 25 MG/1
TABLET, EXTENDED RELEASE ORAL
Qty: 90 TABLET | Refills: 0 | Status: SHIPPED | OUTPATIENT
Start: 2025-06-06 | End: 2025-06-06 | Stop reason: SDUPTHER

## 2025-06-06 RX ORDER — LISINOPRIL AND HYDROCHLOROTHIAZIDE 12.5; 2 MG/1; MG/1
1 TABLET ORAL DAILY
Qty: 90 TABLET | Refills: 3 | Status: SHIPPED | OUTPATIENT
Start: 2025-06-06

## 2025-06-06 RX ORDER — ATORVASTATIN CALCIUM 40 MG/1
40 TABLET, FILM COATED ORAL NIGHTLY
Qty: 90 TABLET | Refills: 3 | Status: SHIPPED | OUTPATIENT
Start: 2025-06-06

## 2025-06-09 ENCOUNTER — OFFICE VISIT (OUTPATIENT)
Dept: PAIN MEDICINE | Facility: CLINIC | Age: 60
End: 2025-06-09
Payer: OTHER GOVERNMENT

## 2025-06-09 VITALS
HEIGHT: 72 IN | SYSTOLIC BLOOD PRESSURE: 123 MMHG | HEART RATE: 63 BPM | DIASTOLIC BLOOD PRESSURE: 85 MMHG | WEIGHT: 228.19 LBS | BODY MASS INDEX: 30.91 KG/M2

## 2025-06-09 DIAGNOSIS — Z96.89 S/P INSERTION OF SPINAL CORD STIMULATOR: ICD-10-CM

## 2025-06-09 DIAGNOSIS — M70.62 GREATER TROCHANTERIC BURSITIS OF LEFT HIP: Primary | ICD-10-CM

## 2025-06-09 DIAGNOSIS — M96.1 POSTLAMINECTOMY SYNDROME OF LUMBAR REGION: ICD-10-CM

## 2025-06-09 DIAGNOSIS — G89.4 CHRONIC PAIN SYNDROME: ICD-10-CM

## 2025-06-09 DIAGNOSIS — M51.369 DEGENERATION OF INTERVERTEBRAL DISC OF LUMBAR REGION, UNSPECIFIED WHETHER PAIN PRESENT: ICD-10-CM

## 2025-06-09 DIAGNOSIS — M47.816 LUMBAR SPONDYLOSIS: ICD-10-CM

## 2025-06-09 DIAGNOSIS — M54.16 LUMBAR RADICULOPATHY: ICD-10-CM

## 2025-06-09 PROCEDURE — 99999 PR PBB SHADOW E&M-EST. PATIENT-LVL IV: CPT | Mod: PBBFAC,,,

## 2025-06-09 PROCEDURE — 99214 OFFICE O/P EST MOD 30 MIN: CPT | Mod: PBBFAC

## 2025-06-09 PROCEDURE — 99214 OFFICE O/P EST MOD 30 MIN: CPT | Mod: S$PBB,,,

## 2025-06-09 NOTE — PROGRESS NOTES
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 0.00     Types: Cigarettes     Quit date: 1999     Years since quittin.0    Smokeless tobacco: Never   Substance and Sexual Activity    Alcohol use: Yes     Alcohol/week: 7.0 standard drinks of alcohol     Types: 7 Glasses of wine per week     Comment: occasionally; NONE 24 HR PRIOR TO SURG    Drug use: Never    Sexual activity: Not Currently     Partners: Female     Social Drivers of Health     Financial Resource Strain: Low Risk  (2024)    Overall Financial Resource Strain (CARDIA)     Difficulty of Paying Living Expenses: Not very hard   Food Insecurity: Patient Declined (2024)    Hunger Vital Sign     Worried About Running Out of Food in the Last Year: Patient declined     Ran Out of Food in the Last Year: Patient declined   Transportation Needs: No Transportation Needs (2023)    PRAPARE - Transportation     Lack of Transportation (Medical): No     Lack of Transportation (Non-Medical): No   Physical Activity: Sufficiently Active (2024)    Exercise Vital Sign     Days of Exercise per Week: 5 days     Minutes of Exercise per Session: 60 min   Stress: Stress Concern Present (2024)    Citizen of [place] of Occupational [employer] - Occupational Stress Questionnaire     Feeling of Stress : To some extent   Housing Stability: Unknown (2024)    Housing Stability Vital Sign     Unable to Pay for Housing in the Last  Year: Patient declined       Review of patient's allergies indicates:  No Known Allergies    Current Outpatient Medications on File Prior to Visit   Medication Sig Dispense Refill    acetaminophen (TYLENOL) 500 MG tablet Take 1 tablet (500 mg total) by mouth every 6 (six) hours as needed for Pain or Temperature greater than (100.5). Note do not exceed >3000mg tylenol/acetaminophen in 24hr period. 50 tablet 0    atorvastatin (LIPITOR) 40 MG tablet Take 1 tablet (40 mg total) by mouth every evening. 90 tablet 3    diclofenac sodium (VOLTAREN) 1 % Gel Apply 2 g topically 4 (four) times daily. 100 g 0    doxepin (SINEQUAN) 10 mg/mL solution Take 0.3 mLs (3 mg total) by mouth every evening. 30 mL 12    fluticasone propionate (FLONASE) 50 mcg/actuation nasal spray 1 spray (50 mcg total) by Each Nostril route 2 (two) times daily as needed for Rhinitis. 15 g 11    lisinopriL-hydrochlorothiazide (PRINZIDE,ZESTORETIC) 20-12.5 mg per tablet Take 1 tablet by mouth once daily. 90 tablet 3    methocarbamoL (ROBAXIN) 750 MG Tab TAKE 1 TABLET(750 MG) BY MOUTH THREE TIMES DAILY AS NEEDED FOR MUSCLE SPASMS 90 tablet 1    metoprolol succinate (TOPROL-XL) 25 MG 24 hr tablet Take 1 tablet (25 mg total) by mouth once daily. 90 tablet 3    omeprazole (PRILOSEC) 20 MG capsule TAKE 1 CAPSULE(20 MG) BY MOUTH EVERY DAY. DECREASED DOSE 90 capsule 0    triamcinolone acetonide 0.1% (KENALOG) 0.1 % cream Apply topically 2 (two) times daily. 80 g 0    cetirizine (ZYRTEC) 10 MG tablet Take 1 tablet (10 mg total) by mouth once daily. 30 tablet 0     No current facility-administered medications on file prior to visit.       Objective:      /85 (BP Location: Right arm, Patient Position: Sitting)   Pulse 63   Ht 6' (1.829 m)   Wt 103.5 kg (228 lb 2.8 oz)   BMI 30.95 kg/m²      Exam:   GEN:  Well developed, well nourished.  No acute distress.  Normal pain behavior.  HEENT:  No trauma.  Mucous membranes moist.  Nares patent bilaterally.  PSYCH:  Normal affect. Thought content appropriate.  CHEST:  Breathing symmetric.  No audible wheezing.  ABD: Soft, non-distended.  SKIN:  Warm, pink, dry.  No rash on exposed areas. SCS site incisions well-healed.   EXT:  No cyanosis, clubbing, or edema.  No color change or changes in nail or hair growth.  BACK: Straight leg raising in the sitting position is negative to radicular pain. No pain to palpation over the spine or costovertebral angles. Normal range of motion with pain on extension and facet loading bilaterally.   NEURO: Fully alert, oriented, and appropriate. Speech normal ashley.  No atrophy or muscle wasting noted. 5/5 strength and coordination in upper and lower extremities. No changes to sensation. Plantar reflexes downgoing. No clonus.   MUSCULOSKELETAL: Full ROM of neck and spine. Negative Spurling. Tenderness to palpation to left GTB.   Gait:  Normal.        Assessment:       Encounter Diagnoses   Name Primary?    Greater trochanteric bursitis of left hip Yes    Chronic pain syndrome     S/P insertion of spinal cord stimulator     Degeneration of intervertebral disc of lumbar region, unspecified whether pain present     Postlaminectomy syndrome of lumbar region     Lumbar spondylosis     Lumbar radiculopathy          Plan:     - Previous imaging was reviewed and discussed with the patient today.     - He is s/p left GTB injection with 70-75% relief    - Patient can return to gym for exercise. Avoid excessive bending or twisting.     - Provided patient with home exercises for his back and hip bursitis vs AVS. Encouraged daily adherence.     - RTC 6-8 weeks to reevaluate axial back pain.     The above plan and management options were discussed at length with patient. Patient is in agreement with the above and verbalized understanding.     Delma Erickson NP  06/09/2025

## 2025-06-10 DIAGNOSIS — G47.00 INSOMNIA, UNSPECIFIED TYPE: ICD-10-CM

## 2025-06-10 RX ORDER — TRAZODONE HYDROCHLORIDE 50 MG/1
TABLET ORAL
Qty: 180 TABLET | Refills: 0 | OUTPATIENT
Start: 2025-06-10

## 2025-06-10 NOTE — TELEPHONE ENCOUNTER
No care due was identified.  [facility] Embedded Care Due Messages. Reference number: [id number].   6/10/2025 5:09:22 PM CDT

## 2025-06-11 NOTE — TELEPHONE ENCOUNTER
Refill Decision Note   Enoch Barr  is requesting a refill authorization.  Brief Assessment and Rationale for Refill:  Quick Discontinue     Medication Therapy Plan:        Comments:     Note composed:7:18 PM 06/10/2025            LARA received a call from pt mom Rehan Parker [phone]. LARA informed Bran Vargas Cleveland Clinic Lutheran HospitalJampp Hennepin County Medical Center. Bran Vargas is also aware that LARA informed Magen DUNBAR of the threats made.

## 2025-06-13 ENCOUNTER — LAB VISIT (OUTPATIENT)
Dept: LAB | Facility: HOSPITAL | Age: 60
End: 2025-06-13
Attending: INTERNAL MEDICINE
Payer: OTHER GOVERNMENT

## 2025-06-13 ENCOUNTER — PATIENT OUTREACH (OUTPATIENT)
Dept: ADMINISTRATIVE | Facility: HOSPITAL | Age: 60
End: 2025-06-13
Payer: OTHER GOVERNMENT

## 2025-06-13 DIAGNOSIS — R53.83 FATIGUE, UNSPECIFIED TYPE: ICD-10-CM

## 2025-06-13 LAB
ALBUMIN SERPL BCP-MCNC: 4 G/DL (ref 3.5–5.2)
ALP SERPL-CCNC: 87 UNIT/L (ref 40–150)
ALT SERPL W/O P-5'-P-CCNC: 48 UNIT/L (ref 10–44)
ANION GAP (OHS): 11 MMOL/L (ref 8–16)
AST SERPL-CCNC: 31 UNIT/L (ref 11–45)
BILIRUB SERPL-MCNC: 0.5 MG/DL (ref 0.1–1)
BUN SERPL-MCNC: 16 MG/DL (ref 6–20)
CALCIUM SERPL-MCNC: 9.2 MG/DL (ref 8.7–10.5)
CHLORIDE SERPL-SCNC: 105 MMOL/L (ref 95–110)
CO2 SERPL-SCNC: 27 MMOL/L (ref 23–29)
CREAT SERPL-MCNC: 1 MG/DL (ref 0.5–1.4)
GFR SERPLBLD CREATININE-BSD FMLA CKD-EPI: >60 ML/MIN/1.73/M2
GLUCOSE SERPL-MCNC: 75 MG/DL (ref 70–110)
POTASSIUM SERPL-SCNC: 4.2 MMOL/L (ref 3.5–5.1)
PROT SERPL-MCNC: 7.1 GM/DL (ref 6–8.4)
SODIUM SERPL-SCNC: 143 MMOL/L (ref 136–145)
TESTOST SERPL-MCNC: 363 NG/DL (ref 304–1227)

## 2025-06-13 PROCEDURE — 84403 ASSAY OF TOTAL TESTOSTERONE: CPT

## 2025-06-13 PROCEDURE — 82374 ASSAY BLOOD CARBON DIOXIDE: CPT

## 2025-06-13 PROCEDURE — 36415 COLL VENOUS BLD VENIPUNCTURE: CPT | Mod: PO

## 2025-06-15 ENCOUNTER — RESULTS FOLLOW-UP (OUTPATIENT)
Dept: FAMILY MEDICINE | Facility: CLINIC | Age: 60
End: 2025-06-15

## 2025-06-19 DIAGNOSIS — K21.9 GASTROESOPHAGEAL REFLUX DISEASE WITHOUT ESOPHAGITIS: ICD-10-CM

## 2025-06-19 RX ORDER — OMEPRAZOLE 20 MG/1
CAPSULE, DELAYED RELEASE ORAL
Qty: 90 CAPSULE | Refills: 3 | Status: SHIPPED | OUTPATIENT
Start: 2025-06-19

## 2025-06-19 NOTE — TELEPHONE ENCOUNTER
No care due was identified.  [facility] Embedded Care Due Messages. Reference number: [id number].   6/19/2025 3:30:32 AM CDT

## 2025-06-19 NOTE — TELEPHONE ENCOUNTER
Refill Decision Note   Enoch Barr  is requesting a refill authorization.  Brief Assessment and Rationale for Refill:  Approve     Medication Therapy Plan:         Comments:     Note composed:11:00 AM 06/19/2025

## (undated) DEVICE — SUT 0 VICRYL / CT-1

## (undated) DEVICE — NDL SURGEON MAYO #7 2/PK 72PKS

## (undated) DEVICE — KIT SINUS OMC

## (undated) DEVICE — PACK ARTHROSCOPY W/ISO BAC

## (undated) DEVICE — BURR PRECISION ROUND 3.0MM

## (undated) DEVICE — COVER OVERHEAD SURG LT BLUE

## (undated) DEVICE — ADAPTER PURASTAT SYRINGE

## (undated) DEVICE — SYR 50CC LL

## (undated) DEVICE — NDL HYPO REG 25G X 1 1/2

## (undated) DEVICE — SYR IRRIGATION BULB STER 60ML

## (undated) DEVICE — GOWN POLY REINF BRTH SLV LG

## (undated) DEVICE — SUT 38 FIBERWIRE #2

## (undated) DEVICE — TOWEL OR NONABSORB ADH 17X26

## (undated) DEVICE — GLOVE BIOGEL PI MICRO INDIC 8

## (undated) DEVICE — PAD CAST SPECIALIST STRL 4

## (undated) DEVICE — DRAPE C-ARMOR EQUIPMENT COVER

## (undated) DEVICE — SUT VICRYL 2-0 36 CT-1

## (undated) DEVICE — DRESSING ADH ISLAND 2.5 X 3

## (undated) DEVICE — DRESSING N ADH OIL EMUL 3X8

## (undated) DEVICE — SYR LUER SLIP GLASS 5ML

## (undated) DEVICE — SYR PURAGEL 3ML

## (undated) DEVICE — APPLICATOR CHLORAPREP ORN 26ML

## (undated) DEVICE — SEE MEDLINE ITEM 146308

## (undated) DEVICE — ADHESIVE DERMABOND ADVANCED

## (undated) DEVICE — SEE MEDLINE ITEM 152622

## (undated) DEVICE — Device

## (undated) DEVICE — DRESSING AQUACEL AG ADV 3.5X6

## (undated) DEVICE — SYR 10CC LUER LOCK

## (undated) DEVICE — BLADE ELECTRO EDGE INSULATED

## (undated) DEVICE — SEE L#120831

## (undated) DEVICE — SEE MEDLINE ITEM 146292

## (undated) DEVICE — GLOVE BIOGEL SKINSENSE PI 7.5

## (undated) DEVICE — GLOVE SURGICAL LATEX SZ 6.5

## (undated) DEVICE — DRESSING ABSRBNT ISLAND 3.6X8

## (undated) DEVICE — STAPLER SKIN ROTATING HEAD

## (undated) DEVICE — KIT ANTIFOG

## (undated) DEVICE — ELECTRODE REM PLYHSV RETURN 9

## (undated) DEVICE — SUT 0 VICRYL / UR6 (J603)

## (undated) DEVICE — PAD ABD 8X10 STERILE

## (undated) DEVICE — DRAPE C-ARM/MOBILE XRAY 44X80

## (undated) DEVICE — TUBE FRAZIER 5MM 2FT SOFT TIP

## (undated) DEVICE — CONTAINER SPECIMEN OR STER 4OZ

## (undated) DEVICE — SPLINT NASAL AIRWAY SEPTAL SIL

## (undated) DEVICE — SLEEVE SCD EXPRESS CALF MEDIUM

## (undated) DEVICE — SYR B-D DISP CONTROL 10CC100/C

## (undated) DEVICE — APPLIER CLIP ENDO LIGAMAX 5MM

## (undated) DEVICE — SUT VICRYL PLUS 4-0 P3 18IN

## (undated) DEVICE — TRAY NEURO OMC

## (undated) DEVICE — STAPLER SKIN PROXIMATE WIDE

## (undated) DEVICE — SUT VICRYL 2-0 8-18 CP-2

## (undated) DEVICE — PACK ENDOSCOPY GENERAL

## (undated) DEVICE — SUPPORT ULNA NERVE PROTECTOR

## (undated) DEVICE — DRESSING SURGICAL 1/2X1/2

## (undated) DEVICE — SEE MEDLINE ITEM 157166

## (undated) DEVICE — DRAPE LAP T SHT W/ INSTR PAD

## (undated) DEVICE — SEE MEDLINE ITEM 152522

## (undated) DEVICE — SEE MEDLINE ITEM 157110

## (undated) DEVICE — TOURNIQUET SB QC DP 18X4IN

## (undated) DEVICE — CATH SUC GRAD CHMNEY VLV 10FR

## (undated) DEVICE — SEE MEDLINE ITEM 146345

## (undated) DEVICE — DRAPE SURG W/TWL 17 5/8X23

## (undated) DEVICE — SUT CTD VICRYL 3-0 CR/SH

## (undated) DEVICE — DISPOSABLES KIT, FOR FIBERTAK DX SUTURE ANCHOR

## (undated) DEVICE — SUT VICRYL PLUS 0 CT1 36IN

## (undated) DEVICE — SLING ORTHOPEDIC LARGE

## (undated) DEVICE — BLADE SURG CARBON STEEL SZ11

## (undated) DEVICE — COVER PROXIMA MAYO STAND

## (undated) DEVICE — TOWEL OR DISP STRL BLUE 4/PK

## (undated) DEVICE — SUT MONOCRYL 4-0 PS-2

## (undated) DEVICE — SEE MEDLINE ITEM 146231

## (undated) DEVICE — COVER SNAP 36IN X 30IN

## (undated) DEVICE — CLOSURE SKIN STERI STRIP 1/2X4

## (undated) DEVICE — SEE MEDLINE ITEM 152529

## (undated) DEVICE — BLADE QUADCUT STRAIGHT 4.3MM

## (undated) DEVICE — UNDERGLOVES BIOGEL PI SIZE 7.5

## (undated) DEVICE — SUT VICRYL 4-0 ANTIBACT

## (undated) DEVICE — CANISTER SUCTION 2 LTR

## (undated) DEVICE — SEE MEDLINE ITEM 156905

## (undated) DEVICE — DRAPE C-ARM FOR MOBILE XRAY

## (undated) DEVICE — SOL IRR SOD CHL .9% POUR

## (undated) DEVICE — DRAPE INCISE IOBAN 2 23X17IN

## (undated) DEVICE — GOWN POLY REINF BRTH SLV XL

## (undated) DEVICE — GAUZE SPONGE 4X4 12PLY

## (undated) DEVICE — KIT POWDER ABSORBABLE GELATIN

## (undated) DEVICE — GLOVE BIOGEL ORTHOPEDIC 8

## (undated) DEVICE — TROCAR ENDOPATH XCEL 5X100MM

## (undated) DEVICE — TUBING INSUFFLATION 10

## (undated) DEVICE — BLANKET LOWER BODY 55.9X40.2IN

## (undated) DEVICE — COVER MAYO STND XL 30X57IN

## (undated) DEVICE — GLOVE BIOGEL 7.5

## (undated) DEVICE — SUT VICRYL 3-0 27 SH

## (undated) DEVICE — GLOVE SURGICAL LATEX SZ 8

## (undated) DEVICE — EVOKE TUNNELER

## (undated) DEVICE — SHEARS HARMONIC 36CM HD 1000I

## (undated) DEVICE — SPONGE PATTY SURGICAL .5X3IN

## (undated) DEVICE — TROCAR ENDOPATH XCEL 11MM 10CM

## (undated) DEVICE — UNDERGLOVE BIOGEL PI SZ 6.5 LF

## (undated) DEVICE — COVER TABLE HVY DTY 60X90IN

## (undated) DEVICE — SOL IRR NACL .9% 3000ML

## (undated) DEVICE — NOVAPAK NASAL SINUS PACKING & STENT

## (undated) DEVICE — DRAPE STERI INSTRUMENT 1018